# Patient Record
Sex: MALE | Race: OTHER | HISPANIC OR LATINO
[De-identification: names, ages, dates, MRNs, and addresses within clinical notes are randomized per-mention and may not be internally consistent; named-entity substitution may affect disease eponyms.]

---

## 2017-01-11 ENCOUNTER — LABORATORY RESULT (OUTPATIENT)
Age: 67
End: 2017-01-11

## 2017-01-11 ENCOUNTER — APPOINTMENT (OUTPATIENT)
Dept: INTERNAL MEDICINE | Facility: CLINIC | Age: 67
End: 2017-01-11

## 2017-01-11 ENCOUNTER — OUTPATIENT (OUTPATIENT)
Dept: OUTPATIENT SERVICES | Facility: HOSPITAL | Age: 67
LOS: 1 days | End: 2017-01-11
Payer: MEDICAID

## 2017-01-11 VITALS
SYSTOLIC BLOOD PRESSURE: 110 MMHG | DIASTOLIC BLOOD PRESSURE: 70 MMHG | BODY MASS INDEX: 27.29 KG/M2 | RESPIRATION RATE: 14 BRPM | WEIGHT: 164 LBS | HEART RATE: 66 BPM

## 2017-01-11 DIAGNOSIS — I10 ESSENTIAL (PRIMARY) HYPERTENSION: ICD-10-CM

## 2017-01-11 DIAGNOSIS — Z90.89 ACQUIRED ABSENCE OF OTHER ORGANS: Chronic | ICD-10-CM

## 2017-01-11 DIAGNOSIS — Z95.5 PRESENCE OF CORONARY ANGIOPLASTY IMPLANT AND GRAFT: Chronic | ICD-10-CM

## 2017-01-11 DIAGNOSIS — N20.0 CALCULUS OF KIDNEY: Chronic | ICD-10-CM

## 2017-01-11 DIAGNOSIS — D11.0 BENIGN NEOPLASM OF PAROTID GLAND: Chronic | ICD-10-CM

## 2017-01-11 LAB — HBA1C BLD-MCNC: 6.7 % — HIGH (ref 4–5.6)

## 2017-01-11 PROCEDURE — G0463: CPT

## 2017-01-11 PROCEDURE — 83036 HEMOGLOBIN GLYCOSYLATED A1C: CPT

## 2017-01-11 PROCEDURE — 80061 LIPID PANEL: CPT

## 2017-01-11 PROCEDURE — 80048 BASIC METABOLIC PNL TOTAL CA: CPT

## 2017-01-12 LAB
ANION GAP SERPL CALC-SCNC: 16 MMOL/L — SIGNIFICANT CHANGE UP (ref 5–17)
BUN SERPL-MCNC: 13 MG/DL — SIGNIFICANT CHANGE UP (ref 7–23)
CALCIUM SERPL-MCNC: 9.7 MG/DL — SIGNIFICANT CHANGE UP (ref 8.4–10.5)
CHLORIDE SERPL-SCNC: 99 MMOL/L — SIGNIFICANT CHANGE UP (ref 96–108)
CHOLEST SERPL-MCNC: 177 MG/DL — SIGNIFICANT CHANGE UP (ref 10–199)
CO2 SERPL-SCNC: 27 MMOL/L — SIGNIFICANT CHANGE UP (ref 22–31)
CREAT SERPL-MCNC: 0.68 MG/DL — SIGNIFICANT CHANGE UP (ref 0.5–1.3)
GLUCOSE SERPL-MCNC: 108 MG/DL — HIGH (ref 70–99)
HDLC SERPL-MCNC: 50 MG/DL — SIGNIFICANT CHANGE UP (ref 40–125)
LIPID PNL WITH DIRECT LDL SERPL: 51 MG/DL — SIGNIFICANT CHANGE UP
POTASSIUM SERPL-MCNC: 4.2 MMOL/L — SIGNIFICANT CHANGE UP (ref 3.5–5.3)
POTASSIUM SERPL-SCNC: 4.2 MMOL/L — SIGNIFICANT CHANGE UP (ref 3.5–5.3)
SODIUM SERPL-SCNC: 142 MMOL/L — SIGNIFICANT CHANGE UP (ref 135–145)
TOTAL CHOLESTEROL/HDL RATIO MEASUREMENT: 3.5 RATIO — SIGNIFICANT CHANGE UP (ref 3.4–9.6)
TRIGL SERPL-MCNC: 378 MG/DL — HIGH (ref 10–149)

## 2017-01-13 DIAGNOSIS — E78.5 HYPERLIPIDEMIA, UNSPECIFIED: ICD-10-CM

## 2017-01-13 DIAGNOSIS — E11.9 TYPE 2 DIABETES MELLITUS WITHOUT COMPLICATIONS: ICD-10-CM

## 2017-04-11 ENCOUNTER — APPOINTMENT (OUTPATIENT)
Dept: INTERNAL MEDICINE | Facility: CLINIC | Age: 67
End: 2017-04-11

## 2017-04-17 ENCOUNTER — LABORATORY RESULT (OUTPATIENT)
Age: 67
End: 2017-04-17

## 2017-04-17 ENCOUNTER — OUTPATIENT (OUTPATIENT)
Dept: OUTPATIENT SERVICES | Facility: HOSPITAL | Age: 67
LOS: 1 days | End: 2017-04-17
Payer: MEDICAID

## 2017-04-17 ENCOUNTER — APPOINTMENT (OUTPATIENT)
Dept: INTERNAL MEDICINE | Facility: CLINIC | Age: 67
End: 2017-04-17

## 2017-04-17 VITALS
SYSTOLIC BLOOD PRESSURE: 128 MMHG | BODY MASS INDEX: 26.96 KG/M2 | HEART RATE: 66 BPM | WEIGHT: 162 LBS | DIASTOLIC BLOOD PRESSURE: 86 MMHG | RESPIRATION RATE: 14 BRPM

## 2017-04-17 DIAGNOSIS — Z90.89 ACQUIRED ABSENCE OF OTHER ORGANS: Chronic | ICD-10-CM

## 2017-04-17 DIAGNOSIS — I10 ESSENTIAL (PRIMARY) HYPERTENSION: ICD-10-CM

## 2017-04-17 DIAGNOSIS — Z95.5 PRESENCE OF CORONARY ANGIOPLASTY IMPLANT AND GRAFT: Chronic | ICD-10-CM

## 2017-04-17 DIAGNOSIS — N20.0 CALCULUS OF KIDNEY: Chronic | ICD-10-CM

## 2017-04-17 DIAGNOSIS — D11.0 BENIGN NEOPLASM OF PAROTID GLAND: Chronic | ICD-10-CM

## 2017-04-17 LAB
HCT VFR BLD CALC: 45 % — SIGNIFICANT CHANGE UP (ref 39–50)
HGB BLD-MCNC: 14.9 G/DL — SIGNIFICANT CHANGE UP (ref 13–17)
MCHC RBC-ENTMCNC: 31.8 PG — SIGNIFICANT CHANGE UP (ref 27–34)
MCHC RBC-ENTMCNC: 33.1 GM/DL — SIGNIFICANT CHANGE UP (ref 32–36)
MCV RBC AUTO: 96.2 FL — SIGNIFICANT CHANGE UP (ref 80–100)
PLATELET # BLD AUTO: 323 K/UL — SIGNIFICANT CHANGE UP (ref 150–400)
RBC # BLD: 4.68 M/UL — SIGNIFICANT CHANGE UP (ref 4.2–5.8)
RBC # FLD: 13.7 % — SIGNIFICANT CHANGE UP (ref 10.3–14.5)
WBC # BLD: 10.33 K/UL — SIGNIFICANT CHANGE UP (ref 3.8–10.5)
WBC # FLD AUTO: 10.33 K/UL — SIGNIFICANT CHANGE UP (ref 3.8–10.5)

## 2017-04-17 PROCEDURE — 87338 HPYLORI STOOL AG IA: CPT

## 2017-04-17 PROCEDURE — G0463: CPT

## 2017-04-17 PROCEDURE — 83036 HEMOGLOBIN GLYCOSYLATED A1C: CPT

## 2017-04-17 PROCEDURE — 85027 COMPLETE CBC AUTOMATED: CPT

## 2017-04-18 LAB — HBA1C BLD-MCNC: 6.9 % — HIGH (ref 4–5.6)

## 2017-04-20 LAB — H PYLORI AG STL QL: SIGNIFICANT CHANGE UP

## 2017-05-02 DIAGNOSIS — E11.9 TYPE 2 DIABETES MELLITUS WITHOUT COMPLICATIONS: ICD-10-CM

## 2017-05-30 ENCOUNTER — APPOINTMENT (OUTPATIENT)
Dept: INTERNAL MEDICINE | Facility: CLINIC | Age: 67
End: 2017-05-30

## 2017-06-06 ENCOUNTER — OUTPATIENT (OUTPATIENT)
Dept: OUTPATIENT SERVICES | Facility: HOSPITAL | Age: 67
LOS: 1 days | End: 2017-06-06
Payer: MEDICAID

## 2017-06-06 ENCOUNTER — APPOINTMENT (OUTPATIENT)
Dept: INTERNAL MEDICINE | Facility: CLINIC | Age: 67
End: 2017-06-06

## 2017-06-06 VITALS
WEIGHT: 165 LBS | DIASTOLIC BLOOD PRESSURE: 70 MMHG | OXYGEN SATURATION: 95 % | HEIGHT: 65 IN | HEART RATE: 63 BPM | BODY MASS INDEX: 27.49 KG/M2 | SYSTOLIC BLOOD PRESSURE: 130 MMHG

## 2017-06-06 DIAGNOSIS — Z95.5 PRESENCE OF CORONARY ANGIOPLASTY IMPLANT AND GRAFT: Chronic | ICD-10-CM

## 2017-06-06 DIAGNOSIS — D11.0 BENIGN NEOPLASM OF PAROTID GLAND: Chronic | ICD-10-CM

## 2017-06-06 DIAGNOSIS — M19.90 UNSPECIFIED OSTEOARTHRITIS, UNSPECIFIED SITE: ICD-10-CM

## 2017-06-06 DIAGNOSIS — I10 ESSENTIAL (PRIMARY) HYPERTENSION: ICD-10-CM

## 2017-06-06 DIAGNOSIS — Z90.89 ACQUIRED ABSENCE OF OTHER ORGANS: Chronic | ICD-10-CM

## 2017-06-06 DIAGNOSIS — N20.0 CALCULUS OF KIDNEY: Chronic | ICD-10-CM

## 2017-06-06 PROCEDURE — G0463: CPT

## 2017-06-15 DIAGNOSIS — Z87.891 PERSONAL HISTORY OF NICOTINE DEPENDENCE: ICD-10-CM

## 2017-06-15 DIAGNOSIS — B35.4 TINEA CORPORIS: ICD-10-CM

## 2017-06-15 DIAGNOSIS — M19.90 UNSPECIFIED OSTEOARTHRITIS, UNSPECIFIED SITE: ICD-10-CM

## 2017-08-15 ENCOUNTER — LABORATORY RESULT (OUTPATIENT)
Age: 67
End: 2017-08-15

## 2017-08-15 ENCOUNTER — APPOINTMENT (OUTPATIENT)
Dept: INTERNAL MEDICINE | Facility: CLINIC | Age: 67
End: 2017-08-15

## 2017-08-15 ENCOUNTER — OUTPATIENT (OUTPATIENT)
Dept: OUTPATIENT SERVICES | Facility: HOSPITAL | Age: 67
LOS: 1 days | End: 2017-08-15
Payer: MEDICAID

## 2017-08-15 VITALS
DIASTOLIC BLOOD PRESSURE: 80 MMHG | BODY MASS INDEX: 27.49 KG/M2 | HEART RATE: 68 BPM | RESPIRATION RATE: 14 BRPM | WEIGHT: 165 LBS | HEIGHT: 65 IN | SYSTOLIC BLOOD PRESSURE: 150 MMHG

## 2017-08-15 DIAGNOSIS — I10 ESSENTIAL (PRIMARY) HYPERTENSION: ICD-10-CM

## 2017-08-15 DIAGNOSIS — Z90.89 ACQUIRED ABSENCE OF OTHER ORGANS: Chronic | ICD-10-CM

## 2017-08-15 DIAGNOSIS — Z95.5 PRESENCE OF CORONARY ANGIOPLASTY IMPLANT AND GRAFT: Chronic | ICD-10-CM

## 2017-08-15 DIAGNOSIS — N20.0 CALCULUS OF KIDNEY: Chronic | ICD-10-CM

## 2017-08-15 DIAGNOSIS — D11.0 BENIGN NEOPLASM OF PAROTID GLAND: Chronic | ICD-10-CM

## 2017-08-15 LAB
ANION GAP SERPL CALC-SCNC: 17 MMOL/L — SIGNIFICANT CHANGE UP (ref 5–17)
APPEARANCE UR: CLEAR — SIGNIFICANT CHANGE UP
BILIRUB UR-MCNC: NEGATIVE — SIGNIFICANT CHANGE UP
BUN SERPL-MCNC: 14 MG/DL — SIGNIFICANT CHANGE UP (ref 7–23)
CALCIUM SERPL-MCNC: 9.8 MG/DL — SIGNIFICANT CHANGE UP (ref 8.4–10.5)
CHLORIDE SERPL-SCNC: 100 MMOL/L — SIGNIFICANT CHANGE UP (ref 96–108)
CHOLEST SERPL-MCNC: 193 MG/DL — SIGNIFICANT CHANGE UP (ref 10–199)
CO2 SERPL-SCNC: 25 MMOL/L — SIGNIFICANT CHANGE UP (ref 22–31)
COLOR SPEC: YELLOW — SIGNIFICANT CHANGE UP
CREAT ?TM UR-MCNC: 83 MG/DL — SIGNIFICANT CHANGE UP
CREAT SERPL-MCNC: 0.86 MG/DL — SIGNIFICANT CHANGE UP (ref 0.5–1.3)
DIFF PNL FLD: NEGATIVE — SIGNIFICANT CHANGE UP
GLUCOSE SERPL-MCNC: 96 MG/DL — SIGNIFICANT CHANGE UP (ref 70–99)
GLUCOSE UR QL: NEGATIVE MG/DL — SIGNIFICANT CHANGE UP
HBA1C BLD-MCNC: 6.4 % — HIGH (ref 4–5.6)
HCT VFR BLD CALC: 44.4 % — SIGNIFICANT CHANGE UP (ref 39–50)
HDLC SERPL-MCNC: 45 MG/DL — SIGNIFICANT CHANGE UP (ref 40–125)
HGB BLD-MCNC: 14.1 G/DL — SIGNIFICANT CHANGE UP (ref 13–17)
KETONES UR-MCNC: NEGATIVE — SIGNIFICANT CHANGE UP
LEUKOCYTE ESTERASE UR-ACNC: NEGATIVE — SIGNIFICANT CHANGE UP
LIPID PNL WITH DIRECT LDL SERPL: 104 MG/DL — SIGNIFICANT CHANGE UP
MCHC RBC-ENTMCNC: 30.5 PG — SIGNIFICANT CHANGE UP (ref 27–34)
MCHC RBC-ENTMCNC: 31.8 GM/DL — LOW (ref 32–36)
MCV RBC AUTO: 96.1 FL — SIGNIFICANT CHANGE UP (ref 80–100)
MICROALBUMIN UR-MCNC: 1 MG/DL — SIGNIFICANT CHANGE UP
MICROALBUMIN/CREAT UR-RTO: 12 MG/G — SIGNIFICANT CHANGE UP (ref 0–30)
NITRITE UR-MCNC: NEGATIVE — SIGNIFICANT CHANGE UP
PH UR: 5 — SIGNIFICANT CHANGE UP (ref 5–8)
PLATELET # BLD AUTO: 328 K/UL — SIGNIFICANT CHANGE UP (ref 150–400)
POTASSIUM SERPL-MCNC: 4.5 MMOL/L — SIGNIFICANT CHANGE UP (ref 3.5–5.3)
POTASSIUM SERPL-SCNC: 4.5 MMOL/L — SIGNIFICANT CHANGE UP (ref 3.5–5.3)
PROT UR-MCNC: NEGATIVE MG/DL — SIGNIFICANT CHANGE UP
RBC # BLD: 4.62 M/UL — SIGNIFICANT CHANGE UP (ref 4.2–5.8)
RBC # FLD: 13.9 % — SIGNIFICANT CHANGE UP (ref 10.3–14.5)
SODIUM SERPL-SCNC: 142 MMOL/L — SIGNIFICANT CHANGE UP (ref 135–145)
SP GR SPEC: 1.02 — SIGNIFICANT CHANGE UP (ref 1.01–1.02)
TOTAL CHOLESTEROL/HDL RATIO MEASUREMENT: 4.3 RATIO — SIGNIFICANT CHANGE UP (ref 3.4–9.6)
TRIGL SERPL-MCNC: 219 MG/DL — HIGH (ref 10–149)
TSH SERPL-MCNC: 1.31 UIU/ML — SIGNIFICANT CHANGE UP (ref 0.27–4.2)
UROBILINOGEN FLD QL: NEGATIVE MG/DL — SIGNIFICANT CHANGE UP
WBC # BLD: 8.16 K/UL — SIGNIFICANT CHANGE UP (ref 3.8–10.5)
WBC # FLD AUTO: 8.16 K/UL — SIGNIFICANT CHANGE UP (ref 3.8–10.5)

## 2017-08-15 PROCEDURE — 83036 HEMOGLOBIN GLYCOSYLATED A1C: CPT

## 2017-08-15 PROCEDURE — 82043 UR ALBUMIN QUANTITATIVE: CPT

## 2017-08-15 PROCEDURE — 81003 URINALYSIS AUTO W/O SCOPE: CPT

## 2017-08-15 PROCEDURE — 84443 ASSAY THYROID STIM HORMONE: CPT

## 2017-08-15 PROCEDURE — G0463: CPT

## 2017-08-15 PROCEDURE — 80061 LIPID PANEL: CPT

## 2017-08-15 PROCEDURE — 80048 BASIC METABOLIC PNL TOTAL CA: CPT

## 2017-08-15 PROCEDURE — 85027 COMPLETE CBC AUTOMATED: CPT

## 2017-08-22 DIAGNOSIS — E11.9 TYPE 2 DIABETES MELLITUS WITHOUT COMPLICATIONS: ICD-10-CM

## 2017-08-22 DIAGNOSIS — M54.5 LOW BACK PAIN: ICD-10-CM

## 2017-08-22 DIAGNOSIS — Z87.891 PERSONAL HISTORY OF NICOTINE DEPENDENCE: ICD-10-CM

## 2017-08-22 DIAGNOSIS — E78.5 HYPERLIPIDEMIA, UNSPECIFIED: ICD-10-CM

## 2017-09-05 ENCOUNTER — APPOINTMENT (OUTPATIENT)
Dept: OPHTHALMOLOGY | Facility: CLINIC | Age: 67
End: 2017-09-05

## 2017-09-05 ENCOUNTER — OUTPATIENT (OUTPATIENT)
Dept: OUTPATIENT SERVICES | Facility: HOSPITAL | Age: 67
LOS: 1 days | End: 2017-09-05

## 2017-09-05 DIAGNOSIS — D11.0 BENIGN NEOPLASM OF PAROTID GLAND: Chronic | ICD-10-CM

## 2017-09-05 DIAGNOSIS — Z95.5 PRESENCE OF CORONARY ANGIOPLASTY IMPLANT AND GRAFT: Chronic | ICD-10-CM

## 2017-09-05 DIAGNOSIS — N20.0 CALCULUS OF KIDNEY: Chronic | ICD-10-CM

## 2017-09-05 DIAGNOSIS — Z90.89 ACQUIRED ABSENCE OF OTHER ORGANS: Chronic | ICD-10-CM

## 2017-09-14 DIAGNOSIS — E13.9 OTHER SPECIFIED DIABETES MELLITUS WITHOUT COMPLICATIONS: ICD-10-CM

## 2017-11-01 ENCOUNTER — RX RENEWAL (OUTPATIENT)
Age: 67
End: 2017-11-01

## 2018-03-26 ENCOUNTER — RX RENEWAL (OUTPATIENT)
Age: 68
End: 2018-03-26

## 2018-03-27 ENCOUNTER — RX RENEWAL (OUTPATIENT)
Age: 68
End: 2018-03-27

## 2018-04-02 ENCOUNTER — TRANSCRIPTION ENCOUNTER (OUTPATIENT)
Age: 68
End: 2018-04-02

## 2018-04-02 ENCOUNTER — EMERGENCY (EMERGENCY)
Facility: HOSPITAL | Age: 68
LOS: 1 days | Discharge: HOME | End: 2018-04-02
Attending: EMERGENCY MEDICINE | Admitting: EMERGENCY MEDICINE
Payer: COMMERCIAL

## 2018-04-02 VITALS
HEART RATE: 68 BPM | RESPIRATION RATE: 18 BRPM | OXYGEN SATURATION: 100 % | DIASTOLIC BLOOD PRESSURE: 85 MMHG | SYSTOLIC BLOOD PRESSURE: 170 MMHG

## 2018-04-02 VITALS
SYSTOLIC BLOOD PRESSURE: 177 MMHG | HEIGHT: 67 IN | RESPIRATION RATE: 18 BRPM | WEIGHT: 167.99 LBS | TEMPERATURE: 98 F | DIASTOLIC BLOOD PRESSURE: 88 MMHG | OXYGEN SATURATION: 97 % | HEART RATE: 67 BPM

## 2018-04-02 DIAGNOSIS — N20.0 CALCULUS OF KIDNEY: Chronic | ICD-10-CM

## 2018-04-02 DIAGNOSIS — Z90.89 ACQUIRED ABSENCE OF OTHER ORGANS: Chronic | ICD-10-CM

## 2018-04-02 DIAGNOSIS — Z95.5 PRESENCE OF CORONARY ANGIOPLASTY IMPLANT AND GRAFT: Chronic | ICD-10-CM

## 2018-04-02 DIAGNOSIS — D11.0 BENIGN NEOPLASM OF PAROTID GLAND: Chronic | ICD-10-CM

## 2018-04-02 LAB
ALBUMIN SERPL ELPH-MCNC: 4.3 G/DL — SIGNIFICANT CHANGE UP (ref 3.3–5)
ALP SERPL-CCNC: 83 U/L — SIGNIFICANT CHANGE UP (ref 40–120)
ALT FLD-CCNC: 16 U/L RC — SIGNIFICANT CHANGE UP (ref 10–45)
ANION GAP SERPL CALC-SCNC: 12 MMOL/L — SIGNIFICANT CHANGE UP (ref 5–17)
AST SERPL-CCNC: 15 U/L — SIGNIFICANT CHANGE UP (ref 10–40)
BASE EXCESS BLDV CALC-SCNC: 3.4 MMOL/L — HIGH (ref -2–2)
BASOPHILS # BLD AUTO: 0.1 K/UL — SIGNIFICANT CHANGE UP (ref 0–0.2)
BASOPHILS NFR BLD AUTO: 0.6 % — SIGNIFICANT CHANGE UP (ref 0–2)
BILIRUB SERPL-MCNC: 0.4 MG/DL — SIGNIFICANT CHANGE UP (ref 0.2–1.2)
BUN SERPL-MCNC: 14 MG/DL — SIGNIFICANT CHANGE UP (ref 7–23)
CA-I SERPL-SCNC: 1.17 MMOL/L — SIGNIFICANT CHANGE UP (ref 1.12–1.3)
CALCIUM SERPL-MCNC: 10.3 MG/DL — SIGNIFICANT CHANGE UP (ref 8.4–10.5)
CHLORIDE BLDV-SCNC: 100 MMOL/L — SIGNIFICANT CHANGE UP (ref 96–108)
CHLORIDE SERPL-SCNC: 99 MMOL/L — SIGNIFICANT CHANGE UP (ref 96–108)
CO2 BLDV-SCNC: 33 MMOL/L — HIGH (ref 22–30)
CO2 SERPL-SCNC: 28 MMOL/L — SIGNIFICANT CHANGE UP (ref 22–31)
CREAT SERPL-MCNC: 0.88 MG/DL — SIGNIFICANT CHANGE UP (ref 0.5–1.3)
EOSINOPHIL # BLD AUTO: 0.1 K/UL — SIGNIFICANT CHANGE UP (ref 0–0.5)
EOSINOPHIL NFR BLD AUTO: 1.7 % — SIGNIFICANT CHANGE UP (ref 0–6)
GAS PNL BLDV: 132 MMOL/L — LOW (ref 136–145)
GAS PNL BLDV: SIGNIFICANT CHANGE UP
GAS PNL BLDV: SIGNIFICANT CHANGE UP
GLUCOSE BLDV-MCNC: 108 MG/DL — HIGH (ref 70–99)
GLUCOSE SERPL-MCNC: 113 MG/DL — HIGH (ref 70–99)
HCO3 BLDV-SCNC: 31 MMOL/L — HIGH (ref 21–29)
HCT VFR BLD CALC: 45.5 % — SIGNIFICANT CHANGE UP (ref 39–50)
HCT VFR BLDA CALC: 46 % — SIGNIFICANT CHANGE UP (ref 39–50)
HGB BLD CALC-MCNC: 15.2 G/DL — SIGNIFICANT CHANGE UP (ref 13–17)
HGB BLD-MCNC: 15.3 G/DL — SIGNIFICANT CHANGE UP (ref 13–17)
LACTATE BLDV-MCNC: 2.6 MMOL/L — HIGH (ref 0.7–2)
LIDOCAIN IGE QN: 145 U/L — HIGH (ref 7–60)
LYMPHOCYTES # BLD AUTO: 1.2 K/UL — SIGNIFICANT CHANGE UP (ref 1–3.3)
LYMPHOCYTES # BLD AUTO: 15.6 % — SIGNIFICANT CHANGE UP (ref 13–44)
MCHC RBC-ENTMCNC: 32.6 PG — SIGNIFICANT CHANGE UP (ref 27–34)
MCHC RBC-ENTMCNC: 33.6 GM/DL — SIGNIFICANT CHANGE UP (ref 32–36)
MCV RBC AUTO: 97.2 FL — SIGNIFICANT CHANGE UP (ref 80–100)
MONOCYTES # BLD AUTO: 0.6 K/UL — SIGNIFICANT CHANGE UP (ref 0–0.9)
MONOCYTES NFR BLD AUTO: 7.4 % — SIGNIFICANT CHANGE UP (ref 2–14)
NEUTROPHILS # BLD AUTO: 6 K/UL — SIGNIFICANT CHANGE UP (ref 1.8–7.4)
NEUTROPHILS NFR BLD AUTO: 74.7 % — SIGNIFICANT CHANGE UP (ref 43–77)
PCO2 BLDV: 63 MMHG — HIGH (ref 35–50)
PH BLDV: 7.32 — LOW (ref 7.35–7.45)
PLATELET # BLD AUTO: 302 K/UL — SIGNIFICANT CHANGE UP (ref 150–400)
PO2 BLDV: 28 MMHG — SIGNIFICANT CHANGE UP (ref 25–45)
POTASSIUM BLDV-SCNC: 5.1 MMOL/L — HIGH (ref 3.5–5)
POTASSIUM SERPL-MCNC: 4.6 MMOL/L — SIGNIFICANT CHANGE UP (ref 3.5–5.3)
POTASSIUM SERPL-SCNC: 4.6 MMOL/L — SIGNIFICANT CHANGE UP (ref 3.5–5.3)
PROT SERPL-MCNC: 7.6 G/DL — SIGNIFICANT CHANGE UP (ref 6–8.3)
RBC # BLD: 4.68 M/UL — SIGNIFICANT CHANGE UP (ref 4.2–5.8)
RBC # FLD: 11.8 % — SIGNIFICANT CHANGE UP (ref 10.3–14.5)
SAO2 % BLDV: 42 % — LOW (ref 67–88)
SODIUM SERPL-SCNC: 139 MMOL/L — SIGNIFICANT CHANGE UP (ref 135–145)
WBC # BLD: 8 K/UL — SIGNIFICANT CHANGE UP (ref 3.8–10.5)
WBC # FLD AUTO: 8 K/UL — SIGNIFICANT CHANGE UP (ref 3.8–10.5)

## 2018-04-02 PROCEDURE — 99284 EMERGENCY DEPT VISIT MOD MDM: CPT | Mod: 25

## 2018-04-02 PROCEDURE — 82435 ASSAY OF BLOOD CHLORIDE: CPT

## 2018-04-02 PROCEDURE — 83605 ASSAY OF LACTIC ACID: CPT

## 2018-04-02 PROCEDURE — 82803 BLOOD GASES ANY COMBINATION: CPT

## 2018-04-02 PROCEDURE — 96374 THER/PROPH/DIAG INJ IV PUSH: CPT | Mod: XU

## 2018-04-02 PROCEDURE — 83690 ASSAY OF LIPASE: CPT

## 2018-04-02 PROCEDURE — 74019 RADEX ABDOMEN 2 VIEWS: CPT

## 2018-04-02 PROCEDURE — 84295 ASSAY OF SERUM SODIUM: CPT

## 2018-04-02 PROCEDURE — 82947 ASSAY GLUCOSE BLOOD QUANT: CPT

## 2018-04-02 PROCEDURE — 74019 RADEX ABDOMEN 2 VIEWS: CPT | Mod: 26

## 2018-04-02 PROCEDURE — 80053 COMPREHEN METABOLIC PANEL: CPT

## 2018-04-02 PROCEDURE — 84132 ASSAY OF SERUM POTASSIUM: CPT

## 2018-04-02 PROCEDURE — 74177 CT ABD & PELVIS W/CONTRAST: CPT

## 2018-04-02 PROCEDURE — 74177 CT ABD & PELVIS W/CONTRAST: CPT | Mod: 26

## 2018-04-02 PROCEDURE — 99284 EMERGENCY DEPT VISIT MOD MDM: CPT

## 2018-04-02 PROCEDURE — 82330 ASSAY OF CALCIUM: CPT

## 2018-04-02 PROCEDURE — 85014 HEMATOCRIT: CPT

## 2018-04-02 PROCEDURE — 85027 COMPLETE CBC AUTOMATED: CPT

## 2018-04-02 RX ORDER — SODIUM CHLORIDE 9 MG/ML
500 INJECTION, SOLUTION INTRAVENOUS ONCE
Qty: 0 | Refills: 0 | Status: COMPLETED | OUTPATIENT
Start: 2018-04-02 | End: 2018-04-02

## 2018-04-02 RX ORDER — FAMOTIDINE 10 MG/ML
20 INJECTION INTRAVENOUS ONCE
Qty: 0 | Refills: 0 | Status: COMPLETED | OUTPATIENT
Start: 2018-04-02 | End: 2018-04-02

## 2018-04-02 RX ORDER — POLYETHYLENE GLYCOL 3350 17 G/17G
17 POWDER, FOR SOLUTION ORAL ONCE
Qty: 0 | Refills: 0 | Status: DISCONTINUED | OUTPATIENT
Start: 2018-04-02 | End: 2018-04-02

## 2018-04-02 RX ADMIN — SODIUM CHLORIDE 500 MILLILITER(S): 9 INJECTION, SOLUTION INTRAVENOUS at 10:36

## 2018-04-02 RX ADMIN — FAMOTIDINE 20 MILLIGRAM(S): 10 INJECTION INTRAVENOUS at 10:36

## 2018-04-02 NOTE — ED ADULT NURSE NOTE - OBJECTIVE STATEMENT
67 yr old male with h/o HTN , Type 2 diabetes and stent c/o gen abd nonrad pain x 8 days Afebrile Denies chest pain or sob denies n/v has not had a BM x 4 days and c/o increased pain after eating. Denies burn or diff urinating Trace blood in urine at first med this am.

## 2018-04-02 NOTE — ED PROVIDER NOTE - OBJECTIVE STATEMENT
66 yo M w/ PMHx of HTN, HLD, T2DM, CAD s/p PCI w/ stent placement, GERD p/w abdominal pain.  The patient reports that he has had intermittent abdominal pain over the past 8 days.  Pain is diffuse and associated with abdominal distention, constipation and chills.  Patient is passing flatus.  Eating and drinking without nausea/vomiting, but his appetite has been poor lately.  Patient endorses that he had similar symptoms ~6months ago and saw a gastroenterologist who started him on protonix with improvement of symptoms.  he took protonix for a few months, but stopped 2 months ago.  Patient restarted his protonix 4 days ago and endorses that his symptoms have slowly started to improve.  Denies fevers, cp, sob, nausea/vomiting, diarrhea, dysuria.

## 2018-04-02 NOTE — ED PROVIDER NOTE - ATTENDING CONTRIBUTION TO CARE
Attending MD Cooper:   I personally have seen and examined this patient.  Physician assistant note reviewed and agree on plan of care and except where noted.  See HPI, PE, and MDM for details.

## 2018-04-02 NOTE — ED PROVIDER NOTE - PLAN OF CARE
1.  Stay well hydrated.  2.  Eat a bland diet  3.  Take Pantoprazole as previously prescribed.  Start taking Miralax, one cap full per day.  4.  Follow up with your PMD in 2-3 days.  bring a copy of your results with you to your appointment       Follow up with your gastroenterologist upon discharge.  5.  Return to the ER for worsening pain, nausea/vomiting or any other concerning symptoms

## 2018-04-02 NOTE — ED PROVIDER NOTE - PMH
BPH (benign prostatic hypertrophy)    CAD (coronary artery disease)    Hyperlipidemia    Hypertension    Type 2 diabetes mellitus    Urinary calculus

## 2018-04-02 NOTE — ED PROVIDER NOTE - PROGRESS NOTE DETAILS
abdominal pain has improved.  Tolerating PO.  CT Abd/pelvis negative for acute intraabdominal pathology.  Patient to be discharged on omeprazole and miralax with close GI follow up.  -Reji Martinez PA-C

## 2018-04-02 NOTE — ED PROVIDER NOTE - MEDICAL DECISION MAKING DETAILS
Attending MD Cooper: 67M presenting with mid abdominal pain and constipation x 4 days. Nontender abdomen currently, low suspicion for bowel obstruction or other acute bowel pathology. Plan for screening abdominal XR, labs, IV fluids and PO challenge. If alarming bloodwork, will pursue CT a/p

## 2018-04-02 NOTE — ED PROVIDER NOTE - PSH
Benign parotid tumor  s/p excision x 2 2008 and 2010 (left/right)  History of tonsillectomy    Renal calculus  s/p laser litho  Stented coronary artery  2009 - BMS to RCA

## 2018-04-02 NOTE — ED PROVIDER NOTE - PHYSICAL EXAMINATION
Gen: AAO x 3, NAD  Skin: No rashes or lesions  HEENT: NC/AT, PERRLA, EOMI, MMM  Resp: unlabored CTAB  Cardiac: rrr s1s2, no murmurs, rubs or gallops  GI: distended, +BS, Soft, NT  Ext: no pedal edema, FROM in all extremities  Neuro: no focal deficits

## 2018-04-02 NOTE — ED PROVIDER NOTE - CARE PLAN
Principal Discharge DX:	Abdominal pain  Assessment and plan of treatment:	1.  Stay well hydrated.  2.  Eat a bland diet  3.  Take Pantoprazole as previously prescribed.  Start taking Miralax, one cap full per day.  4.  Follow up with your PMD in 2-3 days.  bring a copy of your results with you to your appointment       Follow up with your gastroenterologist upon discharge.  5.  Return to the ER for worsening pain, nausea/vomiting or any other concerning symptoms  Secondary Diagnosis:	Constipation

## 2018-05-11 ENCOUNTER — OUTPATIENT (OUTPATIENT)
Dept: OUTPATIENT SERVICES | Facility: HOSPITAL | Age: 68
LOS: 1 days | End: 2018-05-11
Payer: COMMERCIAL

## 2018-05-11 ENCOUNTER — APPOINTMENT (OUTPATIENT)
Dept: INTERNAL MEDICINE | Facility: CLINIC | Age: 68
End: 2018-05-11
Payer: MEDICARE

## 2018-05-11 VITALS
DIASTOLIC BLOOD PRESSURE: 70 MMHG | HEIGHT: 65 IN | OXYGEN SATURATION: 95 % | BODY MASS INDEX: 27.32 KG/M2 | SYSTOLIC BLOOD PRESSURE: 140 MMHG | WEIGHT: 164 LBS | HEART RATE: 68 BPM

## 2018-05-11 DIAGNOSIS — I10 ESSENTIAL (PRIMARY) HYPERTENSION: ICD-10-CM

## 2018-05-11 DIAGNOSIS — Z90.89 ACQUIRED ABSENCE OF OTHER ORGANS: Chronic | ICD-10-CM

## 2018-05-11 DIAGNOSIS — D11.0 BENIGN NEOPLASM OF PAROTID GLAND: Chronic | ICD-10-CM

## 2018-05-11 DIAGNOSIS — Z95.5 PRESENCE OF CORONARY ANGIOPLASTY IMPLANT AND GRAFT: Chronic | ICD-10-CM

## 2018-05-11 DIAGNOSIS — N20.0 CALCULUS OF KIDNEY: Chronic | ICD-10-CM

## 2018-05-11 LAB — HBA1C MFR BLD HPLC: 6.9

## 2018-05-11 PROCEDURE — G0463: CPT

## 2018-05-11 PROCEDURE — 99213 OFFICE O/P EST LOW 20 MIN: CPT | Mod: GE

## 2018-05-11 RX ORDER — BUPROPION HYDROCHLORIDE 150 MG/1
150 TABLET, EXTENDED RELEASE ORAL
Qty: 60 | Refills: 1 | Status: DISCONTINUED | COMMUNITY
Start: 2017-04-17 | End: 2018-05-11

## 2018-05-17 DIAGNOSIS — E11.9 TYPE 2 DIABETES MELLITUS WITHOUT COMPLICATIONS: ICD-10-CM

## 2018-05-17 DIAGNOSIS — E78.5 HYPERLIPIDEMIA, UNSPECIFIED: ICD-10-CM

## 2018-07-10 ENCOUNTER — OUTPATIENT (OUTPATIENT)
Dept: OUTPATIENT SERVICES | Facility: HOSPITAL | Age: 68
LOS: 1 days | End: 2018-07-10

## 2018-07-10 ENCOUNTER — APPOINTMENT (OUTPATIENT)
Dept: OPHTHALMOLOGY | Facility: CLINIC | Age: 68
End: 2018-07-10

## 2018-07-10 DIAGNOSIS — D11.0 BENIGN NEOPLASM OF PAROTID GLAND: Chronic | ICD-10-CM

## 2018-07-10 DIAGNOSIS — Z95.5 PRESENCE OF CORONARY ANGIOPLASTY IMPLANT AND GRAFT: Chronic | ICD-10-CM

## 2018-07-10 DIAGNOSIS — N20.0 CALCULUS OF KIDNEY: Chronic | ICD-10-CM

## 2018-07-10 DIAGNOSIS — Z90.89 ACQUIRED ABSENCE OF OTHER ORGANS: Chronic | ICD-10-CM

## 2018-08-13 ENCOUNTER — APPOINTMENT (OUTPATIENT)
Dept: OPHTHALMOLOGY | Facility: CLINIC | Age: 68
End: 2018-08-13

## 2018-09-11 ENCOUNTER — APPOINTMENT (OUTPATIENT)
Dept: OPHTHALMOLOGY | Facility: CLINIC | Age: 68
End: 2018-09-11

## 2018-11-23 ENCOUNTER — LABORATORY RESULT (OUTPATIENT)
Age: 68
End: 2018-11-23

## 2018-11-23 ENCOUNTER — OUTPATIENT (OUTPATIENT)
Dept: OUTPATIENT SERVICES | Facility: HOSPITAL | Age: 68
LOS: 1 days | End: 2018-11-23
Payer: COMMERCIAL

## 2018-11-23 ENCOUNTER — APPOINTMENT (OUTPATIENT)
Dept: INTERNAL MEDICINE | Facility: CLINIC | Age: 68
End: 2018-11-23
Payer: SELF-PAY

## 2018-11-23 VITALS
OXYGEN SATURATION: 95 % | WEIGHT: 164 LBS | DIASTOLIC BLOOD PRESSURE: 70 MMHG | HEART RATE: 74 BPM | HEIGHT: 65 IN | BODY MASS INDEX: 27.32 KG/M2 | SYSTOLIC BLOOD PRESSURE: 146 MMHG

## 2018-11-23 DIAGNOSIS — I10 ESSENTIAL (PRIMARY) HYPERTENSION: ICD-10-CM

## 2018-11-23 DIAGNOSIS — E78.5 HYPERLIPIDEMIA, UNSPECIFIED: ICD-10-CM

## 2018-11-23 DIAGNOSIS — E11.9 TYPE 2 DIABETES MELLITUS WITHOUT COMPLICATIONS: ICD-10-CM

## 2018-11-23 DIAGNOSIS — N20.0 CALCULUS OF KIDNEY: Chronic | ICD-10-CM

## 2018-11-23 DIAGNOSIS — D11.0 BENIGN NEOPLASM OF PAROTID GLAND: Chronic | ICD-10-CM

## 2018-11-23 DIAGNOSIS — K29.70 GASTRITIS, UNSPECIFIED, WITHOUT BLEEDING: ICD-10-CM

## 2018-11-23 DIAGNOSIS — Z95.5 PRESENCE OF CORONARY ANGIOPLASTY IMPLANT AND GRAFT: Chronic | ICD-10-CM

## 2018-11-23 DIAGNOSIS — Z90.89 ACQUIRED ABSENCE OF OTHER ORGANS: Chronic | ICD-10-CM

## 2018-11-23 LAB
ALBUMIN SERPL ELPH-MCNC: 4.2 G/DL — SIGNIFICANT CHANGE UP (ref 3.3–5)
ALP SERPL-CCNC: 97 U/L — SIGNIFICANT CHANGE UP (ref 30–120)
ALT FLD-CCNC: 18 U/L — SIGNIFICANT CHANGE UP (ref 10–45)
ANION GAP SERPL CALC-SCNC: 13 MMOL/L — SIGNIFICANT CHANGE UP (ref 5–17)
AST SERPL-CCNC: 20 U/L — SIGNIFICANT CHANGE UP (ref 10–40)
BILIRUB SERPL-MCNC: 0.5 MG/DL — SIGNIFICANT CHANGE UP (ref 0.2–1.2)
BUN SERPL-MCNC: 19 MG/DL — SIGNIFICANT CHANGE UP (ref 7–23)
CALCIUM SERPL-MCNC: 10.1 MG/DL — SIGNIFICANT CHANGE UP (ref 8.4–10.5)
CHLORIDE SERPL-SCNC: 97 MMOL/L — SIGNIFICANT CHANGE UP (ref 96–108)
CHOLEST SERPL-MCNC: 152 MG/DL — SIGNIFICANT CHANGE UP (ref 10–199)
CO2 SERPL-SCNC: 26 MMOL/L — SIGNIFICANT CHANGE UP (ref 22–31)
CREAT ?TM UR-MCNC: 109 MG/DL — SIGNIFICANT CHANGE UP
CREAT SERPL-MCNC: 0.98 MG/DL — SIGNIFICANT CHANGE UP (ref 0.5–1.3)
GLUCOSE SERPL-MCNC: 116 MG/DL — HIGH (ref 70–99)
HBA1C BLD-MCNC: 7 % — HIGH (ref 4–5.6)
HCT VFR BLD CALC: 45.7 % — SIGNIFICANT CHANGE UP (ref 39–50)
HDLC SERPL-MCNC: 42 MG/DL — SIGNIFICANT CHANGE UP
HGB BLD-MCNC: 14.5 G/DL — SIGNIFICANT CHANGE UP (ref 13–17)
LIPID PNL WITH DIRECT LDL SERPL: 79 MG/DL — SIGNIFICANT CHANGE UP
MCHC RBC-ENTMCNC: 30.8 PG — SIGNIFICANT CHANGE UP (ref 27–34)
MCHC RBC-ENTMCNC: 31.7 GM/DL — LOW (ref 32–36)
MCV RBC AUTO: 97 FL — SIGNIFICANT CHANGE UP (ref 80–100)
MICROALBUMIN UR-MCNC: <1.2 MG/DL — SIGNIFICANT CHANGE UP
MICROALBUMIN/CREAT UR-RTO: SIGNIFICANT CHANGE UP (ref 0–30)
PLATELET # BLD AUTO: 354 K/UL — SIGNIFICANT CHANGE UP (ref 150–400)
POTASSIUM SERPL-MCNC: 5 MMOL/L — SIGNIFICANT CHANGE UP (ref 3.5–5.3)
POTASSIUM SERPL-SCNC: 5 MMOL/L — SIGNIFICANT CHANGE UP (ref 3.5–5.3)
PROT SERPL-MCNC: 7 G/DL — SIGNIFICANT CHANGE UP (ref 6–8.3)
RBC # BLD: 4.71 M/UL — SIGNIFICANT CHANGE UP (ref 4.2–5.8)
RBC # FLD: 13.8 % — SIGNIFICANT CHANGE UP (ref 10.3–14.5)
SODIUM SERPL-SCNC: 136 MMOL/L — SIGNIFICANT CHANGE UP (ref 135–145)
TOTAL CHOLESTEROL/HDL RATIO MEASUREMENT: 3.6 RATIO — SIGNIFICANT CHANGE UP (ref 3.4–9.6)
TRIGL SERPL-MCNC: 155 MG/DL — HIGH (ref 10–149)
WBC # BLD: 9.37 K/UL — SIGNIFICANT CHANGE UP (ref 3.8–10.5)
WBC # FLD AUTO: 9.37 K/UL — SIGNIFICANT CHANGE UP (ref 3.8–10.5)

## 2018-11-23 PROCEDURE — G0463: CPT

## 2018-11-23 PROCEDURE — 83036 HEMOGLOBIN GLYCOSYLATED A1C: CPT

## 2018-11-23 PROCEDURE — 82043 UR ALBUMIN QUANTITATIVE: CPT

## 2018-11-23 PROCEDURE — 85027 COMPLETE CBC AUTOMATED: CPT

## 2018-11-23 PROCEDURE — 99213 OFFICE O/P EST LOW 20 MIN: CPT | Mod: GE

## 2018-11-23 PROCEDURE — 80061 LIPID PANEL: CPT

## 2018-11-23 PROCEDURE — 80053 COMPREHEN METABOLIC PANEL: CPT

## 2018-11-23 NOTE — HEALTH RISK ASSESSMENT
[Very Good] : ~his/her~  mood as very good [] : Yes [No falls in past year] : Patient reported no falls in the past year [0] : 2) Feeling down, depressed, or hopeless: Not at all (0) [Patient reported colonoscopy was normal] : Patient reported colonoscopy was normal [With Family] : lives with family [Fully functional (bathing, dressing, toileting, transferring, walking, feeding)] : Fully functional (bathing, dressing, toileting, transferring, walking, feeding) [Fully functional (using the telephone, shopping, preparing meals, housekeeping, doing laundry, using] : Fully functional and needs no help or supervision to perform IADLs (using the telephone, shopping, preparing meals, housekeeping, doing laundry, using transportation, managing medications and managing finances) [de-identified] : 1/2 pack per day for 45 years. 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

## 2018-11-23 NOTE — REVIEW OF SYSTEMS
[Fever] : no fever [Chills] : no chills [Chest Pain] : no chest pain [Palpitations] : no palpitations [Shortness Of Breath] : no shortness of breath [Cough] : no cough [Abdominal Pain] : no abdominal pain [Nausea] : no nausea [Vomiting] : no vomiting [Hematuria] : no hematuria [Frequency] : no frequency [Joint Swelling] : no joint swelling [Headache] : no headache [Dizziness] : no dizziness [FreeTextEntry9] : b/l wrist pain

## 2018-11-23 NOTE — HISTORY OF PRESENT ILLNESS
[FreeTextEntry1] : Annual CPE [de-identified] : Patient is a 69 yo M w/ TIIDM, HTN, CAD stent to RCA 2009, dyslipidemia, and gastritis presenting for annual CPE. He reports b/l wrist pain that started today morning. He rated his pain as 1/10. He has had these sx in the past and it is generally triggered by cold weather. He is currently asymptomatic. Denies SOB, chest pain, fever, chills, headache, dizziness, nausea, vomiting, and palpitations. Pt is compliant with his medications. He is currently taking metformin 1000 mg bid, atorvastatin 40 mg, aspirin 81 mg, lisinopril 20 mg, metoprolol 20 mg, niacin 500 mg 2 tabs at bedtime and pantoprazole. He has had diabetic eye exam few months ago and it was normal.

## 2018-11-23 NOTE — ASSESSMENT
[FreeTextEntry1] : Patient is a 66 yo M w/ TIIDM, HTN, CAD stent to RCA 2009, dyslipidemia, and gastritis presenting for annual CPE.\par \par # T2DM\par - Last HgA1c (5/2018)- 6.9\par - UTD Diabetic Eye Exam- no cataracts\par - refilled metformin 1000 mg BID\par - f/u HgA1c, urine microalbumin/creatinine\par \par # Gastritis\par - Pt reports that his sx have been stable. Advised to d/c pantoprazole for two weeks and see if his sx resolve. If they don't, then resume pantoprazole.\par \par # Dyslipidemia\par - refilled atorvastatin and niacin\par \par # HTN\par - refilled lisinopril 20 mg once a day\par \par HCM\par - high dose influenza shot administered to LT arm\par - PCV13 administered to RT arm\par - f/u cbc, cmp, lipid panel\par \par - case d/w Dr. Waite

## 2018-11-23 NOTE — PHYSICAL EXAM
[No Acute Distress] : no acute distress [Well Nourished] : well nourished [Well Developed] : well developed [PERRL] : pupils equal round and reactive to light [EOMI] : extraocular movements intact [Supple] : supple [Clear to Auscultation] : lungs were clear to auscultation bilaterally [No Accessory Muscle Use] : no accessory muscle use [Regular Rhythm] : with a regular rhythm [Normal S1, S2] : normal S1 and S2 [Soft] : abdomen soft [Non Tender] : non-tender [No HSM] : no HSM [No Focal Deficits] : no focal deficits [de-identified] : holosystolic murmur at the apex

## 2018-11-26 DIAGNOSIS — H35.81 RETINAL EDEMA: ICD-10-CM

## 2019-02-18 ENCOUNTER — EMERGENCY (EMERGENCY)
Facility: HOSPITAL | Age: 69
LOS: 1 days | Discharge: HOME | End: 2019-02-18
Attending: EMERGENCY MEDICINE | Admitting: EMERGENCY MEDICINE
Payer: MEDICARE

## 2019-02-18 VITALS
SYSTOLIC BLOOD PRESSURE: 155 MMHG | DIASTOLIC BLOOD PRESSURE: 74 MMHG | TEMPERATURE: 96 F | HEART RATE: 76 BPM | RESPIRATION RATE: 16 BRPM

## 2019-02-18 DIAGNOSIS — Z90.89 ACQUIRED ABSENCE OF OTHER ORGANS: Chronic | ICD-10-CM

## 2019-02-18 DIAGNOSIS — Z95.5 PRESENCE OF CORONARY ANGIOPLASTY IMPLANT AND GRAFT: Chronic | ICD-10-CM

## 2019-02-18 DIAGNOSIS — N20.0 CALCULUS OF KIDNEY: Chronic | ICD-10-CM

## 2019-02-18 DIAGNOSIS — D11.0 BENIGN NEOPLASM OF PAROTID GLAND: Chronic | ICD-10-CM

## 2019-02-18 LAB
ALBUMIN SERPL ELPH-MCNC: 4.1 G/DL — SIGNIFICANT CHANGE UP (ref 3.3–5)
ALP SERPL-CCNC: 85 U/L — SIGNIFICANT CHANGE UP (ref 40–120)
ALT FLD-CCNC: 14 U/L — SIGNIFICANT CHANGE UP (ref 4–41)
ANION GAP SERPL CALC-SCNC: 15 MMO/L — HIGH (ref 7–14)
AST SERPL-CCNC: 13 U/L — SIGNIFICANT CHANGE UP (ref 4–40)
BASOPHILS # BLD AUTO: 0.06 K/UL — SIGNIFICANT CHANGE UP (ref 0–0.2)
BASOPHILS NFR BLD AUTO: 0.8 % — SIGNIFICANT CHANGE UP (ref 0–2)
BILIRUB SERPL-MCNC: 0.4 MG/DL — SIGNIFICANT CHANGE UP (ref 0.2–1.2)
BUN SERPL-MCNC: 9 MG/DL — SIGNIFICANT CHANGE UP (ref 7–23)
CALCIUM SERPL-MCNC: 9.5 MG/DL — SIGNIFICANT CHANGE UP (ref 8.4–10.5)
CHLORIDE SERPL-SCNC: 93 MMOL/L — LOW (ref 98–107)
CO2 SERPL-SCNC: 28 MMOL/L — SIGNIFICANT CHANGE UP (ref 22–31)
CREAT SERPL-MCNC: 0.86 MG/DL — SIGNIFICANT CHANGE UP (ref 0.5–1.3)
EOSINOPHIL # BLD AUTO: 0.19 K/UL — SIGNIFICANT CHANGE UP (ref 0–0.5)
EOSINOPHIL NFR BLD AUTO: 2.4 % — SIGNIFICANT CHANGE UP (ref 0–6)
GLUCOSE SERPL-MCNC: 135 MG/DL — HIGH (ref 70–99)
HCT VFR BLD CALC: 41.6 % — SIGNIFICANT CHANGE UP (ref 39–50)
HGB BLD-MCNC: 13.5 G/DL — SIGNIFICANT CHANGE UP (ref 13–17)
IMM GRANULOCYTES NFR BLD AUTO: 0.3 % — SIGNIFICANT CHANGE UP (ref 0–1.5)
LIDOCAIN IGE QN: 159.4 U/L — HIGH (ref 7–60)
LYMPHOCYTES # BLD AUTO: 1 K/UL — SIGNIFICANT CHANGE UP (ref 1–3.3)
LYMPHOCYTES # BLD AUTO: 12.6 % — LOW (ref 13–44)
MCHC RBC-ENTMCNC: 29.9 PG — SIGNIFICANT CHANGE UP (ref 27–34)
MCHC RBC-ENTMCNC: 32.5 % — SIGNIFICANT CHANGE UP (ref 32–36)
MCV RBC AUTO: 92.2 FL — SIGNIFICANT CHANGE UP (ref 80–100)
MONOCYTES # BLD AUTO: 0.63 K/UL — SIGNIFICANT CHANGE UP (ref 0–0.9)
MONOCYTES NFR BLD AUTO: 7.9 % — SIGNIFICANT CHANGE UP (ref 2–14)
NEUTROPHILS # BLD AUTO: 6.04 K/UL — SIGNIFICANT CHANGE UP (ref 1.8–7.4)
NEUTROPHILS NFR BLD AUTO: 76 % — SIGNIFICANT CHANGE UP (ref 43–77)
NRBC # FLD: 0 K/UL — LOW (ref 25–125)
PLATELET # BLD AUTO: 272 K/UL — SIGNIFICANT CHANGE UP (ref 150–400)
PMV BLD: 8.8 FL — SIGNIFICANT CHANGE UP (ref 7–13)
POTASSIUM SERPL-MCNC: 3.8 MMOL/L — SIGNIFICANT CHANGE UP (ref 3.5–5.3)
POTASSIUM SERPL-SCNC: 3.8 MMOL/L — SIGNIFICANT CHANGE UP (ref 3.5–5.3)
PROT SERPL-MCNC: 6.9 G/DL — SIGNIFICANT CHANGE UP (ref 6–8.3)
RBC # BLD: 4.51 M/UL — SIGNIFICANT CHANGE UP (ref 4.2–5.8)
RBC # FLD: 13.8 % — SIGNIFICANT CHANGE UP (ref 10.3–14.5)
SODIUM SERPL-SCNC: 136 MMOL/L — SIGNIFICANT CHANGE UP (ref 135–145)
WBC # BLD: 7.94 K/UL — SIGNIFICANT CHANGE UP (ref 3.8–10.5)
WBC # FLD AUTO: 7.94 K/UL — SIGNIFICANT CHANGE UP (ref 3.8–10.5)

## 2019-02-18 PROCEDURE — 99284 EMERGENCY DEPT VISIT MOD MDM: CPT | Mod: GC

## 2019-02-18 RX ORDER — FAMOTIDINE 10 MG/ML
1 INJECTION INTRAVENOUS
Qty: 14 | Refills: 0 | OUTPATIENT
Start: 2019-02-18 | End: 2019-03-03

## 2019-02-18 RX ORDER — FAMOTIDINE 10 MG/ML
20 INJECTION INTRAVENOUS ONCE
Qty: 0 | Refills: 0 | Status: COMPLETED | OUTPATIENT
Start: 2019-02-18 | End: 2019-02-18

## 2019-02-18 RX ADMIN — FAMOTIDINE 20 MILLIGRAM(S): 10 INJECTION INTRAVENOUS at 09:52

## 2019-02-18 RX ADMIN — Medication 30 MILLILITER(S): at 09:52

## 2019-02-18 NOTE — ED ADULT TRIAGE NOTE - CHIEF COMPLAINT QUOTE
Pt with PMH of DM, HTN, HLD, CAD with stent, gastritis, c/o epigastric pain x2 weeks, worse since last night, unable to sleep. Has been taking xantac and pantoprazole with some relief. Denies pain currently.

## 2019-02-18 NOTE — ED PROVIDER NOTE - PROGRESS NOTE DETAILS
Pt states he is feeling better after maalox and famotidine.  Labs reviewed, lipiase elevated however has been in past.  Tolerating food now.  Pt encouraged to follow up with PMD for repeat labs in 1-2 weeks after discharge.

## 2019-02-18 NOTE — ED PROVIDER NOTE - CLINICAL SUMMARY MEDICAL DECISION MAKING FREE TEXT BOX
68M PMH HTN, HLDL, CAD with stents presenting for epigastric pain in the setting of stopping PPI, likely 2/2 to stopping ppi.  Will give maalox, famotidine, follow up labs and reassess.

## 2019-02-18 NOTE — ED ADULT NURSE NOTE - OBJECTIVE STATEMENT
Pt A+OX3 c/o abd pain x2 weeks.  Denies N/V/D.  Saw MD and was given pantoprazole with no relief.  Labs obtained and sent as ordered.  #20g SL R AC placed.  Kept NPO.

## 2019-02-18 NOTE — ED PROVIDER NOTE - ATTENDING CONTRIBUTION TO CARE
I performed a face to face bedside interview with patient regarding history of present illness, review of symptoms and past medical history. I completed an independent physical exam.  I have discussed patient's plan of care.   I agree with note as stated above, having amended the EMR as needed to reflect my findings. I have discussed the assessment and plan of care.  This includes during the time I functioned as the attending physician for this patient.  Attending Contribution to Care: agree with plan of resident. pt p/w epigastric pain, recently stopped pantoprazole, pt stable, at this point. labs wnl with mildly elevated lipase, not consistent with pancreatitis. yasmine lf/u with gi for further management. pt is currently asymptomatic. vss. hd stable.

## 2019-02-18 NOTE — ED PROVIDER NOTE - NSFOLLOWUPINSTRUCTIONS_ED_ALL_ED_FT
Please take famotidine 20 mg daily before breakfast.  Follow up with your primary care physician in 1-2 weeks.  Call [phone] for an appointment.

## 2019-02-18 NOTE — ED PROVIDER NOTE - OBJECTIVE STATEMENT
Patient is a 69 yo M w/ TIIDM, HTN, CAD stent to RCA 2009, dyslipidemia, and gastritis presenting with 2 weeks abdominal pain starting since stopping pantoprazole 2 weeks ago.  Pt states that he was told to stop pantoprazole by PMD due to side effects but did not stop until about 2 weeks ago.  At that point, he started having increased epigastric pain daily.  It was better with small meals, worse with large meals.  Not associated with any food in particular.  Last night, pt states pain got worse and pt could not sleep so he came to the hospital.  Pt endorse chills, subjective fevers.  Denies sick contacts.  Pt lives at home with wife and adult child.  No travel.  Works as [employer]  and was able to work on Friday without issues.

## 2019-02-19 NOTE — DISCUSSION/SUMMARY
[ED Visit] : a visit to ED [FreeTextEntry1] : 67 yo M w/ TIIDM, HTN, CAD stent to RCA\par 2009, dyslipidemia, and gastritis presenting with 2 weeks abdominal pain\par starting since stopping pantoprazole 2 weeks ago. Pt states that he was told\par to stop pantoprazole by PMD due to side effects but did not stop until about 2\par weeks ago. At that point, he started having increased epigastric pain daily.\par It was better with small meals, worse with large meals. Not associated with\par any food in particular. Last night, pt states pain got worse and pt could not\par sleep so he came to the hospital. Pt endorse chills, subjective fevers.\par Denies sick contacts.\par 	Pt lives at home with wife and adult child. No travel.\par Works as [employer]  and was able to work on Friday without issues.

## 2019-03-08 ENCOUNTER — APPOINTMENT (OUTPATIENT)
Dept: INTERNAL MEDICINE | Facility: CLINIC | Age: 69
End: 2019-03-08

## 2019-03-08 ENCOUNTER — OUTPATIENT (OUTPATIENT)
Dept: OUTPATIENT SERVICES | Facility: HOSPITAL | Age: 69
LOS: 1 days | End: 2019-03-08
Payer: COMMERCIAL

## 2019-03-08 VITALS
BODY MASS INDEX: 26.33 KG/M2 | HEIGHT: 65 IN | WEIGHT: 158 LBS | SYSTOLIC BLOOD PRESSURE: 130 MMHG | DIASTOLIC BLOOD PRESSURE: 70 MMHG

## 2019-03-08 DIAGNOSIS — Z95.5 PRESENCE OF CORONARY ANGIOPLASTY IMPLANT AND GRAFT: Chronic | ICD-10-CM

## 2019-03-08 DIAGNOSIS — D11.0 BENIGN NEOPLASM OF PAROTID GLAND: Chronic | ICD-10-CM

## 2019-03-08 DIAGNOSIS — N20.0 CALCULUS OF KIDNEY: Chronic | ICD-10-CM

## 2019-03-08 DIAGNOSIS — E78.5 HYPERLIPIDEMIA, UNSPECIFIED: ICD-10-CM

## 2019-03-08 DIAGNOSIS — Z90.89 ACQUIRED ABSENCE OF OTHER ORGANS: Chronic | ICD-10-CM

## 2019-03-08 DIAGNOSIS — E11.9 TYPE 2 DIABETES MELLITUS WITHOUT COMPLICATIONS: ICD-10-CM

## 2019-03-08 DIAGNOSIS — I10 ESSENTIAL (PRIMARY) HYPERTENSION: ICD-10-CM

## 2019-03-08 LAB — HBA1C MFR BLD HPLC: 7.2

## 2019-03-08 PROCEDURE — 83036 HEMOGLOBIN GLYCOSYLATED A1C: CPT

## 2019-03-08 PROCEDURE — G0463: CPT

## 2019-03-19 ENCOUNTER — APPOINTMENT (OUTPATIENT)
Dept: CT IMAGING | Facility: CLINIC | Age: 69
End: 2019-03-19

## 2019-03-19 ENCOUNTER — APPOINTMENT (OUTPATIENT)
Dept: ULTRASOUND IMAGING | Facility: CLINIC | Age: 69
End: 2019-03-19

## 2019-03-24 NOTE — HISTORY OF PRESENT ILLNESS
[de-identified] : The patient is a 69 yo man with PMH of T2DM, HTN, CAD s/p PCI to RCA in 09’, dyslipidemia, gastritis presenting after ED visit. Pt visited ED in February 2018 with abdominal pain most likely 2/2 gastritis. Per documentation pt had stopped taking pantoprazole 2 weeks prior to visiting the ED. Pt reports in ED, being seen by GI, was treated with famotidine/maalox. At this time pt w/o abdominal pain, no nausea, vomiting, eating small portions, no blood in hematemesis/stool. No SOB, no CP.  \par \par Social History:\par Pt reports difficulty sleeping at night, is getting up 330 everyday for work. Was smoking 1 ppd, now smoking 1 pack every 3 days, drinks scotch on the weekends. PHQ-2 is 0.

## 2019-03-24 NOTE — END OF VISIT
[] : Resident [FreeTextEntry3] : Gastritis sx remitting with medication tx.  Will follow up with Dr. Srinivasan for possible endoscopy, in near term should sx re-emerge.  With weight loss in recent months, though potentially accounted for by recent conditions, would check CT lung CA screen given smoking hx.  US to r/o AAA due for screening.

## 2019-03-24 NOTE — ASSESSMENT
[FreeTextEntry1] : 67 yo man with PMH of T2DM, HTN, CAD s/p PCI to RCA in 09’, dyslipidemia, gastritis\par \par #Gastritis\par - pt will continue pantroprazole starting tomorrow, was taking famotidine 20 mg bid until yesterday\par - will f/u with Dr. Srinivasan, pt informed if continuing to have gastritis symptoms then will obtain endoscopy\par \par #T2DM\par - A1c 7.2 c/w current management\par - ophthalmic and podiatric referral given\par \par #Sleeping difficulty\par - instructed to start taking melatonin to help with sleep difficulty\par \par #Current smoking\par - pt reports being an active smoker, given history of smoking given referral for low dose CT scan for lung cancer screening and US for aortic abdominal aneurysm. \par \par #HCM\par Colon Cancer 1/15/16\par Pneumovax 11/12, 3/15, with booster given 10/18 per documentation\par Influenza 10/18 provided\par Depression PHQ 2

## 2019-03-27 ENCOUNTER — FORM ENCOUNTER (OUTPATIENT)
Age: 69
End: 2019-03-27

## 2019-03-28 ENCOUNTER — APPOINTMENT (OUTPATIENT)
Dept: ULTRASOUND IMAGING | Facility: CLINIC | Age: 69
End: 2019-03-28
Payer: MEDICARE

## 2019-03-28 ENCOUNTER — APPOINTMENT (OUTPATIENT)
Dept: CT IMAGING | Facility: CLINIC | Age: 69
End: 2019-03-28
Payer: MEDICARE

## 2019-03-28 ENCOUNTER — OUTPATIENT (OUTPATIENT)
Dept: OUTPATIENT SERVICES | Facility: HOSPITAL | Age: 69
LOS: 1 days | End: 2019-03-28
Payer: COMMERCIAL

## 2019-03-28 DIAGNOSIS — Z90.89 ACQUIRED ABSENCE OF OTHER ORGANS: Chronic | ICD-10-CM

## 2019-03-28 DIAGNOSIS — Z95.5 PRESENCE OF CORONARY ANGIOPLASTY IMPLANT AND GRAFT: Chronic | ICD-10-CM

## 2019-03-28 DIAGNOSIS — D11.0 BENIGN NEOPLASM OF PAROTID GLAND: Chronic | ICD-10-CM

## 2019-03-28 DIAGNOSIS — Z00.8 ENCOUNTER FOR OTHER GENERAL EXAMINATION: ICD-10-CM

## 2019-03-28 DIAGNOSIS — N20.0 CALCULUS OF KIDNEY: Chronic | ICD-10-CM

## 2019-03-28 PROCEDURE — G0297: CPT | Mod: 26

## 2019-03-28 PROCEDURE — 76775 US EXAM ABDO BACK WALL LIM: CPT | Mod: 26

## 2019-03-28 PROCEDURE — 76775 US EXAM ABDO BACK WALL LIM: CPT

## 2019-03-28 PROCEDURE — G0297: CPT

## 2019-04-03 ENCOUNTER — FORM ENCOUNTER (OUTPATIENT)
Age: 69
End: 2019-04-03

## 2019-04-04 ENCOUNTER — OUTPATIENT (OUTPATIENT)
Dept: OUTPATIENT SERVICES | Facility: HOSPITAL | Age: 69
LOS: 1 days | End: 2019-04-04
Payer: COMMERCIAL

## 2019-04-04 ENCOUNTER — APPOINTMENT (OUTPATIENT)
Dept: NUCLEAR MEDICINE | Facility: CLINIC | Age: 69
End: 2019-04-04
Payer: MEDICARE

## 2019-04-04 DIAGNOSIS — Z90.89 ACQUIRED ABSENCE OF OTHER ORGANS: Chronic | ICD-10-CM

## 2019-04-04 DIAGNOSIS — N20.0 CALCULUS OF KIDNEY: Chronic | ICD-10-CM

## 2019-04-04 DIAGNOSIS — Z95.5 PRESENCE OF CORONARY ANGIOPLASTY IMPLANT AND GRAFT: Chronic | ICD-10-CM

## 2019-04-04 DIAGNOSIS — R91.1 SOLITARY PULMONARY NODULE: ICD-10-CM

## 2019-04-04 DIAGNOSIS — D11.0 BENIGN NEOPLASM OF PAROTID GLAND: Chronic | ICD-10-CM

## 2019-04-04 PROCEDURE — A9552: CPT

## 2019-04-04 PROCEDURE — 78815 PET IMAGE W/CT SKULL-THIGH: CPT | Mod: 26,PI

## 2019-04-04 PROCEDURE — 78815 PET IMAGE W/CT SKULL-THIGH: CPT

## 2019-04-08 ENCOUNTER — APPOINTMENT (OUTPATIENT)
Dept: PULMONOLOGY | Facility: CLINIC | Age: 69
End: 2019-04-08
Payer: MEDICARE

## 2019-04-08 VITALS
SYSTOLIC BLOOD PRESSURE: 139 MMHG | HEART RATE: 76 BPM | DIASTOLIC BLOOD PRESSURE: 76 MMHG | HEIGHT: 65 IN | OXYGEN SATURATION: 92 % | RESPIRATION RATE: 18 BRPM | BODY MASS INDEX: 26.33 KG/M2 | WEIGHT: 158 LBS | TEMPERATURE: 97.9 F

## 2019-04-08 DIAGNOSIS — Z90.49 ACQUIRED ABSENCE OF OTHER SPECIFIED PARTS OF DIGESTIVE TRACT: ICD-10-CM

## 2019-04-08 DIAGNOSIS — Z90.79 ACQUIRED ABSENCE OF OTHER GENITAL ORGAN(S): ICD-10-CM

## 2019-04-08 PROCEDURE — 99205 OFFICE O/P NEW HI 60 MIN: CPT

## 2019-04-08 RX ORDER — CLOTRIMAZOLE 10 MG/G
1 CREAM TOPICAL TWICE DAILY
Qty: 1 | Refills: 0 | Status: DISCONTINUED | COMMUNITY
Start: 2017-06-06 | End: 2019-04-08

## 2019-04-08 RX ORDER — ACETAMINOPHEN EXTRA STRENGTH 500 MG/1
500 TABLET ORAL
Qty: 1 | Refills: 0 | Status: DISCONTINUED | COMMUNITY
Start: 2017-06-06 | End: 2019-04-08

## 2019-04-09 ENCOUNTER — NON-APPOINTMENT (OUTPATIENT)
Age: 69
End: 2019-04-09

## 2019-04-09 ENCOUNTER — OUTPATIENT (OUTPATIENT)
Dept: OUTPATIENT SERVICES | Facility: HOSPITAL | Age: 69
LOS: 1 days | End: 2019-04-09
Payer: COMMERCIAL

## 2019-04-09 ENCOUNTER — APPOINTMENT (OUTPATIENT)
Dept: CARDIOLOGY | Facility: HOSPITAL | Age: 69
End: 2019-04-09

## 2019-04-09 VITALS
DIASTOLIC BLOOD PRESSURE: 75 MMHG | SYSTOLIC BLOOD PRESSURE: 122 MMHG | WEIGHT: 156 LBS | BODY MASS INDEX: 25.99 KG/M2 | HEIGHT: 65 IN | HEART RATE: 71 BPM | OXYGEN SATURATION: 97 %

## 2019-04-09 DIAGNOSIS — N20.0 CALCULUS OF KIDNEY: Chronic | ICD-10-CM

## 2019-04-09 DIAGNOSIS — Z95.5 PRESENCE OF CORONARY ANGIOPLASTY IMPLANT AND GRAFT: Chronic | ICD-10-CM

## 2019-04-09 DIAGNOSIS — I25.10 ATHEROSCLEROTIC HEART DISEASE OF NATIVE CORONARY ARTERY WITHOUT ANGINA PECTORIS: ICD-10-CM

## 2019-04-09 DIAGNOSIS — Z90.89 ACQUIRED ABSENCE OF OTHER ORGANS: Chronic | ICD-10-CM

## 2019-04-09 DIAGNOSIS — D11.0 BENIGN NEOPLASM OF PAROTID GLAND: Chronic | ICD-10-CM

## 2019-04-09 LAB
ALBUMIN SERPL ELPH-MCNC: 4.2 G/DL
ALP BLD-CCNC: 113 U/L
ALT SERPL-CCNC: 19 U/L
ANION GAP SERPL CALC-SCNC: 17 MMOL/L
AST SERPL-CCNC: 20 U/L
BASOPHILS # BLD AUTO: 0.11 K/UL
BASOPHILS NFR BLD AUTO: 1.3 %
BILIRUB SERPL-MCNC: <0.2 MG/DL
BUN SERPL-MCNC: 10 MG/DL
CALCIUM SERPL-MCNC: 9.6 MG/DL
CHLORIDE SERPL-SCNC: 99 MMOL/L
CO2 SERPL-SCNC: 25 MMOL/L
CREAT SERPL-MCNC: 0.81 MG/DL
EOSINOPHIL # BLD AUTO: 0.64 K/UL
EOSINOPHIL NFR BLD AUTO: 7.8 %
GLUCOSE SERPL-MCNC: 132 MG/DL
HCT VFR BLD CALC: 42.5 %
HGB BLD-MCNC: 13.6 G/DL
IMM GRANULOCYTES NFR BLD AUTO: 0.4 %
INR PPP: 0.83 RATIO
LYMPHOCYTES # BLD AUTO: 1.18 K/UL
LYMPHOCYTES NFR BLD AUTO: 14.3 %
MAN DIFF?: NORMAL
MCHC RBC-ENTMCNC: 30.4 PG
MCHC RBC-ENTMCNC: 32 GM/DL
MCV RBC AUTO: 94.9 FL
MONOCYTES # BLD AUTO: 0.95 K/UL
MONOCYTES NFR BLD AUTO: 11.5 %
NEUTROPHILS # BLD AUTO: 5.32 K/UL
NEUTROPHILS NFR BLD AUTO: 64.7 %
PLATELET # BLD AUTO: 397 K/UL
POTASSIUM SERPL-SCNC: 4.7 MMOL/L
PROT SERPL-MCNC: 6.9 G/DL
PT BLD: 9.4 SEC
RBC # BLD: 4.48 M/UL
RBC # FLD: 15.9 %
SODIUM SERPL-SCNC: 141 MMOL/L
WBC # FLD AUTO: 8.23 K/UL

## 2019-04-09 PROCEDURE — G0463: CPT

## 2019-04-09 NOTE — PHYSICAL EXAM
[General Appearance - Well Developed] : well developed [Normal Appearance] : normal appearance [Well Groomed] : well groomed [General Appearance - Well Nourished] : well nourished [No Deformities] : no deformities [General Appearance - In No Acute Distress] : no acute distress [Normal Conjunctiva] : the conjunctiva exhibited no abnormalities [Neck Appearance] : the appearance of the neck was normal [Neck Cervical Mass (___cm)] : no neck mass was observed [Jugular Venous Distention Increased] : there was no jugular-venous distention [Heart Rate And Rhythm] : heart rate and rhythm were normal [Heart Sounds] : normal S1 and S2 [Arterial Pulses Normal] : the arterial pulses were normal [Edema] : no peripheral edema present [Respiration, Rhythm And Depth] : normal respiratory rhythm and effort [Exaggerated Use Of Accessory Muscles For Inspiration] : no accessory muscle use [Auscultation Breath Sounds / Voice Sounds] : lungs were clear to auscultation bilaterally [Bowel Sounds] : normal bowel sounds [Abdomen Soft] : soft [Abdomen Tenderness] : non-tender [Abnormal Walk] : normal gait [Nail Clubbing] : no clubbing of the fingernails [Cyanosis, Localized] : no localized cyanosis [Skin Turgor] : normal skin turgor [] : no rash [Motor Exam] : the motor exam was normal [No Focal Deficits] : no focal deficits [Oriented To Time, Place, And Person] : oriented to person, place, and time [Impaired Insight] : insight and judgment were intact [Affect] : the affect was normal [Mood] : the mood was normal [Memory Recent] : recent memory was not impaired [Memory Remote] : remote memory was not impaired [Erythema] : no erythema of the pharynx [FreeTextEntry1] : NCAT, MMM, nares clear, trachea midline, bilateral parotidectomy (benign lesions)

## 2019-04-09 NOTE — PHYSICAL EXAM
[Well Groomed] : well groomed [Normal Appearance] : normal appearance [General Appearance - Well Developed] : well developed [General Appearance - Well Nourished] : well nourished [General Appearance - In No Acute Distress] : no acute distress [No Deformities] : no deformities [Eyelids - No Xanthelasma] : the eyelids demonstrated no xanthelasmas [Normal Conjunctiva] : the conjunctiva exhibited no abnormalities [No Oral Pallor] : no oral pallor [Normal Oral Mucosa] : normal oral mucosa [No Oral Cyanosis] : no oral cyanosis [Normal Jugular Venous V Waves Present] : normal jugular venous V waves present [Normal Jugular Venous A Waves Present] : normal jugular venous A waves present [No Jugular Venous Seth A Waves] : no jugular venous seth A waves [Respiration, Rhythm And Depth] : normal respiratory rhythm and effort [Exaggerated Use Of Accessory Muscles For Inspiration] : no accessory muscle use [Auscultation Breath Sounds / Voice Sounds] : lungs were clear to auscultation bilaterally [Heart Rate And Rhythm] : heart rate and rhythm were normal [Heart Sounds] : normal S1 and S2 [Murmurs] : no murmurs present [Abdomen Soft] : soft [Abdomen Tenderness] : non-tender [Abnormal Walk] : normal gait [Abdomen Mass (___ Cm)] : no abdominal mass palpated [Nail Clubbing] : no clubbing of the fingernails [Gait - Sufficient For Exercise Testing] : the gait was sufficient for exercise testing [Petechial Hemorrhages (___cm)] : no petechial hemorrhages [Cyanosis, Localized] : no localized cyanosis [] : no rash [Skin Color & Pigmentation] : normal skin color and pigmentation [No Venous Stasis] : no venous stasis [Skin Lesions] : no skin lesions [No Xanthoma] : no  xanthoma was observed [No Skin Ulcers] : no skin ulcer

## 2019-04-09 NOTE — REVIEW OF SYSTEMS
[Fever] : no fever [Chills] : chills [Fatigue] : no fatigue [Poor Appetite] : poor appetite [Recent Wt Loss (___ Lbs)] : recent [unfilled] ~Ulb weight loss [Dry Eyes] : no dryness of the eyes [Nasal Congestion] : no nasal congestion [Ear Disturbance] : no ear disturbance [Sinus Problems] : no sinus problems [Postnasal Drip] : no postnasal drip [Epistaxis] : no nosebleeds [Dry Mouth] : no dry mouth [Sore Throat] : no sore throat [Cough] : cough [Hemoptysis] : no hemoptysis [Chest Tightness] : no chest tightness [Sputum] : sputum  [Dyspnea] : dyspnea [Frequent URIs] : no frequent upper respiratory infections [Pleuritic Pain] : no pleuritic pain [Hypotension] : no hypotension [Wheezing] : no wheezing [Hypertension] : ~T hypertension [Chest Discomfort] : no chest discomfort [Orthopnea] : no orthopnea [PND] : no PND [Palpitations] : no palpitations [Murmurs] : no murmurs were heard [Dysrhythmia] : no dysrhythmia [Watery Eyes] : no discharge from the eyes [Nasal Discharge] : no nasal discharge [Edema] : ~T edema was not present [Hives] : no urticaria was observed [Angioedema] : no angioedema [Heartburn] : heartburn [Nausea] : no nausea [Indigestion] : indigestion [Dysphagia] : no dysphagia [Vomiting] : no vomiting [Abdominal Pain] : no abdominal pain [Nocturia] : nocturia [Bleeding] : no bleeding [Back Pain] : ~T no back pain [Frequency] : urinary frequency [Urgency] : feelings of urinary urgency [Myalgias] : no myalgias [Arthralgias] : no arthralgias [Rash] : no [unfilled] rash [Easy Bruising] : no ~M tendency for easy bruising [Itch] : no itching [Headache] : no headache [Dizziness] : no dizziness [Syncope] : no fainting [Numbness] : no numbness [Paralysis] : no paralysis was seen [Focal Weakness] : no focal weakness [Depression] : depression [Anxiety] : no anxiety [Seizures] : no seizures [HIV] : no HIV infection [Diabetes] : diabetes mellitus [DVT] : no DVT [Difficulty Initiating Sleep] : difficulty falling asleep [Hepatic Disease] : no hepatic disease [Snoring] : snoring [Difficulty Maintaining Sleep] : difficulty maintaining sleep [Awakes With Headache] : awakes without a headache [Witnessed Apneas] : demonstrated no ~M apnea [Nonrestorative Sleep] : restorative sleep [Hypersomnolence] : not sleeping much more than usual [Awakes With Dry Mouth] : awakes without dry mouth

## 2019-04-09 NOTE — REASON FOR VISIT
[Initial Evaluation] : an initial evaluation [Abnormal CT Scan] : abnormal CT Scan [Spouse] : spouse [Patient Declined  Services] : - None: Patient declined  services [Family Member] : family member [FreeTextEntry2] : Daughter - Leena Ness

## 2019-04-09 NOTE — CONSULT LETTER
[Dear  ___] : Dear  [unfilled], [Please see my note below.] : Please see my note below. [Consult Letter:] : I had the pleasure of evaluating your patient, [unfilled]. [Consult Closing:] : Thank you very much for allowing me to participate in the care of this patient.  If you have any questions, please do not hesitate to contact me. [Sincerely,] : Sincerely, [FreeTextEntry3] : Don Jacobs MD\par  [DrDavid  ___] : Dr. CORTES

## 2019-04-09 NOTE — ASSESSMENT
[FreeTextEntry1] : 68M smoker, DM2, HTN, CAD s/p stent (2007 RCA) on Aspirin who presents for initial pulmonary evaluation of a concerning PATRICIO spiculated lung nodule.\par \par 1. Lung Nodule - patient has a 2.8 x 2.7cm spiculated PATRICIO lung nodule with mediastinal adenopathy. PET/CT also demonstrates uptake in the PATRICIO nodule, left mediastinal nodes, and possibly in the right hilar LN. He will need to have a biopsy of these lesions and I have suggested he undergo EBUS/FNA and Navigational Bronchoscopy as initial step in diagnosis and staging - with attempts at accessing the left paratracheal (Level 4) and Left hilar (Level 10) LN, as well as evaluating the right hilar (level 10) LN.\par -  The PRETEST probability of Malignancy in this spiculated, PET-avid, upper lobe nodule in an active smoker without a prior history of cancer is 87.4%. The clinical probability of malignancy is HIGH. He should have a procedure to provide diagnosis and staging. Based on PET/CT this is at LEAST a STAGE IIIA (if left sided LN are positive) or IIIB (if right hilar LN is positive). \par - If malignant cells are found he will need to be set up with the Oncologists to discuss treatment options. From my understanding, the initial treatment would be chemotherapy for stage III A lung cancers.\par - In order to undergo bronchoscopy he will need Presurgical testing which my office will help arrange. I have also asked that he obtain cardiology evaluation and clearance/risk stratification to undergo procedures with General Anesthesia given his history of CAD\par \par 2. Active Smoker and Emphysema - there is a suspicion of obstructive lung disease. He will need PFTs to further evaluate his lung function but his can be done after his biopsy procedures. I have suggested he start carrying and Albuterol inhaler and use it based on symptoms. We will monitor his usage and then based on this, plus his future PFTs, the need for long acting bronchodilator therapy can be determined.\par \par 3. Will arrange for EBUS/FNA +/- Navigational bronchoscopy with Dr. Martinez for next week. He will require evaluation by PST and Cardiology. He is not on any blood thinners. We will check labs in the office today. \par \par 4. Patient consented for enrollment in our [email or web address] Blood Study looking at patients with suspected lung cancer. Consent given to the research team at 410.

## 2019-04-09 NOTE — HISTORY OF PRESENT ILLNESS
[Feelings Of Weakness On Exertion] : denies exercise intolerance [Cough] : stable coughing [Wheezing] : denies wheezing [Regional Soft Tissue Swelling Both Lower Extremities] : denies lower extremity edema [Chest Pain Or Discomfort] : denies chest pain [Fever] : denies fever [Wt Loss ___ Lbs] : recent [unfilled] ~Upound(s) weight loss [0  -  Nothing at all] : 0, nothing at all [Date: ___] : was performed [unfilled] [PFTs] : pulmonary function tests [FreeTextEntry1] : 68M DM2, HTN, CAD s/p PCI (RCA 2007), HLD presenting for initial pulmonary evaluation of abnormal CT scan.\par \par He has been a long term smoker - about 1 ppd x 40 years and now down to about 1/4 ppd. He has dyspnea on exertion and a chronic cough with light sputum. He denies any overt hemoptysis but his wife has noted some blood streaked saliva on his pillow in the AM. He denies any chest pain or palpitations. He denies any prior history of lung disease. 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 He is originally from [facility]. He has had about a 12 lb weight loss over the last 4-6 months. His weight loss and smoking history are what prompted his CT scan which found the PATRICIO nodule.\par \par Daughter - Leena Ness - [phone]\par PMD - Candido Stephania - [address] [facility] [phone]\par \par He had a prior TURP for BPH. He did NOT have prostate cancer. He had prior bilateral parotidectomy done 2009 and then 2011 - both for benign swellings. He has never required any chemotherapy. He has no history of prior cancer. \par \par He has a history of CAD and had a BMS placed in 2007 at  [facility]. He currently takes ASA. He does not take Plavix or any other blood thinners. [Wt Gain ___ Lbs] : no recent weight gain [de-identified] : found to have a concerning lung nodule [Oxygen] : the patient uses no supplemental oxygen [Current] : is a current smoker [de-identified] : COMPARISON: No prior PET/CT scan available. \par \par OTHER STUDIES USED FOR CORRELATION: Low-dose CT chest 3/28/2019. \par \par FINDINGS: HEAD/NECK: Physiologic FDG activity in the visualized brain, head, \par and neck. \par \par THORAX: FDG avid AP window lymph node, 1.6 x 1.9 cm (image 94), SUV 12.1. \par Small right hilar lymph node, 0.8 x 0.8 cm (image 105), SUV 3.2. \par \par LUNGS: FDG avid spiculated left upper lobe lung nodule, 2.8 x 2.7 cm (image \par 88), SUV 13.5. Scattered calcified and noncalcified bilateral pulmonary \par nodules. Largest noncalcified nodule, 0.3 cm in right upper lobe, too small \par to characterize. \par \par PLEURA/PERICARDIUM: No abnormal FDG activity. No pleural or pericardial \par effusion. \par \par HEPATOBILIARY/PANCREAS: Physiologic FDG activity. Liver SUV mean 2.4. \par Cholelithiasis. \par \par SPLEEN: Physiologic FDG activity. \par \par ADRENAL GLANDS: No abnormal FDG activity. Non-FDG avid right adrenal nodule \par (image 117) and left adrenal gland thickening, stable on CT dating back to \par 11/16/2015. \par \par KIDNEYS/URINARY BLADDER: Excreted activity. \par \par ABDOMINOPELVIC NODES: No abnormal FDG activity. \par \par BOWEL/PERITONEUM/MESENTERY: Nonspecific heterogeneous FDG activity in the \par small bowel and large bowel. Nonobstructing proximal transverse colon \par lipoma. Colonic diverticulosis without diverticulitis. \par \par PELVIC ORGANS: Status post prostatectomy. No abnormal FDG activity. \par \par BONES: Small sclerotic focus in the anterolateral right fourth rib with \par minimal FDG activity, SUV 1.7 (image 89). Small sclerotic lesion in the left \par body of T6 vertebra, 0.8 x 0.6 cm (image 94), too small to characterize. \par \par FDG avid degenerative changes in the cervical spine. Degenerative changes in \par thoracolumbar spine. \par \par IMPRESSION: FDG-PET/CT scan: \par \par 1. FDG avid spiculated left upper lobe lung nodule, worrisome for malignancy. \par \par 2. FDG avid AP window lymph node, suspicious for metastasis. \par \par 3. Indeterminate sclerotic right fourth rib and T6 vertebral body foci

## 2019-04-10 ENCOUNTER — OUTPATIENT (OUTPATIENT)
Dept: OUTPATIENT SERVICES | Facility: HOSPITAL | Age: 69
LOS: 1 days | End: 2019-04-10

## 2019-04-10 VITALS
TEMPERATURE: 97 F | HEIGHT: 65 IN | HEART RATE: 76 BPM | SYSTOLIC BLOOD PRESSURE: 128 MMHG | DIASTOLIC BLOOD PRESSURE: 80 MMHG | WEIGHT: 154.98 LBS | RESPIRATION RATE: 16 BRPM | OXYGEN SATURATION: 97 %

## 2019-04-10 DIAGNOSIS — R91.1 SOLITARY PULMONARY NODULE: ICD-10-CM

## 2019-04-10 DIAGNOSIS — N20.0 CALCULUS OF KIDNEY: Chronic | ICD-10-CM

## 2019-04-10 DIAGNOSIS — D11.0 BENIGN NEOPLASM OF PAROTID GLAND: Chronic | ICD-10-CM

## 2019-04-10 DIAGNOSIS — E11.9 TYPE 2 DIABETES MELLITUS WITHOUT COMPLICATIONS: ICD-10-CM

## 2019-04-10 DIAGNOSIS — I25.10 ATHEROSCLEROTIC HEART DISEASE OF NATIVE CORONARY ARTERY WITHOUT ANGINA PECTORIS: ICD-10-CM

## 2019-04-10 DIAGNOSIS — Z90.89 ACQUIRED ABSENCE OF OTHER ORGANS: Chronic | ICD-10-CM

## 2019-04-10 DIAGNOSIS — Z95.5 PRESENCE OF CORONARY ANGIOPLASTY IMPLANT AND GRAFT: Chronic | ICD-10-CM

## 2019-04-10 RX ORDER — SODIUM CHLORIDE 9 MG/ML
1000 INJECTION, SOLUTION INTRAVENOUS
Qty: 0 | Refills: 0 | Status: DISCONTINUED | OUTPATIENT
Start: 2019-04-18 | End: 2019-05-03

## 2019-04-10 NOTE — H&P PST ADULT - HISTORY OF PRESENT ILLNESS
Pt is a 68 yr old male, German speaking , scheduled for navigation Endobronchial U/S Bronchoscopy Radial and Linear with Cytology with dr Martinez 4/18/19 - pt has had 12 lb wt loss over past 6 months and is a 52 yr smoker - CT scan noted pulmonary nodule. Pt has chronic cough with whitish sputum.

## 2019-04-10 NOTE — H&P PST ADULT - NSICDXPASTSURGICALHX_GEN_ALL_CORE_FT
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

## 2019-04-10 NOTE — H&P PST ADULT - NEGATIVE ENMT SYMPTOMS
no tinnitus/no vertigo/no sinus symptoms/no nasal congestion/no hearing difficulty/no ear pain/no throat pain/no dysphagia

## 2019-04-10 NOTE — H&P PST ADULT - NS SC CAGE ALCOHOL EYE OPENER
Depression Scale  In the past 7 days:  I have been able to laugh and see the funny side of things[de-identified] As much as I always could  I have looked forward with enjoyment to things: As much as I ever did  I have blamed myself unnecessarily when things went wrong: No, never  I have been anxious or worried for no good reason: No, not at all  I have felt scared or panicky for no good reason: No, not at all  Things have been getting on top of me: No, I have been coping as well as ever  I have been so unhappy that I have had difficulty sleeping: No, not at all  I have felt sad or miserable: No, not at all  I have been so unhappy that I have been crying: No, never  The thought of harming myself has occured to me: Never  [facility]  Depression Scale (EPDS)  [place]  Depression Score: 0 no

## 2019-04-10 NOTE — H&P PST ADULT - NSICDXPASTMEDICALHX_GEN_ALL_CORE_FT
PAST MEDICAL HISTORY:  BPH (benign prostatic hypertrophy)     CAD (coronary artery disease)     Hyperlipidemia     Hypertension     Type 2 diabetes mellitus     Urinary calculus PAST MEDICAL HISTORY:  BPH (benign prostatic hypertrophy)     CAD (coronary artery disease)     Hyperlipidemia     Hypertension     Smoking history     Type 2 diabetes mellitus     Urinary calculus     Weight loss

## 2019-04-10 NOTE — H&P PST ADULT - NSICDXPROBLEM_GEN_ALL_CORE_FT
PROBLEM DIAGNOSES  Problem: Pulmonary nodule  Assessment and Plan: Pt and daughter given preop instructions and verbalized understanding.   OR Booking notified of risk for sleep apnea and DM   Pt seen by Cardio and clearance given - printed and in chart     Problem: DM (diabetes mellitus)  Assessment and Plan: Pt to take last dose of Metformin 4/17/19 am dose     Problem: CAD (coronary artery disease)  Assessment and Plan: Pt to take Lisinopril and Metoprolol am DOS   Pt to stay on ASA preop

## 2019-04-10 NOTE — H&P PST ADULT - NEGATIVE NEUROLOGICAL SYMPTOMS
no transient paralysis/no weakness/no paresthesias/no focal seizures/no syncope/no generalized seizures

## 2019-04-11 NOTE — REASON FOR VISIT
[Follow-Up - Clinic] : a clinic follow-up of [Coronary Artery Disease] : coronary artery disease [FreeTextEntry1] : Pre-operative evaluation

## 2019-04-11 NOTE — HISTORY OF PRESENT ILLNESS
[FreeTextEntry1] : Mr Ness is a 69 yo M with a PMH significant for CAD s/p BMS to RCA in 2009, pre-DM, HTN, HLD who presents for evaluation prior to bronchoscopy. He underwent PET/CT 4/4/19 which revealed an FDG avid spiculated PATRICIO nodule and lymph node suspicious for metastatic CA. He is scheduled to undergo bronchoscopy/EBUS/FNA in about a week so he presents for pre-operative evaluation.\par \par Patient notes that he typically walks about 5-8 blocks daily because he whipple a bit far from his job. He also notes that he can climb about 4 flights of stairs and does so frequently when he visits family. He is able to do these activities without any limitations including chest pain, SOB, BILLINGS, palpitations, dizziness, lightheadedness, pre-syncope. \par \par \par Meds:\par lisinopril 20mg daily\par metoprolol succinate 50mg daily\par metformin 100mg BID\par ASA 81mg daily\par atorvastatin 40mg daily\par pantoprazole 40mg daily\par niacin 1000mg qhs\par fish oil 100mg daily\par MVI\par \par \par All:\par None\par \par FH: \par Mother- AAA\par Sister- valvular disease\par \par SH:\par S: now 3 cigs/day, prior ppd x 40 years\par E: 4-5 whiskey drinks/weekend\par D: denies

## 2019-04-15 ENCOUNTER — APPOINTMENT (OUTPATIENT)
Age: 69
End: 2019-04-15

## 2019-04-18 ENCOUNTER — APPOINTMENT (OUTPATIENT)
Dept: PULMONOLOGY | Facility: HOSPITAL | Age: 69
End: 2019-04-18

## 2019-04-18 ENCOUNTER — OUTPATIENT (OUTPATIENT)
Dept: OUTPATIENT SERVICES | Facility: HOSPITAL | Age: 69
LOS: 1 days | Discharge: HOME | End: 2019-04-18
Payer: MEDICARE

## 2019-04-18 ENCOUNTER — RESULT REVIEW (OUTPATIENT)
Age: 69
End: 2019-04-18

## 2019-04-18 VITALS
DIASTOLIC BLOOD PRESSURE: 68 MMHG | SYSTOLIC BLOOD PRESSURE: 144 MMHG | HEIGHT: 65 IN | OXYGEN SATURATION: 98 % | HEART RATE: 66 BPM | WEIGHT: 154.98 LBS | TEMPERATURE: 98 F | RESPIRATION RATE: 16 BRPM

## 2019-04-18 VITALS
SYSTOLIC BLOOD PRESSURE: 107 MMHG | RESPIRATION RATE: 20 BRPM | HEART RATE: 72 BPM | OXYGEN SATURATION: 96 % | DIASTOLIC BLOOD PRESSURE: 63 MMHG

## 2019-04-18 DIAGNOSIS — N20.0 CALCULUS OF KIDNEY: Chronic | ICD-10-CM

## 2019-04-18 DIAGNOSIS — R91.1 SOLITARY PULMONARY NODULE: ICD-10-CM

## 2019-04-18 DIAGNOSIS — Z90.89 ACQUIRED ABSENCE OF OTHER ORGANS: Chronic | ICD-10-CM

## 2019-04-18 DIAGNOSIS — D11.0 BENIGN NEOPLASM OF PAROTID GLAND: Chronic | ICD-10-CM

## 2019-04-18 DIAGNOSIS — Z95.5 PRESENCE OF CORONARY ANGIOPLASTY IMPLANT AND GRAFT: Chronic | ICD-10-CM

## 2019-04-18 PROCEDURE — 31624 DX BRONCHOSCOPE/LAVAGE: CPT | Mod: GC

## 2019-04-18 PROCEDURE — 88360 TUMOR IMMUNOHISTOCHEM/MANUAL: CPT | Mod: 26

## 2019-04-18 PROCEDURE — 31653 BRONCH EBUS SAMPLNG 3/> NODE: CPT | Mod: GC

## 2019-04-18 PROCEDURE — 88305 TISSUE EXAM BY PATHOLOGIST: CPT | Mod: 26

## 2019-04-18 PROCEDURE — 88173 CYTOPATH EVAL FNA REPORT: CPT | Mod: 26

## 2019-04-18 PROCEDURE — 88342 IMHCHEM/IMCYTCHM 1ST ANTB: CPT | Mod: 26,59

## 2019-04-18 PROCEDURE — 88112 CYTOPATH CELL ENHANCE TECH: CPT | Mod: 26,59

## 2019-04-18 PROCEDURE — 88341 IMHCHEM/IMCYTCHM EA ADD ANTB: CPT | Mod: 26,59

## 2019-04-18 NOTE — ASU DISCHARGE PLAN (ADULT/PEDIATRIC) - ASU DC SPECIAL INSTRUCTIONSFT
You may have a sore throat for the next 1-2 days for which you can take tylenol.  You may cough up some blood tinged phlegm for the next few days.   You may have a low grade fever within the first 24 hours of the procedure, for which you can take tylenol.

## 2019-04-18 NOTE — ASU DISCHARGE PLAN (ADULT/PEDIATRIC) - CALL YOUR DOCTOR IF YOU HAVE ANY OF THE FOLLOWING:
Bleeding that does not stop/Fever greater than (need to indicate Fahrenheit or Celsius)/Pain not relieved by Medications/any shortness of breath or chest pain

## 2019-04-18 NOTE — ASU PREOP CHECKLIST - 1.
Pt Indonesian speaking,  utilized to verify    may translate Pt Uzbek speaking,  utilized to verify Leena may translate. See flow sheet for  details

## 2019-04-22 PROBLEM — R63.4 ABNORMAL WEIGHT LOSS: Chronic | Status: ACTIVE | Noted: 2019-04-10

## 2019-04-22 PROBLEM — Z87.891 PERSONAL HISTORY OF NICOTINE DEPENDENCE: Chronic | Status: ACTIVE | Noted: 2019-04-10

## 2019-04-25 ENCOUNTER — OUTPATIENT (OUTPATIENT)
Dept: OUTPATIENT SERVICES | Facility: HOSPITAL | Age: 69
LOS: 1 days | Discharge: HOME | End: 2019-04-25

## 2019-04-25 DIAGNOSIS — Z90.89 ACQUIRED ABSENCE OF OTHER ORGANS: Chronic | ICD-10-CM

## 2019-04-25 DIAGNOSIS — Z95.5 PRESENCE OF CORONARY ANGIOPLASTY IMPLANT AND GRAFT: Chronic | ICD-10-CM

## 2019-04-25 DIAGNOSIS — C34.90 MALIGNANT NEOPLASM OF UNSPECIFIED PART OF UNSPECIFIED BRONCHUS OR LUNG: ICD-10-CM

## 2019-04-25 DIAGNOSIS — D11.0 BENIGN NEOPLASM OF PAROTID GLAND: Chronic | ICD-10-CM

## 2019-04-25 DIAGNOSIS — N20.0 CALCULUS OF KIDNEY: Chronic | ICD-10-CM

## 2019-04-26 ENCOUNTER — APPOINTMENT (OUTPATIENT)
Dept: HEMATOLOGY ONCOLOGY | Facility: CLINIC | Age: 69
End: 2019-04-26
Payer: MEDICARE

## 2019-04-26 VITALS
HEART RATE: 69 BPM | DIASTOLIC BLOOD PRESSURE: 68 MMHG | WEIGHT: 162.7 LBS | SYSTOLIC BLOOD PRESSURE: 113 MMHG | BODY MASS INDEX: 27.78 KG/M2 | OXYGEN SATURATION: 96 % | RESPIRATION RATE: 15 BRPM | HEIGHT: 64 IN | TEMPERATURE: 98.6 F

## 2019-04-26 DIAGNOSIS — Z80.8 FAMILY HISTORY OF MALIGNANT NEOPLASM OF OTHER ORGANS OR SYSTEMS: ICD-10-CM

## 2019-04-26 PROCEDURE — 99205 OFFICE O/P NEW HI 60 MIN: CPT

## 2019-04-30 PROBLEM — Z80.8 FAMILY HISTORY OF SKIN CANCER: Status: ACTIVE | Noted: 2019-04-30

## 2019-05-01 ENCOUNTER — LABORATORY RESULT (OUTPATIENT)
Age: 69
End: 2019-05-01

## 2019-05-01 ENCOUNTER — RESULT REVIEW (OUTPATIENT)
Age: 69
End: 2019-05-01

## 2019-05-01 ENCOUNTER — OTHER (OUTPATIENT)
Age: 69
End: 2019-05-01

## 2019-05-01 ENCOUNTER — APPOINTMENT (OUTPATIENT)
Dept: INFUSION THERAPY | Facility: HOSPITAL | Age: 69
End: 2019-05-01

## 2019-05-01 LAB
BASOPHILS # BLD AUTO: 0.1 K/UL — SIGNIFICANT CHANGE UP (ref 0–0.2)
BASOPHILS NFR BLD AUTO: 0.6 % — SIGNIFICANT CHANGE UP (ref 0–2)
EOSINOPHIL # BLD AUTO: 0.5 K/UL — SIGNIFICANT CHANGE UP (ref 0–0.5)
EOSINOPHIL NFR BLD AUTO: 5.4 % — SIGNIFICANT CHANGE UP (ref 0–6)
HCT VFR BLD CALC: 39.3 % — SIGNIFICANT CHANGE UP (ref 39–50)
HGB BLD-MCNC: 13.6 G/DL — SIGNIFICANT CHANGE UP (ref 13–17)
LYMPHOCYTES # BLD AUTO: 1.3 K/UL — SIGNIFICANT CHANGE UP (ref 1–3.3)
LYMPHOCYTES # BLD AUTO: 15.2 % — SIGNIFICANT CHANGE UP (ref 13–44)
MCHC RBC-ENTMCNC: 31.4 PG — SIGNIFICANT CHANGE UP (ref 27–34)
MCHC RBC-ENTMCNC: 34.5 G/DL — SIGNIFICANT CHANGE UP (ref 32–36)
MCV RBC AUTO: 91.1 FL — SIGNIFICANT CHANGE UP (ref 80–100)
MONOCYTES # BLD AUTO: 0.7 K/UL — SIGNIFICANT CHANGE UP (ref 0–0.9)
MONOCYTES NFR BLD AUTO: 7.4 % — SIGNIFICANT CHANGE UP (ref 2–14)
NEUTROPHILS # BLD AUTO: 6.3 K/UL — SIGNIFICANT CHANGE UP (ref 1.8–7.4)
NEUTROPHILS NFR BLD AUTO: 71.4 % — SIGNIFICANT CHANGE UP (ref 43–77)
PLATELET # BLD AUTO: 364 K/UL — SIGNIFICANT CHANGE UP (ref 150–400)
RBC # BLD: 4.32 M/UL — SIGNIFICANT CHANGE UP (ref 4.2–5.8)
RBC # FLD: 14.1 % — SIGNIFICANT CHANGE UP (ref 10.3–14.5)
WBC # BLD: 8.8 K/UL — SIGNIFICANT CHANGE UP (ref 3.8–10.5)
WBC # FLD AUTO: 8.8 K/UL — SIGNIFICANT CHANGE UP (ref 3.8–10.5)

## 2019-05-02 ENCOUNTER — APPOINTMENT (OUTPATIENT)
Dept: INFUSION THERAPY | Facility: HOSPITAL | Age: 69
End: 2019-05-02

## 2019-05-02 DIAGNOSIS — R11.2 NAUSEA WITH VOMITING, UNSPECIFIED: ICD-10-CM

## 2019-05-02 DIAGNOSIS — Z51.11 ENCOUNTER FOR ANTINEOPLASTIC CHEMOTHERAPY: ICD-10-CM

## 2019-05-02 DIAGNOSIS — C34.12 MALIGNANT NEOPLASM OF UPPER LOBE, LEFT BRONCHUS OR LUNG: ICD-10-CM

## 2019-05-02 DIAGNOSIS — Z01.818 ENCOUNTER FOR OTHER PREPROCEDURAL EXAMINATION: ICD-10-CM

## 2019-05-03 ENCOUNTER — APPOINTMENT (OUTPATIENT)
Dept: INFUSION THERAPY | Facility: HOSPITAL | Age: 69
End: 2019-05-03

## 2019-05-04 ENCOUNTER — FORM ENCOUNTER (OUTPATIENT)
Age: 69
End: 2019-05-04

## 2019-05-05 ENCOUNTER — APPOINTMENT (OUTPATIENT)
Dept: MRI IMAGING | Facility: CLINIC | Age: 69
End: 2019-05-05
Payer: MEDICARE

## 2019-05-05 ENCOUNTER — OUTPATIENT (OUTPATIENT)
Dept: OUTPATIENT SERVICES | Facility: HOSPITAL | Age: 69
LOS: 1 days | End: 2019-05-05
Payer: COMMERCIAL

## 2019-05-05 DIAGNOSIS — Z90.89 ACQUIRED ABSENCE OF OTHER ORGANS: Chronic | ICD-10-CM

## 2019-05-05 DIAGNOSIS — C34.12 MALIGNANT NEOPLASM OF UPPER LOBE, LEFT BRONCHUS OR LUNG: ICD-10-CM

## 2019-05-05 DIAGNOSIS — D11.0 BENIGN NEOPLASM OF PAROTID GLAND: Chronic | ICD-10-CM

## 2019-05-05 DIAGNOSIS — N20.0 CALCULUS OF KIDNEY: Chronic | ICD-10-CM

## 2019-05-05 DIAGNOSIS — Z95.5 PRESENCE OF CORONARY ANGIOPLASTY IMPLANT AND GRAFT: Chronic | ICD-10-CM

## 2019-05-05 PROCEDURE — 70553 MRI BRAIN STEM W/O & W/DYE: CPT | Mod: 26

## 2019-05-05 PROCEDURE — A9585: CPT

## 2019-05-05 PROCEDURE — 72157 MRI CHEST SPINE W/O & W/DYE: CPT | Mod: 26

## 2019-05-05 PROCEDURE — 70553 MRI BRAIN STEM W/O & W/DYE: CPT

## 2019-05-05 PROCEDURE — 72157 MRI CHEST SPINE W/O & W/DYE: CPT

## 2019-05-06 ENCOUNTER — APPOINTMENT (OUTPATIENT)
Dept: PULMONOLOGY | Facility: CLINIC | Age: 69
End: 2019-05-06
Payer: MEDICARE

## 2019-05-06 VITALS
BODY MASS INDEX: 27.81 KG/M2 | DIASTOLIC BLOOD PRESSURE: 76 MMHG | SYSTOLIC BLOOD PRESSURE: 110 MMHG | WEIGHT: 162 LBS | OXYGEN SATURATION: 97 % | RESPIRATION RATE: 15 BRPM | TEMPERATURE: 97.5 F | HEART RATE: 73 BPM

## 2019-05-06 DIAGNOSIS — Z51.89 ENCOUNTER FOR OTHER SPECIFIED AFTERCARE: ICD-10-CM

## 2019-05-06 PROCEDURE — 99214 OFFICE O/P EST MOD 30 MIN: CPT

## 2019-05-07 NOTE — ASSESSMENT
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 Since last visit has received chemotherapy - carboplatin, etoposide, and atezolizumab (started on 4/26), and tolerating it well. MRI Brain and T spine neg for metastatic disease. Only c/o fatigue and abdominal pain. Daughters state that he has had snoring and witnessed episodes of apnea - if pt has ARIELLE, may contribute to fatigue as well. Some cough but improved since he's quit smoking. Will obtain PFTs given smoking history to establish baseline and need for inhalers. Will order diagnostic sleep study to r/o ARIELLE.\par OV in 2 months.

## 2019-05-07 NOTE — PHYSICAL EXAM
[General Appearance - Well Developed] : well developed [Normal Appearance] : normal appearance [General Appearance - Well Nourished] : well nourished [General Appearance - In No Acute Distress] : no acute distress [Normal Conjunctiva] : the conjunctiva exhibited no abnormalities [Neck Appearance] : the appearance of the neck was normal [Heart Rate And Rhythm] : heart rate and rhythm were normal [Heart Sounds] : normal S1 and S2 [] : no respiratory distress [Murmurs] : no murmurs present [Auscultation Breath Sounds / Voice Sounds] : lungs were clear to auscultation bilaterally [Exaggerated Use Of Accessory Muscles For Inspiration] : no accessory muscle use [Bowel Sounds] : normal bowel sounds [Abnormal Walk] : normal gait [Abdomen Tenderness] : non-tender [Abdomen Soft] : soft [Nail Clubbing] : no clubbing of the fingernails [Cyanosis, Localized] : no localized cyanosis [Skin Turgor] : normal skin turgor [Skin Color & Pigmentation] : normal skin color and pigmentation [Motor Exam] : the motor exam was normal [No Focal Deficits] : no focal deficits [Oriented To Time, Place, And Person] : oriented to person, place, and time [Affect] : the affect was normal [FreeTextEntry1] : good airway entry b/l

## 2019-05-07 NOTE — HISTORY OF PRESENT ILLNESS
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 Last seen by Dr. Jacobs for evaluation of spiculated PATRICIO lung nodule with mediastinal adenopathy.\par \par PET showed uptake of PATRICIO nodule, L mediastinal nodes and possibly in R hilar LN. EBUS/FNA was preformed on 4/8/2019. EBUS showed 4L > 1 cm, 4R 5.5 mm, 8.1 mm; 7 8.7 mm, and 11R 8-10 mm. 4L/R, 11R were sampled. 4L/R showed small cell carcinoma, but 11R was non-diagnostic. He was subsequently referred to heme/onc and was seen on 4/26. Started on carboplatin, etoposide, and atezolizumab on 4/26. Last chemo 5/3. Overall tolerating chemo well. Just c/o fatigue and abdominal pain. Recently had MRI of Brain/T spine on 5/5, which was negative. WBT ppx discussed with oncology, but not needed at this time. Chronic cough for past year, gotten better since he's stopped smoking. No hemoptysis. No weight loss, change in appetite.\par \par TTE 1/10/2011: LA 4.0, normal LV sys function, no segmental wall motion abnormalities; EF 75%. Increased relative wall thickness with normal LV mass index – concentric LV remodeling. Normal RA, RV size/function\par CT Chest 3/28/2019: 2.8 x 2.7 spiculated nodule in PATRICIO extending centrally along apical posterior segmental bronchus. Spiculations and most inferior and medial to nodule abuts the mediastinum. Emphysema. Calcified and noncalcified b/l pulmonary nodules – largest noncalcified is 3 mm in RUL. Nonenlarged R hilar LN measures 8 x 8 mm, enlarged LN 16 mm in aortopulmonary window.\par PET 4/4/2019: FDG avid AP window LN 1.6 x 1.9 cm, SUV 12; small R hilar LN 0.8 x 0.8 cm, SUV 3.2; FDG avid spiculated PATRICIO nodule 2.8x 2.7 cm, SUV 13.5, scattered calcified and non-calcified b/l pulmonary nodules. Largest noncalcified 0.3 cm in RUL too small to characterized. Small sclerotic focus in anterolateral R fourth rib with minimal FDG activity SUV 1.7 small sclerotic lesion in left body of T6 vertebra 0.8 x 0.6 cm, too small to characterize\par MRI Brain/TSpine: neg for metastatic disease

## 2019-05-07 NOTE — REVIEW OF SYSTEMS
[Fatigue] : fatigue [Abdominal Pain] : abdominal pain [Negative] : Endocrine [Fever] : no fever [Chills] : no chills [Nausea] : no nausea [Vomiting] : no vomiting

## 2019-05-15 ENCOUNTER — APPOINTMENT (OUTPATIENT)
Dept: HEMATOLOGY ONCOLOGY | Facility: CLINIC | Age: 69
End: 2019-05-15
Payer: MEDICARE

## 2019-05-15 ENCOUNTER — RESULT REVIEW (OUTPATIENT)
Age: 69
End: 2019-05-15

## 2019-05-15 VITALS
RESPIRATION RATE: 14 BRPM | OXYGEN SATURATION: 98 % | SYSTOLIC BLOOD PRESSURE: 130 MMHG | WEIGHT: 162.04 LBS | DIASTOLIC BLOOD PRESSURE: 70 MMHG | HEART RATE: 96 BPM | BODY MASS INDEX: 27.81 KG/M2 | TEMPERATURE: 99.1 F

## 2019-05-15 LAB
BASOPHILS # BLD AUTO: 0.1 K/UL — SIGNIFICANT CHANGE UP (ref 0–0.2)
BASOPHILS NFR BLD AUTO: 0.5 % — SIGNIFICANT CHANGE UP (ref 0–2)
EOSINOPHIL # BLD AUTO: 0.4 K/UL — SIGNIFICANT CHANGE UP (ref 0–0.5)
EOSINOPHIL NFR BLD AUTO: 2.4 % — SIGNIFICANT CHANGE UP (ref 0–6)
HCT VFR BLD CALC: 39 % — SIGNIFICANT CHANGE UP (ref 39–50)
HGB BLD-MCNC: 13.1 G/DL — SIGNIFICANT CHANGE UP (ref 13–17)
LYMPHOCYTES # BLD AUTO: 1.4 K/UL — SIGNIFICANT CHANGE UP (ref 1–3.3)
LYMPHOCYTES # BLD AUTO: 8 % — LOW (ref 13–44)
MCHC RBC-ENTMCNC: 30.5 PG — SIGNIFICANT CHANGE UP (ref 27–34)
MCHC RBC-ENTMCNC: 33.7 G/DL — SIGNIFICANT CHANGE UP (ref 32–36)
MCV RBC AUTO: 90.5 FL — SIGNIFICANT CHANGE UP (ref 80–100)
MONOCYTES # BLD AUTO: 0.9 K/UL — SIGNIFICANT CHANGE UP (ref 0–0.9)
MONOCYTES NFR BLD AUTO: 5.2 % — SIGNIFICANT CHANGE UP (ref 2–14)
NEUTROPHILS # BLD AUTO: 14.8 K/UL — HIGH (ref 1.8–7.4)
NEUTROPHILS NFR BLD AUTO: 84 % — HIGH (ref 43–77)
PLATELET # BLD AUTO: 198 K/UL — SIGNIFICANT CHANGE UP (ref 150–400)
RBC # BLD: 4.3 M/UL — SIGNIFICANT CHANGE UP (ref 4.2–5.8)
RBC # FLD: 14.4 % — SIGNIFICANT CHANGE UP (ref 10.3–14.5)
WBC # BLD: 17.6 K/UL — HIGH (ref 3.8–10.5)
WBC # FLD AUTO: 17.6 K/UL — HIGH (ref 3.8–10.5)

## 2019-05-15 PROCEDURE — 99214 OFFICE O/P EST MOD 30 MIN: CPT

## 2019-05-16 LAB
ALBUMIN SERPL ELPH-MCNC: 4.2 G/DL
ALP BLD-CCNC: 176 U/L
ALT SERPL-CCNC: 32 U/L
ANION GAP SERPL CALC-SCNC: 15 MMOL/L
AST SERPL-CCNC: 18 U/L
BILIRUB SERPL-MCNC: 0.3 MG/DL
BUN SERPL-MCNC: 18 MG/DL
CALCIUM SERPL-MCNC: 9.8 MG/DL
CHLORIDE SERPL-SCNC: 97 MMOL/L
CO2 SERPL-SCNC: 27 MMOL/L
CREAT SERPL-MCNC: 0.8 MG/DL
GLUCOSE SERPL-MCNC: 224 MG/DL
LDH SERPL-CCNC: 152 U/L
POTASSIUM SERPL-SCNC: 4.4 MMOL/L
PROT SERPL-MCNC: 7 G/DL
SODIUM SERPL-SCNC: 139 MMOL/L

## 2019-05-22 ENCOUNTER — RESULT REVIEW (OUTPATIENT)
Age: 69
End: 2019-05-22

## 2019-05-22 ENCOUNTER — APPOINTMENT (OUTPATIENT)
Dept: INFUSION THERAPY | Facility: HOSPITAL | Age: 69
End: 2019-05-22

## 2019-05-22 ENCOUNTER — LABORATORY RESULT (OUTPATIENT)
Age: 69
End: 2019-05-22

## 2019-05-22 LAB
BASOPHILS # BLD AUTO: 0.1 K/UL — SIGNIFICANT CHANGE UP (ref 0–0.2)
BASOPHILS NFR BLD AUTO: 0.4 % — SIGNIFICANT CHANGE UP (ref 0–2)
EOSINOPHIL # BLD AUTO: 0.5 K/UL — SIGNIFICANT CHANGE UP (ref 0–0.5)
EOSINOPHIL NFR BLD AUTO: 3.7 % — SIGNIFICANT CHANGE UP (ref 0–6)
HCT VFR BLD CALC: 35 % — LOW (ref 39–50)
HGB BLD-MCNC: 12.5 G/DL — LOW (ref 13–17)
LYMPHOCYTES # BLD AUTO: 1.2 K/UL — SIGNIFICANT CHANGE UP (ref 1–3.3)
LYMPHOCYTES # BLD AUTO: 9.7 % — LOW (ref 13–44)
MCHC RBC-ENTMCNC: 32.6 PG — SIGNIFICANT CHANGE UP (ref 27–34)
MCHC RBC-ENTMCNC: 35.9 G/DL — SIGNIFICANT CHANGE UP (ref 32–36)
MCV RBC AUTO: 91 FL — SIGNIFICANT CHANGE UP (ref 80–100)
MONOCYTES # BLD AUTO: 0.9 K/UL — SIGNIFICANT CHANGE UP (ref 0–0.9)
MONOCYTES NFR BLD AUTO: 7.5 % — SIGNIFICANT CHANGE UP (ref 2–14)
NEUTROPHILS # BLD AUTO: 9.8 K/UL — HIGH (ref 1.8–7.4)
NEUTROPHILS NFR BLD AUTO: 78.7 % — HIGH (ref 43–77)
PLATELET # BLD AUTO: 371 K/UL — SIGNIFICANT CHANGE UP (ref 150–400)
RBC # BLD: 3.84 M/UL — LOW (ref 4.2–5.8)
RBC # FLD: 14.7 % — HIGH (ref 10.3–14.5)
WBC # BLD: 12.4 K/UL — HIGH (ref 3.8–10.5)
WBC # FLD AUTO: 12.4 K/UL — HIGH (ref 3.8–10.5)

## 2019-05-23 ENCOUNTER — APPOINTMENT (OUTPATIENT)
Dept: INFUSION THERAPY | Facility: HOSPITAL | Age: 69
End: 2019-05-23

## 2019-05-23 ENCOUNTER — OTHER (OUTPATIENT)
Age: 69
End: 2019-05-23

## 2019-05-24 ENCOUNTER — APPOINTMENT (OUTPATIENT)
Dept: INFUSION THERAPY | Facility: HOSPITAL | Age: 69
End: 2019-05-24

## 2019-05-30 ENCOUNTER — APPOINTMENT (OUTPATIENT)
Dept: NUCLEAR MEDICINE | Facility: IMAGING CENTER | Age: 69
End: 2019-05-30
Payer: MEDICARE

## 2019-05-30 ENCOUNTER — OUTPATIENT (OUTPATIENT)
Dept: OUTPATIENT SERVICES | Facility: HOSPITAL | Age: 69
LOS: 1 days | End: 2019-05-30
Payer: COMMERCIAL

## 2019-05-30 DIAGNOSIS — Z90.89 ACQUIRED ABSENCE OF OTHER ORGANS: Chronic | ICD-10-CM

## 2019-05-30 DIAGNOSIS — N20.0 CALCULUS OF KIDNEY: Chronic | ICD-10-CM

## 2019-05-30 DIAGNOSIS — C34.12 MALIGNANT NEOPLASM OF UPPER LOBE, LEFT BRONCHUS OR LUNG: ICD-10-CM

## 2019-05-30 DIAGNOSIS — Z95.5 PRESENCE OF CORONARY ANGIOPLASTY IMPLANT AND GRAFT: Chronic | ICD-10-CM

## 2019-05-30 DIAGNOSIS — D11.0 BENIGN NEOPLASM OF PAROTID GLAND: Chronic | ICD-10-CM

## 2019-05-30 PROCEDURE — 78815 PET IMAGE W/CT SKULL-THIGH: CPT | Mod: 26,PS

## 2019-05-30 PROCEDURE — 78815 PET IMAGE W/CT SKULL-THIGH: CPT

## 2019-05-30 PROCEDURE — A9552: CPT

## 2019-05-31 ENCOUNTER — OUTPATIENT (OUTPATIENT)
Dept: OUTPATIENT SERVICES | Facility: HOSPITAL | Age: 69
LOS: 1 days | Discharge: HOME | End: 2019-05-31

## 2019-05-31 DIAGNOSIS — N20.0 CALCULUS OF KIDNEY: Chronic | ICD-10-CM

## 2019-05-31 DIAGNOSIS — Z95.5 PRESENCE OF CORONARY ANGIOPLASTY IMPLANT AND GRAFT: Chronic | ICD-10-CM

## 2019-05-31 DIAGNOSIS — Z90.89 ACQUIRED ABSENCE OF OTHER ORGANS: Chronic | ICD-10-CM

## 2019-05-31 DIAGNOSIS — D11.0 BENIGN NEOPLASM OF PAROTID GLAND: Chronic | ICD-10-CM

## 2019-05-31 DIAGNOSIS — C34.90 MALIGNANT NEOPLASM OF UNSPECIFIED PART OF UNSPECIFIED BRONCHUS OR LUNG: ICD-10-CM

## 2019-06-05 ENCOUNTER — RESULT REVIEW (OUTPATIENT)
Age: 69
End: 2019-06-05

## 2019-06-05 ENCOUNTER — APPOINTMENT (OUTPATIENT)
Dept: HEMATOLOGY ONCOLOGY | Facility: CLINIC | Age: 69
End: 2019-06-05
Payer: MEDICARE

## 2019-06-05 VITALS
WEIGHT: 167.55 LBS | OXYGEN SATURATION: 99 % | RESPIRATION RATE: 14 BRPM | BODY MASS INDEX: 28.76 KG/M2 | DIASTOLIC BLOOD PRESSURE: 70 MMHG | SYSTOLIC BLOOD PRESSURE: 130 MMHG | TEMPERATURE: 98.3 F | HEART RATE: 80 BPM

## 2019-06-05 LAB
ALBUMIN SERPL ELPH-MCNC: 4.4 G/DL
ALP BLD-CCNC: 169 U/L
ALT SERPL-CCNC: 20 U/L
ANION GAP SERPL CALC-SCNC: 14 MMOL/L
AST SERPL-CCNC: 14 U/L
BASOPHILS # BLD AUTO: 0.1 K/UL — SIGNIFICANT CHANGE UP (ref 0–0.2)
BASOPHILS NFR BLD AUTO: 0.4 % — SIGNIFICANT CHANGE UP (ref 0–2)
BILIRUB SERPL-MCNC: 0.3 MG/DL
BUN SERPL-MCNC: 15 MG/DL
CALCIUM SERPL-MCNC: 9.8 MG/DL
CHLORIDE SERPL-SCNC: 97 MMOL/L
CO2 SERPL-SCNC: 28 MMOL/L
CREAT SERPL-MCNC: 0.91 MG/DL
EOSINOPHIL # BLD AUTO: 0.4 K/UL — SIGNIFICANT CHANGE UP (ref 0–0.5)
EOSINOPHIL NFR BLD AUTO: 2.2 % — SIGNIFICANT CHANGE UP (ref 0–6)
GLUCOSE SERPL-MCNC: 196 MG/DL
HCT VFR BLD CALC: 33.6 % — LOW (ref 39–50)
HGB BLD-MCNC: 12 G/DL — LOW (ref 13–17)
LDH SERPL-CCNC: 170 U/L
LYMPHOCYTES # BLD AUTO: 1.3 K/UL — SIGNIFICANT CHANGE UP (ref 1–3.3)
LYMPHOCYTES # BLD AUTO: 7.3 % — LOW (ref 13–44)
MCHC RBC-ENTMCNC: 33 PG — SIGNIFICANT CHANGE UP (ref 27–34)
MCHC RBC-ENTMCNC: 35.7 G/DL — SIGNIFICANT CHANGE UP (ref 32–36)
MCV RBC AUTO: 92.6 FL — SIGNIFICANT CHANGE UP (ref 80–100)
MONOCYTES # BLD AUTO: 0.7 K/UL — SIGNIFICANT CHANGE UP (ref 0–0.9)
MONOCYTES NFR BLD AUTO: 4.2 % — SIGNIFICANT CHANGE UP (ref 2–14)
NEUTROPHILS # BLD AUTO: 15.2 K/UL — HIGH (ref 1.8–7.4)
NEUTROPHILS NFR BLD AUTO: 86 % — HIGH (ref 43–77)
PLATELET # BLD AUTO: 186 K/UL — SIGNIFICANT CHANGE UP (ref 150–400)
POTASSIUM SERPL-SCNC: 4.8 MMOL/L
PROT SERPL-MCNC: 6.6 G/DL
RBC # BLD: 3.63 M/UL — LOW (ref 4.2–5.8)
RBC # FLD: 14.6 % — HIGH (ref 10.3–14.5)
SODIUM SERPL-SCNC: 139 MMOL/L
WBC # BLD: 17.7 K/UL — HIGH (ref 3.8–10.5)
WBC # FLD AUTO: 17.7 K/UL — HIGH (ref 3.8–10.5)

## 2019-06-05 PROCEDURE — 99215 OFFICE O/P EST HI 40 MIN: CPT

## 2019-06-12 ENCOUNTER — APPOINTMENT (OUTPATIENT)
Dept: INFUSION THERAPY | Facility: HOSPITAL | Age: 69
End: 2019-06-12

## 2019-06-12 ENCOUNTER — RESULT REVIEW (OUTPATIENT)
Age: 69
End: 2019-06-12

## 2019-06-12 LAB
BASOPHILS # BLD AUTO: 0.1 K/UL — SIGNIFICANT CHANGE UP (ref 0–0.2)
BASOPHILS NFR BLD AUTO: 0.4 % — SIGNIFICANT CHANGE UP (ref 0–2)
EOSINOPHIL # BLD AUTO: 0.4 K/UL — SIGNIFICANT CHANGE UP (ref 0–0.5)
EOSINOPHIL NFR BLD AUTO: 2.8 % — SIGNIFICANT CHANGE UP (ref 0–6)
HCT VFR BLD CALC: 33.3 % — LOW (ref 39–50)
HGB BLD-MCNC: 11.6 G/DL — LOW (ref 13–17)
LYMPHOCYTES # BLD AUTO: 1.2 K/UL — SIGNIFICANT CHANGE UP (ref 1–3.3)
LYMPHOCYTES # BLD AUTO: 9.4 % — LOW (ref 13–44)
MCHC RBC-ENTMCNC: 32.8 PG — SIGNIFICANT CHANGE UP (ref 27–34)
MCHC RBC-ENTMCNC: 34.9 G/DL — SIGNIFICANT CHANGE UP (ref 32–36)
MCV RBC AUTO: 94.1 FL — SIGNIFICANT CHANGE UP (ref 80–100)
MONOCYTES # BLD AUTO: 0.8 K/UL — SIGNIFICANT CHANGE UP (ref 0–0.9)
MONOCYTES NFR BLD AUTO: 6.4 % — SIGNIFICANT CHANGE UP (ref 2–14)
NEUTROPHILS # BLD AUTO: 10.5 K/UL — HIGH (ref 1.8–7.4)
NEUTROPHILS NFR BLD AUTO: 80.9 % — HIGH (ref 43–77)
PLATELET # BLD AUTO: 269 K/UL — SIGNIFICANT CHANGE UP (ref 150–400)
RBC # BLD: 3.54 M/UL — LOW (ref 4.2–5.8)
RBC # FLD: 15.1 % — HIGH (ref 10.3–14.5)
WBC # BLD: 12.9 K/UL — HIGH (ref 3.8–10.5)
WBC # FLD AUTO: 12.9 K/UL — HIGH (ref 3.8–10.5)

## 2019-06-13 ENCOUNTER — APPOINTMENT (OUTPATIENT)
Dept: INFUSION THERAPY | Facility: HOSPITAL | Age: 69
End: 2019-06-13

## 2019-06-13 DIAGNOSIS — R11.2 NAUSEA WITH VOMITING, UNSPECIFIED: ICD-10-CM

## 2019-06-13 DIAGNOSIS — Z51.11 ENCOUNTER FOR ANTINEOPLASTIC CHEMOTHERAPY: ICD-10-CM

## 2019-06-14 ENCOUNTER — APPOINTMENT (OUTPATIENT)
Dept: INFUSION THERAPY | Facility: HOSPITAL | Age: 69
End: 2019-06-14

## 2019-06-17 DIAGNOSIS — Z51.89 ENCOUNTER FOR OTHER SPECIFIED AFTERCARE: ICD-10-CM

## 2019-06-20 NOTE — ED PROVIDER NOTE - TEMPLATE
Addended by: ABDULLAHI OREILLY on: 6/20/2019 10:24 AM     Modules accepted: Orders    
Abdominal Pain, N/V/D

## 2019-06-26 ENCOUNTER — RESULT REVIEW (OUTPATIENT)
Age: 69
End: 2019-06-26

## 2019-06-26 ENCOUNTER — APPOINTMENT (OUTPATIENT)
Dept: HEMATOLOGY ONCOLOGY | Facility: CLINIC | Age: 69
End: 2019-06-26
Payer: MEDICARE

## 2019-06-26 VITALS
HEART RATE: 71 BPM | SYSTOLIC BLOOD PRESSURE: 126 MMHG | OXYGEN SATURATION: 95 % | BODY MASS INDEX: 29.14 KG/M2 | TEMPERATURE: 98.2 F | DIASTOLIC BLOOD PRESSURE: 73 MMHG | RESPIRATION RATE: 16 BRPM | WEIGHT: 169.73 LBS

## 2019-06-26 LAB
BASOPHILS # BLD AUTO: 0 K/UL — SIGNIFICANT CHANGE UP (ref 0–0.2)
BASOPHILS NFR BLD AUTO: 0.3 % — SIGNIFICANT CHANGE UP (ref 0–2)
EOSINOPHIL # BLD AUTO: 0.5 K/UL — SIGNIFICANT CHANGE UP (ref 0–0.5)
EOSINOPHIL NFR BLD AUTO: 3.4 % — SIGNIFICANT CHANGE UP (ref 0–6)
HCT VFR BLD CALC: 34.6 % — LOW (ref 39–50)
HGB BLD-MCNC: 11.6 G/DL — LOW (ref 13–17)
LYMPHOCYTES # BLD AUTO: 1.4 K/UL — SIGNIFICANT CHANGE UP (ref 1–3.3)
LYMPHOCYTES # BLD AUTO: 10.7 % — LOW (ref 13–44)
MCHC RBC-ENTMCNC: 32.2 PG — SIGNIFICANT CHANGE UP (ref 27–34)
MCHC RBC-ENTMCNC: 33.6 G/DL — SIGNIFICANT CHANGE UP (ref 32–36)
MCV RBC AUTO: 95.6 FL — SIGNIFICANT CHANGE UP (ref 80–100)
MONOCYTES # BLD AUTO: 0.7 K/UL — SIGNIFICANT CHANGE UP (ref 0–0.9)
MONOCYTES NFR BLD AUTO: 5.6 % — SIGNIFICANT CHANGE UP (ref 2–14)
NEUTROPHILS # BLD AUTO: 10.6 K/UL — HIGH (ref 1.8–7.4)
NEUTROPHILS NFR BLD AUTO: 80 % — HIGH (ref 43–77)
PLATELET # BLD AUTO: 171 K/UL — SIGNIFICANT CHANGE UP (ref 150–400)
RBC # BLD: 3.62 M/UL — LOW (ref 4.2–5.8)
RBC # FLD: 15.3 % — HIGH (ref 10.3–14.5)
WBC # BLD: 13.2 K/UL — HIGH (ref 3.8–10.5)
WBC # FLD AUTO: 13.2 K/UL — HIGH (ref 3.8–10.5)

## 2019-06-26 PROCEDURE — 99214 OFFICE O/P EST MOD 30 MIN: CPT

## 2019-06-27 LAB
ALBUMIN SERPL ELPH-MCNC: 4.2 G/DL
ALP BLD-CCNC: 155 U/L
ALT SERPL-CCNC: 25 U/L
ANION GAP SERPL CALC-SCNC: 14 MMOL/L
AST SERPL-CCNC: 16 U/L
BILIRUB SERPL-MCNC: 0.3 MG/DL
BUN SERPL-MCNC: 16 MG/DL
CALCIUM SERPL-MCNC: 9.4 MG/DL
CHLORIDE SERPL-SCNC: 99 MMOL/L
CO2 SERPL-SCNC: 24 MMOL/L
CREAT SERPL-MCNC: 0.85 MG/DL
GLUCOSE SERPL-MCNC: 194 MG/DL
LDH SERPL-CCNC: 164 U/L
POTASSIUM SERPL-SCNC: 4.3 MMOL/L
PROT SERPL-MCNC: 6.4 G/DL
SODIUM SERPL-SCNC: 137 MMOL/L

## 2019-06-28 ENCOUNTER — OUTPATIENT (OUTPATIENT)
Dept: OUTPATIENT SERVICES | Facility: HOSPITAL | Age: 69
LOS: 1 days | Discharge: HOME | End: 2019-06-28

## 2019-06-28 ENCOUNTER — APPOINTMENT (OUTPATIENT)
Dept: PULMONOLOGY | Facility: CLINIC | Age: 69
End: 2019-06-28
Payer: MEDICARE

## 2019-06-28 DIAGNOSIS — D11.0 BENIGN NEOPLASM OF PAROTID GLAND: Chronic | ICD-10-CM

## 2019-06-28 DIAGNOSIS — C34.90 MALIGNANT NEOPLASM OF UNSPECIFIED PART OF UNSPECIFIED BRONCHUS OR LUNG: ICD-10-CM

## 2019-06-28 DIAGNOSIS — Z90.89 ACQUIRED ABSENCE OF OTHER ORGANS: Chronic | ICD-10-CM

## 2019-06-28 DIAGNOSIS — Z95.5 PRESENCE OF CORONARY ANGIOPLASTY IMPLANT AND GRAFT: Chronic | ICD-10-CM

## 2019-06-28 DIAGNOSIS — N20.0 CALCULUS OF KIDNEY: Chronic | ICD-10-CM

## 2019-06-28 PROCEDURE — 94726 PLETHYSMOGRAPHY LUNG VOLUMES: CPT

## 2019-06-28 PROCEDURE — 94729 DIFFUSING CAPACITY: CPT

## 2019-06-28 PROCEDURE — 94060 EVALUATION OF WHEEZING: CPT

## 2019-07-03 ENCOUNTER — RESULT REVIEW (OUTPATIENT)
Age: 69
End: 2019-07-03

## 2019-07-03 ENCOUNTER — APPOINTMENT (OUTPATIENT)
Dept: INFUSION THERAPY | Facility: HOSPITAL | Age: 69
End: 2019-07-03

## 2019-07-03 LAB
BASOPHILS # BLD AUTO: 0 K/UL — SIGNIFICANT CHANGE UP (ref 0–0.2)
BASOPHILS NFR BLD AUTO: 0.4 % — SIGNIFICANT CHANGE UP (ref 0–2)
EOSINOPHIL # BLD AUTO: 0.6 K/UL — HIGH (ref 0–0.5)
EOSINOPHIL NFR BLD AUTO: 5.5 % — SIGNIFICANT CHANGE UP (ref 0–6)
HCT VFR BLD CALC: 35.7 % — LOW (ref 39–50)
HGB BLD-MCNC: 12 G/DL — LOW (ref 13–17)
LYMPHOCYTES # BLD AUTO: 1.2 K/UL — SIGNIFICANT CHANGE UP (ref 1–3.3)
LYMPHOCYTES # BLD AUTO: 11.3 % — LOW (ref 13–44)
MCHC RBC-ENTMCNC: 32.8 PG — SIGNIFICANT CHANGE UP (ref 27–34)
MCHC RBC-ENTMCNC: 33.8 G/DL — SIGNIFICANT CHANGE UP (ref 32–36)
MCV RBC AUTO: 97.1 FL — SIGNIFICANT CHANGE UP (ref 80–100)
MONOCYTES # BLD AUTO: 0.8 K/UL — SIGNIFICANT CHANGE UP (ref 0–0.9)
MONOCYTES NFR BLD AUTO: 7.5 % — SIGNIFICANT CHANGE UP (ref 2–14)
NEUTROPHILS # BLD AUTO: 8.2 K/UL — HIGH (ref 1.8–7.4)
NEUTROPHILS NFR BLD AUTO: 75.3 % — SIGNIFICANT CHANGE UP (ref 43–77)
PLATELET # BLD AUTO: 259 K/UL — SIGNIFICANT CHANGE UP (ref 150–400)
RBC # BLD: 3.67 M/UL — LOW (ref 4.2–5.8)
RBC # FLD: 15.4 % — HIGH (ref 10.3–14.5)
WBC # BLD: 10.8 K/UL — HIGH (ref 3.8–10.5)
WBC # FLD AUTO: 10.8 K/UL — HIGH (ref 3.8–10.5)

## 2019-07-05 ENCOUNTER — APPOINTMENT (OUTPATIENT)
Dept: INFUSION THERAPY | Facility: HOSPITAL | Age: 69
End: 2019-07-05

## 2019-07-05 DIAGNOSIS — Z51.11 ENCOUNTER FOR ANTINEOPLASTIC CHEMOTHERAPY: ICD-10-CM

## 2019-07-05 DIAGNOSIS — R11.2 NAUSEA WITH VOMITING, UNSPECIFIED: ICD-10-CM

## 2019-07-05 DIAGNOSIS — C34.12 MALIGNANT NEOPLASM OF UPPER LOBE, LEFT BRONCHUS OR LUNG: ICD-10-CM

## 2019-07-06 ENCOUNTER — APPOINTMENT (OUTPATIENT)
Dept: INFUSION THERAPY | Facility: HOSPITAL | Age: 69
End: 2019-07-06

## 2019-07-09 DIAGNOSIS — Z51.89 ENCOUNTER FOR OTHER SPECIFIED AFTERCARE: ICD-10-CM

## 2019-07-12 ENCOUNTER — APPOINTMENT (OUTPATIENT)
Dept: CT IMAGING | Facility: IMAGING CENTER | Age: 69
End: 2019-07-12
Payer: MEDICARE

## 2019-07-12 ENCOUNTER — APPOINTMENT (OUTPATIENT)
Dept: NUCLEAR MEDICINE | Facility: IMAGING CENTER | Age: 69
End: 2019-07-12

## 2019-07-12 ENCOUNTER — OUTPATIENT (OUTPATIENT)
Dept: OUTPATIENT SERVICES | Facility: HOSPITAL | Age: 69
LOS: 1 days | End: 2019-07-12
Payer: COMMERCIAL

## 2019-07-12 DIAGNOSIS — N20.0 CALCULUS OF KIDNEY: Chronic | ICD-10-CM

## 2019-07-12 DIAGNOSIS — Z90.89 ACQUIRED ABSENCE OF OTHER ORGANS: Chronic | ICD-10-CM

## 2019-07-12 DIAGNOSIS — Z95.5 PRESENCE OF CORONARY ANGIOPLASTY IMPLANT AND GRAFT: Chronic | ICD-10-CM

## 2019-07-12 DIAGNOSIS — C34.12 MALIGNANT NEOPLASM OF UPPER LOBE, LEFT BRONCHUS OR LUNG: ICD-10-CM

## 2019-07-12 DIAGNOSIS — D11.0 BENIGN NEOPLASM OF PAROTID GLAND: Chronic | ICD-10-CM

## 2019-07-12 PROCEDURE — 71260 CT THORAX DX C+: CPT

## 2019-07-12 PROCEDURE — 71260 CT THORAX DX C+: CPT | Mod: 26

## 2019-07-17 ENCOUNTER — APPOINTMENT (OUTPATIENT)
Dept: HEMATOLOGY ONCOLOGY | Facility: CLINIC | Age: 69
End: 2019-07-17
Payer: MEDICARE

## 2019-07-17 ENCOUNTER — RESULT REVIEW (OUTPATIENT)
Age: 69
End: 2019-07-17

## 2019-07-17 VITALS
SYSTOLIC BLOOD PRESSURE: 122 MMHG | OXYGEN SATURATION: 96 % | DIASTOLIC BLOOD PRESSURE: 70 MMHG | BODY MASS INDEX: 29.59 KG/M2 | HEART RATE: 83 BPM | RESPIRATION RATE: 14 BRPM | WEIGHT: 172.4 LBS | TEMPERATURE: 98 F

## 2019-07-17 LAB
BASOPHILS # BLD AUTO: 0.1 K/UL — SIGNIFICANT CHANGE UP (ref 0–0.2)
BASOPHILS NFR BLD AUTO: 0.5 % — SIGNIFICANT CHANGE UP (ref 0–2)
EOSINOPHIL # BLD AUTO: 0.3 K/UL — SIGNIFICANT CHANGE UP (ref 0–0.5)
EOSINOPHIL NFR BLD AUTO: 2.7 % — SIGNIFICANT CHANGE UP (ref 0–6)
HCT VFR BLD CALC: 34.8 % — LOW (ref 39–50)
HGB BLD-MCNC: 11.5 G/DL — LOW (ref 13–17)
LYMPHOCYTES # BLD AUTO: 1.2 K/UL — SIGNIFICANT CHANGE UP (ref 1–3.3)
LYMPHOCYTES # BLD AUTO: 10.5 % — LOW (ref 13–44)
MCHC RBC-ENTMCNC: 32.8 PG — SIGNIFICANT CHANGE UP (ref 27–34)
MCHC RBC-ENTMCNC: 33.2 G/DL — SIGNIFICANT CHANGE UP (ref 32–36)
MCV RBC AUTO: 99 FL — SIGNIFICANT CHANGE UP (ref 80–100)
MONOCYTES # BLD AUTO: 0.6 K/UL — SIGNIFICANT CHANGE UP (ref 0–0.9)
MONOCYTES NFR BLD AUTO: 5.3 % — SIGNIFICANT CHANGE UP (ref 2–14)
NEUTROPHILS # BLD AUTO: 9.6 K/UL — HIGH (ref 1.8–7.4)
NEUTROPHILS NFR BLD AUTO: 81 % — HIGH (ref 43–77)
PLATELET # BLD AUTO: 176 K/UL — SIGNIFICANT CHANGE UP (ref 150–400)
RBC # BLD: 3.51 M/UL — LOW (ref 4.2–5.8)
RBC # FLD: 16.1 % — HIGH (ref 10.3–14.5)
WBC # BLD: 11.9 K/UL — HIGH (ref 3.8–10.5)
WBC # FLD AUTO: 11.9 K/UL — HIGH (ref 3.8–10.5)

## 2019-07-17 PROCEDURE — 99215 OFFICE O/P EST HI 40 MIN: CPT

## 2019-07-18 LAB
ALBUMIN SERPL ELPH-MCNC: 4.4 G/DL
ALP BLD-CCNC: 183 U/L
ALT SERPL-CCNC: 24 U/L
ANION GAP SERPL CALC-SCNC: 17 MMOL/L
AST SERPL-CCNC: 18 U/L
BILIRUB SERPL-MCNC: 0.3 MG/DL
BUN SERPL-MCNC: 16 MG/DL
CALCIUM SERPL-MCNC: 9.5 MG/DL
CHLORIDE SERPL-SCNC: 97 MMOL/L
CO2 SERPL-SCNC: 25 MMOL/L
CREAT SERPL-MCNC: 0.9 MG/DL
GLUCOSE SERPL-MCNC: 248 MG/DL
LDH SERPL-CCNC: 225 U/L
POTASSIUM SERPL-SCNC: 4.6 MMOL/L
PROT SERPL-MCNC: 6.4 G/DL
SODIUM SERPL-SCNC: 139 MMOL/L

## 2019-07-23 ENCOUNTER — OUTPATIENT (OUTPATIENT)
Dept: OUTPATIENT SERVICES | Facility: HOSPITAL | Age: 69
LOS: 1 days | Discharge: HOME | End: 2019-07-23
Payer: MEDICARE

## 2019-07-23 ENCOUNTER — APPOINTMENT (OUTPATIENT)
Dept: RADIATION ONCOLOGY | Facility: CLINIC | Age: 69
End: 2019-07-23
Payer: MEDICARE

## 2019-07-23 VITALS
DIASTOLIC BLOOD PRESSURE: 79 MMHG | HEART RATE: 80 BPM | WEIGHT: 173.5 LBS | HEIGHT: 64 IN | BODY MASS INDEX: 29.62 KG/M2 | RESPIRATION RATE: 18 BRPM | OXYGEN SATURATION: 95 % | SYSTOLIC BLOOD PRESSURE: 124 MMHG

## 2019-07-23 DIAGNOSIS — Z95.5 PRESENCE OF CORONARY ANGIOPLASTY IMPLANT AND GRAFT: Chronic | ICD-10-CM

## 2019-07-23 DIAGNOSIS — Z90.89 ACQUIRED ABSENCE OF OTHER ORGANS: Chronic | ICD-10-CM

## 2019-07-23 DIAGNOSIS — N20.0 CALCULUS OF KIDNEY: Chronic | ICD-10-CM

## 2019-07-23 DIAGNOSIS — D11.0 BENIGN NEOPLASM OF PAROTID GLAND: Chronic | ICD-10-CM

## 2019-07-23 PROCEDURE — 99205 OFFICE O/P NEW HI 60 MIN: CPT

## 2019-07-23 RX ORDER — NICOTINE 4 MG/1
4 GUM, CHEWING ORAL
Qty: 240 | Refills: 0 | Status: DISCONTINUED | COMMUNITY
Start: 2017-04-17 | End: 2019-07-23

## 2019-07-23 NOTE — REVIEW OF SYSTEMS
[SOB on Exertion] : shortness of breath during exertion [Constipation] : constipation [Muscle Pain] : muscle pain [Diarrhea] : diarrhea [Easy Bruising] : a tendency for easy bruising [Negative] : Psychiatric [Diarrhea: Grade 1 - Increase of <4 stools per day over baseline; mild increase in ostomy output compared to baseline] : Diarrhea: Grade 1 - Increase of <4 stools per day over baseline; mild increase in ostomy output compared to baseline [Constipation: Grade 1 - Occasional or intermittent symptoms; occasional use of stool softeners, laxatives, dietary modification, or enema] : Constipation: Grade 1 - Occasional or intermittent symptoms; occasional use of stool softeners, laxatives, dietary modification, or enema [Dysphagia: Grade 0] : Dysphagia: Grade 0 [Nausea: Grade 0] : Nausea: Grade 0 [Fatigue: Grade 0] : Fatigue: Grade 0 [Hematuria: Grade 0] : Hematuria: Grade 0 [Dyspnea: Grade 1 - Shortness of breath with moderate exertion] : Dyspnea: Grade 1 - Shortness of breath with moderate exertion [Cough: Grade 0] : Cough: Grade 0

## 2019-07-23 NOTE — VITALS
[Maximal Pain Intensity: 0/10] : 0/10 [Least Pain Intensity: 0/10] : 0/10 [80: Normal activity with effort; some signs or symptoms of disease.] : 80: Normal activity with effort; some signs or symptoms of disease.  [Date: ____________] : Patient's last distress assessment performed on [unfilled]. [ECOG Performance Status: 0 - Fully active, able to carry on all pre-disease performance without restriction] : Performance Status: 0 - Fully active, able to carry on all pre-disease performance without restriction [8 - Distress Level] : Distress Level: 8 [Patient given social work contact information and resource sheet] : Patient was given social work contact information and resource sheet

## 2019-07-24 ENCOUNTER — LABORATORY RESULT (OUTPATIENT)
Age: 69
End: 2019-07-24

## 2019-07-24 ENCOUNTER — APPOINTMENT (OUTPATIENT)
Dept: INFUSION THERAPY | Facility: HOSPITAL | Age: 69
End: 2019-07-24

## 2019-07-26 PROCEDURE — 77263 THER RADIOLOGY TX PLNG CPLX: CPT

## 2019-07-26 PROCEDURE — 77334 RADIATION TREATMENT AID(S): CPT | Mod: 26

## 2019-07-26 PROCEDURE — 77290 THER RAD SIMULAJ FIELD CPLX: CPT | Mod: 26

## 2019-08-02 ENCOUNTER — RX RENEWAL (OUTPATIENT)
Age: 69
End: 2019-08-02

## 2019-08-06 PROCEDURE — 77295 3-D RADIOTHERAPY PLAN: CPT | Mod: 26

## 2019-08-06 PROCEDURE — 77300 RADIATION THERAPY DOSE PLAN: CPT | Mod: 26

## 2019-08-06 PROCEDURE — 77334 RADIATION TREATMENT AID(S): CPT | Mod: 26

## 2019-08-08 PROCEDURE — 77280 THER RAD SIMULAJ FIELD SMPL: CPT | Mod: 26

## 2019-08-09 PROCEDURE — G6002: CPT | Mod: 26

## 2019-08-12 ENCOUNTER — OUTPATIENT (OUTPATIENT)
Dept: OUTPATIENT SERVICES | Facility: HOSPITAL | Age: 69
LOS: 1 days | Discharge: HOME | End: 2019-08-12

## 2019-08-12 DIAGNOSIS — C34.90 MALIGNANT NEOPLASM OF UNSPECIFIED PART OF UNSPECIFIED BRONCHUS OR LUNG: ICD-10-CM

## 2019-08-12 DIAGNOSIS — N20.0 CALCULUS OF KIDNEY: Chronic | ICD-10-CM

## 2019-08-12 DIAGNOSIS — Z90.89 ACQUIRED ABSENCE OF OTHER ORGANS: Chronic | ICD-10-CM

## 2019-08-12 DIAGNOSIS — D11.0 BENIGN NEOPLASM OF PAROTID GLAND: Chronic | ICD-10-CM

## 2019-08-12 DIAGNOSIS — Z95.5 PRESENCE OF CORONARY ANGIOPLASTY IMPLANT AND GRAFT: Chronic | ICD-10-CM

## 2019-08-12 PROCEDURE — G6002: CPT | Mod: 26

## 2019-08-13 PROCEDURE — G6002: CPT | Mod: 26

## 2019-08-14 ENCOUNTER — APPOINTMENT (OUTPATIENT)
Dept: INFUSION THERAPY | Facility: HOSPITAL | Age: 69
End: 2019-08-14

## 2019-08-14 ENCOUNTER — LABORATORY RESULT (OUTPATIENT)
Age: 69
End: 2019-08-14

## 2019-08-14 ENCOUNTER — RESULT REVIEW (OUTPATIENT)
Age: 69
End: 2019-08-14

## 2019-08-14 ENCOUNTER — APPOINTMENT (OUTPATIENT)
Dept: HEMATOLOGY ONCOLOGY | Facility: CLINIC | Age: 69
End: 2019-08-14
Payer: MEDICARE

## 2019-08-14 VITALS
WEIGHT: 177.47 LBS | HEART RATE: 65 BPM | DIASTOLIC BLOOD PRESSURE: 84 MMHG | SYSTOLIC BLOOD PRESSURE: 134 MMHG | BODY MASS INDEX: 30.46 KG/M2 | OXYGEN SATURATION: 95 % | RESPIRATION RATE: 18 BRPM | TEMPERATURE: 98.2 F

## 2019-08-14 LAB
BASOPHILS # BLD AUTO: 0.1 K/UL — SIGNIFICANT CHANGE UP (ref 0–0.2)
BASOPHILS NFR BLD AUTO: 1.1 % — SIGNIFICANT CHANGE UP (ref 0–2)
EOSINOPHIL # BLD AUTO: 0.5 K/UL — SIGNIFICANT CHANGE UP (ref 0–0.5)
EOSINOPHIL NFR BLD AUTO: 7.6 % — HIGH (ref 0–6)
HCT VFR BLD CALC: 37.4 % — LOW (ref 39–50)
HGB BLD-MCNC: 12.7 G/DL — LOW (ref 13–17)
LYMPHOCYTES # BLD AUTO: 0.7 K/UL — LOW (ref 1–3.3)
LYMPHOCYTES # BLD AUTO: 9.9 % — LOW (ref 13–44)
MCHC RBC-ENTMCNC: 34 G/DL — SIGNIFICANT CHANGE UP (ref 32–36)
MCHC RBC-ENTMCNC: 34.3 PG — HIGH (ref 27–34)
MCV RBC AUTO: 101 FL — HIGH (ref 80–100)
MONOCYTES # BLD AUTO: 0.5 K/UL — SIGNIFICANT CHANGE UP (ref 0–0.9)
MONOCYTES NFR BLD AUTO: 7.7 % — SIGNIFICANT CHANGE UP (ref 2–14)
NEUTROPHILS # BLD AUTO: 5 K/UL — SIGNIFICANT CHANGE UP (ref 1.8–7.4)
NEUTROPHILS NFR BLD AUTO: 73.7 % — SIGNIFICANT CHANGE UP (ref 43–77)
PLATELET # BLD AUTO: 215 K/UL — SIGNIFICANT CHANGE UP (ref 150–400)
RBC # BLD: 3.71 M/UL — LOW (ref 4.2–5.8)
RBC # FLD: 14.3 % — SIGNIFICANT CHANGE UP (ref 10.3–14.5)
WBC # BLD: 6.8 K/UL — SIGNIFICANT CHANGE UP (ref 3.8–10.5)
WBC # FLD AUTO: 6.8 K/UL — SIGNIFICANT CHANGE UP (ref 3.8–10.5)

## 2019-08-14 PROCEDURE — 99214 OFFICE O/P EST MOD 30 MIN: CPT

## 2019-08-14 PROCEDURE — G6002: CPT | Mod: 26

## 2019-08-14 RX ORDER — NYSTATIN 100000 [USP'U]/ML
100000 SUSPENSION ORAL 3 TIMES DAILY
Qty: 1 | Refills: 0 | Status: DISCONTINUED | COMMUNITY
Start: 2019-07-26 | End: 2019-08-14

## 2019-08-15 VITALS
OXYGEN SATURATION: 95 % | SYSTOLIC BLOOD PRESSURE: 156 MMHG | RESPIRATION RATE: 18 BRPM | BODY MASS INDEX: 30.54 KG/M2 | HEART RATE: 76 BPM | DIASTOLIC BLOOD PRESSURE: 84 MMHG | WEIGHT: 177.91 LBS

## 2019-08-15 DIAGNOSIS — Z51.11 ENCOUNTER FOR ANTINEOPLASTIC CHEMOTHERAPY: ICD-10-CM

## 2019-08-15 PROCEDURE — 77014: CPT | Mod: 26

## 2019-08-15 PROCEDURE — 77427 RADIATION TX MANAGEMENT X5: CPT

## 2019-08-16 ENCOUNTER — RX RENEWAL (OUTPATIENT)
Age: 69
End: 2019-08-16

## 2019-08-16 PROCEDURE — G6002: CPT | Mod: 26

## 2019-08-18 NOTE — DISEASE MANAGEMENT
[Clinical] : TNM Stage: c [TTNM] : x [NTNM] : x [MTNM] : x [N/A] : Currently not applicable [de-identified] : 5tx/1500cGy [de-identified] : 10 tx/3000cGy [de-identified] : left lung

## 2019-08-18 NOTE — REASON FOR VISIT
[Consideration of Curative Therapy] : consideration of curative therapy for [Lung Cancer] : lung cancer [Spouse] : spouse [FreeTextEntry1] : [id number] [FreeTextEntry2] : blaire

## 2019-08-18 NOTE — REVIEW OF SYSTEMS
[Constipation: Grade 1 - Occasional or intermittent symptoms; occasional use of stool softeners, laxatives, dietary modification, or enema] : Constipation: Grade 1 - Occasional or intermittent symptoms; occasional use of stool softeners, laxatives, dietary modification, or enema [Dysphagia: Grade 0] : Dysphagia: Grade 0 [Diarrhea: Grade 1 - Increase of <4 stools per day over baseline; mild increase in ostomy output compared to baseline] : Diarrhea: Grade 1 - Increase of <4 stools per day over baseline; mild increase in ostomy output compared to baseline [Nausea: Grade 0] : Nausea: Grade 0 [Hematuria: Grade 0] : Hematuria: Grade 0 [Fatigue: Grade 0] : Fatigue: Grade 0 [Cough: Grade 1 - Mild symptoms; nonprescription intervention indicated] : Cough: Grade 1 - Mild symptoms; nonprescription intervention indicated [Dyspnea: Grade 1 - Shortness of breath with moderate exertion] : Dyspnea: Grade 1 - Shortness of breath with moderate exertion [Hoarseness: Grade 0] : Hoarseness: Grade 0 [FreeTextEntry1] : semi-productive

## 2019-08-18 NOTE — HISTORY OF PRESENT ILLNESS
[FreeTextEntry1] : Mr. Kareem Ness is a 68 year old man who developed a cogh a couple of months ago which has gotten progressively worse. he was a heavy smoker untill  when he quit. Pt was diagnosed via bronchoscopy on 4/18/19 as having small cell carcinoma.\par \par [id number]\par -tolerating tx well\par -no complaints of pain noted\par -pt has developed semi-productive cough with occ greenish phelgm.no fever noted.\par -eating well and moving bowels well\par -will complete treatment next week and was given discharge instructions and followup appt\par -all translated by pacific interperter # [id number]\par

## 2019-08-19 PROCEDURE — G6002: CPT | Mod: 26

## 2019-08-20 ENCOUNTER — RX RENEWAL (OUTPATIENT)
Age: 69
End: 2019-08-20

## 2019-08-20 VITALS
WEIGHT: 179.24 LBS | RESPIRATION RATE: 16 BRPM | SYSTOLIC BLOOD PRESSURE: 124 MMHG | BODY MASS INDEX: 30.77 KG/M2 | OXYGEN SATURATION: 96 % | TEMPERATURE: 37 F | DIASTOLIC BLOOD PRESSURE: 80 MMHG | HEART RATE: 77 BPM

## 2019-08-20 PROCEDURE — G6002: CPT | Mod: 26

## 2019-08-20 NOTE — HISTORY OF PRESENT ILLNESS
[FreeTextEntry1] : Mr. Kareem Ness is a 68 year old man who developed a cough a couple of months ago which has gotten progressively worse. \par Pt saw PCP and then referred to pulmonary doctor.  He was sent for a CT of chest which showed a left upper lobe lung mass with associated lymphadenopathy and suspicious bony lesions in ribs. PET scan confirmed these findings.Brain MRI and thoracic spine negative for metastatic disease.  He underwent a bronchoscopy on 4/18/19  with EBUS  biopsy of bilateral mediastinal lymph nodes that were positive for small cell carcinoma\par \par Started on Carbo/Etoposide/Atezolizumab in 5/19. completed 4th cycle on 7/6/19.  Had achieved MA after 2nd cycle.  Pt to start immunotherapy, tomorrow. Now referred to discuss consolidative radiation therapy.\par \par No complaints of pain noted. Occ sob on exertion.  Denies cough. No dysphagia. Eating well.  Pt speaks mainly Polish and wife/daughter here to interpret.\par \par

## 2019-08-20 NOTE — PHYSICAL EXAM
[Sclera] : the sclera and conjunctiva were normal [No Focal Deficits] : no focal deficits [Normal] : oriented to person, place and time, the affect was normal, the mood was normal and not anxious

## 2019-08-20 NOTE — REASON FOR VISIT
[Lung Cancer] : lung cancer [Spouse] : spouse [Consideration for Non-Curative Therapy] : consideration for non-curative therapy for

## 2019-08-20 NOTE — REVIEW OF SYSTEMS
[Constipation: Grade 1 - Occasional or intermittent symptoms; occasional use of stool softeners, laxatives, dietary modification, or enema] : Constipation: Grade 1 - Occasional or intermittent symptoms; occasional use of stool softeners, laxatives, dietary modification, or enema [Diarrhea: Grade 1 - Increase of <4 stools per day over baseline; mild increase in ostomy output compared to baseline] : Diarrhea: Grade 1 - Increase of <4 stools per day over baseline; mild increase in ostomy output compared to baseline [Nausea: Grade 0] : Nausea: Grade 0 [Fatigue: Grade 0] : Fatigue: Grade 0 [Hematuria: Grade 0] : Hematuria: Grade 0 [Cough: Grade 1 - Mild symptoms; nonprescription intervention indicated] : Cough: Grade 1 - Mild symptoms; nonprescription intervention indicated [Dyspnea: Grade 1 - Shortness of breath with moderate exertion] : Dyspnea: Grade 1 - Shortness of breath with moderate exertion [Hoarseness: Grade 0] : Hoarseness: Grade 0 [Dysphagia: Grade 1 - Symptomatic, able to eat regular diet] : Dysphagia: Grade 1 - Symptomatic, able to eat regular diet [Esophagitis: Grade 1 - Asymptomatic; clinical or diagnostic observations only; intervention not indicated] : Esophagitis: Grade 1 - Asymptomatic; clinical or diagnostic observations only; intervention not indicated [FreeTextEntry1] : semi-productive [Dermatitis Radiation: Grade 0] : Dermatitis Radiation: Grade 0

## 2019-08-20 NOTE — DISEASE MANAGEMENT
[Clinical] : TNM Stage: c [N/A] : Currently not applicable [FreeTextEntry4] : extensive stage small cell lung cancer [TTNM] : x [NTNM] : x [MTNM] : x [de-identified] : 5tx/1500cGy [de-identified] : 10 tx/3000cGy [de-identified] : left lung/mediastinum

## 2019-08-20 NOTE — HISTORY OF PRESENT ILLNESS
[FreeTextEntry1] : 8/20/19 - otv\par -Patient is doing well.\par -Reports cough mostly in the morning with greenish phlegm.  He denies fever.\par -Has good appetite but is starting to have pain when swallowing.  He is chewing his food more to go down easier\par -He completed 2400/3000 cGy today. \par \par 8/15/19\par -tolerating tx well\par -no complaints of pain noted\par -pt has developed semi-productive cough with occ greenish phelgm.no fever noted.\par -eating well and moving bowels well\par -will complete treatment next week and was given discharge instructions and followup appt\par -all translated by pacific interperter # [id number]\par

## 2019-08-20 NOTE — DISEASE MANAGEMENT
[FreeTextEntry4] : Extensive stage small cell lung cancer [Clinical] : TNM Stage: c [TTNM] : - [MTNM] : - [NTNM] : - [N/A] : Currently not applicable

## 2019-08-20 NOTE — REASON FOR VISIT
[Lung Cancer] : lung cancer [Spouse] : spouse [Routine On-Treatment] : a routine on-treatment visit for [FreeTextEntry2] : blaire [FreeTextEntry1] : [id number]

## 2019-08-21 PROCEDURE — 77014: CPT | Mod: 26

## 2019-08-21 RX ORDER — DIPHENHYDRAMINE HYDROCHLORIDE AND LIDOCAINE HYDROCHLORIDE AND ALUMINUM HYDROXIDE AND MAGNESIUM HYDRO
KIT
Qty: 1 | Refills: 5 | Status: DISCONTINUED | COMMUNITY
Start: 2019-08-20 | End: 2019-08-21

## 2019-08-21 RX ORDER — SUCRALFATE 1 G/10ML
1 SUSPENSION ORAL 4 TIMES DAILY
Qty: 600 | Refills: 5 | Status: DISCONTINUED | COMMUNITY
Start: 2019-08-20 | End: 2019-08-21

## 2019-08-22 PROCEDURE — G6002: CPT | Mod: 26

## 2019-08-22 PROCEDURE — 77427 RADIATION TX MANAGEMENT X5: CPT

## 2019-08-23 NOTE — PHYSICAL EXAM
[Restricted in physically strenuous activity but ambulatory and able to carry out work of a light or sedentary nature] : Status 1- Restricted in physically strenuous activity but ambulatory and able to carry out work of a light or sedentary nature, e.g., light house work, office work [Normal] : clear to auscultation bilaterally, no dullness, no wheezing [de-identified] : No icterus  [de-identified] : MMM O/P clear  [de-identified] : Supple No LAD  [de-identified] : S1 S2  [de-identified] : No edema  [de-identified] : Soft NT/ND No masses  [de-identified] : No spine/CVA tenderness

## 2019-08-23 NOTE — RESULTS/DATA
[FreeTextEntry1] : -CT Chest 3/28/19:  \par 1. A left upper lobe spiculated nodule is concerning for primary lung cancer. \par 2. Enlarged chest lymph node measuring 16 mm. \par 3. Indeterminate sclerotic lesions of the right fourth rib and T6 vertebral body. \par \par -PET/CT 4/4/19:  FDG-PET/CT scan: \par 1. FDG avid spiculated left upper lobe lung nodule, worrisome for malignancy. \par 2. FDG avid AP window lymph node, suspicious for metastasis. \par 3. Indeterminate sclerotic right fourth rib and T6 vertebral body foci. \par \par -MRI Brain 5/5/19:  Small vessel white matter ischemic changes. No hemorrhage, mass or acute infarct. No abnormal enhancement to suggest brain metastases. Nonspecific left mastoid effusion. \par \par -MRI Thoracic Spine 5/5/19: Focus of low signal intensity on the T1 and T2-weighted images within the anterior T6 vertebral body corresponds to sclerosis on PET/CT and \par likely represents benign sclerosis or bone island. No evidence of lytic or blastic metastases. No abnormal signal or enhancement. \par \par -PET/CT 5/30/19:  Abnormal FDG-PET/CT scan: \par 1. Hypermetabolic spiculated left upper lobe pulmonary nodule in bilateral mediastinal and hilar lymph nodes are decreased in size and metabolism as compared to prior study from 4/4/2019, compatible with a partial response to interval therapy. Scattered calcified and noncalcified bilateral pulmonary nodules, unchanged. \par 2. Diffuse bone marrow hypermetabolism, increased as compared to prior study, likely is secondary to recent treatment with colony-stimulating factors. Small sclerotic densities in right fourth rib and body of T6 vertebra are unchanged. \par \par Reviewed:\par \par -CT Chest 7/12/19:  \par 1. In comparison with 5/30/2019, 1.5 cm left upper lobe spiculated nodule is decrease in size. No new or enlarging pulmonary nodule. \par 2. Stable subcentimeter mediastinal and hilar lymph nodes.

## 2019-08-23 NOTE — ASSESSMENT
[FreeTextEntry1] : Small cell lung cancer that is at least stage IIIB based and bilateral mediastinal lymph node involvement, and is possibly stage IV based on evidence of bony metastatic disease in ribs.  MRI Brain negative for metastasis; MRI Thoracic spine negative for lytic lesions.  Treated for extensive stage disease.  Started first line Carbo/Etoposide/Atezo in early May 2019, achieved WI following C2.   s/p C4 with sustained response.   \par Now on Atezolizumab every 3 weeks, tolerating well . Getting consolidated thoracic RT with , will complete course on 8/22/19\par -Continue atezolizumab every 3 weeks.\par - F/up visit with  on 9/25/19, and will get f/up Chest CT without contrast on 9/22/19. RX given.\par \par

## 2019-08-23 NOTE — HISTORY OF PRESENT ILLNESS
[Disease: _____________________] : Disease: [unfilled] [T: ___] : T[unfilled] [N: ___] : N[unfilled] [de-identified] : The patient has a heavy smoking history who recently quit. He developed a worsening cough roughly 6 months ago followed by fatigue and insomnia roughly 5 months ago. He saw his PCP and was referred to pulmonary. He was referred for a CT chest for lung cancer screening which revealed a left upper lobe lung mass with associated lymphadenopathy and suspicious bony lesions. He was then sent for a PET/CT scan which have activity in these areas. He underwent a bronchoscopy with EBUS biopsy of bilateral mediastinal lymph nodes that were positive for small cell carcinoma.  Started first line systemic therapy with Carbo/Etoposide/Atezolizumab in May 2019.  Achieved VT after Cycle 2.  \par \par  [de-identified] : -Lymph node 4L and 4R EBUS guided FNA 4/18/19: Positive for malignant cells. Small cell carcinoma. [de-identified] : *Translation provided by wife. \par Patient is s/p C4 Carb/Etoposide/Atezo on 7/3-7/6.   Currently on treatment regimen with atezoluzumab every 3 weeks, and getting concomitant consolidation RT with , will complete on 8/22/19.\par -  He has had an occasional productive cough since starting RT. No fever/chills\par - Mild fatigue\par - Occasional episodes of constipation/diarrhea. Takes prunes/high fiber, and immodium for diarrhea as needed\par - Appetite good, stable weight and a stable performance status.\par

## 2019-09-04 ENCOUNTER — LABORATORY RESULT (OUTPATIENT)
Age: 69
End: 2019-09-04

## 2019-09-04 ENCOUNTER — RESULT REVIEW (OUTPATIENT)
Age: 69
End: 2019-09-04

## 2019-09-04 ENCOUNTER — APPOINTMENT (OUTPATIENT)
Dept: INFUSION THERAPY | Facility: HOSPITAL | Age: 69
End: 2019-09-04

## 2019-09-04 LAB
BASOPHILS # BLD AUTO: 0 K/UL — SIGNIFICANT CHANGE UP (ref 0–0.2)
BASOPHILS NFR BLD AUTO: 0.3 % — SIGNIFICANT CHANGE UP (ref 0–2)
EOSINOPHIL # BLD AUTO: 0.4 K/UL — SIGNIFICANT CHANGE UP (ref 0–0.5)
EOSINOPHIL NFR BLD AUTO: 6.4 % — HIGH (ref 0–6)
HCT VFR BLD CALC: 37.8 % — LOW (ref 39–50)
HGB BLD-MCNC: 13 G/DL — SIGNIFICANT CHANGE UP (ref 13–17)
LYMPHOCYTES # BLD AUTO: 0.4 K/UL — LOW (ref 1–3.3)
LYMPHOCYTES # BLD AUTO: 6.6 % — LOW (ref 13–44)
MCHC RBC-ENTMCNC: 34.1 PG — HIGH (ref 27–34)
MCHC RBC-ENTMCNC: 34.3 G/DL — SIGNIFICANT CHANGE UP (ref 32–36)
MCV RBC AUTO: 99.3 FL — SIGNIFICANT CHANGE UP (ref 80–100)
MONOCYTES # BLD AUTO: 0.6 K/UL — SIGNIFICANT CHANGE UP (ref 0–0.9)
MONOCYTES NFR BLD AUTO: 9.9 % — SIGNIFICANT CHANGE UP (ref 2–14)
NEUTROPHILS # BLD AUTO: 4.9 K/UL — SIGNIFICANT CHANGE UP (ref 1.8–7.4)
NEUTROPHILS NFR BLD AUTO: 76.8 % — SIGNIFICANT CHANGE UP (ref 43–77)
PLATELET # BLD AUTO: 225 K/UL — SIGNIFICANT CHANGE UP (ref 150–400)
RBC # BLD: 3.81 M/UL — LOW (ref 4.2–5.8)
RBC # FLD: 12.1 % — SIGNIFICANT CHANGE UP (ref 10.3–14.5)
WBC # BLD: 6.4 K/UL — SIGNIFICANT CHANGE UP (ref 3.8–10.5)
WBC # FLD AUTO: 6.4 K/UL — SIGNIFICANT CHANGE UP (ref 3.8–10.5)

## 2019-09-22 ENCOUNTER — FORM ENCOUNTER (OUTPATIENT)
Age: 69
End: 2019-09-22

## 2019-09-23 ENCOUNTER — APPOINTMENT (OUTPATIENT)
Dept: CT IMAGING | Facility: IMAGING CENTER | Age: 69
End: 2019-09-23

## 2019-09-23 ENCOUNTER — OUTPATIENT (OUTPATIENT)
Dept: OUTPATIENT SERVICES | Facility: HOSPITAL | Age: 69
LOS: 1 days | End: 2019-09-23
Payer: COMMERCIAL

## 2019-09-23 ENCOUNTER — OUTPATIENT (OUTPATIENT)
Dept: OUTPATIENT SERVICES | Facility: HOSPITAL | Age: 69
LOS: 1 days | Discharge: HOME | End: 2019-09-23

## 2019-09-23 DIAGNOSIS — C34.90 MALIGNANT NEOPLASM OF UNSPECIFIED PART OF UNSPECIFIED BRONCHUS OR LUNG: ICD-10-CM

## 2019-09-23 DIAGNOSIS — N20.0 CALCULUS OF KIDNEY: Chronic | ICD-10-CM

## 2019-09-23 DIAGNOSIS — D11.0 BENIGN NEOPLASM OF PAROTID GLAND: Chronic | ICD-10-CM

## 2019-09-23 DIAGNOSIS — Z90.89 ACQUIRED ABSENCE OF OTHER ORGANS: Chronic | ICD-10-CM

## 2019-09-23 DIAGNOSIS — Z95.5 PRESENCE OF CORONARY ANGIOPLASTY IMPLANT AND GRAFT: Chronic | ICD-10-CM

## 2019-09-23 DIAGNOSIS — C34.12 MALIGNANT NEOPLASM OF UPPER LOBE, LEFT BRONCHUS OR LUNG: ICD-10-CM

## 2019-09-23 PROCEDURE — 71250 CT THORAX DX C-: CPT

## 2019-09-23 PROCEDURE — 71250 CT THORAX DX C-: CPT | Mod: 26

## 2019-09-25 ENCOUNTER — APPOINTMENT (OUTPATIENT)
Dept: INFUSION THERAPY | Facility: HOSPITAL | Age: 69
End: 2019-09-25

## 2019-09-25 ENCOUNTER — APPOINTMENT (OUTPATIENT)
Dept: HEMATOLOGY ONCOLOGY | Facility: CLINIC | Age: 69
End: 2019-09-25
Payer: MEDICARE

## 2019-09-25 ENCOUNTER — RESULT REVIEW (OUTPATIENT)
Age: 69
End: 2019-09-25

## 2019-09-25 ENCOUNTER — LABORATORY RESULT (OUTPATIENT)
Age: 69
End: 2019-09-25

## 2019-09-25 VITALS
WEIGHT: 178.57 LBS | SYSTOLIC BLOOD PRESSURE: 122 MMHG | TEMPERATURE: 98.2 F | HEART RATE: 80 BPM | DIASTOLIC BLOOD PRESSURE: 74 MMHG | BODY MASS INDEX: 30.65 KG/M2 | OXYGEN SATURATION: 94 % | RESPIRATION RATE: 16 BRPM

## 2019-09-25 LAB
BASOPHILS # BLD AUTO: 0 K/UL — SIGNIFICANT CHANGE UP (ref 0–0.2)
BASOPHILS NFR BLD AUTO: 0.2 % — SIGNIFICANT CHANGE UP (ref 0–2)
EOSINOPHIL # BLD AUTO: 0.3 K/UL — SIGNIFICANT CHANGE UP (ref 0–0.5)
EOSINOPHIL NFR BLD AUTO: 3.9 % — SIGNIFICANT CHANGE UP (ref 0–6)
HCT VFR BLD CALC: 39.2 % — SIGNIFICANT CHANGE UP (ref 39–50)
HGB BLD-MCNC: 13.2 G/DL — SIGNIFICANT CHANGE UP (ref 13–17)
LYMPHOCYTES # BLD AUTO: 0.6 K/UL — LOW (ref 1–3.3)
LYMPHOCYTES # BLD AUTO: 8.6 % — LOW (ref 13–44)
MCHC RBC-ENTMCNC: 32.7 PG — SIGNIFICANT CHANGE UP (ref 27–34)
MCHC RBC-ENTMCNC: 33.6 G/DL — SIGNIFICANT CHANGE UP (ref 32–36)
MCV RBC AUTO: 97.1 FL — SIGNIFICANT CHANGE UP (ref 80–100)
MONOCYTES # BLD AUTO: 0.7 K/UL — SIGNIFICANT CHANGE UP (ref 0–0.9)
MONOCYTES NFR BLD AUTO: 9.6 % — SIGNIFICANT CHANGE UP (ref 2–14)
NEUTROPHILS # BLD AUTO: 5.4 K/UL — SIGNIFICANT CHANGE UP (ref 1.8–7.4)
NEUTROPHILS NFR BLD AUTO: 77.6 % — HIGH (ref 43–77)
PLATELET # BLD AUTO: 355 K/UL — SIGNIFICANT CHANGE UP (ref 150–400)
RBC # BLD: 4.03 M/UL — LOW (ref 4.2–5.8)
RBC # FLD: 11.8 % — SIGNIFICANT CHANGE UP (ref 10.3–14.5)
WBC # BLD: 7 K/UL — SIGNIFICANT CHANGE UP (ref 3.8–10.5)
WBC # FLD AUTO: 7 K/UL — SIGNIFICANT CHANGE UP (ref 3.8–10.5)

## 2019-09-25 PROCEDURE — 99214 OFFICE O/P EST MOD 30 MIN: CPT

## 2019-09-26 DIAGNOSIS — Z51.11 ENCOUNTER FOR ANTINEOPLASTIC CHEMOTHERAPY: ICD-10-CM

## 2019-10-10 ENCOUNTER — APPOINTMENT (OUTPATIENT)
Dept: RADIATION ONCOLOGY | Facility: CLINIC | Age: 69
End: 2019-10-10
Payer: MEDICARE

## 2019-10-10 VITALS
OXYGEN SATURATION: 92 % | DIASTOLIC BLOOD PRESSURE: 65 MMHG | TEMPERATURE: 98.2 F | HEART RATE: 87 BPM | WEIGHT: 179.67 LBS | RESPIRATION RATE: 16 BRPM | BODY MASS INDEX: 30.67 KG/M2 | SYSTOLIC BLOOD PRESSURE: 127 MMHG | HEIGHT: 64 IN

## 2019-10-10 PROCEDURE — 99024 POSTOP FOLLOW-UP VISIT: CPT | Mod: GC

## 2019-10-10 NOTE — REVIEW OF SYSTEMS
[Shortness Of Breath] : shortness of breath [Cough] : cough [Negative] : Constitutional [SOB on Exertion] : shortness of breath during exertion [Cough: Grade 1 - Mild symptoms; nonprescription intervention indicated] : Cough: Grade 1 - Mild symptoms; nonprescription intervention indicated [Dyspnea: Grade 1 - Shortness of breath with moderate exertion] : Dyspnea: Grade 1 - Shortness of breath with moderate exertion

## 2019-10-13 NOTE — REASON FOR VISIT
[Routine Follow-Up] : routine follow-up visit for [Lung Cancer] : lung cancer [Spouse] : spouse [FreeTextEntry1] : [id number] [TWNoteComboBox1] : [place]

## 2019-10-13 NOTE — DISEASE MANAGEMENT
[Clinical] : TNM Stage: c [N/A] : Currently not applicable [FreeTextEntry4] : Extensive stage [TTNM] : 1c [NTNM] : 3 [MTNM] : x

## 2019-10-16 ENCOUNTER — APPOINTMENT (OUTPATIENT)
Dept: INFUSION THERAPY | Facility: HOSPITAL | Age: 69
End: 2019-10-16

## 2019-10-16 ENCOUNTER — RESULT REVIEW (OUTPATIENT)
Age: 69
End: 2019-10-16

## 2019-10-16 ENCOUNTER — LABORATORY RESULT (OUTPATIENT)
Age: 69
End: 2019-10-16

## 2019-10-16 LAB
BASOPHILS # BLD AUTO: 0 K/UL — SIGNIFICANT CHANGE UP (ref 0–0.2)
BASOPHILS NFR BLD AUTO: 0.8 % — SIGNIFICANT CHANGE UP (ref 0–2)
EOSINOPHIL # BLD AUTO: 0.4 K/UL — SIGNIFICANT CHANGE UP (ref 0–0.5)
EOSINOPHIL NFR BLD AUTO: 7.1 % — HIGH (ref 0–6)
HCT VFR BLD CALC: 39 % — SIGNIFICANT CHANGE UP (ref 39–50)
HGB BLD-MCNC: 13.3 G/DL — SIGNIFICANT CHANGE UP (ref 13–17)
LYMPHOCYTES # BLD AUTO: 0.7 K/UL — LOW (ref 1–3.3)
LYMPHOCYTES # BLD AUTO: 11 % — LOW (ref 13–44)
MCHC RBC-ENTMCNC: 32.8 PG — SIGNIFICANT CHANGE UP (ref 27–34)
MCHC RBC-ENTMCNC: 34.2 G/DL — SIGNIFICANT CHANGE UP (ref 32–36)
MCV RBC AUTO: 96 FL — SIGNIFICANT CHANGE UP (ref 80–100)
MONOCYTES # BLD AUTO: 0.5 K/UL — SIGNIFICANT CHANGE UP (ref 0–0.9)
MONOCYTES NFR BLD AUTO: 8.2 % — SIGNIFICANT CHANGE UP (ref 2–14)
NEUTROPHILS # BLD AUTO: 4.6 K/UL — SIGNIFICANT CHANGE UP (ref 1.8–7.4)
NEUTROPHILS NFR BLD AUTO: 72.9 % — SIGNIFICANT CHANGE UP (ref 43–77)
PLATELET # BLD AUTO: 278 K/UL — SIGNIFICANT CHANGE UP (ref 150–400)
RBC # BLD: 4.06 M/UL — LOW (ref 4.2–5.8)
RBC # FLD: 12.7 % — SIGNIFICANT CHANGE UP (ref 10.3–14.5)
WBC # BLD: 6.2 K/UL — SIGNIFICANT CHANGE UP (ref 3.8–10.5)
WBC # FLD AUTO: 6.2 K/UL — SIGNIFICANT CHANGE UP (ref 3.8–10.5)

## 2019-10-23 ENCOUNTER — OUTPATIENT (OUTPATIENT)
Dept: OUTPATIENT SERVICES | Facility: HOSPITAL | Age: 69
LOS: 1 days | Discharge: HOME | End: 2019-10-23

## 2019-10-23 DIAGNOSIS — C34.90 MALIGNANT NEOPLASM OF UNSPECIFIED PART OF UNSPECIFIED BRONCHUS OR LUNG: ICD-10-CM

## 2019-10-23 DIAGNOSIS — Z90.89 ACQUIRED ABSENCE OF OTHER ORGANS: Chronic | ICD-10-CM

## 2019-10-23 DIAGNOSIS — D11.0 BENIGN NEOPLASM OF PAROTID GLAND: Chronic | ICD-10-CM

## 2019-10-23 DIAGNOSIS — Z95.5 PRESENCE OF CORONARY ANGIOPLASTY IMPLANT AND GRAFT: Chronic | ICD-10-CM

## 2019-10-23 DIAGNOSIS — N20.0 CALCULUS OF KIDNEY: Chronic | ICD-10-CM

## 2019-11-04 ENCOUNTER — LABORATORY RESULT (OUTPATIENT)
Age: 69
End: 2019-11-04

## 2019-11-04 ENCOUNTER — APPOINTMENT (OUTPATIENT)
Dept: INTERNAL MEDICINE | Facility: CLINIC | Age: 69
End: 2019-11-04
Payer: MEDICARE

## 2019-11-04 ENCOUNTER — OUTPATIENT (OUTPATIENT)
Dept: OUTPATIENT SERVICES | Facility: HOSPITAL | Age: 69
LOS: 1 days | End: 2019-11-04

## 2019-11-04 VITALS
SYSTOLIC BLOOD PRESSURE: 108 MMHG | HEART RATE: 76 BPM | WEIGHT: 180 LBS | DIASTOLIC BLOOD PRESSURE: 62 MMHG | BODY MASS INDEX: 30.73 KG/M2 | HEIGHT: 64 IN

## 2019-11-04 DIAGNOSIS — D11.0 BENIGN NEOPLASM OF PAROTID GLAND: Chronic | ICD-10-CM

## 2019-11-04 DIAGNOSIS — Z95.5 PRESENCE OF CORONARY ANGIOPLASTY IMPLANT AND GRAFT: Chronic | ICD-10-CM

## 2019-11-04 DIAGNOSIS — Z90.89 ACQUIRED ABSENCE OF OTHER ORGANS: Chronic | ICD-10-CM

## 2019-11-04 DIAGNOSIS — N20.0 CALCULUS OF KIDNEY: Chronic | ICD-10-CM

## 2019-11-04 LAB
ALBUMIN SERPL ELPH-MCNC: 4.4 G/DL — SIGNIFICANT CHANGE UP (ref 3.3–5)
ALP SERPL-CCNC: 91 U/L — SIGNIFICANT CHANGE UP (ref 40–120)
ALT FLD-CCNC: 40 U/L — SIGNIFICANT CHANGE UP (ref 4–41)
ANION GAP SERPL CALC-SCNC: 17 MMO/L — HIGH (ref 7–14)
AST SERPL-CCNC: 23 U/L — SIGNIFICANT CHANGE UP (ref 4–40)
BILIRUB SERPL-MCNC: 0.2 MG/DL — SIGNIFICANT CHANGE UP (ref 0.2–1.2)
BUN SERPL-MCNC: 18 MG/DL — SIGNIFICANT CHANGE UP (ref 7–23)
CALCIUM SERPL-MCNC: 10 MG/DL — SIGNIFICANT CHANGE UP (ref 8.4–10.5)
CHLORIDE SERPL-SCNC: 98 MMOL/L — SIGNIFICANT CHANGE UP (ref 98–107)
CHOLEST SERPL-MCNC: 179 MG/DL — SIGNIFICANT CHANGE UP (ref 120–199)
CO2 SERPL-SCNC: 25 MMOL/L — SIGNIFICANT CHANGE UP (ref 22–31)
CREAT SERPL-MCNC: 0.97 MG/DL — SIGNIFICANT CHANGE UP (ref 0.5–1.3)
GLUCOSE SERPL-MCNC: 257 MG/DL — HIGH (ref 70–99)
HBA1C BLD-MCNC: 9.4 % — HIGH (ref 4–5.6)
HDLC SERPL-MCNC: 36 MG/DL — SIGNIFICANT CHANGE UP (ref 35–55)
LIPID PNL WITH DIRECT LDL SERPL: 105 MG/DL — SIGNIFICANT CHANGE UP
MAGNESIUM SERPL-MCNC: 1.2 MG/DL — LOW (ref 1.6–2.6)
PHOSPHATE SERPL-MCNC: 3.9 MG/DL — SIGNIFICANT CHANGE UP (ref 2.5–4.5)
POTASSIUM SERPL-MCNC: 4.1 MMOL/L — SIGNIFICANT CHANGE UP (ref 3.5–5.3)
POTASSIUM SERPL-SCNC: 4.1 MMOL/L — SIGNIFICANT CHANGE UP (ref 3.5–5.3)
PROT SERPL-MCNC: 7.1 G/DL — SIGNIFICANT CHANGE UP (ref 6–8.3)
SODIUM SERPL-SCNC: 140 MMOL/L — SIGNIFICANT CHANGE UP (ref 135–145)
TRIGL SERPL-MCNC: 500 MG/DL — HIGH (ref 10–149)

## 2019-11-04 PROCEDURE — 99204 OFFICE O/P NEW MOD 45 MIN: CPT

## 2019-11-05 LAB
CREAT UR-MCNC: 170 MG/DL — SIGNIFICANT CHANGE UP
MICROALBUMIN UR-MCNC: 3.5 MG/DL — SIGNIFICANT CHANGE UP
MICROALBUMIN/CREAT UR-RTO: 21 MG/G — SIGNIFICANT CHANGE UP (ref 0–30)

## 2019-11-06 ENCOUNTER — RESULT REVIEW (OUTPATIENT)
Age: 69
End: 2019-11-06

## 2019-11-06 ENCOUNTER — APPOINTMENT (OUTPATIENT)
Dept: INFUSION THERAPY | Facility: HOSPITAL | Age: 69
End: 2019-11-06

## 2019-11-06 ENCOUNTER — LABORATORY RESULT (OUTPATIENT)
Age: 69
End: 2019-11-06

## 2019-11-06 ENCOUNTER — APPOINTMENT (OUTPATIENT)
Dept: HEMATOLOGY ONCOLOGY | Facility: CLINIC | Age: 69
End: 2019-11-06
Payer: MEDICARE

## 2019-11-06 VITALS
DIASTOLIC BLOOD PRESSURE: 70 MMHG | SYSTOLIC BLOOD PRESSURE: 118 MMHG | BODY MASS INDEX: 31.03 KG/M2 | WEIGHT: 180.78 LBS | OXYGEN SATURATION: 98 % | TEMPERATURE: 98.9 F | HEART RATE: 89 BPM | RESPIRATION RATE: 14 BRPM

## 2019-11-06 LAB
BASOPHILS # BLD AUTO: 0 K/UL — SIGNIFICANT CHANGE UP (ref 0–0.2)
BASOPHILS NFR BLD AUTO: 0.7 % — SIGNIFICANT CHANGE UP (ref 0–2)
EOSINOPHIL # BLD AUTO: 0.5 K/UL — SIGNIFICANT CHANGE UP (ref 0–0.5)
EOSINOPHIL NFR BLD AUTO: 7.3 % — HIGH (ref 0–6)
HCT VFR BLD CALC: 38.2 % — LOW (ref 39–50)
HGB BLD-MCNC: 13.7 G/DL — SIGNIFICANT CHANGE UP (ref 13–17)
LYMPHOCYTES # BLD AUTO: 0.7 K/UL — LOW (ref 1–3.3)
LYMPHOCYTES # BLD AUTO: 10.9 % — LOW (ref 13–44)
MCHC RBC-ENTMCNC: 34 PG — SIGNIFICANT CHANGE UP (ref 27–34)
MCHC RBC-ENTMCNC: 35.8 G/DL — SIGNIFICANT CHANGE UP (ref 32–36)
MCV RBC AUTO: 95 FL — SIGNIFICANT CHANGE UP (ref 80–100)
MONOCYTES # BLD AUTO: 0.5 K/UL — SIGNIFICANT CHANGE UP (ref 0–0.9)
MONOCYTES NFR BLD AUTO: 7.9 % — SIGNIFICANT CHANGE UP (ref 2–14)
NEUTROPHILS # BLD AUTO: 5 K/UL — SIGNIFICANT CHANGE UP (ref 1.8–7.4)
NEUTROPHILS NFR BLD AUTO: 73.2 % — SIGNIFICANT CHANGE UP (ref 43–77)
PLATELET # BLD AUTO: 232 K/UL — SIGNIFICANT CHANGE UP (ref 150–400)
RBC # BLD: 4.02 M/UL — LOW (ref 4.2–5.8)
RBC # FLD: 12.8 % — SIGNIFICANT CHANGE UP (ref 10.3–14.5)
WBC # BLD: 6.8 K/UL — SIGNIFICANT CHANGE UP (ref 3.8–10.5)
WBC # FLD AUTO: 6.8 K/UL — SIGNIFICANT CHANGE UP (ref 3.8–10.5)

## 2019-11-06 PROCEDURE — 99215 OFFICE O/P EST HI 40 MIN: CPT

## 2019-11-07 DIAGNOSIS — Z51.11 ENCOUNTER FOR ANTINEOPLASTIC CHEMOTHERAPY: ICD-10-CM

## 2019-11-12 LAB — GLUCOSE BLDC GLUCOMTR-MCNC: 214

## 2019-11-14 ENCOUNTER — FORM ENCOUNTER (OUTPATIENT)
Age: 69
End: 2019-11-14

## 2019-11-14 NOTE — PHYSICAL EXAM
[No Acute Distress] : no acute distress [Well Nourished] : well nourished [Well-Appearing] : well-appearing [Well Developed] : well developed [Normal Sclera/Conjunctiva] : normal sclera/conjunctiva [EOMI] : extraocular movements intact [Normal Outer Ear/Nose] : the outer ears and nose were normal in appearance [Normal Oropharynx] : the oropharynx was normal [No JVD] : no jugular venous distention [No Respiratory Distress] : no respiratory distress  [No Accessory Muscle Use] : no accessory muscle use [Clear to Auscultation] : lungs were clear to auscultation bilaterally [Normal Rate] : normal rate  [Regular Rhythm] : with a regular rhythm [Normal S1, S2] : normal S1 and S2 [Pedal Pulses Present] : the pedal pulses are present [No Edema] : there was no peripheral edema [No Extremity Clubbing/Cyanosis] : no extremity clubbing/cyanosis [Soft] : abdomen soft [Non Tender] : non-tender [Non-distended] : non-distended [Normal Bowel Sounds] : normal bowel sounds [No CVA Tenderness] : no CVA  tenderness [No Spinal Tenderness] : no spinal tenderness [No Rash] : no rash [No Focal Deficits] : no focal deficits [Normal Gait] : normal gait [Speech Grossly Normal] : speech grossly normal [Memory Grossly Normal] : memory grossly normal [Normal Insight/Judgement] : insight and judgment were intact

## 2019-11-14 NOTE — ASSESSMENT
[FreeTextEntry1] : HCM\par flu shot today\par shingrix discuss next visit\par pneumovax 2012, repeat next visit\par tdap 2012\par prevnar 2015\par ADL/IADL independent in all\par no recent falls\par depression screening done today\par CRC - colonoscopy 2016 with polyps\par Given health care proxy paperwork for completion, discussed together today

## 2019-11-14 NOTE — HISTORY OF PRESENT ILLNESS
[FreeTextEntry1] : establish care with new PCP [de-identified] : 69 year old man with history of chronic LBP, HTN, DM, HLD, ED, gastritis, CAD s/p stent, prior smoker with emphysema and extensive stage SCLC dx 2019, with chemo started 5/2019 carboplatin/etoposide/atezolizumab with partial response who remains on atezolizumab and s/p radiation therapy in 8/2019 who presents today to establish care with new PCP.\par -Planned MRI brain this month and CT Chest in Dec per onc, remains on atezolizumab. No respiratory symptoms. 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 No skin changes or swelling\par -sometimes has constipation or diarrhea, occasionally takes immodium and it controls diarrhea. no bleeding or thinning of stools\par -has noted that his blood glucose has been elevated lately in 200s since starting mAb\par -walks daily to walk the dog, approx 2 miles\par -feels he has lost his purpose for waking in the morning. \par -needs refills of metoprolol, lisinopril, metformin\par \par Review of Systems: \par Denies night sweats, chills, recent significant change in weight (though notes some increased abdominal girth and less muscle mass in arms), sore throat, cough, shortness of breath, chest pain, orthopnea, lower extremity edema, PND, nausea, vomiting, heartburn, blood in stool or black stools, dysuria, hematuria, urinary hesitancy, nocturia, urinary frequency, urinary urgency, incontinence of stool or urine, skin rash, muscle weakness, headache, insomnia, bleeding, or bruising.

## 2019-11-14 NOTE — HEALTH RISK ASSESSMENT
[No falls in past year] : Patient reported no falls in the past year [Patient reported colonoscopy was normal] : Patient reported colonoscopy was normal [Fully functional (using the telephone, shopping, preparing meals, housekeeping, doing laundry, using] : Fully functional and needs no help or supervision to perform IADLs (using the telephone, shopping, preparing meals, housekeeping, doing laundry, using transportation, managing medications and managing finances) [Fully functional (bathing, dressing, toileting, transferring, walking, feeding)] : Fully functional (bathing, dressing, toileting, transferring, walking, feeding) [] : No [YearQuit] : 2019 [FreeTextEntry1] : PHQ9 score 10 [de-identified] : walks 2 miles with dog daily  [EyeExamDate] : 06/19 [LowDoseCTScan] : current treatment lung cancer [ColonoscopyDate] : 01/16 [HIVDate] : 05/19 [HepatitisCDate] : 05/19 [HIVComments] : negative [de-identified] : wife does some of these due to division of labor but he is independent [HepatitisCComments] : negative [With Patient/Caregiver] : With Patient/Caregiver [AdvancecareDate] : 11/19

## 2019-11-15 ENCOUNTER — RESULT REVIEW (OUTPATIENT)
Age: 69
End: 2019-11-15

## 2019-11-15 ENCOUNTER — APPOINTMENT (OUTPATIENT)
Dept: MRI IMAGING | Facility: CLINIC | Age: 69
End: 2019-11-15
Payer: MEDICARE

## 2019-11-15 ENCOUNTER — OUTPATIENT (OUTPATIENT)
Dept: OUTPATIENT SERVICES | Facility: HOSPITAL | Age: 69
LOS: 1 days | End: 2019-11-15
Payer: COMMERCIAL

## 2019-11-15 DIAGNOSIS — Z00.8 ENCOUNTER FOR OTHER GENERAL EXAMINATION: ICD-10-CM

## 2019-11-15 DIAGNOSIS — Z23 ENCOUNTER FOR IMMUNIZATION: ICD-10-CM

## 2019-11-15 DIAGNOSIS — F32.89 OTHER SPECIFIED DEPRESSIVE EPISODES: ICD-10-CM

## 2019-11-15 DIAGNOSIS — C34.12 MALIGNANT NEOPLASM OF UPPER LOBE, LEFT BRONCHUS OR LUNG: ICD-10-CM

## 2019-11-15 DIAGNOSIS — I10 ESSENTIAL (PRIMARY) HYPERTENSION: ICD-10-CM

## 2019-11-15 DIAGNOSIS — Z95.5 PRESENCE OF CORONARY ANGIOPLASTY IMPLANT AND GRAFT: Chronic | ICD-10-CM

## 2019-11-15 DIAGNOSIS — N20.0 CALCULUS OF KIDNEY: Chronic | ICD-10-CM

## 2019-11-15 DIAGNOSIS — Z90.89 ACQUIRED ABSENCE OF OTHER ORGANS: Chronic | ICD-10-CM

## 2019-11-15 DIAGNOSIS — J43.2 CENTRILOBULAR EMPHYSEMA: ICD-10-CM

## 2019-11-15 DIAGNOSIS — E11.9 TYPE 2 DIABETES MELLITUS WITHOUT COMPLICATIONS: ICD-10-CM

## 2019-11-15 DIAGNOSIS — I25.10 ATHEROSCLEROTIC HEART DISEASE OF NATIVE CORONARY ARTERY WITHOUT ANGINA PECTORIS: ICD-10-CM

## 2019-11-15 DIAGNOSIS — D11.0 BENIGN NEOPLASM OF PAROTID GLAND: Chronic | ICD-10-CM

## 2019-11-15 DIAGNOSIS — R25.2 CRAMP AND SPASM: ICD-10-CM

## 2019-11-15 DIAGNOSIS — Z00.00 ENCOUNTER FOR GENERAL ADULT MEDICAL EXAMINATION WITHOUT ABNORMAL FINDINGS: ICD-10-CM

## 2019-11-15 PROCEDURE — 70553 MRI BRAIN STEM W/O & W/DYE: CPT

## 2019-11-15 PROCEDURE — 70553 MRI BRAIN STEM W/O & W/DYE: CPT | Mod: 26

## 2019-11-21 ENCOUNTER — OUTPATIENT (OUTPATIENT)
Dept: OUTPATIENT SERVICES | Facility: HOSPITAL | Age: 69
LOS: 1 days | Discharge: HOME | End: 2019-11-21

## 2019-11-21 DIAGNOSIS — Z90.89 ACQUIRED ABSENCE OF OTHER ORGANS: Chronic | ICD-10-CM

## 2019-11-21 DIAGNOSIS — D11.0 BENIGN NEOPLASM OF PAROTID GLAND: Chronic | ICD-10-CM

## 2019-11-21 DIAGNOSIS — N20.0 CALCULUS OF KIDNEY: Chronic | ICD-10-CM

## 2019-11-21 DIAGNOSIS — Z95.5 PRESENCE OF CORONARY ANGIOPLASTY IMPLANT AND GRAFT: Chronic | ICD-10-CM

## 2019-11-21 DIAGNOSIS — C34.90 MALIGNANT NEOPLASM OF UNSPECIFIED PART OF UNSPECIFIED BRONCHUS OR LUNG: ICD-10-CM

## 2019-11-22 ENCOUNTER — RESULT REVIEW (OUTPATIENT)
Age: 69
End: 2019-11-22

## 2019-11-25 ENCOUNTER — APPOINTMENT (OUTPATIENT)
Dept: PULMONOLOGY | Facility: CLINIC | Age: 69
End: 2019-11-25
Payer: MEDICARE

## 2019-11-25 VITALS
BODY MASS INDEX: 30.73 KG/M2 | TEMPERATURE: 97.9 F | HEIGHT: 64 IN | SYSTOLIC BLOOD PRESSURE: 121 MMHG | HEART RATE: 74 BPM | DIASTOLIC BLOOD PRESSURE: 77 MMHG | WEIGHT: 180 LBS | OXYGEN SATURATION: 94 % | RESPIRATION RATE: 16 BRPM

## 2019-11-25 PROCEDURE — 99215 OFFICE O/P EST HI 40 MIN: CPT

## 2019-11-26 NOTE — ASSESSMENT
[FreeTextEntry1] : 69M HTN, HLD, CAD, small cell lung cancer (4/2019) s/p chemo/XRT/immunotherapy now presenting for followup pulmonary evaluation in the setting of cough and sputum production that appear related to COPD and postnasal drip.\par \par 1. COPD - patient does have known obstructive lung disease with a bronchodilator response. He has not been using his rescue inhaler because he did not notice any benefit, but has only used 4 puffs in total based on review of his inhaler. We discussed increasing his inhaler use to 2 puffs as needed at least 3x daily for the next week to see if we can achieve control of his symptoms. Inhaler technique reviewed. I have also recommended starting long acting bronchodilator therapy with Breo-Ellipta. At this time patient does not require supplemental O2 or steroid therapy. We discussed these as possibilities should his symptoms progress or worsen.\par -- In addition, patient should continue to remain active as a sedentary lifestyle will worsen his COPD symptoms.\par \par 2. Postnasal drip - patient does have signs and symptoms suggestive of postnasal drip. He also appears to have allergies that trigger this rhinitis. I have recommended starting Flonase BID and monitoring for response over the next month. \par \par 3. Small Cell Lung Cancer - continue followup with Dr. Ramirez. Patient is s/p Carboplatin/Etoposide/Atezolizumab in May 2019 and then subsequent thoracic radiation therapy in August 2019.\par -- MRI brain reportedly without evidence of metastatic diseaes. Patient was offered prophylactic cranial irradiation but deferred.\par -- Patient does have evidence of extensive stage or metastatic disease with bony mets in ribs as well as bilateral mediastinal lymph node involvement.\par -- Continue immunotherapy indefinitely as per Dr. Ramirez with followup imaging to assess stability of disease.\par \par 4. Followup in 2-3 months or sooner as needed.

## 2019-11-26 NOTE — PHYSICAL EXAM
[General Appearance - Well Developed] : well developed [Normal Appearance] : normal appearance [Well Groomed] : well groomed [General Appearance - Well Nourished] : well nourished [No Deformities] : no deformities [General Appearance - In No Acute Distress] : no acute distress [Normal Conjunctiva] : the conjunctiva exhibited no abnormalities [Neck Appearance] : the appearance of the neck was normal [Neck Cervical Mass (___cm)] : no neck mass was observed [Jugular Venous Distention Increased] : there was no jugular-venous distention [Heart Rate And Rhythm] : heart rate and rhythm were normal [Heart Sounds] : normal S1 and S2 [Arterial Pulses Normal] : the arterial pulses were normal [Edema] : no peripheral edema present [Respiration, Rhythm And Depth] : normal respiratory rhythm and effort [Exaggerated Use Of Accessory Muscles For Inspiration] : no accessory muscle use [Bowel Sounds] : normal bowel sounds [Abdomen Soft] : soft [Abdomen Tenderness] : non-tender [Abnormal Walk] : normal gait [Nail Clubbing] : no clubbing of the fingernails [Cyanosis, Localized] : no localized cyanosis [Motor Exam] : the motor exam was normal [No Focal Deficits] : no focal deficits [Oriented To Time, Place, And Person] : oriented to person, place, and time [Impaired Insight] : insight and judgment were intact [Affect] : the affect was normal [Mood] : the mood was normal [Memory Recent] : recent memory was not impaired [Memory Remote] : remote memory was not impaired [Skin Turgor] : normal skin turgor [] : no rash [Erythema] : no erythema of the pharynx [FreeTextEntry1] : end expiratory wheeze

## 2019-11-26 NOTE — REASON FOR VISIT
[Follow-Up] : a follow-up visit [[employer] ] : provided by [employer]   [COPD] : COPD [Lung Cancer] : lung cancer [Spouse] : spouse [FreeTextEntry1] : [id number] [TWNoteComboBox1] : [place]

## 2019-11-26 NOTE — HISTORY OF PRESENT ILLNESS
[Date: ___] : was performed [unfilled] [Difficulty Breathing During Exertion] : improved dyspnea on exertion [Feelings Of Weakness On Exertion] : stable exercise intolerance [Cough] : worsened coughing [Wheezing] : improved wheezing [Regional Soft Tissue Swelling Both Lower Extremities] : denies lower extremity edema [Chest Pain Or Discomfort] : denies chest pain [Fever] : denies fever [0.5  -  Very, very slight] : 0.5, very, very slight [Former] : is a former smoker [FreeTextEntry1] : 69M DM2, HTN, CAD s/p PCI (RCA 2007), HLD, and newly diagnosed lung CA (4/2019) s/p chemo and XRT now on immunotherapy with Atezolizumab (PDL1) presenting for followup pulmonary evaluation in the setting of cough, dyspnea, and AM sputum production.\par \par He has intermittent dyspnea on exertion and a chronic cough with light sputum. He denies any overt hemoptysis. He notes that he has sputum in the AM and a hoarse voice that improve after coughing and clearing the mucus. He has at times normal exertion, and can walk his dog 3/4 mile without any difficulty but other times he walks 50 feet and he gets wheezing and SOB. He denies any chest pain or palpitations. He denies fevers or chills.. He is originally from [facility]. \par \par He had been a long term smoker - about 1 ppd x 40 years at the time of his cancer diagnosis. He states that he tolerated chemotherapy and radiation therapy well. He continues on immunotherapy with Dr. Ramirez which is planned indefinitely. He had a recent brain MRI which was clean by his report.\par \par Daughter - Leena Ness - [phone]\par \par He had a prior TURP for BPH. He did NOT have prostate cancer. He had prior bilateral parotidectomy done 2009 and then 2011 - both for benign swellings. He has never required any chemotherapy for this.  [Wt Gain ___ Lbs] : no recent weight gain [Oxygen] : the patient uses no supplemental oxygen [de-identified] : cough with AM sputum [de-identified] : The 1.5 x 1.3 cm spiculated PATRICIO nodule is unchanged. The bilateral calcified and noncalcified pulmonary nodules are unchanged. Emphysema. The 9x8mm AP window LN is unchanged. L hilar LN  [de-identified] : moderate obstructive ventilatory defect with positive bronchodilator response, normal lung volumes, moderate reduction in diffusion capacity

## 2019-11-26 NOTE — REVIEW OF SYSTEMS
[Cough] : cough [Sputum] : sputum  [Dyspnea] : dyspnea [Wheezing] : wheezing [Hypertension] : ~T hypertension [Heartburn] : heartburn [Nocturia] : nocturia [Frequency] : urinary frequency [Diabetes] : diabetes mellitus [Difficulty Initiating Sleep] : difficulty falling asleep [Difficulty Maintaining Sleep] : difficulty maintaining sleep [Snoring] : snoring [Fever] : no fever [Chills] : no chills [Fatigue] : no fatigue [Poor Appetite] : normal appetite  [Recent Wt Loss (___ Lbs)] : no recent weight loss [Dry Eyes] : no dryness of the eyes [Ear Disturbance] : no ear disturbance [Nasal Congestion] : no nasal congestion [Epistaxis] : no nosebleeds [Postnasal Drip] : no postnasal drip [Sinus Problems] : no sinus problems [Sore Throat] : no sore throat [Dry Mouth] : no dry mouth [Hemoptysis] : no hemoptysis [Chest Tightness] : no chest tightness [Pleuritic Pain] : no pleuritic pain [Frequent URIs] : no frequent upper respiratory infections [Hypotension] : no hypotension [Chest Discomfort] : no chest discomfort [PND] : no PND [Orthopnea] : no orthopnea [Dysrhythmia] : no dysrhythmia [Murmurs] : no murmurs were heard [Palpitations] : no palpitations [Edema] : ~T edema was not present [Watery Eyes] : no discharge from the eyes [Nasal Discharge] : no nasal discharge [Hives] : no urticaria was observed [Angioedema] : no angioedema [Indigestion] : no indigestion [Dysphagia] : no dysphagia [Nausea] : no nausea [Vomiting] : no vomiting [Abdominal Pain] : no abdominal pain [Bleeding] : no bleeding [Urgency] : no feelings of urinary urgency [Back Pain] : ~T no back pain [Myalgias] : no myalgias [Arthralgias] : no arthralgias [Rash] : no [unfilled] rash [Itch] : no itching [Easy Bruising] : no ~M tendency for easy bruising [Headache] : no headache [Dizziness] : no dizziness [Syncope] : no fainting [Focal Weakness] : no focal weakness [Numbness] : no numbness [Paralysis] : no paralysis was seen [Seizures] : no seizures [Depression] : no depression [Anxiety] : no anxiety [HIV] : no HIV infection [DVT] : no DVT [Hepatic Disease] : no hepatic disease [Witnessed Apneas] : demonstrated no ~M apnea [Nonrestorative Sleep] : restorative sleep [Awakes With Headache] : awakes without a headache [Awakes With Dry Mouth] : awakes without dry mouth [Hypersomnolence] : not sleeping much more than usual [de-identified] : Lung Cancer - on Immunotherapy

## 2019-11-27 ENCOUNTER — RESULT REVIEW (OUTPATIENT)
Age: 69
End: 2019-11-27

## 2019-11-27 ENCOUNTER — LABORATORY RESULT (OUTPATIENT)
Age: 69
End: 2019-11-27

## 2019-11-27 ENCOUNTER — APPOINTMENT (OUTPATIENT)
Dept: INFUSION THERAPY | Facility: HOSPITAL | Age: 69
End: 2019-11-27

## 2019-11-27 LAB
BASOPHILS # BLD AUTO: 0 K/UL — SIGNIFICANT CHANGE UP (ref 0–0.2)
BASOPHILS NFR BLD AUTO: 0.4 % — SIGNIFICANT CHANGE UP (ref 0–2)
EOSINOPHIL # BLD AUTO: 0.4 K/UL — SIGNIFICANT CHANGE UP (ref 0–0.5)
EOSINOPHIL NFR BLD AUTO: 6.6 % — HIGH (ref 0–6)
HCT VFR BLD CALC: 39.4 % — SIGNIFICANT CHANGE UP (ref 39–50)
HGB BLD-MCNC: 13.2 G/DL — SIGNIFICANT CHANGE UP (ref 13–17)
LYMPHOCYTES # BLD AUTO: 0.8 K/UL — LOW (ref 1–3.3)
LYMPHOCYTES # BLD AUTO: 12.5 % — LOW (ref 13–44)
MCHC RBC-ENTMCNC: 31.5 PG — SIGNIFICANT CHANGE UP (ref 27–34)
MCHC RBC-ENTMCNC: 33.4 G/DL — SIGNIFICANT CHANGE UP (ref 32–36)
MCV RBC AUTO: 94.4 FL — SIGNIFICANT CHANGE UP (ref 80–100)
MONOCYTES # BLD AUTO: 0.7 K/UL — SIGNIFICANT CHANGE UP (ref 0–0.9)
MONOCYTES NFR BLD AUTO: 10.3 % — SIGNIFICANT CHANGE UP (ref 2–14)
NEUTROPHILS # BLD AUTO: 4.7 K/UL — SIGNIFICANT CHANGE UP (ref 1.8–7.4)
NEUTROPHILS NFR BLD AUTO: 70.2 % — SIGNIFICANT CHANGE UP (ref 43–77)
PLATELET # BLD AUTO: 261 K/UL — SIGNIFICANT CHANGE UP (ref 150–400)
RBC # BLD: 4.18 M/UL — LOW (ref 4.2–5.8)
RBC # FLD: 12.8 % — SIGNIFICANT CHANGE UP (ref 10.3–14.5)
WBC # BLD: 6.6 K/UL — SIGNIFICANT CHANGE UP (ref 3.8–10.5)
WBC # FLD AUTO: 6.6 K/UL — SIGNIFICANT CHANGE UP (ref 3.8–10.5)

## 2019-11-29 DIAGNOSIS — Z51.11 ENCOUNTER FOR ANTINEOPLASTIC CHEMOTHERAPY: ICD-10-CM

## 2019-12-09 ENCOUNTER — RX RENEWAL (OUTPATIENT)
Age: 69
End: 2019-12-09

## 2019-12-16 ENCOUNTER — APPOINTMENT (OUTPATIENT)
Dept: CT IMAGING | Facility: CLINIC | Age: 69
End: 2019-12-16
Payer: MEDICARE

## 2019-12-16 ENCOUNTER — OUTPATIENT (OUTPATIENT)
Dept: OUTPATIENT SERVICES | Facility: HOSPITAL | Age: 69
LOS: 1 days | End: 2019-12-16
Payer: COMMERCIAL

## 2019-12-16 DIAGNOSIS — Z90.89 ACQUIRED ABSENCE OF OTHER ORGANS: Chronic | ICD-10-CM

## 2019-12-16 DIAGNOSIS — Z95.5 PRESENCE OF CORONARY ANGIOPLASTY IMPLANT AND GRAFT: Chronic | ICD-10-CM

## 2019-12-16 DIAGNOSIS — N20.0 CALCULUS OF KIDNEY: Chronic | ICD-10-CM

## 2019-12-16 DIAGNOSIS — C34.12 MALIGNANT NEOPLASM OF UPPER LOBE, LEFT BRONCHUS OR LUNG: ICD-10-CM

## 2019-12-16 DIAGNOSIS — Z00.8 ENCOUNTER FOR OTHER GENERAL EXAMINATION: ICD-10-CM

## 2019-12-16 DIAGNOSIS — D11.0 BENIGN NEOPLASM OF PAROTID GLAND: Chronic | ICD-10-CM

## 2019-12-16 PROCEDURE — 71250 CT THORAX DX C-: CPT | Mod: 26

## 2019-12-16 PROCEDURE — 71250 CT THORAX DX C-: CPT

## 2019-12-17 ENCOUNTER — TRANSCRIPTION ENCOUNTER (OUTPATIENT)
Age: 69
End: 2019-12-17

## 2019-12-18 ENCOUNTER — APPOINTMENT (OUTPATIENT)
Dept: HEMATOLOGY ONCOLOGY | Facility: CLINIC | Age: 69
End: 2019-12-18
Payer: MEDICARE

## 2019-12-18 ENCOUNTER — APPOINTMENT (OUTPATIENT)
Dept: INFUSION THERAPY | Facility: HOSPITAL | Age: 69
End: 2019-12-18

## 2019-12-18 ENCOUNTER — LABORATORY RESULT (OUTPATIENT)
Age: 69
End: 2019-12-18

## 2019-12-18 VITALS
HEART RATE: 70 BPM | OXYGEN SATURATION: 94 % | BODY MASS INDEX: 30.84 KG/M2 | DIASTOLIC BLOOD PRESSURE: 81 MMHG | WEIGHT: 179.65 LBS | RESPIRATION RATE: 18 BRPM | TEMPERATURE: 98.2 F | SYSTOLIC BLOOD PRESSURE: 136 MMHG

## 2019-12-18 PROCEDURE — 99214 OFFICE O/P EST MOD 30 MIN: CPT

## 2019-12-20 ENCOUNTER — OUTPATIENT (OUTPATIENT)
Dept: OUTPATIENT SERVICES | Facility: HOSPITAL | Age: 69
LOS: 1 days | Discharge: HOME | End: 2019-12-20

## 2019-12-20 DIAGNOSIS — D11.0 BENIGN NEOPLASM OF PAROTID GLAND: Chronic | ICD-10-CM

## 2019-12-20 DIAGNOSIS — N20.0 CALCULUS OF KIDNEY: Chronic | ICD-10-CM

## 2019-12-20 DIAGNOSIS — Z90.89 ACQUIRED ABSENCE OF OTHER ORGANS: Chronic | ICD-10-CM

## 2019-12-20 DIAGNOSIS — Z95.5 PRESENCE OF CORONARY ANGIOPLASTY IMPLANT AND GRAFT: Chronic | ICD-10-CM

## 2019-12-20 DIAGNOSIS — C34.90 MALIGNANT NEOPLASM OF UNSPECIFIED PART OF UNSPECIFIED BRONCHUS OR LUNG: ICD-10-CM

## 2019-12-23 ENCOUNTER — APPOINTMENT (OUTPATIENT)
Dept: HEMATOLOGY ONCOLOGY | Facility: CLINIC | Age: 69
End: 2019-12-23

## 2019-12-26 ENCOUNTER — APPOINTMENT (OUTPATIENT)
Dept: HEMATOLOGY ONCOLOGY | Facility: CLINIC | Age: 69
End: 2019-12-26

## 2019-12-26 ENCOUNTER — RESULT REVIEW (OUTPATIENT)
Age: 69
End: 2019-12-26

## 2019-12-26 LAB
BASOPHILS # BLD AUTO: 0 K/UL — SIGNIFICANT CHANGE UP (ref 0–0.2)
BASOPHILS NFR BLD AUTO: 0.6 % — SIGNIFICANT CHANGE UP (ref 0–2)
EOSINOPHIL # BLD AUTO: 0.5 K/UL — SIGNIFICANT CHANGE UP (ref 0–0.5)
EOSINOPHIL NFR BLD AUTO: 7.7 % — HIGH (ref 0–6)
HCT VFR BLD CALC: 41.3 % — SIGNIFICANT CHANGE UP (ref 39–50)
HGB BLD-MCNC: 13.7 G/DL — SIGNIFICANT CHANGE UP (ref 13–17)
LYMPHOCYTES # BLD AUTO: 0.8 K/UL — LOW (ref 1–3.3)
LYMPHOCYTES # BLD AUTO: 11.9 % — LOW (ref 13–44)
MCHC RBC-ENTMCNC: 31.5 PG — SIGNIFICANT CHANGE UP (ref 27–34)
MCHC RBC-ENTMCNC: 33.2 G/DL — SIGNIFICANT CHANGE UP (ref 32–36)
MCV RBC AUTO: 94.7 FL — SIGNIFICANT CHANGE UP (ref 80–100)
MONOCYTES # BLD AUTO: 0.5 K/UL — SIGNIFICANT CHANGE UP (ref 0–0.9)
MONOCYTES NFR BLD AUTO: 8.5 % — SIGNIFICANT CHANGE UP (ref 2–14)
NEUTROPHILS # BLD AUTO: 4.5 K/UL — SIGNIFICANT CHANGE UP (ref 1.8–7.4)
NEUTROPHILS NFR BLD AUTO: 71.3 % — SIGNIFICANT CHANGE UP (ref 43–77)
PLATELET # BLD AUTO: 248 K/UL — SIGNIFICANT CHANGE UP (ref 150–400)
RBC # BLD: 4.36 M/UL — SIGNIFICANT CHANGE UP (ref 4.2–5.8)
RBC # FLD: 13.1 % — SIGNIFICANT CHANGE UP (ref 10.3–14.5)
WBC # BLD: 6.3 K/UL — SIGNIFICANT CHANGE UP (ref 3.8–10.5)
WBC # FLD AUTO: 6.3 K/UL — SIGNIFICANT CHANGE UP (ref 3.8–10.5)

## 2019-12-28 ENCOUNTER — APPOINTMENT (OUTPATIENT)
Dept: INFUSION THERAPY | Facility: HOSPITAL | Age: 69
End: 2019-12-28

## 2019-12-30 DIAGNOSIS — E83.42 HYPOMAGNESEMIA: ICD-10-CM

## 2020-01-02 ENCOUNTER — RX RENEWAL (OUTPATIENT)
Age: 70
End: 2020-01-02

## 2020-01-02 LAB
MAGNESIUM SERPL-MCNC: 1.2 MG/DL
PHOSPHATE SERPL-MCNC: 2.3 MG/DL

## 2020-01-06 ENCOUNTER — APPOINTMENT (OUTPATIENT)
Dept: INTERNAL MEDICINE | Facility: CLINIC | Age: 70
End: 2020-01-06
Payer: MEDICARE

## 2020-01-06 ENCOUNTER — OUTPATIENT (OUTPATIENT)
Dept: OUTPATIENT SERVICES | Facility: HOSPITAL | Age: 70
LOS: 1 days | End: 2020-01-06

## 2020-01-06 VITALS
HEIGHT: 64 IN | DIASTOLIC BLOOD PRESSURE: 78 MMHG | WEIGHT: 181 LBS | HEART RATE: 87 BPM | SYSTOLIC BLOOD PRESSURE: 136 MMHG | OXYGEN SATURATION: 97 % | BODY MASS INDEX: 30.9 KG/M2

## 2020-01-06 DIAGNOSIS — N20.0 CALCULUS OF KIDNEY: Chronic | ICD-10-CM

## 2020-01-06 DIAGNOSIS — E11.9 TYPE 2 DIABETES MELLITUS WITHOUT COMPLICATIONS: ICD-10-CM

## 2020-01-06 DIAGNOSIS — Z95.5 PRESENCE OF CORONARY ANGIOPLASTY IMPLANT AND GRAFT: Chronic | ICD-10-CM

## 2020-01-06 DIAGNOSIS — D11.0 BENIGN NEOPLASM OF PAROTID GLAND: Chronic | ICD-10-CM

## 2020-01-06 DIAGNOSIS — I25.10 ATHEROSCLEROTIC HEART DISEASE OF NATIVE CORONARY ARTERY WITHOUT ANGINA PECTORIS: ICD-10-CM

## 2020-01-06 DIAGNOSIS — Z90.89 ACQUIRED ABSENCE OF OTHER ORGANS: Chronic | ICD-10-CM

## 2020-01-06 DIAGNOSIS — E83.42 HYPOMAGNESEMIA: ICD-10-CM

## 2020-01-06 DIAGNOSIS — F32.89 OTHER SPECIFIED DEPRESSIVE EPISODES: ICD-10-CM

## 2020-01-06 DIAGNOSIS — R25.2 CRAMP AND SPASM: ICD-10-CM

## 2020-01-06 DIAGNOSIS — E78.5 HYPERLIPIDEMIA, UNSPECIFIED: ICD-10-CM

## 2020-01-06 DIAGNOSIS — I10 ESSENTIAL (PRIMARY) HYPERTENSION: ICD-10-CM

## 2020-01-06 DIAGNOSIS — J43.2 CENTRILOBULAR EMPHYSEMA: ICD-10-CM

## 2020-01-06 LAB
GLUCOSE BLDC GLUCOMTR-MCNC: 207
HBA1C MFR BLD HPLC: 9

## 2020-01-06 PROCEDURE — 99214 OFFICE O/P EST MOD 30 MIN: CPT

## 2020-01-06 NOTE — ASSESSMENT
[FreeTextEntry1] : HCM\par flu shot 11/2019\par shingrix discuss next visit\par pneumovax 2012, [id number] next visit\par has HCP completed at home, will bring in\par tdap 2012\par prevnar 2015\par ADL/IADL independent in all\par no recent falls\par depression screening done today\par CRC - colonoscopy 2016 with polyps

## 2020-01-06 NOTE — HISTORY OF PRESENT ILLNESS
[FreeTextEntry1] : here for follow up of chronic medical problems [de-identified] : 69 year old man with history of chronic LBP, BPH s/p TURP, HTN, DM, HLD, ED, gastritis, CAD s/p stent, prior smoker with emphysema and extensive stage SCLC dx 2019, with chemo started 5/2019 carboplatin/etoposide/atezolizumab with partial response who remains on atezolizumab and s/p radiation therapy in 8/2019 who presents today for follow up chronic medical conditions. \par He is sleeping better on ambien which was started by pulm last month; using nightly-no side effects. States he will need refills, but discussed they will need to obtain from prior prescriber as it is a controlled substance. \fern Reports that he is breathing better on breo ellipta but then he developed plaques in his mouth and stopped it a week ago. Now reports that the plaques are gone so he will restart it and will wash his mouth after use. Reports he has not needed a rescue inhaler. Less cough while using new medications, still with yellow morning phlegm, but has not changed at all recently and is chronic. No fevers. Has increased exercise tolerance without shortness of breath. \fern Has not had great dietary control while he had family visiting over the holidays, but is using both metformin and januvia now. A1c today is 9.0 from 9.4 when we started januvia at the end of Nov; slightly decreased. \fern Had low Mg, was given po then iv repletion, leg cramping resolved. \par \par Review of Systems: \fern Denies night sweats, chills, recent significant change in weight (gained 2 lbs since last visit), sore throat, chest pain, orthopnea, lower extremity edema, PND, nausea, vomiting, heartburn, blood in stool or black stools, dysuria, hematuria, urinary hesitancy, nocturia, urinary frequency, urinary urgency, incontinence of stool or urine, skin rash, muscle weakness, headache, insomnia, bleeding, or bruising.

## 2020-01-06 NOTE — PHYSICAL EXAM
[Well Nourished] : well nourished [No Acute Distress] : no acute distress [Well Developed] : well developed [EOMI] : extraocular movements intact [Normal Sclera/Conjunctiva] : normal sclera/conjunctiva [Well-Appearing] : well-appearing [Normal Outer Ear/Nose] : the outer ears and nose were normal in appearance [No Respiratory Distress] : no respiratory distress  [No JVD] : no jugular venous distention [No Accessory Muscle Use] : no accessory muscle use [Clear to Auscultation] : lungs were clear to auscultation bilaterally [Normal Rate] : normal rate  [Regular Rhythm] : with a regular rhythm [Normal S1, S2] : normal S1 and S2 [No Varicosities] : no varicosities [No Edema] : there was no peripheral edema [No CVA Tenderness] : no CVA  tenderness [No Spinal Tenderness] : no spinal tenderness [No Focal Deficits] : no focal deficits [No Rash] : no rash [Speech Grossly Normal] : speech grossly normal [Normal Gait] : normal gait [Memory Grossly Normal] : memory grossly normal [Normal Insight/Judgement] : insight and judgment were intact [de-identified] : good air entry

## 2020-01-08 ENCOUNTER — APPOINTMENT (OUTPATIENT)
Dept: INFUSION THERAPY | Facility: HOSPITAL | Age: 70
End: 2020-01-08

## 2020-01-13 ENCOUNTER — APPOINTMENT (OUTPATIENT)
Dept: INTERNAL MEDICINE | Facility: CLINIC | Age: 70
End: 2020-01-13

## 2020-01-14 ENCOUNTER — OUTPATIENT (OUTPATIENT)
Dept: OUTPATIENT SERVICES | Facility: HOSPITAL | Age: 70
LOS: 1 days | End: 2020-01-14

## 2020-01-14 ENCOUNTER — APPOINTMENT (OUTPATIENT)
Dept: INTERNAL MEDICINE | Facility: CLINIC | Age: 70
End: 2020-01-14

## 2020-01-14 ENCOUNTER — LABORATORY RESULT (OUTPATIENT)
Age: 70
End: 2020-01-14

## 2020-01-14 DIAGNOSIS — Z90.89 ACQUIRED ABSENCE OF OTHER ORGANS: Chronic | ICD-10-CM

## 2020-01-14 DIAGNOSIS — E11.9 TYPE 2 DIABETES MELLITUS WITHOUT COMPLICATIONS: ICD-10-CM

## 2020-01-14 DIAGNOSIS — D11.0 BENIGN NEOPLASM OF PAROTID GLAND: Chronic | ICD-10-CM

## 2020-01-14 DIAGNOSIS — N20.0 CALCULUS OF KIDNEY: Chronic | ICD-10-CM

## 2020-01-14 DIAGNOSIS — E78.5 HYPERLIPIDEMIA, UNSPECIFIED: ICD-10-CM

## 2020-01-14 DIAGNOSIS — Z95.5 PRESENCE OF CORONARY ANGIOPLASTY IMPLANT AND GRAFT: Chronic | ICD-10-CM

## 2020-01-14 DIAGNOSIS — I10 ESSENTIAL (PRIMARY) HYPERTENSION: ICD-10-CM

## 2020-01-14 LAB
CHOLEST SERPL-MCNC: 192 MG/DL — SIGNIFICANT CHANGE UP (ref 120–199)
HDLC SERPL-MCNC: 42 MG/DL — SIGNIFICANT CHANGE UP (ref 35–55)
LIPID PNL WITH DIRECT LDL SERPL: 121 MG/DL — SIGNIFICANT CHANGE UP
MAGNESIUM SERPL-MCNC: 1.1 MG/DL — LOW (ref 1.6–2.6)
TRIGL SERPL-MCNC: 276 MG/DL — HIGH (ref 10–149)

## 2020-01-15 ENCOUNTER — RESULT REVIEW (OUTPATIENT)
Age: 70
End: 2020-01-15

## 2020-01-15 ENCOUNTER — LABORATORY RESULT (OUTPATIENT)
Age: 70
End: 2020-01-15

## 2020-01-15 ENCOUNTER — APPOINTMENT (OUTPATIENT)
Dept: INFUSION THERAPY | Facility: HOSPITAL | Age: 70
End: 2020-01-15

## 2020-01-15 LAB
BASOPHILS # BLD AUTO: 0 K/UL — SIGNIFICANT CHANGE UP (ref 0–0.2)
BASOPHILS NFR BLD AUTO: 0.2 % — SIGNIFICANT CHANGE UP (ref 0–2)
EOSINOPHIL # BLD AUTO: 0.4 K/UL — SIGNIFICANT CHANGE UP (ref 0–0.5)
EOSINOPHIL NFR BLD AUTO: 6.3 % — HIGH (ref 0–6)
HCT VFR BLD CALC: 38.9 % — LOW (ref 39–50)
HGB BLD-MCNC: 13.2 G/DL — SIGNIFICANT CHANGE UP (ref 13–17)
LYMPHOCYTES # BLD AUTO: 0.6 K/UL — LOW (ref 1–3.3)
LYMPHOCYTES # BLD AUTO: 8.5 % — LOW (ref 13–44)
MCHC RBC-ENTMCNC: 32.3 PG — SIGNIFICANT CHANGE UP (ref 27–34)
MCHC RBC-ENTMCNC: 33.9 G/DL — SIGNIFICANT CHANGE UP (ref 32–36)
MCV RBC AUTO: 95.4 FL — SIGNIFICANT CHANGE UP (ref 80–100)
MONOCYTES # BLD AUTO: 0.5 K/UL — SIGNIFICANT CHANGE UP (ref 0–0.9)
MONOCYTES NFR BLD AUTO: 8.3 % — SIGNIFICANT CHANGE UP (ref 2–14)
NEUTROPHILS # BLD AUTO: 5 K/UL — SIGNIFICANT CHANGE UP (ref 1.8–7.4)
NEUTROPHILS NFR BLD AUTO: 76.6 % — SIGNIFICANT CHANGE UP (ref 43–77)
PLATELET # BLD AUTO: 238 K/UL — SIGNIFICANT CHANGE UP (ref 150–400)
RBC # BLD: 4.08 M/UL — LOW (ref 4.2–5.8)
RBC # FLD: 13.3 % — SIGNIFICANT CHANGE UP (ref 10.3–14.5)
WBC # BLD: 6.5 K/UL — SIGNIFICANT CHANGE UP (ref 3.8–10.5)
WBC # FLD AUTO: 6.5 K/UL — SIGNIFICANT CHANGE UP (ref 3.8–10.5)

## 2020-01-15 RX ORDER — PANTOPRAZOLE 40 MG/1
40 TABLET, DELAYED RELEASE ORAL DAILY
Qty: 90 | Refills: 0 | Status: DISCONTINUED | COMMUNITY
Start: 2019-11-14 | End: 2020-01-15

## 2020-01-15 RX ORDER — LANOLIN ALCOHOL/MO/W.PET/CERES
64 CREAM (GRAM) TOPICAL
Qty: 180 | Refills: 1 | Status: DISCONTINUED | COMMUNITY
Start: 2019-12-18 | End: 2020-01-15

## 2020-01-16 DIAGNOSIS — Z51.11 ENCOUNTER FOR ANTINEOPLASTIC CHEMOTHERAPY: ICD-10-CM

## 2020-01-23 ENCOUNTER — LABORATORY RESULT (OUTPATIENT)
Age: 70
End: 2020-01-23

## 2020-01-23 ENCOUNTER — APPOINTMENT (OUTPATIENT)
Dept: INTERNAL MEDICINE | Facility: CLINIC | Age: 70
End: 2020-01-23

## 2020-01-23 ENCOUNTER — OUTPATIENT (OUTPATIENT)
Dept: OUTPATIENT SERVICES | Facility: HOSPITAL | Age: 70
LOS: 1 days | End: 2020-01-23

## 2020-01-23 DIAGNOSIS — I10 ESSENTIAL (PRIMARY) HYPERTENSION: ICD-10-CM

## 2020-01-23 DIAGNOSIS — D11.0 BENIGN NEOPLASM OF PAROTID GLAND: Chronic | ICD-10-CM

## 2020-01-23 DIAGNOSIS — Z95.5 PRESENCE OF CORONARY ANGIOPLASTY IMPLANT AND GRAFT: Chronic | ICD-10-CM

## 2020-01-23 DIAGNOSIS — N20.0 CALCULUS OF KIDNEY: Chronic | ICD-10-CM

## 2020-01-23 DIAGNOSIS — E11.9 TYPE 2 DIABETES MELLITUS WITHOUT COMPLICATIONS: ICD-10-CM

## 2020-01-23 DIAGNOSIS — Z90.89 ACQUIRED ABSENCE OF OTHER ORGANS: Chronic | ICD-10-CM

## 2020-01-23 DIAGNOSIS — E78.5 HYPERLIPIDEMIA, UNSPECIFIED: ICD-10-CM

## 2020-01-23 LAB — MAGNESIUM SERPL-MCNC: 1.5 MG/DL — LOW (ref 1.6–2.6)

## 2020-01-24 ENCOUNTER — APPOINTMENT (OUTPATIENT)
Dept: INTERNAL MEDICINE | Facility: CLINIC | Age: 70
End: 2020-01-24

## 2020-01-29 ENCOUNTER — APPOINTMENT (OUTPATIENT)
Dept: INFUSION THERAPY | Facility: HOSPITAL | Age: 70
End: 2020-01-29

## 2020-01-29 ENCOUNTER — APPOINTMENT (OUTPATIENT)
Dept: HEMATOLOGY ONCOLOGY | Facility: CLINIC | Age: 70
End: 2020-01-29

## 2020-01-29 ENCOUNTER — OUTPATIENT (OUTPATIENT)
Dept: OUTPATIENT SERVICES | Facility: HOSPITAL | Age: 70
LOS: 1 days | Discharge: HOME | End: 2020-01-29

## 2020-01-29 DIAGNOSIS — N20.0 CALCULUS OF KIDNEY: Chronic | ICD-10-CM

## 2020-01-29 DIAGNOSIS — Z95.5 PRESENCE OF CORONARY ANGIOPLASTY IMPLANT AND GRAFT: Chronic | ICD-10-CM

## 2020-01-29 DIAGNOSIS — C34.90 MALIGNANT NEOPLASM OF UNSPECIFIED PART OF UNSPECIFIED BRONCHUS OR LUNG: ICD-10-CM

## 2020-01-29 DIAGNOSIS — Z90.89 ACQUIRED ABSENCE OF OTHER ORGANS: Chronic | ICD-10-CM

## 2020-01-29 DIAGNOSIS — D11.0 BENIGN NEOPLASM OF PAROTID GLAND: Chronic | ICD-10-CM

## 2020-02-05 ENCOUNTER — APPOINTMENT (OUTPATIENT)
Dept: HEMATOLOGY ONCOLOGY | Facility: CLINIC | Age: 70
End: 2020-02-05
Payer: MEDICARE

## 2020-02-05 ENCOUNTER — APPOINTMENT (OUTPATIENT)
Dept: INFUSION THERAPY | Facility: HOSPITAL | Age: 70
End: 2020-02-05

## 2020-02-05 ENCOUNTER — LABORATORY RESULT (OUTPATIENT)
Age: 70
End: 2020-02-05

## 2020-02-05 ENCOUNTER — RESULT REVIEW (OUTPATIENT)
Age: 70
End: 2020-02-05

## 2020-02-05 VITALS
SYSTOLIC BLOOD PRESSURE: 120 MMHG | DIASTOLIC BLOOD PRESSURE: 70 MMHG | WEIGHT: 179.68 LBS | OXYGEN SATURATION: 99 % | TEMPERATURE: 98.3 F | HEART RATE: 71 BPM | BODY MASS INDEX: 30.84 KG/M2 | RESPIRATION RATE: 18 BRPM

## 2020-02-05 LAB
BASOPHILS # BLD AUTO: 0.1 K/UL — SIGNIFICANT CHANGE UP (ref 0–0.2)
BASOPHILS NFR BLD AUTO: 0.8 % — SIGNIFICANT CHANGE UP (ref 0–2)
EOSINOPHIL # BLD AUTO: 0.5 K/UL — SIGNIFICANT CHANGE UP (ref 0–0.5)
EOSINOPHIL NFR BLD AUTO: 8.1 % — HIGH (ref 0–6)
HCT VFR BLD CALC: 41.1 % — SIGNIFICANT CHANGE UP (ref 39–50)
HGB BLD-MCNC: 13.8 G/DL — SIGNIFICANT CHANGE UP (ref 13–17)
LYMPHOCYTES # BLD AUTO: 0.8 K/UL — LOW (ref 1–3.3)
LYMPHOCYTES # BLD AUTO: 11.8 % — LOW (ref 13–44)
MCHC RBC-ENTMCNC: 32.1 PG — SIGNIFICANT CHANGE UP (ref 27–34)
MCHC RBC-ENTMCNC: 33.6 G/DL — SIGNIFICANT CHANGE UP (ref 32–36)
MCV RBC AUTO: 95.4 FL — SIGNIFICANT CHANGE UP (ref 80–100)
MONOCYTES # BLD AUTO: 0.7 K/UL — SIGNIFICANT CHANGE UP (ref 0–0.9)
MONOCYTES NFR BLD AUTO: 10.1 % — SIGNIFICANT CHANGE UP (ref 2–14)
NEUTROPHILS # BLD AUTO: 4.7 K/UL — SIGNIFICANT CHANGE UP (ref 1.8–7.4)
NEUTROPHILS NFR BLD AUTO: 69.3 % — SIGNIFICANT CHANGE UP (ref 43–77)
PLATELET # BLD AUTO: 270 K/UL — SIGNIFICANT CHANGE UP (ref 150–400)
RBC # BLD: 4.3 M/UL — SIGNIFICANT CHANGE UP (ref 4.2–5.8)
RBC # FLD: 13 % — SIGNIFICANT CHANGE UP (ref 10.3–14.5)
WBC # BLD: 6.8 K/UL — SIGNIFICANT CHANGE UP (ref 3.8–10.5)
WBC # FLD AUTO: 6.8 K/UL — SIGNIFICANT CHANGE UP (ref 3.8–10.5)

## 2020-02-05 PROCEDURE — 99213 OFFICE O/P EST LOW 20 MIN: CPT

## 2020-02-05 RX ORDER — PANTOPRAZOLE SODIUM 20 MG/1
1 TABLET, DELAYED RELEASE ORAL
Qty: 0 | Refills: 0 | DISCHARGE

## 2020-02-06 DIAGNOSIS — Z51.11 ENCOUNTER FOR ANTINEOPLASTIC CHEMOTHERAPY: ICD-10-CM

## 2020-02-26 ENCOUNTER — LABORATORY RESULT (OUTPATIENT)
Age: 70
End: 2020-02-26

## 2020-02-26 ENCOUNTER — APPOINTMENT (OUTPATIENT)
Dept: INFUSION THERAPY | Facility: HOSPITAL | Age: 70
End: 2020-02-26

## 2020-03-12 ENCOUNTER — OUTPATIENT (OUTPATIENT)
Dept: OUTPATIENT SERVICES | Facility: HOSPITAL | Age: 70
LOS: 1 days | Discharge: HOME | End: 2020-03-12

## 2020-03-12 DIAGNOSIS — Z95.5 PRESENCE OF CORONARY ANGIOPLASTY IMPLANT AND GRAFT: Chronic | ICD-10-CM

## 2020-03-12 DIAGNOSIS — D11.0 BENIGN NEOPLASM OF PAROTID GLAND: Chronic | ICD-10-CM

## 2020-03-12 DIAGNOSIS — C34.90 MALIGNANT NEOPLASM OF UNSPECIFIED PART OF UNSPECIFIED BRONCHUS OR LUNG: ICD-10-CM

## 2020-03-12 DIAGNOSIS — Z90.89 ACQUIRED ABSENCE OF OTHER ORGANS: Chronic | ICD-10-CM

## 2020-03-12 DIAGNOSIS — N20.0 CALCULUS OF KIDNEY: Chronic | ICD-10-CM

## 2020-03-13 ENCOUNTER — OUTPATIENT (OUTPATIENT)
Dept: OUTPATIENT SERVICES | Facility: HOSPITAL | Age: 70
LOS: 1 days | End: 2020-03-13
Payer: COMMERCIAL

## 2020-03-13 ENCOUNTER — APPOINTMENT (OUTPATIENT)
Dept: CT IMAGING | Facility: CLINIC | Age: 70
End: 2020-03-13
Payer: MEDICARE

## 2020-03-13 DIAGNOSIS — D11.0 BENIGN NEOPLASM OF PAROTID GLAND: Chronic | ICD-10-CM

## 2020-03-13 DIAGNOSIS — Z00.8 ENCOUNTER FOR OTHER GENERAL EXAMINATION: ICD-10-CM

## 2020-03-13 DIAGNOSIS — Z95.5 PRESENCE OF CORONARY ANGIOPLASTY IMPLANT AND GRAFT: Chronic | ICD-10-CM

## 2020-03-13 DIAGNOSIS — Z90.89 ACQUIRED ABSENCE OF OTHER ORGANS: Chronic | ICD-10-CM

## 2020-03-13 DIAGNOSIS — N20.0 CALCULUS OF KIDNEY: Chronic | ICD-10-CM

## 2020-03-13 PROCEDURE — 71250 CT THORAX DX C-: CPT | Mod: 26

## 2020-03-13 PROCEDURE — 71250 CT THORAX DX C-: CPT

## 2020-03-18 ENCOUNTER — RESULT REVIEW (OUTPATIENT)
Age: 70
End: 2020-03-18

## 2020-03-18 ENCOUNTER — APPOINTMENT (OUTPATIENT)
Dept: HEMATOLOGY ONCOLOGY | Facility: CLINIC | Age: 70
End: 2020-03-18
Payer: MEDICARE

## 2020-03-18 ENCOUNTER — LABORATORY RESULT (OUTPATIENT)
Age: 70
End: 2020-03-18

## 2020-03-18 ENCOUNTER — APPOINTMENT (OUTPATIENT)
Dept: INFUSION THERAPY | Facility: HOSPITAL | Age: 70
End: 2020-03-18

## 2020-03-18 VITALS
SYSTOLIC BLOOD PRESSURE: 122 MMHG | WEIGHT: 176.37 LBS | BODY MASS INDEX: 30.27 KG/M2 | DIASTOLIC BLOOD PRESSURE: 72 MMHG | RESPIRATION RATE: 18 BRPM | TEMPERATURE: 98.3 F | HEART RATE: 86 BPM | OXYGEN SATURATION: 99 %

## 2020-03-18 LAB
BASOPHILS # BLD AUTO: 0.05 K/UL — SIGNIFICANT CHANGE UP (ref 0–0.2)
BASOPHILS NFR BLD AUTO: 0.7 % — SIGNIFICANT CHANGE UP (ref 0–2)
EOSINOPHIL # BLD AUTO: 0.48 K/UL — SIGNIFICANT CHANGE UP (ref 0–0.5)
EOSINOPHIL NFR BLD AUTO: 7.1 % — HIGH (ref 0–6)
HCT VFR BLD CALC: 41.9 % — SIGNIFICANT CHANGE UP (ref 39–50)
HGB BLD-MCNC: 13.6 G/DL — SIGNIFICANT CHANGE UP (ref 13–17)
IMM GRANULOCYTES NFR BLD AUTO: 0.6 % — SIGNIFICANT CHANGE UP (ref 0–1.5)
LYMPHOCYTES # BLD AUTO: 0.63 K/UL — LOW (ref 1–3.3)
LYMPHOCYTES # BLD AUTO: 9.3 % — LOW (ref 13–44)
MCHC RBC-ENTMCNC: 31.1 PG — SIGNIFICANT CHANGE UP (ref 27–34)
MCHC RBC-ENTMCNC: 32.5 GM/DL — SIGNIFICANT CHANGE UP (ref 32–36)
MCV RBC AUTO: 95.9 FL — SIGNIFICANT CHANGE UP (ref 80–100)
MONOCYTES # BLD AUTO: 0.57 K/UL — SIGNIFICANT CHANGE UP (ref 0–0.9)
MONOCYTES NFR BLD AUTO: 8.4 % — SIGNIFICANT CHANGE UP (ref 2–14)
NEUTROPHILS # BLD AUTO: 4.99 K/UL — SIGNIFICANT CHANGE UP (ref 1.8–7.4)
NEUTROPHILS NFR BLD AUTO: 73.9 % — SIGNIFICANT CHANGE UP (ref 43–77)
NRBC # BLD: 0 /100 WBCS — SIGNIFICANT CHANGE UP (ref 0–0)
PLATELET # BLD AUTO: 252 K/UL — SIGNIFICANT CHANGE UP (ref 150–400)
RBC # BLD: 4.37 M/UL — SIGNIFICANT CHANGE UP (ref 4.2–5.8)
RBC # FLD: 13.4 % — SIGNIFICANT CHANGE UP (ref 10.3–14.5)
WBC # BLD: 6.76 K/UL — SIGNIFICANT CHANGE UP (ref 3.8–10.5)
WBC # FLD AUTO: 6.76 K/UL — SIGNIFICANT CHANGE UP (ref 3.8–10.5)

## 2020-03-18 PROCEDURE — 99214 OFFICE O/P EST MOD 30 MIN: CPT

## 2020-03-19 DIAGNOSIS — Z51.11 ENCOUNTER FOR ANTINEOPLASTIC CHEMOTHERAPY: ICD-10-CM

## 2020-04-06 ENCOUNTER — APPOINTMENT (OUTPATIENT)
Dept: INTERNAL MEDICINE | Facility: CLINIC | Age: 70
End: 2020-04-06

## 2020-04-08 ENCOUNTER — RESULT REVIEW (OUTPATIENT)
Age: 70
End: 2020-04-08

## 2020-04-08 ENCOUNTER — APPOINTMENT (OUTPATIENT)
Dept: INFUSION THERAPY | Facility: HOSPITAL | Age: 70
End: 2020-04-08

## 2020-04-08 ENCOUNTER — LABORATORY RESULT (OUTPATIENT)
Age: 70
End: 2020-04-08

## 2020-04-08 LAB
BASOPHILS # BLD AUTO: 0.07 K/UL — SIGNIFICANT CHANGE UP (ref 0–0.2)
BASOPHILS NFR BLD AUTO: 1 % — SIGNIFICANT CHANGE UP (ref 0–2)
EOSINOPHIL # BLD AUTO: 0.58 K/UL — HIGH (ref 0–0.5)
EOSINOPHIL NFR BLD AUTO: 8.6 % — HIGH (ref 0–6)
HCT VFR BLD CALC: 39.9 % — SIGNIFICANT CHANGE UP (ref 39–50)
HGB BLD-MCNC: 13.4 G/DL — SIGNIFICANT CHANGE UP (ref 13–17)
IMM GRANULOCYTES NFR BLD AUTO: 0.7 % — SIGNIFICANT CHANGE UP (ref 0–1.5)
LYMPHOCYTES # BLD AUTO: 0.7 K/UL — LOW (ref 1–3.3)
LYMPHOCYTES # BLD AUTO: 10.4 % — LOW (ref 13–44)
MCHC RBC-ENTMCNC: 31.5 PG — SIGNIFICANT CHANGE UP (ref 27–34)
MCHC RBC-ENTMCNC: 33.6 GM/DL — SIGNIFICANT CHANGE UP (ref 32–36)
MCV RBC AUTO: 93.9 FL — SIGNIFICANT CHANGE UP (ref 80–100)
MONOCYTES # BLD AUTO: 0.52 K/UL — SIGNIFICANT CHANGE UP (ref 0–0.9)
MONOCYTES NFR BLD AUTO: 7.7 % — SIGNIFICANT CHANGE UP (ref 2–14)
NEUTROPHILS # BLD AUTO: 4.8 K/UL — SIGNIFICANT CHANGE UP (ref 1.8–7.4)
NEUTROPHILS NFR BLD AUTO: 71.6 % — SIGNIFICANT CHANGE UP (ref 43–77)
NRBC # BLD: 0 /100 WBCS — SIGNIFICANT CHANGE UP (ref 0–0)
PLATELET # BLD AUTO: 254 K/UL — SIGNIFICANT CHANGE UP (ref 150–400)
RBC # BLD: 4.25 M/UL — SIGNIFICANT CHANGE UP (ref 4.2–5.8)
RBC # FLD: 13.7 % — SIGNIFICANT CHANGE UP (ref 10.3–14.5)
WBC # BLD: 6.72 K/UL — SIGNIFICANT CHANGE UP (ref 3.8–10.5)
WBC # FLD AUTO: 6.72 K/UL — SIGNIFICANT CHANGE UP (ref 3.8–10.5)

## 2020-04-22 ENCOUNTER — OUTPATIENT (OUTPATIENT)
Dept: OUTPATIENT SERVICES | Facility: HOSPITAL | Age: 70
LOS: 1 days | Discharge: HOME | End: 2020-04-22

## 2020-04-22 DIAGNOSIS — Z95.5 PRESENCE OF CORONARY ANGIOPLASTY IMPLANT AND GRAFT: Chronic | ICD-10-CM

## 2020-04-22 DIAGNOSIS — N20.0 CALCULUS OF KIDNEY: Chronic | ICD-10-CM

## 2020-04-22 DIAGNOSIS — Z90.89 ACQUIRED ABSENCE OF OTHER ORGANS: Chronic | ICD-10-CM

## 2020-04-22 DIAGNOSIS — C34.90 MALIGNANT NEOPLASM OF UNSPECIFIED PART OF UNSPECIFIED BRONCHUS OR LUNG: ICD-10-CM

## 2020-04-22 DIAGNOSIS — D11.0 BENIGN NEOPLASM OF PAROTID GLAND: Chronic | ICD-10-CM

## 2020-04-29 ENCOUNTER — LABORATORY RESULT (OUTPATIENT)
Age: 70
End: 2020-04-29

## 2020-04-29 ENCOUNTER — APPOINTMENT (OUTPATIENT)
Dept: INFUSION THERAPY | Facility: HOSPITAL | Age: 70
End: 2020-04-29

## 2020-04-29 ENCOUNTER — APPOINTMENT (OUTPATIENT)
Dept: HEMATOLOGY ONCOLOGY | Facility: CLINIC | Age: 70
End: 2020-04-29
Payer: MEDICARE

## 2020-04-29 ENCOUNTER — RESULT REVIEW (OUTPATIENT)
Age: 70
End: 2020-04-29

## 2020-04-29 LAB
BASOPHILS # BLD AUTO: 0.05 K/UL — SIGNIFICANT CHANGE UP (ref 0–0.2)
BASOPHILS NFR BLD AUTO: 0.7 % — SIGNIFICANT CHANGE UP (ref 0–2)
EOSINOPHIL # BLD AUTO: 0.57 K/UL — HIGH (ref 0–0.5)
EOSINOPHIL NFR BLD AUTO: 8.3 % — HIGH (ref 0–6)
HCT VFR BLD CALC: 40.3 % — SIGNIFICANT CHANGE UP (ref 39–50)
HGB BLD-MCNC: 13.6 G/DL — SIGNIFICANT CHANGE UP (ref 13–17)
IMM GRANULOCYTES NFR BLD AUTO: 0.7 % — SIGNIFICANT CHANGE UP (ref 0–1.5)
LYMPHOCYTES # BLD AUTO: 0.77 K/UL — LOW (ref 1–3.3)
LYMPHOCYTES # BLD AUTO: 11.2 % — LOW (ref 13–44)
MCHC RBC-ENTMCNC: 31.7 PG — SIGNIFICANT CHANGE UP (ref 27–34)
MCHC RBC-ENTMCNC: 33.7 GM/DL — SIGNIFICANT CHANGE UP (ref 32–36)
MCV RBC AUTO: 93.9 FL — SIGNIFICANT CHANGE UP (ref 80–100)
MONOCYTES # BLD AUTO: 0.7 K/UL — SIGNIFICANT CHANGE UP (ref 0–0.9)
MONOCYTES NFR BLD AUTO: 10.2 % — SIGNIFICANT CHANGE UP (ref 2–14)
NEUTROPHILS # BLD AUTO: 4.75 K/UL — SIGNIFICANT CHANGE UP (ref 1.8–7.4)
NEUTROPHILS NFR BLD AUTO: 68.9 % — SIGNIFICANT CHANGE UP (ref 43–77)
NRBC # BLD: 0 /100 WBCS — SIGNIFICANT CHANGE UP (ref 0–0)
PLATELET # BLD AUTO: 245 K/UL — SIGNIFICANT CHANGE UP (ref 150–400)
RBC # BLD: 4.29 M/UL — SIGNIFICANT CHANGE UP (ref 4.2–5.8)
RBC # FLD: 13.8 % — SIGNIFICANT CHANGE UP (ref 10.3–14.5)
WBC # BLD: 6.89 K/UL — SIGNIFICANT CHANGE UP (ref 3.8–10.5)
WBC # FLD AUTO: 6.89 K/UL — SIGNIFICANT CHANGE UP (ref 3.8–10.5)

## 2020-04-29 PROCEDURE — 99214 OFFICE O/P EST MOD 30 MIN: CPT

## 2020-04-30 DIAGNOSIS — Z51.11 ENCOUNTER FOR ANTINEOPLASTIC CHEMOTHERAPY: ICD-10-CM

## 2020-05-12 ENCOUNTER — RX RENEWAL (OUTPATIENT)
Age: 70
End: 2020-05-12

## 2020-05-18 ENCOUNTER — APPOINTMENT (OUTPATIENT)
Dept: INTERNAL MEDICINE | Facility: CLINIC | Age: 70
End: 2020-05-18

## 2020-05-20 ENCOUNTER — LABORATORY RESULT (OUTPATIENT)
Age: 70
End: 2020-05-20

## 2020-05-20 ENCOUNTER — RESULT REVIEW (OUTPATIENT)
Age: 70
End: 2020-05-20

## 2020-05-20 ENCOUNTER — APPOINTMENT (OUTPATIENT)
Dept: INFUSION THERAPY | Facility: HOSPITAL | Age: 70
End: 2020-05-20

## 2020-05-20 LAB
BASOPHILS # BLD AUTO: 0.04 K/UL — SIGNIFICANT CHANGE UP (ref 0–0.2)
BASOPHILS NFR BLD AUTO: 0.6 % — SIGNIFICANT CHANGE UP (ref 0–2)
EOSINOPHIL # BLD AUTO: 0.38 K/UL — SIGNIFICANT CHANGE UP (ref 0–0.5)
EOSINOPHIL NFR BLD AUTO: 5.3 % — SIGNIFICANT CHANGE UP (ref 0–6)
HCT VFR BLD CALC: 40.9 % — SIGNIFICANT CHANGE UP (ref 39–50)
HGB BLD-MCNC: 13.9 G/DL — SIGNIFICANT CHANGE UP (ref 13–17)
IMM GRANULOCYTES NFR BLD AUTO: 0.6 % — SIGNIFICANT CHANGE UP (ref 0–1.5)
LYMPHOCYTES # BLD AUTO: 0.79 K/UL — LOW (ref 1–3.3)
LYMPHOCYTES # BLD AUTO: 11.1 % — LOW (ref 13–44)
MCHC RBC-ENTMCNC: 32 PG — SIGNIFICANT CHANGE UP (ref 27–34)
MCHC RBC-ENTMCNC: 34 GM/DL — SIGNIFICANT CHANGE UP (ref 32–36)
MCV RBC AUTO: 94 FL — SIGNIFICANT CHANGE UP (ref 80–100)
MONOCYTES # BLD AUTO: 0.56 K/UL — SIGNIFICANT CHANGE UP (ref 0–0.9)
MONOCYTES NFR BLD AUTO: 7.9 % — SIGNIFICANT CHANGE UP (ref 2–14)
NEUTROPHILS # BLD AUTO: 5.3 K/UL — SIGNIFICANT CHANGE UP (ref 1.8–7.4)
NEUTROPHILS NFR BLD AUTO: 74.5 % — SIGNIFICANT CHANGE UP (ref 43–77)
NRBC # BLD: 0 /100 WBCS — SIGNIFICANT CHANGE UP (ref 0–0)
PLATELET # BLD AUTO: 253 K/UL — SIGNIFICANT CHANGE UP (ref 150–400)
RBC # BLD: 4.35 M/UL — SIGNIFICANT CHANGE UP (ref 4.2–5.8)
RBC # FLD: 13.6 % — SIGNIFICANT CHANGE UP (ref 10.3–14.5)
WBC # BLD: 7.11 K/UL — SIGNIFICANT CHANGE UP (ref 3.8–10.5)
WBC # FLD AUTO: 7.11 K/UL — SIGNIFICANT CHANGE UP (ref 3.8–10.5)

## 2020-05-27 ENCOUNTER — TRANSCRIPTION ENCOUNTER (OUTPATIENT)
Age: 70
End: 2020-05-27

## 2020-05-29 ENCOUNTER — RESULT REVIEW (OUTPATIENT)
Age: 70
End: 2020-05-29

## 2020-05-29 ENCOUNTER — OUTPATIENT (OUTPATIENT)
Dept: OUTPATIENT SERVICES | Facility: HOSPITAL | Age: 70
LOS: 1 days | Discharge: HOME | End: 2020-05-29

## 2020-05-29 ENCOUNTER — LABORATORY RESULT (OUTPATIENT)
Age: 70
End: 2020-05-29

## 2020-05-29 ENCOUNTER — APPOINTMENT (OUTPATIENT)
Dept: HEMATOLOGY ONCOLOGY | Facility: CLINIC | Age: 70
End: 2020-05-29

## 2020-05-29 DIAGNOSIS — D11.0 BENIGN NEOPLASM OF PAROTID GLAND: Chronic | ICD-10-CM

## 2020-05-29 DIAGNOSIS — N20.0 CALCULUS OF KIDNEY: Chronic | ICD-10-CM

## 2020-05-29 DIAGNOSIS — Z95.5 PRESENCE OF CORONARY ANGIOPLASTY IMPLANT AND GRAFT: Chronic | ICD-10-CM

## 2020-05-29 DIAGNOSIS — Z90.89 ACQUIRED ABSENCE OF OTHER ORGANS: Chronic | ICD-10-CM

## 2020-05-29 DIAGNOSIS — C34.90 MALIGNANT NEOPLASM OF UNSPECIFIED PART OF UNSPECIFIED BRONCHUS OR LUNG: ICD-10-CM

## 2020-05-29 LAB
BASOPHILS # BLD AUTO: 0.06 K/UL — SIGNIFICANT CHANGE UP (ref 0–0.2)
BASOPHILS NFR BLD AUTO: 0.8 % — SIGNIFICANT CHANGE UP (ref 0–2)
EOSINOPHIL # BLD AUTO: 0.43 K/UL — SIGNIFICANT CHANGE UP (ref 0–0.5)
EOSINOPHIL NFR BLD AUTO: 6.1 % — HIGH (ref 0–6)
HCT VFR BLD CALC: 40.4 % — SIGNIFICANT CHANGE UP (ref 39–50)
HGB BLD-MCNC: 13.5 G/DL — SIGNIFICANT CHANGE UP (ref 13–17)
IMM GRANULOCYTES NFR BLD AUTO: 0.8 % — SIGNIFICANT CHANGE UP (ref 0–1.5)
LYMPHOCYTES # BLD AUTO: 0.67 K/UL — LOW (ref 1–3.3)
LYMPHOCYTES # BLD AUTO: 9.5 % — LOW (ref 13–44)
MCHC RBC-ENTMCNC: 31.7 PG — SIGNIFICANT CHANGE UP (ref 27–34)
MCHC RBC-ENTMCNC: 33.4 GM/DL — SIGNIFICANT CHANGE UP (ref 32–36)
MCV RBC AUTO: 94.8 FL — SIGNIFICANT CHANGE UP (ref 80–100)
MONOCYTES # BLD AUTO: 0.66 K/UL — SIGNIFICANT CHANGE UP (ref 0–0.9)
MONOCYTES NFR BLD AUTO: 9.3 % — SIGNIFICANT CHANGE UP (ref 2–14)
NEUTROPHILS # BLD AUTO: 5.18 K/UL — SIGNIFICANT CHANGE UP (ref 1.8–7.4)
NEUTROPHILS NFR BLD AUTO: 73.5 % — SIGNIFICANT CHANGE UP (ref 43–77)
NRBC # BLD: 0 /100 WBCS — SIGNIFICANT CHANGE UP (ref 0–0)
PLATELET # BLD AUTO: 231 K/UL — SIGNIFICANT CHANGE UP (ref 150–400)
RBC # BLD: 4.26 M/UL — SIGNIFICANT CHANGE UP (ref 4.2–5.8)
RBC # FLD: 13.8 % — SIGNIFICANT CHANGE UP (ref 10.3–14.5)
WBC # BLD: 7.06 K/UL — SIGNIFICANT CHANGE UP (ref 3.8–10.5)
WBC # FLD AUTO: 7.06 K/UL — SIGNIFICANT CHANGE UP (ref 3.8–10.5)

## 2020-06-03 ENCOUNTER — LABORATORY RESULT (OUTPATIENT)
Age: 70
End: 2020-06-03

## 2020-06-08 ENCOUNTER — OUTPATIENT (OUTPATIENT)
Dept: OUTPATIENT SERVICES | Facility: HOSPITAL | Age: 70
LOS: 1 days | End: 2020-06-08
Payer: COMMERCIAL

## 2020-06-08 ENCOUNTER — APPOINTMENT (OUTPATIENT)
Dept: CT IMAGING | Facility: IMAGING CENTER | Age: 70
End: 2020-06-08
Payer: MEDICARE

## 2020-06-08 DIAGNOSIS — Z90.89 ACQUIRED ABSENCE OF OTHER ORGANS: Chronic | ICD-10-CM

## 2020-06-08 DIAGNOSIS — N20.0 CALCULUS OF KIDNEY: Chronic | ICD-10-CM

## 2020-06-08 DIAGNOSIS — C34.12 MALIGNANT NEOPLASM OF UPPER LOBE, LEFT BRONCHUS OR LUNG: ICD-10-CM

## 2020-06-08 DIAGNOSIS — Z95.5 PRESENCE OF CORONARY ANGIOPLASTY IMPLANT AND GRAFT: Chronic | ICD-10-CM

## 2020-06-08 DIAGNOSIS — D11.0 BENIGN NEOPLASM OF PAROTID GLAND: Chronic | ICD-10-CM

## 2020-06-08 PROCEDURE — 71250 CT THORAX DX C-: CPT

## 2020-06-08 PROCEDURE — 71250 CT THORAX DX C-: CPT | Mod: 26

## 2020-06-09 ENCOUNTER — TRANSCRIPTION ENCOUNTER (OUTPATIENT)
Age: 70
End: 2020-06-09

## 2020-06-10 ENCOUNTER — LABORATORY RESULT (OUTPATIENT)
Age: 70
End: 2020-06-10

## 2020-06-10 ENCOUNTER — RESULT REVIEW (OUTPATIENT)
Age: 70
End: 2020-06-10

## 2020-06-10 ENCOUNTER — APPOINTMENT (OUTPATIENT)
Dept: HEMATOLOGY ONCOLOGY | Facility: CLINIC | Age: 70
End: 2020-06-10
Payer: MEDICARE

## 2020-06-10 ENCOUNTER — APPOINTMENT (OUTPATIENT)
Dept: INFUSION THERAPY | Facility: HOSPITAL | Age: 70
End: 2020-06-10

## 2020-06-10 VITALS
DIASTOLIC BLOOD PRESSURE: 77 MMHG | BODY MASS INDEX: 31.26 KG/M2 | TEMPERATURE: 97.9 F | WEIGHT: 182.1 LBS | SYSTOLIC BLOOD PRESSURE: 125 MMHG | OXYGEN SATURATION: 95 % | RESPIRATION RATE: 17 BRPM | HEART RATE: 66 BPM

## 2020-06-10 LAB
BASOPHILS # BLD AUTO: 0.05 K/UL — SIGNIFICANT CHANGE UP (ref 0–0.2)
BASOPHILS NFR BLD AUTO: 0.7 % — SIGNIFICANT CHANGE UP (ref 0–2)
EOSINOPHIL # BLD AUTO: 0.45 K/UL — SIGNIFICANT CHANGE UP (ref 0–0.5)
EOSINOPHIL NFR BLD AUTO: 6.5 % — HIGH (ref 0–6)
HCT VFR BLD CALC: 40.3 % — SIGNIFICANT CHANGE UP (ref 39–50)
HGB BLD-MCNC: 13.6 G/DL — SIGNIFICANT CHANGE UP (ref 13–17)
IMM GRANULOCYTES NFR BLD AUTO: 0.4 % — SIGNIFICANT CHANGE UP (ref 0–1.5)
LYMPHOCYTES # BLD AUTO: 0.69 K/UL — LOW (ref 1–3.3)
LYMPHOCYTES # BLD AUTO: 9.9 % — LOW (ref 13–44)
MCHC RBC-ENTMCNC: 31.9 PG — SIGNIFICANT CHANGE UP (ref 27–34)
MCHC RBC-ENTMCNC: 33.7 GM/DL — SIGNIFICANT CHANGE UP (ref 32–36)
MCV RBC AUTO: 94.6 FL — SIGNIFICANT CHANGE UP (ref 80–100)
MONOCYTES # BLD AUTO: 0.71 K/UL — SIGNIFICANT CHANGE UP (ref 0–0.9)
MONOCYTES NFR BLD AUTO: 10.2 % — SIGNIFICANT CHANGE UP (ref 2–14)
NEUTROPHILS # BLD AUTO: 5.02 K/UL — SIGNIFICANT CHANGE UP (ref 1.8–7.4)
NEUTROPHILS NFR BLD AUTO: 72.3 % — SIGNIFICANT CHANGE UP (ref 43–77)
NRBC # BLD: 0 /100 WBCS — SIGNIFICANT CHANGE UP (ref 0–0)
PLATELET # BLD AUTO: 258 K/UL — SIGNIFICANT CHANGE UP (ref 150–400)
RBC # BLD: 4.26 M/UL — SIGNIFICANT CHANGE UP (ref 4.2–5.8)
RBC # FLD: 13.8 % — SIGNIFICANT CHANGE UP (ref 10.3–14.5)
WBC # BLD: 6.95 K/UL — SIGNIFICANT CHANGE UP (ref 3.8–10.5)
WBC # FLD AUTO: 6.95 K/UL — SIGNIFICANT CHANGE UP (ref 3.8–10.5)

## 2020-06-10 PROCEDURE — 99215 OFFICE O/P EST HI 40 MIN: CPT

## 2020-06-11 DIAGNOSIS — Z51.11 ENCOUNTER FOR ANTINEOPLASTIC CHEMOTHERAPY: ICD-10-CM

## 2020-06-15 LAB
ALBUMIN SERPL ELPH-MCNC: 4.5 G/DL
ALP BLD-CCNC: 103 U/L
ALT SERPL-CCNC: 25 U/L
ANION GAP SERPL CALC-SCNC: 19 MMOL/L
AST SERPL-CCNC: 18 U/L
BILIRUB SERPL-MCNC: 0.2 MG/DL
BUN SERPL-MCNC: 16 MG/DL
CALCIUM SERPL-MCNC: 10.1 MG/DL
CHLORIDE SERPL-SCNC: 97 MMOL/L
CO2 SERPL-SCNC: 23 MMOL/L
CREAT SERPL-MCNC: 0.89 MG/DL
GLUCOSE SERPL-MCNC: 196 MG/DL
POTASSIUM SERPL-SCNC: 4 MMOL/L
PROT SERPL-MCNC: 6.6 G/DL
SODIUM SERPL-SCNC: 139 MMOL/L

## 2020-06-16 ENCOUNTER — OUTPATIENT (OUTPATIENT)
Dept: OUTPATIENT SERVICES | Facility: HOSPITAL | Age: 70
LOS: 1 days | End: 2020-06-16

## 2020-06-16 ENCOUNTER — APPOINTMENT (OUTPATIENT)
Dept: INTERNAL MEDICINE | Facility: CLINIC | Age: 70
End: 2020-06-16

## 2020-06-16 VITALS — WEIGHT: 180 LBS | BODY MASS INDEX: 30.73 KG/M2 | HEIGHT: 64 IN

## 2020-06-16 DIAGNOSIS — N20.0 CALCULUS OF KIDNEY: Chronic | ICD-10-CM

## 2020-06-16 DIAGNOSIS — D11.0 BENIGN NEOPLASM OF PAROTID GLAND: Chronic | ICD-10-CM

## 2020-06-16 DIAGNOSIS — Z95.5 PRESENCE OF CORONARY ANGIOPLASTY IMPLANT AND GRAFT: Chronic | ICD-10-CM

## 2020-06-16 DIAGNOSIS — Z90.89 ACQUIRED ABSENCE OF OTHER ORGANS: Chronic | ICD-10-CM

## 2020-06-21 ENCOUNTER — RX RENEWAL (OUTPATIENT)
Age: 70
End: 2020-06-21

## 2020-06-23 NOTE — HISTORY OF PRESENT ILLNESS
[Home] : at home, [unfilled] , at the time of the visit. [Other Location: e.g. Home (Enter Location, City,State)___] : at [unfilled] [Spouse] : spouse [FreeTextEntry1] : EVER GOYAL is a 69 year man who presents today for follow up of chronic medical conditions. \par \par  [de-identified] : 69 year old man with history of chronic LBP, BPH s/p TURP, HTN, DM, HLD, ED, gastritis, CAD s/p stent, prior smoker with emphysema and extensive stage SCLC dx 2019, with chemo started 5/2019 carboplatin/etoposide/atezolizumab with partial response who remains on atezolizumab and s/p radiation therapy in 8/2019 who presents today for follow up chronic medical conditions including uncontrolled diabetes. HbA1c 8.9 last week with oncology, mg 1.5; from a1c 9.0 in Jan - diet changes at that time - and has been adherent to his meds during the interim period. diet control during quarantine has been difficult during stay at home period. now states he is amenable to insulin.\par having some heartburn but doing ok on famotidine and occasional Aluminum [place] (gaviscon) and occasional carafate. taking Mg 1 tab AM, 1 tab lunchtime, 2 tabs nighttime. \par Denies any hypoglycemia, hypoglycemia requiring assistance, nocturnal hypoglycemia, tremors, sweating, nausea, confusion, weakness, nocturia, polyuria, polydipsia, polyphagia, blurred vision, leg tingling, or weight changes. \par Scheduled for MRI brain 6/24 per onc, with recent HA. Otherwise to continue with immunotherapy.

## 2020-06-23 NOTE — ASSESSMENT
[FreeTextEntry1] : type 2 diabetes: uncontrolled, A1c above goal at 8.9 - also with some dietary indiscretions. On metformin and januvia. Will add lantus 10units qhs. Sent kwikpen to pharmacy with pen needles and new glucometer and testing supplies, discussed testing 1-2x/day and discussed proper injection sites. Discussed hypoglycemia recognition and treatment, they verbalized understanding. Insulin pen teaching will be done at home with their daughter who is an NP. \par will need repeat pneumovax next in-person visit as last dose in 2012 was prior to age 67. \par ophtho - reports had visit this morning with no DR (6/2020)\par monofilament exam normal 11/2019\par continue atorvastatin 40mg daily\par Continue with lisinopril\par \par cramp in lower leg: likely due to hypomagnesemia, increase Mg as below\par \par hypomagnesemia: recently still low, likely 2/2 atezolizumab therapy. increase mag supplementation to 800mg in the morning, 400mg in the afternoon, and 800mg in the evening. asymptomatic currently, no leg cramping\par \par headaches: saw oncology- scheduled for MRI brain on 6/24 given h/o SCLC\par \par Centrilobular emphysema: No significant emphysema symptoms at this time, continue with present treatment regimen.\par \par Discuss shingrix next visit

## 2020-06-23 NOTE — REVIEW OF SYSTEMS
[Headache] : headache [Negative] : Respiratory [Fever] : no fever [Chills] : no chills [Itching] : no itching [Skin Rash] : no skin rash [Dizziness] : no dizziness [Confusion] : no confusion [Unsteady Walk] : no ataxia [FreeTextEntry2] : some fatigue [de-identified] : some leg cramping

## 2020-06-23 NOTE — PHYSICAL EXAM
[No Acute Distress] : no acute distress [Well Nourished] : well nourished [Well-Appearing] : well-appearing [Well Developed] : well developed [Normal Sclera/Conjunctiva] : normal sclera/conjunctiva [EOMI] : extraocular movements intact [Normal Outer Ear/Nose] : the outer ears and nose were normal in appearance [No JVD] : no jugular venous distention [No Respiratory Distress] : no respiratory distress  [No Accessory Muscle Use] : no accessory muscle use [No Edema] : there was no peripheral edema [Non Tender] : non-tender [Soft] : abdomen soft [Coordination Grossly Intact] : coordination grossly intact [Grossly Normal Strength/Tone] : grossly normal strength/tone [Speech Grossly Normal] : speech grossly normal [Normal Gait] : normal gait [Memory Grossly Normal] : memory grossly normal [Normal Mood] : the mood was normal [Normal Insight/Judgement] : insight and judgment were intact [de-identified] : Patient and his wife participated in video exam [de-identified] : n

## 2020-06-24 ENCOUNTER — APPOINTMENT (OUTPATIENT)
Dept: MRI IMAGING | Facility: CLINIC | Age: 70
End: 2020-06-24
Payer: MEDICARE

## 2020-06-24 ENCOUNTER — OUTPATIENT (OUTPATIENT)
Dept: OUTPATIENT SERVICES | Facility: HOSPITAL | Age: 70
LOS: 1 days | End: 2020-06-24
Payer: COMMERCIAL

## 2020-06-24 DIAGNOSIS — D11.0 BENIGN NEOPLASM OF PAROTID GLAND: Chronic | ICD-10-CM

## 2020-06-24 DIAGNOSIS — N20.0 CALCULUS OF KIDNEY: Chronic | ICD-10-CM

## 2020-06-24 DIAGNOSIS — Z90.89 ACQUIRED ABSENCE OF OTHER ORGANS: Chronic | ICD-10-CM

## 2020-06-24 DIAGNOSIS — Z00.8 ENCOUNTER FOR OTHER GENERAL EXAMINATION: ICD-10-CM

## 2020-06-24 DIAGNOSIS — Z95.5 PRESENCE OF CORONARY ANGIOPLASTY IMPLANT AND GRAFT: Chronic | ICD-10-CM

## 2020-06-24 PROCEDURE — 70553 MRI BRAIN STEM W/O & W/DYE: CPT

## 2020-06-24 PROCEDURE — A9585: CPT

## 2020-06-24 PROCEDURE — 70553 MRI BRAIN STEM W/O & W/DYE: CPT | Mod: 26

## 2020-06-25 ENCOUNTER — OUTPATIENT (OUTPATIENT)
Dept: OUTPATIENT SERVICES | Facility: HOSPITAL | Age: 70
LOS: 1 days | Discharge: HOME | End: 2020-06-25
Payer: MEDICARE

## 2020-06-25 DIAGNOSIS — Z90.89 ACQUIRED ABSENCE OF OTHER ORGANS: Chronic | ICD-10-CM

## 2020-06-25 DIAGNOSIS — N20.0 CALCULUS OF KIDNEY: Chronic | ICD-10-CM

## 2020-06-25 DIAGNOSIS — Z95.5 PRESENCE OF CORONARY ANGIOPLASTY IMPLANT AND GRAFT: Chronic | ICD-10-CM

## 2020-06-25 DIAGNOSIS — D11.0 BENIGN NEOPLASM OF PAROTID GLAND: Chronic | ICD-10-CM

## 2020-06-26 ENCOUNTER — APPOINTMENT (OUTPATIENT)
Dept: RADIATION ONCOLOGY | Facility: CLINIC | Age: 70
End: 2020-06-26
Payer: MEDICARE

## 2020-06-26 ENCOUNTER — OUTPATIENT (OUTPATIENT)
Dept: OUTPATIENT SERVICES | Facility: HOSPITAL | Age: 70
LOS: 1 days | Discharge: HOME | End: 2020-06-26

## 2020-06-26 DIAGNOSIS — Z90.89 ACQUIRED ABSENCE OF OTHER ORGANS: Chronic | ICD-10-CM

## 2020-06-26 DIAGNOSIS — Z95.5 PRESENCE OF CORONARY ANGIOPLASTY IMPLANT AND GRAFT: Chronic | ICD-10-CM

## 2020-06-26 DIAGNOSIS — N20.0 CALCULUS OF KIDNEY: Chronic | ICD-10-CM

## 2020-06-26 DIAGNOSIS — C34.90 MALIGNANT NEOPLASM OF UNSPECIFIED PART OF UNSPECIFIED BRONCHUS OR LUNG: ICD-10-CM

## 2020-06-26 DIAGNOSIS — D11.0 BENIGN NEOPLASM OF PAROTID GLAND: Chronic | ICD-10-CM

## 2020-06-26 PROCEDURE — 99215 OFFICE O/P EST HI 40 MIN: CPT | Mod: 95,25

## 2020-06-26 NOTE — VITALS
[Maximal Pain Intensity: 0/10] : 0/10 [80: Normal activity with effort; some signs or symptoms of disease.] : 80: Normal activity with effort; some signs or symptoms of disease.  [Least Pain Intensity: 0/10] : 0/10

## 2020-06-26 NOTE — LETTER CLOSING
[FreeTextEntry3] : Wilberto Guerra MD\par Attending Physician\par Department of Radiation Medicine\par \par \par

## 2020-06-26 NOTE — HISTORY OF PRESENT ILLNESS
[Other Location: e.g. Home (Enter Location, City,State)___] : at [unfilled] [Home] : at home, [unfilled] , at the time of the visit. [Family Member] : family member [Spouse] : spouse [Verbal consent obtained from patient] : the patient, [unfilled] [FreeTextEntry1] : Mr. Ness presents for consideration for radiation therapy for new brain metastases.  \par \par He is a 69 year old male with a history of small cell lung cancer.  This was initially diagnosed in 4/2019 through an ebus of bilateral level 4 lymph nodes in 4/2019 after presenting with 6 months of worsening cough, fatigue, and insomnia. A CT chest showed a lung mass at that time. He was initially treated with carbo/etoposide/atezoluzimab starting in 5/2019 with 4 cycles completed in 7/2019, followed by atezolizumab maintenance . Consolidative RT completed in 8/2019. He elected against prophylactic whole brain radiation. \par \par Due to frequent headaches, forgetfulness, and reflexes bring worse, a brain MRI was ordered on 6/10/2020. \par \par This brain MRI revealed two brain lesions consistent with metastases. \par 1: left posterior temporal/occipital cortex,  approximately 1.4 x 1.2 cm. \par 2:  high medial left frontal cortex, approximately 0.7 x 0.6 cm Small amount of surrounding edema is identified. \par \par He presents to discuss management of his brain metastases.  He is not currently on dexamethasone.  He endorses ocasssional headaches, manageable.  Denies focal weakness, nausea, vomiting, or other complaints.  Maintains an excellent KPS.  His daughter, a physician, is serving as the  during this conversation

## 2020-06-26 NOTE — PHYSICAL EXAM
[General Appearance - Well Developed] : well developed [General Appearance - Well Nourished] : well nourished [de-identified] : Telehealth Visit with two way audio-video [Not Anxious] : not anxious [General Appearance - In No Acute Distress] : in no acute distress [Oriented To Time, Place, And Person] : oriented to person, place, and time [de-identified] : CN II-XII grossly intact.  Excellent comprehension.

## 2020-06-29 ENCOUNTER — APPOINTMENT (OUTPATIENT)
Dept: NEUROSURGERY | Facility: CLINIC | Age: 70
End: 2020-06-29
Payer: MEDICARE

## 2020-06-29 PROCEDURE — 99204 OFFICE O/P NEW MOD 45 MIN: CPT | Mod: 95

## 2020-06-30 ENCOUNTER — OUTPATIENT (OUTPATIENT)
Dept: OUTPATIENT SERVICES | Facility: HOSPITAL | Age: 70
LOS: 1 days | End: 2020-06-30
Payer: COMMERCIAL

## 2020-06-30 ENCOUNTER — APPOINTMENT (OUTPATIENT)
Dept: MRI IMAGING | Facility: IMAGING CENTER | Age: 70
End: 2020-06-30

## 2020-06-30 DIAGNOSIS — D11.0 BENIGN NEOPLASM OF PAROTID GLAND: Chronic | ICD-10-CM

## 2020-06-30 DIAGNOSIS — C79.31 SECONDARY MALIGNANT NEOPLASM OF BRAIN: ICD-10-CM

## 2020-06-30 DIAGNOSIS — Z00.8 ENCOUNTER FOR OTHER GENERAL EXAMINATION: ICD-10-CM

## 2020-06-30 DIAGNOSIS — N20.0 CALCULUS OF KIDNEY: Chronic | ICD-10-CM

## 2020-06-30 DIAGNOSIS — Z95.5 PRESENCE OF CORONARY ANGIOPLASTY IMPLANT AND GRAFT: Chronic | ICD-10-CM

## 2020-06-30 DIAGNOSIS — Z90.89 ACQUIRED ABSENCE OF OTHER ORGANS: Chronic | ICD-10-CM

## 2020-06-30 PROCEDURE — 77334 RADIATION TREATMENT AID(S): CPT | Mod: 26

## 2020-06-30 PROCEDURE — A9585: CPT

## 2020-06-30 PROCEDURE — 77290 THER RAD SIMULAJ FIELD CPLX: CPT | Mod: 26

## 2020-06-30 PROCEDURE — 76498 UNLISTED MR PROCEDURE: CPT

## 2020-06-30 NOTE — REASON FOR VISIT
[New Patient Visit] : a new patient visit [Spouse] : spouse [Patient Declined  Services] : - None: Patient declined  services [Family Member] : family member [FreeTextEntry3] : Daughter translated per the patient's request. [FreeTextEntry1] : metastatic brain lesions for treatment discussion.

## 2020-06-30 NOTE — PLAN
[FreeTextEntry1] : Discussion:\par - Reviewed MRI findings. Two metastatic lesions that are amenable to SRS treatment.\par - These lesions are not a treatment failure but more likely seeded prior to treatment.\par - Reviewed treatment options- SRS and WBRT. WBRT is not recommended at this time. Low tumor burden and risk of cognitive effects not indicated at this time.\par - reviewed SRS using Gamma Knife as highly effective focused radiation to control tumor growth.\par - No interruption in current treatment is needed.\par - Reviewed synergistic effect of immunotherapy and SRS- the possible risk of cerebral edema and associated side effects would be treated with steroids, resection or DENISE.\par - Reviewed GK experience. Mask placement, pain management, treatment duration expected to be 35 minutes, and recovery.\par - SRS is tentatively scheduled for Wenesday, July 1st at 11;30.\par - Patient prescribed AE keppra 500mg twice daily for 14 days.\par - All questions were answered.

## 2020-06-30 NOTE — PHYSICAL EXAM
[General Appearance - Alert] : alert [General Appearance - In No Acute Distress] : in no acute distress [Oriented To Time, Place, And Person] : oriented to person, place, and time [General Appearance - Well-Appearing] : healthy appearing [Impaired Insight] : insight and judgment were intact [Person] : oriented to person [Affect] : the affect was normal [Place] : oriented to place [Time] : oriented to time [Short Term Intact] : short term memory intact [Span Intact] : the attention span was normal [Remote Intact] : remote memory intact [Concentration Intact] : normal concentrating ability [Comprehension] : comprehension intact [Fluency] : fluency intact [Current Events] : adequate knowledge of current events [Vocabulary] : adequate range of vocabulary [Cranial Nerves Optic (II)] : visual acuity intact bilaterally,  pupils equal round and reactive to light [Past History] : adequate knowledge of personal past history [Cranial Nerves Trigeminal (V)] : facial sensation intact symmetrically [Cranial Nerves Oculomotor (III)] : extraocular motion intact [Cranial Nerves Facial (VII)] : face symmetrical [Cranial Nerves Accessory (XI - Cranial And Spinal)] : head turning and shoulder shrug symmetric [Cranial Nerves Vestibulocochlear (VIII)] : hearing was intact bilaterally [Cranial Nerves Hypoglossal (XII)] : there was no tongue deviation with protrusion [Motor Strength] : muscle strength was normal in all four extremities [Abnormal Walk] : normal gait [Motor Handedness Right-Handed] : the patient is right hand dominant [Tremor] : no tremor present [Balance] : balance was intact [Past-pointing] : there was no past-pointing

## 2020-06-30 NOTE — HISTORY OF PRESENT ILLNESS
[Home] : at home, [unfilled] , at the time of the visit. [Spouse] : spouse [Medical Office: ([facility])___] : at the medical office located in  [< 3 months] : less than 3 months [Family Member] : family member [Verbal consent obtained from patient] : the patient, [unfilled] [FreeTextEntry1] : Forgetfulness and headaches [de-identified] : MRI brain +/- contrast performed with new headaches and forgetfulness revealing two metastatic brain tumors. Lung cancer primary.\par Patient currently on immunotherapy with good systemic control of disease.\par Patient had previously treated with chemo and RT. Some residual neuropathy.

## 2020-06-30 NOTE — DATA REVIEWED
[de-identified] : MRI brain reviewed.\par \par EXAM: MR BRAIN WAW IC \par \par \par PROCEDURE DATE: 06/24/2020 \par \par \par \par INTERPRETATION: Clinical indication: Headache. History of lung cancer. \par \par MRI of the brain was performed using sagittal T1, axial T1 and fast \par spin-echo T2-weighted sequence with FLAIR diffusion and susceptibility \par weighted sequence. The patient was injected with approximately 7.5 cc of \par gadolinium IV and sagittal coronal and axial T1-weighted sequences were \par performed. \par \par Parenchymal volume loss and chronic microvascular ischemic changes are \par identified. \par \par Lesion 1: Thick irregular, peripherally enhancing lesion is now seen \par involving the left posterior temporal/occipital cortex. This lesion measures \par approximately 1.4 x 1.2 cm. \par \par Lesion 2: Enhancing lesion is seen involving the high medial left frontal \par cortex. This is along the interhemispheric region and measures approximately \par 0.7 x 0.6 cm. Small amount of surrounding edema is identified. \par \par There is no evidence of acute hemorrhage in the posterior fossa or supra \par tentorial region \par \par Evaluation of the diffusion weighted sequence demonstrates no abnormal areas \par of restricted diffusion to suggest acute infarct. \par \par The large vessels demonstrate normal flow-voids. \par \par Left maxillary polyp versus retention cyst is seen. \par \par Bilateral maxillary sinus mucosal thickening is seen. \par \par Inflammatory changes involving the left mastoid and middle ear region is \par seen. \par \par IMPRESSION: Enhancing lesions are identified consistent with metastasis \par given patient's history. \par \par Findings discussed with Zeny GREEN on June 24, 2020 1:10 PM with read back. \par \par \par \par \par \par \par \par \par CHANDANA NULL M.D., ATTENDING RADIOLOGIST \par This document has been electronically signed. Jun 24 2020 1:14PM

## 2020-06-30 NOTE — REVIEW OF SYSTEMS
[Memory Lapses or Loss] : memory loss [Numbness] : numbness [Tension Headache] : tension-type headaches [Abnormal Sensation] : an abnormal sensation [Negative] : Heme/Lymph

## 2020-07-01 ENCOUNTER — APPOINTMENT (OUTPATIENT)
Dept: NEUROSURGERY | Facility: CLINIC | Age: 70
End: 2020-07-01
Payer: MEDICARE

## 2020-07-01 PROCEDURE — 61797 SRS CRAN LES SIMPLE ADDL: CPT

## 2020-07-01 PROCEDURE — 77432 STEREOTACTIC RADIATION TRMT: CPT

## 2020-07-01 PROCEDURE — 77263 THER RADIOLOGY TX PLNG CPLX: CPT

## 2020-07-01 PROCEDURE — 61796 SRS CRANIAL LESION SIMPLE: CPT

## 2020-07-01 PROCEDURE — 77300 RADIATION THERAPY DOSE PLAN: CPT | Mod: 26

## 2020-07-01 PROCEDURE — 77295 3-D RADIOTHERAPY PLAN: CPT | Mod: 26

## 2020-07-01 PROCEDURE — 77334 RADIATION TREATMENT AID(S): CPT | Mod: 26

## 2020-07-02 ENCOUNTER — APPOINTMENT (OUTPATIENT)
Dept: HEMATOLOGY ONCOLOGY | Facility: CLINIC | Age: 70
End: 2020-07-02
Payer: MEDICARE

## 2020-07-02 ENCOUNTER — APPOINTMENT (OUTPATIENT)
Dept: INFUSION THERAPY | Facility: HOSPITAL | Age: 70
End: 2020-07-02

## 2020-07-02 ENCOUNTER — RESULT REVIEW (OUTPATIENT)
Age: 70
End: 2020-07-02

## 2020-07-02 ENCOUNTER — LABORATORY RESULT (OUTPATIENT)
Age: 70
End: 2020-07-02

## 2020-07-02 VITALS
BODY MASS INDEX: 30.65 KG/M2 | DIASTOLIC BLOOD PRESSURE: 72 MMHG | TEMPERATURE: 98 F | SYSTOLIC BLOOD PRESSURE: 122 MMHG | WEIGHT: 178.57 LBS | OXYGEN SATURATION: 96 % | HEART RATE: 81 BPM | RESPIRATION RATE: 18 BRPM

## 2020-07-02 LAB
BASOPHILS # BLD AUTO: 0.08 K/UL — SIGNIFICANT CHANGE UP (ref 0–0.2)
BASOPHILS NFR BLD AUTO: 1 % — SIGNIFICANT CHANGE UP (ref 0–2)
EOSINOPHIL # BLD AUTO: 0.56 K/UL — HIGH (ref 0–0.5)
EOSINOPHIL NFR BLD AUTO: 6.8 % — HIGH (ref 0–6)
HCT VFR BLD CALC: 42.2 % — SIGNIFICANT CHANGE UP (ref 39–50)
HGB BLD-MCNC: 13.7 G/DL — SIGNIFICANT CHANGE UP (ref 13–17)
IMM GRANULOCYTES NFR BLD AUTO: 0.5 % — SIGNIFICANT CHANGE UP (ref 0–1.5)
LYMPHOCYTES # BLD AUTO: 0.88 K/UL — LOW (ref 1–3.3)
LYMPHOCYTES # BLD AUTO: 10.7 % — LOW (ref 13–44)
MCHC RBC-ENTMCNC: 32 PG — SIGNIFICANT CHANGE UP (ref 27–34)
MCHC RBC-ENTMCNC: 32.5 GM/DL — SIGNIFICANT CHANGE UP (ref 32–36)
MCV RBC AUTO: 98.6 FL — SIGNIFICANT CHANGE UP (ref 80–100)
MONOCYTES # BLD AUTO: 0.8 K/UL — SIGNIFICANT CHANGE UP (ref 0–0.9)
MONOCYTES NFR BLD AUTO: 9.7 % — SIGNIFICANT CHANGE UP (ref 2–14)
NEUTROPHILS # BLD AUTO: 5.85 K/UL — SIGNIFICANT CHANGE UP (ref 1.8–7.4)
NEUTROPHILS NFR BLD AUTO: 71.3 % — SIGNIFICANT CHANGE UP (ref 43–77)
NRBC # BLD: 0 /100 WBCS — SIGNIFICANT CHANGE UP (ref 0–0)
PLATELET # BLD AUTO: 277 K/UL — SIGNIFICANT CHANGE UP (ref 150–400)
RBC # BLD: 4.28 M/UL — SIGNIFICANT CHANGE UP (ref 4.2–5.8)
RBC # FLD: 13.9 % — SIGNIFICANT CHANGE UP (ref 10.3–14.5)
WBC # BLD: 8.21 K/UL — SIGNIFICANT CHANGE UP (ref 3.8–10.5)
WBC # FLD AUTO: 8.21 K/UL — SIGNIFICANT CHANGE UP (ref 3.8–10.5)

## 2020-07-02 PROCEDURE — 99214 OFFICE O/P EST MOD 30 MIN: CPT

## 2020-07-06 DIAGNOSIS — Z51.11 ENCOUNTER FOR ANTINEOPLASTIC CHEMOTHERAPY: ICD-10-CM

## 2020-07-10 DIAGNOSIS — C79.31 SECONDARY MALIGNANT NEOPLASM OF BRAIN: ICD-10-CM

## 2020-07-15 ENCOUNTER — OUTPATIENT (OUTPATIENT)
Dept: OUTPATIENT SERVICES | Facility: HOSPITAL | Age: 70
LOS: 1 days | End: 2020-07-15
Payer: COMMERCIAL

## 2020-07-15 ENCOUNTER — APPOINTMENT (OUTPATIENT)
Dept: CT IMAGING | Facility: CLINIC | Age: 70
End: 2020-07-15
Payer: MEDICARE

## 2020-07-15 DIAGNOSIS — C34.12 MALIGNANT NEOPLASM OF UPPER LOBE, LEFT BRONCHUS OR LUNG: ICD-10-CM

## 2020-07-15 DIAGNOSIS — D11.0 BENIGN NEOPLASM OF PAROTID GLAND: Chronic | ICD-10-CM

## 2020-07-15 DIAGNOSIS — Z90.89 ACQUIRED ABSENCE OF OTHER ORGANS: Chronic | ICD-10-CM

## 2020-07-15 DIAGNOSIS — N20.0 CALCULUS OF KIDNEY: Chronic | ICD-10-CM

## 2020-07-15 DIAGNOSIS — Z95.5 PRESENCE OF CORONARY ANGIOPLASTY IMPLANT AND GRAFT: Chronic | ICD-10-CM

## 2020-07-15 PROCEDURE — 74177 CT ABD & PELVIS W/CONTRAST: CPT

## 2020-07-15 PROCEDURE — 74177 CT ABD & PELVIS W/CONTRAST: CPT | Mod: 26

## 2020-07-17 ENCOUNTER — APPOINTMENT (OUTPATIENT)
Dept: INFUSION THERAPY | Facility: HOSPITAL | Age: 70
End: 2020-07-17

## 2020-07-21 LAB — SARS-COV-2 N GENE NPH QL NAA+PROBE: NOT DETECTED

## 2020-07-22 ENCOUNTER — LABORATORY RESULT (OUTPATIENT)
Age: 70
End: 2020-07-22

## 2020-07-22 ENCOUNTER — APPOINTMENT (OUTPATIENT)
Dept: INFUSION THERAPY | Facility: HOSPITAL | Age: 70
End: 2020-07-22

## 2020-07-22 ENCOUNTER — APPOINTMENT (OUTPATIENT)
Dept: HEMATOLOGY ONCOLOGY | Facility: CLINIC | Age: 70
End: 2020-07-22

## 2020-07-22 ENCOUNTER — EMERGENCY (EMERGENCY)
Facility: HOSPITAL | Age: 70
LOS: 1 days | Discharge: HOME | End: 2020-07-22
Attending: EMERGENCY MEDICINE | Admitting: EMERGENCY MEDICINE
Payer: MEDICARE

## 2020-07-22 VITALS
RESPIRATION RATE: 16 BRPM | HEART RATE: 63 BPM | TEMPERATURE: 98 F | OXYGEN SATURATION: 100 % | DIASTOLIC BLOOD PRESSURE: 75 MMHG | SYSTOLIC BLOOD PRESSURE: 136 MMHG

## 2020-07-22 VITALS
TEMPERATURE: 98 F | OXYGEN SATURATION: 98 % | SYSTOLIC BLOOD PRESSURE: 175 MMHG | DIASTOLIC BLOOD PRESSURE: 82 MMHG | RESPIRATION RATE: 20 BRPM | HEART RATE: 74 BPM

## 2020-07-22 DIAGNOSIS — Z90.89 ACQUIRED ABSENCE OF OTHER ORGANS: Chronic | ICD-10-CM

## 2020-07-22 DIAGNOSIS — D11.0 BENIGN NEOPLASM OF PAROTID GLAND: Chronic | ICD-10-CM

## 2020-07-22 DIAGNOSIS — Z95.5 PRESENCE OF CORONARY ANGIOPLASTY IMPLANT AND GRAFT: Chronic | ICD-10-CM

## 2020-07-22 DIAGNOSIS — N20.0 CALCULUS OF KIDNEY: Chronic | ICD-10-CM

## 2020-07-22 LAB
ANION GAP SERPL CALC-SCNC: 13 MMO/L — SIGNIFICANT CHANGE UP (ref 7–14)
BASOPHILS # BLD AUTO: 0.05 K/UL — SIGNIFICANT CHANGE UP (ref 0–0.2)
BASOPHILS NFR BLD AUTO: 0.5 % — SIGNIFICANT CHANGE UP (ref 0–2)
BUN SERPL-MCNC: 16 MG/DL — SIGNIFICANT CHANGE UP (ref 7–23)
CALCIUM SERPL-MCNC: 9.7 MG/DL — SIGNIFICANT CHANGE UP (ref 8.4–10.5)
CHLORIDE SERPL-SCNC: 98 MMOL/L — SIGNIFICANT CHANGE UP (ref 98–107)
CO2 SERPL-SCNC: 26 MMOL/L — SIGNIFICANT CHANGE UP (ref 22–31)
CREAT SERPL-MCNC: 0.94 MG/DL — SIGNIFICANT CHANGE UP (ref 0.5–1.3)
CRP SERPL-MCNC: 10 MG/L — HIGH
EOSINOPHIL # BLD AUTO: 0.29 K/UL — SIGNIFICANT CHANGE UP (ref 0–0.5)
EOSINOPHIL NFR BLD AUTO: 2.9 % — SIGNIFICANT CHANGE UP (ref 0–6)
ERYTHROCYTE [SEDIMENTATION RATE] IN BLOOD: 23 MM/HR — HIGH (ref 1–15)
GLUCOSE SERPL-MCNC: 185 MG/DL — HIGH (ref 70–99)
HCT VFR BLD CALC: 40.7 % — SIGNIFICANT CHANGE UP (ref 39–50)
HGB BLD-MCNC: 13.6 G/DL — SIGNIFICANT CHANGE UP (ref 13–17)
IMM GRANULOCYTES NFR BLD AUTO: 0.3 % — SIGNIFICANT CHANGE UP (ref 0–1.5)
LYMPHOCYTES # BLD AUTO: 0.54 K/UL — LOW (ref 1–3.3)
LYMPHOCYTES # BLD AUTO: 5.4 % — LOW (ref 13–44)
MCHC RBC-ENTMCNC: 32.4 PG — SIGNIFICANT CHANGE UP (ref 27–34)
MCHC RBC-ENTMCNC: 33.4 % — SIGNIFICANT CHANGE UP (ref 32–36)
MCV RBC AUTO: 96.9 FL — SIGNIFICANT CHANGE UP (ref 80–100)
MONOCYTES # BLD AUTO: 0.91 K/UL — HIGH (ref 0–0.9)
MONOCYTES NFR BLD AUTO: 9 % — SIGNIFICANT CHANGE UP (ref 2–14)
NEUTROPHILS # BLD AUTO: 8.24 K/UL — HIGH (ref 1.8–7.4)
NEUTROPHILS NFR BLD AUTO: 81.9 % — HIGH (ref 43–77)
NRBC # FLD: 0 K/UL — SIGNIFICANT CHANGE UP (ref 0–0)
PLATELET # BLD AUTO: 251 K/UL — SIGNIFICANT CHANGE UP (ref 150–400)
PMV BLD: 9.2 FL — SIGNIFICANT CHANGE UP (ref 7–13)
POTASSIUM SERPL-MCNC: 4.3 MMOL/L — SIGNIFICANT CHANGE UP (ref 3.5–5.3)
POTASSIUM SERPL-SCNC: 4.3 MMOL/L — SIGNIFICANT CHANGE UP (ref 3.5–5.3)
RBC # BLD: 4.2 M/UL — SIGNIFICANT CHANGE UP (ref 4.2–5.8)
RBC # FLD: 13.7 % — SIGNIFICANT CHANGE UP (ref 10.3–14.5)
SODIUM SERPL-SCNC: 137 MMOL/L — SIGNIFICANT CHANGE UP (ref 135–145)
WBC # BLD: 10.06 K/UL — SIGNIFICANT CHANGE UP (ref 3.8–10.5)
WBC # FLD AUTO: 10.06 K/UL — SIGNIFICANT CHANGE UP (ref 3.8–10.5)

## 2020-07-22 PROCEDURE — 73110 X-RAY EXAM OF WRIST: CPT | Mod: 26,LT

## 2020-07-22 PROCEDURE — 99281 EMR DPT VST MAYX REQ PHY/QHP: CPT | Mod: GC

## 2020-07-22 PROCEDURE — 99284 EMERGENCY DEPT VISIT MOD MDM: CPT

## 2020-07-22 RX ORDER — KETOROLAC TROMETHAMINE 30 MG/ML
15 SYRINGE (ML) INJECTION ONCE
Refills: 0 | Status: DISCONTINUED | OUTPATIENT
Start: 2020-07-22 | End: 2020-07-22

## 2020-07-22 RX ORDER — OMEPRAZOLE 10 MG/1
1 CAPSULE, DELAYED RELEASE ORAL
Qty: 14 | Refills: 0
Start: 2020-07-22 | End: 2020-08-04

## 2020-07-22 RX ORDER — ACETAMINOPHEN 500 MG
975 TABLET ORAL ONCE
Refills: 0 | Status: COMPLETED | OUTPATIENT
Start: 2020-07-22 | End: 2020-07-22

## 2020-07-22 RX ADMIN — Medication 15 MILLIGRAM(S): at 12:35

## 2020-07-22 RX ADMIN — Medication 975 MILLIGRAM(S): at 09:43

## 2020-07-22 RX ADMIN — Medication 15 MILLIGRAM(S): at 12:15

## 2020-07-22 RX ADMIN — Medication 975 MILLIGRAM(S): at 10:40

## 2020-07-22 NOTE — ED PROVIDER NOTE - NS ED ROS FT
Constitutional: No fever or Chills.  No unexpected weight loss.  Head, Eyes, Ears, Nose, Throat: No visual changes.  No tongue or throat swelling or pain.  Neck: No neck stiffness.  Pulmonary: No shortness of breath or cough.  No wheezing.  Cardiovascular: No chest pain, palpitations, or diaphoresis.  Abdominal: No abdominal pain. No nausea, vomiting, diarrhea.  No bloody or melenic stools.  Genitourinary: No dysuria or hematuria.  No discharge.  Dermatologic: No rashes.  Neurologic: No headache, weakness, visual changes, speech changes, or sensory abnormalities.  No fainting episodes.  No gait imbalance.

## 2020-07-22 NOTE — ED PROVIDER NOTE - PMH
BPH (benign prostatic hypertrophy)    CAD (coronary artery disease)    Hyperlipidemia    Hypertension    Smoking history    Type 2 diabetes mellitus    Urinary calculus    Weight loss

## 2020-07-22 NOTE — ED PROVIDER NOTE - NSFOLLOWUPINSTRUCTIONS_ED_ALL_ED_FT
You were seen for wrist pain. No medical emergency was found. You were seen by our hand surgeon. We discussed your case with the rheumatology doctor but you are electing to leave the emergency department without their full evaluation.  They will call you to setup an appointment in about 1 week with Dr Choi. Contact information for the clinic is below.    [address] [place] [address]  [phone]    You were prescribed naproxen 500 mg twice a day for two weeks to help with pain as well as pantoprazole once per day.  Return to the emergency department for worsening pain, new fever over 100.4 F, inability to move the wrist, or any other concern for a medical emergency. You were seen for wrist pain. No medical emergency was found. You were seen by our hand surgeon. We discussed your case with the rheumatology doctor but you are electing to leave the emergency department without their full evaluation.  They will call you to setup an appointment in about 1 week with Dr Choi. Contact information for the clinic is below.    [address] [place] [address]  [phone]    You were prescribed naproxen 500 mg twice a day for two weeks to help with pain as well as Omeprazole once per day.  Please use ice for 20 minutes at a time, use the splint as needed for comfort, and keep the wrist elevated.   Return to the emergency department for worsening pain, new fever over 100.4 F, inability to move the wrist, or any other concern for a medical emergency.

## 2020-07-22 NOTE — CONSULT NOTE ADULT - SUBJECTIVE AND OBJECTIVE BOX
Authored By:  Cecilio Fraser MD  PGY-2, Internal Medicine  Pager NS: [phone], LI: [phone]    EVER GOYAL  [id number]    HISTORY OF PRESENT ILLNESS:  Patient interviewed with  # [id number].    Patient is a 68 y/o M with SCLC w/ brain mets (diagnosed 2019, s/p chemo with carbo/etoposide on 5/2019, radiation therapy on 8/2019, and recent gamma knife for brain mets currently on maintenance immunotherapy with atezolizumab for over 1 year), HTN, DM, BPH and CAD presenting to the ED with acute onset left wrist pain. Patient reports doing some heavy lifting yesterday and was awoken at 3AM last night due to severe wrist pain. Pain is described as sharp, rated at 3/10 at rest and 10/10 with movement. Patient has also noticed wrist swelling since last night. He states that he has never experienced pain/swelling of any joint before. Denies any trauma to the wrist. Denies any history of gout or arthritis. Has not taken any medications at home for the pain. He reports that his pain has somewhat improved with toradol and Tylenol that he has received in the ED as well as with ice packs. Currently denies any fever, numbness/tingling of extremity, weakness, CP, SOB, lightheadedness, n/v or diarrhea.     PAST MEDICAL & SURGICAL HISTORY:  Weight loss  Smoking history  Urinary calculus  BPH (benign prostatic hypertrophy)  Type 2 diabetes mellitus  Hyperlipidemia  CAD (coronary artery disease)  Hypertension  Benign parotid tumor: s/p excision x 2 2008 and 2010 (left/right)  Renal calculus: s/p laser litho  Stented coronary artery: 2009 - BMS to RCA  History of tonsillectomy    Review of Systems:  Gen:  No fevers/chills, weight loss  HEENT: No blurry vision, no difficulty swallowing  CVS: No chest pain/palpitations  Resp: No SOB/wheezing  GI: No N/V/C/D/abdominal pain  MSK: Left wrist pain and swelling  Skin: No new rashes  Neuro: No headaches    MEDICATIONS  (STANDING):    MEDICATIONS  (PRN):    Allergies    No Known Allergies    Intolerances    PERTINENT MEDICATION HISTORY:    SOCIAL HISTORY: Former smoker, denies drinking/drug use    TRAVEL HISTORY: Denies any recent travel    FAMILY HISTORY:  No pertinent family history in first degree relatives    Vital Signs Last 24 Hrs  T(C): 36.4 (22 Jul 2020 16:12), Max: 37.1 (22 Jul 2020 13:00)  T(F): 97.5 (22 Jul 2020 16:12), Max: 98.8 (22 Jul 2020 13:00)  HR: 63 (22 Jul 2020 16:12) (63 - 80)  BP: 136/75 (22 Jul 2020 16:12) (134/76 - 175/82)  BP(mean): --  RR: 16 (22 Jul 2020 16:12) (16 - 20)  SpO2: 100% (22 Jul 2020 16:12) (98% - 100%)    Physical Exam:  General: No apparent distress  HEENT: EOMI, MMM  CVS: +S1/S2, RRR, no murmurs/rubs/gallops  Resp: CTA b/l. No crackles/wheezing  GI: Soft, NT/ND +BS  MSK:  Shoulders: wnl  Elbows: wnl  Wrists: left wrist with erythema and swelling, exquisite TTP around wrist, ROM impaired 2/2 pain. Sensation over hand and fingers intact. Palpable radial pulses. No cyanosis of fingers.  MCPs: wnl  PIPs: wnl  DIPs: wnl   Knees: wnl   Ankle: wnl  Neuro: AAOx3  Skin: small non-erythematous papules present over upper chest    LABS:                        13.6   10.06 )-----------( 251      ( 22 Jul 2020 09:46 )             40.7     07-22    137  |  98  |  16  ----------------------------<  185<H>  4.3   |  26  |  0.94    Ca    9.7      22 Jul 2020 09:46    ESR: 23  CRP: 10    RADIOLOGY & ADDITIONAL STUDIES:  < from: Xray Wrist 3 Views, Left (07.22.20 @ 10:30) >  IMPRESSION:  No tracking soft tissue gas collections, radiopaque foreign bodies, or gross radiographic evidence for osteomyelitis.    No fractures or dislocations.    Preserved joint spaces and no joint margin erosions.    Carpal bones normally aligned.    Neutral ulnar variance.    No lytic or blastic lesions.    < end of copied text >

## 2020-07-22 NOTE — CONSULT NOTE ADULT - ASSESSMENT
68 y/o M with SCLC w/ brain mets currently on maintenance immunotherapy with atezolizumab presenting with acute left wrist pain and swelling with elevated ESR and CRP with largely unremarkable imaging.    #Left Wrist Pain  Patient with acute onset left wrist pain with elevated ESR/CRP, imaging unremarkable for fracture and no fever or leukocytosis.  - Possible causes include septic joint vs gout/pseudogout vs fracture vs medication adverse effect  - Patient evaluated by plastic surgery, unlikely septic joint, no indication for arthrocentesis at this time  - Possible adverse effect of atezolizumab is arthralgia, however more likely to be polyarticular in cases of medication adverse effect  - More likely to be pseudogout incited by heavy lifting day prior to admission  - Patient now symptomatically improved since admission  - recommend discharging patient with Naproxen 500mg BID with PPI  - follow up in rheumatology clinic in 1 week    Case discussed with Dr. Choi    PLEASE SEE ATTENDING ATTESTATION BELOW:

## 2020-07-22 NOTE — ED PROVIDER NOTE - PROGRESS NOTE DETAILS
Seen and evaluated by Dr SIMS from plastic surgery. He advises no arthrocentesis as suspicion for septic arthritis very low. He advises rheum follow up and no further hand surgery follow up for now.  Patient in some discomfort but pain much improved compared to prior.

## 2020-07-22 NOTE — CONSULT NOTE ADULT - ATTENDING COMMENTS
Patient discharged before being seen.  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  Naproxen 500mg bid x 2 weeks if no contraindication.  Patient will be contacted tomorrow for close follow up in rheumatology clinic,

## 2020-07-22 NOTE — ED PROVIDER NOTE - NOTES
Spoke to Dr. Ca.  Does not want arthrocentesis at this time as believes likely inflammatory condition.  Will come see patient.  Asked to have Rheumatology evaluate patient. 2nd page 11:23hrs.

## 2020-07-22 NOTE — ED ADULT TRIAGE NOTE - CHIEF COMPLAINT QUOTE
pt coming with left wrist pain/swelling/warm to touch/erythema to area pt stated woke at 3 AM with the pain, pt stated yest. he was lifting few boxes approx. 15-20lbs each,   Hx. lung Ca on chemo/radiation Tx

## 2020-07-22 NOTE — ED PROVIDER NOTE - OBJECTIVE STATEMENT
70 yo M with lung CA with brain mets on immunotherapy presents for left wrist pain. First noticed when woke up around 3am. Since then has been gradually worsening. No obvious cause that he can think of. No trauma. Never had this before. Sharp. Non radiating. Worse with movement. Slightly better with ice applied at triage. Hasn't taken any meds for this.   Denies other joint pains, fever. Otherwise feeling normal.  Denies arthritis, gout, history of join infection. 68 yo M with lung CA with brain mets on immunotherapy presents for left wrist pain. First noticed when woke up around 3am. Since then has been gradually worsening. No obvious cause that he can think of. No trauma. Never had this before. Sharp. Non radiating. Worse with movement. Slightly better with ice applied at triage. Hasn't taken any meds for this.   Denies other joint pains, fever. Otherwise feeling normal.  Denies arthritis, gout, history of join infection.  Attending - Agree with above.  I evaluated patient myself. 68 y/o M with PMH including lung CA with mets to brain s/p chemo, on Tecentriq.  c/o left wrist pain that started approximately 3am.  Pain with movement of wrist.  No known trauma to area.  Reports was carrying bags yesterday.  No fever.  No other pain or complaint.  Denies fever, cough, covid dx.  Never had this before.  No other joint pain. 68 yo M with lung CA with brain mets on immunotherapy presents for left wrist pain. First noticed when woke up around 3am. Since then has been gradually worsening. No obvious cause that he can think of. No trauma. Never had this before. Sharp. Non radiating. Worse with movement. Slightly better with ice applied at triage. Not much pain when holding still. Hasn't taken any meds for this.   Denies other joint pains, fever, numbness/tingling/weakness of extremity. Otherwise feeling normal.  Denies arthritis, gout, history of join infection.  Attending - Agree with above.  I evaluated patient myself. 70 y/o M with PMH including lung CA with mets to brain s/p chemo, on Tecentriq.  c/o left wrist pain that started approximately 3am.  Pain with movement of wrist.  No known trauma to area.  Reports was carrying bags yesterday.  No fever.  No other pain or complaint.  Denies fever, cough, covid dx.  Never had this before.  No other joint pain.

## 2020-07-22 NOTE — ED PROVIDER NOTE - PATIENT PORTAL LINK FT
You can access the FollowMyHealth Patient Portal offered by [facility] by registering at the following website: [email or web address]. By joining [facility]’s FollowMyHealth portal, you will also be able to view your health information using other applications (apps) compatible with our system.

## 2020-07-22 NOTE — ED PROVIDER NOTE - PHYSICAL EXAMINATION
General: Seated on edge of bed, Awake, Alert, Conversant, no distress  Head, Ears, Eyes, Nose, Throat: Pupils equal, round, reactive to light, normal sclera, moist oral mucosa  Pulmonary: Breath sounds bilaterally, no increased work of breathing, speaking full sentences  Cardiovascular: Normal and regular heart rate, normal S1/S2, no murmurs, rubs, or gallops  Abdominal: Soft and nontender, no rebound or guarding  Extremities: Left wrist without severe swelling compared to right; no tenderness of left upper extremity including left wrist; left wrist cool to touch and erythematous consistent with ice pack; not otherwise significantly erythematous; left hand with normal median/ulnar/radial nerve distribution and  strength; left wrist flexion/extension and pronation/supination very limited due to severe pain; appears comfortable without movement  Dermatologic: No rash  Neurologic: Alert and oriented x3, clear and fluent speech, moving all extremities with good strength General: Seated on edge of bed, Awake, Alert, Conversant, no distress  Head, Ears, Eyes, Nose, Throat: Pupils equal, round, reactive to light, normal sclera, moist oral mucosa  Pulmonary: Breath sounds bilaterally, no increased work of breathing, speaking full sentences  Cardiovascular: Normal and regular heart rate, normal S1/S2, no murmurs, rubs, or gallops  Abdominal: Soft and nontender, no rebound or guarding  Extremities: Left wrist without severe swelling compared to right; no tenderness of left upper extremity including left wrist; left wrist cool to touch and erythematous consistent with ice pack; not otherwise significantly erythematous; left hand with normal median/ulnar/radial nerve distribution and  strength; left wrist flexion/extension and pronation/supination very limited due to severe pain; appears comfortable without movement  Dermatologic: No rash  Neurologic: Alert and oriented x3, clear and fluent speech, moving all extremities with good strength  ATTENDING PHYSICAL EXAM  GEN - NAD; well appearing; A+O x3  HEAD - NC/AT; EYES/NOSE - PERRL, EOMI, mucous membranes moist, no discharge; THROAT: Oral cavity and pharynx normal. No inflammation, swelling, exudate, or lesions  NECK: Neck supple, non-tender  PULMONARY - CTA b/l, symmetric breath sounds  CARDIAC -s1s2, RRR, no M,R,G  ABDOMEN - +BS, ND, NT, soft, no guarding, no rebound, no masses   BACK - no CVA tenderness, No vertebral or paravertebral tenderness  EXTREMITIES - left arm and elbow normal.  Noted mild erythema of skin on dorsal wrist to distal aspect of forearm.  No wrist swelling appreciated.  +pain with any ranging of wrist.  2+ rad pulse.  Distal motor and sensory function intact.

## 2020-07-22 NOTE — CONSULT NOTE ADULT - SUBJECTIVE AND OBJECTIVE BOX
Plastic Surgery    SUBJECTIVE: 69M with pmh lung cancer w/ mets to brain s/p chemotherapy, currently on immunotherapy. Awoke at 3 am last night to sharp pain and swelling in left wrist. Has never had pain like this previously in any joint. Pain with movement and to touch. Less tender when not moving wrist, no radiation. Stated yesterday he was carrying heavy bags but otherwise no trauma. No other painful joints. Denies fevers.     VITALS  T(C): 36.9 (07-22-20 @ 09:02), Max: 36.9 (07-22-20 @ 09:02)  HR: 77 (07-22-20 @ 09:46) (74 - 77)  BP: 165/74 (07-22-20 @ 09:46) (165/74 - 175/82)  RR: 18 (07-22-20 @ 09:46) (18 - 20)  SpO2: 99% (07-22-20 @ 09:46) (98% - 99%)  CAPILLARY BLOOD GLUCOSE      POCT Blood Glucose.: 198 mg/dL (22 Jul 2020 09:10)      Is/Os      PHYSICAL EXAM:   General: NAD, Lying in bed   Neuro: Awake and alert  LUE: generalized edema around wrist when compared to right wrist, dorsal>volar. Tender to touch. Range of motion limited by tenderness. No erythema. Neurovascularly intact distal to wrist.      MEDICATIONS (STANDING):   MEDICATIONS (PRN):    LABS  CBC (07-22 @ 09:46)                              13.6                           10.06   )----------------(  251        81.9<H>% Neutrophils, 5.4<L>% Lymphocytes, ANC: 8.24<H>                              40.7      BMP (07-22 @ 09:46)             137     |  98      |  16    		Ca++ --      Ca 9.7                ---------------------------------( 185<H>		Mg --                 4.3     |  26      |  0.94  			Ph --                    IMAGING STUDIES

## 2020-07-22 NOTE — ED ADULT NURSE NOTE - OBJECTIVE STATEMENT
Pt awake and alert x 3 afebrile co pain to left wrist with mild swelling , iv placed blood collected pt received Tylenol for pain. awaiting xrays and dispo.

## 2020-07-22 NOTE — CONSULT NOTE ADULT - ASSESSMENT
A: 69M with pmh lung cancer w/ mets to brain s/p chemotherapy, currently on immunotherapy, CAD, T2DM consulted for acute wrist pain    P:  - Given history of pain, low suspicion for septic joint  - No indication for acute surgical intervention at this time  - Recommend rheumatology consult, given recent history of new immunotherapy agent  - NPO until seen by Dr. Ca  - To be seen this afternoon with Dr. Ca, plan subject to change A: 69M with pmh lung cancer w/ mets to brain s/p chemotherapy, currently on immunotherapy, CAD, T2DM consulted for acute wrist pain    P:  - Given history of pain, low suspicion for septic joint  - No indication for acute surgical intervention at this time  - Recommend rheumatology and ID consults, given recent history of new immunotherapy agent and immunosuppression  - NPO until seen by Dr. Ca  - To be seen early this afternoon with Dr. Ca, plan subject to change A: 69M with pmh lung cancer w/ mets to brain s/p chemotherapy, currently on immunotherapy, CAD, T2DM consulted for acute wrist pain    P:  - Given history of pain, low suspicion for septic joint  - No indication for acute surgical intervention at this time  - Recommend rheumatology and ID consults, given recent history of new immunotherapy agent and immunosuppression.

## 2020-07-22 NOTE — ED PROVIDER NOTE - CLINICAL SUMMARY MEDICAL DECISION MAKING FREE TEXT BOX
Right handed man on immunotherapy seen for acute non traumatic left wrist pain. No history of joint problems. Overall well appearing. Afebrile.   Left wrist with some mild swelling. Not severely tender but very painful with only slight range of motion. Neurovascularly intact. Suspected more likely inflammatory in the setting of medications but given the degree of pain with movement and immunosuppression, considered possible septic arthritis.  Discussed with hand surgery who evaluated and was confident there is no septic arthritis and advised strongly against arthrocentesis due to risk of potentially introducing an infection.   Treated with tylenol and NSAIDs which much improvement in pain. Still some discomfort and range of motion still somewhat limited to pain but exam now not consistent with a septic joint.   Discussed with rheumatology who had planned to round on the patient as a team but pt insisted on going home. Discussed risks and benefits of leaving with him and his adult daughter who is a health care worker here at [facility]. Do not feel that pt necessarily has to signs AMA but we thoroughly discussed return precautions including signs/symptoms of septic arthritis.  Rheum team will follow up to set up clinic appointment in 1 week. They recommend 2 weeks PPI and naproxen. Daughter will be able to do serial exams on wrist and will immediately bring back to ED for any worsening.   We discussed RICE therapy and pt was provided with a velcro splint for comfort.   Discharged home in stable condition. Right handed man on immunotherapy seen for acute non traumatic left wrist pain. No history of joint problems. Overall well appearing. Afebrile.   Left wrist with some mild swelling. Not severely tender but very painful with only slight range of motion. Neurovascularly intact. Suspected more likely inflammatory in the setting of medications but given the degree of pain with movement and immunosuppression, considered possible septic arthritis.  Wrist xray unremarkable. Inflammatory markers slightly elevated. No leukocytosis.  Discussed with hand surgery who evaluated and was confident there is no septic arthritis and advised strongly against arthrocentesis due to risk of potentially introducing an infection.   Treated with tylenol and NSAIDs which much improvement in pain. Still some discomfort and range of motion still somewhat limited to pain but exam now not consistent with a septic joint.   Discussed with rheumatology who had planned to round on the patient as a team but pt insisted on going home. Discussed risks and benefits of leaving with him and his adult daughter who is a health care worker here at [facility]. Do not feel that pt necessarily has to signs AMA but we thoroughly discussed return precautions including signs/symptoms of septic arthritis.  Rheum team will follow up to set up clinic appointment in 1 week. They recommend 2 weeks PPI and naproxen. Daughter will be able to do serial exams on wrist and will immediately bring back to ED for any worsening.   We discussed RICE therapy and pt was provided with a velcro splint for comfort.   Specific cause of pain unclear at this time but low suspicion for emergent pathology. Discharged home in stable condition.

## 2020-07-24 ENCOUNTER — OUTPATIENT (OUTPATIENT)
Dept: OUTPATIENT SERVICES | Facility: HOSPITAL | Age: 70
LOS: 1 days | Discharge: HOME | End: 2020-07-24

## 2020-07-24 DIAGNOSIS — D11.0 BENIGN NEOPLASM OF PAROTID GLAND: Chronic | ICD-10-CM

## 2020-07-24 DIAGNOSIS — N20.0 CALCULUS OF KIDNEY: Chronic | ICD-10-CM

## 2020-07-24 DIAGNOSIS — C34.90 MALIGNANT NEOPLASM OF UNSPECIFIED PART OF UNSPECIFIED BRONCHUS OR LUNG: ICD-10-CM

## 2020-07-24 DIAGNOSIS — Z95.5 PRESENCE OF CORONARY ANGIOPLASTY IMPLANT AND GRAFT: Chronic | ICD-10-CM

## 2020-07-24 DIAGNOSIS — Z90.89 ACQUIRED ABSENCE OF OTHER ORGANS: Chronic | ICD-10-CM

## 2020-07-29 ENCOUNTER — APPOINTMENT (OUTPATIENT)
Dept: INFUSION THERAPY | Facility: HOSPITAL | Age: 70
End: 2020-07-29

## 2020-07-29 ENCOUNTER — RESULT REVIEW (OUTPATIENT)
Age: 70
End: 2020-07-29

## 2020-07-29 ENCOUNTER — LABORATORY RESULT (OUTPATIENT)
Age: 70
End: 2020-07-29

## 2020-07-29 ENCOUNTER — APPOINTMENT (OUTPATIENT)
Dept: HEMATOLOGY ONCOLOGY | Facility: CLINIC | Age: 70
End: 2020-07-29
Payer: MEDICARE

## 2020-07-29 VITALS
SYSTOLIC BLOOD PRESSURE: 148 MMHG | TEMPERATURE: 98.2 F | OXYGEN SATURATION: 93 % | WEIGHT: 178.33 LBS | BODY MASS INDEX: 30.61 KG/M2 | HEART RATE: 74 BPM | DIASTOLIC BLOOD PRESSURE: 76 MMHG

## 2020-07-29 LAB
BASOPHILS # BLD AUTO: 0.06 K/UL — SIGNIFICANT CHANGE UP (ref 0–0.2)
BASOPHILS NFR BLD AUTO: 0.9 % — SIGNIFICANT CHANGE UP (ref 0–2)
EOSINOPHIL # BLD AUTO: 0.47 K/UL — SIGNIFICANT CHANGE UP (ref 0–0.5)
EOSINOPHIL NFR BLD AUTO: 6.7 % — HIGH (ref 0–6)
HCT VFR BLD CALC: 39.7 % — SIGNIFICANT CHANGE UP (ref 39–50)
HGB BLD-MCNC: 12.8 G/DL — LOW (ref 13–17)
IMM GRANULOCYTES NFR BLD AUTO: 0.6 % — SIGNIFICANT CHANGE UP (ref 0–1.5)
LYMPHOCYTES # BLD AUTO: 0.55 K/UL — LOW (ref 1–3.3)
LYMPHOCYTES # BLD AUTO: 7.8 % — LOW (ref 13–44)
MCHC RBC-ENTMCNC: 32 PG — SIGNIFICANT CHANGE UP (ref 27–34)
MCHC RBC-ENTMCNC: 32.2 GM/DL — SIGNIFICANT CHANGE UP (ref 32–36)
MCV RBC AUTO: 99.3 FL — SIGNIFICANT CHANGE UP (ref 80–100)
MONOCYTES # BLD AUTO: 0.68 K/UL — SIGNIFICANT CHANGE UP (ref 0–0.9)
MONOCYTES NFR BLD AUTO: 9.7 % — SIGNIFICANT CHANGE UP (ref 2–14)
NEUTROPHILS # BLD AUTO: 5.21 K/UL — SIGNIFICANT CHANGE UP (ref 1.8–7.4)
NEUTROPHILS NFR BLD AUTO: 74.3 % — SIGNIFICANT CHANGE UP (ref 43–77)
NRBC # BLD: 0 /100 WBCS — SIGNIFICANT CHANGE UP (ref 0–0)
PLATELET # BLD AUTO: 259 K/UL — SIGNIFICANT CHANGE UP (ref 150–400)
RBC # BLD: 4 M/UL — LOW (ref 4.2–5.8)
RBC # FLD: 13.6 % — SIGNIFICANT CHANGE UP (ref 10.3–14.5)
WBC # BLD: 7.01 K/UL — SIGNIFICANT CHANGE UP (ref 3.8–10.5)
WBC # FLD AUTO: 7.01 K/UL — SIGNIFICANT CHANGE UP (ref 3.8–10.5)

## 2020-07-29 PROCEDURE — 99213 OFFICE O/P EST LOW 20 MIN: CPT

## 2020-07-30 ENCOUNTER — APPOINTMENT (OUTPATIENT)
Dept: RHEUMATOLOGY | Facility: CLINIC | Age: 70
End: 2020-07-30
Payer: MEDICARE

## 2020-07-30 VITALS
SYSTOLIC BLOOD PRESSURE: 147 MMHG | BODY MASS INDEX: 25.61 KG/M2 | HEART RATE: 77 BPM | OXYGEN SATURATION: 95 % | HEIGHT: 64 IN | TEMPERATURE: 97.6 F | DIASTOLIC BLOOD PRESSURE: 73 MMHG | WEIGHT: 150 LBS

## 2020-07-30 DIAGNOSIS — Z51.12 ENCOUNTER FOR ANTINEOPLASTIC IMMUNOTHERAPY: ICD-10-CM

## 2020-07-30 DIAGNOSIS — R11.2 NAUSEA WITH VOMITING, UNSPECIFIED: ICD-10-CM

## 2020-07-30 DIAGNOSIS — Z51.11 ENCOUNTER FOR ANTINEOPLASTIC CHEMOTHERAPY: ICD-10-CM

## 2020-07-30 PROCEDURE — 99213 OFFICE O/P EST LOW 20 MIN: CPT

## 2020-08-02 PROBLEM — Z51.12 MAINTENANCE ANTINEOPLASTIC IMMUNOTHERAPY: Status: ACTIVE | Noted: 2020-08-02

## 2020-08-02 NOTE — ASSESSMENT
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 PD-L1 generally lead to symmetric polyarthritis, less so a large joint oligoarthritis.\par \par Plan:\par -since patient is improving, would continue with 1 more week of naproxen 500mg bid and stop\par -RTC as needed if patient develops recurrence of arthritis. Pt has my contact information

## 2020-08-02 NOTE — REASON FOR VISIT
[Initial Evaluation] : an initial evaluation [Spouse] : spouse [FreeTextEntry1] : [id number] [TWNoteComboBox1] : [place]

## 2020-08-02 NOTE — HISTORY OF PRESENT ILLNESS
[FreeTextEntry1] : \par 69M with PMH of Stage IV SCC of lung (with lymph node and bone involvement, since 5/2019 on immunotherapy atezolizumab in addition to carbo/etoposide, s/p RT 8/2019) with 2 day h/o significant left wrist and hand pain and swelling with limited range of motion. Patient has been doing heavy lifting day prior.Patient seen in [facility] ED on 7/22 and hand surgery was consulted for concern of septic arthritis. They did not think that the wrist was infected and arthrocentesis not indicated. Labs ESR 23 CRP 1.0, uric acid 6.6. Patient received 1 x Toradol which improved wrist swelling and patient sent home from ED with 2 weeks of naproxen 500mg bid with PPI.\par Today patient is feeling much better after 1 week of naproxen 500mg bid. Swelling and pain have improved and patient is able to flex/extend wrist.\par No h/o prior joint pain or swelling. Not on diuretic, no h/o kidney stones. No FH of gout or other athritis.\par \par

## 2020-08-02 NOTE — PHYSICAL EXAM
[General Appearance - In No Acute Distress] : in no acute distress [General Appearance - Alert] : alert [Sclera] : the sclera and conjunctiva were normal [No Spinal Tenderness] : no spinal tenderness [General Appearance - Well Nourished] : well nourished [Sensation] : the sensory exam was normal to light touch and pinprick [] : no rash [Oriented To Time, Place, And Person] : oriented to person, place, and time [Motor Exam] : the motor exam was normal [FreeTextEntry1] : very slight swelling in left wrist, much improved, able to flex and extend wrist. Slight TTP to palpation.

## 2020-08-12 ENCOUNTER — APPOINTMENT (OUTPATIENT)
Dept: INFUSION THERAPY | Facility: HOSPITAL | Age: 70
End: 2020-08-12

## 2020-08-12 ENCOUNTER — APPOINTMENT (OUTPATIENT)
Dept: HEMATOLOGY ONCOLOGY | Facility: CLINIC | Age: 70
End: 2020-08-12

## 2020-08-17 ENCOUNTER — LABORATORY RESULT (OUTPATIENT)
Age: 70
End: 2020-08-17

## 2020-08-17 ENCOUNTER — APPOINTMENT (OUTPATIENT)
Dept: INFUSION THERAPY | Facility: HOSPITAL | Age: 70
End: 2020-08-17

## 2020-08-19 ENCOUNTER — RESULT REVIEW (OUTPATIENT)
Age: 70
End: 2020-08-19

## 2020-08-19 ENCOUNTER — APPOINTMENT (OUTPATIENT)
Dept: INFUSION THERAPY | Facility: HOSPITAL | Age: 70
End: 2020-08-19

## 2020-08-19 ENCOUNTER — APPOINTMENT (OUTPATIENT)
Dept: HEMATOLOGY ONCOLOGY | Facility: CLINIC | Age: 70
End: 2020-08-19
Payer: MEDICARE

## 2020-08-19 ENCOUNTER — LABORATORY RESULT (OUTPATIENT)
Age: 70
End: 2020-08-19

## 2020-08-19 VITALS
SYSTOLIC BLOOD PRESSURE: 133 MMHG | RESPIRATION RATE: 18 BRPM | HEIGHT: 63.98 IN | OXYGEN SATURATION: 98 % | WEIGHT: 176.37 LBS | HEART RATE: 68 BPM | DIASTOLIC BLOOD PRESSURE: 81 MMHG | BODY MASS INDEX: 30.11 KG/M2 | TEMPERATURE: 97.6 F

## 2020-08-19 DIAGNOSIS — C79.31 SECONDARY MALIGNANT NEOPLASM OF BRAIN: ICD-10-CM

## 2020-08-19 LAB
BASOPHILS # BLD AUTO: 0.06 K/UL — SIGNIFICANT CHANGE UP (ref 0–0.2)
BASOPHILS NFR BLD AUTO: 0.8 % — SIGNIFICANT CHANGE UP (ref 0–2)
EOSINOPHIL # BLD AUTO: 0.47 K/UL — SIGNIFICANT CHANGE UP (ref 0–0.5)
EOSINOPHIL NFR BLD AUTO: 6.6 % — HIGH (ref 0–6)
HCT VFR BLD CALC: 41.1 % — SIGNIFICANT CHANGE UP (ref 39–50)
HGB BLD-MCNC: 13.5 G/DL — SIGNIFICANT CHANGE UP (ref 13–17)
IMM GRANULOCYTES NFR BLD AUTO: 1.1 % — SIGNIFICANT CHANGE UP (ref 0–1.5)
LYMPHOCYTES # BLD AUTO: 0.66 K/UL — LOW (ref 1–3.3)
LYMPHOCYTES # BLD AUTO: 9.2 % — LOW (ref 13–44)
MCHC RBC-ENTMCNC: 31.4 PG — SIGNIFICANT CHANGE UP (ref 27–34)
MCHC RBC-ENTMCNC: 32.8 GM/DL — SIGNIFICANT CHANGE UP (ref 32–36)
MCV RBC AUTO: 95.6 FL — SIGNIFICANT CHANGE UP (ref 80–100)
MONOCYTES # BLD AUTO: 0.69 K/UL — SIGNIFICANT CHANGE UP (ref 0–0.9)
MONOCYTES NFR BLD AUTO: 9.7 % — SIGNIFICANT CHANGE UP (ref 2–14)
NEUTROPHILS # BLD AUTO: 5.19 K/UL — SIGNIFICANT CHANGE UP (ref 1.8–7.4)
NEUTROPHILS NFR BLD AUTO: 72.6 % — SIGNIFICANT CHANGE UP (ref 43–77)
NRBC # BLD: 0 /100 WBCS — SIGNIFICANT CHANGE UP (ref 0–0)
PLATELET # BLD AUTO: 315 K/UL — SIGNIFICANT CHANGE UP (ref 150–400)
RBC # BLD: 4.3 M/UL — SIGNIFICANT CHANGE UP (ref 4.2–5.8)
RBC # FLD: 13.5 % — SIGNIFICANT CHANGE UP (ref 10.3–14.5)
WBC # BLD: 7.15 K/UL — SIGNIFICANT CHANGE UP (ref 3.8–10.5)
WBC # FLD AUTO: 7.15 K/UL — SIGNIFICANT CHANGE UP (ref 3.8–10.5)

## 2020-08-19 PROCEDURE — 99213 OFFICE O/P EST LOW 20 MIN: CPT

## 2020-08-25 ENCOUNTER — RX RENEWAL (OUTPATIENT)
Age: 70
End: 2020-08-25

## 2020-08-30 NOTE — ASU PATIENT PROFILE, ADULT - ABILITY TO HEAR (WITH HEARING AID OR HEARING APPLIANCE IF NORMALLY USED):
Pt Name: Vidya Gomez  MRN: [id number]  [place] 1992  Age/Sex: 34 y o  male  Date of evaluation: 2020  PCP: Hayley Curiel MD    [address] [place] [address]    Chief Complaint   Patient presents with    Back Pain     Patient presents to ED with co lower back that started yesterday  Denies injuir         HPI    34 y o  male presenting with lower back pain  Patient states that he has a history of low back pain with a prior to your episode several years ago, never sought care  He states he was in his normal state of health yesterday, was bending over and cleaning things, woke this morning with low back pain  He complains of being stiff and sore, with pain in the left low back, severe, sharp, radiating up and down the back as well as towards the left leg, worse with movement better at rest   He denies fever, nausea, vomiting, numbness, weakness, changes in bowel or bladder function, incontinence, perineal numbness, trauma, other symptoms  HPI      Past Medical and Surgical History    Past Medical History:   Diagnosis Date    Fatty liver     Prediabetes        Past Surgical History:   Procedure Laterality Date    TONSILLECTOMY         Family History   Problem Relation Age of Onset    No Known Problems Mother     Hypertension Father        Social History     Tobacco Use    Smoking status: Former Smoker     Last attempt to quit: 2016     Years since quittin 6    Smokeless tobacco: Former User   Substance Use Topics    Alcohol use: No    Drug use: No           Allergies    No Known Allergies    Home Medications    Prior to Admission medications    Medication Sig Start Date End Date Taking?  Authorizing Provider   albuterol (PROAIR HFA) 90 mcg/act inhaler Inhale 2 puffs every 6 (six) hours as needed for wheezing 10/14/19  Yes Gerry Bob PA-C   valACYclovir (VALTREX) 500 mg tablet Take 500 mg by mouth 2 (two) times a day   Yes Historical Provider, MD   dicyclomine (BENTYL) 20 mg tablet Take 1 tablet (20 mg total) by mouth 2 (two) times a day  Patient not taking: Reported on 10/14/2019 6/29/19   Eboni Bradshaw MD   ondansetron (ZOFRAN-ODT) 4 mg disintegrating tablet Take 1 tablet (4 mg total) by mouth every 8 (eight) hours as needed for nausea  Patient not taking: Reported on 10/14/2019 6/29/19   Eboni Bradshaw MD   pantoprazole (PROTONIX) 40 mg tablet Take 1 tablet (40 mg total) by mouth daily  Patient not taking: Reported on 10/14/2019 7/24/19   Ángel Johnson PA-C           Review of Systems    Review of Systems   Constitutional: Negative for appetite change, chills and diaphoresis  HENT: Negative for drooling, facial swelling, trouble swallowing and voice change  Respiratory: Negative for apnea, shortness of breath and wheezing  Cardiovascular: Negative for chest pain and leg swelling  Gastrointestinal: Negative for abdominal distention, abdominal pain, diarrhea, nausea and vomiting  Genitourinary: Negative for dysuria and urgency  Musculoskeletal: Positive for back pain  Negative for arthralgias, gait problem and neck pain  Skin: Negative for color change, rash and wound  Neurological: Negative for seizures, speech difficulty, weakness and headaches  Psychiatric/Behavioral: Negative for agitation, behavioral problems and dysphoric mood  The patient is not nervous/anxious  All other systems reviewed and negative  Physical Exam      ED Triage Vitals   Temperature Pulse Respirations Blood Pressure SpO2   08/30/20 1106 08/30/20 1059 08/30/20 1059 08/30/20 1059 08/30/20 1059   98 6 °F (37 °C) 87 15 (!) 185/107 100 %      Temp Source Heart Rate Source Patient Position - Orthostatic VS BP Location FiO2 (%)   08/30/20 1059 08/30/20 1059 08/30/20 1059 08/30/20 1059 --   Oral Monitor Sitting Right arm       Pain Score       08/30/20 1059       7               Physical Exam  Vitals signs and nursing note reviewed  Constitutional:       Appearance: He is well-developed  HENT:      Head: Normocephalic and atraumatic  Eyes:      Conjunctiva/sclera: Conjunctivae normal       Pupils: Pupils are equal, round, and reactive to light  Neck:      Musculoskeletal: Normal range of motion and neck supple  Trachea: No tracheal deviation  Cardiovascular:      Rate and Rhythm: Normal rate and regular rhythm  Heart sounds: Normal heart sounds  No murmur  Pulmonary:      Effort: Pulmonary effort is normal  No respiratory distress  Breath sounds: Normal breath sounds  No stridor  No wheezing or rales  Abdominal:      General: There is no distension  Palpations: Abdomen is soft  Tenderness: There is no abdominal tenderness  There is no guarding or rebound  Musculoskeletal: Normal range of motion  General: Tenderness present  No deformity  Comments: Tender to palpation left lower back, no midline tenderness, with maximal tenderness approximately 5 cm lateral to the L4-L5 level  Skin:     General: Skin is warm and dry  Findings: No rash  Neurological:      Mental Status: He is alert and oriented to person, place, and time  Cranial Nerves: No cranial nerve deficit  Motor: No weakness  Coordination: Coordination normal       Gait: Gait normal       Deep Tendon Reflexes: Reflexes normal       Comments: 5/5 strength in all extremities, 2+ brisk patellar reflexes bilaterally, normal sensation  Patient previous reported pain radiating down towards the foot on the left but has negative straight leg raise test in ER  Psychiatric:         Behavior: Behavior normal          Thought Content:  Thought content normal          Judgment: Judgment normal               Diagnostic Results      Labs:    Results Reviewed     None          All labs reviewed and utilized in the medical decision making process    Radiology:    No orders to display       All radiology studies independently viewed by me and interpreted by the radiologist     Procedure    Procedures    After discussion of risks and benefits, to include infection, failure to improve pain,  damage to deeper structures, patient consented to a trigger point injection  One site of maximum pain located by palpation  5 cm lateral to the L4-L5 level, skin prepped with alcohol, and 10 mL 2% lidocaine without epinephrine infiltrated into that site  No air or blood seen on continuous aspiration while inserting the needle  Procedure was tolerated well without complications  Patient reported substantial relief his pain almost immediately, posture improved  ED Course of Care and Re-Assessments      Symptoms improved substantially with Toradol and trigger point injection  Medications   ketorolac (TORADOL) injection 30 mg (30 mg Intramuscular Given 8/30/20 1138)   lidocaine (PF) (XYLOCAINE-MPF) 2 % injection 10 mL (10 mL Infiltration Given 8/30/20 1139)           FINAL IMPRESSION    Final diagnoses:   Acute left-sided low back pain, unspecified whether sciatica present   Elevated blood pressure reading         DISPOSITION/PLAN    Acute left-sided low back pain as above  Vital signs and examination overall reassuring, nonfocal neurologic exam   Very low suspicion for unstable fracture or dislocation, critical cord or nerve root compression, cauda equina syndrome, spinal epidural hematoma or abscess, other acute threat to life limb or nerve  Treated symptomatically and referred [facility], counseled regarding conservative measures and need for stretching and mobility, discharged strict return precautions    Time reflects when diagnosis was documented in both MDM as applicable and the Disposition within this note     Time User Action Codes Description Comment    8/30/2020 11:50 AM Judi SMITH Add [M54 5] Acute left-sided low back pain, unspecified whether sciatica present     8/30/2020 11:50 AM Darral Salts Add [R03 0] Elevated blood pressure reading       ED Disposition     ED Disposition Condition Date/Time Comment    Discharge Stable Sun Aug 30, 2020 11:51 AM Mar Vale discharge to home/self care  Follow-up Information     Follow up With Specialties Details Why [address] [place], MD Family Medicine Call in 1 day To discuss this visit and recheck your blood pressure [address] [facility]  [address]  [facility] [address]  [facility] Emergency Department Emergency Medicine Go to  If symptoms worsen [address] ED, [address], [address]            PATIENT REFERRED TO:    Luh Min MD  [address]  [address]  [facility] [address] [phone]    Call in 1 day  To discuss this visit and recheck your blood pressure    [facility]  [address] [facility] [address] [phone]  Go to   If symptoms worsen      DISCHARGE MEDICATIONS:    Patient's Medications   Discharge Prescriptions    MELOXICAM (MOBIC) 7 5 MG TABLET    Take 1 tablet (7 5 mg total) by mouth daily       Start Date: 8/30/2020 End Date: --       Order Dose: 7 5 mg       Quantity: 15 tablet    Refills: 0                MD Noelle Saenz MD  08/30/20 0895 Adequate: hears normal conversation without difficulty

## 2020-08-31 ENCOUNTER — RESULT REVIEW (OUTPATIENT)
Age: 70
End: 2020-08-31

## 2020-08-31 ENCOUNTER — APPOINTMENT (OUTPATIENT)
Dept: CT IMAGING | Facility: CLINIC | Age: 70
End: 2020-08-31
Payer: MEDICARE

## 2020-08-31 ENCOUNTER — OUTPATIENT (OUTPATIENT)
Dept: OUTPATIENT SERVICES | Facility: HOSPITAL | Age: 70
LOS: 1 days | End: 2020-08-31
Payer: COMMERCIAL

## 2020-08-31 DIAGNOSIS — N20.0 CALCULUS OF KIDNEY: Chronic | ICD-10-CM

## 2020-08-31 DIAGNOSIS — Z95.5 PRESENCE OF CORONARY ANGIOPLASTY IMPLANT AND GRAFT: Chronic | ICD-10-CM

## 2020-08-31 DIAGNOSIS — Z90.89 ACQUIRED ABSENCE OF OTHER ORGANS: Chronic | ICD-10-CM

## 2020-08-31 DIAGNOSIS — Z00.8 ENCOUNTER FOR OTHER GENERAL EXAMINATION: ICD-10-CM

## 2020-08-31 DIAGNOSIS — D11.0 BENIGN NEOPLASM OF PAROTID GLAND: Chronic | ICD-10-CM

## 2020-08-31 PROCEDURE — 71250 CT THORAX DX C-: CPT | Mod: 26

## 2020-08-31 PROCEDURE — 71250 CT THORAX DX C-: CPT

## 2020-09-01 ENCOUNTER — OUTPATIENT (OUTPATIENT)
Dept: OUTPATIENT SERVICES | Facility: HOSPITAL | Age: 70
LOS: 1 days | Discharge: HOME | End: 2020-09-01

## 2020-09-01 DIAGNOSIS — Z95.5 PRESENCE OF CORONARY ANGIOPLASTY IMPLANT AND GRAFT: Chronic | ICD-10-CM

## 2020-09-01 DIAGNOSIS — D11.0 BENIGN NEOPLASM OF PAROTID GLAND: Chronic | ICD-10-CM

## 2020-09-01 DIAGNOSIS — Z90.89 ACQUIRED ABSENCE OF OTHER ORGANS: Chronic | ICD-10-CM

## 2020-09-01 DIAGNOSIS — N20.0 CALCULUS OF KIDNEY: Chronic | ICD-10-CM

## 2020-09-01 DIAGNOSIS — C34.90 MALIGNANT NEOPLASM OF UNSPECIFIED PART OF UNSPECIFIED BRONCHUS OR LUNG: ICD-10-CM

## 2020-09-02 ENCOUNTER — APPOINTMENT (OUTPATIENT)
Dept: HEMATOLOGY ONCOLOGY | Facility: CLINIC | Age: 70
End: 2020-09-02

## 2020-09-02 ENCOUNTER — APPOINTMENT (OUTPATIENT)
Dept: INFUSION THERAPY | Facility: HOSPITAL | Age: 70
End: 2020-09-02

## 2020-09-09 ENCOUNTER — APPOINTMENT (OUTPATIENT)
Dept: INFUSION THERAPY | Facility: HOSPITAL | Age: 70
End: 2020-09-09

## 2020-09-09 ENCOUNTER — APPOINTMENT (OUTPATIENT)
Dept: HEMATOLOGY ONCOLOGY | Facility: CLINIC | Age: 70
End: 2020-09-09
Payer: MEDICARE

## 2020-09-09 ENCOUNTER — LABORATORY RESULT (OUTPATIENT)
Age: 70
End: 2020-09-09

## 2020-09-09 VITALS
TEMPERATURE: 97.8 F | BODY MASS INDEX: 29.54 KG/M2 | DIASTOLIC BLOOD PRESSURE: 80 MMHG | HEART RATE: 72 BPM | SYSTOLIC BLOOD PRESSURE: 130 MMHG | RESPIRATION RATE: 18 BRPM | WEIGHT: 171.96 LBS | OXYGEN SATURATION: 96 %

## 2020-09-09 DIAGNOSIS — Z51.11 ENCOUNTER FOR ANTINEOPLASTIC CHEMOTHERAPY: ICD-10-CM

## 2020-09-09 PROCEDURE — 99215 OFFICE O/P EST HI 40 MIN: CPT

## 2020-09-09 RX ORDER — ZOLPIDEM TARTRATE 5 MG/1
5 TABLET ORAL
Qty: 30 | Refills: 5 | Status: COMPLETED | COMMUNITY
Start: 2019-11-06 | End: 2020-05-15

## 2020-09-10 NOTE — ASU PREOP CHECKLIST - ASSESSMENT, HISTORY & PHYSICAL COMPLETED AND ON MEDICAL RECORD
-- DO NOT REPLY / DO NOT REPLY ALL --  -- Message is from the [facility]--    COVID-19 Universal Screening: N/A - Not about scheduling    General Patient Message      Reason for Call: Patient states [employer] faxed over information to office regarding his medictaions. Please follow up with patient.        Alternative phone number: none    Turnaround time given to caller:   \"This message will be sent to [state Provider's name]. The clinical team will fulfill your request as soon as they review your message.\"    
done

## 2020-09-11 ENCOUNTER — RESULT REVIEW (OUTPATIENT)
Age: 70
End: 2020-09-11

## 2020-09-11 ENCOUNTER — APPOINTMENT (OUTPATIENT)
Dept: MRI IMAGING | Facility: CLINIC | Age: 70
End: 2020-09-11
Payer: MEDICARE

## 2020-09-11 ENCOUNTER — OUTPATIENT (OUTPATIENT)
Dept: OUTPATIENT SERVICES | Facility: HOSPITAL | Age: 70
LOS: 1 days | End: 2020-09-11

## 2020-09-11 DIAGNOSIS — Z90.89 ACQUIRED ABSENCE OF OTHER ORGANS: Chronic | ICD-10-CM

## 2020-09-11 DIAGNOSIS — D11.0 BENIGN NEOPLASM OF PAROTID GLAND: Chronic | ICD-10-CM

## 2020-09-11 DIAGNOSIS — N20.0 CALCULUS OF KIDNEY: Chronic | ICD-10-CM

## 2020-09-11 DIAGNOSIS — Z95.5 PRESENCE OF CORONARY ANGIOPLASTY IMPLANT AND GRAFT: Chronic | ICD-10-CM

## 2020-09-11 DIAGNOSIS — C79.31 SECONDARY MALIGNANT NEOPLASM OF BRAIN: ICD-10-CM

## 2020-09-11 PROCEDURE — 70553 MRI BRAIN STEM W/O & W/DYE: CPT | Mod: 26

## 2020-09-16 ENCOUNTER — APPOINTMENT (OUTPATIENT)
Dept: NEUROSURGERY | Facility: CLINIC | Age: 70
End: 2020-09-16
Payer: MEDICARE

## 2020-09-16 PROCEDURE — 99024 POSTOP FOLLOW-UP VISIT: CPT

## 2020-09-16 NOTE — ASSESSMENT
[FreeTextEntry1] : Good response to GK SRS on multiple brain mets originated from lung\par  \par Plan: Follow up MRI with/without contrast enhancement in 3 months. \par

## 2020-09-16 NOTE — REASON FOR VISIT
[FreeTextEntry1] : 70 year-old gentleman, \par came for post GK-SRS visit. \par \par Had lung mass FNA biopsy on 04/18/2019 (Diagnosed "Small cell lung carcinoma", T1cN3) \par s/p Chemotherapy (Carbo/Etoposide/Atezolizumab, May`July, 2019)\par s/p Consolidative thoracic radiotherapy (Aug 2019)  \par \par Found small brain mets on left frontal & parietal area \par s/p Gamma Knife Radiosurgery (20Gy/1Fx for each mass, July 2020) \par \par F/u MRI looks great (tumor shrinked/disappeared)

## 2020-09-16 NOTE — PHYSICAL EXAM
[General Appearance - Alert] : alert [General Appearance - In No Acute Distress] : in no acute distress [General Appearance - Well Nourished] : well nourished [General Appearance - Well-Appearing] : healthy appearing [Person] : oriented to person [Place] : oriented to place [Time] : oriented to time [I: Normal Smell] : smell intact bilaterally [Cranial Nerves Optic (II)] : visual acuity intact bilaterally,  pupils equal round and reactive to light [Cranial Nerves Oculomotor (III)] : extraocular motion intact [Cranial Nerves Trigeminal (V)] : facial sensation intact symmetrically [Cranial Nerves Facial (VII)] : face symmetrical [Cranial Nerves Vestibulocochlear (VIII)] : hearing was intact bilaterally [Cranial Nerves Glossopharyngeal (IX)] : tongue and palate midline [Cranial Nerves Accessory (XI - Cranial And Spinal)] : head turning and shoulder shrug symmetric [Cranial Nerves Hypoglossal (XII)] : there was no tongue deviation with protrusion [Motor Tone] : muscle tone was normal in all four extremities [Motor Strength] : muscle strength was normal in all four extremities [Involuntary Movements] : no involuntary movements were seen [Balance] : balance was intact [Abnormal Walk] : normal gait [Intact] : all reflexes within normal limits bilaterally [Neck Appearance] : the appearance of the neck was normal [] : the neck was supple

## 2020-09-17 ENCOUNTER — APPOINTMENT (OUTPATIENT)
Dept: RADIATION ONCOLOGY | Facility: CLINIC | Age: 70
End: 2020-09-17
Payer: MEDICARE

## 2020-09-17 VITALS
SYSTOLIC BLOOD PRESSURE: 127 MMHG | HEIGHT: 63.98 IN | TEMPERATURE: 98.7 F | RESPIRATION RATE: 18 BRPM | BODY MASS INDEX: 29.55 KG/M2 | OXYGEN SATURATION: 92 % | WEIGHT: 173.06 LBS | DIASTOLIC BLOOD PRESSURE: 57 MMHG | HEART RATE: 84 BPM

## 2020-09-17 PROCEDURE — 99024 POSTOP FOLLOW-UP VISIT: CPT

## 2020-09-23 ENCOUNTER — RX RENEWAL (OUTPATIENT)
Age: 70
End: 2020-09-23

## 2020-09-23 ENCOUNTER — APPOINTMENT (OUTPATIENT)
Dept: DERMATOLOGY | Facility: CLINIC | Age: 70
End: 2020-09-23
Payer: MEDICARE

## 2020-09-23 PROCEDURE — 99203 OFFICE O/P NEW LOW 30 MIN: CPT

## 2020-09-23 NOTE — HISTORY OF PRESENT ILLNESS
[FreeTextEntry1] : Mr. Ness presents for follow up. He underwent gamma knife treatment for a total of 2000 cgy of radiation to a left frontal and left parietal brain lesion on 7/1/2020. \par \par ONCOLOGY HISTORY\par He is a 69 year old male with a history of small cell lung cancer.  This was initially diagnosed in 4/2019 through an ebus of bilateral level 4 lymph nodes in 4/2019 after presenting with 6 months of worsening cough, fatigue, and insomnia. A CT chest showed a lung mass at that time. He was initially treated with carbo/etoposide/atezoluzimab starting in 5/2019 with 4 cycles completed in 7/2019, followed by atezolizumab maintenance . Consolidative RT completed in 8/2019. He elected against prophylactic whole brain radiation. \par \par Due to frequent headaches, forgetfulness, and reflexes bring worse, a brain MRI was ordered on 6/10/2020. \par \par This brain MRI revealed two brain lesions consistent with metastases. \par 1: left posterior temporal/occipital cortex,  approximately 1.4 x 1.2 cm. \par 2:  high medial left frontal cortex, approximately 0.7 x 0.6 cm Small amount of surrounding edema is identified. \par \par At the time of initial consult he endorsed occassional headaches, manageable.  Denied focal weakness, nausea, vomiting, or other complaints.  Maintains an excellent KPS.  His daughter, a physician, is serving as the  during this conversation.\par \par He was treated with GK SRS to the two lesions on 7/1/2020\par \par 9/17/2020- Mr. Ness presents today for follow up. He is seen with the assistance of [facility]  [id number]. MRI brain done 9/11/2020 showed maarked improvement and treatment response since prior exam. Previously visualized enhancing lesion in the left posterior temporal lobe is markedly decreased in size since prior exam, measuring approximately 0.6 x 0.6 cm, and previously measured 1.4 x 1.2 cm. There is no vasogenic edema surrounding this lesion. Previously visualized enhancing lesion in the high medial left frontal cortex, is markedly decreased in size since prior exam, nearly nonexistent measuring 0.1 cm, and previously measuring 0.7 x 0.6 cm. Minimal surrounding vasogenic edema remains. No new enhancing lesions are identified. \par \par He continues following with Dr. Ramirez. continues on atezoluzumab maintenance. CT chest 8/31/2020 showed Emphysema is present. Left upper lobe ill-defined nodular/linear opacity is similar compared to the prior study. Other bilateral patchy lung opacities are new from the prior study. These are indeterminate and may be infectious/inflammatory. 1 month follow-up CT needed for complete evaluation. \par \par Today he feels very well. Notes some headaches over the last week, not lasting long. Denies nausea, vomiting, focal weakness, Overall feeling well. Hearing may be a little worse recently.

## 2020-09-23 NOTE — PHYSICAL EXAM
[General Appearance - Well Developed] : well developed [General Appearance - Well Nourished] : well nourished [General Appearance - In No Acute Distress] : in no acute distress [Oriented To Time, Place, And Person] : oriented to person, place, and time [Not Anxious] : not anxious [Outer Ear] : the ears and nose were normal in appearance [Normal] : no focal deficits [de-identified] : CN II-XII grossly intact.  Excellent comprehension.

## 2020-09-23 NOTE — REVIEW OF SYSTEMS
[Negative] : Allergic/Immunologic [Loss of Hearing] : loss of hearing [Shortness Of Breath] : shortness of breath [Dysphagia] : no dysphagia [Confused] : no confusion [Dizziness] : no dizziness [FreeTextEntry9] : pain to back X 15 years [de-identified] : skin rash to hands

## 2020-09-30 ENCOUNTER — APPOINTMENT (OUTPATIENT)
Dept: INFUSION THERAPY | Facility: HOSPITAL | Age: 70
End: 2020-09-30

## 2020-09-30 ENCOUNTER — RESULT REVIEW (OUTPATIENT)
Age: 70
End: 2020-09-30

## 2020-09-30 ENCOUNTER — APPOINTMENT (OUTPATIENT)
Dept: HEMATOLOGY ONCOLOGY | Facility: CLINIC | Age: 70
End: 2020-09-30
Payer: MEDICARE

## 2020-09-30 ENCOUNTER — LABORATORY RESULT (OUTPATIENT)
Age: 70
End: 2020-09-30

## 2020-09-30 VITALS
RESPIRATION RATE: 18 BRPM | HEART RATE: 83 BPM | SYSTOLIC BLOOD PRESSURE: 113 MMHG | WEIGHT: 174.17 LBS | OXYGEN SATURATION: 92 % | TEMPERATURE: 98.3 F | DIASTOLIC BLOOD PRESSURE: 72 MMHG | BODY MASS INDEX: 29.91 KG/M2

## 2020-09-30 LAB
BASOPHILS # BLD AUTO: 0.07 K/UL — SIGNIFICANT CHANGE UP (ref 0–0.2)
BASOPHILS NFR BLD AUTO: 0.7 % — SIGNIFICANT CHANGE UP (ref 0–2)
EOSINOPHIL # BLD AUTO: 0.38 K/UL — SIGNIFICANT CHANGE UP (ref 0–0.5)
EOSINOPHIL NFR BLD AUTO: 4 % — SIGNIFICANT CHANGE UP (ref 0–6)
HCT VFR BLD CALC: 41.1 % — SIGNIFICANT CHANGE UP (ref 39–50)
HGB BLD-MCNC: 13.2 G/DL — SIGNIFICANT CHANGE UP (ref 13–17)
IMM GRANULOCYTES NFR BLD AUTO: 0.5 % — SIGNIFICANT CHANGE UP (ref 0–1.5)
LYMPHOCYTES # BLD AUTO: 0.55 K/UL — LOW (ref 1–3.3)
LYMPHOCYTES # BLD AUTO: 5.8 % — LOW (ref 13–44)
MCHC RBC-ENTMCNC: 30.6 PG — SIGNIFICANT CHANGE UP (ref 27–34)
MCHC RBC-ENTMCNC: 32.1 G/DL — SIGNIFICANT CHANGE UP (ref 32–36)
MCV RBC AUTO: 95.4 FL — SIGNIFICANT CHANGE UP (ref 80–100)
MONOCYTES # BLD AUTO: 1.2 K/UL — HIGH (ref 0–0.9)
MONOCYTES NFR BLD AUTO: 12.7 % — SIGNIFICANT CHANGE UP (ref 2–14)
NEUTROPHILS # BLD AUTO: 7.21 K/UL — SIGNIFICANT CHANGE UP (ref 1.8–7.4)
NEUTROPHILS NFR BLD AUTO: 76.3 % — SIGNIFICANT CHANGE UP (ref 43–77)
NRBC # BLD: 0 /100 WBCS — SIGNIFICANT CHANGE UP (ref 0–0)
PLATELET # BLD AUTO: 406 K/UL — HIGH (ref 150–400)
RBC # BLD: 4.31 M/UL — SIGNIFICANT CHANGE UP (ref 4.2–5.8)
RBC # FLD: 13.9 % — SIGNIFICANT CHANGE UP (ref 10.3–14.5)
WBC # BLD: 9.46 K/UL — SIGNIFICANT CHANGE UP (ref 3.8–10.5)
WBC # FLD AUTO: 9.46 K/UL — SIGNIFICANT CHANGE UP (ref 3.8–10.5)

## 2020-09-30 PROCEDURE — 99213 OFFICE O/P EST LOW 20 MIN: CPT

## 2020-10-02 ENCOUNTER — RX RENEWAL (OUTPATIENT)
Age: 70
End: 2020-10-02

## 2020-10-09 NOTE — H&P PST ADULT - WEIGHT IN KG
Patient:  Cortez George Location:  [place]   :  1940 Attending Physician:  Radha Espinosa M.D.     Date:  2020 Note Type: Phone Triage Note      PHONE CALL NOTE    Date Of Call:  2020   Time of Call:    Patient: Cortez George    Diagnosis: Primary - C34.90 - Malignant neoplasm of unspecified part of unspecified bronchus or lung,    Diagnosed Oct 7, 2019   (Active)  Stg IIIA,  T4, N0, M0        Physician/Nurse returning the call:  Marine    Concern/Issue:    Pt's son, Miguel Angel, called regarding pt's cough,states, \" he coughs all night, he doesn't get any sleep\"    Assessment/Plan:    Miguel Angel reports pt. has tried benzonatate, Delsym, Robitussin and Mucinex (currently), with little to no relief.  Reviewed with Dr. Espinosa, son  instructed to stop the Mucinex, to  have pt.  try bendadryl 25 mg at bedtime, to call MD on call this weekend if no relief.  Miguel Angel verbalized understanding        Electronically signed by:    Marine Mendes          70.3

## 2020-10-12 NOTE — ED ADULT TRIAGE NOTE - RESPIRATORY RATE (BREATHS/MIN)
Initial ACM outreach call to pt. Unable to reach pt. Message left requesting return call. If no return call- will attempted second ACM outreach call in a week.      FU appts/Provider:    Future Appointments   Date Time Provider Mimi Belle   10/16/2020 11:00 AM [facility] [address] [place]   10/30/2020  2:30 PM Mraianne Chen MD [place] [facility] 20

## 2020-10-13 ENCOUNTER — APPOINTMENT (OUTPATIENT)
Dept: PULMONOLOGY | Facility: CLINIC | Age: 70
End: 2020-10-13
Payer: MEDICARE

## 2020-10-13 VITALS
TEMPERATURE: 98.2 F | HEART RATE: 86 BPM | DIASTOLIC BLOOD PRESSURE: 66 MMHG | SYSTOLIC BLOOD PRESSURE: 145 MMHG | OXYGEN SATURATION: 92 % | RESPIRATION RATE: 17 BRPM | WEIGHT: 173 LBS | BODY MASS INDEX: 29.53 KG/M2 | HEIGHT: 63.98 IN

## 2020-10-13 PROCEDURE — 99215 OFFICE O/P EST HI 40 MIN: CPT

## 2020-10-13 NOTE — HISTORY OF PRESENT ILLNESS
[Difficulty Initiating Sleep] : difficulty initiating sleep [Snoring] : snoring [Witnessed Apneas] : witnessed apneas [Former] : former [Never] : never [Wheezing] : wheezing [Nasal Passage Blockage (Stuffiness)] : edema [Nonspecific Pain, Swelling, And Stiffness] : chest pain [Fever] : fever [Cough] : coughing [Difficulty Breathing During Exertion] : dyspnea on exertion [Feelings Of Weakness On Exertion] : exercise intolerance [2  -  Slight] : 2, slight [TextBox_4] : 70M DM2, HTN, CAD s/p PCI (RCA 2007), HLD, and COPD and lung cancer presenting for followup pulmonary evaluation. He was last seen in our office about 1 year ago on 11/25/2019.\par \par He is a long term former smoker but quit at the time of his lung cancer diagnosis. He has known obstructive lung disease with a bronchodilator response. He continues to use Breo-Ellipta. He has a ventolin inhaler but has not used it much. His daughter is concerned that he is having more dyspnea than he lets on and has asked him to use his rescue inhaler more often. He is not wheezing on exam today but does tell me that he has noted more dyspnea on exertion.\par \par He was diagnosed with small cell lung cancer 4/2019 and then started on chemotherapy and radiation. He initially underwent Bronchoscopy/EBUS with Dr. Martinez which revealed that bilateral mediastinal LN were positive for small cell carcinoma. He was started on chemotehrapy at that time in May 2019 and achieved partial response. He was then started on maintenance Atezolizumab. He andrew offered prophylactic cranial irradiation but declined initially. He developed small brain mets in June 2020 and received GK-SRS with Dr. Mari [facility]. He had a CT chest 8/31/2020 that showed a small stable upper lobe opacity. He is pending a repeat CT chest in 2 weeks.\par \par He has a visit with PMD set up for tomorrow. He is due for a flu shot but will discuss with his PMD. He also is likely due to a pneumococcal vaccine booster with PPSV 23 as he received his prior immunizations prior to 65. He will discuss with PMD.\par  [TextBox_77] : 10-11 pm [TextBox_79] : 4-5 am [TextBox_83] : y [TextBox_93] : started on Ambien in conjunction with Immunotherapy for Small Cell Lung Cancer [TextBox_42] : 8/31/2020 - \par  CT Chest No Cont             Final\par \par No Documents Attached\par \par \par \par \par   EXAM:  CT CHEST\par \par \par PROCEDURE DATE:  08/31/2020\par \par \par \par INTERPRETATION:  EXAMINATION: CT CHEST\par \par CLINICAL INDICATION: Lung cancer.\par \par TECHNIQUE: Noncontrast CT of the chest was obtained.\par \par COMPARISON: Multiple CT, most recent 6/8/2020.\par \par FINDINGS:\par \par AIRWAYS AND LUNGS: The central tracheobronchial tree is patent.  Emphysema is present. Left upper lobe ill-defined nodular/linear opacity is similar compared to the prior study. Other bilateral patchy lung opacities are new from the prior study. Calcified bilateral lung nodules.\par \par MEDIASTINUM AND PLEURA: Calcified mediastinal and right hilar lymph nodes. The visualized portion of the thyroid gland is unremarkable. There is no pleural effusion. There is no pneumothorax.\par \par HEART AND VESSELS: The heart is normal in size.  There are atherosclerotic calcifications of the aorta and coronary arteries.  There is no pericardial effusion.\par \par UPPER ABDOMEN: Images of the upper abdomen demonstrate no abnormality.\par \par BONES AND SOFT TISSUES: The bones are unremarkable.  The soft tissues are unremarkable.\par \par TUBES/LINES: None.\par \par IMPRESSION:\par Emphysema is present. Left upper lobe ill-defined nodular/linear opacity is similar compared to the prior study. Other bilateral patchy lung opacities are new from the prior study. These are indeterminate and may be infectious/inflammatory. 1 month follow-up CT needed for complete evaluation

## 2020-10-13 NOTE — ASSESSMENT
[FreeTextEntry1] : 70M extensive history including DM2, CAD, COPD, and small cell lung cancer presenting for routine pulmonary followup. Over the last year he has been doing well but has persistent dyspnea which has worsened in the setting of inactivity due to COVID-19 pandemic. \par \par 1. Small Cell Lung Cancer - with metastatic disease. Continue maintenance therapy as per Dr. Ramirez et al. Followup chest imaging later this month for monitoring of upper lobe lesion.\par - patient has received radiation therapy \par \par 2. COPD - patient is a long term former smoker. He will continue Breo and increase his use of Ventolin as needed. We will plan for repeat PFTs in 2021 hopefully once COVID pandemic has eased as well as a 6MWT.\par \par 3. Snoring - patient with reported signs and symptoms of sleep disorder breathing. I have referred him to the [facility] for a diagnostic home sleep study. We discussed potential sleep apnea therapies which patient will consider.\par \par 4. Health Maintenance - patient appears to be due for both flu shot and PPSV 23. I offered these to him today but he wants to discuss with his PMD (Dr. Hyman) who he has an appointment with tomorrow.

## 2020-10-13 NOTE — REVIEW OF SYSTEMS
[Cough] : cough [SOB on Exertion] : sob on exertion [Rash] : rash [Diabetes] : diabetes [Fever] : no fever [Fatigue] : no fatigue [EDS] : no eds [Chills] : no chills [Poor Appetite] : no poor appetite [Dry Eyes] : no dry eyes [Ear Disturbance] : no ear disturbance [Epistaxis] : no epistaxis [Sore Throat] : no sore throat [Eye Irritation] : no eye irritation [Nasal Congestion] : no nasal congestion [Postnasal Drip] : no postnasal drip [Mouth Ulcers] : no mouth ulcers [Hemoptysis] : no hemoptysis [Chest Tightness] : no chest tightness [Frequent URIs] : no frequent URIs [Sputum] : no sputum [Wheezing] : no wheezing [Chest Discomfort] : no chest discomfort [Claudication] : no claudication [Edema] : no edema [Leg Cramps] : no leg cramps [Orthopnea] : no orthopnea [Syncope] : no syncope [Hay Fever] : no hay fever [Watery Eyes] : no watery eyes [Itchy Eyes] : no itchy eyes [Seasonal Allergies] : no seasonal allergies [Hives] : no hives [GERD] : no gerd [Abdominal Pain] : no abdominal pain [Nausea] : no nausea [Vomiting] : no vomiting [Dysuria] : no urgency [Arthralgias] : no arthralgias [Myalgias] : no myalgias [Itch] : no itch [Easy Bruising] : no easy bruising [Headache] : no headache [Focal Weakness] : no focal weakness [Seizures] : no seizures [Head Injury] : no head injury [Dizziness] : no dizziness [Paralysis] : no paralysis [Panic Attacks] : no panic attacks [Obesity] : no obesity [TextBox_101] : seen by Derm

## 2020-10-13 NOTE — PHYSICAL EXAM
[No Acute Distress] : no acute distress [Well Nourished] : well nourished [Well Groomed] : well groomed [No Deformities] : no deformities [Well Developed] : well developed [Normal Oropharynx] : normal oropharynx [Normal Appearance] : normal appearance [Supple] : supple [No Neck Mass] : no neck mass [No JVD] : no jvd [Normal Rate/Rhythm] : normal rate/rhythm [Normal S1, S2] : normal s1, s2 [No Resp Distress] : no resp distress [No Acc Muscle Use] : no acc muscle use [Normal Rhythm and Effort] : normal rhythm and effort [No Abnormalities] : no abnormalities [Benign] : benign [Not Tender] : not tender [Soft] : soft [Normal Gait] : normal gait [No Clubbing] : no clubbing [No Cyanosis] : no cyanosis [No Edema] : no edema [FROM] : FROM [Normal Color/ Pigmentation] : normal color/ pigmentation [No Focal Deficits] : no focal deficits [No Motor Deficits] : no motor deficits [Oriented x3] : oriented x3 [Normal Mood] : normal mood [Normal Affect] : normal affect [TextBox_11] : NCAT, EOMI, anicteric, trachea midline, wearing mask [TextBox_68] : no wheeze [TextBox_125] : rash over bilateral lower extremities (improved per patient) - scaly papules and small plaques

## 2020-10-13 NOTE — REASON FOR VISIT
[Follow-Up] : a follow-up visit [Lung Cancer] : lung cancer [COPD] : COPD [Other: _____] : [unfilled] [[employer] ] : provided by [employer]   [FreeTextEntry1] : [id number] [FreeTextEntry2] : Opal [TWNoteComboBox1] : [place]ian

## 2020-10-13 NOTE — REVIEW OF SYSTEMS
This document is complete and the patient is ready for discharge.
[FreeTextEntry2] : see HPI for ROS

## 2020-10-14 ENCOUNTER — OUTPATIENT (OUTPATIENT)
Dept: OUTPATIENT SERVICES | Facility: HOSPITAL | Age: 70
LOS: 1 days | End: 2020-10-14

## 2020-10-14 ENCOUNTER — APPOINTMENT (OUTPATIENT)
Dept: INTERNAL MEDICINE | Facility: CLINIC | Age: 70
End: 2020-10-14

## 2020-10-14 ENCOUNTER — MED ADMIN CHARGE (OUTPATIENT)
Age: 70
End: 2020-10-14

## 2020-10-14 VITALS
HEART RATE: 86 BPM | BODY MASS INDEX: 29.19 KG/M2 | SYSTOLIC BLOOD PRESSURE: 130 MMHG | DIASTOLIC BLOOD PRESSURE: 60 MMHG | OXYGEN SATURATION: 95 % | WEIGHT: 171 LBS | HEIGHT: 63.98 IN

## 2020-10-14 VITALS — TEMPERATURE: 97.7 F

## 2020-10-14 DIAGNOSIS — N20.0 CALCULUS OF KIDNEY: Chronic | ICD-10-CM

## 2020-10-14 DIAGNOSIS — Z90.89 ACQUIRED ABSENCE OF OTHER ORGANS: Chronic | ICD-10-CM

## 2020-10-14 DIAGNOSIS — D11.0 BENIGN NEOPLASM OF PAROTID GLAND: Chronic | ICD-10-CM

## 2020-10-14 DIAGNOSIS — Z95.5 PRESENCE OF CORONARY ANGIOPLASTY IMPLANT AND GRAFT: Chronic | ICD-10-CM

## 2020-10-16 ENCOUNTER — OUTPATIENT (OUTPATIENT)
Dept: OUTPATIENT SERVICES | Facility: HOSPITAL | Age: 70
LOS: 1 days | Discharge: HOME | End: 2020-10-16

## 2020-10-16 DIAGNOSIS — N20.0 CALCULUS OF KIDNEY: Chronic | ICD-10-CM

## 2020-10-16 DIAGNOSIS — Z95.5 PRESENCE OF CORONARY ANGIOPLASTY IMPLANT AND GRAFT: Chronic | ICD-10-CM

## 2020-10-16 DIAGNOSIS — C34.90 MALIGNANT NEOPLASM OF UNSPECIFIED PART OF UNSPECIFIED BRONCHUS OR LUNG: ICD-10-CM

## 2020-10-16 DIAGNOSIS — Z90.89 ACQUIRED ABSENCE OF OTHER ORGANS: Chronic | ICD-10-CM

## 2020-10-16 DIAGNOSIS — D11.0 BENIGN NEOPLASM OF PAROTID GLAND: Chronic | ICD-10-CM

## 2020-10-16 NOTE — ASSESSMENT
[FreeTextEntry1] : Type 2 diabetes mellitus:\par Started insulin at the last visit, and his A1c has decreased to 6.2%.  Now it is at goal.  Denies hypoglycemia.  We will continue with current medication regimen at this time.  May be able to reduce insulin dosing in the future (currently on 10 units of basal glargine nightly).  Counseled on recognition and management of hypoglycemia.  Continue statin and ACE inhibitor.  Monofilament exam normal today.  Up-to-date with ophthalmology screening for diabetic retinopathy.\par \par Hypertension:\par Blood pressure at goal today.  Continue with present  medication regimen.\par \par Coronary artery disease:\par Asymptomatic. Blood pressure at goal.  Diabetes at goal.  Continue aspirin.  Continue statin.  Continue metoprolol.\par \par Hypomagnesemia:\par Last magnesium level was normal.  Continue with magnesium supplementation.  Refills were sent to pharmacy.\par \par Healthcare maintenance: Influenza vaccination today.\par \par Emphysema: Symptoms are presently controlled.  He is following with pulmonary.  Continue with current medication regimen.  May have sleep apnea as per wife's description of symptoms.  Has been recommended for sleep study by pulmonary.\par \par Eczematous dermatitis: Following with dermatology.  Was started on betamethasone which is helping with rash somewhat.  Likely due to immunotherapy.  Refill sent on betamethasone.  Advised to limit use to areas affected, and if these areas are really large surface areas, to use steroid topical for 1 week on, 1 week off.\par \par Lung cancer with brain and bone metastases: Pending repeat CT of chest.  Following with hematology/oncology.  Status post radiation for brain metastases with improvement in brain MRI.  Following with radiation oncology.  Remains on atezolizumab.\par \par Wrist arthritis:\par Resolved.  Was traumatic wrist arthritis.  Completed naproxen.  Does not need refills at this time, discontinue naproxen.\par \par Return to office 3 months

## 2020-10-16 NOTE — PHYSICAL EXAM
[No Acute Distress] : no acute distress [Well Developed] : well developed [Well-Appearing] : well-appearing [Normal Sclera/Conjunctiva] : normal sclera/conjunctiva [Normal Outer Ear/Nose] : the outer ears and nose were normal in appearance [Normal Oropharynx] : the oropharynx was normal [No Respiratory Distress] : no respiratory distress  [No JVD] : no jugular venous distention [No Accessory Muscle Use] : no accessory muscle use [Clear to Auscultation] : lungs were clear to auscultation bilaterally [Normal Rate] : normal rate  [No Edema] : there was no peripheral edema [Regular Rhythm] : with a regular rhythm [Normal S1, S2] : normal S1 and S2 [Soft] : abdomen soft [Non Tender] : non-tender [Non-distended] : non-distended [Normal Supraclavicular Nodes] : no supraclavicular lymphadenopathy [Normal Posterior Cervical Nodes] : no posterior cervical lymphadenopathy [Normal Anterior Cervical Nodes] : no anterior cervical lymphadenopathy [No Focal Deficits] : no focal deficits [Normal Gait] : normal gait [Normal Affect] : the affect was normal [Speech Grossly Normal] : speech grossly normal [Normal Mood] : the mood was normal [None] : no ulcers in either foot were found [] : normal [de-identified] : Diffuse rash on arms and legs and trunk, scattered, scaly, mildly erythematous papules and plaques [de-identified] : No wheezing [de-identified] : 5 point monofilament testing was normal [TWNoteComboBox3] : +2 [TWNoteComboBox4] : +2

## 2020-10-16 NOTE — HISTORY OF PRESENT ILLNESS
[FreeTextEntry3] : Visit conducted in Tongan by clinician [FreeTextEntry1] : Follow-up of chronic medical conditions [de-identified] : 70-year-old man with a history of chronic low back pain, benign prostatic hypertrophy s/p TURP, hypertension, type 2 diabetes mellitus, hyperlipidemia, erectile dysfunction, gastritis, hypomagnesemia, coronary artery disease s/p stent, prior smoker with emphysema and extensive stage SCLC (dx spring 2019) s/p carboplatin/etoposide and radiation with partial response now remains on atezolizumab immunotherapy, recently found to have small brain metastases now status post gamma knife radiation therapy with improved brain MRI who presents for follow-up of his chronic medical conditions.\fern Overall, reports that he has been trying to stay active.  Does a few hours of carpentry daily, walks 1 to 2 miles per day, has been busy in the house since moving during the summer.  Reports feeling less depressed.  Feels encouraged by repeat MRI of the brain which had improved after radiation.  Last CT chest with nonspecific opacities, plan for repeat next week to ensure resolution.  In interim, was seen by rheumatology for wrist pain which was thought secondary to traumatic arthritis and required a short course of naproxen which she is no longer taking.  His wrist pain has improved.  He has also developed rashes which began on his hands and progressed to her arms, legs, trunk.  Was evaluated by dermatology on 9/23/2020 and thought secondary to atezolizumab.  Was given betamethasone cream, which he and his wife report he has been using.  However, given the extent of his lesions, he has had difficulty using betamethasone only on specific spots of his rash and rather has been using over larger areas that are all affected.  Requesting refills of betamethasone cream today.\fern Was evaluated by pulmonary, no changes to his COPD medications.  Given his wife reports snoring and moments where she feels he is "drowning", he was referred for evaluation with sleep study for sleep apnea.  Remains off cigarettes.\fern Has been taking magnesium oxide 800 mg in the morning and evening, with 400 mg at lunchtime.  Not having any cramping in his legs at this time.  Last magnesium level was normal.\fern Reports ophthalmology visit 4 months ago, reported no diabetic retinopathy bilaterally.\fern We discussed considering renal evaluation for persistent hypomagnesemia requiring supplementation, and also discussed that his gastritis/prior PPI use could have contributed.  He would like to hold off on renal evaluation at this time given the number of medical appointments he has currently.\par He has been taking Basaglar, Metformin, Januvia.  A1c was 8.9 in June.  At that time, insulin was added.  He has been doing well with insulin injections.  Denies any hypoglycemia, lowest blood sugars have been in the 130s range.  Mostly, fasting blood sugars have been between 150 and 160s.\par PHQ-9 score today is 6.\par POC A1c today is 6.2%

## 2020-10-16 NOTE — REVIEW OF SYSTEMS
[Fatigue] : fatigue [Skin Rash] : skin rash [Negative] : Genitourinary [Shortness Of Breath] : no shortness of breath [Cough] : no cough [FreeTextEntry3] : following with kael [de-identified] : see HPI [de-identified] : headaches are improved [de-identified] : feeling like he is handling stress better with his activities

## 2020-10-19 ENCOUNTER — OUTPATIENT (OUTPATIENT)
Dept: OUTPATIENT SERVICES | Facility: HOSPITAL | Age: 70
LOS: 1 days | End: 2020-10-19
Payer: COMMERCIAL

## 2020-10-19 ENCOUNTER — APPOINTMENT (OUTPATIENT)
Dept: CT IMAGING | Facility: CLINIC | Age: 70
End: 2020-10-19
Payer: MEDICARE

## 2020-10-19 DIAGNOSIS — D11.0 BENIGN NEOPLASM OF PAROTID GLAND: Chronic | ICD-10-CM

## 2020-10-19 DIAGNOSIS — N20.0 CALCULUS OF KIDNEY: Chronic | ICD-10-CM

## 2020-10-19 DIAGNOSIS — Z90.89 ACQUIRED ABSENCE OF OTHER ORGANS: Chronic | ICD-10-CM

## 2020-10-19 DIAGNOSIS — C34.12 MALIGNANT NEOPLASM OF UPPER LOBE, LEFT BRONCHUS OR LUNG: ICD-10-CM

## 2020-10-19 DIAGNOSIS — Z95.5 PRESENCE OF CORONARY ANGIOPLASTY IMPLANT AND GRAFT: Chronic | ICD-10-CM

## 2020-10-19 PROCEDURE — 71250 CT THORAX DX C-: CPT | Mod: 26

## 2020-10-19 PROCEDURE — 71250 CT THORAX DX C-: CPT

## 2020-10-21 ENCOUNTER — INPATIENT (INPATIENT)
Facility: HOSPITAL | Age: 70
LOS: 11 days | Discharge: HOME HEALTH | End: 2020-11-02
Attending: HOSPITALIST | Admitting: HOSPITALIST
Payer: MEDICARE

## 2020-10-21 ENCOUNTER — APPOINTMENT (OUTPATIENT)
Dept: INFUSION THERAPY | Facility: HOSPITAL | Age: 70
End: 2020-10-21

## 2020-10-21 ENCOUNTER — APPOINTMENT (OUTPATIENT)
Dept: HEMATOLOGY ONCOLOGY | Facility: CLINIC | Age: 70
End: 2020-10-21
Payer: MEDICARE

## 2020-10-21 VITALS
BODY MASS INDEX: 28.4 KG/M2 | RESPIRATION RATE: 18 BRPM | DIASTOLIC BLOOD PRESSURE: 72 MMHG | SYSTOLIC BLOOD PRESSURE: 121 MMHG | HEART RATE: 79 BPM | WEIGHT: 165.35 LBS | TEMPERATURE: 98 F

## 2020-10-21 VITALS
HEIGHT: 65 IN | TEMPERATURE: 99 F | SYSTOLIC BLOOD PRESSURE: 121 MMHG | OXYGEN SATURATION: 87 % | DIASTOLIC BLOOD PRESSURE: 61 MMHG | HEART RATE: 102 BPM | RESPIRATION RATE: 26 BRPM

## 2020-10-21 VITALS — OXYGEN SATURATION: 86 %

## 2020-10-21 DIAGNOSIS — J44.9 CHRONIC OBSTRUCTIVE PULMONARY DISEASE, UNSPECIFIED: ICD-10-CM

## 2020-10-21 DIAGNOSIS — E11.9 TYPE 2 DIABETES MELLITUS WITHOUT COMPLICATIONS: ICD-10-CM

## 2020-10-21 DIAGNOSIS — Z90.89 ACQUIRED ABSENCE OF OTHER ORGANS: Chronic | ICD-10-CM

## 2020-10-21 DIAGNOSIS — R21 RASH AND OTHER NONSPECIFIC SKIN ERUPTION: ICD-10-CM

## 2020-10-21 DIAGNOSIS — E78.5 HYPERLIPIDEMIA, UNSPECIFIED: ICD-10-CM

## 2020-10-21 DIAGNOSIS — R06.02 SHORTNESS OF BREATH: ICD-10-CM

## 2020-10-21 DIAGNOSIS — N20.0 CALCULUS OF KIDNEY: Chronic | ICD-10-CM

## 2020-10-21 DIAGNOSIS — D11.0 BENIGN NEOPLASM OF PAROTID GLAND: Chronic | ICD-10-CM

## 2020-10-21 DIAGNOSIS — R09.02 HYPOXEMIA: ICD-10-CM

## 2020-10-21 DIAGNOSIS — Z95.5 PRESENCE OF CORONARY ANGIOPLASTY IMPLANT AND GRAFT: Chronic | ICD-10-CM

## 2020-10-21 DIAGNOSIS — Z29.9 ENCOUNTER FOR PROPHYLACTIC MEASURES, UNSPECIFIED: ICD-10-CM

## 2020-10-21 DIAGNOSIS — I10 ESSENTIAL (PRIMARY) HYPERTENSION: ICD-10-CM

## 2020-10-21 DIAGNOSIS — C34.90 MALIGNANT NEOPLASM OF UNSPECIFIED PART OF UNSPECIFIED BRONCHUS OR LUNG: ICD-10-CM

## 2020-10-21 LAB
ALBUMIN SERPL ELPH-MCNC: 3.9 G/DL — SIGNIFICANT CHANGE UP (ref 3.3–5)
ALP SERPL-CCNC: 79 U/L — SIGNIFICANT CHANGE UP (ref 40–120)
ALT FLD-CCNC: 13 U/L — SIGNIFICANT CHANGE UP (ref 4–41)
ANION GAP SERPL CALC-SCNC: 17 MMO/L — HIGH (ref 7–14)
APTT BLD: 26 SEC — LOW (ref 27–36.3)
AST SERPL-CCNC: 23 U/L — SIGNIFICANT CHANGE UP (ref 4–40)
B PERT DNA SPEC QL NAA+PROBE: SIGNIFICANT CHANGE UP
BASE EXCESS BLDV CALC-SCNC: 3.5 MMOL/L — SIGNIFICANT CHANGE UP
BASOPHILS # BLD AUTO: 0.06 K/UL — SIGNIFICANT CHANGE UP (ref 0–0.2)
BASOPHILS NFR BLD AUTO: 0.6 % — SIGNIFICANT CHANGE UP (ref 0–2)
BILIRUB SERPL-MCNC: 0.3 MG/DL — SIGNIFICANT CHANGE UP (ref 0.2–1.2)
BLOOD GAS VENOUS - CREATININE: 1.01 MG/DL — SIGNIFICANT CHANGE UP (ref 0.5–1.3)
BUN SERPL-MCNC: 17 MG/DL — SIGNIFICANT CHANGE UP (ref 7–23)
C PNEUM DNA SPEC QL NAA+PROBE: SIGNIFICANT CHANGE UP
CALCIUM SERPL-MCNC: 9.8 MG/DL — SIGNIFICANT CHANGE UP (ref 8.4–10.5)
CHLORIDE BLDV-SCNC: 100 MMOL/L — SIGNIFICANT CHANGE UP (ref 96–108)
CHLORIDE SERPL-SCNC: 98 MMOL/L — SIGNIFICANT CHANGE UP (ref 98–107)
CO2 SERPL-SCNC: 23 MMOL/L — SIGNIFICANT CHANGE UP (ref 22–31)
CREAT SERPL-MCNC: 0.95 MG/DL — SIGNIFICANT CHANGE UP (ref 0.5–1.3)
EOSINOPHIL # BLD AUTO: 0.17 K/UL — SIGNIFICANT CHANGE UP (ref 0–0.5)
EOSINOPHIL NFR BLD AUTO: 1.8 % — SIGNIFICANT CHANGE UP (ref 0–6)
FLUAV H1 2009 PAND RNA SPEC QL NAA+PROBE: SIGNIFICANT CHANGE UP
FLUAV H1 RNA SPEC QL NAA+PROBE: SIGNIFICANT CHANGE UP
FLUAV H3 RNA SPEC QL NAA+PROBE: SIGNIFICANT CHANGE UP
FLUAV SUBTYP SPEC NAA+PROBE: SIGNIFICANT CHANGE UP
FLUBV RNA SPEC QL NAA+PROBE: SIGNIFICANT CHANGE UP
GAS PNL BLDV: 132 MMOL/L — LOW (ref 136–146)
GLUCOSE BLDV-MCNC: 294 MG/DL — HIGH (ref 70–99)
GLUCOSE SERPL-MCNC: 135 MG/DL — HIGH (ref 70–99)
HADV DNA SPEC QL NAA+PROBE: SIGNIFICANT CHANGE UP
HCO3 BLDV-SCNC: 25 MMOL/L — SIGNIFICANT CHANGE UP (ref 20–27)
HCOV PNL SPEC NAA+PROBE: SIGNIFICANT CHANGE UP
HCT VFR BLD CALC: 41.9 % — SIGNIFICANT CHANGE UP (ref 39–50)
HCT VFR BLDV CALC: 44.5 % — SIGNIFICANT CHANGE UP (ref 39–51)
HGB BLD-MCNC: 13.4 G/DL — SIGNIFICANT CHANGE UP (ref 13–17)
HGB BLDV-MCNC: 14.5 G/DL — SIGNIFICANT CHANGE UP (ref 13–17)
HMPV RNA SPEC QL NAA+PROBE: SIGNIFICANT CHANGE UP
HPIV1 RNA SPEC QL NAA+PROBE: SIGNIFICANT CHANGE UP
HPIV2 RNA SPEC QL NAA+PROBE: SIGNIFICANT CHANGE UP
HPIV3 RNA SPEC QL NAA+PROBE: SIGNIFICANT CHANGE UP
HPIV4 RNA SPEC QL NAA+PROBE: SIGNIFICANT CHANGE UP
IMM GRANULOCYTES NFR BLD AUTO: 0.6 % — SIGNIFICANT CHANGE UP (ref 0–1.5)
INR BLD: 1.1 — SIGNIFICANT CHANGE UP (ref 0.88–1.16)
LACTATE BLDV-MCNC: 3.9 MMOL/L — HIGH (ref 0.5–2)
LYMPHOCYTES # BLD AUTO: 0.38 K/UL — LOW (ref 1–3.3)
LYMPHOCYTES # BLD AUTO: 4 % — LOW (ref 13–44)
MCHC RBC-ENTMCNC: 29.2 PG — SIGNIFICANT CHANGE UP (ref 27–34)
MCHC RBC-ENTMCNC: 32 % — SIGNIFICANT CHANGE UP (ref 32–36)
MCV RBC AUTO: 91.3 FL — SIGNIFICANT CHANGE UP (ref 80–100)
MONOCYTES # BLD AUTO: 1.1 K/UL — HIGH (ref 0–0.9)
MONOCYTES NFR BLD AUTO: 11.7 % — SIGNIFICANT CHANGE UP (ref 2–14)
NEUTROPHILS # BLD AUTO: 7.62 K/UL — HIGH (ref 1.8–7.4)
NEUTROPHILS NFR BLD AUTO: 81.3 % — HIGH (ref 43–77)
NRBC # FLD: 0 K/UL — SIGNIFICANT CHANGE UP (ref 0–0)
PCO2 BLDV: 47 MMHG — SIGNIFICANT CHANGE UP (ref 41–51)
PH BLDV: 7.39 PH — SIGNIFICANT CHANGE UP (ref 7.32–7.43)
PLATELET # BLD AUTO: 345 K/UL — SIGNIFICANT CHANGE UP (ref 150–400)
PMV BLD: 9.1 FL — SIGNIFICANT CHANGE UP (ref 7–13)
PO2 BLDV: < 24 MMHG — LOW (ref 35–40)
POTASSIUM BLDV-SCNC: 4 MMOL/L — SIGNIFICANT CHANGE UP (ref 3.4–4.5)
POTASSIUM SERPL-MCNC: 4.3 MMOL/L — SIGNIFICANT CHANGE UP (ref 3.5–5.3)
POTASSIUM SERPL-SCNC: 4.3 MMOL/L — SIGNIFICANT CHANGE UP (ref 3.5–5.3)
PROT SERPL-MCNC: 6.7 G/DL — SIGNIFICANT CHANGE UP (ref 6–8.3)
PROTHROM AB SERPL-ACNC: 12.5 SEC — SIGNIFICANT CHANGE UP (ref 10.6–13.6)
RAPID RVP RESULT: SIGNIFICANT CHANGE UP
RBC # BLD: 4.59 M/UL — SIGNIFICANT CHANGE UP (ref 4.2–5.8)
RBC # FLD: 15.2 % — HIGH (ref 10.3–14.5)
RSV RNA SPEC QL NAA+PROBE: SIGNIFICANT CHANGE UP
RV+EV RNA SPEC QL NAA+PROBE: SIGNIFICANT CHANGE UP
SAO2 % BLDV: 18.9 % — LOW (ref 60–85)
SARS-COV-2 RNA SPEC QL NAA+PROBE: SIGNIFICANT CHANGE UP
SARS-COV-2 RNA SPEC QL NAA+PROBE: SIGNIFICANT CHANGE UP
SODIUM SERPL-SCNC: 138 MMOL/L — SIGNIFICANT CHANGE UP (ref 135–145)
TROPONIN T, HIGH SENSITIVITY: 15 NG/L — SIGNIFICANT CHANGE UP (ref ?–14)
TROPONIN T, HIGH SENSITIVITY: 15 NG/L — SIGNIFICANT CHANGE UP (ref ?–14)
WBC # BLD: 9.39 K/UL — SIGNIFICANT CHANGE UP (ref 3.8–10.5)
WBC # FLD AUTO: 9.39 K/UL — SIGNIFICANT CHANGE UP (ref 3.8–10.5)

## 2020-10-21 PROCEDURE — 99223 1ST HOSP IP/OBS HIGH 75: CPT | Mod: GC

## 2020-10-21 PROCEDURE — 99285 EMERGENCY DEPT VISIT HI MDM: CPT

## 2020-10-21 PROCEDURE — 71275 CT ANGIOGRAPHY CHEST: CPT | Mod: 26

## 2020-10-21 PROCEDURE — 93010 ELECTROCARDIOGRAM REPORT: CPT

## 2020-10-21 PROCEDURE — 99215 OFFICE O/P EST HI 40 MIN: CPT

## 2020-10-21 RX ORDER — OMEGA-3 ACID ETHYL ESTERS 1 G
1 CAPSULE ORAL DAILY
Refills: 0 | Status: DISCONTINUED | OUTPATIENT
Start: 2020-10-21 | End: 2020-10-30

## 2020-10-21 RX ORDER — OMEGA-3 ACID ETHYL ESTERS 1 G
1 CAPSULE ORAL
Qty: 0 | Refills: 0 | DISCHARGE

## 2020-10-21 RX ORDER — SODIUM CHLORIDE 9 MG/ML
1000 INJECTION, SOLUTION INTRAVENOUS
Refills: 0 | Status: DISCONTINUED | OUTPATIENT
Start: 2020-10-21 | End: 2020-11-02

## 2020-10-21 RX ORDER — FAMOTIDINE 10 MG/ML
1 INJECTION INTRAVENOUS
Qty: 0 | Refills: 0 | DISCHARGE

## 2020-10-21 RX ORDER — ZOLPIDEM TARTRATE 10 MG/1
5 TABLET ORAL AT BEDTIME
Refills: 0 | Status: DISCONTINUED | OUTPATIENT
Start: 2020-10-21 | End: 2020-10-21

## 2020-10-21 RX ORDER — ASPIRIN/CALCIUM CARB/MAGNESIUM 324 MG
1 TABLET ORAL
Qty: 0 | Refills: 0 | DISCHARGE

## 2020-10-21 RX ORDER — INSULIN LISPRO 100/ML
VIAL (ML) SUBCUTANEOUS AT BEDTIME
Refills: 0 | Status: DISCONTINUED | OUTPATIENT
Start: 2020-10-21 | End: 2020-10-30

## 2020-10-21 RX ORDER — FLUTICASONE FUROATE AND VILANTEROL TRIFENATATE 100; 25 UG/1; UG/1
1 POWDER RESPIRATORY (INHALATION)
Qty: 0 | Refills: 0 | DISCHARGE

## 2020-10-21 RX ORDER — GLUCAGON INJECTION, SOLUTION 0.5 MG/.1ML
1 INJECTION, SOLUTION SUBCUTANEOUS ONCE
Refills: 0 | Status: DISCONTINUED | OUTPATIENT
Start: 2020-10-21 | End: 2020-11-02

## 2020-10-21 RX ORDER — DEXTROSE 50 % IN WATER 50 %
15 SYRINGE (ML) INTRAVENOUS ONCE
Refills: 0 | Status: DISCONTINUED | OUTPATIENT
Start: 2020-10-21 | End: 2020-11-02

## 2020-10-21 RX ORDER — NIACIN 50 MG
2 TABLET ORAL
Qty: 0 | Refills: 0 | DISCHARGE

## 2020-10-21 RX ORDER — DEXTROSE 50 % IN WATER 50 %
25 SYRINGE (ML) INTRAVENOUS ONCE
Refills: 0 | Status: DISCONTINUED | OUTPATIENT
Start: 2020-10-21 | End: 2020-11-02

## 2020-10-21 RX ORDER — DEXTROSE 50 % IN WATER 50 %
12.5 SYRINGE (ML) INTRAVENOUS ONCE
Refills: 0 | Status: DISCONTINUED | OUTPATIENT
Start: 2020-10-21 | End: 2020-11-02

## 2020-10-21 RX ORDER — INSULIN LISPRO 100/ML
VIAL (ML) SUBCUTANEOUS
Refills: 0 | Status: DISCONTINUED | OUTPATIENT
Start: 2020-10-21 | End: 2020-10-30

## 2020-10-21 RX ORDER — BUDESONIDE AND FORMOTEROL FUMARATE DIHYDRATE 160; 4.5 UG/1; UG/1
2 AEROSOL RESPIRATORY (INHALATION)
Refills: 0 | Status: DISCONTINUED | OUTPATIENT
Start: 2020-10-21 | End: 2020-11-02

## 2020-10-21 RX ORDER — METOPROLOL TARTRATE 50 MG
50 TABLET ORAL DAILY
Refills: 0 | Status: DISCONTINUED | OUTPATIENT
Start: 2020-10-21 | End: 2020-11-02

## 2020-10-21 RX ORDER — ALBUTEROL 90 UG/1
2 AEROSOL, METERED ORAL
Qty: 0 | Refills: 0 | DISCHARGE

## 2020-10-21 RX ORDER — METOPROLOL TARTRATE 50 MG
1 TABLET ORAL
Qty: 0 | Refills: 0 | DISCHARGE

## 2020-10-21 RX ORDER — FLUTICASONE PROPIONATE 50 MCG
1 SPRAY, SUSPENSION NASAL
Qty: 0 | Refills: 0 | DISCHARGE

## 2020-10-21 RX ORDER — ENOXAPARIN SODIUM 100 MG/ML
40 INJECTION SUBCUTANEOUS DAILY
Refills: 0 | Status: DISCONTINUED | OUTPATIENT
Start: 2020-10-21 | End: 2020-10-22

## 2020-10-21 RX ORDER — ATORVASTATIN CALCIUM 80 MG/1
1 TABLET, FILM COATED ORAL
Qty: 0 | Refills: 0 | DISCHARGE

## 2020-10-21 RX ORDER — LISINOPRIL 2.5 MG/1
1 TABLET ORAL
Qty: 0 | Refills: 0 | DISCHARGE

## 2020-10-21 RX ORDER — SODIUM CHLORIDE 9 MG/ML
1000 INJECTION INTRAMUSCULAR; INTRAVENOUS; SUBCUTANEOUS
Refills: 0 | Status: DISCONTINUED | OUTPATIENT
Start: 2020-10-21 | End: 2020-10-23

## 2020-10-21 RX ORDER — INSULIN GLARGINE 100 [IU]/ML
10 INJECTION, SOLUTION SUBCUTANEOUS AT BEDTIME
Refills: 0 | Status: DISCONTINUED | OUTPATIENT
Start: 2020-10-21 | End: 2020-10-26

## 2020-10-21 RX ORDER — METOPROLOL TARTRATE 50 MG
50 TABLET ORAL DAILY
Refills: 0 | Status: DISCONTINUED | OUTPATIENT
Start: 2020-10-21 | End: 2020-10-21

## 2020-10-21 RX ORDER — IPRATROPIUM/ALBUTEROL SULFATE 18-103MCG
3 AEROSOL WITH ADAPTER (GRAM) INHALATION EVERY 4 HOURS
Refills: 0 | Status: DISCONTINUED | OUTPATIENT
Start: 2020-10-21 | End: 2020-11-02

## 2020-10-21 RX ORDER — FAMOTIDINE 10 MG/ML
20 INJECTION INTRAVENOUS
Refills: 0 | Status: DISCONTINUED | OUTPATIENT
Start: 2020-10-21 | End: 2020-11-02

## 2020-10-21 RX ORDER — NIACIN 50 MG
1000 TABLET ORAL AT BEDTIME
Refills: 0 | Status: DISCONTINUED | OUTPATIENT
Start: 2020-10-21 | End: 2020-11-02

## 2020-10-21 RX ORDER — MAGNESIUM OXIDE 400 MG ORAL TABLET 241.3 MG
400 TABLET ORAL DAILY
Refills: 0 | Status: DISCONTINUED | OUTPATIENT
Start: 2020-10-21 | End: 2020-10-23

## 2020-10-21 RX ORDER — ASCORBIC ACID 60 MG
1 TABLET,CHEWABLE ORAL
Qty: 0 | Refills: 0 | DISCHARGE

## 2020-10-21 RX ORDER — ATORVASTATIN CALCIUM 80 MG/1
40 TABLET, FILM COATED ORAL AT BEDTIME
Refills: 0 | Status: DISCONTINUED | OUTPATIENT
Start: 2020-10-21 | End: 2020-11-02

## 2020-10-21 RX ORDER — ZOLPIDEM TARTRATE 10 MG/1
1 TABLET ORAL
Qty: 0 | Refills: 0 | DISCHARGE

## 2020-10-21 RX ORDER — ASPIRIN/CALCIUM CARB/MAGNESIUM 324 MG
81 TABLET ORAL DAILY
Refills: 0 | Status: DISCONTINUED | OUTPATIENT
Start: 2020-10-21 | End: 2020-11-02

## 2020-10-21 RX ORDER — MAGNESIUM OXIDE 400 MG ORAL TABLET 241.3 MG
1 TABLET ORAL
Qty: 0 | Refills: 0 | DISCHARGE

## 2020-10-21 RX ADMIN — Medication 3: at 17:31

## 2020-10-21 RX ADMIN — Medication 1000 MILLIGRAM(S): at 21:32

## 2020-10-21 RX ADMIN — BUDESONIDE AND FORMOTEROL FUMARATE DIHYDRATE 2 PUFF(S): 160; 4.5 AEROSOL RESPIRATORY (INHALATION) at 23:00

## 2020-10-21 RX ADMIN — Medication 1 GRAM(S): at 21:32

## 2020-10-21 RX ADMIN — ATORVASTATIN CALCIUM 40 MILLIGRAM(S): 80 TABLET, FILM COATED ORAL at 21:32

## 2020-10-21 RX ADMIN — MAGNESIUM OXIDE 400 MG ORAL TABLET 400 MILLIGRAM(S): 241.3 TABLET ORAL at 21:32

## 2020-10-21 RX ADMIN — FAMOTIDINE 20 MILLIGRAM(S): 10 INJECTION INTRAVENOUS at 17:31

## 2020-10-21 RX ADMIN — ENOXAPARIN SODIUM 40 MILLIGRAM(S): 100 INJECTION SUBCUTANEOUS at 21:33

## 2020-10-21 RX ADMIN — INSULIN GLARGINE 10 UNIT(S): 100 INJECTION, SOLUTION SUBCUTANEOUS at 21:32

## 2020-10-21 RX ADMIN — SODIUM CHLORIDE 75 MILLILITER(S): 9 INJECTION INTRAMUSCULAR; INTRAVENOUS; SUBCUTANEOUS at 21:32

## 2020-10-21 NOTE — ED PROVIDER NOTE - ATTENDING CONTRIBUTION TO CARE
New Zealander speaking ID  # [id number]:  71 y/o male with pmhx of lung ca with mets to brain on immunotherapy, (previsouly radiationa nd chemo)DM,  htn, hld, cad, sent to ED by oncologist Dr Ramirez for hypoxia. Pt states he is not on oxygen at home. His baseline o2 is between 92 and 94. Patient c/o worsening dry cough x 2 months and increased SOB and fatigue when he walks a mile a day. Pt denies chest pain, fever, chills, abd pain, n/v/d, dysuria, weakness, syncope. symptoms better at rest  pt well appearing no distress, off 02 sats 89-90, able to speak full sentences, not tachypneic at rest  lungs clear, no longer tachy, abd nontender, legs not swollen  will check labs, ekg, trop, CTA ro PE, dw heme/onc Angolan speaking ID  # [id number]:  69 y/o male with pmhx of lung ca with mets to brain on immunotherapy, (previsouly radiationa nd chemo)DM,  htn, hld, cad, COPD sent to ED by oncologist Dr Ramirez for hypoxia. Pt states he is not on oxygen at home. His baseline o2 is between 92 and 94. Patient c/o worsening dry cough x 2 months and increased SOB and fatigue when he walks a mile a day. Pt denies chest pain, fever, chills, abd pain, n/v/d, dysuria, weakness, syncope. symptoms better at rest pt had noncon CT on monday with worsening bl GGO, concern for covid   pt well appearing no distress, off 02 sats 89-90, able to speak full sentences, not tachypneic at rest  lungs clear, no longer tachy, abd nontender, legs not swollen  will check labs, ekg, trop, CTA ro PE, dw heme/onc

## 2020-10-21 NOTE — H&P ADULT - PROBLEM SELECTOR PLAN 1
-Patient with worsening bilateral diffuse peribronchovascular opacities on CT imaging concerning for infectious vs. inflammatory/pneumonitis etiology  -History of SCLC on maintenance immunotherapy, following for surveillance imaging  -Pulmonology aware, will f/u recs  -RVP negative in ED, repeat COVID  -Sputum Cx  -Send fungitell, Quantiferon, LDH  -Tentative plan for bronch per pulmonology -Patient with worsening bilateral diffuse peribronchovascular opacities on CT imaging concerning for infectious vs. inflammatory/pneumonitis etiology  - patient admitted with acute hypoxic respiratory failure (with desaturations to the mid 80s), c/w supplemental O2 as needed  -History of SCLC on maintenance immunotherapy, following for surveillance imaging  -Pulmonology aware, will f/u recs  -RVP negative in ED, repeat COVID swab  -Sputum Cx  -Send fungitell, Quantiferon, LDH  -Tentative plan for bronch per pulmonology  - non-toxic appearing, afebrile, without leukocytosis, monitoring off antibiotics at this time, will f/u pulm and heme/onc recs

## 2020-10-21 NOTE — ED PROVIDER NOTE - OBJECTIVE STATEMENT
Syrian speaking ID  # [id number]:  69 y/o male with pmhx of lung ca with mets to brain on immunotherapy, DM, sent to ED by oncologist for hypoxia. Pt states he is not on oxygen at home. His baseline o2 is between 92 and 94. Patient c/o worsening dry cough x 2 months and increased SOB when he walks a mile a day. Pt denies chest pain, fever, chills, abd pain, n/v/d, dysuria, weakness, syncope.

## 2020-10-21 NOTE — H&P ADULT - NSICDXPASTMEDICALHX_GEN_ALL_CORE_FT
PAST MEDICAL HISTORY:  BPH (benign prostatic hypertrophy)     CAD (coronary artery disease)     Hyperlipidemia     Hypertension     Smoking history     Type 2 diabetes mellitus     Urinary calculus     Weight loss

## 2020-10-21 NOTE — CHART NOTE - NSCHARTNOTEFT_GEN_A_CORE
Anurag Hanley | Reference #: [id number]    Others' Prescriptions  Patient Name: Osman Kohli Date: 1950  Address: [facility] [place] [address] [address]Sam: Male  Rx Written	Rx Dispensed	Drug	Quantity	Days Supply	Prescriber Name	Payment Method	Dispenser  09/15/2020	10/16/2020	zolpidem tartrate 10 mg tablet	30	30	Kerry Mcclain (PA)	Medicare	[id number]  09/15/2020	09/16/2020	zolpidem tartrate 10 mg tablet	30	30	Kerry Mcclain (PA)	Medicare	[facility][id number]  08/17/2020	08/17/2020	zolpidem tartrate 10 mg tablet	30	30	Kerry Mcclain (PA)	Medicare	[facility][id number]  06/15/2020	07/16/2020	zolpidem tartrate 10 mg tablet	30	30	Kerry Mcclain (PA)	Medicare	[facility][id number]  06/15/2020	06/15/2020	zolpidem tartrate 10 mg tablet	30	30	Kerry Mcclain (PA)	Medicare	[facility][id number]  03/18/2020	05/18/2020	zolpidem tartrate 5 mg tablet	30	30	Al Ramirez MD	Medicare	[facility][id number]  03/18/2020	04/17/2020	zolpidem tartrate 5 mg tablet	30	30	Al Ramirez MD	Medicare	[facility][id number]  03/18/2020	03/18/2020	zolpidem tartrate 5 mg tablet	30	30	Al Ramirez MD	Medicare	[facility][id number]  02/05/2020	02/06/2020	zolpidem tartrate 5 mg tablet	30	30	Kerry Mcclain (PA)	[facility] #[id number]  01/02/2020	01/06/2020	zolpidem tartrate 5 mg tablet	30	30	Kerry Mcclain (PA)	Medicare	[facility][id number]  12/09/2019	12/10/2019	zolpidem tartrate 5 mg tablet	30	30	Kerry Mcclain (PA)	Medicare	[facility][id number]  11/06/2019	11/06/2019	zolpidem tartrate 5 mg tablet	30	30	Kerry Mcclain (PA)	Medicare	[facility] #[id number]

## 2020-10-21 NOTE — H&P ADULT - NSHPSOCIALHISTORY_GEN_ALL_CORE
Patient emigrated from [place] 21 years ago. He lives at home with his family. He is a retired  though and spends his free time working in his woodshop with protective mask. Patient is independent of ADLs and walks with AD  Tobacco - former smoker quit 1.5 years ago, 50 pack-year history  ETOH - occasional use  Illicit drugs - denies

## 2020-10-21 NOTE — PROVIDER CONTACT NOTE (OTHER) - RECOMMENDATIONS
Incentive spirometry performed. Chest PT performed. Will continue to monitor. oxygen increased to 6L without relief. oxygen increased to 35% venturi mask, now O2 saturation 90%. Incentive spirometry performed. Chest PT performed. Will continue to monitor.

## 2020-10-21 NOTE — H&P ADULT - ASSESSMENT
Patient is a 69yo M with PMHx HTN, HLD, IDDM, BPH s/p TURP, CAD s/p stent 2007, COPD, and SCLC (dx spring 2019) s/p carboplatin/etoposide and radiation with partial response now on maintenance atezolizumab immunotherapy every 3 weeks (c/b brain metastases now s/p gamma knife radiation therapy with improved brain MRI) who presents with worsening BILLINGS [id number] found to be hypoxic and with interval progression of bilateral lung opacities on CT concerning for infectious vs inflammatory etiology. Patient is a 71yo M with PMHx HTN, HLD, DM2, BPH s/p TURP, CAD s/p stent 2007, COPD, and SCLC (dx spring 2019) s/p carboplatin/etoposide and radiation with partial response now on maintenance atezolizumab immunotherapy every 3 weeks (c/b brain metastases now s/p gamma knife radiation therapy with improved brain MRI) who presents with worsening BILLINGS [id number] found to be hypoxic and with interval progression of bilateral lung opacities on CT concerning for infectious vs inflammatory etiology.

## 2020-10-21 NOTE — H&P ADULT - PROBLEM SELECTOR PLAN 4
-Patient seen by derm for rash as an outpatient who recommended betamethasone ointment which patient has been using without relief  -Consider dermatology consult in AM per family request

## 2020-10-21 NOTE — H&P ADULT - NSHPREVIEWOFSYSTEMS_GEN_ALL_CORE
REVIEW OF SYSTEMS:  CONSTITUTIONAL: Denies fever, night sweats  EYES: Denies eye pain, visual disturbances, or discharge  ENMT:  Denies difficulty hearing, tinnitus, vertigo, sore throat  NECK: Denies pain or stiffness  RESPIRATORY: Denies wheezing, or hemoptysis; +Shortness of breath +Dry cough  CARDIOVASCULAR: Denies chest pain, dizziness, or leg swelling +palpitations  GASTROINTESTINAL: Denies abdominal or epigastric pain. Denies nausea, vomiting, or hematemesis; Denies diarrhea or constipation. Denies melena or hematochezia.  GENITOURINARY: Denies dysuria, urinary urgency or frequency, hematuria, or incontinence  NEUROLOGICAL: Denies headaches, memory loss, loss of strength, numbness, or tremors  SKIN: +Rash as above  LYMPH NODES: Denies enlarged glands  ENDOCRINE: Denies heat or cold intolerance; Denies hair loss  MUSCULOSKELETAL: Denies joint pain or swelling; Denies muscle, back, or extremity pain  PSYCHIATRIC: Denies depression, anxiety, mood swings, +insomnia  HEME/LYMPH: Denies easy bruising, or bleeding gums  ALLERGY AND IMMUNOLOGIC: Denies hives or eczema REVIEW OF SYSTEMS:   CONSTITUTIONAL: Denies fever, night sweats  EYES: Denies eye pain, visual disturbances, or discharge  ENMT:  Denies difficulty hearing, tinnitus, vertigo, sore throat  NECK: Denies pain or stiffness  RESPIRATORY: Denies wheezing, or hemoptysis; +Shortness of breath +Dry cough  CARDIOVASCULAR: Denies chest pain, dizziness, or leg swelling +palpitations  GASTROINTESTINAL: Denies abdominal or epigastric pain. Denies nausea, vomiting, or hematemesis; Denies diarrhea or constipation. Denies melena or hematochezia.  GENITOURINARY: Denies dysuria, urinary urgency or frequency, hematuria, or incontinence  NEUROLOGICAL: Denies headaches, memory loss, loss of strength, numbness, or tremors  SKIN: +Rash as above  LYMPH NODES: Denies enlarged glands  ENDOCRINE: Denies heat or cold intolerance; Denies hair loss  MUSCULOSKELETAL: Denies joint pain or swelling; Denies muscle, back, or extremity pain  PSYCHIATRIC: Denies depression, anxiety, mood swings, +insomnia  HEME/LYMPH: Denies easy bruising, or bleeding gums  ALLERGY AND IMMUNOLOGIC: Denies hives or eczema

## 2020-10-21 NOTE — PHARMACOTHERAPY INTERVENTION NOTE - COMMENTS
Medication history is complete. Medication list updated in Outpatient Medication Record (OMR). Please call spectra [phone] if you have any questions.

## 2020-10-21 NOTE — H&P ADULT - PROBLEM SELECTOR PLAN 5
-Recently started on insulin as outpatient  -Lantus 10u QHS with FS and SSI TIDAC  -Regular diet -Recently started on insulin as outpatient  -Lantus 10u QHS with FS and SSI TIDAC  -Regular diet (consistent carb/dash/tlc)

## 2020-10-21 NOTE — H&P ADULT - PROBLEM SELECTOR PLAN 2
-Patient follows with oncology Dr. Ramirez outpatient  -Oncology aware, will f/u recs  -Atezolizumab maintenance therapy every 3 weeks, holding for now  -Dyspnea workup as above
none

## 2020-10-21 NOTE — H&P ADULT - PROBLEM SELECTOR PLAN 3
-No hx of hospitalizations, does not use home O2  -Duonebs PRN  -Symbicort as interchange for Breo Ellipta  -Maintain goal O2 sat >88%, 88-93% on NC

## 2020-10-21 NOTE — H&P ADULT - HISTORY OF PRESENT ILLNESS
Patient is a 71yo M with PMHx HTN, HLD, BPH s/p TURP, CAD s/p stent 2007, COPD, and SCLC (dx spring 2019) s/p carboplatin/etoposide and radiation with partial response now on maintenance atezolizumab immunotherapy (c/b brain metastases now s/p gamma knife radiation therapy with improved brain MRI) who was sent from oncology clinic for hypoxia. Patient states that he has had worsening dyspnea on exertion over the past month associated with a dry cough not productive of any sputum. Patient is a 69yo M with PMHx HTN, HLD, IDDM, BPH s/p TURP, CAD s/p stent 2007, COPD, and SCLC (dx spring 2019) s/p carboplatin/etoposide and radiation with partial response now on maintenance atezolizumab immunotherapy every 3 weeks (c/b brain metastases now s/p gamma knife radiation therapy with improved brain MRI) who was sent from oncology clinic for hypoxia. Patient states that he has had worsening dyspnea on exertion over the past month associated with a dry cough not productive of any sputum, worse over the past 5 days. He walks about a mile each morning and has noticed progressively earlier fatigue and dyspnea, often associated with palpitations. He denies chest pain, syncope, shortness of breath at rest, orthopnea, lower extremity swelling, recent illness or sick contacts, recent travel. Over the past month, patient has also developed a non-itchy, nontender rash which started on his palms and spread to his trunk and lower extremities. He was seen by dermatology 9/23/20 who started betamethasone cream. Patient's daughter Leena is an NP and occasionally monitors O2 sat with portable pulse ox, and home sats have been as low as 80-82% with an isolated desaturation to 67% during activity. Patient has a history of COPD though has never required hospitalization and does not use home O2. Of note, patient has been followed with surveillance imaging for lung cancer which in August of this year showed bilateral patchy opacities concerning for infectious vs. inflammatory etiology. Outpatient imaging 10/19/20 prior to outpatient oncology appointment on DOA demonstrated interval progression of these opacities.     ED Course:  Vitals: T98.3F, HR , BP 94//61, RR 26 with SpO2 87% RA -> RR 20 with 92% on 4L  In the ED, the patient was found to be hypoxic and started on nasal cannula which was titrated to 4L. CTA Chest was repeated which showed increase in diffuse bilateral peribronchovascular opacities representing likely infectious vs. pneumonitis etiology. Patient is a 71yo M with PMHx HTN, HLD, IDDM, BPH s/p TURP, CAD s/p stent 2007, COPD, and SCLC (dx spring 2019) s/p carboplatin/etoposide and radiation with partial response now on maintenance atezolizumab immunotherapy every 3 weeks (c/b brain metastases now s/p gamma knife radiation therapy with improved brain MRI) who was sent from oncology clinic for hypoxia. He is primarily Irish-speaking though understands and speaks some English, and was examined with translation assistance from his daughter Leena at his request. Patient states that he has had worsening dyspnea on exertion over the past month associated with a dry cough not productive of any sputum, worse over the past 5 days. He walks about a mile each morning and has noticed progressively earlier fatigue and dyspnea, often associated with palpitations. He denies chest pain, syncope, shortness of breath at rest, orthopnea, lower extremity swelling, recent illness or sick contacts, recent travel. Over the past month, patient has also developed a non-itchy, nontender rash which started on his palms and spread to his trunk and lower extremities. He was seen by dermatology 9/23/20 who started betamethasone cream. Patient's daughter Leena is an NP and occasionally monitors O2 sat with portable pulse ox, and home sats have been as low as 80-82% with an isolated desaturation to 67% during activity. Patient has a history of COPD though has never required hospitalization and does not use home O2. Of note, patient has been followed with surveillance imaging for lung cancer which in August of this year showed bilateral patchy opacities concerning for infectious vs. inflammatory etiology. Outpatient imaging 10/19/20 prior to outpatient oncology appointment on DOA demonstrated interval progression of these opacities.     ED Course:  Vitals: T98.3F, HR , BP 94//61, RR 26 with SpO2 87% RA -> RR 20 with 92% on 4L  In the ED, the patient was found to be hypoxic and started on nasal cannula which was titrated to 4L. CTA Chest was repeated which showed increase in diffuse bilateral peribronchovascular opacities representing likely infectious vs. pneumonitis etiology.

## 2020-10-21 NOTE — H&P ADULT - NSHPPHYSICALEXAM_GEN_ALL_CORE
GENERAL: NAD, sitting comfortably  HEAD:  Atraumatic, Normocephalic  EYES: EOMI, PERRL, conjunctiva and sclera clear  ENT: Mucous membranes moist, no pharyngeal erythema  NECK: Supple, No Lymphadenopathy  CHEST/LUNG: Clear to auscultation bilaterally; No wheezes or rhonchi  CV: Regular rate and rhythm, S1/S2 equal; No murmurs, rubs, or gallops; No JVD or hepatojugular reflux  ABDOMEN: Soft, Nontender, Nondistended; Bowel sounds present  EXTREMITIES:  2+ Peripheral Pulses, No clubbing, cyanosis, or edema  PSYCH: AAOx3; affect appropriate  NEUROLOGY: no focal motor or sensory deficits, muscle strength 5/5 bilaterally, quadriceps reflex intact  SKIN: Diffuse nontender nonitching blanching macular rash on lower extremities, buttocks, and trunk with 2-3 open blisters on b/l ankle and few lesions on hands/palms Vital Signs Last 24 Hrs  T(C): 36.7 (21 Oct 2020 16:10), Max: 37.3 (21 Oct 2020 09:50)  T(F): 98.1 (21 Oct 2020 16:10), Max: 99.2 (21 Oct 2020 09:50)  HR: 68 (21 Oct 2020 16:10) (68 - 102)  BP: 113/75 (21 Oct 2020 16:10) (94/73 - 121/61)  BP(mean): 78 (21 Oct 2020 10:51) (78 - 78)  RR: 18 (21 Oct 2020 19:09) (18 - 26)  SpO2: 90% (21 Oct 2020 19:09) (85% - 96%)    GENERAL: NAD, sitting comfortably  HEAD:  Atraumatic, Normocephalic  EYES: EOMI, PERRL, conjunctiva and sclera clear  ENT: Mucous membranes moist, no pharyngeal erythema  NECK: Supple, No Lymphadenopathy  CHEST/LUNG: Clear to auscultation bilaterally; No wheezes or rhonchi. NC in place, speaking in full sentences  CV: Regular rate and rhythm, S1/S2 equal; No murmurs, rubs, or gallops; No JVD or hepatojugular reflux  ABDOMEN: Soft, Nontender, Nondistended; Bowel sounds present  EXTREMITIES:  2+ Peripheral Pulses, No clubbing, cyanosis, or edema  PSYCH: AAOx3; affect appropriate  NEUROLOGY: no focal motor or sensory deficits, muscle strength 5/5 bilaterally, quadriceps reflex intact  SKIN: Diffuse nontender nonitching blanching macular rash on lower extremities, buttocks, and trunk with 2-3 open blisters on b/l ankle and few lesions on hands/palms
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
## 2020-10-21 NOTE — H&P ADULT - NSHPLABSRESULTS_GEN_ALL_CORE
LABS: Personally reviewed labs, imaging, and ECG                          13.4   9.39  )-----------( 345      ( 21 Oct 2020 10:53 )             41.9       10-21    138  |  98  |  17  ----------------------------<  135<H>  4.3   |  23  |  0.95    Ca    9.8      21 Oct 2020 10:53    TPro  6.7  /  Alb  3.9  /  TBili  0.3  /  DBili  x   /  AST  23  /  ALT  13  /  AlkPhos  79  10-21       LIVER FUNCTIONS - ( 21 Oct 2020 10:53 )  Alb: 3.9 g/dL / Pro: 6.7 g/dL / ALK PHOS: 79 u/L / ALT: 13 u/L / AST: 23 u/L / GGT: x                        PT/INR - ( 21 Oct 2020 10:53 )   PT: 12.5 SEC;   INR: 1.10          PTT - ( 21 Oct 2020 10:53 )  PTT:26.0 SEC    Lactate Trend            CAPILLARY BLOOD GLUCOSE      POCT Blood Glucose.: 259 mg/dL (21 Oct 2020 17:20)        RADIOLOGY & ADDITIONAL TESTS:   < from: CT Angio Chest w/ IV Cont (10.21.20 @ 13:48) >    IMPRESSION:    No pulmonary embolus.    Interval increase of diffuse bilateral peribronchovascular opacities which may represent pneumonitis or infection.    < end of copied text >

## 2020-10-21 NOTE — ED PROVIDER NOTE - PROGRESS NOTE DETAILS
ARCENIO Tipton- spoke with Dr. Ramirez's PA onoclogy, states pt had outpt CT today noncontrast that showed b/l opacities concerning for infection vs inflammatory and possible COVID pneumonia. plan to admit patient

## 2020-10-21 NOTE — H&P ADULT - ATTENDING COMMENTS
Patient seen and examined, d/w Dr. Ibanez, agree with above.     69 yo M w/ COPD, lung ca (SCLC) w/ mets to the brain, currently on immunotherapy, admitted for acute hypoxic respiratory failure, respiratory status currently improving with addition of supplemental O2. Sami translation provided by daughter Leena per patient request (daughter is NP in the medicine clinic [facility]). Patient currently comfortable. CTA negative for PE but with findings concerning for pneumonitis (?related to immunotherapy?) vs infection. Obtaining sputum culture, fungitell, quant gold, LDH as per pulm, they may consider bronchoscopy, will f/u further recs. Initial COVID negative, repeat swab pending (denies sick contacts/recent travel). Patient afebrile, without leukocytosis, non-toxic appearing, monitoring patient off antibiotics at this time, will reassess if clinical status changes. Will followup further pulm and heme/onc recs. Daughter reports patient's baseline O2 sat in low 90s, is not on home O2. Patient and wife hopeful for continued improvement in clinical status.

## 2020-10-21 NOTE — ED PROVIDER NOTE - CLINICAL SUMMARY MEDICAL DECISION MAKING FREE TEXT BOX
69 y/o male with pmhx of lung ca with mets to brain, DM, sent to ED by oncologist for hypoxia. Pt states he is not on oxygen at home. His baseline o2 is between 92 and 94. Patient c/o worsening dry cough x 2 months and increased SOB when he walks a mile a day. pt well appearing, sating 92% on RA, not tachypneic, not tachycardic, stable. plan to check labs, CTA r/o PE, ekg, oncology consult, likely admit for hypoxia.

## 2020-10-21 NOTE — ED ADULT TRIAGE NOTE - CHIEF COMPLAINT QUOTE
Arrives from home with wife pt was sent by oncologist for hypoxia. Pt has known lung ca, finished chemo and radiation last year now on immunotherapy. As per wife, pt has been more SOB and oxygen saturation at home has been 86-88% x 2 weeks. pt tachypneic in triage, placed on supplemental O2. Denies other PMH

## 2020-10-21 NOTE — ED ADULT NURSE NOTE - OBJECTIVE STATEMENT
Pt. is A&Ox3 presents to ER c/o SOB & weakness x 2 weeks. Pt. has a history of HTN, prediabetes, lung CA mets to brain, and HLD. Pt. was following up with oncologist, was told by MD to come to ER to rule out infection due to hypoxia. Pt. placed on cardiac monitoring, NSR at this time, respirations even and labored, 93% on 2.5LNC, abdomen nontender and nondistended, noted hives all over body due to immunotherapy currently on medication for hives otherwise skin intact. 20G IV obtained in RAC, bloods obtained, flushing without resistance, dressing dry and intact. Pt. is A&Ox3 presents to ER c/o SOB, dry nonproductive cough, and weakness x 2 weeks. Pt. has a history of HTN, prediabetes, lung CA mets to brain, and HLD. Pt. was following up with oncologist, was told by MD to come to ER to rule out infection due to hypoxia. Pt. placed on cardiac monitoring, NSR at this time, respirations even and labored, 93% on 2.5LNC, abdomen nontender and nondistended, noted hives all over body due to immunotherapy currently on medication for hives otherwise skin intact. 20G IV obtained in RAC, bloods obtained, flushing without resistance, dressing dry and intact.

## 2020-10-22 DIAGNOSIS — L28.0 LICHEN SIMPLEX CHRONICUS: ICD-10-CM

## 2020-10-22 LAB
ALBUMIN SERPL ELPH-MCNC: 3.3 G/DL — SIGNIFICANT CHANGE UP (ref 3.3–5)
ALP SERPL-CCNC: 69 U/L — SIGNIFICANT CHANGE UP (ref 40–120)
ALT FLD-CCNC: 13 U/L — SIGNIFICANT CHANGE UP (ref 4–41)
ANION GAP SERPL CALC-SCNC: 10 MMO/L — SIGNIFICANT CHANGE UP (ref 7–14)
AST SERPL-CCNC: 17 U/L — SIGNIFICANT CHANGE UP (ref 4–40)
BASOPHILS # BLD AUTO: 0.05 K/UL — SIGNIFICANT CHANGE UP (ref 0–0.2)
BASOPHILS NFR BLD AUTO: 0.8 % — SIGNIFICANT CHANGE UP (ref 0–2)
BILIRUB SERPL-MCNC: 0.3 MG/DL — SIGNIFICANT CHANGE UP (ref 0.2–1.2)
BUN SERPL-MCNC: 13 MG/DL — SIGNIFICANT CHANGE UP (ref 7–23)
CALCIUM SERPL-MCNC: 9.2 MG/DL — SIGNIFICANT CHANGE UP (ref 8.4–10.5)
CHLORIDE SERPL-SCNC: 98 MMOL/L — SIGNIFICANT CHANGE UP (ref 98–107)
CO2 SERPL-SCNC: 26 MMOL/L — SIGNIFICANT CHANGE UP (ref 22–31)
CREAT SERPL-MCNC: 0.93 MG/DL — SIGNIFICANT CHANGE UP (ref 0.5–1.3)
EOSINOPHIL # BLD AUTO: 0.24 K/UL — SIGNIFICANT CHANGE UP (ref 0–0.5)
EOSINOPHIL NFR BLD AUTO: 3.9 % — SIGNIFICANT CHANGE UP (ref 0–6)
GLUCOSE SERPL-MCNC: 155 MG/DL — HIGH (ref 70–99)
GRAM STN FLD: SIGNIFICANT CHANGE UP
HBA1C BLD-MCNC: 6.9 % — HIGH (ref 4–5.6)
HCT VFR BLD CALC: 39.2 % — SIGNIFICANT CHANGE UP (ref 39–50)
HGB BLD-MCNC: 12.2 G/DL — LOW (ref 13–17)
IMM GRANULOCYTES NFR BLD AUTO: 0.6 % — SIGNIFICANT CHANGE UP (ref 0–1.5)
LACTATE BLDV-MCNC: 1.3 MMOL/L — SIGNIFICANT CHANGE UP (ref 0.5–2)
LDH SERPL L TO P-CCNC: 234 U/L — HIGH (ref 135–225)
LYMPHOCYTES # BLD AUTO: 0.32 K/UL — LOW (ref 1–3.3)
LYMPHOCYTES # BLD AUTO: 5.2 % — LOW (ref 13–44)
MAGNESIUM SERPL-MCNC: 1.5 MG/DL — LOW (ref 1.6–2.6)
MCHC RBC-ENTMCNC: 29.1 PG — SIGNIFICANT CHANGE UP (ref 27–34)
MCHC RBC-ENTMCNC: 31.1 % — LOW (ref 32–36)
MCV RBC AUTO: 93.6 FL — SIGNIFICANT CHANGE UP (ref 80–100)
MONOCYTES # BLD AUTO: 0.81 K/UL — SIGNIFICANT CHANGE UP (ref 0–0.9)
MONOCYTES NFR BLD AUTO: 13 % — SIGNIFICANT CHANGE UP (ref 2–14)
NEUTROPHILS # BLD AUTO: 4.75 K/UL — SIGNIFICANT CHANGE UP (ref 1.8–7.4)
NEUTROPHILS NFR BLD AUTO: 76.5 % — SIGNIFICANT CHANGE UP (ref 43–77)
NRBC # FLD: 0 K/UL — SIGNIFICANT CHANGE UP (ref 0–0)
PHOSPHATE SERPL-MCNC: 3.6 MG/DL — SIGNIFICANT CHANGE UP (ref 2.5–4.5)
PLATELET # BLD AUTO: 286 K/UL — SIGNIFICANT CHANGE UP (ref 150–400)
PMV BLD: 9.4 FL — SIGNIFICANT CHANGE UP (ref 7–13)
POTASSIUM SERPL-MCNC: 4.1 MMOL/L — SIGNIFICANT CHANGE UP (ref 3.5–5.3)
POTASSIUM SERPL-SCNC: 4.1 MMOL/L — SIGNIFICANT CHANGE UP (ref 3.5–5.3)
PROT SERPL-MCNC: 6.2 G/DL — SIGNIFICANT CHANGE UP (ref 6–8.3)
RBC # BLD: 4.19 M/UL — LOW (ref 4.2–5.8)
RBC # FLD: 15.4 % — HIGH (ref 10.3–14.5)
SODIUM SERPL-SCNC: 134 MMOL/L — LOW (ref 135–145)
SPECIMEN SOURCE: SIGNIFICANT CHANGE UP
WBC # BLD: 6.21 K/UL — SIGNIFICANT CHANGE UP (ref 3.8–10.5)
WBC # FLD AUTO: 6.21 K/UL — SIGNIFICANT CHANGE UP (ref 3.8–10.5)

## 2020-10-22 PROCEDURE — 99223 1ST HOSP IP/OBS HIGH 75: CPT | Mod: GC

## 2020-10-22 PROCEDURE — 99233 SBSQ HOSP IP/OBS HIGH 50: CPT | Mod: GC

## 2020-10-22 PROCEDURE — 99232 SBSQ HOSP IP/OBS MODERATE 35: CPT | Mod: GC

## 2020-10-22 PROCEDURE — 99222 1ST HOSP IP/OBS MODERATE 55: CPT

## 2020-10-22 RX ADMIN — INSULIN GLARGINE 10 UNIT(S): 100 INJECTION, SOLUTION SUBCUTANEOUS at 21:11

## 2020-10-22 RX ADMIN — Medication 81 MILLIGRAM(S): at 09:59

## 2020-10-22 RX ADMIN — Medication 1 GRAM(S): at 09:59

## 2020-10-22 RX ADMIN — ATORVASTATIN CALCIUM 40 MILLIGRAM(S): 80 TABLET, FILM COATED ORAL at 21:12

## 2020-10-22 RX ADMIN — Medication 1 APPLICATION(S): at 18:12

## 2020-10-22 RX ADMIN — ENOXAPARIN SODIUM 40 MILLIGRAM(S): 100 INJECTION SUBCUTANEOUS at 09:59

## 2020-10-22 RX ADMIN — BUDESONIDE AND FORMOTEROL FUMARATE DIHYDRATE 2 PUFF(S): 160; 4.5 AEROSOL RESPIRATORY (INHALATION) at 09:59

## 2020-10-22 RX ADMIN — FAMOTIDINE 20 MILLIGRAM(S): 10 INJECTION INTRAVENOUS at 05:43

## 2020-10-22 RX ADMIN — Medication 1: at 21:12

## 2020-10-22 RX ADMIN — Medication 50 MILLIGRAM(S): at 05:42

## 2020-10-22 RX ADMIN — Medication 1000 MILLIGRAM(S): at 21:12

## 2020-10-22 RX ADMIN — BUDESONIDE AND FORMOTEROL FUMARATE DIHYDRATE 2 PUFF(S): 160; 4.5 AEROSOL RESPIRATORY (INHALATION) at 21:12

## 2020-10-22 RX ADMIN — FAMOTIDINE 20 MILLIGRAM(S): 10 INJECTION INTRAVENOUS at 18:13

## 2020-10-22 RX ADMIN — MAGNESIUM OXIDE 400 MG ORAL TABLET 400 MILLIGRAM(S): 241.3 TABLET ORAL at 09:59

## 2020-10-22 NOTE — PROGRESS NOTE ADULT - PROBLEM SELECTOR PLAN 5
-Recently started on insulin as outpatient  -Lantus 10u QHS with FS and SSI TIDAC  -Regular diet (consistent carb/dash/tlc) -Recently started on insulin as outpatient  -Lantus 10u QHS with FS and SSI TIDAC  - monitor FS and adjust regimen as needed  -Regular diet (consistent carb/dash/tlc)  - A1C 6.9

## 2020-10-22 NOTE — PROGRESS NOTE ADULT - ATTENDING COMMENTS
Patient seen and examined, d/w Dr. Ibanez, agree with above.     Ukrainian translation provided by daughter Leena per patient request.  Patient transitioned from ventimask back to NC, patient reports breathing currently improved. Is enjoying his lunch. Case was d/w Dr. Coffey (pulmonary), workup ongoing, will plan for bronch tomorrow for further evaluation, NPO after midnight. Dermatology evaluation appreciated, will continue topical treatments as per recs. Daughter Leena and patient in agreement with plan. Will continue to monitor patient off antibiotics. Hypomagnesemia - replete Mg.

## 2020-10-22 NOTE — CONSULT NOTE ADULT - ASSESSMENT
Patient is a 71 yo M w/ HTN, HLD, DM, BPH s/p TURP, CAD s/p stent 2007, COPD, and SCLC (dx spring 2019) s/p carboplatin/etoposide and radiation with partial response now on maintenance atezolizumab immunotherapy every 3 weeks (c/b brain metastases now s/p gamma knife radiation therapy with improved brain MRI) admitted on 10/21 after being found to be hypoxemic at oncology clinic. As per patient and chart review, endorses worsening BILLINGS and dry cough x 1 month. On image review, patient has had progression of peribronchovascular opacities since CT chest 8/31/2020 with marked progression between CT from 10/19/2020 and 10/21/2020.

## 2020-10-22 NOTE — CONSULT NOTE ADULT - PROBLEM SELECTOR RECOMMENDATION 9
Progressive peribronchovascular opacities on CT chest over last 7 weeks accompanied by worsening BILLINGS and dry cough. Also with b/l shin nodular rash. No fevers or leukocytosis. Differential includes atypical infection in setting of relative immunocompromise (fungal, PCP) vs pneumonitis (3% w/ atezolizumab, although atypical distribution) vs progression of disease vs overlapping inflammatory lung disease  - Plan for Bronch w/ BAL and transbronchial biopsy 10/23 at noon  - Please make NPO after MN tonight and hold Lovenox  - f/u Fungitell and Quant gold  - f/u sputum cultures  - Recommend derm eval for rash biopsy    Shad Coffey MD  Pulmonary & Critical Care Fellow  [phone]

## 2020-10-22 NOTE — PROGRESS NOTE ADULT - PROBLEM SELECTOR PLAN 3
-No hx of hospitalizations, does not use home O2  -Roxy PRN  -Symbicort as interchange for Breo Ellipta  -Maintain goal O2 sat >88%, 88-93% on NC  -Will likely qualify for home O2 on discharge -No hx of hospitalizations, does not use home O2  -Roxy PRN  -Symbicort as interchange for Breo Ellipta  -Maintain goal O2 sat >88%, 88-93% on NC  -Will determine need for home O2 prior to discharge

## 2020-10-22 NOTE — PROGRESS NOTE ADULT - SUBJECTIVE AND OBJECTIVE BOX
Modesto Ibanez MD  Pager [phone]    Patient is a 70y old  Male who presents with a chief complaint of Hypoxia, abnormal CT (22 Oct 2020 10:49)      SUBJECTIVE / OVERNIGHT EVENTS:  Patient was seen and examined at bedside this morning. No acute events overnight.  ID#[id number]. Patient was placed on venturi mask 35% yesterday evening for saturations in the low 80s and has had recurrent desats with exertion. Transitioned back to 6L NC this morning with stable sats >88%. Patient reports feeling comfortable today. He states that SOB is improved and he does not have cough today. Denies fever, chills, chest pain, n/v/d. Counseled patient on plan for bronch and further infectious workup. Plan to update daughter NP Leena today.     ADDITIONAL REVIEW OF SYSTEMS: Otherwise negative.    MEDICATIONS  (STANDING):  aspirin enteric coated 81 milliGRAM(s) Oral daily  atorvastatin 40 milliGRAM(s) Oral at bedtime  budesonide 160 MICROgram(s)/formoterol 4.5 MICROgram(s) Inhaler 2 Puff(s) Inhalation two times a day  dextrose 5%. 1000 milliLiter(s) (50 mL/Hr) IV Continuous <Continuous>  dextrose 50% Injectable 12.5 Gram(s) IV Push once  dextrose 50% Injectable 25 Gram(s) IV Push once  dextrose 50% Injectable 25 Gram(s) IV Push once  famotidine    Tablet 20 milliGRAM(s) Oral two times a day  insulin glargine Injectable (LANTUS) 10 Unit(s) SubCutaneous at bedtime  insulin lispro (ADMELOG) corrective regimen sliding scale   SubCutaneous three times a day before meals  insulin lispro (ADMELOG) corrective regimen sliding scale   SubCutaneous at bedtime  magnesium oxide 400 milliGRAM(s) Oral daily  metoprolol succinate ER 50 milliGRAM(s) Oral daily  niacin SR 1000 milliGRAM(s) Oral at bedtime  omega-3-Acid Ethyl Esters 1 Gram(s) Oral daily  sodium chloride 0.9%. 1000 milliLiter(s) (75 mL/Hr) IV Continuous <Continuous>    MEDICATIONS  (PRN):  albuterol/ipratropium for Nebulization 3 milliLiter(s) Nebulizer every 4 hours PRN Shortness of Breath and/or Wheezing  dextrose 40% Gel 15 Gram(s) Oral once PRN Blood Glucose LESS THAN 70 milliGRAM(s)/deciliter  glucagon  Injectable 1 milliGRAM(s) IntraMuscular once PRN Glucose LESS THAN 70 milligrams/deciliter  zolpidem 5 milliGRAM(s) Oral at bedtime PRN Insomnia  zolpidem 5 milliGRAM(s) Oral at bedtime PRN Insomnia      CAPILLARY BLOOD GLUCOSE  149 (22 Oct 2020 07:39)      POCT Blood Glucose.: 137 mg/dL (22 Oct 2020 11:50)  POCT Blood Glucose.: 149 mg/dL (22 Oct 2020 07:39)  POCT Blood Glucose.: 149 mg/dL (21 Oct 2020 21:01)  POCT Blood Glucose.: 259 mg/dL (21 Oct 2020 17:20)    I&O's Summary      PHYSICAL EXAM:  Vital Signs Last 24 Hrs  T(C): 36.6 (22 Oct 2020 05:40), Max: 37.1 (21 Oct 2020 21:15)  T(F): 97.9 (22 Oct 2020 05:40), Max: 98.7 (21 Oct 2020 21:15)  HR: 67 (22 Oct 2020 05:40) (67 - 88)  BP: 109/55 (22 Oct 2020 05:40) (108/61 - 114/57)  BP(mean): --  RR: 16 (22 Oct 2020 10:52) (16 - 20)  SpO2: 95% (22 Oct 2020 10:52) (85% - 95%)    GENERAL: NAD, sitting comfortably  HEAD:  Atraumatic, Normocephalic  EYES: EOMI, PERRL, conjunctiva and sclera clear  ENT: Mucous membranes moist, no pharyngeal erythema  NECK: Supple, No Lymphadenopathy  CHEST/LUNG: Clear to auscultation bilaterally; No wheezes or rhonchi. NC in place, speaking in full sentences  CV: Regular rate and rhythm, S1/S2 equal; No murmurs, rubs, or gallops; No JVD or hepatojugular reflux  ABDOMEN: Soft, Nontender, Nondistended; Bowel sounds present  EXTREMITIES:  2+ Peripheral Pulses, No clubbing, cyanosis, or edema  PSYCH: AAOx3; affect appropriate  NEUROLOGY: no focal motor or sensory deficits, muscle strength 5/5 bilaterally, quadriceps reflex intact  SKIN: Diffuse nontender nonitching blanching macular rash on lower extremities, buttocks, and trunk with 2-3 open blisters on b/l ankle and few lesions on hands/palms    LABS:                        12.2   6.21  )-----------( 286      ( 22 Oct 2020 05:30 )             39.2     10-22    134<L>  |  98  |  13  ----------------------------<  155<H>  4.1   |  26  |  0.93    Ca    9.2      22 Oct 2020 05:30  Phos  3.6     10-22  Mg     1.5     10-22    TPro  6.2  /  Alb  3.3  /  TBili  0.3  /  DBili  x   /  AST  17  /  ALT  13  /  AlkPhos  69  10-22    PT/INR - ( 21 Oct 2020 10:53 )   PT: 12.5 SEC;   INR: 1.10          PTT - ( 21 Oct 2020 10:53 )  PTT:26.0 SEC          Culture - Sputum (collected 22 Oct 2020 00:21)  Source: .Sputum Sputum  Gram Stain (22 Oct 2020 07:52):    Moderate polymorphonuclear leukocytes per low power field    Rare Squamous epithelial cells per low power field    Numerous Gram Negative Rods seen per oil power field    Numerous Gram positive cocci in pairs seen per oil power field        RADIOLOGY & ADDITIONAL TESTS:  < from: CT Angio Chest w/ IV Cont (10.21.20 @ 13:48) >  IMPRESSION:    No pulmonary embolus.    Interval increase of diffuse bilateral peribronchovascular opacities which may represent pneumonitis or infection.      < end of copied text >   Modesto Ibanez MD  Pager [phone]    Patient is a 70y old  Male who presents with a chief complaint of Hypoxia, abnormal CT (22 Oct 2020 10:49)      SUBJECTIVE / OVERNIGHT EVENTS:  Patient was seen and examined at bedside this morning. No acute events overnight.  ID#[id number]. Patient was placed on venturi mask 35% yesterday evening for saturations in the low 80s and has had recurrent desats with exertion. Transitioned back to 6L NC this morning with stable sats >88%. Patient reports feeling comfortable today. He states that SOB is improved and he does not have cough today. Denies fever, chills, chest pain, n/v/d. Counseled patient on plan for bronch and further infectious workup. Plan to update daughter NP Leena today.     ADDITIONAL REVIEW OF SYSTEMS: Otherwise negative.    MEDICATIONS  (STANDING):  aspirin enteric coated 81 milliGRAM(s) Oral daily  atorvastatin 40 milliGRAM(s) Oral at bedtime  budesonide 160 MICROgram(s)/formoterol 4.5 MICROgram(s) Inhaler 2 Puff(s) Inhalation two times a day  dextrose 5%. 1000 milliLiter(s) (50 mL/Hr) IV Continuous <Continuous>  dextrose 50% Injectable 12.5 Gram(s) IV Push once  dextrose 50% Injectable 25 Gram(s) IV Push once  dextrose 50% Injectable 25 Gram(s) IV Push once  famotidine    Tablet 20 milliGRAM(s) Oral two times a day  insulin glargine Injectable (LANTUS) 10 Unit(s) SubCutaneous at bedtime  insulin lispro (ADMELOG) corrective regimen sliding scale   SubCutaneous three times a day before meals  insulin lispro (ADMELOG) corrective regimen sliding scale   SubCutaneous at bedtime  magnesium oxide 400 milliGRAM(s) Oral daily  metoprolol succinate ER 50 milliGRAM(s) Oral daily  niacin SR 1000 milliGRAM(s) Oral at bedtime  omega-3-Acid Ethyl Esters 1 Gram(s) Oral daily  sodium chloride 0.9%. 1000 milliLiter(s) (75 mL/Hr) IV Continuous <Continuous>    MEDICATIONS  (PRN):  albuterol/ipratropium for Nebulization 3 milliLiter(s) Nebulizer every 4 hours PRN Shortness of Breath and/or Wheezing  dextrose 40% Gel 15 Gram(s) Oral once PRN Blood Glucose LESS THAN 70 milliGRAM(s)/deciliter  glucagon  Injectable 1 milliGRAM(s) IntraMuscular once PRN Glucose LESS THAN 70 milligrams/deciliter  zolpidem 5 milliGRAM(s) Oral at bedtime PRN Insomnia  zolpidem 5 milliGRAM(s) Oral at bedtime PRN Insomnia      CAPILLARY BLOOD GLUCOSE  149 (22 Oct 2020 07:39)      POCT Blood Glucose.: 137 mg/dL (22 Oct 2020 11:50)  POCT Blood Glucose.: 149 mg/dL (22 Oct 2020 07:39)  POCT Blood Glucose.: 149 mg/dL (21 Oct 2020 21:01)  POCT Blood Glucose.: 259 mg/dL (21 Oct 2020 17:20)    I&O's Summary      PHYSICAL EXAM:  Vital Signs Last 24 Hrs  T(C): 36.6 (22 Oct 2020 05:40), Max: 37.1 (21 Oct 2020 21:15)  T(F): 97.9 (22 Oct 2020 05:40), Max: 98.7 (21 Oct 2020 21:15)  HR: 67 (22 Oct 2020 05:40) (67 - 88)  BP: 109/55 (22 Oct 2020 05:40) (108/61 - 114/57)  BP(mean): --  RR: 16 (22 Oct 2020 10:52) (16 - 20)  SpO2: 95% (22 Oct 2020 10:52) (85% - 95%)    GENERAL: NAD, sitting comfortably  HEAD:  Atraumatic, Normocephalic  EYES: EOMI, PERRL, conjunctiva and sclera clear  ENT: Mucous membranes moist, no pharyngeal erythema  NECK: Supple, No Lymphadenopathy  CHEST/LUNG: Clear to auscultation bilaterally; No wheezes or rhonchi. NC in place, speaking in full sentences  CV: Regular rate and rhythm, S1/S2 equal; No murmurs, rubs, or gallops; No JVD or hepatojugular reflux  ABDOMEN: Soft, Nontender, Nondistended; Bowel sounds present  EXTREMITIES:  2+ Peripheral Pulses, No clubbing, cyanosis, or edema  PSYCH: AAOx3; affect appropriate  NEUROLOGY: no focal motor or sensory deficits, muscle strength 5/5 bilaterally, quadriceps reflex intact  SKIN: Diffuse nontender nonitching blanching macular rash on lower extremities, buttocks, and trunk with 2-3 open blisters on b/l ankle and few lesions on hands/palms    LABS:                        12.2   6.21  )-----------( 286      ( 22 Oct 2020 05:30 )             39.2     10-22    134<L>  |  98  |  13  ----------------------------<  155<H>  4.1   |  26  |  0.93    Ca    9.2      22 Oct 2020 05:30  Phos  3.6     10-22  Mg     1.5     10-22    TPro  6.2  /  Alb  3.3  /  TBili  0.3  /  DBili  x   /  AST  17  /  ALT  13  /  AlkPhos  69  10-22    PT/INR - ( 21 Oct 2020 10:53 )   PT: 12.5 SEC;   INR: 1.10          PTT - ( 21 Oct 2020 10:53 )  PTT:26.0 SEC          Culture - Sputum (collected 22 Oct 2020 00:21)  Source: .Sputum Sputum  Gram Stain (22 Oct 2020 07:52):    Moderate polymorphonuclear leukocytes per low power field    Rare Squamous epithelial cells per low power field    Numerous Gram Negative Rods seen per oil power field    Numerous Gram positive cocci in pairs seen per oil power field    Consultant notes reviewed: Pulm, Heme/Onc, Dermatology    Providers d/w: Dr. Coffey (pulm) re: workup and plans for bronch    RADIOLOGY & ADDITIONAL TESTS:  < from: CT Angio Chest w/ IV Cont (10.21.20 @ 13:48) >  IMPRESSION:    No pulmonary embolus.    Interval increase of diffuse bilateral peribronchovascular opacities which may represent pneumonitis or infection.      < end of copied text >

## 2020-10-22 NOTE — CONSULT NOTE ADULT - ASSESSMENT
Patient with extensive stage small cell lung cancer who has been on maintenance immunotherapy with Atezolizumab, p/w worsening SOB and hypoxia to mid-80’s on RA.   CT chest with b/l diffuse opacities concerning for infection vs pneumonitis, on immune checkpoint inhibitor therapy.    -Pulm consult   Patient with extensive stage small cell lung cancer who has been on maintenance immunotherapy with Atezolizumab, p/w worsening SOB and hypoxia to mid-80’s on RA.   CT chest with b/l diffuse opacities concerning for infection vs pneumonitis, on immune checkpoint inhibitor therapy.    -Pulm consult appreciated. Plan is for bronch tomorrow  -Holding off on steroids for treatment of possible pneumonitis given pt is stable and comfortable until bronch results are back  -Consdier Covid IgG  -Derm Consult  -Supportive care, pain control, Nutrition, PT, DVT ppx  -Outpatient oncology f/u    Will follow. Please do not hesitate to call back with questions.     Kirsten Phipps MD  Medical Oncology Attending  C: [phone]

## 2020-10-22 NOTE — PROGRESS NOTE ADULT - ASSESSMENT
Patient is a 69yo M with PMHx HTN, HLD, DM2, BPH s/p TURP, CAD s/p stent 2007, COPD, and SCLC (dx spring 2019) s/p carboplatin/etoposide and radiation with partial response now on maintenance atezolizumab immunotherapy every 3 weeks (c/b brain metastases now s/p gamma knife radiation therapy with improved brain MRI) who presents with worsening BILLINGS [id number] found to be hypoxic and with interval progression of bilateral lung opacities on CT concerning for infectious vs inflammatory etiology.

## 2020-10-22 NOTE — PROGRESS NOTE ADULT - PROBLEM SELECTOR PLAN 2
-Patient follows with oncology Dr. Ramirez outpatient  -Oncology aware, will f/u recs  -Atezolizumab maintenance therapy every 3 weeks, holding for now  -Dyspnea workup as above -Patient follows with oncology Dr. Ramirez outpatient  -Oncology aware, appreciate recs  -Atezolizumab maintenance therapy every 3 weeks, holding for now  -Dyspnea workup as above

## 2020-10-22 NOTE — CONSULT NOTE ADULT - SUBJECTIVE AND OBJECTIVE BOX
CHIEF COMPLAINT:    HPI: Patient is a 69 yo M w/ HTN, HLD, DM, BPH s/p TURP, CAD s/p stent 2007, COPD, and SCLC (dx spring 2019) s/p carboplatin/etoposide and radiation with partial response now on maintenance atezolizumab immunotherapy every 3 weeks (c/b brain metastases now s/p gamma knife radiation therapy with improved brain MRI) admitted on 10/21 after being found to be hypoxemic at oncology clinic. As per patient and chart review, endorses worsening BILLINGS and dry cough x 1 month. On image review, patient has had progression of peribronchovascular opacities since CT chest 8/31/2020 with marked progression between CT from 10/19/2020 and 10/21/2020. Labs notable for no leukocytosis, mildly elevated , RVP negative, sputum cultures pending, RVP negative, COVID negative. Quant Gold and Fungitell pending.     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)    Renal calculus  s/p laser litho    Stented coronary artery  2009 - BMS to RCA    History of tonsillectomy        FAMILY HISTORY:  FH: liver cancer    FH: HTN (hypertension)        SOCIAL HISTORY:  Smoking: [ ] Never Smoked [X] Former Smoker (50 pack years, quit 1.5 years ago) [ ] Current Smoker  (__ packs x ___ years)  Substance Use: [X] Never Used [ ] Used ____  EtOH Use: Occasional  Marital Status: [ ] Single [ ]  [ ]  [ ]   Sexual History:   Occupation:  Recent Travel: Retired   Country of Birth:  Advance Directives:    Allergies    No Known Allergies    Intolerances        HOME MEDICATIONS:  Home Medications:  Aspirin Enteric Coated 81 mg oral delayed release tablet: 1 tab(s) orally once a day (21 Oct 2020 17:58)  atorvastatin 40 mg oral tablet: 1 tab(s) orally once a day (21 Oct 2020 17:58)  Basaglar KwikPen 100 units/mL subcutaneous solution: 10 unit(s) subcutaneous once a day (at bedtime) (21 Oct 2020 17:58)  betamethasone dipropionate, augmented 0.05% topical ointment: Apply topically to affected area sparingly 2 times a day (21 Oct 2020 17:58)  Breo Ellipta 200 mcg-25 mcg/inh inhalation powder: 1 puff(s) inhaled once a day (21 Oct 2020 17:58)  famotidine 20 mg oral tablet: 1 tab(s) orally 2 times a day (21 Oct 2020 17:58)  Fish Oil 1000 mg oral capsule: 1 cap(s) orally once a day  OTC supplement (21 Oct 2020 17:58)  Januvia 100 mg oral tablet: 1 tab(s) orally once a day (21 Oct 2020 17:58)  lisinopril 20 mg oral tablet: 1 tab(s) orally once a day (21 Oct 2020 17:58)  magnesium oxide 400 mg (240 mg elemental magnesium) oral tablet: 1 tab(s) orally in the morning  2 tab(s) orally in the afternoon  1 tab(s) orally in the evening (21 Oct 2020 17:58)  metFORMIN 1000 mg oral tablet: 1 tab(s) orally 2 times a day (21 Oct 2020 17:58)  Metoprolol Succinate ER 50 mg oral tablet, extended release: 1 tab(s) orally once a day (21 Oct 2020 17:58)  niacin 500 mg oral tablet, extended release: 2 tab(s) orally once a day (at bedtime) (21 Oct 2020 17:58)  Ventolin HFA 90 mcg/inh inhalation aerosol: 1 to 2 puff(s) inhaled every 4 to 6 hours, As Needed (21 Oct 2020 17:58)  Vitamin C 500 mg oral tablet: 1 tab(s) orally once a day (21 Oct 2020 17:58)  zolpidem 10 mg oral tablet: 1 tab(s) orally once a day (at bedtime)    ISTOP Reference #: [id number]  Quantity of 30 tablets for a 30-day supply dispensed 10/16/2020 (21 Oct 2020 17:58)      REVIEW OF SYSTEMS:  Constitutional: [ ] negative [ ] fevers [ ] chills [ ] weight loss [ ] weight gain  HEENT: [ ] negative [ ] dry eyes [ ] eye irritation [ ] postnasal drip [ ] nasal congestion  CV: [ ] negative  [ ] chest pain [ ] orthopnea [ ] palpitations [ ] murmur  Resp: [ ] negative [ ] cough [ ] shortness of breath [ ] dyspnea [ ] wheezing [ ] sputum [ ] hemoptysis  GI: [ ] negative [ ] nausea [ ] vomiting [ ] diarrhea [ ] constipation [ ] abd pain [ ] dysphagia   : [ ] negative [ ] dysuria [ ] nocturia [ ] hematuria [ ] increased urinary frequency  Musculoskeletal: [ ] negative [ ] back pain [ ] myalgias [ ] arthralgias [ ] fracture  Skin: [ ] negative [ ] rash [ ] itch  Neurological: [ ] negative [ ] headache [ ] dizziness [ ] syncope [ ] weakness [ ] numbness  Psychiatric: [ ] negative [ ] anxiety [ ] depression  Endocrine: [ ] negative [ ] diabetes [ ] thyroid problem  Hematologic/Lymphatic: [ ] negative [ ] anemia [ ] bleeding problem  Allergic/Immunologic: [ ] negative [ ] itchy eyes [ ] nasal discharge [ ] hives [ ] angioedema  [ ] All other systems negative  [ ] Unable to assess ROS because ________    OBJECTIVE:  ICU Vital Signs Last 24 Hrs  T(C): 36.6 (22 Oct 2020 05:40), Max: 37.3 (21 Oct 2020 09:50)  T(F): 97.9 (22 Oct 2020 05:40), Max: 99.2 (21 Oct 2020 09:50)  HR: 67 (22 Oct 2020 05:40) (67 - 102)  BP: 109/55 (22 Oct 2020 05:40) (94/73 - 121/61)  BP(mean): 78 (21 Oct 2020 10:51) (78 - 78)  ABP: --  ABP(mean): --  RR: 18 (22 Oct 2020 05:40) (18 - 26)  SpO2: 91% (22 Oct 2020 05:40) (85% - 96%)        CAPILLARY BLOOD GLUCOSE  149 (22 Oct 2020 07:39)      POCT Blood Glucose.: 149 mg/dL (22 Oct 2020 07:39)      PHYSICAL EXAM:  General:   HEENT:   Lymph Nodes:  Neck:   Respiratory:   Cardiovascular:   Abdomen:   Extremities:   Skin:   Neurological:  Psychiatry:    LINES:     HOSPITAL MEDICATIONS:  Standing Meds:  aspirin enteric coated 81 milliGRAM(s) Oral daily  atorvastatin 40 milliGRAM(s) Oral at bedtime  budesonide 160 MICROgram(s)/formoterol 4.5 MICROgram(s) Inhaler 2 Puff(s) Inhalation two times a day  dextrose 5%. 1000 milliLiter(s) IV Continuous <Continuous>  dextrose 50% Injectable 12.5 Gram(s) IV Push once  dextrose 50% Injectable 25 Gram(s) IV Push once  dextrose 50% Injectable 25 Gram(s) IV Push once  enoxaparin Injectable 40 milliGRAM(s) SubCutaneous daily  famotidine    Tablet 20 milliGRAM(s) Oral two times a day  insulin glargine Injectable (LANTUS) 10 Unit(s) SubCutaneous at bedtime  insulin lispro (ADMELOG) corrective regimen sliding scale   SubCutaneous three times a day before meals  insulin lispro (ADMELOG) corrective regimen sliding scale   SubCutaneous at bedtime  magnesium oxide 400 milliGRAM(s) Oral daily  metoprolol succinate ER 50 milliGRAM(s) Oral daily  niacin SR 1000 milliGRAM(s) Oral at bedtime  omega-3-Acid Ethyl Esters 1 Gram(s) Oral daily  sodium chloride 0.9%. 1000 milliLiter(s) IV Continuous <Continuous>      PRN Meds:  albuterol/ipratropium for Nebulization 3 milliLiter(s) Nebulizer every 4 hours PRN  dextrose 40% Gel 15 Gram(s) Oral once PRN  glucagon  Injectable 1 milliGRAM(s) IntraMuscular once PRN  zolpidem 5 milliGRAM(s) Oral at bedtime PRN  zolpidem 5 milliGRAM(s) Oral at bedtime PRN      LABS:                        12.2   6.21  )-----------( 286      ( 22 Oct 2020 05:30 )             39.2     Hgb Trend: 12.2<--, 13.4<--  10-22    134<L>  |  98  |  13  ----------------------------<  155<H>  4.1   |  26  |  0.93    Ca    9.2      22 Oct 2020 05:30  Phos  3.6     10-22  Mg     1.5     10-22    TPro  6.2  /  Alb  3.3  /  TBili  0.3  /  DBili  x   /  AST  17  /  ALT  13  /  AlkPhos  69  10-22    Creatinine Trend: 0.93<--, 0.95<--  PT/INR - ( 21 Oct 2020 10:53 )   PT: 12.5 SEC;   INR: 1.10          PTT - ( 21 Oct 2020 10:53 )  PTT:26.0 SEC      Venous Blood Gas:  10-22 @ 08:04  --/--/--/--/--  VBG Lactate: 1.3  Venous Blood Gas:  10-21 @ 18:13  7.39/47/< 24/25/18.9  VBG Lactate: 3.9      MICROBIOLOGY:     Culture - Sputum (collected 22 Oct 2020 00:21)  Source: .Sputum Sputum  Gram Stain (22 Oct 2020 07:52):    Moderate polymorphonuclear leukocytes per low power field    Rare Squamous epithelial cells per low power field    Numerous Gram Negative Rods seen per oil power field    Numerous Gram positive cocci in pairs seen per oil power field        RADIOLOGY:  [ ] Reviewed and interpreted by me    PULMONARY FUNCTION TESTS:    EKG:   CHIEF COMPLAINT:    HPI: Patient is a 71 yo M w/ HTN, HLD, DM, BPH s/p TURP, CAD s/p stent 2007, COPD, and SCLC (dx spring 2019) s/p carboplatin/etoposide and radiation with partial response now on maintenance atezolizumab immunotherapy every 3 weeks (c/b brain metastases now s/p gamma knife radiation therapy with improved brain MRI) admitted on 10/21 after being found to be hypoxemic at oncology clinic. As per patient and chart review, endorses worsening BILLINGS and dry cough x 1 month. On image review, patient has had progression of peribronchovascular opacities since CT chest 8/31/2020 with marked progression between CT from 10/19/2020 and 10/21/2020. Labs notable for no leukocytosis, mildly elevated , RVP negative, sputum cultures pending, RVP negative, COVID negative. Quant Gold and Fungitell pending.      #[id number], and Daughter Leena    Patient endorses wood working as hobby but endorses consistent PPE use, endorses one episode of chills, b/l LE skin rash being treated by derm. Patient denies any weight loss, fevers, sick contacts, recent travels, family history of lung disease, pets, change in environment.    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)    Renal calculus  s/p laser litho    Stented coronary artery  2009 - BMS to RCA    History of tonsillectomy        FAMILY HISTORY:  FH: liver cancer    FH: HTN (hypertension)        SOCIAL HISTORY:  Smoking: [ ] Never Smoked [X] Former Smoker (50 pack years, quit 1.5 years ago) [ ] Current Smoker  (__ packs x ___ years)  Substance Use: [X] Never Used [ ] Used ____  EtOH Use: Occasional  Marital Status: [ ] Single [ ]  [ ]  [ ]   Sexual History:   Occupation:  Recent Travel: Retired   Country of Birth:  Advance Directives:    Allergies    No Known Allergies    Intolerances        HOME MEDICATIONS:  Home Medications:  Aspirin Enteric Coated 81 mg oral delayed release tablet: 1 tab(s) orally once a day (21 Oct 2020 17:58)  atorvastatin 40 mg oral tablet: 1 tab(s) orally once a day (21 Oct 2020 17:58)  Basaglar KwikPen 100 units/mL subcutaneous solution: 10 unit(s) subcutaneous once a day (at bedtime) (21 Oct 2020 17:58)  betamethasone dipropionate, augmented 0.05% topical ointment: Apply topically to affected area sparingly 2 times a day (21 Oct 2020 17:58)  Breo Ellipta 200 mcg-25 mcg/inh inhalation powder: 1 puff(s) inhaled once a day (21 Oct 2020 17:58)  famotidine 20 mg oral tablet: 1 tab(s) orally 2 times a day (21 Oct 2020 17:58)  Fish Oil 1000 mg oral capsule: 1 cap(s) orally once a day  OTC supplement (21 Oct 2020 17:58)  Januvia 100 mg oral tablet: 1 tab(s) orally once a day (21 Oct 2020 17:58)  lisinopril 20 mg oral tablet: 1 tab(s) orally once a day (21 Oct 2020 17:58)  magnesium oxide 400 mg (240 mg elemental magnesium) oral tablet: 1 tab(s) orally in the morning  2 tab(s) orally in the afternoon  1 tab(s) orally in the evening (21 Oct 2020 17:58)  metFORMIN 1000 mg oral tablet: 1 tab(s) orally 2 times a day (21 Oct 2020 17:58)  Metoprolol Succinate ER 50 mg oral tablet, extended release: 1 tab(s) orally once a day (21 Oct 2020 17:58)  niacin 500 mg oral tablet, extended release: 2 tab(s) orally once a day (at bedtime) (21 Oct 2020 17:58)  Ventolin HFA 90 mcg/inh inhalation aerosol: 1 to 2 puff(s) inhaled every 4 to 6 hours, As Needed (21 Oct 2020 17:58)  Vitamin C 500 mg oral tablet: 1 tab(s) orally once a day (21 Oct 2020 17:58)  zolpidem 10 mg oral tablet: 1 tab(s) orally once a day (at bedtime)    ISTOP Reference #: [id number]  Quantity of 30 tablets for a 30-day supply dispensed 10/16/2020 (21 Oct 2020 17:58)      REVIEW OF SYSTEMS:  Constitutional: [ ] negative [ ] fevers [ ] chills [ ] weight loss [ ] weight gain  HEENT: [ ] negative [ ] dry eyes [ ] eye irritation [ ] postnasal drip [ ] nasal congestion  CV: [ ] negative  [ ] chest pain [ ] orthopnea [ ] palpitations [ ] murmur  Resp: [ ] negative [ ] cough [ ] shortness of breath [X] dyspnea [ ] wheezing [ ] sputum [ ] hemoptysis  GI: [ ] negative [ ] nausea [ ] vomiting [ ] diarrhea [ ] constipation [ ] abd pain [ ] dysphagia   : [ ] negative [ ] dysuria [ ] nocturia [ ] hematuria [ ] increased urinary frequency  Musculoskeletal: [ ] negative [ ] back pain [ ] myalgias [ ] arthralgias [ ] fracture  Skin: [ ] negative [ ] rash [ ] itch  Neurological: [ ] negative [ ] headache [ ] dizziness [ ] syncope [ ] weakness [ ] numbness  Psychiatric: [ ] negative [ ] anxiety [ ] depression  Endocrine: [ ] negative [ ] diabetes [ ] thyroid problem  Hematologic/Lymphatic: [ ] negative [ ] anemia [ ] bleeding problem  Allergic/Immunologic: [ ] negative [ ] itchy eyes [ ] nasal discharge [ ] hives [ ] angioedema  [X] All other systems negative  [ ] Unable to assess ROS because ________    OBJECTIVE:  ICU Vital Signs Last 24 Hrs  T(C): 36.6 (22 Oct 2020 05:40), Max: 37.3 (21 Oct 2020 09:50)  T(F): 97.9 (22 Oct 2020 05:40), Max: 99.2 (21 Oct 2020 09:50)  HR: 67 (22 Oct 2020 05:40) (67 - 102)  BP: 109/55 (22 Oct 2020 05:40) (94/73 - 121/61)  BP(mean): 78 (21 Oct 2020 10:51) (78 - 78)  ABP: --  ABP(mean): --  RR: 18 (22 Oct 2020 05:40) (18 - 26)  SpO2: 91% (22 Oct 2020 05:40) (85% - 96%)        CAPILLARY BLOOD GLUCOSE  149 (22 Oct 2020 07:39)      POCT Blood Glucose.: 149 mg/dL (22 Oct 2020 07:39)      PHYSICAL EXAM:  General: NAD, resting comfortably of 4 L nc  HEENT: EOMI, PERRLA  Respiratory: CTAB  Cardiovascular: S1S2, RRR  Abdomen: Soft, NTND, BS+  Extremities: No peripheral edema  Skin: B/l shin nodular rash  Neurological: Grossly intact      LINES:     HOSPITAL MEDICATIONS:  Standing Meds:  aspirin enteric coated 81 milliGRAM(s) Oral daily  atorvastatin 40 milliGRAM(s) Oral at bedtime  budesonide 160 MICROgram(s)/formoterol 4.5 MICROgram(s) Inhaler 2 Puff(s) Inhalation two times a day  dextrose 5%. 1000 milliLiter(s) IV Continuous <Continuous>  dextrose 50% Injectable 12.5 Gram(s) IV Push once  dextrose 50% Injectable 25 Gram(s) IV Push once  dextrose 50% Injectable 25 Gram(s) IV Push once  enoxaparin Injectable 40 milliGRAM(s) SubCutaneous daily  famotidine    Tablet 20 milliGRAM(s) Oral two times a day  insulin glargine Injectable (LANTUS) 10 Unit(s) SubCutaneous at bedtime  insulin lispro (ADMELOG) corrective regimen sliding scale   SubCutaneous three times a day before meals  insulin lispro (ADMELOG) corrective regimen sliding scale   SubCutaneous at bedtime  magnesium oxide 400 milliGRAM(s) Oral daily  metoprolol succinate ER 50 milliGRAM(s) Oral daily  niacin SR 1000 milliGRAM(s) Oral at bedtime  omega-3-Acid Ethyl Esters 1 Gram(s) Oral daily  sodium chloride 0.9%. 1000 milliLiter(s) IV Continuous <Continuous>      PRN Meds:  albuterol/ipratropium for Nebulization 3 milliLiter(s) Nebulizer every 4 hours PRN  dextrose 40% Gel 15 Gram(s) Oral once PRN  glucagon  Injectable 1 milliGRAM(s) IntraMuscular once PRN  zolpidem 5 milliGRAM(s) Oral at bedtime PRN  zolpidem 5 milliGRAM(s) Oral at bedtime PRN      LABS:                        12.2   6.21  )-----------( 286      ( 22 Oct 2020 05:30 )             39.2     Hgb Trend: 12.2<--, 13.4<--  10-22    134<L>  |  98  |  13  ----------------------------<  155<H>  4.1   |  26  |  0.93    Ca    9.2      22 Oct 2020 05:30  Phos  3.6     10-22  Mg     1.5     10-22    TPro  6.2  /  Alb  3.3  /  TBili  0.3  /  DBili  x   /  AST  17  /  ALT  13  /  AlkPhos  69  10-22    Creatinine Trend: 0.93<--, 0.95<--  PT/INR - ( 21 Oct 2020 10:53 )   PT: 12.5 SEC;   INR: 1.10          PTT - ( 21 Oct 2020 10:53 )  PTT:26.0 SEC      Venous Blood Gas:  10-22 @ 08:04  --/--/--/--/--  VBG Lactate: 1.3  Venous Blood Gas:  10-21 @ 18:13  7.39/47/< 24/25/18.9  VBG Lactate: 3.9      MICROBIOLOGY:     Culture - Sputum (collected 22 Oct 2020 00:21)  Source: .Sputum Sputum  Gram Stain (22 Oct 2020 07:52):    Moderate polymorphonuclear leukocytes per low power field    Rare Squamous epithelial cells per low power field    Numerous Gram Negative Rods seen per oil power field    Numerous Gram positive cocci in pairs seen per oil power field        RADIOLOGY:  [ ] Reviewed and interpreted by me    PULMONARY FUNCTION TESTS:    EKG:

## 2020-10-22 NOTE — PROGRESS NOTE ADULT - PROBLEM SELECTOR PLAN 1
-Patient with worsening bilateral diffuse peribronchovascular opacities on CT imaging concerning for infectious vs. inflammatory/pneumonitis etiology  -Patient admitted with acute hypoxic respiratory failure (with desaturations to the mid 80s), c/w supplemental O2 as needed  -History of SCLC on maintenance immunotherapy, following for surveillance imaging  -Pulmonology aware, will f/u recs  -RVP negative in ED, repeat COVID swab negative  -Sputum Cx with GN rods and GP cocci in pairs  -Send fungitell, Quantiferon, LDH  -Plan for bronch 10/23 per pulmonology  -Non-toxic appearing, afebrile, without leukocytosis, monitoring off antibiotics at this time, will f/u pulm and heme/onc recs -Patient with worsening bilateral diffuse peribronchovascular opacities on CT imaging concerning for infectious vs. inflammatory/pneumonitis etiology  -Patient admitted with acute hypoxic respiratory failure (with desaturations to the mid 80s), c/w supplemental O2 as needed  -History of SCLC on maintenance immunotherapy, following for surveillance imaging  -Pulmonology aware, appreicate input, workup in progress, plan for bronch 10/23, NPO after MN  -RVP negative in ED, repeat COVID swab negative  -Sputum Cx with GN rods and GP cocci in pairs  -Send fungitell, Quantiferon, LDH  -Plan for bronch 10/23 per pulmonology  -Non-toxic appearing, afebrile, without leukocytosis, monitoring off antibiotics at this time, appreciate pulm and heme/onc recs

## 2020-10-22 NOTE — CONSULT NOTE ADULT - SUBJECTIVE AND OBJECTIVE BOX
Patient is a 70y old  Male who presents with a chief complaint of Hypoxia, abnormal CT (22 Oct 2020 08:44)      HPI:  Patient is a 71yo M with PMHx HTN, HLD, IDDM, BPH s/p TURP, CAD s/p stent 2007, COPD, and SCLC (dx spring 2019) s/p carboplatin/etoposide and radiation with partial response now on maintenance atezolizumab immunotherapy every 3 weeks (c/b brain metastases now s/p gamma knife radiation therapy with improved brain MRI) who was sent from oncology clinic for hypoxia. He is primarily Nepalese-speaking though understands and speaks some English, and was examined with translation assistance from his daughter Leena at his request. Patient states that he has had worsening dyspnea on exertion over the past month associated with a dry cough not productive of any sputum, worse over the past 5 days. He walks about a mile each morning and has noticed progressively earlier fatigue and dyspnea, often associated with palpitations. He denies chest pain, syncope, shortness of breath at rest, orthopnea, lower extremity swelling, recent illness or sick contacts, recent travel. Over the past month, patient has also developed a non-itchy, nontender rash which started on his palms and spread to his trunk and lower extremities. He was seen by dermatology 9/23/20 who started betamethasone cream. Patient's daughter Leena is an NP and occasionally monitors O2 sat with portable pulse ox, and home sats have been as low as 80-82% with an isolated desaturation to 67% during activity. Patient has a history of COPD though has never required hospitalization and does not use home O2. Of note, patient has been followed with surveillance imaging for lung cancer which in August of this year showed bilateral patchy opacities concerning for infectious vs. inflammatory etiology. Outpatient imaging 10/19/20 prior to outpatient oncology appointment on DOA demonstrated interval progression of these opacities.     ED Course:  Vitals: T98.3F, HR , BP 94//61, RR 26 with SpO2 87% RA -> RR 20 with 92% on 4L  In the ED, the patient was found to be hypoxic and started on nasal cannula which was titrated to 4L. CTA Chest was repeated which showed increase in diffuse bilateral peribronchovascular opacities representing likely infectious vs. pneumonitis etiology. (21 Oct 2020 16:56)       Oncologic History:      ROS: as above     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)    Renal calculus  s/p laser litho    Stented coronary artery  2009 - BMS to RCA    History of tonsillectomy        SOCIAL HISTORY:    FAMILY HISTORY:  FH: liver cancer    FH: HTN (hypertension)        MEDICATIONS  (STANDING):  aspirin enteric coated 81 milliGRAM(s) Oral daily  atorvastatin 40 milliGRAM(s) Oral at bedtime  budesonide 160 MICROgram(s)/formoterol 4.5 MICROgram(s) Inhaler 2 Puff(s) Inhalation two times a day  dextrose 5%. 1000 milliLiter(s) (50 mL/Hr) IV Continuous <Continuous>  dextrose 50% Injectable 12.5 Gram(s) IV Push once  dextrose 50% Injectable 25 Gram(s) IV Push once  dextrose 50% Injectable 25 Gram(s) IV Push once  famotidine    Tablet 20 milliGRAM(s) Oral two times a day  insulin glargine Injectable (LANTUS) 10 Unit(s) SubCutaneous at bedtime  insulin lispro (ADMELOG) corrective regimen sliding scale   SubCutaneous three times a day before meals  insulin lispro (ADMELOG) corrective regimen sliding scale   SubCutaneous at bedtime  magnesium oxide 400 milliGRAM(s) Oral daily  metoprolol succinate ER 50 milliGRAM(s) Oral daily  niacin SR 1000 milliGRAM(s) Oral at bedtime  omega-3-Acid Ethyl Esters 1 Gram(s) Oral daily  sodium chloride 0.9%. 1000 milliLiter(s) (75 mL/Hr) IV Continuous <Continuous>    MEDICATIONS  (PRN):  albuterol/ipratropium for Nebulization 3 milliLiter(s) Nebulizer every 4 hours PRN Shortness of Breath and/or Wheezing  dextrose 40% Gel 15 Gram(s) Oral once PRN Blood Glucose LESS THAN 70 milliGRAM(s)/deciliter  glucagon  Injectable 1 milliGRAM(s) IntraMuscular once PRN Glucose LESS THAN 70 milligrams/deciliter  zolpidem 5 milliGRAM(s) Oral at bedtime PRN Insomnia  zolpidem 5 milliGRAM(s) Oral at bedtime PRN Insomnia      Allergies    No Known Allergies    Intolerances        Vital Signs Last 24 Hrs  T(C): 36.6 (22 Oct 2020 05:40), Max: 37.3 (21 Oct 2020 10:51)  T(F): 97.9 (22 Oct 2020 05:40), Max: 99.1 (21 Oct 2020 10:51)  HR: 67 (22 Oct 2020 05:40) (67 - 89)  BP: 109/55 (22 Oct 2020 05:40) (94/73 - 114/57)  BP(mean): 78 (21 Oct 2020 10:51) (78 - 78)  RR: 18 (22 Oct 2020 05:40) (18 - 23)  SpO2: 91% (22 Oct 2020 05:40) (85% - 93%)    PHYSICAL EXAM  General: adult in NAD  HEENT: clear oropharynx, anicteric sclera, pink conjunctiva  Neck: supple  CV: normal S1/S2 with no murmur rubs or gallops  Lungs: positive air movement b/l ant lungs, clear to auscultation, no wheezes, no rales  Abdomen: soft non-tender non-distended, no hepatosplenomegaly  Ext: no clubbing cyanosis or edema  Skin: no rashes and no petechiae  Neuro: alert and oriented X 3, none focal    LABS:                          12.2   6.21  )-----------( 286      ( 22 Oct 2020 05:30 )             39.2         Mean Cell Volume : 93.6 fL  Mean Cell Hemoglobin : 29.1 pg  Mean Cell Hemoglobin Concentration : 31.1 %  Auto Neutrophil # : 4.75 K/uL  Auto Lymphocyte # : 0.32 K/uL  Auto Monocyte # : 0.81 K/uL  Auto Eosinophil # : 0.24 K/uL  Auto Basophil # : 0.05 K/uL  Auto Neutrophil % : 76.5 %  Auto Lymphocyte % : 5.2 %  Auto Monocyte % : 13.0 %  Auto Eosinophil % : 3.9 %  Auto Basophil % : 0.8 %      Serial CBC's  10-22 @ 05:30  Hct-39.2 / Hgb-12.2 / Plat-286 / RBC-4.19 / WBC-6.21  Serial CBC's  10-21 @ 10:53  Hct-41.9 / Hgb-13.4 / Plat-345 / RBC-4.59 / WBC-9.39      10-22    134<L>  |  98  |  13  ----------------------------<  155<H>  4.1   |  26  |  0.93    Ca    9.2      22 Oct 2020 05:30  Phos  3.6     10-22  Mg     1.5     10-22    TPro  6.2  /  Alb  3.3  /  TBili  0.3  /  DBili  x   /  AST  17  /  ALT  13  /  AlkPhos  69  10-22      PT/INR - ( 21 Oct 2020 10:53 )   PT: 12.5 SEC;   INR: 1.10          PTT - ( 21 Oct 2020 10:53 )  PTT:26.0 SEC                RADIOLOGY & ADDITIONAL STUDIES:     Patient is a 70y old  Male who presents with a chief complaint of Hypoxia, abnormal CT (22 Oct 2020 08:44)      HPI:  Patient is a 69yo M with PMHx HTN, HLD, IDDM, BPH s/p TURP, CAD s/p stent 2007, COPD, and SCLC (dx spring 2019) s/p carboplatin/etoposide and radiation with partial response now on maintenance atezolizumab immunotherapy every 3 weeks (c/b brain metastases now s/p gamma knife radiation therapy with improved brain MRI) who was sent from oncology clinic for hypoxia. He is primarily French-speaking though understands and speaks some English, and was examined with translation assistance from his daughter Leena at his request. Patient states that he has had worsening dyspnea on exertion over the past month associated with a dry cough not productive of any sputum, worse over the past 5 days. He walks about a mile each morning and has noticed progressively earlier fatigue and dyspnea, often associated with palpitations. He denies chest pain, syncope, shortness of breath at rest, orthopnea, lower extremity swelling, recent illness or sick contacts, recent travel. Over the past month, patient has also developed a non-itchy, nontender rash which started on his palms and spread to his trunk and lower extremities. He was seen by dermatology 9/23/20 who started betamethasone cream. Patient's daughter Leena is an NP and occasionally monitors O2 sat with portable pulse ox, and home sats have been as low as 80-82% with an isolated desaturation to 67% during activity. Patient has a history of COPD though has never required hospitalization and does not use home O2. Of note, patient has been followed with surveillance imaging for lung cancer which in August of this year showed bilateral patchy opacities concerning for infectious vs. inflammatory etiology. Outpatient imaging 10/19/20 prior to outpatient oncology appointment on DOA demonstrated interval progression of these opacities.     ED Course:  Vitals: T98.3F, HR , BP 94//61, RR 26 with SpO2 87% RA -> RR 20 with 92% on 4L  In the ED, the patient was found to be hypoxic and started on nasal cannula which was titrated to 4L. CTA Chest was repeated which showed increase in diffuse bilateral peribronchovascular opacities representing likely infectious vs. pneumonitis etiology. (21 Oct 2020 16:56)       Oncologic History:      ROS: as above     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)    Renal calculus  s/p laser litho    Stented coronary artery  2009 - BMS to RCA    History of tonsillectomy        SOCIAL HISTORY:    FAMILY HISTORY:  FH: liver cancer    FH: HTN (hypertension)        MEDICATIONS  (STANDING):  aspirin enteric coated 81 milliGRAM(s) Oral daily  atorvastatin 40 milliGRAM(s) Oral at bedtime  budesonide 160 MICROgram(s)/formoterol 4.5 MICROgram(s) Inhaler 2 Puff(s) Inhalation two times a day  dextrose 5%. 1000 milliLiter(s) (50 mL/Hr) IV Continuous <Continuous>  dextrose 50% Injectable 12.5 Gram(s) IV Push once  dextrose 50% Injectable 25 Gram(s) IV Push once  dextrose 50% Injectable 25 Gram(s) IV Push once  famotidine    Tablet 20 milliGRAM(s) Oral two times a day  insulin glargine Injectable (LANTUS) 10 Unit(s) SubCutaneous at bedtime  insulin lispro (ADMELOG) corrective regimen sliding scale   SubCutaneous three times a day before meals  insulin lispro (ADMELOG) corrective regimen sliding scale   SubCutaneous at bedtime  magnesium oxide 400 milliGRAM(s) Oral daily  metoprolol succinate ER 50 milliGRAM(s) Oral daily  niacin SR 1000 milliGRAM(s) Oral at bedtime  omega-3-Acid Ethyl Esters 1 Gram(s) Oral daily  sodium chloride 0.9%. 1000 milliLiter(s) (75 mL/Hr) IV Continuous <Continuous>    MEDICATIONS  (PRN):  albuterol/ipratropium for Nebulization 3 milliLiter(s) Nebulizer every 4 hours PRN Shortness of Breath and/or Wheezing  dextrose 40% Gel 15 Gram(s) Oral once PRN Blood Glucose LESS THAN 70 milliGRAM(s)/deciliter  glucagon  Injectable 1 milliGRAM(s) IntraMuscular once PRN Glucose LESS THAN 70 milligrams/deciliter  zolpidem 5 milliGRAM(s) Oral at bedtime PRN Insomnia  zolpidem 5 milliGRAM(s) Oral at bedtime PRN Insomnia      Allergies    No Known Allergies    Intolerances        Vital Signs Last 24 Hrs  T(C): 36.6 (22 Oct 2020 05:40), Max: 37.3 (21 Oct 2020 10:51)  T(F): 97.9 (22 Oct 2020 05:40), Max: 99.1 (21 Oct 2020 10:51)  HR: 67 (22 Oct 2020 05:40) (67 - 89)  BP: 109/55 (22 Oct 2020 05:40) (94/73 - 114/57)  BP(mean): 78 (21 Oct 2020 10:51) (78 - 78)  RR: 18 (22 Oct 2020 05:40) (18 - 23)  SpO2: 91% (22 Oct 2020 05:40) (85% - 93%)        LABS:                          12.2   6.21  )-----------( 286      ( 22 Oct 2020 05:30 )             39.2         Mean Cell Volume : 93.6 fL  Mean Cell Hemoglobin : 29.1 pg  Mean Cell Hemoglobin Concentration : 31.1 %  Auto Neutrophil # : 4.75 K/uL  Auto Lymphocyte # : 0.32 K/uL  Auto Monocyte # : 0.81 K/uL  Auto Eosinophil # : 0.24 K/uL  Auto Basophil # : 0.05 K/uL  Auto Neutrophil % : 76.5 %  Auto Lymphocyte % : 5.2 %  Auto Monocyte % : 13.0 %  Auto Eosinophil % : 3.9 %  Auto Basophil % : 0.8 %      Serial CBC's  10-22 @ 05:30  Hct-39.2 / Hgb-12.2 / Plat-286 / RBC-4.19 / WBC-6.21  Serial CBC's  10-21 @ 10:53  Hct-41.9 / Hgb-13.4 / Plat-345 / RBC-4.59 / WBC-9.39      10-22    134<L>  |  98  |  13  ----------------------------<  155<H>  4.1   |  26  |  0.93    Ca    9.2      22 Oct 2020 05:30  Phos  3.6     10-22  Mg     1.5     10-22    TPro  6.2  /  Alb  3.3  /  TBili  0.3  /  DBili  x   /  AST  17  /  ALT  13  /  AlkPhos  69  10-22      PT/INR - ( 21 Oct 2020 10:53 )   PT: 12.5 SEC;   INR: 1.10          PTT - ( 21 Oct 2020 10:53 )  PTT:26.0 SEC                RADIOLOGY & ADDITIONAL STUDIES:

## 2020-10-22 NOTE — CONSULT NOTE ADULT - ASSESSMENT
#Lichenoid dermatitis  This is likely secondary to immunotherapy with atezolizumab  This is the most common reaction in skin to this therapy  No lesions in mouth or classic Elsa's striae to indicate systemic process such as lichen planus  This condition is notoriously difficult to treat  Since this rash has been more bothersome from an aesthetic perspective and minimally symptomatic, would recommend treating through and continuing immuno therapy  While in hospital start clobetasol 0.05% ointment BID to affected areas on upper and lower extremities. Avoid use on face or groin. Upon discharge, please have patient continue betamethasone ointment BID to these areas x 2 weeks on 1 week off.  Will discuss case with Dr. Marques and see if additional therapies warranted while hospitalized    Rohith Espinoza MD  Resident Physician, PGY3  [facility]  Pager: [phone]  Office: [phone]    The patient's chart was reviewed in addition to being seen and examined at bedside with the dermatology attending Dr. Asif  Recommendations were communicated with the primary team.  Please page [phone] for further related questions.   #Lichenoid dermatitis  This is likely secondary to immunotherapy with atezolizumab. Grade 1 reaction  This is the most common reaction in skin to this therapy  No lesions in mouth or classic Elsa's striae  This condition is often recalcitrant to treatment  Since pt has minimal symptoms, would recommend continuing topical treatments for now  While in hospital start clobetasol 0.05% ointment BID to affected areas on upper and lower extremities. Avoid use on face or groin. Upon discharge, please have patient continue augmented betamethasone ointment BID to these areas x 2 weeks on 1 week off.    Rohith Espinoza MD  Resident Physician, PGY3  [facility]  Pager: [phone]  Office: [phone]    The patient's chart was reviewed in addition to being seen and examined at bedside with the dermatology attending Dr. Asif  Recommendations were communicated with the primary team.  Please page [phone] for further related questions.

## 2020-10-22 NOTE — CONSULT NOTE ADULT - SUBJECTIVE AND OBJECTIVE BOX
HPI:  Patient is a 69yo M with PMHx HTN, HLD, IDDM, BPH s/p TURP, CAD s/p stent 2007, COPD, and SCLC (dx spring 2019) s/p carboplatin/etoposide and radiation with partial response now on maintenance atezolizumab immunotherapy every 3 weeks (c/b brain metastases now s/p gamma knife radiation therapy with improved brain MRI) admitted for hypoxia after being referred to ED by oncology team.     Dermatology consulted for rash on upper and lower extremities x 2 months. Previously seen by our dermatology office at [facility] 9/23/20 by attending Dr. Marques, believed to be reaction to immunotherapy. Treated with betamethasone ointment and emollient although not responding very well.    He is primarily Palauan-speaking though understands and speaks some English, and was examined with translation assistance from his daughter Leena at his request.  Per patient and daughter, the rash is asymptomatic. Denies itching or pain. Does report some tenderness of rash on hands occasionally but the rash is now predominantly on lower extremities without symptoms. Rash initially started on palms and distal upper extremities which then resolved with hyperpigmentation. Then started from waist down spreading to legs. Currently using betamethasone x 2 weeks on 1 week off. Denies lesions in mouth or pain in mouth or on gums.        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)    Renal calculus  s/p laser litho    Stented coronary artery  2009 - BMS to RCA    History of tonsillectomy        REVIEW OF SYSTEMS      General: no fevers/chills, no lethargy	    Skin/Breast: see HPI  	  Ophthalmologic: no eye pain or change in vision  	  ENMT: no dysphagia or change in hearing    Respiratory and Thorax: no SOB or cough  	  Cardiovascular: no palpitations or chest pain    Gastrointestinal: no abdominal pain or blood in stool     Genitourinary: no dysuria or frequency    Musculoskeletal: no joint pains or weakness	    Neurological: no weakness, numbness , or tingling    MEDICATIONS  (STANDING):  aspirin enteric coated 81 milliGRAM(s) Oral daily  atorvastatin 40 milliGRAM(s) Oral at bedtime  budesonide 160 MICROgram(s)/formoterol 4.5 MICROgram(s) Inhaler 2 Puff(s) Inhalation two times a day  dextrose 5%. 1000 milliLiter(s) (50 mL/Hr) IV Continuous <Continuous>  dextrose 50% Injectable 12.5 Gram(s) IV Push once  dextrose 50% Injectable 25 Gram(s) IV Push once  dextrose 50% Injectable 25 Gram(s) IV Push once  famotidine    Tablet 20 milliGRAM(s) Oral two times a day  insulin glargine Injectable (LANTUS) 10 Unit(s) SubCutaneous at bedtime  insulin lispro (ADMELOG) corrective regimen sliding scale   SubCutaneous three times a day before meals  insulin lispro (ADMELOG) corrective regimen sliding scale   SubCutaneous at bedtime  magnesium oxide 400 milliGRAM(s) Oral daily  metoprolol succinate ER 50 milliGRAM(s) Oral daily  niacin SR 1000 milliGRAM(s) Oral at bedtime  omega-3-Acid Ethyl Esters 1 Gram(s) Oral daily  sodium chloride 0.9%. 1000 milliLiter(s) (75 mL/Hr) IV Continuous <Continuous>    MEDICATIONS  (PRN):  albuterol/ipratropium for Nebulization 3 milliLiter(s) Nebulizer every 4 hours PRN Shortness of Breath and/or Wheezing  dextrose 40% Gel 15 Gram(s) Oral once PRN Blood Glucose LESS THAN 70 milliGRAM(s)/deciliter  glucagon  Injectable 1 milliGRAM(s) IntraMuscular once PRN Glucose LESS THAN 70 milligrams/deciliter  zolpidem 5 milliGRAM(s) Oral at bedtime PRN Insomnia  zolpidem 5 milliGRAM(s) Oral at bedtime PRN Insomnia      Allergies    No Known Allergies    Intolerances        SOCIAL HISTORY:    FAMILY HISTORY:  FH: liver cancer    FH: HTN (hypertension)        Vital Signs Last 24 Hrs  T(C): 36.9 (22 Oct 2020 13:46), Max: 37.1 (21 Oct 2020 21:15)  T(F): 98.4 (22 Oct 2020 13:46), Max: 98.7 (21 Oct 2020 21:15)  HR: 88 (22 Oct 2020 13:46) (67 - 88)  BP: 126/64 (22 Oct 2020 13:46) (108/61 - 126/64)  BP(mean): --  RR: 17 (22 Oct 2020 13:46) (16 - 18)  SpO2: 92% (22 Oct 2020 13:46) (85% - 95%)    PHYSICAL EXAM:     The patient was alert and oriented X 3, well nourished, and in no  apparent distress.  OP showed no ulcerations  There was no visible lymphadenopathy.  Conjunctiva were non injected  There was no clubbing or edema of extremities.  The scalp, hair, face, eyebrows, lips, OP, neck, chest, back,   extremities X 4, nails were examined.  There was no hyperhidrosis or bromhidrosis.    Of note on skin exam:   Lichenoid appearing flat topped violaceous papules and plaques predominantly on upper and lower extremities, most pronounced on distal aspects. Some lesions with slight scale.     LABS:                        12.2   6.21  )-----------( 286      ( 22 Oct 2020 05:30 )             39.2     10-22    134<L>  |  98  |  13  ----------------------------<  155<H>  4.1   |  26  |  0.93    Ca    9.2      22 Oct 2020 05:30  Phos  3.6     10-22  Mg     1.5     10-22    TPro  6.2  /  Alb  3.3  /  TBili  0.3  /  DBili  x   /  AST  17  /  ALT  13  /  AlkPhos  69  10-22    PT/INR - ( 21 Oct 2020 10:53 )   PT: 12.5 SEC;   INR: 1.10          PTT - ( 21 Oct 2020 10:53 )  PTT:26.0 SEC      RADIOLOGY & ADDITIONAL STUDIES:           HPI:  Patient is a 71yo M with PMHx HTN, HLD, IDDM, BPH s/p TURP, CAD s/p stent 2007, COPD, and SCLC (dx spring 2019) s/p carboplatin/etoposide and radiation with partial response now on maintenance atezolizumab immunotherapy every 3 weeks (c/b brain metastases now s/p gamma knife radiation therapy with improved brain MRI) admitted for hypoxia after being referred to ED by oncology team.     Dermatology consulted for rash on upper and lower extremities x 2 months. Previously seen by our dermatology office at [facility] 9/23/20 by attending Dr. Marques, believed to be reaction to immunotherapy. Treated with betamethasone ointment and emollient although not responding very well.    He is primarily Romanian-speaking though understands and speaks some English, and was examined with translation assistance from his daughter Leena at his request.  Per patient and daughter, the rash is asymptomatic. Denies itching or pain. Does report some tenderness of rash on hands occasionally but the rash is now predominantly on lower extremities without symptoms. Rash initially started on palms and distal upper extremities which then resolved with hyperpigmentation. Then started from waist down spreading to legs. Currently using betamethasone x 2 weeks on 1 week off. Denies lesions in mouth or pain in mouth or on gums.        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)    Renal calculus  s/p laser litho    Stented coronary artery  2009 - BMS to RCA    History of tonsillectomy        REVIEW OF SYSTEMS      General: no fevers/chills, no lethargy	    Skin/Breast: see HPI  	  Ophthalmologic: no eye pain or change in vision  	  ENMT: no dysphagia or change in hearing    Respiratory and Thorax: no SOB or cough  	  Cardiovascular: no palpitations or chest pain    Gastrointestinal: no abdominal pain or blood in stool     Genitourinary: no dysuria or frequency    Musculoskeletal: no joint pains or weakness	    Neurological: no weakness, numbness , or tingling    MEDICATIONS  (STANDING):  aspirin enteric coated 81 milliGRAM(s) Oral daily  atorvastatin 40 milliGRAM(s) Oral at bedtime  budesonide 160 MICROgram(s)/formoterol 4.5 MICROgram(s) Inhaler 2 Puff(s) Inhalation two times a day  dextrose 5%. 1000 milliLiter(s) (50 mL/Hr) IV Continuous <Continuous>  dextrose 50% Injectable 12.5 Gram(s) IV Push once  dextrose 50% Injectable 25 Gram(s) IV Push once  dextrose 50% Injectable 25 Gram(s) IV Push once  famotidine    Tablet 20 milliGRAM(s) Oral two times a day  insulin glargine Injectable (LANTUS) 10 Unit(s) SubCutaneous at bedtime  insulin lispro (ADMELOG) corrective regimen sliding scale   SubCutaneous three times a day before meals  insulin lispro (ADMELOG) corrective regimen sliding scale   SubCutaneous at bedtime  magnesium oxide 400 milliGRAM(s) Oral daily  metoprolol succinate ER 50 milliGRAM(s) Oral daily  niacin SR 1000 milliGRAM(s) Oral at bedtime  omega-3-Acid Ethyl Esters 1 Gram(s) Oral daily  sodium chloride 0.9%. 1000 milliLiter(s) (75 mL/Hr) IV Continuous <Continuous>    MEDICATIONS  (PRN):  albuterol/ipratropium for Nebulization 3 milliLiter(s) Nebulizer every 4 hours PRN Shortness of Breath and/or Wheezing  dextrose 40% Gel 15 Gram(s) Oral once PRN Blood Glucose LESS THAN 70 milliGRAM(s)/deciliter  glucagon  Injectable 1 milliGRAM(s) IntraMuscular once PRN Glucose LESS THAN 70 milligrams/deciliter  zolpidem 5 milliGRAM(s) Oral at bedtime PRN Insomnia  zolpidem 5 milliGRAM(s) Oral at bedtime PRN Insomnia      Allergies    No Known Allergies    Intolerances        SOCIAL HISTORY: denies drug use    FAMILY HISTORY:  FH: liver cancer    FH: HTN (hypertension)        Vital Signs Last 24 Hrs  T(C): 36.9 (22 Oct 2020 13:46), Max: 37.1 (21 Oct 2020 21:15)  T(F): 98.4 (22 Oct 2020 13:46), Max: 98.7 (21 Oct 2020 21:15)  HR: 88 (22 Oct 2020 13:46) (67 - 88)  BP: 126/64 (22 Oct 2020 13:46) (108/61 - 126/64)  BP(mean): --  RR: 17 (22 Oct 2020 13:46) (16 - 18)  SpO2: 92% (22 Oct 2020 13:46) (85% - 95%)    PHYSICAL EXAM:     The patient was alert and oriented X 3, well nourished, and in no  apparent distress.  OP showed no ulcerations  There was no visible lymphadenopathy.  Conjunctiva were non injected  There was no clubbing or edema of extremities.  The scalp, hair, face, eyebrows, lips, OP, neck, chest, back,   extremities X 4, nails were examined.  There was no hyperhidrosis or bromhidrosis.    Of note on skin exam:   Lichenoid appearing flat topped violaceous papules and plaques predominantly on upper and lower extremities, most pronounced on distal aspects. Some lesions with slight scale. volar wrist involvement    LABS:                        12.2   6.21  )-----------( 286      ( 22 Oct 2020 05:30 )             39.2     10-22    134<L>  |  98  |  13  ----------------------------<  155<H>  4.1   |  26  |  0.93    Ca    9.2      22 Oct 2020 05:30  Phos  3.6     10-22  Mg     1.5     10-22    TPro  6.2  /  Alb  3.3  /  TBili  0.3  /  DBili  x   /  AST  17  /  ALT  13  /  AlkPhos  69  10-22    PT/INR - ( 21 Oct 2020 10:53 )   PT: 12.5 SEC;   INR: 1.10          PTT - ( 21 Oct 2020 10:53 )  PTT:26.0 SEC      RADIOLOGY & ADDITIONAL STUDIES: no relevant studies

## 2020-10-23 ENCOUNTER — RESULT REVIEW (OUTPATIENT)
Age: 70
End: 2020-10-23

## 2020-10-23 LAB
ANION GAP SERPL CALC-SCNC: 13 MMO/L — SIGNIFICANT CHANGE UP (ref 7–14)
APTT BLD: 25.8 SEC — LOW (ref 27–36.3)
BASOPHILS # BLD AUTO: 0.05 K/UL — SIGNIFICANT CHANGE UP (ref 0–0.2)
BASOPHILS NFR BLD AUTO: 0.8 % — SIGNIFICANT CHANGE UP (ref 0–2)
BODY FLUID TYPE: SIGNIFICANT CHANGE UP
BUN SERPL-MCNC: 13 MG/DL — SIGNIFICANT CHANGE UP (ref 7–23)
CALCIUM SERPL-MCNC: 9 MG/DL — SIGNIFICANT CHANGE UP (ref 8.4–10.5)
CHLORIDE SERPL-SCNC: 100 MMOL/L — SIGNIFICANT CHANGE UP (ref 98–107)
CLARITY SPEC: CLEAR — SIGNIFICANT CHANGE UP
CO2 SERPL-SCNC: 24 MMOL/L — SIGNIFICANT CHANGE UP (ref 22–31)
COLOR FLD: COLORLESS — SIGNIFICANT CHANGE UP
CREAT SERPL-MCNC: 0.84 MG/DL — SIGNIFICANT CHANGE UP (ref 0.5–1.3)
CULTURE RESULTS: SIGNIFICANT CHANGE UP
EOSINOPHIL # BLD AUTO: 0.23 K/UL — SIGNIFICANT CHANGE UP (ref 0–0.5)
EOSINOPHIL NFR BLD AUTO: 3.6 % — SIGNIFICANT CHANGE UP (ref 0–6)
GAMMA INTERFERON BACKGROUND BLD IA-ACNC: 0.02 IU/ML — SIGNIFICANT CHANGE UP
GLUCOSE SERPL-MCNC: 172 MG/DL — HIGH (ref 70–99)
HCT VFR BLD CALC: 38.4 % — LOW (ref 39–50)
HGB BLD-MCNC: 12.4 G/DL — LOW (ref 13–17)
IMM GRANULOCYTES NFR BLD AUTO: 0.5 % — SIGNIFICANT CHANGE UP (ref 0–1.5)
INR BLD: 1.09 — SIGNIFICANT CHANGE UP (ref 0.88–1.16)
LYMPHOCYTES # BLD AUTO: 0.28 K/UL — LOW (ref 1–3.3)
LYMPHOCYTES # BLD AUTO: 4.4 % — LOW (ref 13–44)
LYMPHOCYTES NFR FLD: 6 % — SIGNIFICANT CHANGE UP
M TB IFN-G BLD-IMP: NEGATIVE — SIGNIFICANT CHANGE UP
M TB IFN-G CD4+ BCKGRND COR BLD-ACNC: 0.15 IU/ML — SIGNIFICANT CHANGE UP
M TB IFN-G CD4+CD8+ BCKGRND COR BLD-ACNC: 0.17 IU/ML — SIGNIFICANT CHANGE UP
MAGNESIUM SERPL-MCNC: 1.5 MG/DL — LOW (ref 1.6–2.6)
MCHC RBC-ENTMCNC: 29.4 PG — SIGNIFICANT CHANGE UP (ref 27–34)
MCHC RBC-ENTMCNC: 32.3 % — SIGNIFICANT CHANGE UP (ref 32–36)
MCV RBC AUTO: 91 FL — SIGNIFICANT CHANGE UP (ref 80–100)
MONOCYTES # BLD AUTO: 0.84 K/UL — SIGNIFICANT CHANGE UP (ref 0–0.9)
MONOCYTES NFR BLD AUTO: 13.1 % — SIGNIFICANT CHANGE UP (ref 2–14)
NEUTROPHILS # BLD AUTO: 5 K/UL — SIGNIFICANT CHANGE UP (ref 1.8–7.4)
NEUTROPHILS NFR BLD AUTO: 77.6 % — HIGH (ref 43–77)
NEUTS SEG NFR FLD MANUAL: 4 % — SIGNIFICANT CHANGE UP
NRBC # FLD: 0 K/UL — SIGNIFICANT CHANGE UP (ref 0–0)
OTHER CELLS FLD MANUAL: 90 % — SIGNIFICANT CHANGE UP
PHOSPHATE SERPL-MCNC: 3.3 MG/DL — SIGNIFICANT CHANGE UP (ref 2.5–4.5)
PLATELET # BLD AUTO: 305 K/UL — SIGNIFICANT CHANGE UP (ref 150–400)
PMV BLD: 9 FL — SIGNIFICANT CHANGE UP (ref 7–13)
POTASSIUM SERPL-MCNC: 3.9 MMOL/L — SIGNIFICANT CHANGE UP (ref 3.5–5.3)
POTASSIUM SERPL-SCNC: 3.9 MMOL/L — SIGNIFICANT CHANGE UP (ref 3.5–5.3)
PROTHROM AB SERPL-ACNC: 12.5 SEC — SIGNIFICANT CHANGE UP (ref 10.6–13.6)
QUANT TB PLUS MITOGEN MINUS NIL: 0.78 IU/ML — SIGNIFICANT CHANGE UP
RBC # BLD: 4.22 M/UL — SIGNIFICANT CHANGE UP (ref 4.2–5.8)
RBC # FLD: 15 % — HIGH (ref 10.3–14.5)
RCV VOL RI: SIGNIFICANT CHANGE UP CELL/UL (ref 0–5)
SODIUM SERPL-SCNC: 137 MMOL/L — SIGNIFICANT CHANGE UP (ref 135–145)
SPECIMEN SOURCE: SIGNIFICANT CHANGE UP
TOTAL CELLS COUNTED, BODY FLUID: 100 CELLS — SIGNIFICANT CHANGE UP
TOTAL NUCLEATED CELL COUNT, BODY FLUID: SIGNIFICANT CHANGE UP CELL/UL (ref 0–5)
WBC # BLD: 6.43 K/UL — SIGNIFICANT CHANGE UP (ref 3.8–10.5)
WBC # FLD AUTO: 6.43 K/UL — SIGNIFICANT CHANGE UP (ref 3.8–10.5)

## 2020-10-23 PROCEDURE — 71045 X-RAY EXAM CHEST 1 VIEW: CPT | Mod: 26

## 2020-10-23 PROCEDURE — 99233 SBSQ HOSP IP/OBS HIGH 50: CPT | Mod: GC

## 2020-10-23 PROCEDURE — 31624 DX BRONCHOSCOPE/LAVAGE: CPT | Mod: GC

## 2020-10-23 PROCEDURE — 88305 TISSUE EXAM BY PATHOLOGIST: CPT | Mod: 26

## 2020-10-23 PROCEDURE — 88112 CYTOPATH CELL ENHANCE TECH: CPT | Mod: 26

## 2020-10-23 PROCEDURE — 88305 TISSUE EXAM BY PATHOLOGIST: CPT | Mod: 26,59

## 2020-10-23 PROCEDURE — 31628 BRONCHOSCOPY/LUNG BX EACH: CPT | Mod: GC

## 2020-10-23 PROCEDURE — 99232 SBSQ HOSP IP/OBS MODERATE 35: CPT | Mod: GC,25

## 2020-10-23 RX ORDER — MAGNESIUM OXIDE 400 MG ORAL TABLET 241.3 MG
400 TABLET ORAL
Refills: 0 | Status: DISCONTINUED | OUTPATIENT
Start: 2020-10-23 | End: 2020-11-02

## 2020-10-23 RX ORDER — FENTANYL CITRATE 50 UG/ML
25 INJECTION INTRAVENOUS
Refills: 0 | Status: DISCONTINUED | OUTPATIENT
Start: 2020-10-23 | End: 2020-10-23

## 2020-10-23 RX ORDER — ENOXAPARIN SODIUM 100 MG/ML
40 INJECTION SUBCUTANEOUS DAILY
Refills: 0 | Status: DISCONTINUED | OUTPATIENT
Start: 2020-10-24 | End: 2020-11-02

## 2020-10-23 RX ORDER — ONDANSETRON 8 MG/1
4 TABLET, FILM COATED ORAL ONCE
Refills: 0 | Status: DISCONTINUED | OUTPATIENT
Start: 2020-10-23 | End: 2020-10-23

## 2020-10-23 RX ORDER — MAGNESIUM SULFATE 500 MG/ML
2 VIAL (ML) INJECTION ONCE
Refills: 0 | Status: COMPLETED | OUTPATIENT
Start: 2020-10-23 | End: 2020-10-23

## 2020-10-23 RX ADMIN — Medication 1 APPLICATION(S): at 05:32

## 2020-10-23 RX ADMIN — Medication 2: at 21:59

## 2020-10-23 RX ADMIN — Medication 1 APPLICATION(S): at 17:06

## 2020-10-23 RX ADMIN — Medication 1000 MILLIGRAM(S): at 22:00

## 2020-10-23 RX ADMIN — Medication 1 GRAM(S): at 17:06

## 2020-10-23 RX ADMIN — Medication 1: at 06:56

## 2020-10-23 RX ADMIN — Medication 50 GRAM(S): at 10:44

## 2020-10-23 RX ADMIN — ATORVASTATIN CALCIUM 40 MILLIGRAM(S): 80 TABLET, FILM COATED ORAL at 21:59

## 2020-10-23 RX ADMIN — Medication 81 MILLIGRAM(S): at 17:06

## 2020-10-23 RX ADMIN — FAMOTIDINE 20 MILLIGRAM(S): 10 INJECTION INTRAVENOUS at 17:06

## 2020-10-23 RX ADMIN — BUDESONIDE AND FORMOTEROL FUMARATE DIHYDRATE 2 PUFF(S): 160; 4.5 AEROSOL RESPIRATORY (INHALATION) at 10:15

## 2020-10-23 RX ADMIN — Medication 1: at 17:06

## 2020-10-23 RX ADMIN — Medication 50 MILLIGRAM(S): at 17:06

## 2020-10-23 RX ADMIN — BUDESONIDE AND FORMOTEROL FUMARATE DIHYDRATE 2 PUFF(S): 160; 4.5 AEROSOL RESPIRATORY (INHALATION) at 22:00

## 2020-10-23 RX ADMIN — INSULIN GLARGINE 10 UNIT(S): 100 INJECTION, SOLUTION SUBCUTANEOUS at 22:00

## 2020-10-23 NOTE — PROGRESS NOTE ADULT - SUBJECTIVE AND OBJECTIVE BOX
***NOTE INCOMPLETE*** Modesto Ibanez MD  Pager [phone]    Patient is a 70y old  Male who presents with a chief complaint of Hypoxia, abnormal CT (23 Oct 2020 12:37)      SUBJECTIVE / OVERNIGHT EVENTS:  Patient was seen and examined at bedside this morning. No acute events overnight.  ID#[id number]. Patient with multiple desaturations to the 80s overnight, lowest desat into the 70s; often when patient walking to bathroom, he has been asymptomatic. States that he feels well, denies sob, cough, chest pain, fever, chills. Understands that plan is for bronch today which will yield better idea regarding infectious vs. inflammatory etiology.     ADDITIONAL REVIEW OF SYSTEMS: Otherwise negative.    MEDICATIONS  (STANDING):  aspirin enteric coated 81 milliGRAM(s) Oral daily  atorvastatin 40 milliGRAM(s) Oral at bedtime  budesonide 160 MICROgram(s)/formoterol 4.5 MICROgram(s) Inhaler 2 Puff(s) Inhalation two times a day  clobetasol 0.05% Ointment 1 Application(s) Topical two times a day  dextrose 5%. 1000 milliLiter(s) (50 mL/Hr) IV Continuous <Continuous>  dextrose 50% Injectable 12.5 Gram(s) IV Push once  dextrose 50% Injectable 25 Gram(s) IV Push once  dextrose 50% Injectable 25 Gram(s) IV Push once  famotidine    Tablet 20 milliGRAM(s) Oral two times a day  insulin glargine Injectable (LANTUS) 10 Unit(s) SubCutaneous at bedtime  insulin lispro (ADMELOG) corrective regimen sliding scale   SubCutaneous three times a day before meals  insulin lispro (ADMELOG) corrective regimen sliding scale   SubCutaneous at bedtime  magnesium oxide 400 milliGRAM(s) Oral three times a day with meals  metoprolol succinate ER 50 milliGRAM(s) Oral daily  niacin SR 1000 milliGRAM(s) Oral at bedtime  omega-3-Acid Ethyl Esters 1 Gram(s) Oral daily    MEDICATIONS  (PRN):  albuterol/ipratropium for Nebulization 3 milliLiter(s) Nebulizer every 4 hours PRN Shortness of Breath and/or Wheezing  dextrose 40% Gel 15 Gram(s) Oral once PRN Blood Glucose LESS THAN 70 milliGRAM(s)/deciliter  glucagon  Injectable 1 milliGRAM(s) IntraMuscular once PRN Glucose LESS THAN 70 milligrams/deciliter  zolpidem 5 milliGRAM(s) Oral at bedtime PRN Insomnia  zolpidem 5 milliGRAM(s) Oral at bedtime PRN Insomnia      CAPILLARY BLOOD GLUCOSE      POCT Blood Glucose.: 178 mg/dL (23 Oct 2020 06:34)  POCT Blood Glucose.: 256 mg/dL (22 Oct 2020 20:30)  POCT Blood Glucose.: 129 mg/dL (22 Oct 2020 16:58)    I&O's Summary      PHYSICAL EXAM:  Vital Signs Last 24 Hrs  T(C): 36.6 (23 Oct 2020 10:55), Max: 36.9 (22 Oct 2020 13:46)  T(F): 97.8 (23 Oct 2020 10:55), Max: 98.4 (22 Oct 2020 13:46)  HR: 101 (23 Oct 2020 10:55) (88 - 101)  BP: 100/60 (23 Oct 2020 10:55) (100/60 - 126/64)  BP(mean): --  RR: 17 (23 Oct 2020 10:55) (17 - 18)  SpO2: 94% (23 Oct 2020 10:55) (92% - 94%)    GENERAL: NAD, sitting comfortably  HEAD:  Atraumatic, Normocephalic  EYES: EOMI, PERRL, conjunctiva and sclera clear  ENT: Mucous membranes moist, no pharyngeal erythema  NECK: Supple, No Lymphadenopathy  CHEST/LUNG: Mild inspiratory wheeze bilaterally; NC in place, speaking in full sentences  CV: Regular rate and rhythm, S1/S2 equal; No murmurs, rubs, or gallops; No JVD or hepatojugular reflux  ABDOMEN: Soft, Nontender, Nondistended; Bowel sounds present  EXTREMITIES:  2+ Peripheral Pulses, No clubbing, cyanosis, or edema  PSYCH: AAOx3; affect appropriate  NEUROLOGY: no focal motor or sensory deficits, muscle strength 5/5 bilaterally, quadriceps reflex intact  SKIN: Diffuse nontender nonitching blanching macular rash on lower extremities, buttocks, and trunk with 2-3 open blisters on b/l ankle and few lesions on hands/palms    LABS:                        12.4   6.43  )-----------( 305      ( 23 Oct 2020 06:17 )             38.4     10-23    137  |  100  |  13  ----------------------------<  172<H>  3.9   |  24  |  0.84    Ca    9.0      23 Oct 2020 06:17  Phos  3.3     10-23  Mg     1.5     10-23    TPro  6.2  /  Alb  3.3  /  TBili  0.3  /  DBili  x   /  AST  17  /  ALT  13  /  AlkPhos  69  10-22    PT/INR - ( 23 Oct 2020 06:17 )   PT: 12.5 SEC;   INR: 1.09          PTT - ( 23 Oct 2020 06:17 )  PTT:25.8 SEC          Culture - Sputum (collected 22 Oct 2020 00:21)  Source: .Sputum Sputum  Gram Stain (22 Oct 2020 07:52):    Moderate polymorphonuclear leukocytes per low power field    Rare Squamous epithelial cells per low power field    Numerous Gram Negative Rods seen per oil power field    Numerous Gram positive cocci in pairs seen per oil power field  Preliminary Report (22 Oct 2020 19:29):    Normal Respiratory Alesia present        RADIOLOGY & ADDITIONAL TESTS:  No new interval imaging. Modesto Ibanez MD  Pager [phone]    Patient is a 70y old  Male who presents with a chief complaint of Hypoxia, abnormal CT (23 Oct 2020 12:37)      SUBJECTIVE / OVERNIGHT EVENTS:  Patient was seen and examined at bedside this morning. No acute events overnight.  ID#[id number]. Patient with multiple desaturations to the 80s overnight, lowest desat into the 70s; often when patient walking to bathroom, he has been asymptomatic. States that he feels well, denies sob, cough, chest pain, fever, chills. Understands that plan is for bronch today which will yield better idea regarding infectious vs. inflammatory etiology.     ADDITIONAL REVIEW OF SYSTEMS: Otherwise negative.    MEDICATIONS  (STANDING):  aspirin enteric coated 81 milliGRAM(s) Oral daily  atorvastatin 40 milliGRAM(s) Oral at bedtime  budesonide 160 MICROgram(s)/formoterol 4.5 MICROgram(s) Inhaler 2 Puff(s) Inhalation two times a day  clobetasol 0.05% Ointment 1 Application(s) Topical two times a day  dextrose 5%. 1000 milliLiter(s) (50 mL/Hr) IV Continuous <Continuous>  dextrose 50% Injectable 12.5 Gram(s) IV Push once  dextrose 50% Injectable 25 Gram(s) IV Push once  dextrose 50% Injectable 25 Gram(s) IV Push once  famotidine    Tablet 20 milliGRAM(s) Oral two times a day  insulin glargine Injectable (LANTUS) 10 Unit(s) SubCutaneous at bedtime  insulin lispro (ADMELOG) corrective regimen sliding scale   SubCutaneous three times a day before meals  insulin lispro (ADMELOG) corrective regimen sliding scale   SubCutaneous at bedtime  magnesium oxide 400 milliGRAM(s) Oral three times a day with meals  metoprolol succinate ER 50 milliGRAM(s) Oral daily  niacin SR 1000 milliGRAM(s) Oral at bedtime  omega-3-Acid Ethyl Esters 1 Gram(s) Oral daily    MEDICATIONS  (PRN):  albuterol/ipratropium for Nebulization 3 milliLiter(s) Nebulizer every 4 hours PRN Shortness of Breath and/or Wheezing  dextrose 40% Gel 15 Gram(s) Oral once PRN Blood Glucose LESS THAN 70 milliGRAM(s)/deciliter  glucagon  Injectable 1 milliGRAM(s) IntraMuscular once PRN Glucose LESS THAN 70 milligrams/deciliter  zolpidem 5 milliGRAM(s) Oral at bedtime PRN Insomnia  zolpidem 5 milliGRAM(s) Oral at bedtime PRN Insomnia      CAPILLARY BLOOD GLUCOSE      POCT Blood Glucose.: 178 mg/dL (23 Oct 2020 06:34)  POCT Blood Glucose.: 256 mg/dL (22 Oct 2020 20:30)  POCT Blood Glucose.: 129 mg/dL (22 Oct 2020 16:58)    I&O's Summary      PHYSICAL EXAM:  Vital Signs Last 24 Hrs  T(C): 36.6 (23 Oct 2020 10:55), Max: 36.9 (22 Oct 2020 13:46)  T(F): 97.8 (23 Oct 2020 10:55), Max: 98.4 (22 Oct 2020 13:46)  HR: 101 (23 Oct 2020 10:55) (88 - 101)  BP: 100/60 (23 Oct 2020 10:55) (100/60 - 126/64)  BP(mean): --  RR: 17 (23 Oct 2020 10:55) (17 - 18)  SpO2: 94% (23 Oct 2020 10:55) (92% - 94%)    GENERAL: NAD, sitting comfortably  HEAD:  Atraumatic, Normocephalic  EYES: EOMI, PERRL, conjunctiva and sclera clear  ENT: Mucous membranes moist, no pharyngeal erythema  NECK: Supple, No Lymphadenopathy  CHEST/LUNG: Mild inspiratory wheeze bilaterally; NC in place, speaking in full sentences  CV: Regular rate and rhythm, S1/S2 equal; No murmurs, rubs, or gallops; No JVD or hepatojugular reflux  ABDOMEN: Soft, Nontender, Nondistended; Bowel sounds present  EXTREMITIES:  2+ Peripheral Pulses, No clubbing, cyanosis, or edema  PSYCH: AAOx3; affect appropriate  NEUROLOGY: no focal motor or sensory deficits, muscle strength 5/5 bilaterally, quadriceps reflex intact  SKIN: Diffuse nontender nonitching blanching macular rash on lower extremities, buttocks, and trunk with 2-3 open blisters on b/l ankle and few lesions on hands/palms    LABS:                        12.4   6.43  )-----------( 305      ( 23 Oct 2020 06:17 )             38.4     10-23    137  |  100  |  13  ----------------------------<  172<H>  3.9   |  24  |  0.84    Ca    9.0      23 Oct 2020 06:17  Phos  3.3     10-23  Mg     1.5     10-23    TPro  6.2  /  Alb  3.3  /  TBili  0.3  /  DBili  x   /  AST  17  /  ALT  13  /  AlkPhos  69  10-22    PT/INR - ( 23 Oct 2020 06:17 )   PT: 12.5 SEC;   INR: 1.09          PTT - ( 23 Oct 2020 06:17 )  PTT:25.8 SEC          Culture - Sputum (collected 22 Oct 2020 00:21)  Source: .Sputum Sputum  Gram Stain (22 Oct 2020 07:52):    Moderate polymorphonuclear leukocytes per low power field    Rare Squamous epithelial cells per low power field    Numerous Gram Negative Rods seen per oil power field    Numerous Gram positive cocci in pairs seen per oil power field  Preliminary Report (22 Oct 2020 19:29):    Normal Respiratory Alesia present        RADIOLOGY & ADDITIONAL TESTS:  No new interval imaging.    Consultant notes reviewed: Pulm

## 2020-10-23 NOTE — PROGRESS NOTE ADULT - PROBLEM SELECTOR PLAN 4
-Patient seen by derm for rash as an outpatient who recommended betamethasone ointment which patient has been using without relief  -Dermatology consult, appreciate recs, clobetasol .05% ointment

## 2020-10-23 NOTE — PROGRESS NOTE ADULT - PROBLEM SELECTOR PLAN 1
Progressive peribronchovascular opacities on CT chest over last 7 weeks accompanied by worsening BILLINGS and dry cough. Also with b/l shin nodular rash. No fevers or leukocytosis. Differential includes atypical infection in setting of relative immunocompromise (fungal, PCP) vs pneumonitis (3% w/ atezolizumab, although atypical distribution) vs progression of disease vs overlapping inflammatory lung disease. Slightly increased O2 requirments.  - Plan for Bronch w/ BAL and transbronchial biopsy 10/23 at noon  - Keep NPO for procedure  - f/u Fungitell and Quant gold  - Derm eval appreciated  - Will attempt to hold steroids/antibiotics until bronch results return but may need to start both empirically if hypoxemia continues to worsen    Shad Coffey MD  Pulmonary & Critical Care Fellow  [phone]

## 2020-10-23 NOTE — PROGRESS NOTE ADULT - SUBJECTIVE AND OBJECTIVE BOX
CHIEF COMPLAINT:    Interval Events: Supplemental O2 increased from 4L to 6L. Patient however endorses stable SOB and feels well.    REVIEW OF SYSTEMS:  Constitutional: [ ] negative [ ] fevers [ ] chills [ ] weight loss [ ] weight gain  HEENT: [ ] negative [ ] dry eyes [ ] eye irritation [ ] postnasal drip [ ] nasal congestion  CV: [ ] negative  [ ] chest pain [ ] orthopnea [ ] palpitations [ ] murmur  Resp: [ ] negative [X] cough [X] shortness of breath [ ] dyspnea [ ] wheezing [ ] sputum [ ] hemoptysis  GI: [ ] negative [ ] nausea [ ] vomiting [ ] diarrhea [ ] constipation [ ] abd pain [ ] dysphagia   : [ ] negative [ ] dysuria [ ] nocturia [ ] hematuria [ ] increased urinary frequency  Musculoskeletal: [ ] negative [ ] back pain [ ] myalgias [ ] arthralgias [ ] fracture  Skin: [ ] negative [ ] rash [ ] itch  Neurological: [ ] negative [ ] headache [ ] dizziness [ ] syncope [ ] weakness [ ] numbness  Psychiatric: [ ] negative [ ] anxiety [ ] depression  Endocrine: [ ] negative [ ] diabetes [ ] thyroid problem  Hematologic/Lymphatic: [ ] negative [ ] anemia [ ] bleeding problem  Allergic/Immunologic: [ ] negative [ ] itchy eyes [ ] nasal discharge [ ] hives [ ] angioedema  [X] All other systems negative  [ ] Unable to assess ROS because ________    OBJECTIVE:  ICU Vital Signs Last 24 Hrs  T(C): 36.6 (23 Oct 2020 05:00), Max: 36.9 (22 Oct 2020 13:46)  T(F): 97.8 (23 Oct 2020 05:00), Max: 98.4 (22 Oct 2020 13:46)  HR: 96 (23 Oct 2020 05:00) (72 - 96)  BP: 108/69 (23 Oct 2020 05:00) (108/69 - 126/64)  BP(mean): --  ABP: --  ABP(mean): --  RR: 18 (23 Oct 2020 05:00) (16 - 18)  SpO2: 93% (23 Oct 2020 05:00) (92% - 95%)        CAPILLARY BLOOD GLUCOSE  149 (22 Oct 2020 07:39)      POCT Blood Glucose.: 178 mg/dL (23 Oct 2020 06:34)      PHYSICAL EXAM:  General: NAD, resting comfortably on 6 L nc  HEENT: EOMI, PERRLA  Respiratory: CTAB  Cardiovascular: S1S2, RRR  Abdomen: Soft, NTND, BS+  Extremities: No peripheral edema  Skin: B/l shin nodular rash  Neurological: Grossly intact      HOSPITAL MEDICATIONS:  MEDICATIONS  (STANDING):  aspirin enteric coated 81 milliGRAM(s) Oral daily  atorvastatin 40 milliGRAM(s) Oral at bedtime  budesonide 160 MICROgram(s)/formoterol 4.5 MICROgram(s) Inhaler 2 Puff(s) Inhalation two times a day  clobetasol 0.05% Ointment 1 Application(s) Topical two times a day  dextrose 5%. 1000 milliLiter(s) (50 mL/Hr) IV Continuous <Continuous>  dextrose 50% Injectable 12.5 Gram(s) IV Push once  dextrose 50% Injectable 25 Gram(s) IV Push once  dextrose 50% Injectable 25 Gram(s) IV Push once  famotidine    Tablet 20 milliGRAM(s) Oral two times a day  insulin glargine Injectable (LANTUS) 10 Unit(s) SubCutaneous at bedtime  insulin lispro (ADMELOG) corrective regimen sliding scale   SubCutaneous three times a day before meals  insulin lispro (ADMELOG) corrective regimen sliding scale   SubCutaneous at bedtime  magnesium oxide 400 milliGRAM(s) Oral daily  metoprolol succinate ER 50 milliGRAM(s) Oral daily  niacin SR 1000 milliGRAM(s) Oral at bedtime  omega-3-Acid Ethyl Esters 1 Gram(s) Oral daily    MEDICATIONS  (PRN):  albuterol/ipratropium for Nebulization 3 milliLiter(s) Nebulizer every 4 hours PRN Shortness of Breath and/or Wheezing  dextrose 40% Gel 15 Gram(s) Oral once PRN Blood Glucose LESS THAN 70 milliGRAM(s)/deciliter  glucagon  Injectable 1 milliGRAM(s) IntraMuscular once PRN Glucose LESS THAN 70 milligrams/deciliter  zolpidem 5 milliGRAM(s) Oral at bedtime PRN Insomnia  zolpidem 5 milliGRAM(s) Oral at bedtime PRN Insomnia      LABS:                        12.4   6.43  )-----------( 305      ( 23 Oct 2020 06:17 )             38.4     Hgb Trend: 12.4<--, 12.2<--, 13.4<--  10-23    137  |  100  |  13  ----------------------------<  172<H>  3.9   |  24  |  0.84    Ca    9.0      23 Oct 2020 06:17  Phos  3.3     10-23  Mg     1.5     10-23    TPro  6.2  /  Alb  3.3  /  TBili  0.3  /  DBili  x   /  AST  17  /  ALT  13  /  AlkPhos  69  10-22    Creatinine Trend: 0.84<--, 0.93<--, 0.95<--  PT/INR - ( 23 Oct 2020 06:17 )   PT: 12.5 SEC;   INR: 1.09          PTT - ( 23 Oct 2020 06:17 )  PTT:25.8 SEC      Venous Blood Gas:  10-22 @ 08:04  --/--/--/--/--  VBG Lactate: 1.3  Venous Blood Gas:  10-21 @ 18:13  7.39/47/< 24/25/18.9  VBG Lactate: 3.9      MICROBIOLOGY:     Culture - Sputum (collected 22 Oct 2020 00:21)  Source: .Sputum Sputum  Gram Stain (22 Oct 2020 07:52):    Moderate polymorphonuclear leukocytes per low power field    Rare Squamous epithelial cells per low power field    Numerous Gram Negative Rods seen per oil power field    Numerous Gram positive cocci in pairs seen per oil power field  Preliminary Report (22 Oct 2020 19:29):    Normal Respiratory Alesia present        RADIOLOGY:  [ ] Reviewed and interpreted by me    PULMONARY FUNCTION TESTS:    EKG:

## 2020-10-23 NOTE — PROGRESS NOTE ADULT - ATTENDING COMMENTS
69 y/o M former smoker, SCLC (dx spring 2019) s/p carboplatin/etoposide and radiation with partial response now on maintenance atezolizumab immunotherapy every 3 weeks (c/b brain metastases now s/p gamma knife radiation therapy with improved brain MRI) now admitted for hypoxemia. Patinet with worsening bilateral ground glass infiltrates predominately in peribronchovascular distribution over past month. Patient also with new lower extremity rash in similar time frame thought to be secondary to immunotherapy. Differential remains broad, however suspect likely pneumonitis from atezolizumab, however infection remains in the differential.    - Plan for bronchoscopy with TBBx to r/o infection.  - Hold steroids/abx for now as patient not toxic appearing  - Hold atezolizumab

## 2020-10-23 NOTE — PROGRESS NOTE ADULT - PROBLEM SELECTOR PLAN 5
-Recently started on insulin as outpatient  -Lantus 10u QHS with FS and SSI TIDAC  - monitor FS and adjust regimen as needed  -Regular diet (consistent carb/dash/tlc)  - A1C 6.9 -Recently started on insulin as outpatient  -Lantus 10u QHS with FS and SSI TIDAC  -Monitor FS and adjust regimen as needed  -Regular diet (consistent carb/dash/tlc)  -A1C 6.9

## 2020-10-23 NOTE — PROGRESS NOTE ADULT - PROBLEM SELECTOR PLAN 2
-Patient follows with oncology Dr. Ramirez outpatient  -Oncology aware, appreciate recs  -Atezolizumab maintenance therapy every 3 weeks, holding for now  -Dyspnea workup as above

## 2020-10-23 NOTE — CHART NOTE - NSCHARTNOTEFT_GEN_A_CORE
BRONCHOSCOPY NOTE    Pre-Bronchoscopy Tuberculosis Risk Screening Tool  To reduce the risk for airborne transmission of Mycobacterium tuberculosis, this assessment form must be completed prior to bronchoscopy procedures being performed outside of a negative pressure environment.    Procedure Date: 10/23  Health Care Provider Name: Dr Lalo Tenorio / Dr Blanton [facility]  Reason for the Bronchoscopy: Respiratory failure  Planned Location for the Procedure:  [ ] Emergency Department     [] Intensive Care Unit     [ x] Operating Room     Other: ___________________    Risk Assessment  I. I have personally evaluated this patient for Mycobacterium tuberculosis and I determined the following risk:  [x] No Risk for TB     [ ] Low risk or TB     [ ] Significant risk for TB    II. Additional Findings: _________________________    III. Based on the Determined Risk for TB the following Action(s) are Suggested:  1. If there are no risk factors for TB infection proceed with the procedure.  2. If there is low risk or significant risk for TB infection the following recommendations should be followed:            a. Perform the procedure in a negative pressure room, with N95 respirator.            b. If not feasible to move the patient or defer the procedure:                    i. Use a single-bedded room low traffic area to perform the bronchoscopy procedure.                   ii. Place a portable high-efficiency particulate air (HEPA) filter in the space prior to starting the procedure and keep closed.                       Refer to the INF.1132 titled “Tuberculosis Control Strategy Plan” for additional information.                  iii. All Health Care Providers within the procedure room shall wear an N95 respirator.            c. Documentation of the tuberculosis risk assessment should be included within the patient’s medical record.      Indication: respiratory failure      Findings:  Bronchoscope inserted through ETT. ETT noted to be in good position. Airway evaluation revealed normal airways. Scant clear secretions seen throughout examined airways. BAL performed, bilateral lower lobes. Transbronchial biopsy performed of RUL. Minimal blood loss. Bronchoscope then withdrawn from ETT.    Specimens: BAL, transbronchial biopsy BRONCHOSCOPY NOTE    Pre-Bronchoscopy Tuberculosis Risk Screening Tool  To reduce the risk for airborne transmission of Mycobacterium tuberculosis, this assessment form must be completed prior to bronchoscopy procedures being performed outside of a negative pressure environment.    Procedure Date: 10/23  Health Care Provider Name: Dr Lalo Tenorio / Dr Blanton [facility]  Reason for the Bronchoscopy: Respiratory failure  Planned Location for the Procedure:  [ ] Emergency Department     [] Intensive Care Unit     [ x] Operating Room     Other: ___________________    Risk Assessment  I. I have personally evaluated this patient for Mycobacterium tuberculosis and I determined the following risk:  [x] No Risk for TB     [ ] Low risk or TB     [ ] Significant risk for TB    II. Additional Findings: _________________________    III. Based on the Determined Risk for TB the following Action(s) are Suggested:  1. If there are no risk factors for TB infection proceed with the procedure.  2. If there is low risk or significant risk for TB infection the following recommendations should be followed:            a. Perform the procedure in a negative pressure room, with N95 respirator.            b. If not feasible to move the patient or defer the procedure:                    i. Use a single-bedded room low traffic area to perform the bronchoscopy procedure.                   ii. Place a portable high-efficiency particulate air (HEPA) filter in the space prior to starting the procedure and keep closed.                       Refer to the INF.1132 titled “Tuberculosis Control Strategy Plan” for additional information.                  iii. All Health Care Providers within the procedure room shall wear an N95 respirator.            c. Documentation of the tuberculosis risk assessment should be included within the patient’s medical record.      Indication: respiratory failure      Findings:  Bronchoscope inserted through ETT. ETT noted to be in good position. Airway evaluation down to segmental levels revealed normal airways. Scant clear secretions seen throughout examined airways. BAL performed, bilateral lower lobes. Transbronchial biopsy performed of RUL. Minimal blood loss. Bronchoscope then withdrawn from ETT.    Specimens: BAL, transbronchial biopsy    ATTENDING ATTESTATION:  I was present for and assisted with the entire procedure.

## 2020-10-23 NOTE — PROGRESS NOTE ADULT - PROBLEM SELECTOR PLAN 3
-No hx of hospitalizations, does not use home O2  -Roxy PRN  -Symbicort as interchange for Breo Ellipta  -Maintain goal O2 sat >88%, 88-93% on NC  -Will determine need for home O2 prior to discharge

## 2020-10-23 NOTE — PROGRESS NOTE ADULT - PROBLEM SELECTOR PLAN 1
-Patient with worsening bilateral diffuse peribronchovascular opacities on CT imaging concerning for infectious vs. inflammatory/pneumonitis etiology  -Patient admitted with acute hypoxic respiratory failure (with desaturations to the mid 80s), c/w supplemental O2 as needed  -History of SCLC on maintenance immunotherapy, following for surveillance imaging  -Pulmonology aware, appreicate input, workup in progress, plan for bronch 10/23, NPO after MN  -RVP negative in ED, repeat COVID swab negative  -Sputum Cx with GN rods and GP cocci in pairs  -Send fungitell, Quantiferon, LDH  -Plan for bronch 10/23 per pulmonology  -Non-toxic appearing, afebrile, without leukocytosis, monitoring off antibiotics at this time, appreciate pulm and heme/onc recs -Patient with worsening bilateral diffuse peribronchovascular opacities on CT imaging concerning for infectious vs. inflammatory/pneumonitis etiology  -Patient admitted with acute hypoxic respiratory failure (with desaturations to the mid 80s), c/w supplemental O2 as needed  -History of SCLC on maintenance immunotherapy, following for surveillance imaging  -Pulmonology aware, appreciate input, workup in progress, plan for bronch 10/23, NPO after MN  -RVP negative in ED, repeat COVID swab negative  -Sputum Cx with GN rods and GP cocci in pairs  -Send fungitell, Quantiferon  -  -Plan for bronch 10/23 per pulmonology  -Non-toxic appearing, afebrile, without leukocytosis, monitoring off antibiotics at this time, appreciate pulm and heme/onc recs -Patient with worsening bilateral diffuse peribronchovascular opacities on CT imaging concerning for infectious vs. inflammatory/pneumonitis etiology  -Patient admitted with acute hypoxic respiratory failure (with desaturations to the mid 80s), c/w supplemental O2 as needed  -History of SCLC on maintenance immunotherapy, following for surveillance imaging  -Pulmonology aware, appreciate input, workup in progress, plan for bronch 10/23, NPO after MN  -RVP negative in ED, repeat COVID swab negative  -Sputum Cx with GN rods and GP cocci in pairs  -Send fungitell, Quantiferon  -  -Non-toxic appearing, afebrile, without leukocytosis, monitoring off antibiotics/steroids at this time, appreciate pulm and heme/onc recs

## 2020-10-23 NOTE — PROGRESS NOTE ADULT - ATTENDING COMMENTS
Patient seen and examined, d/w Dr. Ibanez, agree with above.     Patient evaluated in PACU (pre-op area), currently awaiting bronchoscopy with transbronchial biopsy today (appreciate pulm assistance) for further evaluation (differential includes pneumonitis 2/2 immunotherapy vs atypical infection vs progression of disease). [place] translation provided by daughter Leena at bedside, patient reports breathing is OK on supplemental O2 at this time, is in agreement with plan for procedure. Will f/u results of bronch/biopsy and further heme/onc, pulm recs. Holding off on steroids/antibiotics for now as per pulm.

## 2020-10-23 NOTE — PROGRESS NOTE ADULT - ASSESSMENT
Patient is a 71yo M with PMHx HTN, HLD, DM2, BPH s/p TURP, CAD s/p stent 2007, COPD, and SCLC (dx spring 2019) s/p carboplatin/etoposide and radiation with partial response now on maintenance atezolizumab immunotherapy every 3 weeks (c/b brain metastases now s/p gamma knife radiation therapy with improved brain MRI) who presents with worsening BILLINGS [id number] found to be hypoxic and with interval progression of bilateral lung opacities on CT concerning for infectious vs inflammatory etiology.

## 2020-10-24 DIAGNOSIS — J96.01 ACUTE RESPIRATORY FAILURE WITH HYPOXIA: ICD-10-CM

## 2020-10-24 LAB
ANION GAP SERPL CALC-SCNC: 14 MMO/L — SIGNIFICANT CHANGE UP (ref 7–14)
BUN SERPL-MCNC: 15 MG/DL — SIGNIFICANT CHANGE UP (ref 7–23)
CALCIUM SERPL-MCNC: 9.1 MG/DL — SIGNIFICANT CHANGE UP (ref 8.4–10.5)
CHLORIDE SERPL-SCNC: 100 MMOL/L — SIGNIFICANT CHANGE UP (ref 98–107)
CO2 SERPL-SCNC: 22 MMOL/L — SIGNIFICANT CHANGE UP (ref 22–31)
CREAT SERPL-MCNC: 0.82 MG/DL — SIGNIFICANT CHANGE UP (ref 0.5–1.3)
GLUCOSE SERPL-MCNC: 267 MG/DL — HIGH (ref 70–99)
HCT VFR BLD CALC: 39.9 % — SIGNIFICANT CHANGE UP (ref 39–50)
HGB BLD-MCNC: 12.4 G/DL — LOW (ref 13–17)
MAGNESIUM SERPL-MCNC: 2 MG/DL — SIGNIFICANT CHANGE UP (ref 1.6–2.6)
MCHC RBC-ENTMCNC: 29.3 PG — SIGNIFICANT CHANGE UP (ref 27–34)
MCHC RBC-ENTMCNC: 31.1 % — LOW (ref 32–36)
MCV RBC AUTO: 94.3 FL — SIGNIFICANT CHANGE UP (ref 80–100)
NRBC # FLD: 0 K/UL — SIGNIFICANT CHANGE UP (ref 0–0)
PHOSPHATE SERPL-MCNC: 3.1 MG/DL — SIGNIFICANT CHANGE UP (ref 2.5–4.5)
PLATELET # BLD AUTO: 348 K/UL — SIGNIFICANT CHANGE UP (ref 150–400)
PMV BLD: 9.1 FL — SIGNIFICANT CHANGE UP (ref 7–13)
POTASSIUM SERPL-MCNC: 4.6 MMOL/L — SIGNIFICANT CHANGE UP (ref 3.5–5.3)
POTASSIUM SERPL-SCNC: 4.6 MMOL/L — SIGNIFICANT CHANGE UP (ref 3.5–5.3)
RBC # BLD: 4.23 M/UL — SIGNIFICANT CHANGE UP (ref 4.2–5.8)
RBC # FLD: 14.6 % — HIGH (ref 10.3–14.5)
SODIUM SERPL-SCNC: 136 MMOL/L — SIGNIFICANT CHANGE UP (ref 135–145)
WBC # BLD: 8.73 K/UL — SIGNIFICANT CHANGE UP (ref 3.8–10.5)
WBC # FLD AUTO: 8.73 K/UL — SIGNIFICANT CHANGE UP (ref 3.8–10.5)

## 2020-10-24 PROCEDURE — 99232 SBSQ HOSP IP/OBS MODERATE 35: CPT | Mod: 25

## 2020-10-24 PROCEDURE — 99233 SBSQ HOSP IP/OBS HIGH 50: CPT | Mod: GC

## 2020-10-24 RX ADMIN — Medication 50 MILLIGRAM(S): at 17:38

## 2020-10-24 RX ADMIN — MAGNESIUM OXIDE 400 MG ORAL TABLET 400 MILLIGRAM(S): 241.3 TABLET ORAL at 08:20

## 2020-10-24 RX ADMIN — ENOXAPARIN SODIUM 40 MILLIGRAM(S): 100 INJECTION SUBCUTANEOUS at 12:26

## 2020-10-24 RX ADMIN — Medication 1 GRAM(S): at 12:26

## 2020-10-24 RX ADMIN — INSULIN GLARGINE 10 UNIT(S): 100 INJECTION, SOLUTION SUBCUTANEOUS at 21:30

## 2020-10-24 RX ADMIN — ATORVASTATIN CALCIUM 40 MILLIGRAM(S): 80 TABLET, FILM COATED ORAL at 21:29

## 2020-10-24 RX ADMIN — Medication 2: at 08:20

## 2020-10-24 RX ADMIN — FAMOTIDINE 20 MILLIGRAM(S): 10 INJECTION INTRAVENOUS at 05:05

## 2020-10-24 RX ADMIN — Medication 1000 MILLIGRAM(S): at 21:30

## 2020-10-24 RX ADMIN — Medication 1 APPLICATION(S): at 17:15

## 2020-10-24 RX ADMIN — Medication 1 APPLICATION(S): at 05:05

## 2020-10-24 RX ADMIN — Medication 2: at 17:16

## 2020-10-24 RX ADMIN — MAGNESIUM OXIDE 400 MG ORAL TABLET 400 MILLIGRAM(S): 241.3 TABLET ORAL at 17:15

## 2020-10-24 RX ADMIN — Medication 81 MILLIGRAM(S): at 12:26

## 2020-10-24 RX ADMIN — MAGNESIUM OXIDE 400 MG ORAL TABLET 400 MILLIGRAM(S): 241.3 TABLET ORAL at 12:26

## 2020-10-24 RX ADMIN — BUDESONIDE AND FORMOTEROL FUMARATE DIHYDRATE 2 PUFF(S): 160; 4.5 AEROSOL RESPIRATORY (INHALATION) at 21:29

## 2020-10-24 RX ADMIN — BUDESONIDE AND FORMOTEROL FUMARATE DIHYDRATE 2 PUFF(S): 160; 4.5 AEROSOL RESPIRATORY (INHALATION) at 08:20

## 2020-10-24 RX ADMIN — FAMOTIDINE 20 MILLIGRAM(S): 10 INJECTION INTRAVENOUS at 17:15

## 2020-10-24 RX ADMIN — Medication 2: at 12:26

## 2020-10-24 NOTE — PROGRESS NOTE ADULT - ATTENDING COMMENTS
Agree with plan as outlined above. Patient seen and examined at bedside. Patient history, laboratory data, and imaging personally reviewed.     Pt is a 70M with PMHx as above s/p bronchoscopy yesterday by pulmonary team. Pt appears clinically well today. Results of bronchoscopy pending. Pulmonary will continue to follow.

## 2020-10-24 NOTE — PROGRESS NOTE ADULT - PROBLEM SELECTOR PLAN 2
-Patient follows with oncology Dr. Ramirez outpatient  -Oncology aware, appreciate recs  -Atezolizumab maintenance therapy every 3 weeks, holding for now  -Dyspnea workup as above SCLC (dx spring 2019) s/p carboplatin/etoposide and radiation with partial response now on maintenance atezolizumab immunotherapy every 3 weeks (c/b brain metastases now s/p gamma knife radiation therapy with improved brain MRI)   -Patient follows with oncology Dr. Ramirez outpatient  -Oncology aware, appreciate recs  -Atezolizumab maintenance therapy every 3 weeks, holding for now

## 2020-10-24 NOTE — PROGRESS NOTE ADULT - PROBLEM SELECTOR PLAN 1
-Patient with worsening bilateral diffuse peribronchovascular opacities on CT imaging concerning for infectious vs. inflammatory/pneumonitis etiology  -Patient admitted with acute hypoxic respiratory failure (with desaturations to the mid 80s), c/w supplemental O2 as needed  -History of SCLC on maintenance immunotherapy, following for surveillance imaging  -Pulmonology aware, appreciate input, workup in progress, plan for bronch 10/23, NPO after MN  -RVP negative in ED, repeat COVID swab negative  -Sputum Cx with GN rods and GP cocci in pairs  -Send fungitell, Quantiferon  -  -Non-toxic appearing, afebrile, without leukocytosis, monitoring off antibiotics/steroids at this time, appreciate pulm and heme/onc recs On 6L, SaO2 94-96% while at rest; desat to 86% if ambulating.   Differential: pneumonitis vs. pneumonia.  CT chest showed progression of bilateral lung opacity.   - CT A negative for PE  - RVP negative  - COVID 19 negative   - sputum culture normal respiratory karlie   - bronchial fluid negative for gram stain (10/23)     Plan:   - low suspicion for pneumonia --- observe off antibiotics  - wean O2 as tolerated   - lung cancer management per below On 6L, SaO2 94-96% while at rest; desat to 86% if ambulating.   Differential: pneumonitis vs. pneumonia.  CT chest showed progression of bilateral lung opacity.   - CT A negative for PE  - RVP negative  - COVID 19 negative   - sputum culture normal respiratory karlie   - bronchial fluid negative for gram stain (10/23)     Plan:   - low suspicion for pneumonia --- observe off antibiotics  - wean O2 as tolerated   - f/u results of bronch  - lung cancer management per below

## 2020-10-24 NOTE — PROGRESS NOTE ADULT - ATTENDING COMMENTS
Patient seen and examined, d/w Dr. Guardado, agree with above.      ID# [id number] (Patient had wanted daughter Leena to provide translation but when he called her it went to voicemail). Patient reports breathing is improving. Tolerated bronchoscopy yesterday, awaiting full results, pulm input appreciated. Patient happy to hear quant gold negative and AFB negative x3, not suspicious for TB (he reports TB is common in his background/area of world). Continue to monitor respiratory status, holding off on steroids/antibiotics at this time.

## 2020-10-24 NOTE — PROGRESS NOTE ADULT - SUBJECTIVE AND OBJECTIVE BOX
ANESTHESIA POSTOP CHECK    70y Male POSTOP DAY 1 S/P bronchoscopy    Vital Signs Last 24 Hrs  T(C): 36.8 (24 Oct 2020 04:58), Max: 36.8 (24 Oct 2020 04:58)  T(F): 98.2 (24 Oct 2020 04:58), Max: 98.2 (24 Oct 2020 04:58)  HR: 68 (24 Oct 2020 04:58) (67 - 101)  BP: 121/62 (24 Oct 2020 04:58) (92/50 - 122/60)  BP(mean): 59 (23 Oct 2020 15:45) (59 - 66)  RR: 18 (24 Oct 2020 04:58) (15 - 19)  SpO2: 94% (24 Oct 2020 04:58) (91% - 95%)  I&O's Summary    23 Oct 2020 07:01  -  24 Oct 2020 07:00  --------------------------------------------------------  IN: 200 mL / OUT: 575 mL / NET: -375 mL        [x] NO APPARENT ANESTHESIA COMPLICATIONS

## 2020-10-24 NOTE — PROGRESS NOTE ADULT - ASSESSMENT
Patient is a 69yo M with PMHx HTN, HLD, DM2, BPH s/p TURP, CAD s/p stent 2007, COPD, and SCLC (dx spring 2019) s/p carboplatin/etoposide and radiation with partial response now on maintenance atezolizumab immunotherapy every 3 weeks (c/b brain metastases now s/p gamma knife radiation therapy with improved brain MRI) who presents with worsening BILLINGS [id number] found to be hypoxic and with interval progression of bilateral lung opacities on CT concerning for infectious vs inflammatory etiology.

## 2020-10-24 NOTE — PROGRESS NOTE ADULT - ASSESSMENT
Patient is a 71 yo M w/ HTN, HLD, DM, BPH s/p TURP, CAD s/p stent 2007, COPD, and SCLC (dx spring 2019) s/p carboplatin/etoposide and radiation with partial response now on maintenance atezolizumab immunotherapy every 3 weeks (c/b brain metastases now s/p gamma knife radiation therapy with improved brain MRI) admitted on 10/21 after being found to be hypoxemic at oncology clinic. As per patient and chart review, endorses worsening BILLINGS and dry cough x 1 month. On image review, patient has had progression of peribronchovascular opacities since CT chest 8/31/2020 with marked progression between CT from 10/19/2020 and 10/21/2020. Patient is s/p bronchoscopy yesterday, 10/24.    Hypoxia  - Potentially pneumonitis from atezolizumab, but infectious etiology remains on differential  - Patient still on 6L NC, reports feeling well  - Continue to hold antibiotics and steroids pending final results of bronchoscopy  - Hold atezolizumab    Landon English, PGY-5  Pulmonary and Critical Care Medicine  [id number]

## 2020-10-24 NOTE — PROGRESS NOTE ADULT - SUBJECTIVE AND OBJECTIVE BOX
CHIEF COMPLAINT: Hypoxia    Interval Events: No acute events overnight. Refused pacific , patient had daughter translate over the phone. Patient reports that he feels well. He denies shortness of breath. He states that he did have some blood tinged sputum after the procedure but none today.    REVIEW OF SYSTEMS:  Constitutional: No fevers or chills.  CV: No chest pain. No palpitations.  Resp: No dyspnea at rest. No cough. No wheezing  GI: No nausea. No vomiting. No diarrhea.  MSK: No joint pain or pain in any extremities  Integumentary: No skin lesions. No pedal edema.  Neurological: No gross motor weakness. No sensory changes.  [x] All other systems negative  [ ] Unable to assess ROS because ________    OBJECTIVE:  ICU Vital Signs Last 24 Hrs  T(C): 36.6 (24 Oct 2020 12:31), Max: 36.8 (24 Oct 2020 04:58)  T(F): 97.8 (24 Oct 2020 12:31), Max: 98.2 (24 Oct 2020 04:58)  HR: 72 (24 Oct 2020 12:31) (67 - 97)  BP: 121/56 (24 Oct 2020 12:31) (92/50 - 130/51)  BP(mean): 59 (23 Oct 2020 15:45) (59 - 66)  ABP: --  ABP(mean): --  RR: 17 (24 Oct 2020 12:31) (15 - 19)  SpO2: 92% (24 Oct 2020 12:31) (91% - 95%)    10-23 @ 07:01  -  10-24 @ 07:00  --------------------------------------------------------  IN: 200 mL / OUT: 575 mL / NET: -375 mL    CAPILLARY BLOOD GLUCOSE    POCT Blood Glucose.: 223 mg/dL (24 Oct 2020 12:11)    PHYSICAL EXAM:  General: Awake, alert, oriented X 3.   HEENT: Atraumatic, normocephalic.   Neck: Supple.  Respiratory: Normal chest expansion. Clear lungs, occasional wheeze on R.  Cardiovascular: S1 S2 normal. No murmurs, rubs or gallops.   Abdomen: Soft, non-tender, non-distended. No organomegaly.  Extremities: Warm to touch. Peripheral pulse palpable. No pedal edema.   Skin: No rashes or skin lesions  Neurological: Motor and sensory examination equal and normal in all four extremities.  Psychiatry: Appropriate mood and affect.    HOSPITAL MEDICATIONS:  MEDICATIONS  (STANDING):  aspirin enteric coated 81 milliGRAM(s) Oral daily  atorvastatin 40 milliGRAM(s) Oral at bedtime  budesonide 160 MICROgram(s)/formoterol 4.5 MICROgram(s) Inhaler 2 Puff(s) Inhalation two times a day  clobetasol 0.05% Ointment 1 Application(s) Topical two times a day  dextrose 5%. 1000 milliLiter(s) (50 mL/Hr) IV Continuous <Continuous>  dextrose 50% Injectable 12.5 Gram(s) IV Push once  dextrose 50% Injectable 25 Gram(s) IV Push once  dextrose 50% Injectable 25 Gram(s) IV Push once  enoxaparin Injectable 40 milliGRAM(s) SubCutaneous daily  famotidine    Tablet 20 milliGRAM(s) Oral two times a day  insulin glargine Injectable (LANTUS) 10 Unit(s) SubCutaneous at bedtime  insulin lispro (ADMELOG) corrective regimen sliding scale   SubCutaneous three times a day before meals  insulin lispro (ADMELOG) corrective regimen sliding scale   SubCutaneous at bedtime  magnesium oxide 400 milliGRAM(s) Oral three times a day with meals  metoprolol succinate ER 50 milliGRAM(s) Oral daily  niacin SR 1000 milliGRAM(s) Oral at bedtime  omega-3-Acid Ethyl Esters 1 Gram(s) Oral daily    MEDICATIONS  (PRN):  albuterol/ipratropium for Nebulization 3 milliLiter(s) Nebulizer every 4 hours PRN Shortness of Breath and/or Wheezing  dextrose 40% Gel 15 Gram(s) Oral once PRN Blood Glucose LESS THAN 70 milliGRAM(s)/deciliter  glucagon  Injectable 1 milliGRAM(s) IntraMuscular once PRN Glucose LESS THAN 70 milligrams/deciliter  zolpidem 5 milliGRAM(s) Oral at bedtime PRN Insomnia  zolpidem 5 milliGRAM(s) Oral at bedtime PRN Insomnia      LABS:                        12.4   8.73  )-----------( 348      ( 24 Oct 2020 06:50 )             39.9     10-24    136  |  100  |  15  ----------------------------<  267<H>  4.6   |  22  |  0.82    Ca    9.1      24 Oct 2020 06:50  Phos  3.1     10-24  Mg     2.0     10-24      PT/INR - ( 23 Oct 2020 06:17 )   PT: 12.5 SEC;   INR: 1.09          PTT - ( 23 Oct 2020 06:17 )  PTT:25.8 SEC          MICROBIOLOGY:     RADIOLOGY:  [ ] Reviewed and interpreted by me    Point of Care Ultrasound Findings:    PFT:    EKG:

## 2020-10-24 NOTE — PROGRESS NOTE ADULT - SUBJECTIVE AND OBJECTIVE BOX
PROGRESS NOTE:     CONTACT INFO:  Bonnie Guardado MD  Internal Medicine PGY2  Pager: [phone]    Patient is a 70y old  Male who presents with a chief complaint of Hypoxia, abnormal CT (23 Oct 2020 12:37)      SUBJECTIVE / OVERNIGHT EVENTS:    ADDITIONAL REVIEW OF SYSTEMS:    MEDICATIONS  (STANDING):  aspirin enteric coated 81 milliGRAM(s) Oral daily  atorvastatin 40 milliGRAM(s) Oral at bedtime  budesonide 160 MICROgram(s)/formoterol 4.5 MICROgram(s) Inhaler 2 Puff(s) Inhalation two times a day  clobetasol 0.05% Ointment 1 Application(s) Topical two times a day  dextrose 5%. 1000 milliLiter(s) (50 mL/Hr) IV Continuous <Continuous>  dextrose 50% Injectable 12.5 Gram(s) IV Push once  dextrose 50% Injectable 25 Gram(s) IV Push once  dextrose 50% Injectable 25 Gram(s) IV Push once  enoxaparin Injectable 40 milliGRAM(s) SubCutaneous daily  famotidine    Tablet 20 milliGRAM(s) Oral two times a day  insulin glargine Injectable (LANTUS) 10 Unit(s) SubCutaneous at bedtime  insulin lispro (ADMELOG) corrective regimen sliding scale   SubCutaneous three times a day before meals  insulin lispro (ADMELOG) corrective regimen sliding scale   SubCutaneous at bedtime  magnesium oxide 400 milliGRAM(s) Oral three times a day with meals  metoprolol succinate ER 50 milliGRAM(s) Oral daily  niacin SR 1000 milliGRAM(s) Oral at bedtime  omega-3-Acid Ethyl Esters 1 Gram(s) Oral daily    MEDICATIONS  (PRN):  albuterol/ipratropium for Nebulization 3 milliLiter(s) Nebulizer every 4 hours PRN Shortness of Breath and/or Wheezing  dextrose 40% Gel 15 Gram(s) Oral once PRN Blood Glucose LESS THAN 70 milliGRAM(s)/deciliter  glucagon  Injectable 1 milliGRAM(s) IntraMuscular once PRN Glucose LESS THAN 70 milligrams/deciliter  zolpidem 5 milliGRAM(s) Oral at bedtime PRN Insomnia  zolpidem 5 milliGRAM(s) Oral at bedtime PRN Insomnia      CAPILLARY BLOOD GLUCOSE      POCT Blood Glucose.: 347 mg/dL (23 Oct 2020 21:16)  POCT Blood Glucose.: 157 mg/dL (23 Oct 2020 16:49)  POCT Blood Glucose.: 178 mg/dL (23 Oct 2020 06:34)    I&O's Summary      PHYSICAL EXAM:  Vital Signs Last 24 Hrs  T(C): 36.8 (24 Oct 2020 04:58), Max: 36.8 (24 Oct 2020 04:58)  T(F): 98.2 (24 Oct 2020 04:58), Max: 98.2 (24 Oct 2020 04:58)  HR: 68 (24 Oct 2020 04:58) (67 - 101)  BP: 121/62 (24 Oct 2020 04:58) (92/50 - 122/60)  BP(mean): 59 (23 Oct 2020 15:45) (59 - 66)  RR: 18 (24 Oct 2020 04:58) (15 - 19)  SpO2: 94% (24 Oct 2020 04:58) (91% - 95%)      LABS:                        12.4   6.43  )-----------( 305      ( 23 Oct 2020 06:17 )             38.4     10-23    137  |  100  |  13  ----------------------------<  172<H>  3.9   |  24  |  0.84    Ca    9.0      23 Oct 2020 06:17  Phos  3.3     10-23  Mg     1.5     10-23    TPro  6.2  /  Alb  3.3  /  TBili  0.3  /  DBili  x   /  AST  17  /  ALT  13  /  AlkPhos  69  10-22    PT/INR - ( 23 Oct 2020 06:17 )   PT: 12.5 SEC;   INR: 1.09          PTT - ( 23 Oct 2020 06:17 )  PTT:25.8 SEC          Culture - Sputum (collected 21 Oct 2020 17:40)  Source: .Sputum Sputum  Gram Stain (22 Oct 2020 07:52):    Moderate polymorphonuclear leukocytes per low power field    Rare Squamous epithelial cells per low power field    Numerous Gram Negative Rods seen per oil power field    Numerous Gram positive cocci in pairs seen per oil power field  Final Report (23 Oct 2020 16:59):    Normal Respiratory Alesia present        RADIOLOGY & ADDITIONAL TESTS:  Results Reviewed:   Imaging Personally Reviewed:  Electrocardiogram Personally Reviewed:    COORDINATION OF CARE:  Care Discussed with Consultants/Other Providers [Y/N]:  Prior or Outpatient Records Reviewed [Y/N]:   PROGRESS NOTE:     CONTACT INFO:  Bonnie Guardado MD  Internal Medicine PGY2  Pager: [phone]    Patient is a 70y old  Male who presents with a chief complaint of Hypoxia, abnormal CT (23 Oct 2020 12:37)      SUBJECTIVE / OVERNIGHT EVENTS:  Overnight, when patient walked from bed to bathroom, desat to 85% to 89% while on 6L O2; while sitting or sleeping, O2 improved to 92-95%. Assessed patient at bedside, patient comfortable, denies chest pain/shortness of breath/abdominal pain, patient able to speak in full sentences  ADDITIONAL REVIEW OF SYSTEMS:    MEDICATIONS  (STANDING):  aspirin enteric coated 81 milliGRAM(s) Oral daily  atorvastatin 40 milliGRAM(s) Oral at bedtime  budesonide 160 MICROgram(s)/formoterol 4.5 MICROgram(s) Inhaler 2 Puff(s) Inhalation two times a day  clobetasol 0.05% Ointment 1 Application(s) Topical two times a day  dextrose 5%. 1000 milliLiter(s) (50 mL/Hr) IV Continuous <Continuous>  dextrose 50% Injectable 12.5 Gram(s) IV Push once  dextrose 50% Injectable 25 Gram(s) IV Push once  dextrose 50% Injectable 25 Gram(s) IV Push once  enoxaparin Injectable 40 milliGRAM(s) SubCutaneous daily  famotidine    Tablet 20 milliGRAM(s) Oral two times a day  insulin glargine Injectable (LANTUS) 10 Unit(s) SubCutaneous at bedtime  insulin lispro (ADMELOG) corrective regimen sliding scale   SubCutaneous three times a day before meals  insulin lispro (ADMELOG) corrective regimen sliding scale   SubCutaneous at bedtime  magnesium oxide 400 milliGRAM(s) Oral three times a day with meals  metoprolol succinate ER 50 milliGRAM(s) Oral daily  niacin SR 1000 milliGRAM(s) Oral at bedtime  omega-3-Acid Ethyl Esters 1 Gram(s) Oral daily    MEDICATIONS  (PRN):  albuterol/ipratropium for Nebulization 3 milliLiter(s) Nebulizer every 4 hours PRN Shortness of Breath and/or Wheezing  dextrose 40% Gel 15 Gram(s) Oral once PRN Blood Glucose LESS THAN 70 milliGRAM(s)/deciliter  glucagon  Injectable 1 milliGRAM(s) IntraMuscular once PRN Glucose LESS THAN 70 milligrams/deciliter  zolpidem 5 milliGRAM(s) Oral at bedtime PRN Insomnia  zolpidem 5 milliGRAM(s) Oral at bedtime PRN Insomnia      CAPILLARY BLOOD GLUCOSE      POCT Blood Glucose.: 347 mg/dL (23 Oct 2020 21:16)  POCT Blood Glucose.: 157 mg/dL (23 Oct 2020 16:49)  POCT Blood Glucose.: 178 mg/dL (23 Oct 2020 06:34)    I&O's Summary      PHYSICAL EXAM:  Vital Signs Last 24 Hrs  T(C): 36.8 (24 Oct 2020 04:58), Max: 36.8 (24 Oct 2020 04:58)  T(F): 98.2 (24 Oct 2020 04:58), Max: 98.2 (24 Oct 2020 04:58)  HR: 68 (24 Oct 2020 04:58) (67 - 101)  BP: 121/62 (24 Oct 2020 04:58) (92/50 - 122/60)  BP(mean): 59 (23 Oct 2020 15:45) (59 - 66)  RR: 18 (24 Oct 2020 04:58) (15 - 19)  SpO2: 94% (24 Oct 2020 04:58) (91% - 95%)    GENERAL: NAD  HEAD:  Atraumatic, Normocephalic  HEENT: scleral anicteric.   CV: Regular rate and rhythm. No murmurs, rubs, or gallops  Respiratory: normal respiratory effort, speaking in complete sentences, on 6L O2 NC. Lungs clear to auscultation bilaterally, no wheezes/crackles.  ABDOMEN: Soft, Nontender, Nondistended; Bowel sounds normal  EXTREMITIES: No lower extremity edema. Bilateral LE symmetric in size.  PSYCH: AAOx3.   NEURO: Symmetric facial expressions.   Skin: No rashes    LABS:                        12.4   6.43  )-----------( 305      ( 23 Oct 2020 06:17 )             38.4     10-23    137  |  100  |  13  ----------------------------<  172<H>  3.9   |  24  |  0.84    Ca    9.0      23 Oct 2020 06:17  Phos  3.3     10-23  Mg     1.5     10-23    TPro  6.2  /  Alb  3.3  /  TBili  0.3  /  DBili  x   /  AST  17  /  ALT  13  /  AlkPhos  69  10-22    PT/INR - ( 23 Oct 2020 06:17 )   PT: 12.5 SEC;   INR: 1.09          PTT - ( 23 Oct 2020 06:17 )  PTT:25.8 SEC          Culture - Sputum (collected 21 Oct 2020 17:40)  Source: .Sputum Sputum  Gram Stain (22 Oct 2020 07:52):    Moderate polymorphonuclear leukocytes per low power field    Rare Squamous epithelial cells per low power field    Numerous Gram Negative Rods seen per oil power field    Numerous Gram positive cocci in pairs seen per oil power field  Final Report (23 Oct 2020 16:59):    Normal Respiratory Alesia present        RADIOLOGY & ADDITIONAL TESTS:  T< from: CT Angio Chest w/ IV Cont (10.21.20 @ 13:48) >  FINDINGS:    PULMONARY VESSELS: No pulmonary embolus. Main pulmonary artery normal in diameter.  HEART AND VASCULATURE: Cardiomegaly. No pericardial effusion. Right coronary stents.  No aortic aneurysm or dissection.  LUNGS, AIRWAYS, PLEURA: Patentcentral airways. Emphysema. Interval increase of diffuse bilateral peribronchovascular opacities.  No pleural effusions or pneumothorax.  MEDIASTINUM: Enlarged subcarinal and right hilar lymph nodes measuring 1.4, 1.2 cm short axis. Calcified mediastinal and right hilar lymph nodes.  UPPER ABDOMEN: Within normal limits.  BONES AND SOFT TISSUES: No aggressive osseous lesion.  LOWER NECK: Within normal limits.  IMPRESSION:    No pulmonary embolus.  Interval increase of diffuse bilateral peribronchovascular opacities which may represent pneumonitis or infection.    < end of copied text >      COORDINATION OF CARE:  Care Discussed with Consultants/Other Providers [Y/N]:  Prior or Outpatient Records Reviewed [Y/N]:   PROGRESS NOTE:     CONTACT INFO:  Bonnie Guardado MD  Internal Medicine PGY2  Pager: [phone]    Patient is a 70y old  Male who presents with a chief complaint of Hypoxia, abnormal CT (23 Oct 2020 12:37)      SUBJECTIVE / OVERNIGHT EVENTS:  Overnight, when patient walked from bed to bathroom, desat to 85% to 89% while on 6L O2; while sitting or sleeping, O2 improved to 92-95%. Assessed patient at bedside, patient comfortable, denies chest pain/shortness of breath/abdominal pain, patient able to speak in full sentences  ADDITIONAL REVIEW OF SYSTEMS:    MEDICATIONS  (STANDING):  aspirin enteric coated 81 milliGRAM(s) Oral daily  atorvastatin 40 milliGRAM(s) Oral at bedtime  budesonide 160 MICROgram(s)/formoterol 4.5 MICROgram(s) Inhaler 2 Puff(s) Inhalation two times a day  clobetasol 0.05% Ointment 1 Application(s) Topical two times a day  dextrose 5%. 1000 milliLiter(s) (50 mL/Hr) IV Continuous <Continuous>  dextrose 50% Injectable 12.5 Gram(s) IV Push once  dextrose 50% Injectable 25 Gram(s) IV Push once  dextrose 50% Injectable 25 Gram(s) IV Push once  enoxaparin Injectable 40 milliGRAM(s) SubCutaneous daily  famotidine    Tablet 20 milliGRAM(s) Oral two times a day  insulin glargine Injectable (LANTUS) 10 Unit(s) SubCutaneous at bedtime  insulin lispro (ADMELOG) corrective regimen sliding scale   SubCutaneous three times a day before meals  insulin lispro (ADMELOG) corrective regimen sliding scale   SubCutaneous at bedtime  magnesium oxide 400 milliGRAM(s) Oral three times a day with meals  metoprolol succinate ER 50 milliGRAM(s) Oral daily  niacin SR 1000 milliGRAM(s) Oral at bedtime  omega-3-Acid Ethyl Esters 1 Gram(s) Oral daily    MEDICATIONS  (PRN):  albuterol/ipratropium for Nebulization 3 milliLiter(s) Nebulizer every 4 hours PRN Shortness of Breath and/or Wheezing  dextrose 40% Gel 15 Gram(s) Oral once PRN Blood Glucose LESS THAN 70 milliGRAM(s)/deciliter  glucagon  Injectable 1 milliGRAM(s) IntraMuscular once PRN Glucose LESS THAN 70 milligrams/deciliter  zolpidem 5 milliGRAM(s) Oral at bedtime PRN Insomnia  zolpidem 5 milliGRAM(s) Oral at bedtime PRN Insomnia      CAPILLARY BLOOD GLUCOSE      POCT Blood Glucose.: 347 mg/dL (23 Oct 2020 21:16)  POCT Blood Glucose.: 157 mg/dL (23 Oct 2020 16:49)  POCT Blood Glucose.: 178 mg/dL (23 Oct 2020 06:34)    I&O's Summary      PHYSICAL EXAM:  Vital Signs Last 24 Hrs  T(C): 36.8 (24 Oct 2020 04:58), Max: 36.8 (24 Oct 2020 04:58)  T(F): 98.2 (24 Oct 2020 04:58), Max: 98.2 (24 Oct 2020 04:58)  HR: 68 (24 Oct 2020 04:58) (67 - 101)  BP: 121/62 (24 Oct 2020 04:58) (92/50 - 122/60)  BP(mean): 59 (23 Oct 2020 15:45) (59 - 66)  RR: 18 (24 Oct 2020 04:58) (15 - 19)  SpO2: 94% (24 Oct 2020 04:58) (91% - 95%)    GENERAL: NAD  HEAD:  Atraumatic, Normocephalic  HEENT: scleral anicteric.   CV: Regular rate and rhythm. No murmurs, rubs, or gallops  Respiratory: normal respiratory effort, speaking in complete sentences, on 6L O2 NC. Lungs clear to auscultation bilaterally, no wheezes/crackles.  ABDOMEN: Soft, Nontender, Nondistended; Bowel sounds normal  EXTREMITIES: No lower extremity edema. Bilateral LE symmetric in size.  PSYCH: AAOx3.   NEURO: Symmetric facial expressions.   Skin: No rashes    LABS:                                   12.4   8.73  )-----------( 348      ( 24 Oct 2020 06:50 )             39.9                  12.4   6.43  )-----------( 305      ( 23 Oct 2020 06:17 )             38.4     10-24    136  |  100  |  15  ----------------------------<  267<H>  4.6   |  22  |  0.82    Ca    9.1      24 Oct 2020 06:50  Phos  3.1     10-24  Mg     2.0     10-24        10-23    137  |  100  |  13  ----------------------------<  172<H>  3.9   |  24  |  0.84    Ca    9.0      23 Oct 2020 06:17  Phos  3.3     10-23  Mg     1.5     10-23    TPro  6.2  /  Alb  3.3  /  TBili  0.3  /  DBili  x   /  AST  17  /  ALT  13  /  AlkPhos  69  10-22    PT/INR - ( 23 Oct 2020 06:17 )   PT: 12.5 SEC;   INR: 1.09          PTT - ( 23 Oct 2020 06:17 )  PTT:25.8 SEC          Culture - Sputum (collected 21 Oct 2020 17:40)  Source: .Sputum Sputum  Gram Stain (22 Oct 2020 07:52):    Moderate polymorphonuclear leukocytes per low power field    Rare Squamous epithelial cells per low power field    Numerous Gram Negative Rods seen per oil power field    Numerous Gram positive cocci in pairs seen per oil power field  Final Report (23 Oct 2020 16:59):    Normal Respiratory Alesia present        RADIOLOGY & ADDITIONAL TESTS:  T< from: CT Angio Chest w/ IV Cont (10.21.20 @ 13:48) >  FINDINGS:    PULMONARY VESSELS: No pulmonary embolus. Main pulmonary artery normal in diameter.  HEART AND VASCULATURE: Cardiomegaly. No pericardial effusion. Right coronary stents.  No aortic aneurysm or dissection.  LUNGS, AIRWAYS, PLEURA: Patentcentral airways. Emphysema. Interval increase of diffuse bilateral peribronchovascular opacities.  No pleural effusions or pneumothorax.  MEDIASTINUM: Enlarged subcarinal and right hilar lymph nodes measuring 1.4, 1.2 cm short axis. Calcified mediastinal and right hilar lymph nodes.  UPPER ABDOMEN: Within normal limits.  BONES AND SOFT TISSUES: No aggressive osseous lesion.  LOWER NECK: Within normal limits.  IMPRESSION:    No pulmonary embolus.  Interval increase of diffuse bilateral peribronchovascular opacities which may represent pneumonitis or infection.    < end of copied text >      COORDINATION OF CARE:  Care Discussed with Consultants/Other Providers [Y/N]:  Prior or Outpatient Records Reviewed [Y]: Pulmonary

## 2020-10-24 NOTE — PROGRESS NOTE ADULT - PROBLEM SELECTOR PLAN 5
-Recently started on insulin as outpatient  -Lantus 10u QHS with FS and SSI TIDAC  -Monitor FS and adjust regimen as needed  -Regular diet (consistent carb/dash/tlc)  -A1C 6.9

## 2020-10-25 LAB
ANION GAP SERPL CALC-SCNC: 12 MMO/L — SIGNIFICANT CHANGE UP (ref 7–14)
BUN SERPL-MCNC: 13 MG/DL — SIGNIFICANT CHANGE UP (ref 7–23)
CALCIUM SERPL-MCNC: 8.9 MG/DL — SIGNIFICANT CHANGE UP (ref 8.4–10.5)
CHLORIDE SERPL-SCNC: 100 MMOL/L — SIGNIFICANT CHANGE UP (ref 98–107)
CO2 SERPL-SCNC: 25 MMOL/L — SIGNIFICANT CHANGE UP (ref 22–31)
CREAT SERPL-MCNC: 0.81 MG/DL — SIGNIFICANT CHANGE UP (ref 0.5–1.3)
GLUCOSE SERPL-MCNC: 187 MG/DL — HIGH (ref 70–99)
HCT VFR BLD CALC: 39 % — SIGNIFICANT CHANGE UP (ref 39–50)
HGB BLD-MCNC: 12.1 G/DL — LOW (ref 13–17)
MAGNESIUM SERPL-MCNC: 1.5 MG/DL — LOW (ref 1.6–2.6)
MCHC RBC-ENTMCNC: 28.9 PG — SIGNIFICANT CHANGE UP (ref 27–34)
MCHC RBC-ENTMCNC: 31 % — LOW (ref 32–36)
MCV RBC AUTO: 93.3 FL — SIGNIFICANT CHANGE UP (ref 80–100)
NRBC # FLD: 0 K/UL — SIGNIFICANT CHANGE UP (ref 0–0)
PHOSPHATE SERPL-MCNC: 2.7 MG/DL — SIGNIFICANT CHANGE UP (ref 2.5–4.5)
PLATELET # BLD AUTO: 318 K/UL — SIGNIFICANT CHANGE UP (ref 150–400)
PMV BLD: 9.2 FL — SIGNIFICANT CHANGE UP (ref 7–13)
POTASSIUM SERPL-MCNC: 4.1 MMOL/L — SIGNIFICANT CHANGE UP (ref 3.5–5.3)
POTASSIUM SERPL-SCNC: 4.1 MMOL/L — SIGNIFICANT CHANGE UP (ref 3.5–5.3)
RBC # BLD: 4.18 M/UL — LOW (ref 4.2–5.8)
RBC # FLD: 14.8 % — HIGH (ref 10.3–14.5)
SODIUM SERPL-SCNC: 137 MMOL/L — SIGNIFICANT CHANGE UP (ref 135–145)
WBC # BLD: 7.16 K/UL — SIGNIFICANT CHANGE UP (ref 3.8–10.5)
WBC # FLD AUTO: 7.16 K/UL — SIGNIFICANT CHANGE UP (ref 3.8–10.5)

## 2020-10-25 PROCEDURE — 99233 SBSQ HOSP IP/OBS HIGH 50: CPT | Mod: GC

## 2020-10-25 RX ORDER — MAGNESIUM SULFATE 500 MG/ML
2 VIAL (ML) INJECTION ONCE
Refills: 0 | Status: COMPLETED | OUTPATIENT
Start: 2020-10-25 | End: 2020-10-25

## 2020-10-25 RX ADMIN — Medication 1 APPLICATION(S): at 17:15

## 2020-10-25 RX ADMIN — Medication 1000 MILLIGRAM(S): at 21:27

## 2020-10-25 RX ADMIN — Medication 1 APPLICATION(S): at 05:53

## 2020-10-25 RX ADMIN — INSULIN GLARGINE 10 UNIT(S): 100 INJECTION, SOLUTION SUBCUTANEOUS at 21:26

## 2020-10-25 RX ADMIN — Medication 81 MILLIGRAM(S): at 11:36

## 2020-10-25 RX ADMIN — Medication 50 GRAM(S): at 11:35

## 2020-10-25 RX ADMIN — MAGNESIUM OXIDE 400 MG ORAL TABLET 400 MILLIGRAM(S): 241.3 TABLET ORAL at 17:14

## 2020-10-25 RX ADMIN — Medication 50 MILLIGRAM(S): at 17:15

## 2020-10-25 RX ADMIN — BUDESONIDE AND FORMOTEROL FUMARATE DIHYDRATE 2 PUFF(S): 160; 4.5 AEROSOL RESPIRATORY (INHALATION) at 08:05

## 2020-10-25 RX ADMIN — Medication 3: at 17:14

## 2020-10-25 RX ADMIN — FAMOTIDINE 20 MILLIGRAM(S): 10 INJECTION INTRAVENOUS at 05:53

## 2020-10-25 RX ADMIN — Medication 1 GRAM(S): at 11:37

## 2020-10-25 RX ADMIN — Medication 1: at 08:05

## 2020-10-25 RX ADMIN — Medication 2: at 11:36

## 2020-10-25 RX ADMIN — BUDESONIDE AND FORMOTEROL FUMARATE DIHYDRATE 2 PUFF(S): 160; 4.5 AEROSOL RESPIRATORY (INHALATION) at 21:27

## 2020-10-25 RX ADMIN — FAMOTIDINE 20 MILLIGRAM(S): 10 INJECTION INTRAVENOUS at 17:15

## 2020-10-25 RX ADMIN — MAGNESIUM OXIDE 400 MG ORAL TABLET 400 MILLIGRAM(S): 241.3 TABLET ORAL at 08:05

## 2020-10-25 RX ADMIN — MAGNESIUM OXIDE 400 MG ORAL TABLET 400 MILLIGRAM(S): 241.3 TABLET ORAL at 11:36

## 2020-10-25 RX ADMIN — ENOXAPARIN SODIUM 40 MILLIGRAM(S): 100 INJECTION SUBCUTANEOUS at 11:36

## 2020-10-25 RX ADMIN — ATORVASTATIN CALCIUM 40 MILLIGRAM(S): 80 TABLET, FILM COATED ORAL at 21:27

## 2020-10-25 NOTE — PROGRESS NOTE ADULT - SUBJECTIVE AND OBJECTIVE BOX
Modesto Ibanez MD  Pager [phone]    Patient is a 70y old  Male who presents with a chief complaint of Hypoxia, abnormal CT (24 Oct 2020 14:24)      SUBJECTIVE / OVERNIGHT EVENTS:  Patient was seen and examined at bedside this morning. No acute events overnight. Sinus 60-70s on tele with multiple transient desaturations to the mid 80s.  ID# [id number]. Patient reports feeling well today; denies fever, chills, shortness of breath, cough. Also denies chest pain, abdominal pain, n/v/d. No further complaints. Patient educated on rationale for holding off on steroids while investigating potential infectious sources.     ADDITIONAL REVIEW OF SYSTEMS: Otherwise negative.    MEDICATIONS  (STANDING):  aspirin enteric coated 81 milliGRAM(s) Oral daily  atorvastatin 40 milliGRAM(s) Oral at bedtime  budesonide 160 MICROgram(s)/formoterol 4.5 MICROgram(s) Inhaler 2 Puff(s) Inhalation two times a day  clobetasol 0.05% Ointment 1 Application(s) Topical two times a day  dextrose 5%. 1000 milliLiter(s) (50 mL/Hr) IV Continuous <Continuous>  dextrose 50% Injectable 12.5 Gram(s) IV Push once  dextrose 50% Injectable 25 Gram(s) IV Push once  dextrose 50% Injectable 25 Gram(s) IV Push once  enoxaparin Injectable 40 milliGRAM(s) SubCutaneous daily  famotidine    Tablet 20 milliGRAM(s) Oral two times a day  insulin glargine Injectable (LANTUS) 10 Unit(s) SubCutaneous at bedtime  insulin lispro (ADMELOG) corrective regimen sliding scale   SubCutaneous three times a day before meals  insulin lispro (ADMELOG) corrective regimen sliding scale   SubCutaneous at bedtime  magnesium oxide 400 milliGRAM(s) Oral three times a day with meals  metoprolol succinate ER 50 milliGRAM(s) Oral daily  niacin SR 1000 milliGRAM(s) Oral at bedtime  omega-3-Acid Ethyl Esters 1 Gram(s) Oral daily    MEDICATIONS  (PRN):  albuterol/ipratropium for Nebulization 3 milliLiter(s) Nebulizer every 4 hours PRN Shortness of Breath and/or Wheezing  dextrose 40% Gel 15 Gram(s) Oral once PRN Blood Glucose LESS THAN 70 milliGRAM(s)/deciliter  glucagon  Injectable 1 milliGRAM(s) IntraMuscular once PRN Glucose LESS THAN 70 milligrams/deciliter  zolpidem 5 milliGRAM(s) Oral at bedtime PRN Insomnia  zolpidem 5 milliGRAM(s) Oral at bedtime PRN Insomnia      CAPILLARY BLOOD GLUCOSE      POCT Blood Glucose.: 163 mg/dL (25 Oct 2020 07:52)  POCT Blood Glucose.: 235 mg/dL (24 Oct 2020 21:23)  POCT Blood Glucose.: 235 mg/dL (24 Oct 2020 16:55)  POCT Blood Glucose.: 223 mg/dL (24 Oct 2020 12:11)    I&O's Summary    24 Oct 2020 07:01  -  25 Oct 2020 07:00  --------------------------------------------------------  IN: 0 mL / OUT: 1300 mL / NET: -1300 mL        PHYSICAL EXAM:  Vital Signs Last 24 Hrs  T(C): 37 (25 Oct 2020 05:51), Max: 37 (25 Oct 2020 05:51)  T(F): 98.6 (25 Oct 2020 05:51), Max: 98.6 (25 Oct 2020 05:51)  HR: 73 (25 Oct 2020 05:51) (67 - 86)  BP: 111/63 (25 Oct 2020 05:51) (111/63 - 130/51)  BP(mean): --  RR: 19 (25 Oct 2020 05:51) (17 - 19)  SpO2: 93% (25 Oct 2020 05:51) (92% - 95%)    GENERAL: NAD, sitting comfortably  HEAD:  Atraumatic, Normocephalic  EYES: EOMI, PERRL, conjunctiva and sclera clear  ENT: Mucous membranes moist, no pharyngeal erythema  NECK: Supple, No Lymphadenopathy  CHEST/LUNG: Mild L-sided rhonchi; NC in place, speaking in full sentences  CV: Regular rate and rhythm, S1/S2 equal; No murmurs, rubs, or gallops; No JVD or hepatojugular reflux  ABDOMEN: Soft, Nontender, Nondistended; Bowel sounds present  EXTREMITIES:  2+ Peripheral Pulses, No clubbing, cyanosis, or edema  PSYCH: AAOx3; affect appropriate  NEUROLOGY: no focal motor or sensory deficits, muscle strength 5/5 bilaterally, quadriceps reflex intact  SKIN: Diffuse nontender nonitching blanching macular rash on lower extremities, buttocks, and trunk with 2-3 open blisters on b/l ankle and few lesions on hands/palms    LABS:                        12.4   8.73  )-----------( 348      ( 24 Oct 2020 06:50 )             39.9     10-24    136  |  100  |  15  ----------------------------<  267<H>  4.6   |  22  |  0.82    Ca    9.1      24 Oct 2020 06:50  Phos  3.1     10-24  Mg     2.0     10-24                Culture - Acid Fast - Bronchial w/Smear (collected 24 Oct 2020 01:17)  Source: .Bronchial    Culture - Bronchial (collected 24 Oct 2020 01:17)  Source: .Bronchial  Gram Stain (24 Oct 2020 07:28):    Rare polymorphonuclear leukocytes seen per low power field    No Squamous epithelial cells seen per low power field    No organisms seen per oil power field  Preliminary Report (25 Oct 2020 08:12):    No growth to date.    Culture - Acid Fast - Bronchial w/Smear (collected 24 Oct 2020 01:15)  Source: .Bronchial    Culture - Bronchial (collected 24 Oct 2020 01:15)  Source: .Bronchial  Gram Stain (24 Oct 2020 07:29):    Rare polymorphonuclear leukocytes seen per low power field    No organisms seen Squamous epithelial cells seen per low power field    No organisms seen per oil power field  Preliminary Report (25 Oct 2020 08:11):    No growth to date.    Culture - Acid Fast - Bronchial w/Smear (collected 24 Oct 2020 01:12)  Source: .Bronchial    Culture - Bronchial (collected 24 Oct 2020 01:12)  Source: .Bronchial  Gram Stain (24 Oct 2020 07:29):    Rare polymorphonuclear leukocytes seen per low power field    No organisms seen Squamous epithelial cells seen per low power field    No organisms seen per oil power field  Preliminary Report (25 Oct 2020 08:11):    No growth to date.        RADIOLOGY & ADDITIONAL TESTS:  No new interval imaging. Modesto Ibanez MD  Pager [phone]    Patient is a 70y old  Male who presents with a chief complaint of Hypoxia, abnormal CT (24 Oct 2020 14:24)      SUBJECTIVE / OVERNIGHT EVENTS:  Patient was seen and examined at bedside this morning. No acute events overnight. Sinus 60-70s on tele with multiple transient desaturations to the mid 80s.  ID# [id number]. Patient reports feeling well today; denies fever, chills, shortness of breath, cough. Also denies chest pain, abdominal pain, n/v/d. No further complaints. Patient educated on rationale for holding off on steroids while investigating potential infectious sources.     ADDITIONAL REVIEW OF SYSTEMS: Otherwise negative.    MEDICATIONS  (STANDING):  aspirin enteric coated 81 milliGRAM(s) Oral daily  atorvastatin 40 milliGRAM(s) Oral at bedtime  budesonide 160 MICROgram(s)/formoterol 4.5 MICROgram(s) Inhaler 2 Puff(s) Inhalation two times a day  clobetasol 0.05% Ointment 1 Application(s) Topical two times a day  dextrose 5%. 1000 milliLiter(s) (50 mL/Hr) IV Continuous <Continuous>  dextrose 50% Injectable 12.5 Gram(s) IV Push once  dextrose 50% Injectable 25 Gram(s) IV Push once  dextrose 50% Injectable 25 Gram(s) IV Push once  enoxaparin Injectable 40 milliGRAM(s) SubCutaneous daily  famotidine    Tablet 20 milliGRAM(s) Oral two times a day  insulin glargine Injectable (LANTUS) 10 Unit(s) SubCutaneous at bedtime  insulin lispro (ADMELOG) corrective regimen sliding scale   SubCutaneous three times a day before meals  insulin lispro (ADMELOG) corrective regimen sliding scale   SubCutaneous at bedtime  magnesium oxide 400 milliGRAM(s) Oral three times a day with meals  metoprolol succinate ER 50 milliGRAM(s) Oral daily  niacin SR 1000 milliGRAM(s) Oral at bedtime  omega-3-Acid Ethyl Esters 1 Gram(s) Oral daily    MEDICATIONS  (PRN):  albuterol/ipratropium for Nebulization 3 milliLiter(s) Nebulizer every 4 hours PRN Shortness of Breath and/or Wheezing  dextrose 40% Gel 15 Gram(s) Oral once PRN Blood Glucose LESS THAN 70 milliGRAM(s)/deciliter  glucagon  Injectable 1 milliGRAM(s) IntraMuscular once PRN Glucose LESS THAN 70 milligrams/deciliter  zolpidem 5 milliGRAM(s) Oral at bedtime PRN Insomnia  zolpidem 5 milliGRAM(s) Oral at bedtime PRN Insomnia      CAPILLARY BLOOD GLUCOSE      POCT Blood Glucose.: 163 mg/dL (25 Oct 2020 07:52)  POCT Blood Glucose.: 235 mg/dL (24 Oct 2020 21:23)  POCT Blood Glucose.: 235 mg/dL (24 Oct 2020 16:55)  POCT Blood Glucose.: 223 mg/dL (24 Oct 2020 12:11)    I&O's Summary    24 Oct 2020 07:01  -  25 Oct 2020 07:00  --------------------------------------------------------  IN: 0 mL / OUT: 1300 mL / NET: -1300 mL        PHYSICAL EXAM:  Vital Signs Last 24 Hrs  T(C): 37 (25 Oct 2020 05:51), Max: 37 (25 Oct 2020 05:51)  T(F): 98.6 (25 Oct 2020 05:51), Max: 98.6 (25 Oct 2020 05:51)  HR: 73 (25 Oct 2020 05:51) (67 - 86)  BP: 111/63 (25 Oct 2020 05:51) (111/63 - 130/51)  BP(mean): --  RR: 19 (25 Oct 2020 05:51) (17 - 19)  SpO2: 93% (25 Oct 2020 05:51) (92% - 95%)    GENERAL: NAD, sitting comfortably  HEAD:  Atraumatic, Normocephalic  EYES: EOMI, PERRL, conjunctiva and sclera clear  ENT: Mucous membranes moist, no pharyngeal erythema  NECK: Supple, No Lymphadenopathy  CHEST/LUNG: Mild L-sided rhonchi; NC in place, speaking in full sentences  CV: Regular rate and rhythm, S1/S2 equal; No murmurs, rubs, or gallops; No JVD or hepatojugular reflux  ABDOMEN: Soft, Nontender, Nondistended; Bowel sounds present  EXTREMITIES:  2+ Peripheral Pulses, No clubbing, cyanosis, or edema  PSYCH: AAOx3; affect appropriate  NEUROLOGY: no focal motor or sensory deficits, muscle strength 5/5 bilaterally, quadriceps reflex intact  SKIN: Diffuse nontender nonitching blanching macular rash on lower extremities, buttocks, and trunk with 2-3 open blisters on b/l ankle and few lesions on hands/palms    LABS:                                   12.1   7.16  )-----------( 318      ( 25 Oct 2020 06:45 )             39.0                  12.4   8.73  )-----------( 348      ( 24 Oct 2020 06:50 )             39.9     10-25    137  |  100  |  13  ----------------------------<  187<H>  4.1   |  25  |  0.81    Ca    8.9      25 Oct 2020 06:46  Phos  2.7     10-25  Mg     1.5     10-25    10-24    136  |  100  |  15  ----------------------------<  267<H>  4.6   |  22  |  0.82    Ca    9.1      24 Oct 2020 06:50  Phos  3.1     10-24  Mg     2.0     10-24    Culture - Acid Fast - Bronchial w/Smear (collected 24 Oct 2020 01:17)  Source: .Bronchial    Culture - Bronchial (collected 24 Oct 2020 01:17)  Source: .Bronchial  Gram Stain (24 Oct 2020 07:28):    Rare polymorphonuclear leukocytes seen per low power field    No Squamous epithelial cells seen per low power field    No organisms seen per oil power field  Preliminary Report (25 Oct 2020 08:12):    No growth to date.    Culture - Acid Fast - Bronchial w/Smear (collected 24 Oct 2020 01:15)  Source: .Bronchial    Culture - Bronchial (collected 24 Oct 2020 01:15)  Source: .Bronchial  Gram Stain (24 Oct 2020 07:29):    Rare polymorphonuclear leukocytes seen per low power field    No organisms seen Squamous epithelial cells seen per low power field    No organisms seen per oil power field  Preliminary Report (25 Oct 2020 08:11):    No growth to date.    Culture - Acid Fast - Bronchial w/Smear (collected 24 Oct 2020 01:12)  Source: .Bronchial    Culture - Bronchial (collected 24 Oct 2020 01:12)  Source: .Bronchial  Gram Stain (24 Oct 2020 07:29):    Rare polymorphonuclear leukocytes seen per low power field    No organisms seen Squamous epithelial cells seen per low power field    No organisms seen per oil power field  Preliminary Report (25 Oct 2020 08:11):    No growth to date.        RADIOLOGY & ADDITIONAL TESTS:  No new interval imaging.

## 2020-10-25 NOTE — PROGRESS NOTE ADULT - PROBLEM SELECTOR PLAN 2
-SCLC (dx spring 2019) s/p carboplatin/etoposide and radiation with partial response now on maintenance atezolizumab immunotherapy every 3 weeks (c/b brain metastases now s/p gamma knife radiation therapy with improved brain MRI)   -Patient follows with oncology Dr. Ramirez outpatient  -Oncology aware, appreciate recs  -Atezolizumab maintenance therapy every 3 weeks, holding for now

## 2020-10-25 NOTE — PROGRESS NOTE ADULT - PROBLEM SELECTOR PLAN 1
-Differential: pneumonitis vs. pneumonia.  CT chest showed progression of bilateral diffuse peribronchovascular opacities  -CT A negative for PE  -RVP/COVID 19 negative   -Sputum culture normal respiratory karlie   -BAL fluid  (10/23) negative for gram stain and NGTD  -Low suspicion for pneumonia - observe off antibiotics  -Wean O2 as tolerated   -F/u results of bronch  -Consider steroids pending bronch results for possible pneumonitis 2/2 atezolizumab  -Lung cancer management per below

## 2020-10-25 NOTE — PROGRESS NOTE ADULT - ASSESSMENT
Patient is a 71yo M with PMHx HTN, HLD, DM2, BPH s/p TURP, CAD s/p stent 2007, COPD, and SCLC (dx spring 2019) s/p carboplatin/etoposide and radiation with partial response now on maintenance atezolizumab immunotherapy every 3 weeks (c/b brain metastases now s/p gamma knife radiation therapy with improved brain MRI) who presents with worsening BILLINGS [id number] found to be hypoxic and with interval progression of bilateral lung opacities on CT concerning for infectious vs inflammatory etiology.

## 2020-10-25 NOTE — PROGRESS NOTE ADULT - ATTENDING COMMENTS
Patient seen and examined, d/w Dr. Ibanez, agree with above.      ID # [id number], patient reports breathing is good on the supplemental O2. Awaiting final results of bronchoscopy, ruling out infectious etiologies etc, will f/u pulm recs re: potential use of steroids given possible pneumonitis. Will need to assess need for home O2, given history of desats this admission, suspect patient will require it. Updated patient's wife and daughter Leena as well, in agreement with plan.

## 2020-10-26 ENCOUNTER — TRANSCRIPTION ENCOUNTER (OUTPATIENT)
Age: 70
End: 2020-10-26

## 2020-10-26 LAB
ANION GAP SERPL CALC-SCNC: 12 MMO/L — SIGNIFICANT CHANGE UP (ref 7–14)
BASOPHILS # BLD AUTO: 0.05 K/UL — SIGNIFICANT CHANGE UP (ref 0–0.2)
BASOPHILS NFR BLD AUTO: 0.7 % — SIGNIFICANT CHANGE UP (ref 0–2)
BUN SERPL-MCNC: 13 MG/DL — SIGNIFICANT CHANGE UP (ref 7–23)
CALCIUM SERPL-MCNC: 9.1 MG/DL — SIGNIFICANT CHANGE UP (ref 8.4–10.5)
CHLORIDE SERPL-SCNC: 99 MMOL/L — SIGNIFICANT CHANGE UP (ref 98–107)
CO2 SERPL-SCNC: 25 MMOL/L — SIGNIFICANT CHANGE UP (ref 22–31)
CREAT SERPL-MCNC: 0.83 MG/DL — SIGNIFICANT CHANGE UP (ref 0.5–1.3)
EOSINOPHIL # BLD AUTO: 0.26 K/UL — SIGNIFICANT CHANGE UP (ref 0–0.5)
EOSINOPHIL NFR BLD AUTO: 3.7 % — SIGNIFICANT CHANGE UP (ref 0–6)
GLUCOSE SERPL-MCNC: 225 MG/DL — HIGH (ref 70–99)
HCT VFR BLD CALC: 41.1 % — SIGNIFICANT CHANGE UP (ref 39–50)
HGB BLD-MCNC: 12.8 G/DL — LOW (ref 13–17)
IMM GRANULOCYTES NFR BLD AUTO: 0.4 % — SIGNIFICANT CHANGE UP (ref 0–1.5)
LYMPHOCYTES # BLD AUTO: 0.33 K/UL — LOW (ref 1–3.3)
LYMPHOCYTES # BLD AUTO: 4.7 % — LOW (ref 13–44)
MAGNESIUM SERPL-MCNC: 1.6 MG/DL — SIGNIFICANT CHANGE UP (ref 1.6–2.6)
MCHC RBC-ENTMCNC: 29.4 PG — SIGNIFICANT CHANGE UP (ref 27–34)
MCHC RBC-ENTMCNC: 31.1 % — LOW (ref 32–36)
MCV RBC AUTO: 94.3 FL — SIGNIFICANT CHANGE UP (ref 80–100)
MONOCYTES # BLD AUTO: 0.79 K/UL — SIGNIFICANT CHANGE UP (ref 0–0.9)
MONOCYTES NFR BLD AUTO: 11.4 % — SIGNIFICANT CHANGE UP (ref 2–14)
NEUTROPHILS # BLD AUTO: 5.49 K/UL — SIGNIFICANT CHANGE UP (ref 1.8–7.4)
NEUTROPHILS NFR BLD AUTO: 79.1 % — HIGH (ref 43–77)
NRBC # FLD: 0 K/UL — SIGNIFICANT CHANGE UP (ref 0–0)
PHOSPHATE SERPL-MCNC: 2.6 MG/DL — SIGNIFICANT CHANGE UP (ref 2.5–4.5)
PLATELET # BLD AUTO: 345 K/UL — SIGNIFICANT CHANGE UP (ref 150–400)
PMV BLD: 9.2 FL — SIGNIFICANT CHANGE UP (ref 7–13)
POTASSIUM SERPL-MCNC: 4.3 MMOL/L — SIGNIFICANT CHANGE UP (ref 3.5–5.3)
POTASSIUM SERPL-SCNC: 4.3 MMOL/L — SIGNIFICANT CHANGE UP (ref 3.5–5.3)
RBC # BLD: 4.36 M/UL — SIGNIFICANT CHANGE UP (ref 4.2–5.8)
RBC # FLD: 14.6 % — HIGH (ref 10.3–14.5)
SODIUM SERPL-SCNC: 136 MMOL/L — SIGNIFICANT CHANGE UP (ref 135–145)
WBC # BLD: 6.95 K/UL — SIGNIFICANT CHANGE UP (ref 3.8–10.5)
WBC # FLD AUTO: 6.95 K/UL — SIGNIFICANT CHANGE UP (ref 3.8–10.5)

## 2020-10-26 PROCEDURE — 99232 SBSQ HOSP IP/OBS MODERATE 35: CPT

## 2020-10-26 PROCEDURE — 99233 SBSQ HOSP IP/OBS HIGH 50: CPT | Mod: GC

## 2020-10-26 PROCEDURE — 99233 SBSQ HOSP IP/OBS HIGH 50: CPT

## 2020-10-26 RX ORDER — INSULIN GLARGINE 100 [IU]/ML
15 INJECTION, SOLUTION SUBCUTANEOUS AT BEDTIME
Refills: 0 | Status: DISCONTINUED | OUTPATIENT
Start: 2020-10-26 | End: 2020-10-28

## 2020-10-26 RX ORDER — INSULIN LISPRO 100/ML
5 VIAL (ML) SUBCUTANEOUS
Refills: 0 | Status: DISCONTINUED | OUTPATIENT
Start: 2020-10-26 | End: 2020-10-27

## 2020-10-26 RX ADMIN — Medication 4: at 21:28

## 2020-10-26 RX ADMIN — BUDESONIDE AND FORMOTEROL FUMARATE DIHYDRATE 2 PUFF(S): 160; 4.5 AEROSOL RESPIRATORY (INHALATION) at 21:29

## 2020-10-26 RX ADMIN — Medication 50 MILLIGRAM(S): at 17:01

## 2020-10-26 RX ADMIN — Medication 1000 MILLIGRAM(S): at 21:28

## 2020-10-26 RX ADMIN — ATORVASTATIN CALCIUM 40 MILLIGRAM(S): 80 TABLET, FILM COATED ORAL at 21:27

## 2020-10-26 RX ADMIN — Medication 1 GRAM(S): at 16:55

## 2020-10-26 RX ADMIN — MAGNESIUM OXIDE 400 MG ORAL TABLET 400 MILLIGRAM(S): 241.3 TABLET ORAL at 12:28

## 2020-10-26 RX ADMIN — INSULIN GLARGINE 15 UNIT(S): 100 INJECTION, SOLUTION SUBCUTANEOUS at 21:28

## 2020-10-26 RX ADMIN — BUDESONIDE AND FORMOTEROL FUMARATE DIHYDRATE 2 PUFF(S): 160; 4.5 AEROSOL RESPIRATORY (INHALATION) at 08:26

## 2020-10-26 RX ADMIN — FAMOTIDINE 20 MILLIGRAM(S): 10 INJECTION INTRAVENOUS at 05:56

## 2020-10-26 RX ADMIN — Medication 81 MILLIGRAM(S): at 12:28

## 2020-10-26 RX ADMIN — Medication 1 APPLICATION(S): at 17:01

## 2020-10-26 RX ADMIN — Medication 40 MILLIGRAM(S): at 15:27

## 2020-10-26 RX ADMIN — Medication 1: at 08:26

## 2020-10-26 RX ADMIN — MAGNESIUM OXIDE 400 MG ORAL TABLET 400 MILLIGRAM(S): 241.3 TABLET ORAL at 17:01

## 2020-10-26 RX ADMIN — Medication 1 APPLICATION(S): at 05:55

## 2020-10-26 RX ADMIN — FAMOTIDINE 20 MILLIGRAM(S): 10 INJECTION INTRAVENOUS at 17:01

## 2020-10-26 RX ADMIN — Medication 5: at 17:01

## 2020-10-26 RX ADMIN — Medication 1: at 12:26

## 2020-10-26 RX ADMIN — MAGNESIUM OXIDE 400 MG ORAL TABLET 400 MILLIGRAM(S): 241.3 TABLET ORAL at 08:26

## 2020-10-26 RX ADMIN — ENOXAPARIN SODIUM 40 MILLIGRAM(S): 100 INJECTION SUBCUTANEOUS at 12:28

## 2020-10-26 NOTE — PROVIDER CONTACT NOTE (OTHER) - SITUATION
Patient's blood glucose level greater than 400 Patient's blood glucose level greater than 400 (403 and repeat was 434)

## 2020-10-26 NOTE — PROGRESS NOTE ADULT - ATTENDING COMMENTS
Agree with above.  Awaiting results of bronch with BAL.  Follow up path report  Trial of steroids as above

## 2020-10-26 NOTE — PROGRESS NOTE ADULT - ASSESSMENT
Patient with extensive stage small cell lung cancer who has been on maintenance immunotherapy with Atezolizumab, p/w worsening SOB and hypoxia to mid-80’s on RA.   CT chest with b/l diffuse opacities concerning for infection vs pneumonitis, on immune checkpoint inhibitor therapy.    -Pulm consult appreciated. Plan is for bronch tomorrow  -Holding off on steroids for treatment of possible pneumonitis given pt is stable and comfortable until bronch results are back  -Consdier Covid IgG  -Derm Consult  -Supportive care, pain control, Nutrition, PT, DVT ppx  -Outpatient oncology f/u     Patient with extensive stage small cell lung cancer who has been on maintenance immunotherapy with Atezolizumab, p/w worsening SOB and hypoxia to mid-80’s on RA.   CT chest with b/l diffuse opacities concerning for infection vs pneumonitis, on immune checkpoint inhibitor therapy.    -Pulm consult appreciated. S/p bronch w/ BAL and transbronchial biopsy 10/23. BAL cx NGTD. Will followup BAL cultures, fungitell, PCP and biopsy path  -Given preliminary negative cultures would start steroids for treatment of possible pneumonitis   - Start Solumedrol 2mg/kg/day  -Consider Covid IgG, ordered, pending collection  -Derm Consult recs appreciated, rash improving  -Patient to followup with Dr. Ramirez ([facility], [place]) upon discharge  -C/w Supportive care, pain control, Nutrition, PT, DVT ppx  -Oncology will continue to follow with you      Case d/w Dr. Desire RG  Oncology Physician Assistant  Jo JEAN/[facility], [place]  Pager [phone]    If after 5pm or weekends please page On-call Oncology Fellow

## 2020-10-26 NOTE — DISCHARGE NOTE PROVIDER - NSDCFUSCHEDAPPT_GEN_ALL_CORE_FT
EVER CHAVEZ ; 11/04/2020 ; NPP Med SleepLab [address]  EVER CHAVEZ ; 12/16/2020 ; NPP Rad  Opd Lkvl  EVER CHAVEZ ; 12/16/2020 ; NPP Neurosurg [address] [facility] EVER CHAVEZ ; 11/04/2020 ; NPP Med SleepLab [address]  EVER CHAVEZ ; 11/10/2020 ; NPP Endocrin [address] ; 12/16/2020 ; NPP Rad  Opd Lkvl  EVER CHAVEZ ; 12/16/2020 ; NPP Neurosurg [address] ; 12/30/2020 ; NPP Endocrin [address] SCOTT EVER ; 11/04/2020 ; NPP Med SleepLab [address]  SCOTT EVER ; 11/10/2020 ; NPP Endocrin [address] ; 11/11/2020 ; [facility] ; 11/12/2020 ; NPP Med Pulm [address] [facility]  SCOTT EVER ; 12/16/2020 ; NPP Rad  Opd Lkvl  SCOTT EVER ; 12/16/2020 ; NPP Neurosurg [address] [facility]  SCOTT EVER ; 12/30/2020 ; NPP Endocrin [address] SCOTT EVER ; 11/10/2020 ; NPP Endocrin [address]  SCOTT EVER ; 11/11/2020 ; NPP [facility]  SCOTT EVER ; 11/12/2020 ; NPP Med Pulm [address] [facility]  SCOTT EVER ; 11/16/2020 ; NPP Intmed OP [id number] [facility]  SCOTT EVER ; 12/16/2020 ; NPP Rad  Opd Lkvl  SCOTT EVER ; 12/16/2020 ; NPP Neurosurg [address] [facility]  SCOTT EVER ; 12/30/2020 ; NPP Endocrin [address]

## 2020-10-26 NOTE — PROGRESS NOTE ADULT - PROBLEM SELECTOR PLAN 1
Progressive peribronchovascular opacities on CT chest over last 7 weeks accompanied by worsening BILLINGS and dry cough. Also with b/l shin nodular rash. No fevers or leukocytosis. Differential includes atypical infection in setting of relative immunocompromise (fungal, PCP) vs pneumonitis (3% w/ atezolizumab, although atypical distribution) vs progression of disease vs overlapping inflammatory lung disease. s/p Bronch w/ BAL and transbronchial biopsy 10/23. BAL cx NGTD  - f/u BAL cultures, fungitell, PCP  - f/u transbronchial biopsy path  - Will discuss starting empiric steroids given preliminary negative cultures    Shad Coffey MD  Pulmonary & Critical Care Fellow  [phone] Progressive peribronchovascular opacities on CT chest over last 7 weeks accompanied by worsening BILLINGS and dry cough. Also with b/l shin nodular rash. No fevers or leukocytosis. Differential includes atypical infection in setting of relative immunocompromise (fungal, PCP) vs pneumonitis (3% w/ atezolizumab, although atypical distribution) vs progression of disease vs overlapping inflammatory lung disease. s/p Bronch w/ BAL and transbronchial biopsy 10/23. BAL cx NGTD  - f/u BAL cultures, fungitell, PCP  - f/u transbronchial biopsy path  - Will start Solumedrol 40mg IV daily    Shad Coffey MD  Pulmonary & Critical Care Fellow  [phone]

## 2020-10-26 NOTE — PROGRESS NOTE ADULT - PROBLEM SELECTOR PLAN 5
-Recently started on insulin as outpatient  -Lantus 10u QHS with FS and SSI TIDAC  -Monitor FS and adjust regimen as needed  -Regular diet (consistent carb/dash/tlc)  -A1C 6.9 -Recently started on insulin as outpatient  -Lantus 10u QHS with FS and SSI TIDAC  -Monitor FS and adjust regimen as needed  -May need dose adjustment in case of initiation of systemic steroids  -Regular diet (consistent carb/dash/tlc)  -A1C 6.9

## 2020-10-26 NOTE — PROVIDER CONTACT NOTE (OTHER) - ACTION/TREATMENT ORDERED:
Sarah Dickens, Physician Assistant, notified, awaiting further instructions Administer PM insulin coverage and reassess patient blood glucose

## 2020-10-26 NOTE — PROGRESS NOTE ADULT - SUBJECTIVE AND OBJECTIVE BOX
CHIEF COMPLAINT:    Interval Events: No acute events overnight. Still with cough and BILLINGS, with intermittent desaturation on 6L nasal cannula with ambulation to bathroom. BAL cultures negative thus far    REVIEW OF SYSTEMS:  Constitutional: [ ] negative [ ] fevers [ ] chills [ ] weight loss [ ] weight gain  HEENT: [ ] negative [ ] dry eyes [ ] eye irritation [ ] postnasal drip [ ] nasal congestion  CV: [ ] negative  [ ] chest pain [ ] orthopnea [ ] palpitations [ ] murmur  Resp: [ ] negative [X] cough [ ] shortness of breath [X] dyspnea [ ] wheezing [ ] sputum [ ] hemoptysis  GI: [ ] negative [ ] nausea [ ] vomiting [ ] diarrhea [ ] constipation [ ] abd pain [ ] dysphagia   : [ ] negative [ ] dysuria [ ] nocturia [ ] hematuria [ ] increased urinary frequency  Musculoskeletal: [ ] negative [ ] back pain [ ] myalgias [ ] arthralgias [ ] fracture  Skin: [ ] negative [ ] rash [ ] itch  Neurological: [ ] negative [ ] headache [ ] dizziness [ ] syncope [ ] weakness [ ] numbness  Psychiatric: [ ] negative [ ] anxiety [ ] depression  Endocrine: [ ] negative [ ] diabetes [ ] thyroid problem  Hematologic/Lymphatic: [ ] negative [ ] anemia [ ] bleeding problem  Allergic/Immunologic: [ ] negative [ ] itchy eyes [ ] nasal discharge [ ] hives [ ] angioedema  [X] All other systems negative  [ ] Unable to assess ROS because ________    OBJECTIVE:  ICU Vital Signs Last 24 Hrs  T(C): 36.8 (26 Oct 2020 05:54), Max: 37.2 (25 Oct 2020 20:41)  T(F): 98.3 (26 Oct 2020 05:54), Max: 98.9 (25 Oct 2020 20:41)  HR: 77 (26 Oct 2020 05:54) (71 - 78)  BP: 127/70 (26 Oct 2020 05:54) (102/61 - 143/67)  BP(mean): --  ABP: --  ABP(mean): --  RR: 17 (26 Oct 2020 05:54) (17 - 18)  SpO2: 94% (26 Oct 2020 05:54) (94% - 96%)        CAPILLARY BLOOD GLUCOSE      POCT Blood Glucose.: 200 mg/dL (26 Oct 2020 08:04)      PHYSICAL EXAM:  General: NAD, resting comfortably on 6 L nc  HEENT: EOMI, PERRLA  Respiratory: CTAB  Cardiovascular: S1S2, RRR  Abdomen: Soft, NTND, BS+  Extremities: No peripheral edema  Skin: B/l shin nodular rash  Neurological: Grossly intact    HOSPITAL MEDICATIONS:  MEDICATIONS  (STANDING):  aspirin enteric coated 81 milliGRAM(s) Oral daily  atorvastatin 40 milliGRAM(s) Oral at bedtime  budesonide 160 MICROgram(s)/formoterol 4.5 MICROgram(s) Inhaler 2 Puff(s) Inhalation two times a day  clobetasol 0.05% Ointment 1 Application(s) Topical two times a day  dextrose 5%. 1000 milliLiter(s) (50 mL/Hr) IV Continuous <Continuous>  dextrose 50% Injectable 12.5 Gram(s) IV Push once  dextrose 50% Injectable 25 Gram(s) IV Push once  dextrose 50% Injectable 25 Gram(s) IV Push once  enoxaparin Injectable 40 milliGRAM(s) SubCutaneous daily  famotidine    Tablet 20 milliGRAM(s) Oral two times a day  insulin glargine Injectable (LANTUS) 10 Unit(s) SubCutaneous at bedtime  insulin lispro (ADMELOG) corrective regimen sliding scale   SubCutaneous three times a day before meals  insulin lispro (ADMELOG) corrective regimen sliding scale   SubCutaneous at bedtime  magnesium oxide 400 milliGRAM(s) Oral three times a day with meals  metoprolol succinate ER 50 milliGRAM(s) Oral daily  niacin SR 1000 milliGRAM(s) Oral at bedtime  omega-3-Acid Ethyl Esters 1 Gram(s) Oral daily    MEDICATIONS  (PRN):  albuterol/ipratropium for Nebulization 3 milliLiter(s) Nebulizer every 4 hours PRN Shortness of Breath and/or Wheezing  dextrose 40% Gel 15 Gram(s) Oral once PRN Blood Glucose LESS THAN 70 milliGRAM(s)/deciliter  glucagon  Injectable 1 milliGRAM(s) IntraMuscular once PRN Glucose LESS THAN 70 milligrams/deciliter  zolpidem 5 milliGRAM(s) Oral at bedtime PRN Insomnia  zolpidem 5 milliGRAM(s) Oral at bedtime PRN Insomnia      LABS:                        12.8   6.95  )-----------( 345      ( 26 Oct 2020 06:45 )             41.1     Hgb Trend: 12.8<--, 12.1<--, 12.4<--, 12.4<--, 12.2<--  10-26    136  |  99  |  13  ----------------------------<  225<H>  4.3   |  25  |  0.83    Ca    9.1      26 Oct 2020 06:45  Phos  2.6     10-26  Mg     1.6     10-26      Creatinine Trend: 0.83<--, 0.81<--, 0.82<--, 0.84<--, 0.93<--, 0.95<--            MICROBIOLOGY:     Culture - Acid Fast - Bronchial w/Smear (collected 24 Oct 2020 01:17)  Source: .Bronchial    Culture - Fungal, Bronchial (collected 24 Oct 2020 01:17)  Source: .Bronchial  Preliminary Report (26 Oct 2020 11:00):    Testing in progress    Culture - Acid Fast - Bronchial w/Smear (collected 24 Oct 2020 01:15)  Source: .Bronchial    Culture - Fungal, Bronchial (collected 24 Oct 2020 01:15)  Source: .Bronchial  Preliminary Report (26 Oct 2020 11:04):    Testing in progress    Culture - Acid Fast - Bronchial w/Smear (collected 24 Oct 2020 01:12)  Source: .Bronchial    Culture - Fungal, Bronchial (collected 24 Oct 2020 01:12)  Source: .Bronchial  Preliminary Report (26 Oct 2020 11:00):    Testing in progress    Culture - Bronchial (collected 23 Oct 2020 16:48)  Source: .Bronchial  Gram Stain (24 Oct 2020 07:29):    Rare polymorphonuclear leukocytes seen per low power field    No organisms seen Squamous epithelial cells seen per low power field    No organisms seen per oil power field  Final Report (26 Oct 2020 10:54):    No growth at 48 hours    Culture - Bronchial (collected 23 Oct 2020 13:35)  Source: .Bronchial  Gram Stain (24 Oct 2020 07:29):    Rare polymorphonuclear leukocytes seen per low power field    No organisms seen Squamous epithelial cells seen per low power field    No organisms seen per oil power field  Final Report (26 Oct 2020 10:55):    No growth at 48 hours    Culture - Bronchial (collected 23 Oct 2020 13:35)  Source: .Bronchial  Gram Stain (24 Oct 2020 07:28):    Rare polymorphonuclear leukocytes seen per low power field    No Squamous epithelial cells seen per low power field    No organisms seen per oil power field  Final Report (26 Oct 2020 10:53):    No growth at 48 hours        RADIOLOGY:  [ ] Reviewed and interpreted by me    PULMONARY FUNCTION TESTS:    EKG:

## 2020-10-26 NOTE — DISCHARGE NOTE PROVIDER - CARE PROVIDER_API CALL
At 5 am, glucose noted to be in 500's. BHB 1.3, bicarb 23 with normal anion GAP. Started on titrating Insulin drip. His glucose check at 1 pm was 135; he was transitioned to subcutaneous determir 22 units and started on bariatric no sugar diet. Insulin drip discontinued an hour later.     PLAN:  - POC AC/HS  - Lev 20 BID and Asp 14 TIDWM  - diabetic diet  - BMP q12   Al Ramirez)  Hematology; Medical Oncology  [address] [place] [address]  Phone: [phone]  Fax: [phone]  Follow Up Time:     IRA MORRISON  Pulmonary Diseases  [address]  [place] [address]  Phone: [phone]  Fax: [phone]  Follow Up Time:     Akhil Hyman)  Internal Medicine  [address] [place] [address]  Phone: [phone]  Fax: [phone]  Follow Up Time:    Al Ramirez)  Hematology; Medical Oncology  [address] [place] [address]  Phone: [phone]  Fax: [phone]  Follow Up Time:     IRA MORRISON  Pulmonary Diseases  [address]  [place] [address]  Phone: [phone]  Fax: [phone]  Follow Up Time:     Akhil Hyman)  Internal Medicine  [address] [place] [address]  Phone: [phone]  Fax: [phone]  Follow Up Time:     Magi Schaffer  NP IN ADULT HEALTH  [address] [place] [address]  Phone: [phone]  Fax: [phone]  Follow Up Time:

## 2020-10-26 NOTE — DISCHARGE NOTE PROVIDER - NSDCCPCAREPLAN_GEN_ALL_CORE_FT
PRINCIPAL DISCHARGE DIAGNOSIS  Diagnosis: Acute respiratory failure with hypoxia  Assessment and Plan of Treatment: You came to the hospital with symptoms of shortness of breath and low oxygen levels. CT imaging of your chest showed worsening haziness that could represent either infection or chronic inflammation. We sent laboratory tests searching for infection which were negative. You received a bronchoscopy during which the lung doctors saw normal airways and took biopsy samples from the lung. The samples taken were also found to be negative for infection. The pathologists determined _____      SECONDARY DISCHARGE DIAGNOSES  Diagnosis: Lung cancer  Assessment and Plan of Treatment: You have a history of cancer for which you are taking atezolizumab. The changes occuring in your lungs may have been caused or exacerbated by the atezolizumab treatment, so we held further treatment while you were in the hospital. Please _____     PRINCIPAL DISCHARGE DIAGNOSIS  Diagnosis: Acute respiratory failure with hypoxia  Assessment and Plan of Treatment: You came to the hospital with symptoms of shortness of breath and low oxygen levels. CT imaging of your chest showed worsening haziness that could represent either infection or chronic inflammation. We sent laboratory tests searching for infection which were negative. You received a bronchoscopy during which the lung doctors saw normal airways and took biopsy samples from the lung. The samples taken were also found to be negative for infection. The biopsy pathology showed chronic inflammation likely due to your immunotherapy. You were started on steroids, which you will need to continue after leaving the hospital. Please follow up with Dr. Ramirez within several days of discharge for management of  your steroid taper.      SECONDARY DISCHARGE DIAGNOSES  Diagnosis: Lung cancer  Assessment and Plan of Treatment: You have a history of cancer for which you are taking atezolizumab. The changes occuring in your lungs may have been caused or exacerbated by the atezolizumab treatment, so we held further treatment while you were in the hospital. Please follow up with Dr. Ramirez within several days of discharge for management of  your steroid taper and further management of your cancer.     PRINCIPAL DISCHARGE DIAGNOSIS  Diagnosis: Acute respiratory failure with hypoxia  Assessment and Plan of Treatment: You came to the hospital with symptoms of shortness of breath and low oxygen levels. CT imaging of your chest showed worsening haziness that could represent either infection or chronic inflammation. We sent laboratory tests searching for infection which were negative. You received a bronchoscopy during which the lung doctors saw normal airways and took biopsy samples from the lung. The samples taken were also found to be negative for infection. The biopsy pathology showed chronic inflammation likely due to your immunotherapy. You were started on steroids, which you will need to continue after leaving the hospital. Please follow up with Dr. Ramirez within several days of discharge for management of  your steroid taper. Please take pantoprazole 40mg daily while on high dose steroids and discuss with Dr. Ramirez regarding continuation.      SECONDARY DISCHARGE DIAGNOSES  Diagnosis: Lung cancer  Assessment and Plan of Treatment: You have a history of cancer for which you are taking atezolizumab. The changes occuring in your lungs may have been caused or exacerbated by the atezolizumab treatment, so we held further treatment while you were in the hospital. Please follow up with Dr. Ramirez within several days of discharge for management of  your steroid taper and further management of your cancer.     PRINCIPAL DISCHARGE DIAGNOSIS  Diagnosis: Acute respiratory failure with hypoxia  Assessment and Plan of Treatment: You came to the hospital with symptoms of shortness of breath and low oxygen levels. CT imaging of your chest showed worsening haziness that could represent either infection or chronic inflammation. We sent laboratory tests searching for infection which were negative. You received a bronchoscopy during which the lung doctors saw normal airways and took biopsy samples from the lung. The samples taken were also found to be negative for infection. The biopsy pathology showed chronic inflammation likely due to your immunotherapy. You were started on steroids, which you will need to continue after leaving the hospital. Please follow up with Dr. Ramirez within several days of discharge for management of  your steroid taper. Please take pantoprazole 40mg daily while on high dose steroids and discuss with Dr. Ramirez regarding continuation.      SECONDARY DISCHARGE DIAGNOSES  Diagnosis: Diabetes mellitus  Assessment and Plan of Treatment: Because you are on steroids, your diabetes medications had to be adjusted. Please take Basaglar 43 units at night.    Diagnosis: Lung cancer  Assessment and Plan of Treatment: You have a history of cancer for which you are taking atezolizumab. The changes occuring in your lungs may have been caused or exacerbated by the atezolizumab treatment, so we held further treatment while you were in the hospital. Please follow up with Dr. Ramirez within several days of discharge for management of  your steroid taper and further management of your cancer.     PRINCIPAL DISCHARGE DIAGNOSIS  Diagnosis: Acute respiratory failure with hypoxia  Assessment and Plan of Treatment: You came to the hospital with symptoms of shortness of breath and low oxygen levels. CT imaging of your chest showed worsening haziness that could represent either infection or chronic inflammation. We sent laboratory tests searching for infection which were negative. You received a bronchoscopy during which the lung doctors saw normal airways and took biopsy samples from the lung. The samples taken were also found to be negative for infection. The biopsy pathology showed chronic inflammation likely due to your immunotherapy. You were started on steroids, which you will need to continue after leaving the hospital. Please follow up with Dr. Ramirez within several days of discharge for management of  your steroid taper. Please take pantoprazole 40mg daily while on high dose steroids and discuss with Dr. Ramirez regarding continuation.      SECONDARY DISCHARGE DIAGNOSES  Diagnosis: Diabetes mellitus  Assessment and Plan of Treatment: Because you are on steroids, your diabetes medications had to be adjusted. Please take Basaglar 43 units at night. Take Novolog 25 units prior to each meal (3 meals a day). You also have Novolog to use as correction, with the following instructions:  2 Units if glc 151-200  4 units if glc 201-250  6 units if glc 251-300  8 units if glc 301-350  10 units if glc 351-400  12 units if glc greater than 400 + contact doctor  Please do this subcutaneous three times a day before meals in addition to 25 units novolog before meals.   Please let your endocrinologist know when your steroid dose is adjusted, as your insulin will also need to be adjusted.       Diagnosis: Lung cancer  Assessment and Plan of Treatment: You have a history of cancer for which you are taking atezolizumab. The changes occuring in your lungs may have been caused or exacerbated by the atezolizumab treatment, so we held further treatment while you were in the hospital. Please follow up with Dr. Ramirez within several days of discharge for management of  your steroid taper and further management of your cancer.

## 2020-10-26 NOTE — PROGRESS NOTE ADULT - PROBLEM SELECTOR PLAN 4
-Patient seen by derm for rash as an outpatient who recommended betamethasone ointment which patient has been using without relief  -Dermatology consult, appreciate recs, clobetasol .05% ointment -Patient seen by derm for rash as an outpatient who recommended betamethasone ointment which patient has been using without relief  -Dermatology consult, appreciate recs, clobetasol .05% ointment  -Rash improving

## 2020-10-26 NOTE — DISCHARGE NOTE PROVIDER - HOSPITAL COURSE
Patient is a 71yo M with PMHx HTN, HLD, DM2, BPH s/p TURP, CAD s/p stent 2007, COPD, and SCLC (dx spring 2019) s/p carboplatin/etoposide and radiation with partial response now on maintenance atezolizumab immunotherapy every 3 weeks (c/b brain metastases now s/p gamma knife radiation therapy with improved brain MRI) who presents with worsening BILLINGS [id number] found to be hypoxic and with interval progression of bilateral lung opacities on CT concerning for infectious vs inflammatory etiology. Infectious workup was performed, including BCx, Sputum Cx, RVP, Fungitell, Quantiferon, LDH which was negative. Patient received a bronchoscopy by pulmonology on 10/23 which revealed normal airways. Transbronchial biopsy and BAL were performed; fluid analysis and fungal/bacterial cultures were negative. Patient was maintained on NC for O2 saturation >82% during his admission.  Transbronchial biopsy _____  Steroids_____ Patient is a 69yo M with PMHx HTN, HLD, DM2, BPH s/p TURP, CAD s/p stent 2007, COPD, and SCLC (dx spring 2019) s/p carboplatin/etoposide and radiation with partial response now on maintenance atezolizumab immunotherapy every 3 weeks (c/b brain metastases now s/p gamma knife radiation therapy with improved brain MRI) who presents with worsening BILLINGS [id number] found to be hypoxic and with interval progression of bilateral lung opacities on CT concerning for infectious vs inflammatory etiology. Infectious workup was performed, including BCx, Sputum Cx, RVP, Fungitell, Quantiferon, LDH which was negative. Patient received a bronchoscopy by pulmonology on 10/23 which revealed normal airways. Transbronchial biopsy and BAL were performed; fluid analysis and fungal/bacterial cultures were negative. Patient was maintained on NC for O2 saturation >88% during his admission.  Transbronchial biopsy pathology results were positive for chronic interstitial inflammation without evidence of malignancy. Patient was initiated on IV steroids increased to 2mg/kg/day   Steroids_____ Patient is a 71yo M with PMHx HTN, HLD, DM2, BPH s/p TURP, CAD s/p stent 2007, COPD, and SCLC (dx spring 2019) s/p carboplatin/etoposide and radiation with partial response now on maintenance atezolizumab immunotherapy every 3 weeks (c/b brain metastases now s/p gamma knife radiation therapy with improved brain MRI) who presents with worsening BILLINGS [id number] found to be hypoxic and with interval progression of bilateral lung opacities on CT concerning for infectious vs inflammatory etiology. Infectious workup was performed, including BCx, Sputum Cx, RVP, Fungitell, Quantiferon, LDH which was negative. Patient received a bronchoscopy by pulmonology on 10/23 which revealed normal airways. Transbronchial biopsy and BAL were performed; fluid analysis and fungal/bacterial cultures were negative. Patient was maintained on NC for O2 saturation >88% during his admission. Transbronchial biopsy pathology results were positive for chronic interstitial inflammation without evidence of malignancy. Patient was initiated on IV steroids and transitioned to prednisone 2mg/kg/day. Per onc, discharge on that dose with follow up within one week of initiation of max dose(10/30). While on steroids, will require basal/bolus home insulin therapy per endocrinology dose ____ Patient is a 71yo M with PMHx HTN, HLD, DM2, BPH s/p TURP, CAD s/p stent 2007, COPD, and SCLC (dx spring 2019) s/p carboplatin/etoposide and radiation with partial response now on maintenance atezolizumab immunotherapy every 3 weeks (c/b brain metastases now s/p gamma knife radiation therapy with improved brain MRI) who presents with worsening BILLINGS [id number] found to be hypoxic and with interval progression of bilateral lung opacities on CT concerning for infectious vs inflammatory etiology. Infectious workup was performed, including BCx, Sputum Cx, RVP, Fungitell, Quantiferon, LDH which was negative. Patient received a bronchoscopy by pulmonology on 10/23 which revealed normal airways. Transbronchial biopsy and BAL were performed; fluid analysis and fungal/bacterial cultures were negative. Patient was maintained on NC for O2 saturation >88% during his admission. Transbronchial biopsy pathology results were positive for chronic interstitial inflammation without evidence of malignancy. Patient was initiated on IV steroids and transitioned to prednisone 2mg/kg/day. Per onc, discharge on that dose with follow up within one week of initiation of max dose(10/30). While on steroids, will require basal/bolus home insulin therapy per endocrinology dose long acting 43 units QHS and premeal 25 units TID. Patient is a 71yo M with PMHx HTN, HLD, DM2, BPH s/p TURP, CAD s/p stent 2007, COPD, and SCLC (dx spring 2019) s/p carboplatin/etoposide and radiation with partial response now on maintenance atezolizumab immunotherapy every 3 weeks (c/b brain metastases now s/p gamma knife radiation therapy with improved brain MRI) who presents with worsening BILLINGS [id number] found to be hypoxic and with interval progression of bilateral lung opacities on CT concerning for infectious vs inflammatory etiology. Infectious workup was performed, including BCx, Sputum Cx, RVP, Fungitell, Quantiferon, LDH which was negative. Patient received a bronchoscopy by pulmonology on 10/23 which revealed normal airways. Transbronchial biopsy and BAL were performed; fluid analysis and fungal/bacterial cultures were negative. Patient was maintained on NC for O2 saturation >88% during his admission. Transbronchial biopsy pathology results were positive for chronic interstitial inflammation without evidence of malignancy. Patient was initiated on IV steroids and transitioned to prednisone 2mg/kg/day. Per onc, discharge on that dose with follow up within one week of initiation of max dose(10/30). While on steroids, will require basal/bolus home insulin therapy per endocrinology dose long acting 43 units QHS and premeal 25 units TID. Will also d/c on PPI while on high dose steroids (pantoprazole 40mg daily).

## 2020-10-26 NOTE — PROGRESS NOTE ADULT - SUBJECTIVE AND OBJECTIVE BOX
Modesto Ibanez MD  Pager [phone]    Patient is a 70y old  Male who presents with a chief complaint of Hypoxia, abnormal CT (25 Oct 2020 08:32)      SUBJECTIVE / OVERNIGHT EVENTS:  Patient was seen and examined at bedside this morning. Sinus rhythm on tele overnight with multiple desats to mid 80s, lowest desat was transiently to 79% nonsustained.  ID#[id number]. Still awaiting pathology from bronch, infectious workup negative to date. Patient remains asymptomatic, denies shortness of breath, cough, chest pain, weakness, palpitations. He states that his rash is improving somewhat and not bothering him; he has been using the clobetasone cream. He is eager to start treatment    ADDITIONAL REVIEW OF SYSTEMS: Otherwise negative.    MEDICATIONS  (STANDING):  aspirin enteric coated 81 milliGRAM(s) Oral daily  atorvastatin 40 milliGRAM(s) Oral at bedtime  budesonide 160 MICROgram(s)/formoterol 4.5 MICROgram(s) Inhaler 2 Puff(s) Inhalation two times a day  clobetasol 0.05% Ointment 1 Application(s) Topical two times a day  dextrose 5%. 1000 milliLiter(s) (50 mL/Hr) IV Continuous <Continuous>  dextrose 50% Injectable 12.5 Gram(s) IV Push once  dextrose 50% Injectable 25 Gram(s) IV Push once  dextrose 50% Injectable 25 Gram(s) IV Push once  enoxaparin Injectable 40 milliGRAM(s) SubCutaneous daily  famotidine    Tablet 20 milliGRAM(s) Oral two times a day  insulin glargine Injectable (LANTUS) 10 Unit(s) SubCutaneous at bedtime  insulin lispro (ADMELOG) corrective regimen sliding scale   SubCutaneous three times a day before meals  insulin lispro (ADMELOG) corrective regimen sliding scale   SubCutaneous at bedtime  magnesium oxide 400 milliGRAM(s) Oral three times a day with meals  metoprolol succinate ER 50 milliGRAM(s) Oral daily  niacin SR 1000 milliGRAM(s) Oral at bedtime  omega-3-Acid Ethyl Esters 1 Gram(s) Oral daily    MEDICATIONS  (PRN):  albuterol/ipratropium for Nebulization 3 milliLiter(s) Nebulizer every 4 hours PRN Shortness of Breath and/or Wheezing  dextrose 40% Gel 15 Gram(s) Oral once PRN Blood Glucose LESS THAN 70 milliGRAM(s)/deciliter  glucagon  Injectable 1 milliGRAM(s) IntraMuscular once PRN Glucose LESS THAN 70 milligrams/deciliter  zolpidem 5 milliGRAM(s) Oral at bedtime PRN Insomnia  zolpidem 5 milliGRAM(s) Oral at bedtime PRN Insomnia      CAPILLARY BLOOD GLUCOSE      POCT Blood Glucose.: 200 mg/dL (26 Oct 2020 08:04)  POCT Blood Glucose.: 202 mg/dL (25 Oct 2020 20:46)  POCT Blood Glucose.: 262 mg/dL (25 Oct 2020 17:02)  POCT Blood Glucose.: 236 mg/dL (25 Oct 2020 11:21)    I&O's Summary      PHYSICAL EXAM:  Vital Signs Last 24 Hrs  T(C): 36.8 (26 Oct 2020 05:54), Max: 37.2 (25 Oct 2020 20:41)  T(F): 98.3 (26 Oct 2020 05:54), Max: 98.9 (25 Oct 2020 20:41)  HR: 77 (26 Oct 2020 05:54) (71 - 78)  BP: 127/70 (26 Oct 2020 05:54) (102/61 - 143/67)  BP(mean): --  RR: 17 (26 Oct 2020 05:54) (17 - 18)  SpO2: 94% (26 Oct 2020 05:54) (94% - 96%)    CONSTITUTIONAL: NAD, lying comfortably  EYES: PERRL; conjunctiva and sclera clear  ENMT: Moist oral mucosa, no pharyngeal erythema or exudates  NECK: Supple, no palpable masses or lymphadenopathy  RESPIRATORY: Normal respiratory effort; lungs are clear to auscultation bilaterally, no wheezes or crackles  CARDIOVASCULAR: Regular rate and rhythm, normal S1 and S2, no murmurs gallops or rubs; No lower extremity edema; No JVD  ABDOMEN: Nondistended, normoactive bowel sounds; Soft, nontender to palpation, no rebound/guarding; No hepatosplenomegaly  EXTREMITIES:  2+ peripheral pulses bilaterally; no joint swelling or tenderness to palpation; no clubbing or cyanosis  PSYCH: Alert and oriented to person, place, and time; affect appropriate  NEUROLOGY: CN 2-12 intact; no gross motor or sensory deficits  SKIN: No rashes; no palpable lesions    LABS:                        12.8   6.95  )-----------( 345      ( 26 Oct 2020 06:45 )             41.1     10-26    136  |  99  |  13  ----------------------------<  225<H>  4.3   |  25  |  0.83    Ca    9.1      26 Oct 2020 06:45  Phos  2.6     10-26  Mg     1.6     10-26                Culture - Acid Fast - Bronchial w/Smear (collected 24 Oct 2020 01:17)  Source: .Bronchial    Culture - Bronchial (collected 24 Oct 2020 01:17)  Source: .Bronchial  Gram Stain (24 Oct 2020 07:28):    Rare polymorphonuclear leukocytes seen per low power field    No Squamous epithelial cells seen per low power field    No organisms seen per oil power field  Preliminary Report (25 Oct 2020 08:12):    No growth to date.    Culture - Acid Fast - Bronchial w/Smear (collected 24 Oct 2020 01:15)  Source: .Bronchial    Culture - Bronchial (collected 24 Oct 2020 01:15)  Source: .Bronchial  Gram Stain (24 Oct 2020 07:29):    Rare polymorphonuclear leukocytes seen per low power field    No organisms seen Squamous epithelial cells seen per low power field    No organisms seen per oil power field  Preliminary Report (25 Oct 2020 08:11):    No growth to date.    Culture - Acid Fast - Bronchial w/Smear (collected 24 Oct 2020 01:12)  Source: .Bronchial    Culture - Bronchial (collected 24 Oct 2020 01:12)  Source: .Bronchial  Gram Stain (24 Oct 2020 07:29):    Rare polymorphonuclear leukocytes seen per low power field    No organisms seen Squamous epithelial cells seen per low power field    No organisms seen per oil power field  Preliminary Report (25 Oct 2020 08:11):    No growth to date.        RADIOLOGY & ADDITIONAL TESTS:   Modesto Ibanez MD  Pager [phone]    Patient is a 70y old  Male who presents with a chief complaint of Hypoxia, abnormal CT (25 Oct 2020 08:32)      SUBJECTIVE / OVERNIGHT EVENTS:  Patient was seen and examined at bedside this morning. Sinus rhythm on tele overnight with multiple desats to mid 80s, lowest desat was transiently to 79% nonsustained.  ID#[id number]. Still awaiting pathology from bronch, infectious workup negative to date. Patient remains asymptomatic, denies shortness of breath, cough, chest pain, weakness, palpitations. He states that his rash is improving somewhat and not bothering him; he has been using the clobetasone ointment.    ADDITIONAL REVIEW OF SYSTEMS: Otherwise negative.    MEDICATIONS  (STANDING):  aspirin enteric coated 81 milliGRAM(s) Oral daily  atorvastatin 40 milliGRAM(s) Oral at bedtime  budesonide 160 MICROgram(s)/formoterol 4.5 MICROgram(s) Inhaler 2 Puff(s) Inhalation two times a day  clobetasol 0.05% Ointment 1 Application(s) Topical two times a day  dextrose 5%. 1000 milliLiter(s) (50 mL/Hr) IV Continuous <Continuous>  dextrose 50% Injectable 12.5 Gram(s) IV Push once  dextrose 50% Injectable 25 Gram(s) IV Push once  dextrose 50% Injectable 25 Gram(s) IV Push once  enoxaparin Injectable 40 milliGRAM(s) SubCutaneous daily  famotidine    Tablet 20 milliGRAM(s) Oral two times a day  insulin glargine Injectable (LANTUS) 10 Unit(s) SubCutaneous at bedtime  insulin lispro (ADMELOG) corrective regimen sliding scale   SubCutaneous three times a day before meals  insulin lispro (ADMELOG) corrective regimen sliding scale   SubCutaneous at bedtime  magnesium oxide 400 milliGRAM(s) Oral three times a day with meals  metoprolol succinate ER 50 milliGRAM(s) Oral daily  niacin SR 1000 milliGRAM(s) Oral at bedtime  omega-3-Acid Ethyl Esters 1 Gram(s) Oral daily    MEDICATIONS  (PRN):  albuterol/ipratropium for Nebulization 3 milliLiter(s) Nebulizer every 4 hours PRN Shortness of Breath and/or Wheezing  dextrose 40% Gel 15 Gram(s) Oral once PRN Blood Glucose LESS THAN 70 milliGRAM(s)/deciliter  glucagon  Injectable 1 milliGRAM(s) IntraMuscular once PRN Glucose LESS THAN 70 milligrams/deciliter  zolpidem 5 milliGRAM(s) Oral at bedtime PRN Insomnia  zolpidem 5 milliGRAM(s) Oral at bedtime PRN Insomnia      CAPILLARY BLOOD GLUCOSE      POCT Blood Glucose.: 200 mg/dL (26 Oct 2020 08:04)  POCT Blood Glucose.: 202 mg/dL (25 Oct 2020 20:46)  POCT Blood Glucose.: 262 mg/dL (25 Oct 2020 17:02)  POCT Blood Glucose.: 236 mg/dL (25 Oct 2020 11:21)    I&O's Summary      PHYSICAL EXAM:  Vital Signs Last 24 Hrs  T(C): 36.8 (26 Oct 2020 05:54), Max: 37.2 (25 Oct 2020 20:41)  T(F): 98.3 (26 Oct 2020 05:54), Max: 98.9 (25 Oct 2020 20:41)  HR: 77 (26 Oct 2020 05:54) (71 - 78)  BP: 127/70 (26 Oct 2020 05:54) (102/61 - 143/67)  BP(mean): --  RR: 17 (26 Oct 2020 05:54) (17 - 18)  SpO2: 94% (26 Oct 2020 05:54) (94% - 96%)    GENERAL: NAD, sitting comfortably  HEAD:  Atraumatic, Normocephalic  EYES: EOMI, PERRL, conjunctiva and sclera clear  ENT: Mucous membranes moist, no pharyngeal erythema  NECK: Supple, No Lymphadenopathy  CHEST/LUNG: Mild bl rhonchi; NC in place, speaking in full sentences  CV: Regular rate and rhythm, S1/S2 equal; No murmurs, rubs, or gallops; No JVD or hepatojugular reflux  ABDOMEN: Soft, Nontender, Nondistended; Bowel sounds present  EXTREMITIES:  2+ Peripheral Pulses, No clubbing, cyanosis, or edema  PSYCH: AAOx3; affect appropriate  NEUROLOGY: no focal motor or sensory deficits, muscle strength 5/5 bilaterally, quadriceps reflex intact  SKIN: Diffuse nontender nonitching blanching macular rash on lower extremities, buttocks, and trunk with 2-3 open blisters on b/l ankle and few lesions on hands/palms    LABS:                        12.8   6.95  )-----------( 345      ( 26 Oct 2020 06:45 )             41.1     10-26    136  |  99  |  13  ----------------------------<  225<H>  4.3   |  25  |  0.83    Ca    9.1      26 Oct 2020 06:45  Phos  2.6     10-26  Mg     1.6     10-26                Culture - Acid Fast - Bronchial w/Smear (collected 24 Oct 2020 01:17)  Source: .Bronchial    Culture - Bronchial (collected 24 Oct 2020 01:17)  Source: .Bronchial  Gram Stain (24 Oct 2020 07:28):    Rare polymorphonuclear leukocytes seen per low power field    No Squamous epithelial cells seen per low power field    No organisms seen per oil power field  Preliminary Report (25 Oct 2020 08:12):    No growth to date.    Culture - Acid Fast - Bronchial w/Smear (collected 24 Oct 2020 01:15)  Source: .Bronchial    Culture - Bronchial (collected 24 Oct 2020 01:15)  Source: .Bronchial  Gram Stain (24 Oct 2020 07:29):    Rare polymorphonuclear leukocytes seen per low power field    No organisms seen Squamous epithelial cells seen per low power field    No organisms seen per oil power field  Preliminary Report (25 Oct 2020 08:11):    No growth to date.    Culture - Acid Fast - Bronchial w/Smear (collected 24 Oct 2020 01:12)  Source: .Bronchial    Culture - Bronchial (collected 24 Oct 2020 01:12)  Source: .Bronchial  Gram Stain (24 Oct 2020 07:29):    Rare polymorphonuclear leukocytes seen per low power field    No organisms seen Squamous epithelial cells seen per low power field    No organisms seen per oil power field  Preliminary Report (25 Oct 2020 08:11):    No growth to date.        RADIOLOGY & ADDITIONAL TESTS:  No new interval imaging Modesto Ibanez MD  Pager [phone]    Patient is a 70y old  Male who presents with a chief complaint of Hypoxia, abnormal CT (25 Oct 2020 08:32)      SUBJECTIVE / OVERNIGHT EVENTS:  Patient was seen and examined at bedside this morning. Sinus rhythm on tele overnight with multiple desats to mid 80s, lowest desat was transiently to 79% nonsustained.  ID#[id number]. Still awaiting pathology from bronch, infectious workup negative to date. Patient remains asymptomatic, denies shortness of breath, cough, chest pain, weakness, palpitations. He states that his rash is improving somewhat and not bothering him; he has been using the clobetasone ointment.    ADDITIONAL REVIEW OF SYSTEMS: Otherwise negative.    MEDICATIONS  (STANDING):  aspirin enteric coated 81 milliGRAM(s) Oral daily  atorvastatin 40 milliGRAM(s) Oral at bedtime  budesonide 160 MICROgram(s)/formoterol 4.5 MICROgram(s) Inhaler 2 Puff(s) Inhalation two times a day  clobetasol 0.05% Ointment 1 Application(s) Topical two times a day  dextrose 5%. 1000 milliLiter(s) (50 mL/Hr) IV Continuous <Continuous>  dextrose 50% Injectable 12.5 Gram(s) IV Push once  dextrose 50% Injectable 25 Gram(s) IV Push once  dextrose 50% Injectable 25 Gram(s) IV Push once  enoxaparin Injectable 40 milliGRAM(s) SubCutaneous daily  famotidine    Tablet 20 milliGRAM(s) Oral two times a day  insulin glargine Injectable (LANTUS) 10 Unit(s) SubCutaneous at bedtime  insulin lispro (ADMELOG) corrective regimen sliding scale   SubCutaneous three times a day before meals  insulin lispro (ADMELOG) corrective regimen sliding scale   SubCutaneous at bedtime  magnesium oxide 400 milliGRAM(s) Oral three times a day with meals  metoprolol succinate ER 50 milliGRAM(s) Oral daily  niacin SR 1000 milliGRAM(s) Oral at bedtime  omega-3-Acid Ethyl Esters 1 Gram(s) Oral daily    MEDICATIONS  (PRN):  albuterol/ipratropium for Nebulization 3 milliLiter(s) Nebulizer every 4 hours PRN Shortness of Breath and/or Wheezing  dextrose 40% Gel 15 Gram(s) Oral once PRN Blood Glucose LESS THAN 70 milliGRAM(s)/deciliter  glucagon  Injectable 1 milliGRAM(s) IntraMuscular once PRN Glucose LESS THAN 70 milligrams/deciliter  zolpidem 5 milliGRAM(s) Oral at bedtime PRN Insomnia  zolpidem 5 milliGRAM(s) Oral at bedtime PRN Insomnia      CAPILLARY BLOOD GLUCOSE      POCT Blood Glucose.: 200 mg/dL (26 Oct 2020 08:04)  POCT Blood Glucose.: 202 mg/dL (25 Oct 2020 20:46)  POCT Blood Glucose.: 262 mg/dL (25 Oct 2020 17:02)  POCT Blood Glucose.: 236 mg/dL (25 Oct 2020 11:21)    I&O's Summary      PHYSICAL EXAM:  Vital Signs Last 24 Hrs  T(C): 36.8 (26 Oct 2020 05:54), Max: 37.2 (25 Oct 2020 20:41)  T(F): 98.3 (26 Oct 2020 05:54), Max: 98.9 (25 Oct 2020 20:41)  HR: 77 (26 Oct 2020 05:54) (71 - 78)  BP: 127/70 (26 Oct 2020 05:54) (102/61 - 143/67)  BP(mean): --  RR: 17 (26 Oct 2020 05:54) (17 - 18)  SpO2: 94% (26 Oct 2020 05:54) (94% - 96%)    GENERAL: NAD, sitting comfortably  HEAD:  Atraumatic, Normocephalic  EYES: EOMI, PERRL, conjunctiva and sclera clear  ENT: Mucous membranes moist, no pharyngeal erythema  NECK: Supple, No Lymphadenopathy  CHEST/LUNG: Mild bl rhonchi; NC in place, speaking in full sentences  CV: Regular rate and rhythm, S1/S2 equal; No murmurs, rubs, or gallops; No JVD or hepatojugular reflux  ABDOMEN: Soft, Nontender, Nondistended; Bowel sounds present  EXTREMITIES:  2+ Peripheral Pulses, No clubbing, cyanosis, or edema  PSYCH: AAOx3; affect appropriate  NEUROLOGY: no focal motor or sensory deficits, muscle strength 5/5 bilaterally, quadriceps reflex intact  SKIN: Diffuse nontender nonitching blanching macular rash on lower extremities, buttocks, and trunk with 2-3 open blisters on b/l ankle and few lesions on hands/palms    LABS:                        12.8   6.95  )-----------( 345      ( 26 Oct 2020 06:45 )             41.1     10-26    136  |  99  |  13  ----------------------------<  225<H>  4.3   |  25  |  0.83    Ca    9.1      26 Oct 2020 06:45  Phos  2.6     10-26  Mg     1.6     10-26                Culture - Acid Fast - Bronchial w/Smear (collected 24 Oct 2020 01:17)  Source: .Bronchial    Culture - Bronchial (collected 24 Oct 2020 01:17)  Source: .Bronchial  Gram Stain (24 Oct 2020 07:28):    Rare polymorphonuclear leukocytes seen per low power field    No Squamous epithelial cells seen per low power field    No organisms seen per oil power field  Preliminary Report (25 Oct 2020 08:12):    No growth to date.    Culture - Acid Fast - Bronchial w/Smear (collected 24 Oct 2020 01:15)  Source: .Bronchial    Culture - Bronchial (collected 24 Oct 2020 01:15)  Source: .Bronchial  Gram Stain (24 Oct 2020 07:29):    Rare polymorphonuclear leukocytes seen per low power field    No organisms seen Squamous epithelial cells seen per low power field    No organisms seen per oil power field  Preliminary Report (25 Oct 2020 08:11):    No growth to date.    Culture - Acid Fast - Bronchial w/Smear (collected 24 Oct 2020 01:12)  Source: .Bronchial    Culture - Bronchial (collected 24 Oct 2020 01:12)  Source: .Bronchial  Gram Stain (24 Oct 2020 07:29):    Rare polymorphonuclear leukocytes seen per low power field    No organisms seen Squamous epithelial cells seen per low power field    No organisms seen per oil power field  Preliminary Report (25 Oct 2020 08:11):    No growth to date.        RADIOLOGY & ADDITIONAL TESTS:  No new interval imaging    Consultant notes reviewed: Pulm, Onc    Providers d/w: Pulm Dr. Coffey, heme/onc Dr. Phipps re: starting steroids

## 2020-10-26 NOTE — PROGRESS NOTE ADULT - ATTENDING COMMENTS
Pt continues to require 6 lit o2 NC  Infectious work up so far negative  Will start steroids, as above  FSG checks and PPI while on steroids

## 2020-10-26 NOTE — DISCHARGE NOTE PROVIDER - CARE PROVIDERS DIRECT ADDRESSES
,[email or web address]_Unknown,[email or web address],DirectAddress_Unknown

## 2020-10-26 NOTE — PROGRESS NOTE ADULT - ASSESSMENT
Patient is a 71yo M with PMHx HTN, HLD, DM2, BPH s/p TURP, CAD s/p stent 2007, COPD, and SCLC (dx spring 2019) s/p carboplatin/etoposide and radiation with partial response now on maintenance atezolizumab immunotherapy every 3 weeks (c/b brain metastases now s/p gamma knife radiation therapy with improved brain MRI) who presents with worsening BILLINGS [id number] found to be hypoxic and with interval progression of bilateral lung opacities on CT concerning for infectious vs inflammatory etiology.

## 2020-10-26 NOTE — PROGRESS NOTE ADULT - SUBJECTIVE AND OBJECTIVE BOX
INTERVAL HPI/OVERNIGHT EVENTS:  Patient seen at bedside.      VITAL SIGNS:  T(F): 98.7 (10-26-20 @ 11:52)  HR: 84 (10-26-20 @ 11:52)  BP: 127/63 (10-26-20 @ 11:52)  RR: 19 (10-26-20 @ 11:52)  SpO2: 97% (10-26-20 @ 11:52)  Wt(kg): --    PHYSICAL EXAM:    Constitutional: NAD, resting in bed comfortably  Eyes: EOMI, sclera non-icteric  Neck: supple, no LAD  Respiratory: CTA b/l, good air entry b/l, no wheezing, rhonchi or crackles  Cardiovascular: RRR, normal S1S2, no M/R/G  Gastrointestinal: soft, NTND  Extremities: no edema  Neurological: AAOx3, non focal  Skin: Normal temperature    MEDICATIONS  (STANDING):  aspirin enteric coated 81 milliGRAM(s) Oral daily  atorvastatin 40 milliGRAM(s) Oral at bedtime  budesonide 160 MICROgram(s)/formoterol 4.5 MICROgram(s) Inhaler 2 Puff(s) Inhalation two times a day  clobetasol 0.05% Ointment 1 Application(s) Topical two times a day  dextrose 5%. 1000 milliLiter(s) (50 mL/Hr) IV Continuous <Continuous>  dextrose 50% Injectable 12.5 Gram(s) IV Push once  dextrose 50% Injectable 25 Gram(s) IV Push once  dextrose 50% Injectable 25 Gram(s) IV Push once  enoxaparin Injectable 40 milliGRAM(s) SubCutaneous daily  famotidine    Tablet 20 milliGRAM(s) Oral two times a day  insulin glargine Injectable (LANTUS) 10 Unit(s) SubCutaneous at bedtime  insulin lispro (ADMELOG) corrective regimen sliding scale   SubCutaneous three times a day before meals  insulin lispro (ADMELOG) corrective regimen sliding scale   SubCutaneous at bedtime  magnesium oxide 400 milliGRAM(s) Oral three times a day with meals  metoprolol succinate ER 50 milliGRAM(s) Oral daily  niacin SR 1000 milliGRAM(s) Oral at bedtime  omega-3-Acid Ethyl Esters 1 Gram(s) Oral daily    MEDICATIONS  (PRN):  albuterol/ipratropium for Nebulization 3 milliLiter(s) Nebulizer every 4 hours PRN Shortness of Breath and/or Wheezing  dextrose 40% Gel 15 Gram(s) Oral once PRN Blood Glucose LESS THAN 70 milliGRAM(s)/deciliter  glucagon  Injectable 1 milliGRAM(s) IntraMuscular once PRN Glucose LESS THAN 70 milligrams/deciliter  zolpidem 5 milliGRAM(s) Oral at bedtime PRN Insomnia  zolpidem 5 milliGRAM(s) Oral at bedtime PRN Insomnia      Allergies    No Known Allergies    Intolerances        LABS:                        12.8   6.95  )-----------( 345      ( 26 Oct 2020 06:45 )             41.1     10-26    136  |  99  |  13  ----------------------------<  225<H>  4.3   |  25  |  0.83    Ca    9.1      26 Oct 2020 06:45  Phos  2.6     10-26  Mg     1.6     10-26            RADIOLOGY & ADDITIONAL TESTS:  Studies reviewed.   INTERVAL HPI/OVERNIGHT EVENTS:  Patient seen at bedside. Patients dtr Leena was  available by telephone and provided translation  Patient with much improved symptoms.   Noted to be on 6L of supp O2  Denies chest pain.      VITAL SIGNS:  T(F): 98.7 (10-26-20 @ 11:52)  HR: 84 (10-26-20 @ 11:52)  BP: 127/63 (10-26-20 @ 11:52)  RR: 19 (10-26-20 @ 11:52)  SpO2: 97% (10-26-20 @ 11:52)  Wt(kg): --    PHYSICAL EXAM:    In accordance with current standard limiting patient contact, deferred physical exam  2/2 COVID pandemic  Please refer to physical exam of primary team.    MEDICATIONS  (STANDING):  aspirin enteric coated 81 milliGRAM(s) Oral daily  atorvastatin 40 milliGRAM(s) Oral at bedtime  budesonide 160 MICROgram(s)/formoterol 4.5 MICROgram(s) Inhaler 2 Puff(s) Inhalation two times a day  clobetasol 0.05% Ointment 1 Application(s) Topical two times a day  dextrose 5%. 1000 milliLiter(s) (50 mL/Hr) IV Continuous <Continuous>  dextrose 50% Injectable 12.5 Gram(s) IV Push once  dextrose 50% Injectable 25 Gram(s) IV Push once  dextrose 50% Injectable 25 Gram(s) IV Push once  enoxaparin Injectable 40 milliGRAM(s) SubCutaneous daily  famotidine    Tablet 20 milliGRAM(s) Oral two times a day  insulin glargine Injectable (LANTUS) 10 Unit(s) SubCutaneous at bedtime  insulin lispro (ADMELOG) corrective regimen sliding scale   SubCutaneous three times a day before meals  insulin lispro (ADMELOG) corrective regimen sliding scale   SubCutaneous at bedtime  magnesium oxide 400 milliGRAM(s) Oral three times a day with meals  metoprolol succinate ER 50 milliGRAM(s) Oral daily  niacin SR 1000 milliGRAM(s) Oral at bedtime  omega-3-Acid Ethyl Esters 1 Gram(s) Oral daily    MEDICATIONS  (PRN):  albuterol/ipratropium for Nebulization 3 milliLiter(s) Nebulizer every 4 hours PRN Shortness of Breath and/or Wheezing  dextrose 40% Gel 15 Gram(s) Oral once PRN Blood Glucose LESS THAN 70 milliGRAM(s)/deciliter  glucagon  Injectable 1 milliGRAM(s) IntraMuscular once PRN Glucose LESS THAN 70 milligrams/deciliter  zolpidem 5 milliGRAM(s) Oral at bedtime PRN Insomnia  zolpidem 5 milliGRAM(s) Oral at bedtime PRN Insomnia      Allergies    No Known Allergies    Intolerances        LABS:                        12.8   6.95  )-----------( 345      ( 26 Oct 2020 06:45 )             41.1     10-26    136  |  99  |  13  ----------------------------<  225<H>  4.3   |  25  |  0.83    Ca    9.1      26 Oct 2020 06:45  Phos  2.6     10-26  Mg     1.6     10-26            RADIOLOGY & ADDITIONAL TESTS:  Studies reviewed.

## 2020-10-26 NOTE — PROGRESS NOTE ADULT - ASSESSMENT
Patient is a 69 yo M w/ HTN, HLD, DM, BPH s/p TURP, CAD s/p stent 2007, COPD, and SCLC (dx spring 2019) s/p carboplatin/etoposide and radiation with partial response now on maintenance atezolizumab immunotherapy every 3 weeks (c/b brain metastases now s/p gamma knife radiation therapy with improved brain MRI) admitted on 10/21 after being found to be hypoxemic at oncology clinic. As per patient and chart review, endorses worsening BILLINGS and dry cough x 1 month. On image review, patient has had progression of peribronchovascular opacities since CT chest 8/31/2020 with marked progression between CT from 10/19/2020 and 10/21/2020. S/P Bronch w/ BAL and transbronchial bx on 10/23

## 2020-10-26 NOTE — DISCHARGE NOTE PROVIDER - NSDCMRMEDTOKEN_GEN_ALL_CORE_FT
Aspirin Enteric Coated 81 mg oral delayed release tablet: 1 tab(s) orally once a day  atorvastatin 40 mg oral tablet: 1 tab(s) orally once a day  Basaglar KwikPen 100 units/mL subcutaneous solution: 10 unit(s) subcutaneous once a day (at bedtime)  betamethasone dipropionate, augmented 0.05% topical ointment: Apply topically to affected area sparingly 2 times a day  Breo Ellipta 200 mcg-25 mcg/inh inhalation powder: 1 puff(s) inhaled once a day  famotidine 20 mg oral tablet: 1 tab(s) orally 2 times a day  Fish Oil 1000 mg oral capsule: 1 cap(s) orally once a day  OTC supplement  Januvia 100 mg oral tablet: 1 tab(s) orally once a day  lisinopril 20 mg oral tablet: 1 tab(s) orally once a day  magnesium oxide 400 mg (240 mg elemental magnesium) oral tablet: 1 tab(s) orally in the morning  2 tab(s) orally in the afternoon  1 tab(s) orally in the evening  metFORMIN 1000 mg oral tablet: 1 tab(s) orally 2 times a day  Metoprolol Succinate ER 50 mg oral tablet, extended release: 1 tab(s) orally once a day  niacin 500 mg oral tablet, extended release: 2 tab(s) orally once a day (at bedtime)  Ventolin HFA 90 mcg/inh inhalation aerosol: 1 to 2 puff(s) inhaled every 4 to 6 hours, As Needed  Vitamin C 500 mg oral tablet: 1 tab(s) orally once a day  zolpidem 10 mg oral tablet: 1 tab(s) orally once a day (at bedtime)    ISTOP Reference #: [id number]  Quantity of 30 tablets for a 30-day supply dispensed 10/16/2020   Aspirin Enteric Coated 81 mg oral delayed release tablet: 1 tab(s) orally once a day  atorvastatin 40 mg oral tablet: 1 tab(s) orally once a day  Bactrim  mg-160 mg oral tablet: 1 tab(s) orally 3 times a week   Basaglar KwikPen 100 units/mL subcutaneous solution: 10 unit(s) subcutaneous once a day (at bedtime)  Breo Ellipta 200 mcg-25 mcg/inh inhalation powder: 1 puff(s) inhaled once a day  clobetasol 0.05% topical ointment: Apply topically to affected area 2 times a day   famotidine 20 mg oral tablet: 1 tab(s) orally 2 times a day  Fish Oil 1000 mg oral capsule: 1 cap(s) orally once a day  OTC supplement  lisinopril 20 mg oral tablet: 1 tab(s) orally once a day  magnesium oxide 400 mg (240 mg elemental magnesium) oral tablet: 1 tab(s) orally in the morning  2 tab(s) orally in the afternoon  1 tab(s) orally in the evening  Metoprolol Succinate ER 50 mg oral tablet, extended release: 1 tab(s) orally once a day  niacin 500 mg oral tablet, extended release: 2 tab(s) orally once a day (at bedtime)  predniSONE 20 mg oral tablet: 75 milligram(s) orally 2 times a day   Ventolin HFA 90 mcg/inh inhalation aerosol: 1 to 2 puff(s) inhaled every 4 to 6 hours, As Needed  Vitamin C 500 mg oral tablet: 1 tab(s) orally once a day  zolpidem 10 mg oral tablet: 1 tab(s) orally once a day (at bedtime)    ISTOP Reference #: [id number]  Quantity of 30 tablets for a 30-day supply dispensed 10/16/2020   Admelog SoloStar 100 units/mL injectable solution: 25 unit(s) injectable 3 times a day (before meals)   Aspirin Enteric Coated 81 mg oral delayed release tablet: 1 tab(s) orally once a day  atorvastatin 40 mg oral tablet: 1 tab(s) orally once a day  Bactrim  mg-160 mg oral tablet: 1 tab(s) orally 3 times a week   Basaglar KwikPen 100 units/mL subcutaneous solution: 10 unit(s) subcutaneous once a day (at bedtime)  Breo Ellipta 200 mcg-25 mcg/inh inhalation powder: 1 puff(s) inhaled once a day  clobetasol 0.05% topical ointment: Apply topically to affected area 2 times a day   famotidine 20 mg oral tablet: 1 tab(s) orally 2 times a day  Fish Oil 1000 mg oral capsule: 1 cap(s) orally once a day  OTC supplement  Lantus 100 units/mL subcutaneous solution: 43 unit(s) subcutaneous once a day (at bedtime)   lisinopril 20 mg oral tablet: 1 tab(s) orally once a day  magnesium oxide 400 mg (240 mg elemental magnesium) oral tablet: 1 tab(s) orally in the morning  2 tab(s) orally in the afternoon  1 tab(s) orally in the evening  Metoprolol Succinate ER 50 mg oral tablet, extended release: 1 tab(s) orally once a day  niacin 500 mg oral tablet, extended release: 2 tab(s) orally once a day (at bedtime)  predniSONE 20 mg oral tablet: 75 milligram(s) orally 2 times a day   Protonix 40 mg oral granule, delayed release: 40 each orally once a day while on high dose steroids  Ventolin HFA 90 mcg/inh inhalation aerosol: 1 to 2 puff(s) inhaled every 4 to 6 hours, As Needed  Vitamin C 500 mg oral tablet: 1 tab(s) orally once a day  zolpidem 10 mg oral tablet: 1 tab(s) orally once a day (at bedtime)    ISTOP Reference #: [id number]  Quantity of 30 tablets for a 30-day supply dispensed 10/16/2020   alcohol swabs : Apply topically to affected area 4 times a day   Aspirin Enteric Coated 81 mg oral delayed release tablet: 1 tab(s) orally once a day  atorvastatin 40 mg oral tablet: 1 tab(s) orally once a day  Bactrim  mg-160 mg oral tablet: 1 tab(s) orally 3 times a week   Breo Ellipta 200 mcg-25 mcg/inh inhalation powder: 1 puff(s) inhaled once a day  clobetasol 0.05% topical ointment: Apply topically to affected area 2 times a day   famotidine 20 mg oral tablet: 1 tab(s) orally 2 times a day  Fish Oil 1000 mg oral capsule: 1 cap(s) orally once a day  OTC supplement  Insulin Pen Needles, 4mm: 1 application subcutaneously 4 times a day. ** Use with insulin pen **   lancets: 1 application subcutaneously 4 times a day   lisinopril 20 mg oral tablet: 1 tab(s) orally once a day  magnesium oxide 400 mg (240 mg elemental magnesium) oral tablet: 1 tab(s) orally in the morning  2 tab(s) orally in the afternoon  1 tab(s) orally in the evening  Metoprolol Succinate ER 50 mg oral tablet, extended release: 1 tab(s) orally once a day  niacin 500 mg oral tablet, extended release: 2 tab(s) orally once a day (at bedtime)  NovoLOG FlexPen 100 units/mL injectable solution: 10 unit(s) injectable 3 times a day (before meals) MDD:36 units  NovoLOG FlexPen 100 units/mL injectable solution: 25 unit(s) injectable 3 times a day (before meals)   predniSONE 20 mg oral tablet: 75 milligram(s) orally 2 times a day   Protonix 40 mg oral delayed release tablet: 1 tab(s) orally once a day   test strips (per patient&#x27;s insurance): 1 application subcutaneously 4 times a day. ** Compatible with patient&#x27;s glucometer **  Ventolin HFA 90 mcg/inh inhalation aerosol: 1 to 2 puff(s) inhaled every 4 to 6 hours, As Needed  Vitamin C 500 mg oral tablet: 1 tab(s) orally once a day  zolpidem 10 mg oral tablet: 1 tab(s) orally once a day (at bedtime)    ISTOP Reference #: [id number]  Quantity of 30 tablets for a 30-day supply dispensed 10/16/2020   alcohol swabs : Apply topically to affected area 4 times a day   Aspirin Enteric Coated 81 mg oral delayed release tablet: 1 tab(s) orally once a day  atorvastatin 40 mg oral tablet: 1 tab(s) orally once a day  Bactrim  mg-160 mg oral tablet: 1 tab(s) orally 3 times a week   Breo Ellipta 200 mcg-25 mcg/inh inhalation powder: 1 puff(s) inhaled once a day  clobetasol 0.05% topical ointment: Apply topically to affected area 2 times a day   famotidine 20 mg oral tablet: 1 tab(s) orally 2 times a day  Fish Oil 1000 mg oral capsule: 1 cap(s) orally once a day  OTC supplement  Insulin Pen Needles, 4mm: 1 application subcutaneously 4 times a day. ** Use with insulin pen **   lancets: 1 application subcutaneously 4 times a day   lisinopril 20 mg oral tablet: 1 tab(s) orally once a day  magnesium oxide 400 mg (240 mg elemental magnesium) oral tablet: 1 tab(s) orally in the morning  2 tab(s) orally in the afternoon  1 tab(s) orally in the evening  Metoprolol Succinate ER 50 mg oral tablet, extended release: 1 tab(s) orally once a day  niacin 500 mg oral tablet, extended release: 2 tab(s) orally once a day (at bedtime)  NovoLOG FlexPen 100 units/mL injectable solution: 25 unit(s) injectable 3 times a day (before meals)   predniSONE 20 mg oral tablet: 75 milligram(s) orally 2 times a day   Protonix 40 mg oral delayed release tablet: 1 tab(s) orally once a day   test strips (per patient&#x27;s insurance): 1 application subcutaneously 4 times a day. ** Compatible with patient&#x27;s glucometer **  Ventolin HFA 90 mcg/inh inhalation aerosol: 1 to 2 puff(s) inhaled every 4 to 6 hours, As Needed  Vitamin C 500 mg oral tablet: 1 tab(s) orally once a day  zolpidem 10 mg oral tablet: 1 tab(s) orally once a day (at bedtime)    ISTOP Reference #: [id number]  Quantity of 30 tablets for a 30-day supply dispensed 10/16/2020   alcohol swabs : Apply topically to affected area 4 times a day   Aspirin Enteric Coated 81 mg oral delayed release tablet: 1 tab(s) orally once a day  atorvastatin 40 mg oral tablet: 1 tab(s) orally once a day  Bactrim  mg-160 mg oral tablet: 1 tab(s) orally 3 times a week   Breo Ellipta 200 mcg-25 mcg/inh inhalation powder: 1 puff(s) inhaled once a day  clobetasol 0.05% topical ointment: Apply topically to affected area 2 times a day   famotidine 20 mg oral tablet: 1 tab(s) orally 2 times a day  Fish Oil 1000 mg oral capsule: 1 cap(s) orally once a day  OTC supplement  Insulin Pen Needles, 4mm: 1 application subcutaneously 4 times a day. ** Use with insulin pen **   lancets: 1 application subcutaneously 4 times a day   lisinopril 20 mg oral tablet: 1 tab(s) orally once a day  magnesium oxide 400 mg (240 mg elemental magnesium) oral tablet: 1 tab(s) orally in the morning  2 tab(s) orally in the afternoon  1 tab(s) orally in the evening  Metoprolol Succinate ER 50 mg oral tablet, extended release: 1 tab(s) orally once a day  niacin 500 mg oral tablet, extended release: 2 tab(s) orally once a day (at bedtime)  NovoLOG FlexPen 100 units/mL injectable solution: 25 unit(s) injectable 3 times a day (before meals) MDD:111u    predniSONE 20 mg oral tablet: 75 milligram(s) orally 2 times a day   Protonix 40 mg oral delayed release tablet: 1 tab(s) orally once a day   test strips (per patient&#x27;s insurance): 1 application subcutaneously 4 times a day. ** Compatible with patient&#x27;s glucometer **  Ventolin HFA 90 mcg/inh inhalation aerosol: 1 to 2 puff(s) inhaled every 4 to 6 hours, As Needed  Vitamin C 500 mg oral tablet: 1 tab(s) orally once a day  zolpidem 10 mg oral tablet: 1 tab(s) orally once a day (at bedtime)    ISTOP Reference #: [id number]  Quantity of 30 tablets for a 30-day supply dispensed 10/16/2020

## 2020-10-26 NOTE — PROGRESS NOTE ADULT - ATTENDING COMMENTS
Patient seen and examined, d/w Dr. Ibanez, agree with above.      ID # [id number], patient reports breathing improving. Case d/w heme/onc Dr. Phipps and pulm Dr. Coffey, plan to initiate steroid therapy for treatment of possible pneumonitis (IV solumedrol). Discussed with patient and daughter Leena at bedside, both in agreement with plan, also  discussed potential side effects of steroid usage including GI upset, hyperglycemia etc. Is on H2 blocker, likely will need to increase insulin regimen to manage steroid induced hyperglycemia. Appreciate CM/SW assistance with setting up home O2.

## 2020-10-26 NOTE — DISCHARGE NOTE PROVIDER - PROVIDER TOKENS
PROVIDER:[TOKEN:[[id number]]],PROVIDER:[TOKEN:[[id number]]],PROVIDER:[TOKEN:[[id number]]] PROVIDER:[TOKEN:[[id number]]],PROVIDER:[TOKEN:[[id number]]],PROVIDER:[TOKEN:[[id number]]],PROVIDER:[TOKEN:[[id number]]]

## 2020-10-27 LAB
ANION GAP SERPL CALC-SCNC: 11 MMO/L — SIGNIFICANT CHANGE UP (ref 7–14)
BUN SERPL-MCNC: 16 MG/DL — SIGNIFICANT CHANGE UP (ref 7–23)
CALCIUM SERPL-MCNC: 9.7 MG/DL — SIGNIFICANT CHANGE UP (ref 8.4–10.5)
CHLORIDE SERPL-SCNC: 99 MMOL/L — SIGNIFICANT CHANGE UP (ref 98–107)
CO2 SERPL-SCNC: 25 MMOL/L — SIGNIFICANT CHANGE UP (ref 22–31)
CREAT SERPL-MCNC: 0.8 MG/DL — SIGNIFICANT CHANGE UP (ref 0.5–1.3)
GLUCOSE SERPL-MCNC: 275 MG/DL — HIGH (ref 70–99)
HCT VFR BLD CALC: 43 % — SIGNIFICANT CHANGE UP (ref 39–50)
HGB BLD-MCNC: 13.7 G/DL — SIGNIFICANT CHANGE UP (ref 13–17)
MAGNESIUM SERPL-MCNC: 2.1 MG/DL — SIGNIFICANT CHANGE UP (ref 1.6–2.6)
MCHC RBC-ENTMCNC: 29.8 PG — SIGNIFICANT CHANGE UP (ref 27–34)
MCHC RBC-ENTMCNC: 31.9 % — LOW (ref 32–36)
MCV RBC AUTO: 93.5 FL — SIGNIFICANT CHANGE UP (ref 80–100)
MISCELLANEOUS - CHEM: SIGNIFICANT CHANGE UP
NRBC # FLD: 0 K/UL — SIGNIFICANT CHANGE UP (ref 0–0)
PHOSPHATE SERPL-MCNC: 3.8 MG/DL — SIGNIFICANT CHANGE UP (ref 2.5–4.5)
PLATELET # BLD AUTO: 425 K/UL — HIGH (ref 150–400)
PMV BLD: 9 FL — SIGNIFICANT CHANGE UP (ref 7–13)
POTASSIUM SERPL-MCNC: 4.5 MMOL/L — SIGNIFICANT CHANGE UP (ref 3.5–5.3)
POTASSIUM SERPL-SCNC: 4.5 MMOL/L — SIGNIFICANT CHANGE UP (ref 3.5–5.3)
RBC # BLD: 4.6 M/UL — SIGNIFICANT CHANGE UP (ref 4.2–5.8)
RBC # FLD: 14.4 % — SIGNIFICANT CHANGE UP (ref 10.3–14.5)
SODIUM SERPL-SCNC: 135 MMOL/L — SIGNIFICANT CHANGE UP (ref 135–145)
WBC # BLD: 7.65 K/UL — SIGNIFICANT CHANGE UP (ref 3.8–10.5)
WBC # FLD AUTO: 7.65 K/UL — SIGNIFICANT CHANGE UP (ref 3.8–10.5)

## 2020-10-27 PROCEDURE — 99232 SBSQ HOSP IP/OBS MODERATE 35: CPT

## 2020-10-27 PROCEDURE — 99233 SBSQ HOSP IP/OBS HIGH 50: CPT | Mod: GC

## 2020-10-27 PROCEDURE — 99233 SBSQ HOSP IP/OBS HIGH 50: CPT

## 2020-10-27 RX ORDER — INSULIN LISPRO 100/ML
8 VIAL (ML) SUBCUTANEOUS
Refills: 0 | Status: DISCONTINUED | OUTPATIENT
Start: 2020-10-27 | End: 2020-10-28

## 2020-10-27 RX ADMIN — Medication 2: at 08:22

## 2020-10-27 RX ADMIN — BUDESONIDE AND FORMOTEROL FUMARATE DIHYDRATE 2 PUFF(S): 160; 4.5 AEROSOL RESPIRATORY (INHALATION) at 08:22

## 2020-10-27 RX ADMIN — Medication 6: at 17:18

## 2020-10-27 RX ADMIN — Medication 4: at 12:15

## 2020-10-27 RX ADMIN — Medication 50 MILLIGRAM(S): at 17:19

## 2020-10-27 RX ADMIN — Medication 8 UNIT(S): at 17:18

## 2020-10-27 RX ADMIN — Medication 81 MILLIGRAM(S): at 12:16

## 2020-10-27 RX ADMIN — BUDESONIDE AND FORMOTEROL FUMARATE DIHYDRATE 2 PUFF(S): 160; 4.5 AEROSOL RESPIRATORY (INHALATION) at 22:23

## 2020-10-27 RX ADMIN — FAMOTIDINE 20 MILLIGRAM(S): 10 INJECTION INTRAVENOUS at 05:36

## 2020-10-27 RX ADMIN — MAGNESIUM OXIDE 400 MG ORAL TABLET 400 MILLIGRAM(S): 241.3 TABLET ORAL at 08:25

## 2020-10-27 RX ADMIN — ATORVASTATIN CALCIUM 40 MILLIGRAM(S): 80 TABLET, FILM COATED ORAL at 22:24

## 2020-10-27 RX ADMIN — Medication 1 GRAM(S): at 12:16

## 2020-10-27 RX ADMIN — Medication 1000 MILLIGRAM(S): at 22:24

## 2020-10-27 RX ADMIN — MAGNESIUM OXIDE 400 MG ORAL TABLET 400 MILLIGRAM(S): 241.3 TABLET ORAL at 17:19

## 2020-10-27 RX ADMIN — Medication 1 APPLICATION(S): at 05:35

## 2020-10-27 RX ADMIN — MAGNESIUM OXIDE 400 MG ORAL TABLET 400 MILLIGRAM(S): 241.3 TABLET ORAL at 12:16

## 2020-10-27 RX ADMIN — Medication 1 APPLICATION(S): at 17:19

## 2020-10-27 RX ADMIN — INSULIN GLARGINE 15 UNIT(S): 100 INJECTION, SOLUTION SUBCUTANEOUS at 22:14

## 2020-10-27 RX ADMIN — Medication 40 MILLIGRAM(S): at 05:35

## 2020-10-27 RX ADMIN — Medication 5 UNIT(S): at 08:22

## 2020-10-27 RX ADMIN — Medication 2: at 22:36

## 2020-10-27 RX ADMIN — Medication 5 UNIT(S): at 12:15

## 2020-10-27 RX ADMIN — FAMOTIDINE 20 MILLIGRAM(S): 10 INJECTION INTRAVENOUS at 17:19

## 2020-10-27 NOTE — PROGRESS NOTE ADULT - PROBLEM SELECTOR PLAN 1
-Differential: pneumonitis vs. pneumonia.  CT chest showed progression of bilateral diffuse peribronchovascular opacities  -CT A negative for PE  -RVP/COVID 19 negative   -Sputum culture normal respiratory karlie   -BAL fluid  (10/23) negative for gram stain and NGTD  -Low suspicion for pneumonia - observe off antibiotics  -Wean O2 as tolerated   -F/u results of bronch  -Consider steroids pending bronch results for possible pneumonitis 2/2 atezolizumab  -Lung cancer management per below -Differential: pneumonitis vs. pneumonia.  CT chest showed progression of bilateral diffuse peribronchovascular opacities  -CT A negative for PE  -RVP/COVID 19 negative   -Sputum culture normal respiratory karlie; fungitell negative  -BAL fluid  (10/23) negative for gram stain and NGTD; cytology negative  -Low suspicion for pneumonia - observe off antibiotics  -Wean O2 as tolerated  -F/u path from bronch  -Consider steroids pending bronch results for possible pneumonitis 2/2 atezolizumab  -Lung cancer management per below -Differential: pneumonitis vs. pneumonia.  CT chest showed progression of bilateral diffuse peribronchovascular opacities  -CT A negative for PE  -RVP/COVID 19 negative   -Sputum culture normal respiratory karlie; fungitell negative  -BAL fluid  (10/23) negative for gram stain and NGTD; cytology negative  -Low suspicion for pneumonia - observe off antibiotics  -Wean O2 as tolerated, however patient requires home oxygen given desaturations  -F/u path from bronch  -started on IV steroids as per heme/onc and pulm for possible pneumonitis 2/2 atezolizumab  -Lung cancer management per below

## 2020-10-27 NOTE — PROGRESS NOTE ADULT - PROBLEM SELECTOR PLAN 1
Progressive peribronchovascular opacities on CT chest over last 7 weeks accompanied by worsening BILLINGS and dry cough. Also with b/l shin nodular rash. No fevers or leukocytosis. Differential includes infection in setting of relative immunocompromise vs pneumonitis (3% w/ atezolizumab, although atypical distribution) vs progression of disease. s/p Bronch w/ BAL and transbronchial biopsy 10/23. BAL cx NGTD. BAL cyto unremarkable. Fungitell negative. Quant negative.  - f/u BAL cultures  - f/u transbronchial biopsy path  - c/w Solumedrol 40mg IV daily for now  - Wean O2 as tolerated    Shad Coffey MD  Pulmonary & Critical Care Fellow  [phone]

## 2020-10-27 NOTE — PROGRESS NOTE ADULT - PROBLEM SELECTOR PLAN 4
-Patient seen by derm for rash as an outpatient who recommended betamethasone ointment which patient has been using without relief  -Dermatology consult, appreciate recs, clobetasol .05% ointment  -Rash improving

## 2020-10-27 NOTE — PROGRESS NOTE ADULT - PROBLEM SELECTOR PLAN 5
-Recently started on insulin as outpatient  -Lantus 10u QHS with FS and SSI TIDAC  -Monitor FS and adjust regimen as needed  -May need dose adjustment in case of initiation of systemic steroids  -Regular diet (consistent carb/dash/tlc)  -A1C 6.9 -Recently started on insulin as outpatient  -Lantus now increased to 15u QHS with FS and SSI TIDAC given hyperglycemia after initiation of steroids  -Monitor FS and adjust regimen as needed  -Regular diet (consistent carb/dash/tlc)  -A1C 6.9

## 2020-10-27 NOTE — PROGRESS NOTE ADULT - SUBJECTIVE AND OBJECTIVE BOX
Modesto Ibanez MD  Pager [phone]    Patient is a 70y old  Male who presents with a chief complaint of Hypoxia, abnormal CT (27 Oct 2020 08:56)      SUBJECTIVE / OVERNIGHT EVENTS:      ADDITIONAL REVIEW OF SYSTEMS: Otherwise negative.    MEDICATIONS  (STANDING):  aspirin enteric coated 81 milliGRAM(s) Oral daily  atorvastatin 40 milliGRAM(s) Oral at bedtime  budesonide 160 MICROgram(s)/formoterol 4.5 MICROgram(s) Inhaler 2 Puff(s) Inhalation two times a day  clobetasol 0.05% Ointment 1 Application(s) Topical two times a day  dextrose 5%. 1000 milliLiter(s) (50 mL/Hr) IV Continuous <Continuous>  dextrose 50% Injectable 12.5 Gram(s) IV Push once  dextrose 50% Injectable 25 Gram(s) IV Push once  dextrose 50% Injectable 25 Gram(s) IV Push once  enoxaparin Injectable 40 milliGRAM(s) SubCutaneous daily  famotidine    Tablet 20 milliGRAM(s) Oral two times a day  insulin glargine Injectable (LANTUS) 15 Unit(s) SubCutaneous at bedtime  insulin lispro (ADMELOG) corrective regimen sliding scale   SubCutaneous three times a day before meals  insulin lispro (ADMELOG) corrective regimen sliding scale   SubCutaneous at bedtime  insulin lispro Injectable (ADMELOG) 5 Unit(s) SubCutaneous three times a day before meals  magnesium oxide 400 milliGRAM(s) Oral three times a day with meals  methylPREDNISolone sodium succinate Injectable 40 milliGRAM(s) IV Push daily  metoprolol succinate ER 50 milliGRAM(s) Oral daily  niacin SR 1000 milliGRAM(s) Oral at bedtime  omega-3-Acid Ethyl Esters 1 Gram(s) Oral daily    MEDICATIONS  (PRN):  albuterol/ipratropium for Nebulization 3 milliLiter(s) Nebulizer every 4 hours PRN Shortness of Breath and/or Wheezing  dextrose 40% Gel 15 Gram(s) Oral once PRN Blood Glucose LESS THAN 70 milliGRAM(s)/deciliter  glucagon  Injectable 1 milliGRAM(s) IntraMuscular once PRN Glucose LESS THAN 70 milligrams/deciliter  zolpidem 5 milliGRAM(s) Oral at bedtime PRN Insomnia  zolpidem 5 milliGRAM(s) Oral at bedtime PRN Insomnia      CAPILLARY BLOOD GLUCOSE      POCT Blood Glucose.: 243 mg/dL (27 Oct 2020 07:52)  POCT Blood Glucose.: 346 mg/dL (26 Oct 2020 23:29)  POCT Blood Glucose.: 434 mg/dL (26 Oct 2020 20:53)  POCT Blood Glucose.: 403 mg/dL (26 Oct 2020 20:51)  POCT Blood Glucose.: 372 mg/dL (26 Oct 2020 16:54)  POCT Blood Glucose.: 414 mg/dL (26 Oct 2020 16:52)  POCT Blood Glucose.: 197 mg/dL (26 Oct 2020 11:47)    I&O's Summary      PHYSICAL EXAM:  Vital Signs Last 24 Hrs  T(C): 36.5 (27 Oct 2020 05:34), Max: 37.1 (26 Oct 2020 11:52)  T(F): 97.7 (27 Oct 2020 05:34), Max: 98.7 (26 Oct 2020 11:52)  HR: 73 (27 Oct 2020 05:34) (65 - 126)  BP: 122/63 (27 Oct 2020 05:34) (122/63 - 129/72)  BP(mean): --  RR: 18 (27 Oct 2020 05:34) (18 - 21)  SpO2: 95% (27 Oct 2020 05:34) (87% - 97%)    CONSTITUTIONAL: NAD, lying comfortably  EYES: PERRL; conjunctiva and sclera clear  ENMT: Moist oral mucosa, no pharyngeal erythema or exudates  NECK: Supple, no palpable masses or lymphadenopathy  RESPIRATORY: Normal respiratory effort; lungs are clear to auscultation bilaterally, no wheezes or crackles  CARDIOVASCULAR: Regular rate and rhythm, normal S1 and S2, no murmurs gallops or rubs; No lower extremity edema; No JVD  ABDOMEN: Nondistended, normoactive bowel sounds; Soft, nontender to palpation, no rebound/guarding; No hepatosplenomegaly  EXTREMITIES:  2+ peripheral pulses bilaterally; no joint swelling or tenderness to palpation; no clubbing or cyanosis  PSYCH: Alert and oriented to person, place, and time; affect appropriate  NEUROLOGY: CN 2-12 intact; no gross motor or sensory deficits  SKIN: No rashes; no palpable lesions    LABS:                        13.7   7.65  )-----------( 425      ( 27 Oct 2020 05:40 )             43.0     10-27    135  |  99  |  16  ----------------------------<  275<H>  4.5   |  25  |  0.80    Ca    9.7      27 Oct 2020 05:40  Phos  3.8     10-27  Mg     2.1     10-27                  RADIOLOGY & ADDITIONAL TESTS:   Modesto Ibanez MD  Pager [phone]    Patient is a 70y old  Male who presents with a chief complaint of Hypoxia, abnormal CT (27 Oct 2020 08:56)      SUBJECTIVE / OVERNIGHT EVENTS:  Patient was seen and examined at bedside this morning. No acute events overnight. Patient had a few desaturation events on tele, lowest to 79%, not sustained (now on 5L NC). Patient was started on Solumedrol yesterday and became hyperglycemic; basal insulin was increased. Patient reports feeling well and is eager to leave the hospital. Denies shortness of breath, cough, chest pain, abdominal pain, n/v/d.     ADDITIONAL REVIEW OF SYSTEMS: Otherwise negative.    MEDICATIONS  (STANDING):  aspirin enteric coated 81 milliGRAM(s) Oral daily  atorvastatin 40 milliGRAM(s) Oral at bedtime  budesonide 160 MICROgram(s)/formoterol 4.5 MICROgram(s) Inhaler 2 Puff(s) Inhalation two times a day  clobetasol 0.05% Ointment 1 Application(s) Topical two times a day  dextrose 5%. 1000 milliLiter(s) (50 mL/Hr) IV Continuous <Continuous>  dextrose 50% Injectable 12.5 Gram(s) IV Push once  dextrose 50% Injectable 25 Gram(s) IV Push once  dextrose 50% Injectable 25 Gram(s) IV Push once  enoxaparin Injectable 40 milliGRAM(s) SubCutaneous daily  famotidine    Tablet 20 milliGRAM(s) Oral two times a day  insulin glargine Injectable (LANTUS) 15 Unit(s) SubCutaneous at bedtime  insulin lispro (ADMELOG) corrective regimen sliding scale   SubCutaneous three times a day before meals  insulin lispro (ADMELOG) corrective regimen sliding scale   SubCutaneous at bedtime  insulin lispro Injectable (ADMELOG) 5 Unit(s) SubCutaneous three times a day before meals  magnesium oxide 400 milliGRAM(s) Oral three times a day with meals  methylPREDNISolone sodium succinate Injectable 40 milliGRAM(s) IV Push daily  metoprolol succinate ER 50 milliGRAM(s) Oral daily  niacin SR 1000 milliGRAM(s) Oral at bedtime  omega-3-Acid Ethyl Esters 1 Gram(s) Oral daily    MEDICATIONS  (PRN):  albuterol/ipratropium for Nebulization 3 milliLiter(s) Nebulizer every 4 hours PRN Shortness of Breath and/or Wheezing  dextrose 40% Gel 15 Gram(s) Oral once PRN Blood Glucose LESS THAN 70 milliGRAM(s)/deciliter  glucagon  Injectable 1 milliGRAM(s) IntraMuscular once PRN Glucose LESS THAN 70 milligrams/deciliter  zolpidem 5 milliGRAM(s) Oral at bedtime PRN Insomnia  zolpidem 5 milliGRAM(s) Oral at bedtime PRN Insomnia      CAPILLARY BLOOD GLUCOSE      POCT Blood Glucose.: 243 mg/dL (27 Oct 2020 07:52)  POCT Blood Glucose.: 346 mg/dL (26 Oct 2020 23:29)  POCT Blood Glucose.: 434 mg/dL (26 Oct 2020 20:53)  POCT Blood Glucose.: 403 mg/dL (26 Oct 2020 20:51)  POCT Blood Glucose.: 372 mg/dL (26 Oct 2020 16:54)  POCT Blood Glucose.: 414 mg/dL (26 Oct 2020 16:52)  POCT Blood Glucose.: 197 mg/dL (26 Oct 2020 11:47)    I&O's Summary      PHYSICAL EXAM:  Vital Signs Last 24 Hrs  T(C): 36.5 (27 Oct 2020 05:34), Max: 37.1 (26 Oct 2020 11:52)  T(F): 97.7 (27 Oct 2020 05:34), Max: 98.7 (26 Oct 2020 11:52)  HR: 73 (27 Oct 2020 05:34) (65 - 126)  BP: 122/63 (27 Oct 2020 05:34) (122/63 - 129/72)  BP(mean): --  RR: 18 (27 Oct 2020 05:34) (18 - 21)  SpO2: 95% (27 Oct 2020 05:34) (87% - 97%)    GENERAL: NAD, sitting comfortably  HEAD:  Atraumatic, Normocephalic  EYES: EOMI, PERRL, conjunctiva and sclera clear  ENT: Mucous membranes moist, no pharyngeal erythema  NECK: Supple, No Lymphadenopathy  CHEST/LUNG: LCTABL; NC in place, speaking in full sentences  CV: Regular rate and rhythm, S1/S2 equal; No murmurs, rubs, or gallops; No JVD or hepatojugular reflux  ABDOMEN: Soft, Nontender, Nondistended; Bowel sounds present  EXTREMITIES:  2+ Peripheral Pulses, No clubbing, cyanosis, or edema  PSYCH: AAOx3; affect appropriate  NEUROLOGY: no focal motor or sensory deficits, muscle strength 5/5 bilaterally, quadriceps reflex intact  SKIN: Diffuse nontender nonitching blanching macular rash on lower extremities, buttocks, and trunk with 2-3 open blisters on b/l ankle and few lesions on hands/palms    LABS:                        13.7   7.65  )-----------( 425      ( 27 Oct 2020 05:40 )             43.0     10-27    135  |  99  |  16  ----------------------------<  275<H>  4.5   |  25  |  0.80    Ca    9.7      27 Oct 2020 05:40  Phos  3.8     10-27  Mg     2.1     10-27      RADIOLOGY & ADDITIONAL TESTS:  No new interval imaging Modesto Ibanez MD  Pager [phone]    Patient is a 70y old  Male who presents with a chief complaint of Hypoxia, abnormal CT (27 Oct 2020 08:56)      SUBJECTIVE / OVERNIGHT EVENTS:  Patient was seen and examined at bedside this morning. No acute events overnight. Patient had a few desaturation events on tele, lowest to 79%, not sustained (now on 5L NC). Patient was started on Solumedrol yesterday and became hyperglycemic; basal insulin was increased. Patient reports feeling well and is eager to leave the hospital. Denies shortness of breath, cough, chest pain, abdominal pain, n/v/d.     ADDITIONAL REVIEW OF SYSTEMS: Otherwise negative.    MEDICATIONS  (STANDING):  aspirin enteric coated 81 milliGRAM(s) Oral daily  atorvastatin 40 milliGRAM(s) Oral at bedtime  budesonide 160 MICROgram(s)/formoterol 4.5 MICROgram(s) Inhaler 2 Puff(s) Inhalation two times a day  clobetasol 0.05% Ointment 1 Application(s) Topical two times a day  dextrose 5%. 1000 milliLiter(s) (50 mL/Hr) IV Continuous <Continuous>  dextrose 50% Injectable 12.5 Gram(s) IV Push once  dextrose 50% Injectable 25 Gram(s) IV Push once  dextrose 50% Injectable 25 Gram(s) IV Push once  enoxaparin Injectable 40 milliGRAM(s) SubCutaneous daily  famotidine    Tablet 20 milliGRAM(s) Oral two times a day  insulin glargine Injectable (LANTUS) 15 Unit(s) SubCutaneous at bedtime  insulin lispro (ADMELOG) corrective regimen sliding scale   SubCutaneous three times a day before meals  insulin lispro (ADMELOG) corrective regimen sliding scale   SubCutaneous at bedtime  insulin lispro Injectable (ADMELOG) 5 Unit(s) SubCutaneous three times a day before meals  magnesium oxide 400 milliGRAM(s) Oral three times a day with meals  methylPREDNISolone sodium succinate Injectable 40 milliGRAM(s) IV Push daily  metoprolol succinate ER 50 milliGRAM(s) Oral daily  niacin SR 1000 milliGRAM(s) Oral at bedtime  omega-3-Acid Ethyl Esters 1 Gram(s) Oral daily    MEDICATIONS  (PRN):  albuterol/ipratropium for Nebulization 3 milliLiter(s) Nebulizer every 4 hours PRN Shortness of Breath and/or Wheezing  dextrose 40% Gel 15 Gram(s) Oral once PRN Blood Glucose LESS THAN 70 milliGRAM(s)/deciliter  glucagon  Injectable 1 milliGRAM(s) IntraMuscular once PRN Glucose LESS THAN 70 milligrams/deciliter  zolpidem 5 milliGRAM(s) Oral at bedtime PRN Insomnia  zolpidem 5 milliGRAM(s) Oral at bedtime PRN Insomnia      CAPILLARY BLOOD GLUCOSE      POCT Blood Glucose.: 243 mg/dL (27 Oct 2020 07:52)  POCT Blood Glucose.: 346 mg/dL (26 Oct 2020 23:29)  POCT Blood Glucose.: 434 mg/dL (26 Oct 2020 20:53)  POCT Blood Glucose.: 403 mg/dL (26 Oct 2020 20:51)  POCT Blood Glucose.: 372 mg/dL (26 Oct 2020 16:54)  POCT Blood Glucose.: 414 mg/dL (26 Oct 2020 16:52)  POCT Blood Glucose.: 197 mg/dL (26 Oct 2020 11:47)    I&O's Summary      PHYSICAL EXAM:  Vital Signs Last 24 Hrs  T(C): 36.5 (27 Oct 2020 05:34), Max: 37.1 (26 Oct 2020 11:52)  T(F): 97.7 (27 Oct 2020 05:34), Max: 98.7 (26 Oct 2020 11:52)  HR: 73 (27 Oct 2020 05:34) (65 - 126)  BP: 122/63 (27 Oct 2020 05:34) (122/63 - 129/72)  BP(mean): --  RR: 18 (27 Oct 2020 05:34) (18 - 21)  SpO2: 95% (27 Oct 2020 05:34) (87% - 97%)    GENERAL: NAD, sitting comfortably  HEAD:  Atraumatic, Normocephalic  EYES: EOMI, PERRL, conjunctiva and sclera clear  ENT: Mucous membranes moist, no pharyngeal erythema  NECK: Supple, No Lymphadenopathy  CHEST/LUNG: LCTABL; NC in place, speaking in full sentences  CV: Regular rate and rhythm, S1/S2 equal; No murmurs, rubs, or gallops; No JVD or hepatojugular reflux  ABDOMEN: Soft, Nontender, Nondistended; Bowel sounds present  EXTREMITIES:  2+ Peripheral Pulses, No clubbing, cyanosis, or edema  PSYCH: AAOx3; affect appropriate  NEUROLOGY: no focal motor or sensory deficits, muscle strength 5/5 bilaterally, quadriceps reflex intact  SKIN: Diffuse nontender nonitching blanching macular rash on lower extremities, buttocks, and trunk with 2-3 open blisters on b/l ankle and few lesions on hands/palms    LABS:                        13.7   7.65  )-----------( 425      ( 27 Oct 2020 05:40 )             43.0     10-27    135  |  99  |  16  ----------------------------<  275<H>  4.5   |  25  |  0.80    Ca    9.7      27 Oct 2020 05:40  Phos  3.8     10-27  Mg     2.1     10-27      RADIOLOGY & ADDITIONAL TESTS:  No new interval imaging    Consultant notes reviewed: Heme/Onc, Pulm

## 2020-10-27 NOTE — PROGRESS NOTE ADULT - SUBJECTIVE AND OBJECTIVE BOX
CHIEF COMPLAINT:    Interval Events: Decreased from 6l nc to 5l nc. Feels subjectively better compared to yesterday with slightly improved dyspnea and cough.     REVIEW OF SYSTEMS:  Constitutional: [ ] negative [ ] fevers [ ] chills [ ] weight loss [ ] weight gain  HEENT: [ ] negative [ ] dry eyes [ ] eye irritation [ ] postnasal drip [ ] nasal congestion  CV: [ ] negative  [ ] chest pain [ ] orthopnea [ ] palpitations [ ] murmur  Resp: [ ] negative [X] cough [ ] shortness of breath [X] dyspnea [ ] wheezing [ ] sputum [ ] hemoptysis  GI: [ ] negative [ ] nausea [ ] vomiting [ ] diarrhea [ ] constipation [ ] abd pain [ ] dysphagia   : [ ] negative [ ] dysuria [ ] nocturia [ ] hematuria [ ] increased urinary frequency  Musculoskeletal: [ ] negative [ ] back pain [ ] myalgias [ ] arthralgias [ ] fracture  Skin: [ ] negative [ ] rash [ ] itch  Neurological: [ ] negative [ ] headache [ ] dizziness [ ] syncope [ ] weakness [ ] numbness  Psychiatric: [ ] negative [ ] anxiety [ ] depression  Endocrine: [ ] negative [ ] diabetes [ ] thyroid problem  Hematologic/Lymphatic: [ ] negative [ ] anemia [ ] bleeding problem  Allergic/Immunologic: [ ] negative [ ] itchy eyes [ ] nasal discharge [ ] hives [ ] angioedema  [X] All other systems negative  [ ] Unable to assess ROS because ________    OBJECTIVE:  ICU Vital Signs Last 24 Hrs  T(C): 36.5 (27 Oct 2020 05:34), Max: 37.1 (26 Oct 2020 11:52)  T(F): 97.7 (27 Oct 2020 05:34), Max: 98.7 (26 Oct 2020 11:52)  HR: 73 (27 Oct 2020 05:34) (65 - 126)  BP: 122/63 (27 Oct 2020 05:34) (122/63 - 129/72)  BP(mean): --  ABP: --  ABP(mean): --  RR: 18 (27 Oct 2020 05:34) (18 - 21)  SpO2: 95% (27 Oct 2020 05:34) (87% - 97%)        CAPILLARY BLOOD GLUCOSE      POCT Blood Glucose.: 243 mg/dL (27 Oct 2020 07:52)      PHYSICAL EXAM:  General: NAD, resting comfortably on 6 L nc  HEENT: EOMI, PERRLA  Respiratory: CTAB  Cardiovascular: S1S2, RRR  Abdomen: Soft, NTND, BS+  Extremities: No peripheral edema  Skin: B/l shin nodular rash  Neurological: Grossly intact    HOSPITAL MEDICATIONS:  MEDICATIONS  (STANDING):  aspirin enteric coated 81 milliGRAM(s) Oral daily  atorvastatin 40 milliGRAM(s) Oral at bedtime  budesonide 160 MICROgram(s)/formoterol 4.5 MICROgram(s) Inhaler 2 Puff(s) Inhalation two times a day  clobetasol 0.05% Ointment 1 Application(s) Topical two times a day  dextrose 5%. 1000 milliLiter(s) (50 mL/Hr) IV Continuous <Continuous>  dextrose 50% Injectable 12.5 Gram(s) IV Push once  dextrose 50% Injectable 25 Gram(s) IV Push once  dextrose 50% Injectable 25 Gram(s) IV Push once  enoxaparin Injectable 40 milliGRAM(s) SubCutaneous daily  famotidine    Tablet 20 milliGRAM(s) Oral two times a day  insulin glargine Injectable (LANTUS) 15 Unit(s) SubCutaneous at bedtime  insulin lispro (ADMELOG) corrective regimen sliding scale   SubCutaneous three times a day before meals  insulin lispro (ADMELOG) corrective regimen sliding scale   SubCutaneous at bedtime  insulin lispro Injectable (ADMELOG) 5 Unit(s) SubCutaneous three times a day before meals  magnesium oxide 400 milliGRAM(s) Oral three times a day with meals  methylPREDNISolone sodium succinate Injectable 40 milliGRAM(s) IV Push daily  metoprolol succinate ER 50 milliGRAM(s) Oral daily  niacin SR 1000 milliGRAM(s) Oral at bedtime  omega-3-Acid Ethyl Esters 1 Gram(s) Oral daily    MEDICATIONS  (PRN):  albuterol/ipratropium for Nebulization 3 milliLiter(s) Nebulizer every 4 hours PRN Shortness of Breath and/or Wheezing  dextrose 40% Gel 15 Gram(s) Oral once PRN Blood Glucose LESS THAN 70 milliGRAM(s)/deciliter  glucagon  Injectable 1 milliGRAM(s) IntraMuscular once PRN Glucose LESS THAN 70 milligrams/deciliter  zolpidem 5 milliGRAM(s) Oral at bedtime PRN Insomnia  zolpidem 5 milliGRAM(s) Oral at bedtime PRN Insomnia      LABS:                        13.7   7.65  )-----------( 425      ( 27 Oct 2020 05:40 )             43.0     Hgb Trend: 13.7<--, 12.8<--, 12.1<--, 12.4<--, 12.4<--  10-27    135  |  99  |  16  ----------------------------<  275<H>  4.5   |  25  |  0.80    Ca    9.7      27 Oct 2020 05:40  Phos  3.8     10-27  Mg     2.1     10-27      Creatinine Trend: 0.80<--, 0.83<--, 0.81<--, 0.82<--, 0.84<--, 0.93<--            MICROBIOLOGY:       RADIOLOGY:  [ ] Reviewed and interpreted by me    PULMONARY FUNCTION TESTS:    EKG:

## 2020-10-27 NOTE — PROGRESS NOTE ADULT - ASSESSMENT
Patient with extensive stage small cell lung cancer who has been on maintenance immunotherapy with Atezolizumab, p/w worsening SOB and hypoxia to mid-80’s on RA.   CT chest with b/l diffuse opacities concerning for infection vs pneumonitis, on immune checkpoint inhibitor therapy.    -Pulm consult appreciated. S/p bronch w/ BAL and transbronchial biopsy 10/23. BAL cx NGTD. Will followup BAL cultures, fungitell, PCP and biopsy path  -Given preliminary negative cultures would start steroids for treatment of possible pneumonitis   - Started on  Solumedrol however would increase dose to 1mg/kg/day  -Consider Covid IgG, ordered, pending collection  -Derm Consult recs appreciated, rash improving  -Patient to followup with Dr. Ramirez ([facility]) upon discharge  -C/w Supportive care, pain control, Nutrition, PT, DVT ppx  -Oncology will continue to follow with you      Case d/w Dr. Desire RG  Oncology Physician Assistant  [facility]  Pager [phone]    If after 5pm or weekends please page On-call Oncology Fellow     Patient with extensive stage small cell lung cancer who has been on maintenance immunotherapy with Atezolizumab, p/w worsening SOB and hypoxia to mid-80’s on RA.   CT chest with b/l diffuse opacities concerning for infection vs pneumonitis, on immune checkpoint inhibitor therapy.    -Pulm consult appreciated. S/p bronch w/ BAL and transbronchial biopsy 10/23. BAL cx NGTD. Will followup BAL cultures, fungitell, PCP and biopsy path  -Given preliminary negative cultures would start steroids for treatment of possible pneumonitis   - Started on  Solumedrol however would increase dose to at least 1mg/kg/day  -Consider Covid IgG, ordered, pending collection  -Derm Consult recs appreciated, rash improving  -Patient to followup with Dr. Ramirez ([facility]) upon discharge  -C/w Supportive care, pain control, Nutrition, PT, DVT ppx  -Oncology will continue to follow with you      Case d/w Dr. Desire RG  Oncology Physician Assistant  [facility]  Pager [phone]    If after 5pm or weekends please page On-call Oncology Fellow

## 2020-10-27 NOTE — PROGRESS NOTE ADULT - SUBJECTIVE AND OBJECTIVE BOX
INTERVAL HPI/OVERNIGHT EVENTS:  Patient seen at bedside.  Patient with improved symptoms  Using less supp oxygen today, 4L from 6L yesterday  Denies cough or fever  Spoke to patient with his dtr Veronica on the phone  Dtr provided translation      VITAL SIGNS:  T(F): 98.7 (10-27-20 @ 11:46)  HR: 76 (10-27-20 @ 11:46)  BP: 139/69 (10-27-20 @ 11:46)  RR: 18 (10-27-20 @ 11:46)  SpO2: 94% (10-27-20 @ 12:30)  Wt(kg): --    PHYSICAL EXAM:  In accordance with current standard limiting patient contact, deferred physical exam  2/2 COVID pandemic  Please refer to physical exam of primary team.    MEDICATIONS  (STANDING):  aspirin enteric coated 81 milliGRAM(s) Oral daily  atorvastatin 40 milliGRAM(s) Oral at bedtime  budesonide 160 MICROgram(s)/formoterol 4.5 MICROgram(s) Inhaler 2 Puff(s) Inhalation two times a day  clobetasol 0.05% Ointment 1 Application(s) Topical two times a day  dextrose 5%. 1000 milliLiter(s) (50 mL/Hr) IV Continuous <Continuous>  dextrose 50% Injectable 12.5 Gram(s) IV Push once  dextrose 50% Injectable 25 Gram(s) IV Push once  dextrose 50% Injectable 25 Gram(s) IV Push once  enoxaparin Injectable 40 milliGRAM(s) SubCutaneous daily  famotidine    Tablet 20 milliGRAM(s) Oral two times a day  insulin glargine Injectable (LANTUS) 15 Unit(s) SubCutaneous at bedtime  insulin lispro (ADMELOG) corrective regimen sliding scale   SubCutaneous three times a day before meals  insulin lispro (ADMELOG) corrective regimen sliding scale   SubCutaneous at bedtime  insulin lispro Injectable (ADMELOG) 8 Unit(s) SubCutaneous three times a day before meals  magnesium oxide 400 milliGRAM(s) Oral three times a day with meals  methylPREDNISolone sodium succinate Injectable 40 milliGRAM(s) IV Push daily  metoprolol succinate ER 50 milliGRAM(s) Oral daily  niacin SR 1000 milliGRAM(s) Oral at bedtime  omega-3-Acid Ethyl Esters 1 Gram(s) Oral daily    MEDICATIONS  (PRN):  albuterol/ipratropium for Nebulization 3 milliLiter(s) Nebulizer every 4 hours PRN Shortness of Breath and/or Wheezing  dextrose 40% Gel 15 Gram(s) Oral once PRN Blood Glucose LESS THAN 70 milliGRAM(s)/deciliter  glucagon  Injectable 1 milliGRAM(s) IntraMuscular once PRN Glucose LESS THAN 70 milligrams/deciliter  zolpidem 5 milliGRAM(s) Oral at bedtime PRN Insomnia  zolpidem 5 milliGRAM(s) Oral at bedtime PRN Insomnia      Allergies    No Known Allergies    Intolerances        LABS:                        13.7   7.65  )-----------( 425      ( 27 Oct 2020 05:40 )             43.0     10-27    135  |  99  |  16  ----------------------------<  275<H>  4.5   |  25  |  0.80    Ca    9.7      27 Oct 2020 05:40  Phos  3.8     10-27  Mg     2.1     10-27            RADIOLOGY & ADDITIONAL TESTS:  Studies reviewed.

## 2020-10-27 NOTE — PROGRESS NOTE ADULT - ATTENDING COMMENTS
Clinically patient feels improved  Saturating 98% on 5L NC at rest  Cont steroids, fungitell negative  Pathology pending  Weans oxygen as tolerated

## 2020-10-27 NOTE — PROGRESS NOTE ADULT - ATTENDING COMMENTS
Patient seen and examined, d/w Dr. Ibanez, agree with above.      ID # [id number]. Patient reports doing well (aside from being a little impatient due to still being in the hospital), has not noticed a large change in his breathing yet since starting the steroids, but is on lower amounts of supplemental O2 at this time. Appreciate heme/onc and pulm input, will followup recs re: steroid regimen/duration (treating patient for possible pneumonitis). Fungitell negative. With steroid induced hyperglycemia, will monitor FS and titrate insulin regimen further as necessary. Appreciate CM/SW assistance with setting up home O2.

## 2020-10-27 NOTE — PROGRESS NOTE ADULT - PROBLEM SELECTOR PLAN 3
-No hx of hospitalizations, does not use home O2  -Roxy PRN  -Symbicort as interchange for Breo Ellipta  -Maintain goal O2 sat >88%, 88-93% on NC  -Will determine need for home O2 prior to discharge -No hx of hospitalizations, does not use home O2  -Roxy PRN  -Symbicort as interchange for Breo Ellipta  -Maintain goal O2 sat >88%, 88-93% on NC  -Will document need for home O2 prior to discharge -No hx of hospitalizations, does not use home O2  -Roxy PRN  -Symbicort as interchange for Breo Ellipta  -Maintain goal O2 sat >88%, 88-93% on NC  -patient requires home O2 as above, appreciate CM/SW assistance

## 2020-10-27 NOTE — PROGRESS NOTE ADULT - ASSESSMENT
Patient is a 71yo M with PMHx HTN, HLD, DM2, BPH s/p TURP, CAD s/p stent 2007, COPD, and SCLC (dx spring 2019) s/p carboplatin/etoposide and radiation with partial response now on maintenance atezolizumab immunotherapy every 3 weeks (c/b brain metastases now s/p gamma knife radiation therapy with improved brain MRI) who presents with worsening BILLINGS [id number] found to be hypoxic and with interval progression of bilateral lung opacities on CT concerning for infectious vs inflammatory etiology. Patient is a 71yo M with PMHx HTN, HLD, DM2, BPH s/p TURP, CAD s/p stent 2007, COPD, and SCLC (dx spring 2019) s/p carboplatin/etoposide and radiation with partial response now on maintenance atezolizumab immunotherapy every 3 weeks (c/b brain metastases now s/p gamma knife radiation therapy with improved brain MRI) who presents with worsening BILLINGS [id number] found to be hypoxic and with interval progression of bilateral lung opacities on CT concerning for infectious vs inflammatory etiology. Started on IV steroids for possible pneumonitis.

## 2020-10-28 LAB
ANION GAP SERPL CALC-SCNC: 12 MMO/L — SIGNIFICANT CHANGE UP (ref 7–14)
BASOPHILS # BLD AUTO: 0.06 K/UL — SIGNIFICANT CHANGE UP (ref 0–0.2)
BASOPHILS NFR BLD AUTO: 0.7 % — SIGNIFICANT CHANGE UP (ref 0–2)
BUN SERPL-MCNC: 18 MG/DL — SIGNIFICANT CHANGE UP (ref 7–23)
CALCIUM SERPL-MCNC: 9.3 MG/DL — SIGNIFICANT CHANGE UP (ref 8.4–10.5)
CHLORIDE SERPL-SCNC: 100 MMOL/L — SIGNIFICANT CHANGE UP (ref 98–107)
CO2 SERPL-SCNC: 24 MMOL/L — SIGNIFICANT CHANGE UP (ref 22–31)
CREAT SERPL-MCNC: 0.84 MG/DL — SIGNIFICANT CHANGE UP (ref 0.5–1.3)
EOSINOPHIL # BLD AUTO: 0.09 K/UL — SIGNIFICANT CHANGE UP (ref 0–0.5)
EOSINOPHIL NFR BLD AUTO: 1.1 % — SIGNIFICANT CHANGE UP (ref 0–6)
GLUCOSE SERPL-MCNC: 226 MG/DL — HIGH (ref 70–99)
HCT VFR BLD CALC: 38.7 % — LOW (ref 39–50)
HGB BLD-MCNC: 12.4 G/DL — LOW (ref 13–17)
IMM GRANULOCYTES NFR BLD AUTO: 0.5 % — SIGNIFICANT CHANGE UP (ref 0–1.5)
LYMPHOCYTES # BLD AUTO: 0.45 K/UL — LOW (ref 1–3.3)
LYMPHOCYTES # BLD AUTO: 5.4 % — LOW (ref 13–44)
MAGNESIUM SERPL-MCNC: 1.5 MG/DL — LOW (ref 1.6–2.6)
MCHC RBC-ENTMCNC: 29 PG — SIGNIFICANT CHANGE UP (ref 27–34)
MCHC RBC-ENTMCNC: 32 % — SIGNIFICANT CHANGE UP (ref 32–36)
MCV RBC AUTO: 90.4 FL — SIGNIFICANT CHANGE UP (ref 80–100)
MONOCYTES # BLD AUTO: 0.71 K/UL — SIGNIFICANT CHANGE UP (ref 0–0.9)
MONOCYTES NFR BLD AUTO: 8.5 % — SIGNIFICANT CHANGE UP (ref 2–14)
NEUTROPHILS # BLD AUTO: 7 K/UL — SIGNIFICANT CHANGE UP (ref 1.8–7.4)
NEUTROPHILS NFR BLD AUTO: 83.8 % — HIGH (ref 43–77)
NRBC # FLD: 0 K/UL — SIGNIFICANT CHANGE UP (ref 0–0)
PHOSPHATE SERPL-MCNC: 3.3 MG/DL — SIGNIFICANT CHANGE UP (ref 2.5–4.5)
PLATELET # BLD AUTO: 400 K/UL — SIGNIFICANT CHANGE UP (ref 150–400)
PMV BLD: 8.9 FL — SIGNIFICANT CHANGE UP (ref 7–13)
POTASSIUM SERPL-MCNC: 3.9 MMOL/L — SIGNIFICANT CHANGE UP (ref 3.5–5.3)
POTASSIUM SERPL-SCNC: 3.9 MMOL/L — SIGNIFICANT CHANGE UP (ref 3.5–5.3)
RBC # BLD: 4.28 M/UL — SIGNIFICANT CHANGE UP (ref 4.2–5.8)
RBC # FLD: 14.5 % — SIGNIFICANT CHANGE UP (ref 10.3–14.5)
SODIUM SERPL-SCNC: 136 MMOL/L — SIGNIFICANT CHANGE UP (ref 135–145)
WBC # BLD: 8.35 K/UL — SIGNIFICANT CHANGE UP (ref 3.8–10.5)
WBC # FLD AUTO: 8.35 K/UL — SIGNIFICANT CHANGE UP (ref 3.8–10.5)

## 2020-10-28 PROCEDURE — 99232 SBSQ HOSP IP/OBS MODERATE 35: CPT

## 2020-10-28 PROCEDURE — 99233 SBSQ HOSP IP/OBS HIGH 50: CPT | Mod: GC

## 2020-10-28 PROCEDURE — 99233 SBSQ HOSP IP/OBS HIGH 50: CPT

## 2020-10-28 RX ORDER — INSULIN LISPRO 100/ML
10 VIAL (ML) SUBCUTANEOUS
Refills: 0 | Status: DISCONTINUED | OUTPATIENT
Start: 2020-10-28 | End: 2020-10-29

## 2020-10-28 RX ORDER — INSULIN GLARGINE 100 [IU]/ML
18 INJECTION, SOLUTION SUBCUTANEOUS AT BEDTIME
Refills: 0 | Status: DISCONTINUED | OUTPATIENT
Start: 2020-10-28 | End: 2020-10-29

## 2020-10-28 RX ORDER — MAGNESIUM SULFATE 500 MG/ML
1 VIAL (ML) INJECTION ONCE
Refills: 0 | Status: COMPLETED | OUTPATIENT
Start: 2020-10-28 | End: 2020-10-28

## 2020-10-28 RX ADMIN — Medication 81 MILLIGRAM(S): at 12:11

## 2020-10-28 RX ADMIN — BUDESONIDE AND FORMOTEROL FUMARATE DIHYDRATE 2 PUFF(S): 160; 4.5 AEROSOL RESPIRATORY (INHALATION) at 21:49

## 2020-10-28 RX ADMIN — Medication 1 APPLICATION(S): at 17:10

## 2020-10-28 RX ADMIN — MAGNESIUM OXIDE 400 MG ORAL TABLET 400 MILLIGRAM(S): 241.3 TABLET ORAL at 17:10

## 2020-10-28 RX ADMIN — INSULIN GLARGINE 18 UNIT(S): 100 INJECTION, SOLUTION SUBCUTANEOUS at 21:49

## 2020-10-28 RX ADMIN — Medication 4: at 17:09

## 2020-10-28 RX ADMIN — Medication 75 MILLIGRAM(S): at 05:53

## 2020-10-28 RX ADMIN — Medication 3: at 12:10

## 2020-10-28 RX ADMIN — Medication 1 GRAM(S): at 12:11

## 2020-10-28 RX ADMIN — ATORVASTATIN CALCIUM 40 MILLIGRAM(S): 80 TABLET, FILM COATED ORAL at 21:49

## 2020-10-28 RX ADMIN — Medication 8 UNIT(S): at 08:15

## 2020-10-28 RX ADMIN — MAGNESIUM OXIDE 400 MG ORAL TABLET 400 MILLIGRAM(S): 241.3 TABLET ORAL at 08:15

## 2020-10-28 RX ADMIN — Medication 1 APPLICATION(S): at 05:54

## 2020-10-28 RX ADMIN — Medication 1: at 21:49

## 2020-10-28 RX ADMIN — FAMOTIDINE 20 MILLIGRAM(S): 10 INJECTION INTRAVENOUS at 17:10

## 2020-10-28 RX ADMIN — Medication 8 UNIT(S): at 12:10

## 2020-10-28 RX ADMIN — Medication 2: at 08:15

## 2020-10-28 RX ADMIN — Medication 1000 MILLIGRAM(S): at 21:50

## 2020-10-28 RX ADMIN — Medication 100 GRAM(S): at 12:10

## 2020-10-28 RX ADMIN — FAMOTIDINE 20 MILLIGRAM(S): 10 INJECTION INTRAVENOUS at 05:54

## 2020-10-28 RX ADMIN — Medication 50 MILLIGRAM(S): at 17:10

## 2020-10-28 RX ADMIN — BUDESONIDE AND FORMOTEROL FUMARATE DIHYDRATE 2 PUFF(S): 160; 4.5 AEROSOL RESPIRATORY (INHALATION) at 08:15

## 2020-10-28 RX ADMIN — Medication 10 UNIT(S): at 17:09

## 2020-10-28 RX ADMIN — MAGNESIUM OXIDE 400 MG ORAL TABLET 400 MILLIGRAM(S): 241.3 TABLET ORAL at 12:11

## 2020-10-28 NOTE — PROGRESS NOTE ADULT - PROBLEM SELECTOR PLAN 1
Progressive peribronchovascular opacities on CT chest over last 7 weeks accompanied by worsening BILLINGS and dry cough. Also with b/l shin nodular rash. No fevers or leukocytosis. Likely pneumonitis (3% w/ atezolizumab) vs progression of disease. s/p Bronch w/ BAL and transbronchial biopsy 10/23. BAL cx NGTD. BAL cyto unremarkable. Fungitell negative. Quant negative.  - f/u BAL cultures  - f/u transbronchial biopsy path  - c/w Solumedrol, increased to 75mg IV daily for likely pneumonitis. Expect slow taper over several weeks if continued improvement  - Wean O2 as tolerated    Shad Coffey MD  Pulmonary & Critical Care Fellow  [phone]

## 2020-10-28 NOTE — PROGRESS NOTE ADULT - SUBJECTIVE AND OBJECTIVE BOX
INTERVAL HPI/OVERNIGHT EVENTS:  Patient seen at bedside.  Patient with continued improvement of symptoms  Now on 1L of O2, tolerating well  No acute complaints overnight    VITAL SIGNS:  T(F): 97.5 (10-28-20 @ 11:45)  HR: 83 (10-28-20 @ 11:45)  BP: 139/72 (10-28-20 @ 11:45)  RR: 18 (10-28-20 @ 11:45)  SpO2: 92% (10-28-20 @ 11:45)  Wt(kg): --    PHYSICAL EXAM:  In accordance with current standard limiting patient contact, deferred physical exam  2/2 COVID pandemic  Please refer to physical exam of primary team.    MEDICATIONS  (STANDING):  aspirin enteric coated 81 milliGRAM(s) Oral daily  atorvastatin 40 milliGRAM(s) Oral at bedtime  budesonide 160 MICROgram(s)/formoterol 4.5 MICROgram(s) Inhaler 2 Puff(s) Inhalation two times a day  clobetasol 0.05% Ointment 1 Application(s) Topical two times a day  dextrose 5%. 1000 milliLiter(s) (50 mL/Hr) IV Continuous <Continuous>  dextrose 50% Injectable 12.5 Gram(s) IV Push once  dextrose 50% Injectable 25 Gram(s) IV Push once  dextrose 50% Injectable 25 Gram(s) IV Push once  enoxaparin Injectable 40 milliGRAM(s) SubCutaneous daily  famotidine    Tablet 20 milliGRAM(s) Oral two times a day  insulin glargine Injectable (LANTUS) 18 Unit(s) SubCutaneous at bedtime  insulin lispro (ADMELOG) corrective regimen sliding scale   SubCutaneous three times a day before meals  insulin lispro (ADMELOG) corrective regimen sliding scale   SubCutaneous at bedtime  insulin lispro Injectable (ADMELOG) 10 Unit(s) SubCutaneous three times a day before meals  magnesium oxide 400 milliGRAM(s) Oral three times a day with meals  methylPREDNISolone sodium succinate Injectable 75 milliGRAM(s) IV Push daily  metoprolol succinate ER 50 milliGRAM(s) Oral daily  niacin SR 1000 milliGRAM(s) Oral at bedtime  omega-3-Acid Ethyl Esters 1 Gram(s) Oral daily    MEDICATIONS  (PRN):  albuterol/ipratropium for Nebulization 3 milliLiter(s) Nebulizer every 4 hours PRN Shortness of Breath and/or Wheezing  dextrose 40% Gel 15 Gram(s) Oral once PRN Blood Glucose LESS THAN 70 milliGRAM(s)/deciliter  glucagon  Injectable 1 milliGRAM(s) IntraMuscular once PRN Glucose LESS THAN 70 milligrams/deciliter  zolpidem 5 milliGRAM(s) Oral at bedtime PRN Insomnia  zolpidem 5 milliGRAM(s) Oral at bedtime PRN Insomnia      Allergies    No Known Allergies    Intolerances        LABS:                        12.4   8.35  )-----------( 400      ( 28 Oct 2020 06:48 )             38.7     10-28    136  |  100  |  18  ----------------------------<  226<H>  3.9   |  24  |  0.84    Ca    9.3      28 Oct 2020 06:48  Phos  3.3     10-28  Mg     1.5     10-28            RADIOLOGY & ADDITIONAL TESTS:  Studies reviewed.

## 2020-10-28 NOTE — DIETITIAN INITIAL EVALUATION ADULT. - OTHER INFO
Pt 71 yo male with PMHx of HTN, HLD, DM2, BPH s/p TURP, CAD s/p stent 2007, COPD, and SCLC (dx spring 2019) s/p carboplatin/etoposide and radiation with partial response now on maintenance atezolizumab immunotherapy every 3 weeks (c/b brain metastases now s/p gamma knife radiation therapy with improved brain MRI) presented with hypoxia - per chart review.    At time of visit Pt appears alert, oriented. Per Pt his appetite good now; no chewing or swallowing difficulty; no nausea, vomiting or diarrhea @ this time. +BM (10/27) - per flow sheet. Per Pt his had lost weight ~15# in past 6 months 2/2 his sickness "Cancer". RDN offered PO supplement: Glucerna shake, but Pt declined. Food preferences discussed. No other food related concerns voiced @ present. RDN remains available.    Pt speaks [place]ian; Pt communicated in English with RD at time of visit.

## 2020-10-28 NOTE — PROGRESS NOTE ADULT - PROBLEM SELECTOR PLAN 3
-No hx of hospitalizations, does not use home O2  -Roxy PRN  -Symbicort as interchange for Breo Ellipta  -Maintain goal O2 sat >88%, 88-93% on NC  -patient requires home O2 as above, appreciate CM/SW assistance

## 2020-10-28 NOTE — PROGRESS NOTE ADULT - SUBJECTIVE AND OBJECTIVE BOX
Modesto Ibanez MD  Pager [phone]    Patient is a 70y old  Male who presents with a chief complaint of Hypoxia, abnormal CT (27 Oct 2020 13:57)      SUBJECTIVE / OVERNIGHT EVENTS:  Patient was seen and examined at bedside this morning. No acute events overnight. No significant desats beow 88% on continuous pulse ox overnight. Patient reports feeling well this morning; denies cough, shortness of breath, fever, chills, n/v/d, chest pain. Patient understands plan for higher dose steroids this morning and glucose management while waiting on surgical path results.    ADDITIONAL REVIEW OF SYSTEMS: Otherwise negative.    MEDICATIONS  (STANDING):  aspirin enteric coated 81 milliGRAM(s) Oral daily  atorvastatin 40 milliGRAM(s) Oral at bedtime  budesonide 160 MICROgram(s)/formoterol 4.5 MICROgram(s) Inhaler 2 Puff(s) Inhalation two times a day  clobetasol 0.05% Ointment 1 Application(s) Topical two times a day  dextrose 5%. 1000 milliLiter(s) (50 mL/Hr) IV Continuous <Continuous>  dextrose 50% Injectable 12.5 Gram(s) IV Push once  dextrose 50% Injectable 25 Gram(s) IV Push once  dextrose 50% Injectable 25 Gram(s) IV Push once  enoxaparin Injectable 40 milliGRAM(s) SubCutaneous daily  famotidine    Tablet 20 milliGRAM(s) Oral two times a day  insulin glargine Injectable (LANTUS) 15 Unit(s) SubCutaneous at bedtime  insulin lispro (ADMELOG) corrective regimen sliding scale   SubCutaneous three times a day before meals  insulin lispro (ADMELOG) corrective regimen sliding scale   SubCutaneous at bedtime  insulin lispro Injectable (ADMELOG) 8 Unit(s) SubCutaneous three times a day before meals  magnesium oxide 400 milliGRAM(s) Oral three times a day with meals  magnesium sulfate  IVPB 1 Gram(s) IV Intermittent once  methylPREDNISolone sodium succinate Injectable 75 milliGRAM(s) IV Push daily  metoprolol succinate ER 50 milliGRAM(s) Oral daily  niacin SR 1000 milliGRAM(s) Oral at bedtime  omega-3-Acid Ethyl Esters 1 Gram(s) Oral daily    MEDICATIONS  (PRN):  albuterol/ipratropium for Nebulization 3 milliLiter(s) Nebulizer every 4 hours PRN Shortness of Breath and/or Wheezing  dextrose 40% Gel 15 Gram(s) Oral once PRN Blood Glucose LESS THAN 70 milliGRAM(s)/deciliter  glucagon  Injectable 1 milliGRAM(s) IntraMuscular once PRN Glucose LESS THAN 70 milligrams/deciliter  zolpidem 5 milliGRAM(s) Oral at bedtime PRN Insomnia  zolpidem 5 milliGRAM(s) Oral at bedtime PRN Insomnia      CAPILLARY BLOOD GLUCOSE      POCT Blood Glucose.: 233 mg/dL (28 Oct 2020 07:38)  POCT Blood Glucose.: 304 mg/dL (27 Oct 2020 22:28)  POCT Blood Glucose.: 292 mg/dL (27 Oct 2020 21:13)  POCT Blood Glucose.: 407 mg/dL (27 Oct 2020 16:50)  POCT Blood Glucose.: 336 mg/dL (27 Oct 2020 11:43)    I&O's Summary      PHYSICAL EXAM:  Vital Signs Last 24 Hrs  T(C): 36.3 (28 Oct 2020 05:53), Max: 37.1 (27 Oct 2020 11:46)  T(F): 97.3 (28 Oct 2020 05:53), Max: 98.7 (27 Oct 2020 11:46)  HR: 68 (28 Oct 2020 05:53) (67 - 76)  BP: 122/60 (28 Oct 2020 05:53) (122/60 - 142/66)  BP(mean): --  RR: 18 (28 Oct 2020 05:53) (17 - 18)  SpO2: 95% (28 Oct 2020 05:53) (94% - 96%)    GENERAL: NAD, sitting comfortably  HEAD:  Atraumatic, Normocephalic  EYES: EOMI, PERRL, conjunctiva and sclera clear  ENT: Mucous membranes moist, no pharyngeal erythema  NECK: Supple, No Lymphadenopathy  CHEST/LUNG: LCTABL; NC in place, speaking in full sentences  CV: Regular rate and rhythm, S1/S2 equal; No murmurs, rubs, or gallops; No JVD or hepatojugular reflux  ABDOMEN: Soft, Nontender, Nondistended; Bowel sounds present  EXTREMITIES:  2+ Peripheral Pulses, No clubbing, cyanosis, or edema  PSYCH: AAOx3; affect appropriate  NEUROLOGY: no focal motor or sensory deficits, muscle strength 5/5 bilaterally, quadriceps reflex intact  SKIN: Diffuse nontender non-itching blanching macular rash on lower extremities, buttocks, and trunk with 2-3 open blisters on b/l ankle and few lesions on hands/palms; improving    LABS:                        12.4   8.35  )-----------( 400      ( 28 Oct 2020 06:48 )             38.7     10-28    136  |  100  |  18  ----------------------------<  226<H>  3.9   |  24  |  0.84    Ca    9.3      28 Oct 2020 06:48  Phos  3.3     10-28  Mg     1.5     10-28      RADIOLOGY & ADDITIONAL TESTS:  No new interval imaging. Modesto Ibanez MD  Pager [phone]    Patient is a 70y old  Male who presents with a chief complaint of Hypoxia, abnormal CT (27 Oct 2020 13:57)      SUBJECTIVE / OVERNIGHT EVENTS:  Patient was seen and examined at bedside this morning. No acute events overnight. No significant desats beow 88% on continuous pulse ox overnight. Patient reports feeling well this morning; denies cough, shortness of breath, fever, chills, n/v/d, chest pain. Patient understands plan for higher dose steroids this morning and glucose management while waiting on surgical path results.    ADDITIONAL REVIEW OF SYSTEMS: Otherwise negative.    MEDICATIONS  (STANDING):  aspirin enteric coated 81 milliGRAM(s) Oral daily  atorvastatin 40 milliGRAM(s) Oral at bedtime  budesonide 160 MICROgram(s)/formoterol 4.5 MICROgram(s) Inhaler 2 Puff(s) Inhalation two times a day  clobetasol 0.05% Ointment 1 Application(s) Topical two times a day  dextrose 5%. 1000 milliLiter(s) (50 mL/Hr) IV Continuous <Continuous>  dextrose 50% Injectable 12.5 Gram(s) IV Push once  dextrose 50% Injectable 25 Gram(s) IV Push once  dextrose 50% Injectable 25 Gram(s) IV Push once  enoxaparin Injectable 40 milliGRAM(s) SubCutaneous daily  famotidine    Tablet 20 milliGRAM(s) Oral two times a day  insulin glargine Injectable (LANTUS) 15 Unit(s) SubCutaneous at bedtime  insulin lispro (ADMELOG) corrective regimen sliding scale   SubCutaneous three times a day before meals  insulin lispro (ADMELOG) corrective regimen sliding scale   SubCutaneous at bedtime  insulin lispro Injectable (ADMELOG) 8 Unit(s) SubCutaneous three times a day before meals  magnesium oxide 400 milliGRAM(s) Oral three times a day with meals  magnesium sulfate  IVPB 1 Gram(s) IV Intermittent once  methylPREDNISolone sodium succinate Injectable 75 milliGRAM(s) IV Push daily  metoprolol succinate ER 50 milliGRAM(s) Oral daily  niacin SR 1000 milliGRAM(s) Oral at bedtime  omega-3-Acid Ethyl Esters 1 Gram(s) Oral daily    MEDICATIONS  (PRN):  albuterol/ipratropium for Nebulization 3 milliLiter(s) Nebulizer every 4 hours PRN Shortness of Breath and/or Wheezing  dextrose 40% Gel 15 Gram(s) Oral once PRN Blood Glucose LESS THAN 70 milliGRAM(s)/deciliter  glucagon  Injectable 1 milliGRAM(s) IntraMuscular once PRN Glucose LESS THAN 70 milligrams/deciliter  zolpidem 5 milliGRAM(s) Oral at bedtime PRN Insomnia  zolpidem 5 milliGRAM(s) Oral at bedtime PRN Insomnia      CAPILLARY BLOOD GLUCOSE      POCT Blood Glucose.: 233 mg/dL (28 Oct 2020 07:38)  POCT Blood Glucose.: 304 mg/dL (27 Oct 2020 22:28)  POCT Blood Glucose.: 292 mg/dL (27 Oct 2020 21:13)  POCT Blood Glucose.: 407 mg/dL (27 Oct 2020 16:50)  POCT Blood Glucose.: 336 mg/dL (27 Oct 2020 11:43)    I&O's Summary      PHYSICAL EXAM:  Vital Signs Last 24 Hrs  T(C): 36.3 (28 Oct 2020 05:53), Max: 37.1 (27 Oct 2020 11:46)  T(F): 97.3 (28 Oct 2020 05:53), Max: 98.7 (27 Oct 2020 11:46)  HR: 68 (28 Oct 2020 05:53) (67 - 76)  BP: 122/60 (28 Oct 2020 05:53) (122/60 - 142/66)  BP(mean): --  RR: 18 (28 Oct 2020 05:53) (17 - 18)  SpO2: 95% (28 Oct 2020 05:53) (94% - 96%)    GENERAL: NAD, sitting comfortably  HEAD:  Atraumatic, Normocephalic  EYES: EOMI, PERRL, conjunctiva and sclera clear  ENT: Mucous membranes moist, no pharyngeal erythema  NECK: Supple, No Lymphadenopathy  CHEST/LUNG: LCTABL; NC in place, speaking in full sentences  CV: Regular rate and rhythm, S1/S2 equal; No murmurs, rubs, or gallops; No JVD or hepatojugular reflux  ABDOMEN: Soft, Nontender, Nondistended; Bowel sounds present  EXTREMITIES:  2+ Peripheral Pulses, No clubbing, cyanosis, or edema  PSYCH: AAOx3; affect appropriate  NEUROLOGY: no focal motor or sensory deficits, muscle strength 5/5 bilaterally, quadriceps reflex intact  SKIN: Diffuse nontender non-itching blanching macular rash on lower extremities, buttocks, and trunk with 2-3 open blisters on b/l ankle and few lesions on hands/palms; improving    LABS:                        12.4   8.35  )-----------( 400      ( 28 Oct 2020 06:48 )             38.7     10-28    136  |  100  |  18  ----------------------------<  226<H>  3.9   |  24  |  0.84    Ca    9.3      28 Oct 2020 06:48  Phos  3.3     10-28  Mg     1.5     10-28      RADIOLOGY & ADDITIONAL TESTS:  No new interval imaging.    Consultant notes reviewed: Pulm, Heme/Onc

## 2020-10-28 NOTE — PROGRESS NOTE ADULT - ASSESSMENT
Patient is a 71yo M with PMHx HTN, HLD, DM2, BPH s/p TURP, CAD s/p stent 2007, COPD, and SCLC (dx spring 2019) s/p carboplatin/etoposide and radiation with partial response now on maintenance atezolizumab immunotherapy every 3 weeks (c/b brain metastases now s/p gamma knife radiation therapy with improved brain MRI) who presents with worsening BILLINGS [id number] found to be hypoxic and with interval progression of bilateral lung opacities on CT concerning for infectious vs inflammatory etiology. Started on IV steroids for possible pneumonitis.

## 2020-10-28 NOTE — PROGRESS NOTE ADULT - ASSESSMENT
Patient with extensive stage small cell lung cancer who has been on maintenance immunotherapy with Atezolizumab, p/w worsening SOB and hypoxia to mid-80’s on RA.   CT chest with b/l diffuse opacities concerning for infection vs pneumonitis, on immune checkpoint inhibitor therapy.    -Pulm consult appreciated. S/p bronch w/ BAL and transbronchial biopsy 10/23. BAL cx NGTD. Will followup BAL cultures, fungitell, PCP and biopsy path  -Given preliminary negative cultures would start steroids for treatment of possible pneumonitis   - Started on  Solumedrol , now om 1mg/kg/day  -Consider Covid IgG, ordered, pending collection  -Derm Consult recs appreciated, rash improving  -Patient to followup with Dr. Ramirez ([facility]) upon discharge  -C/w Supportive care, pain control, Nutrition, PT, DVT ppx  -Oncology will continue to follow with you      Case d/w Dr. Desire RG  Oncology Physician Assistant  [facility]  Pager [phone]    If after 5pm or weekends please page On-call Oncology Fellow

## 2020-10-28 NOTE — PROGRESS NOTE ADULT - ATTENDING COMMENTS
Patient seen and examined, d/w Dr. Ibanez, agree with above.      ID# [id number] Patient feeling a bit better today, is on less supplemental oxygen then he was on previously. Enjoying lasagna for lunch. Will continue on steroids for possible pneumonitis as per heme/onc and pulm, f/u their recommendations re: regimen/duration, when can transition to oral steroids etc. With steroid induced hyperglycemia, FS still above goal, will titrate insulin regimen further. Hypomagnesemia - replete Mg.

## 2020-10-28 NOTE — PROGRESS NOTE ADULT - PROBLEM SELECTOR PLAN 5
-Recently started on insulin as outpatient  -Lantus now increased to 15u QHS with Admelog 8u TIDAC and FS and SSI TIDAC/QHS given hyperglycemia after initiation of steroids  -Monitor FS and adjust regimen as needed  -Regular diet (consistent carb/dash/tlc)  -A1C 6.9

## 2020-10-28 NOTE — DIETITIAN INITIAL EVALUATION ADULT. - ADD RECOMMEND
1. Encourage & assist Pt with meals; Monitor PO diet tolerance; Honor food preferences;   2. Monitor labs, hydration status;

## 2020-10-28 NOTE — PROGRESS NOTE ADULT - PROBLEM SELECTOR PLAN 1
-Differential: pneumonitis vs. pneumonia. CT chest showed progression of bilateral diffuse peribronchovascular opacities  -CT A negative for PE  -RVP/COVID 19 negative   -Sputum culture normal respiratory karlie; fungitell negative  -BAL fluid  (10/23) negative for gram stain and NGTD; cytology negative  -Low suspicion for pneumonia - observe off antibiotics  -Wean O2 as tolerated, however patient requires home oxygen given desaturations  -F/u path from bronch  -Started on IV steroids as per heme/onc and pulm for possible pneumonitis 2/2 atezolizumab; increased 10/28 to 75mg IV daily  -Lung cancer management per below

## 2020-10-28 NOTE — PROGRESS NOTE ADULT - ATTENDING COMMENTS
Clinically patient feels improved  Saturating 98% on 2L NC at rest (down from 6L NC)  Cont steroids, fungitell negative  Pathology pending  Weans oxygen as tolerated

## 2020-10-28 NOTE — PROGRESS NOTE ADULT - SUBJECTIVE AND OBJECTIVE BOX
CHIEF COMPLAINT:    Interval Events: Feels better, down to 3L nc at rest. No more cough.     REVIEW OF SYSTEMS:  Constitutional: [ ] negative [ ] fevers [ ] chills [ ] weight loss [ ] weight gain  HEENT: [ ] negative [ ] dry eyes [ ] eye irritation [ ] postnasal drip [ ] nasal congestion  CV: [ ] negative  [ ] chest pain [ ] orthopnea [ ] palpitations [ ] murmur  Resp: [ ] negative [ ] cough [ ] shortness of breath [X] dyspnea [ ] wheezing [ ] sputum [ ] hemoptysis  GI: [ ] negative [ ] nausea [ ] vomiting [ ] diarrhea [ ] constipation [ ] abd pain [ ] dysphagia   : [ ] negative [ ] dysuria [ ] nocturia [ ] hematuria [ ] increased urinary frequency  Musculoskeletal: [ ] negative [ ] back pain [ ] myalgias [ ] arthralgias [ ] fracture  Skin: [ ] negative [ ] rash [ ] itch  Neurological: [ ] negative [ ] headache [ ] dizziness [ ] syncope [ ] weakness [ ] numbness  Psychiatric: [ ] negative [ ] anxiety [ ] depression  Endocrine: [ ] negative [ ] diabetes [ ] thyroid problem  Hematologic/Lymphatic: [ ] negative [ ] anemia [ ] bleeding problem  Allergic/Immunologic: [ ] negative [ ] itchy eyes [ ] nasal discharge [ ] hives [ ] angioedema  [X] All other systems negative  [ ] Unable to assess ROS because ________    OBJECTIVE:  ICU Vital Signs Last 24 Hrs  T(C): 36.3 (28 Oct 2020 05:53), Max: 37.1 (27 Oct 2020 11:46)  T(F): 97.3 (28 Oct 2020 05:53), Max: 98.7 (27 Oct 2020 11:46)  HR: 68 (28 Oct 2020 05:53) (67 - 76)  BP: 122/60 (28 Oct 2020 05:53) (122/60 - 142/66)  BP(mean): --  ABP: --  ABP(mean): --  RR: 18 (28 Oct 2020 05:53) (17 - 18)  SpO2: 95% (28 Oct 2020 05:53) (94% - 96%)        CAPILLARY BLOOD GLUCOSE      POCT Blood Glucose.: 233 mg/dL (28 Oct 2020 07:38)      PHYSICAL EXAM:  General: NAD, resting comfortably on 3 L nc  HEENT: EOMI, PERRLA  Respiratory: CTAB  Cardiovascular: S1S2, RRR  Abdomen: Soft, NTND, BS+  Extremities: No peripheral edema  Skin: B/l shin nodular rash  Neurological: Grossly intact    HOSPITAL MEDICATIONS:  MEDICATIONS  (STANDING):  aspirin enteric coated 81 milliGRAM(s) Oral daily  atorvastatin 40 milliGRAM(s) Oral at bedtime  budesonide 160 MICROgram(s)/formoterol 4.5 MICROgram(s) Inhaler 2 Puff(s) Inhalation two times a day  clobetasol 0.05% Ointment 1 Application(s) Topical two times a day  dextrose 5%. 1000 milliLiter(s) (50 mL/Hr) IV Continuous <Continuous>  dextrose 50% Injectable 12.5 Gram(s) IV Push once  dextrose 50% Injectable 25 Gram(s) IV Push once  dextrose 50% Injectable 25 Gram(s) IV Push once  enoxaparin Injectable 40 milliGRAM(s) SubCutaneous daily  famotidine    Tablet 20 milliGRAM(s) Oral two times a day  insulin glargine Injectable (LANTUS) 15 Unit(s) SubCutaneous at bedtime  insulin lispro (ADMELOG) corrective regimen sliding scale   SubCutaneous three times a day before meals  insulin lispro (ADMELOG) corrective regimen sliding scale   SubCutaneous at bedtime  insulin lispro Injectable (ADMELOG) 8 Unit(s) SubCutaneous three times a day before meals  magnesium oxide 400 milliGRAM(s) Oral three times a day with meals  magnesium sulfate  IVPB 1 Gram(s) IV Intermittent once  methylPREDNISolone sodium succinate Injectable 75 milliGRAM(s) IV Push daily  metoprolol succinate ER 50 milliGRAM(s) Oral daily  niacin SR 1000 milliGRAM(s) Oral at bedtime  omega-3-Acid Ethyl Esters 1 Gram(s) Oral daily    MEDICATIONS  (PRN):  albuterol/ipratropium for Nebulization 3 milliLiter(s) Nebulizer every 4 hours PRN Shortness of Breath and/or Wheezing  dextrose 40% Gel 15 Gram(s) Oral once PRN Blood Glucose LESS THAN 70 milliGRAM(s)/deciliter  glucagon  Injectable 1 milliGRAM(s) IntraMuscular once PRN Glucose LESS THAN 70 milligrams/deciliter  zolpidem 5 milliGRAM(s) Oral at bedtime PRN Insomnia  zolpidem 5 milliGRAM(s) Oral at bedtime PRN Insomnia      LABS:                        12.4   8.35  )-----------( 400      ( 28 Oct 2020 06:48 )             38.7     Hgb Trend: 12.4<--, 13.7<--, 12.8<--, 12.1<--, 12.4<--  10-28    136  |  100  |  18  ----------------------------<  226<H>  3.9   |  24  |  0.84    Ca    9.3      28 Oct 2020 06:48  Phos  3.3     10-28  Mg     1.5     10-28      Creatinine Trend: 0.84<--, 0.80<--, 0.83<--, 0.81<--, 0.82<--, 0.84<--            MICROBIOLOGY:       RADIOLOGY:  [ ] Reviewed and interpreted by me    PULMONARY FUNCTION TESTS:    EKG:

## 2020-10-29 ENCOUNTER — TRANSCRIPTION ENCOUNTER (OUTPATIENT)
Age: 70
End: 2020-10-29

## 2020-10-29 DIAGNOSIS — E11.9 TYPE 2 DIABETES MELLITUS WITHOUT COMPLICATIONS: ICD-10-CM

## 2020-10-29 DIAGNOSIS — I10 ESSENTIAL (PRIMARY) HYPERTENSION: ICD-10-CM

## 2020-10-29 DIAGNOSIS — E78.5 HYPERLIPIDEMIA, UNSPECIFIED: ICD-10-CM

## 2020-10-29 DIAGNOSIS — T38.0X5A ADVERSE EFFECT OF GLUCOCORTICOIDS AND SYNTHETIC ANALOGUES, INITIAL ENCOUNTER: ICD-10-CM

## 2020-10-29 LAB
ANION GAP SERPL CALC-SCNC: 11 MMO/L — SIGNIFICANT CHANGE UP (ref 7–14)
BASOPHILS # BLD AUTO: 0.05 K/UL — SIGNIFICANT CHANGE UP (ref 0–0.2)
BASOPHILS NFR BLD AUTO: 0.6 % — SIGNIFICANT CHANGE UP (ref 0–2)
BUN SERPL-MCNC: 19 MG/DL — SIGNIFICANT CHANGE UP (ref 7–23)
CALCIUM SERPL-MCNC: 9.2 MG/DL — SIGNIFICANT CHANGE UP (ref 8.4–10.5)
CHLORIDE SERPL-SCNC: 100 MMOL/L — SIGNIFICANT CHANGE UP (ref 98–107)
CO2 SERPL-SCNC: 24 MMOL/L — SIGNIFICANT CHANGE UP (ref 22–31)
CREAT SERPL-MCNC: 0.81 MG/DL — SIGNIFICANT CHANGE UP (ref 0.5–1.3)
EOSINOPHIL # BLD AUTO: 0.06 K/UL — SIGNIFICANT CHANGE UP (ref 0–0.5)
EOSINOPHIL NFR BLD AUTO: 0.7 % — SIGNIFICANT CHANGE UP (ref 0–6)
GLUCOSE SERPL-MCNC: 232 MG/DL — HIGH (ref 70–99)
HCT VFR BLD CALC: 39.3 % — SIGNIFICANT CHANGE UP (ref 39–50)
HGB BLD-MCNC: 12.5 G/DL — LOW (ref 13–17)
IMM GRANULOCYTES NFR BLD AUTO: 0.6 % — SIGNIFICANT CHANGE UP (ref 0–1.5)
LYMPHOCYTES # BLD AUTO: 0.51 K/UL — LOW (ref 1–3.3)
LYMPHOCYTES # BLD AUTO: 6 % — LOW (ref 13–44)
MAGNESIUM SERPL-MCNC: 1.7 MG/DL — SIGNIFICANT CHANGE UP (ref 1.6–2.6)
MCHC RBC-ENTMCNC: 28.8 PG — SIGNIFICANT CHANGE UP (ref 27–34)
MCHC RBC-ENTMCNC: 31.8 % — LOW (ref 32–36)
MCV RBC AUTO: 90.6 FL — SIGNIFICANT CHANGE UP (ref 80–100)
MONOCYTES # BLD AUTO: 0.71 K/UL — SIGNIFICANT CHANGE UP (ref 0–0.9)
MONOCYTES NFR BLD AUTO: 8.3 % — SIGNIFICANT CHANGE UP (ref 2–14)
NEUTROPHILS # BLD AUTO: 7.19 K/UL — SIGNIFICANT CHANGE UP (ref 1.8–7.4)
NEUTROPHILS NFR BLD AUTO: 83.8 % — HIGH (ref 43–77)
NRBC # FLD: 0 K/UL — SIGNIFICANT CHANGE UP (ref 0–0)
PHOSPHATE SERPL-MCNC: 3.7 MG/DL — SIGNIFICANT CHANGE UP (ref 2.5–4.5)
PLATELET # BLD AUTO: 403 K/UL — HIGH (ref 150–400)
PMV BLD: 8.8 FL — SIGNIFICANT CHANGE UP (ref 7–13)
POTASSIUM SERPL-MCNC: 3.7 MMOL/L — SIGNIFICANT CHANGE UP (ref 3.5–5.3)
POTASSIUM SERPL-SCNC: 3.7 MMOL/L — SIGNIFICANT CHANGE UP (ref 3.5–5.3)
RBC # BLD: 4.34 M/UL — SIGNIFICANT CHANGE UP (ref 4.2–5.8)
RBC # FLD: 14.2 % — SIGNIFICANT CHANGE UP (ref 10.3–14.5)
SODIUM SERPL-SCNC: 135 MMOL/L — SIGNIFICANT CHANGE UP (ref 135–145)
WBC # BLD: 8.57 K/UL — SIGNIFICANT CHANGE UP (ref 3.8–10.5)
WBC # FLD AUTO: 8.57 K/UL — SIGNIFICANT CHANGE UP (ref 3.8–10.5)

## 2020-10-29 PROCEDURE — 99233 SBSQ HOSP IP/OBS HIGH 50: CPT | Mod: GC

## 2020-10-29 PROCEDURE — 99223 1ST HOSP IP/OBS HIGH 75: CPT

## 2020-10-29 PROCEDURE — 99233 SBSQ HOSP IP/OBS HIGH 50: CPT

## 2020-10-29 RX ORDER — INSULIN LISPRO 100/ML
12 VIAL (ML) SUBCUTANEOUS
Refills: 0 | Status: DISCONTINUED | OUTPATIENT
Start: 2020-10-30 | End: 2020-10-30

## 2020-10-29 RX ORDER — INSULIN GLARGINE 100 [IU]/ML
24 INJECTION, SOLUTION SUBCUTANEOUS AT BEDTIME
Refills: 0 | Status: DISCONTINUED | OUTPATIENT
Start: 2020-10-29 | End: 2020-10-30

## 2020-10-29 RX ORDER — INSULIN LISPRO 100/ML
13 VIAL (ML) SUBCUTANEOUS
Refills: 0 | Status: DISCONTINUED | OUTPATIENT
Start: 2020-10-29 | End: 2020-10-29

## 2020-10-29 RX ADMIN — FAMOTIDINE 20 MILLIGRAM(S): 10 INJECTION INTRAVENOUS at 05:17

## 2020-10-29 RX ADMIN — Medication 50 MILLIGRAM(S): at 21:19

## 2020-10-29 RX ADMIN — ENOXAPARIN SODIUM 40 MILLIGRAM(S): 100 INJECTION SUBCUTANEOUS at 12:07

## 2020-10-29 RX ADMIN — MAGNESIUM OXIDE 400 MG ORAL TABLET 400 MILLIGRAM(S): 241.3 TABLET ORAL at 17:13

## 2020-10-29 RX ADMIN — Medication 2: at 08:04

## 2020-10-29 RX ADMIN — Medication 4: at 17:12

## 2020-10-29 RX ADMIN — MAGNESIUM OXIDE 400 MG ORAL TABLET 400 MILLIGRAM(S): 241.3 TABLET ORAL at 08:04

## 2020-10-29 RX ADMIN — MAGNESIUM OXIDE 400 MG ORAL TABLET 400 MILLIGRAM(S): 241.3 TABLET ORAL at 12:08

## 2020-10-29 RX ADMIN — Medication 1 GRAM(S): at 12:08

## 2020-10-29 RX ADMIN — Medication 75 MILLIGRAM(S): at 05:17

## 2020-10-29 RX ADMIN — Medication 5: at 12:07

## 2020-10-29 RX ADMIN — Medication 1 APPLICATION(S): at 05:17

## 2020-10-29 RX ADMIN — Medication 10 UNIT(S): at 12:07

## 2020-10-29 RX ADMIN — Medication 81 MILLIGRAM(S): at 12:07

## 2020-10-29 RX ADMIN — Medication 10 UNIT(S): at 08:04

## 2020-10-29 RX ADMIN — Medication 1 APPLICATION(S): at 17:13

## 2020-10-29 RX ADMIN — ATORVASTATIN CALCIUM 40 MILLIGRAM(S): 80 TABLET, FILM COATED ORAL at 21:19

## 2020-10-29 RX ADMIN — BUDESONIDE AND FORMOTEROL FUMARATE DIHYDRATE 2 PUFF(S): 160; 4.5 AEROSOL RESPIRATORY (INHALATION) at 08:04

## 2020-10-29 RX ADMIN — FAMOTIDINE 20 MILLIGRAM(S): 10 INJECTION INTRAVENOUS at 17:13

## 2020-10-29 RX ADMIN — Medication 10 UNIT(S): at 17:12

## 2020-10-29 RX ADMIN — INSULIN GLARGINE 24 UNIT(S): 100 INJECTION, SOLUTION SUBCUTANEOUS at 21:19

## 2020-10-29 RX ADMIN — BUDESONIDE AND FORMOTEROL FUMARATE DIHYDRATE 2 PUFF(S): 160; 4.5 AEROSOL RESPIRATORY (INHALATION) at 21:18

## 2020-10-29 RX ADMIN — Medication 1000 MILLIGRAM(S): at 21:19

## 2020-10-29 NOTE — PHYSICAL THERAPY INITIAL EVALUATION ADULT - DIAGNOSIS, PT EVAL
CHIEF COMPLAINT:   Chief Complaint   Patient presents with   • Physical       HISTORY OF PRESENT ILLNESS:  The patient is a 56 year old who presents for her routine physical exam.  She has no other concerns at this time.    She is having ongoing knee issues but does not want to follow-up further with orthopedics at this time.  She underwent a uterine biopsy for her irregular menstrual cycles and that showed a polyp only. No bleeding since. Hormone levels back October 2016 were not indicative of the patient being in menopause at that time. She is now having occasional hot flashes.  She sees Dr. Nirav Sunshine for follow-up regarding her cardiac care    Current Outpatient Prescriptions   Medication Sig Dispense Refill   • amLODIPine (NORVASC) 2.5 MG tablet Take 1 tablet by mouth daily. Indications: Disease of the Arteries of the Heart 30 tablet 0   • aspirin 325 MG tablet Take 1 tablet by mouth daily.     • carvedilol (COREG) 3.125 MG tablet Take 1 tablet by mouth 2 times daily (with meals). For heart disease. 180 tablet 3   • cholecalciferol (VITAMIN D3) 1000 UNIT tablet Take 1,000 Units by mouth 2 times daily. 1 tablet 0   • ZINC SULFATE PO Take 1 tablet by mouth daily as needed.       No current facility-administered medications for this visit.        ADULT ILLNESSES:    Plantar fascial fibromatosis                    07/01/2005      Comment: left    ECZEMA                                                          Comment: recurrent, left forearm, bilateral cheeks    Acute myocardial infarction, unspecified site,* 11/27/09        Comment: MI, cath w/out blockage    Vitamin D deficiency                            9/1/10          Comment: started supplement    Abnormal echocardiogram                         11/09           Comment: post mI EJF 58 %    Abnormal uterine bleeding                       11/9/2016     PAST SURGICAL HX:    CAD DIAGNOSITC MAMMOGRAPHY                      09/19/2007      Comment: right -   numerous calcifications, probably                benign, 6 month f/u recommended    SCREENING MAMMOGRAPHY                           09/07/2007      Comment: bilateral - clusters of calcifications, right                breast    LEFT HEART CATH,PERCUTANEOUS                    11/09           Comment: Cardiac Cath, no blockage    SLEEP STUDY ATTENDED                                            Comment: negative    ECHO HEART STRESS                               9/2013          Comment: Echocardiac, Stress, WNL    MYOCARDL PERF REST STRESS                       7/2014          Comment: Myocardial Perfusion, Exercise    KNEE SCOPE,MED OR LAT MENIS REPAIR                       Comment: R knee torn meniscus    Social History     Social History   • Marital status:      Spouse name: Ulices   • Number of children: 3   • Years of education: N/A     Occupational History   • 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[employer]     Social History Main Topics   • Smoking status: Never Smoker   • Smokeless tobacco: Never Used   • Alcohol use 0.0 oz/week     0 Standard drinks or equivalent per week      Comment: 2 drinks per week   • Drug use: No   • Sexual activity: Yes     Partners: Male     Other Topics Concern   • Not on file     Social History Narrative     Exercise:Yes  Caffeine: No    Family History   Problem Relation Age of Onset   • Cancer Other      maternal cousin had breast cancer     Review of patient's family status indicates:    Mother                         Alive                       Comment: osteoporosis, HTN    Father                         Alive                     Sister                         Alive                     Sister                         Alive                     Brother                        Alive                     Brother                        Alive                     Other                                                      No family history of uterine , cervical , ovarian, colon   cancer.    REVIEW OFSYSTMES: Menses are irregular .  She has not had history of an abnormal PAP.    She  has not had a history of an abnormal mammogram. Colonoscopy:  never .   Last eye exam was greater than 2 years ago.   Patient denies headaches, vision changes, lightheadedness/dizziness, SOB, chest pain, palpitations, breast pain, abdominal pain, change in bowel habits, melena, hematochezia, vaginal discharge , urinary urgency, frequency, dysuria, myalgias, fatigue or concerns regarding depression .     Immunization History   Administered Date(s) Administered   • INFLUENZA QUADRIVALENT 10/08/2014, 12/07/2015, 10/17/2016   • Influenza 10/13/2010, 09/28/2012, 10/01/2013   • Pneumococcal Polysaccharide Adult 08/27/2010   • Td:Adult type tetanus/diphtheria 11/23/1993, 11/08/2003   • Tdap 08/27/2010           PHYSICAL EXAM:    VITAL SIGNS:   Visit Vitals   • /78   • Pulse 64   • Ht 5' 6.5\" (1.689 m)   • Wt 70.3 kg   • LMP 11/14/2016 (Approximate)   • BMI 24.64 kg/m2    weight is down 2 pounds the past year.  GENERAL APPEARANCE:Healthy. Affect is varied and appropriate   SKIN:Skin color, texture, turgor are normal. There are no bruises, rashes or lesions.  HEAD:normocephalic. No masses, lesions, tenderness or abnormalities  EARS:External ears normal. Canals clear. Tympanic membranes are clear with all landmarks noted.  EYES:PERRL, EOMI, sclera white and nonicteric, no conjunctival erythema, exudate or swelling; normal lids  NOSE:normal  MOUTH:mucosa pink and moist.  THROAT:Lips, mucosa, and tongue normal. Teeth and gums normal. Oropharynx clear  NECK: symmetric, supple, without adenopathy and thyroid normal size, non-tender,  without nodularity  CHEST:symmetrical, no chest deformities noted and no chest wall tenderness  BACK:straight, symmetric and nontender to palpation  LUNGS:clear to auscultation  BREAST:breasts symmetric, no dominant or suspicious mass, no skin or nipple changes and no axillary  adenopathy  HEART:regular rate and rhythm and no murmurs, clicks, or gallops  ABDOMEN:soft, non-tender, bowel sounds normal, no masses, hepatomegaly or splenomegaly noted  RECTAL: Deferred recently done   PELVIC: Deferred completed in October 2016 with Pap and recent uterine biopsy  EXTREMITIES:no clubbing, cyanosis, edema or deformity noted  NEUROLOGIC:CN II-XII intact. Sensory and motor grossly intact. Reflexes normal and symmetric    ASSESSMENT: 56 year old female physical exam                               1. Routine general medical examination at a health care facility    2. Screening for colon cancer    3. Coronary Spasm                  PLAN:   Continue close follow-up with cardiology  Schedule colonoscopy  FLP. Will not repeat her BMP as this was just done in December  Eye exam  Immunizations up-to-date  Follow up in 1 year for complete physical exam, discussed calcium, folic acid, vitamin D supplements, discussed  exercise, caffeine, alcohol use, mammogram, colonoscopy, labs ordered , eye exam   Patient instructed to call if she has not received her test results within 2 weeks of them being completed.    She is in agreement with the above.     See orders.    SOB, BILLINGS

## 2020-10-29 NOTE — CONSULT NOTE ADULT - ASSESSMENT
Patient is a 71yo M with PMHx HTN, HLD, DM2, BPH s/p TURP, CAD s/p stent 2007, COPD, and SCLC (dx spring 2019) s/p carboplatin/etoposide and radiation with partial response now on maintenance atezolizumab immunotherapy every 3 weeks (c/b brain metastases now s/p gamma knife radiation therapy with improved brain MRI) who presents with worsening BILLINGS [id number] found to be hypoxic and with interval progression of bilateral lung opacities on CT concerning for infectious vs inflammatory etiology. Started on IV steroids for possible pneumonitis, course c/b steroid induced hyperglycemia. Endocrine consulted for steroid induced hyperglycemia.    Plan:  1. Steroid Induced Hyperglycemia  - based upon insulin used in past 24 hours, can increase lantus and premeal insulin  - 24 U of lantus at night  - 12 U premeal insulin  - will continue adjusting insulin as needed  - lactate 1.3 on VBG  - if patient is being discharged on steroids or a steroid taper, will likely need basal + bolus insulin management and dosage adjustment, otherwise can continue on original home diabetes medication regimen    2. HLD  - continue with home atorvastatin  - lovaza dosage normally 2 g BID, can increase lovaza to that dosage unless there is a reason he is on 1 g daily  - can check lipid panel as it has been a year since last lipid panel    3. HTN  - continue with metoprolol  - will defer adjustment of HTN medications to primary team Patient is a 69yo M with PMHx HTN, HLD, DM2, BPH s/p TURP, CAD s/p stent 2007, COPD, and SCLC (dx spring 2019) s/p carboplatin/etoposide and radiation with partial response now on maintenance atezolizumab immunotherapy every 3 weeks (c/b brain metastases now s/p gamma knife radiation therapy with improved brain MRI) who presents with worsening BILLINGS [id number] found to be hypoxic and with interval progression of bilateral lung opacities on CT concerning for infectious vs inflammatory etiology. Started on IV steroids for possible pneumonitis, course c/b steroid induced hyperglycemia. Endocrine consulted for steroid induced hyperglycemia.    Plan:  1. Steroid Induced Hyperglycemia  - based upon insulin used in past 24 hours, can increase lantus and premeal insulin  - 24 U of lantus at night  - 12 U premeal insulin  - will continue adjusting insulin as needed  - moderate correctional insulin sliding scale as pt on steroids  - lactate 1.3 on VBG  - if patient is being discharged on steroids or a steroid taper, will likely need basal + bolus insulin management and dosage adjustment, otherwise can continue on original home diabetes medication regimen    2. HLD  - continue with home atorvastatin  - lovaza dosage normally 2 g BID, can increase lovaza to that dosage unless there is a reason he is on 1 g daily  - can check lipid panel as it has been a year since last lipid panel    3. HTN  - continue with metoprolol  - will defer adjustment of HTN medications to primary team Patient is a 69yo M with PMHx HTN, HLD, DM2, BPH s/p TURP, CAD s/p stent 2007, COPD, and SCLC (dx spring 2019) s/p carboplatin/etoposide and radiation with partial response now on maintenance atezolizumab immunotherapy every 3 weeks (c/b brain metastases now s/p gamma knife radiation therapy with improved brain MRI) who presents with worsening BILLINGS [id number] found to be hypoxic and with interval progression of bilateral lung opacities on CT concerning for infectious vs inflammatory etiology. Started on IV steroids for possible pneumonitis, course c/b steroid induced hyperglycemia. Endocrine consulted for steroid induced hyperglycemia.    Plan:  1. Steroid Induced Hyperglycemia  - based upon insulin used in past 24 hours, can increase lantus and premeal insulin  - 24 U of lantus at night  - 12 U premeal Admelog TID  - will continue adjusting insulin as needed  - moderate correctional insulin sliding scale before meals and moderate bedtime scale as pt on steroids  - lactate 3.9 initially then decreased to 1.3 on VBG  - if patient is being discharged on steroids or a steroid taper, will likely need basal + bolus insulin management and dosage adjustment, otherwise can continue on original home diabetes medication regimen    2. HLD  - continue with home atorvastatin  - lovaza dosage normally 2 g BID, can increase lovaza to that dosage unless there is a reason he is on 1 g daily  - can check lipid panel as it has been a year since last lipid panel    3. HTN  - continue with metoprolol  - will defer adjustment of HTN medications to primary team

## 2020-10-29 NOTE — PROGRESS NOTE ADULT - ASSESSMENT
Patient is a 69 yo M w/ HTN, HLD, DM, BPH s/p TURP, CAD s/p stent 2007, COPD, and SCLC (dx spring 2019) s/p carboplatin/etoposide and radiation with partial response now on maintenance atezolizumab immunotherapy every 3 weeks (c/b brain metastases now s/p gamma knife radiation therapy with improved brain MRI) admitted on 10/21 after being found to be hypoxemic at oncology clinic. As per patient and chart review, endorses worsening BILLINGS and dry cough x 1 month. On image review, patient has had progression of peribronchovascular opacities since CT chest 8/31/2020 with marked progression between CT from 10/19/2020 and 10/21/2020. S/P Bronch w/ BAL and transbronchial bx on 10/23. Improved with steroids for treatment of pneumonitis.

## 2020-10-29 NOTE — PROGRESS NOTE ADULT - ATTENDING COMMENTS
Clinically patient feels improved  Cont steroids, fungitell negative  Pathology pending  Weans oxygen as tolerated   Will need pulmonary follow up as outpatient

## 2020-10-29 NOTE — PHYSICAL THERAPY INITIAL EVALUATION ADULT - PREDICTED DURATION OF THERAPY (DAYS/WKS), PT EVAL
d/c home; no PT needs required. Patient demonstrating safe ambulation and performed safe stair negotiation-->will not be placed on PT program.

## 2020-10-29 NOTE — PROGRESS NOTE ADULT - ATTENDING COMMENTS
Patient seen and examined, d/w Dr. Ibanez, agree with above.      ID# [id number] Patient reports feeling well, breathing is comfortable on supplemental O2. Is trying to be patient with remaining in hospital. Understands plan to continue steroids as per oncology. Will f/u oncology recs re: steroid dosing/duration, when can transition to PO. Per d/w pulm, they defer steroid course to oncology. Patient with steroid induced hyperglycemia, will continue to monitor FS and adjust insulin regimen as necessary. Given potential duration of steroid therapy, will obtain endocrine evaluation to assist with glycemic control and ensure good outpatient followup.

## 2020-10-29 NOTE — PROGRESS NOTE ADULT - PROBLEM SELECTOR PLAN 1
Likely pneumonitis (3% w/ atezolizumab) improved with steroids. s/p Bronch w/ BAL and transbronchial biopsy 10/23. BAL cx NGTD. BAL cyto unremarkable. Fungitell negative. Quant negative.  - f/u transbronchial biopsy path  - c/w Solumedrol 75mg IV daily for likely pneumonitis. Expect slow taper over several weeks if continued improvement. Defer to oncology for taper/transition to PO  - Wean O2 as tolerated  - No objections to discharge from pulmonary standpoint with close pulmonary/oncology follow up once change to PO steroids and home O2 delivered.  - Needs outpatient pulmonary follow up with Dr. Jacobs upon discharge at [address] [place] [address], [address] ([phone]).  Please e-mail [email or web address] to make an appointment for the patient.  Please include the name, , and a phone number for you and the patient)    Shad Coffey MD  Pulmonary & Critical Care Fellow  [phone] Likely pneumonitis (3% w/ atezolizumab) improved with steroids. s/p Bronch w/ BAL and transbronchial biopsy 10/23. BAL cx NGTD. BAL cyto unremarkable. Fungitell negative. Quant negative.  - f/u transbronchial biopsy path  - c/w Solumedrol 75mg IV daily for likely pneumonitis. Expect slow taper over several weeks if continued improvement. Defer to oncology for taper/transition to PO  - Wean O2 as tolerated  - No objections to discharge from pulmonary standpoint with close pulmonary/oncology follow up once change to PO steroids and home O2 delivered.  - Interval CT chest in 4 to 6 weeks  - Needs outpatient pulmonary follow up with Dr. Jacobs upon discharge at [address] [place] [address], [facility] ([phone]).  Please e-mail [email or web address] to make an appointment for the patient.  Please include the name, , and a phone number for you and the patient)    Shad Coffey MD  Pulmonary & Critical Care Fellow  [phone] Likely pneumonitis (3% w/ atezolizumab) improved with steroids. s/p Bronch w/ BAL and transbronchial biopsy 10/23. BAL cx NGTD. BAL cyto unremarkable. Fungitell negative. Quant negative.  - f/u transbronchial biopsy path  - c/w Solumedrol 75mg IV daily for likely pneumonitis. Expect slow taper over several weeks if continued improvement. Defer to oncology for taper/transition to PO  - Wean O2 as tolerated  - No objections to discharge from pulmonary standpoint with close pulmonary/oncology follow up once change to PO steroids and home O2 delivered.  - Please start prophylactic Bactrim given expected extended duration of steroids  - Interval CT chest in 4 to 6 weeks  - Needs outpatient pulmonary follow up with Dr. Jacobs upon discharge at [address] [place] [address], [address] ([phone]).  Please e-mail [email or web address] to make an appointment for the patient.  Please include the name, , and a phone number for you and the patient)    Shad Coffey MD  Pulmonary & Critical Care Fellow  [phone]

## 2020-10-29 NOTE — PHYSICAL THERAPY INITIAL EVALUATION ADULT - PERTINENT HX OF CURRENT PROBLEM, REHAB EVAL
Patient is a 70 year old male admitted to [facility] on 10/21 sent from oncology clinic for hypoxia. PMH: HTN, HLD, IDDM, BPH s/p TURP, CAD s/p stent 2007, COPD, and SCLC (dx spring 2019) (c/b brain metastases now s/p gamma knife radiation therapy with improved brain MRI).

## 2020-10-29 NOTE — CONSULT NOTE ADULT - SUBJECTIVE AND OBJECTIVE BOX
HPI:  Patient is a 69yo M with PMHx HTN, HLD, IDDM, BPH s/p TURP, CAD s/p stent 2007, COPD, and SCLC (dx spring 2019) s/p carboplatin/etoposide and radiation with partial response now on maintenance atezolizumab immunotherapy every 3 weeks (c/b brain metastases now s/p gamma knife radiation therapy with improved brain MRI) who was sent from oncology clinic for hypoxia. He is primarily Burkinan-speaking though understands and speaks some English, and was examined with translation assistance from his daughter Leena at his request. Patient states that he has had worsening dyspnea on exertion over the past month associated with a dry cough not productive of any sputum, worse over the past 5 days. He walks about a mile each morning and has noticed progressively earlier fatigue and dyspnea, often associated with palpitations. He denies chest pain, syncope, shortness of breath at rest, orthopnea, lower extremity swelling, recent illness or sick contacts, recent travel. Over the past month, patient has also developed a non-itchy, nontender rash which started on his palms and spread to his trunk and lower extremities. He was seen by dermatology 9/23/20 who started betamethasone cream. Patient's daughter Leena is an NP and occasionally monitors O2 sat with portable pulse ox, and home sats have been as low as 80-82% with an isolated desaturation to 67% during activity. Patient has a history of COPD though has never required hospitalization and does not use home O2. Of note, patient has been followed with surveillance imaging for lung cancer which in August of this year showed bilateral patchy opacities concerning for infectious vs. inflammatory etiology. Outpatient imaging 10/19/20 prior to outpatient oncology appointment on DOA demonstrated interval progression of these opacities.     ED Course:  Vitals: T98.3F, HR , BP 94//61, RR 26 with SpO2 87% RA -> RR 20 with 92% on 4L  In the ED, the patient was found to be hypoxic and started on nasal cannula which was titrated to 4L. CTA Chest was repeated which showed increase in diffuse bilateral peribronchovascular opacities representing likely infectious vs. pneumonitis etiology. (21 Oct 2020 16:56)    SUBJECTIVE: Patient seen and examined bedside. Currently has no new complaints. In terms of diabetes history, states his A1c was around 8.9 in June and he was started on 10 U of lantus at night. Repeat A1c was 6.2 in October. He is currently on 75 mg IV solumedrol, sugars noted to be increased as a result. He denies headaches, dizziness, nausea/vomiting, abdominal pain, changes in urination.     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 1000 milliLiter(s) (50 mL/Hr) IV Continuous <Continuous>  dextrose 50% Injectable 12.5 Gram(s) IV Push once  dextrose 50% Injectable 25 Gram(s) IV Push once  dextrose 50% Injectable 25 Gram(s) IV Push once  enoxaparin Injectable 40 milliGRAM(s) SubCutaneous daily  famotidine    Tablet 20 milliGRAM(s) Oral two times a day  insulin glargine Injectable (LANTUS) 18 Unit(s) SubCutaneous at bedtime  insulin lispro (ADMELOG) corrective regimen sliding scale   SubCutaneous three times a day before meals  insulin lispro (ADMELOG) corrective regimen sliding scale   SubCutaneous at bedtime  insulin lispro Injectable (ADMELOG) 13 Unit(s) SubCutaneous three times a day before meals  magnesium oxide 400 milliGRAM(s) Oral three times a day with meals  methylPREDNISolone sodium succinate Injectable 75 milliGRAM(s) IV Push daily  metoprolol succinate ER 50 milliGRAM(s) Oral daily  niacin SR 1000 milliGRAM(s) Oral at bedtime  omega-3-Acid Ethyl Esters 1 Gram(s) Oral daily    MEDICATIONS  (PRN):  albuterol/ipratropium for Nebulization 3 milliLiter(s) Nebulizer every 4 hours PRN Shortness of Breath and/or Wheezing  dextrose 40% Gel 15 Gram(s) Oral once PRN Blood Glucose LESS THAN 70 milliGRAM(s)/deciliter  glucagon  Injectable 1 milliGRAM(s) IntraMuscular once PRN Glucose LESS THAN 70 milligrams/deciliter      Allergies    No Known Allergies    Intolerances      Review of Systems:  Constitutional: No fever  Eyes: No blurry vision  Neuro: No tremors  HEENT: No pain  Cardiovascular: No chest pain, palpitations  Respiratory: No SOB, no cough  GI: No nausea, vomiting, abdominal pain  : No dysuria  Skin: no rash  Psych: no depression  Endocrine: no polyuria, polydipsia  Hem/lymph: no swelling  Osteoporosis: no fractures    ALL OTHER SYSTEMS REVIEWED AND NEGATIVE    UNABLE TO OBTAIN    PHYSICAL EXAM:  VITALS: T(C): 36.8 (10-29-20 @ 11:56)  T(F): 98.3 (10-29-20 @ 11:56), Max: 98.3 (10-29-20 @ 11:56)  HR: 73 (10-29-20 @ 11:56) (60 - 73)  BP: 118/54 (10-29-20 @ 11:56) (108/61 - 129/68)  RR:  (18 - 18)  SpO2:  (93% - 98%)  Wt(kg): --  GENERAL: NAD, well-groomed, well-developed  EYES: No proptosis, no lid lag, anicteric  HEENT:  Atraumatic, Normocephalic, moist mucous membranes  THYROID: Normal size, no palpable nodules  RESPIRATORY: Clear to auscultation bilaterally; No rales, rhonchi, wheezing  CARDIOVASCULAR: Regular rate and rhythm; No murmurs; no peripheral edema  GI: Soft, nontender, non distended, normal bowel sounds  SKIN: Dry, intact, No rashes or lesions  MUSCULOSKELETAL: Full range of motion, normal strength  NEURO: sensation intact, extraocular movements intact, no tremor  PSYCH: Alert and oriented x 3, normal affect, normal mood  CUSHING'S SIGNS: no striae      CAPILLARY BLOOD GLUCOSE      POCT Blood Glucose.: 324 mg/dL (29 Oct 2020 17:06)  POCT Blood Glucose.: 364 mg/dL (29 Oct 2020 11:46)  POCT Blood Glucose.: 210 mg/dL (29 Oct 2020 07:41)  POCT Blood Glucose.: 266 mg/dL (28 Oct 2020 21:35)                            12.5   8.57  )-----------( 403      ( 29 Oct 2020 05:11 )             39.3       10-29    135  |  100  |  19  ----------------------------<  232<H>  3.7   |  24  |  0.81    EGFR if : 106  EGFR if non : 91    Ca    9.2      10-29  Mg     1.7     10-29  Phos  3.7     10-29        Thyroid Function Tests:      A1C with Estimated Average Glucose: 6.9 % (10-22-20 @ 05:30)  Hemoglobin A1C, Whole Blood: 9.4 % (11-04-19 @ 16:00)          Radiology:                HPI:  Patient is a 69yo M with PMHx HTN, HLD, IDDM, BPH s/p TURP, CAD s/p stent , COPD, and SCLC (dx spring 2019) s/p carboplatin/etoposide and radiation with partial response now on maintenance atezolizumab immunotherapy every 3 weeks (c/b brain metastases now s/p gamma knife radiation therapy with improved brain MRI) who was sent from oncology clinic for hypoxia. He is primarily [place]-speaking though understands and speaks some English, and was examined with translation assistance from his daughter Leena at his request. Patient states that he has had worsening dyspnea on exertion over the past month associated with a dry cough not productive of any sputum, worse over the past 5 days. He walks about a mile each morning and has noticed progressively earlier fatigue and dyspnea, often associated with palpitations. He denies chest pain, syncope, shortness of breath at rest, orthopnea, lower extremity swelling, recent illness or sick contacts, recent travel. Over the past month, patient has also developed a non-itchy, nontender rash which started on his palms and spread to his trunk and lower extremities. He was seen by dermatology 20 who started betamethasone cream. Patient's daughter Leena is an NP and occasionally monitors O2 sat with portable pulse ox, and home sats have been as low as 80-82% with an isolated desaturation to 67% during activity. Patient has a history of COPD though has never required hospitalization and does not use home O2. Of note, patient has been followed with surveillance imaging for lung cancer which in August of this year showed bilateral patchy opacities concerning for infectious vs. inflammatory etiology. Outpatient imaging 10/19/20 prior to outpatient oncology appointment on DOA demonstrated interval progression of these opacities.     ED Course:  Vitals: T98.3F, HR , BP 94//61, RR 26 with SpO2 87% RA -> RR 20 with 92% on 4L  In the ED, the patient was found to be hypoxic and started on nasal cannula which was titrated to 4L. CTA Chest was repeated which showed increase in diffuse bilateral peribronchovascular opacities representing likely infectious vs. pneumonitis etiology. (21 Oct 2020 16:56)    SUBJECTIVE: Patient seen and examined bedside. Currently has no new complaints. In terms of diabetes history, states his A1c was around 8.9 in  and he was started on 10 U of lantus at night. Repeat A1c was 6.2 in October. He is currently on 75 mg IV solumedrol, sugars noted to be increased as a result. He denies headaches, dizziness, nausea/vomiting, abdominal pain, changes in urination.     PAST MEDICAL & SURGICAL HISTORY:  Weight loss    Smoking history    Urinary calculus    BPH (benign prostatic hypertrophy)    Type 2 diabetes mellitus    Hyperlipidemia    CAD (coronary artery disease)    Hypertension    Benign parotid tumor  s/p excision x 2  and  (left/right)    Renal calculus  s/p laser litho    Stented coronary artery  2009 - BMS to RCA    History of tonsillectomy        FAMILY HISTORY:  FH: liver cancer    FH: HTN (hypertension)        Social History:  Smokinish pack years, quit 1.5 years ago  Alcohol: none  Recreational Drugs: none    Outpatient Medications:  ASA 81  Metoprolol 50 mg daily  Lisinopril 20 mg daily  Atorvastatin 40 mg daily  ASA 81  Niacin 500 mg 2 tabs  Ventolin  Fluticasone    MEDICATIONS  (STANDING):  aspirin enteric coated 81 milliGRAM(s) Oral daily  atorvastatin 40 milliGRAM(s) Oral at bedtime  budesonide 160 MICROgram(s)/formoterol 4.5 MICROgram(s) Inhaler 2 Puff(s) Inhalation two times a day  clobetasol 0.05% Ointment 1 Application(s) Topical two times a day  dextrose 5%. 1000 milliLiter(s) (50 mL/Hr) IV Continuous <Continuous>  dextrose 50% Injectable 12.5 Gram(s) IV Push once  dextrose 50% Injectable 25 Gram(s) IV Push once  dextrose 50% Injectable 25 Gram(s) IV Push once  enoxaparin Injectable 40 milliGRAM(s) SubCutaneous daily  famotidine    Tablet 20 milliGRAM(s) Oral two times a day  insulin glargine Injectable (LANTUS) 18 Unit(s) SubCutaneous at bedtime  insulin lispro (ADMELOG) corrective regimen sliding scale   SubCutaneous three times a day before meals  insulin lispro (ADMELOG) corrective regimen sliding scale   SubCutaneous at bedtime  insulin lispro Injectable (ADMELOG) 13 Unit(s) SubCutaneous three times a day before meals  magnesium oxide 400 milliGRAM(s) Oral three times a day with meals  methylPREDNISolone sodium succinate Injectable 75 milliGRAM(s) IV Push daily  metoprolol succinate ER 50 milliGRAM(s) Oral daily  niacin SR 1000 milliGRAM(s) Oral at bedtime  omega-3-Acid Ethyl Esters 1 Gram(s) Oral daily    MEDICATIONS  (PRN):  albuterol/ipratropium for Nebulization 3 milliLiter(s) Nebulizer every 4 hours PRN Shortness of Breath and/or Wheezing  dextrose 40% Gel 15 Gram(s) Oral once PRN Blood Glucose LESS THAN 70 milliGRAM(s)/deciliter  glucagon  Injectable 1 milliGRAM(s) IntraMuscular once PRN Glucose LESS THAN 70 milligrams/deciliter      Allergies    No Known Allergies    Intolerances      Review of Systems:  Constitutional: No fever  Eyes: No blurry vision  Neuro: No tremors  HEENT: No pain  Cardiovascular: No chest pain, palpitations  Respiratory: no SOB  GI: No nausea, vomiting, abdominal pain  : No dysuria  Skin: no rash  Psych: no depression  Endocrine: no polyuria, polydipsia  Hem/lymph: no swelling  Osteoporosis: no fractures    ALL OTHER SYSTEMS REVIEWED AND NEGATIVE    PHYSICAL EXAM:  VITALS: T(C): 36.8 (10-29-20 @ 11:56)  T(F): 98.3 (10-29-20 @ 11:56), Max: 98.3 (10-29-20 @ 11:56)  HR: 73 (10-29-20 @ 11:56) (60 - 73)  BP: 118/54 (10-29-20 @ 11:56) (108/61 - 129/68)  RR:  (18 - 18)  SpO2:  (93% - 98%)  Wt(kg): --  GENERAL: NAD, lying in bed comfortably  EYES: No proptosis, no lid lag, anicteric  HEENT:  Atraumatic, Normocephalic, moist mucous membranes  THYROID: Normal size, no palpable nodules  RESPIRATORY: Clear to auscultation bilaterally; No rales, rhonchi, wheezing  CARDIOVASCULAR: Regular rate and rhythm; No murmurs; no peripheral edema  GI: Soft, nontender, non distended, normal bowel sounds  SKIN: Dry, intact, rashes on lower extremities  MUSCULOSKELETAL: Full range of motion, normal strength  NEURO: sensation intact, extraocular movements intact, no tremor  PSYCH: Alert and oriented x 3, normal affect, normal mood  CUSHING'S SIGNS: no striae      CAPILLARY BLOOD GLUCOSE      POCT Blood Glucose.: 324 mg/dL (29 Oct 2020 17:06)  POCT Blood Glucose.: 364 mg/dL (29 Oct 2020 11:46)  POCT Blood Glucose.: 210 mg/dL (29 Oct 2020 07:41)  POCT Blood Glucose.: 266 mg/dL (28 Oct 2020 21:35)                            12.5   8.57  )-----------( 403      ( 29 Oct 2020 05:11 )             39.3       10    135  |  100  |  19  ----------------------------<  232<H>  3.7   |  24  |  0.81    EGFR if : 106  EGFR if non : 91    Ca    9.2      10-29  Mg     1.7     10-29  Phos  3.7     10-29        Thyroid Function Tests:      A1C with Estimated Average Glucose: 6.9 % (10-22-20 @ 05:30)  Hemoglobin A1C, Whole Blood: 9.4 % (19 @ 16:00)

## 2020-10-29 NOTE — PHYSICAL THERAPY INITIAL EVALUATION ADULT - PATIENT PROFILE REVIEW, REHAB EVAL
PT orders received: no formal activity order. Consult with NORMA HUGHES, pt may participate in PT evaluation./yes

## 2020-10-29 NOTE — PROGRESS NOTE ADULT - SUBJECTIVE AND OBJECTIVE BOX
Modesto Ibanez MD  Pager [phone]    Patient is a 70y old  Male who presents with a chief complaint of Hypoxia, abnormal CT (29 Oct 2020 08:44)      SUBJECTIVE / OVERNIGHT EVENTS:  Patient was seen and examined at bedside this morning. No acute events overnight.  ID#[id number]; also with translation assistance from daughter Leena over the phone per patient request. Patient has no complaints; he is feeling well. Several mild desats on continuous pulse ox overnight, lowest 84% around 0430 transiently, not sustained. Patient denies shortness of breath, cough, chest pain, fatigue, headache, dizziness, weakness. Counseled patient and family on plans for transition or oral steroid taper for discharge upon surgical path results and adequate glycemic control. Answered questions regarding home O2; SW/CM aware and working on setting up.     ADDITIONAL REVIEW OF SYSTEMS: Otherwise negative.    MEDICATIONS  (STANDING):  aspirin enteric coated 81 milliGRAM(s) Oral daily  atorvastatin 40 milliGRAM(s) Oral at bedtime  budesonide 160 MICROgram(s)/formoterol 4.5 MICROgram(s) Inhaler 2 Puff(s) Inhalation two times a day  clobetasol 0.05% Ointment 1 Application(s) Topical two times a day  dextrose 5%. 1000 milliLiter(s) (50 mL/Hr) IV Continuous <Continuous>  dextrose 50% Injectable 12.5 Gram(s) IV Push once  dextrose 50% Injectable 25 Gram(s) IV Push once  dextrose 50% Injectable 25 Gram(s) IV Push once  enoxaparin Injectable 40 milliGRAM(s) SubCutaneous daily  famotidine    Tablet 20 milliGRAM(s) Oral two times a day  insulin glargine Injectable (LANTUS) 18 Unit(s) SubCutaneous at bedtime  insulin lispro (ADMELOG) corrective regimen sliding scale   SubCutaneous three times a day before meals  insulin lispro (ADMELOG) corrective regimen sliding scale   SubCutaneous at bedtime  insulin lispro Injectable (ADMELOG) 10 Unit(s) SubCutaneous three times a day before meals  magnesium oxide 400 milliGRAM(s) Oral three times a day with meals  methylPREDNISolone sodium succinate Injectable 75 milliGRAM(s) IV Push daily  metoprolol succinate ER 50 milliGRAM(s) Oral daily  niacin SR 1000 milliGRAM(s) Oral at bedtime  omega-3-Acid Ethyl Esters 1 Gram(s) Oral daily    MEDICATIONS  (PRN):  albuterol/ipratropium for Nebulization 3 milliLiter(s) Nebulizer every 4 hours PRN Shortness of Breath and/or Wheezing  dextrose 40% Gel 15 Gram(s) Oral once PRN Blood Glucose LESS THAN 70 milliGRAM(s)/deciliter  glucagon  Injectable 1 milliGRAM(s) IntraMuscular once PRN Glucose LESS THAN 70 milligrams/deciliter      CAPILLARY BLOOD GLUCOSE      POCT Blood Glucose.: 210 mg/dL (29 Oct 2020 07:41)  POCT Blood Glucose.: 266 mg/dL (28 Oct 2020 21:35)  POCT Blood Glucose.: 307 mg/dL (28 Oct 2020 16:37)  POCT Blood Glucose.: 297 mg/dL (28 Oct 2020 11:37)    I&O's Summary      PHYSICAL EXAM:  Vital Signs Last 24 Hrs  T(C): 36.7 (29 Oct 2020 05:17), Max: 36.8 (28 Oct 2020 21:23)  T(F): 98 (29 Oct 2020 05:17), Max: 98.2 (28 Oct 2020 21:23)  HR: 60 (29 Oct 2020 05:17) (60 - 83)  BP: 108/61 (29 Oct 2020 05:17) (108/61 - 146/70)  BP(mean): --  RR: 18 (29 Oct 2020 05:17) (18 - 18)  SpO2: 95% (29 Oct 2020 05:17) (92% - 95%)    GENERAL: NAD, sitting comfortably  HEAD:  Atraumatic, Normocephalic  EYES: EOMI, PERRL, conjunctiva and sclera clear  ENT: Mucous membranes moist, no pharyngeal erythema  NECK: Supple, No Lymphadenopathy  CHEST/LUNG: LCTABL; NC in place, speaking in full sentences  CV: Regular rate and rhythm, S1/S2 equal; No murmurs, rubs, or gallops; No JVD or hepatojugular reflux  ABDOMEN: Soft, Nontender, Nondistended; Bowel sounds present  EXTREMITIES:  2+ Peripheral Pulses, No clubbing, cyanosis, or edema  PSYCH: AAOx3; affect appropriate  NEUROLOGY: no focal motor or sensory deficits, muscle strength 5/5 bilaterally, quadriceps reflex intact  SKIN: Diffuse nontender non-itching blanching macular rash on lower extremities, buttocks, and trunk with 2-3 open blisters on b/l ankle and few lesions on hands/palms; improving    LABS:                        12.5   8.57  )-----------( 403      ( 29 Oct 2020 05:11 )             39.3     10-29    135  |  100  |  19  ----------------------------<  232<H>  3.7   |  24  |  0.81    Ca    9.2      29 Oct 2020 05:11  Phos  3.7     10-29  Mg     1.7     10-29    RADIOLOGY & ADDITIONAL TESTS:  No new interval imaging. Modesto Ibanez MD  Pager [phone]    Patient is a 70y old  Male who presents with a chief complaint of Hypoxia, abnormal CT (29 Oct 2020 08:44)      SUBJECTIVE / OVERNIGHT EVENTS:  Patient was seen and examined at bedside this morning. No acute events overnight.  ID#[id number]; also with translation assistance from daughter Leena over the phone per patient request. Patient has no complaints; he is feeling well. Several mild desats on continuous pulse ox overnight, lowest 84% around 0430 transiently, not sustained. Patient denies shortness of breath, cough, chest pain, fatigue, headache, dizziness, weakness. Counseled patient and family on plans for transition or oral steroid taper for discharge upon surgical path results and adequate glycemic control. Answered questions regarding home O2; SW/CM aware and working on setting up.     ADDITIONAL REVIEW OF SYSTEMS: Otherwise negative.    MEDICATIONS  (STANDING):  aspirin enteric coated 81 milliGRAM(s) Oral daily  atorvastatin 40 milliGRAM(s) Oral at bedtime  budesonide 160 MICROgram(s)/formoterol 4.5 MICROgram(s) Inhaler 2 Puff(s) Inhalation two times a day  clobetasol 0.05% Ointment 1 Application(s) Topical two times a day  dextrose 5%. 1000 milliLiter(s) (50 mL/Hr) IV Continuous <Continuous>  dextrose 50% Injectable 12.5 Gram(s) IV Push once  dextrose 50% Injectable 25 Gram(s) IV Push once  dextrose 50% Injectable 25 Gram(s) IV Push once  enoxaparin Injectable 40 milliGRAM(s) SubCutaneous daily  famotidine    Tablet 20 milliGRAM(s) Oral two times a day  insulin glargine Injectable (LANTUS) 18 Unit(s) SubCutaneous at bedtime  insulin lispro (ADMELOG) corrective regimen sliding scale   SubCutaneous three times a day before meals  insulin lispro (ADMELOG) corrective regimen sliding scale   SubCutaneous at bedtime  insulin lispro Injectable (ADMELOG) 10 Unit(s) SubCutaneous three times a day before meals  magnesium oxide 400 milliGRAM(s) Oral three times a day with meals  methylPREDNISolone sodium succinate Injectable 75 milliGRAM(s) IV Push daily  metoprolol succinate ER 50 milliGRAM(s) Oral daily  niacin SR 1000 milliGRAM(s) Oral at bedtime  omega-3-Acid Ethyl Esters 1 Gram(s) Oral daily    MEDICATIONS  (PRN):  albuterol/ipratropium for Nebulization 3 milliLiter(s) Nebulizer every 4 hours PRN Shortness of Breath and/or Wheezing  dextrose 40% Gel 15 Gram(s) Oral once PRN Blood Glucose LESS THAN 70 milliGRAM(s)/deciliter  glucagon  Injectable 1 milliGRAM(s) IntraMuscular once PRN Glucose LESS THAN 70 milligrams/deciliter      CAPILLARY BLOOD GLUCOSE      POCT Blood Glucose.: 210 mg/dL (29 Oct 2020 07:41)  POCT Blood Glucose.: 266 mg/dL (28 Oct 2020 21:35)  POCT Blood Glucose.: 307 mg/dL (28 Oct 2020 16:37)  POCT Blood Glucose.: 297 mg/dL (28 Oct 2020 11:37)    I&O's Summary      PHYSICAL EXAM:  Vital Signs Last 24 Hrs  T(C): 36.7 (29 Oct 2020 05:17), Max: 36.8 (28 Oct 2020 21:23)  T(F): 98 (29 Oct 2020 05:17), Max: 98.2 (28 Oct 2020 21:23)  HR: 60 (29 Oct 2020 05:17) (60 - 83)  BP: 108/61 (29 Oct 2020 05:17) (108/61 - 146/70)  BP(mean): --  RR: 18 (29 Oct 2020 05:17) (18 - 18)  SpO2: 95% (29 Oct 2020 05:17) (92% - 95%)    GENERAL: NAD, sitting comfortably  HEAD:  Atraumatic, Normocephalic  EYES: EOMI, PERRL, conjunctiva and sclera clear  ENT: Mucous membranes moist, no pharyngeal erythema  NECK: Supple, No Lymphadenopathy  CHEST/LUNG: LCTABL; NC in place, speaking in full sentences  CV: Regular rate and rhythm, S1/S2 equal; No murmurs, rubs, or gallops; No JVD or hepatojugular reflux  ABDOMEN: Soft, Nontender, Nondistended; Bowel sounds present  EXTREMITIES:  2+ Peripheral Pulses, No clubbing, cyanosis, or edema  PSYCH: AAOx3; affect appropriate  NEUROLOGY: no focal motor or sensory deficits, muscle strength 5/5 bilaterally, quadriceps reflex intact  SKIN: Diffuse nontender non-itching blanching macular rash on lower extremities, buttocks, and trunk with 2-3 open blisters on b/l ankle and few lesions on hands/palms; improving    LABS:                        12.5   8.57  )-----------( 403      ( 29 Oct 2020 05:11 )             39.3     10-29    135  |  100  |  19  ----------------------------<  232<H>  3.7   |  24  |  0.81    Ca    9.2      29 Oct 2020 05:11  Phos  3.7     10-29  Mg     1.7     10-29    RADIOLOGY & ADDITIONAL TESTS:  No new interval imaging.    Consultant notes reviewed: Pulm    Providers d/w: Pulm Dr. Coffey -> defer steroid course to oncology

## 2020-10-29 NOTE — PROGRESS NOTE ADULT - SUBJECTIVE AND OBJECTIVE BOX
CHIEF COMPLAINT:    Interval Events: No acute events overnight. On 2 to 3 L nc due to desaturations with ambulation.    REVIEW OF SYSTEMS:  Constitutional: [ ] negative [ ] fevers [ ] chills [ ] weight loss [ ] weight gain  HEENT: [ ] negative [ ] dry eyes [ ] eye irritation [ ] postnasal drip [ ] nasal congestion  CV: [ ] negative  [ ] chest pain [ ] orthopnea [ ] palpitations [ ] murmur  Resp: [ ] negative [ ] cough [ ] shortness of breath [X] dyspnea [ ] wheezing [ ] sputum [ ] hemoptysis  GI: [ ] negative [ ] nausea [ ] vomiting [ ] diarrhea [ ] constipation [ ] abd pain [ ] dysphagia   : [ ] negative [ ] dysuria [ ] nocturia [ ] hematuria [ ] increased urinary frequency  Musculoskeletal: [ ] negative [ ] back pain [ ] myalgias [ ] arthralgias [ ] fracture  Skin: [ ] negative [ ] rash [ ] itch  Neurological: [ ] negative [ ] headache [ ] dizziness [ ] syncope [ ] weakness [ ] numbness  Psychiatric: [ ] negative [ ] anxiety [ ] depression  Endocrine: [ ] negative [ ] diabetes [ ] thyroid problem  Hematologic/Lymphatic: [ ] negative [ ] anemia [ ] bleeding problem  Allergic/Immunologic: [ ] negative [ ] itchy eyes [ ] nasal discharge [ ] hives [ ] angioedema  [X] All other systems negative  [ ] Unable to assess ROS because ________    OBJECTIVE:  ICU Vital Signs Last 24 Hrs  T(C): 36.7 (29 Oct 2020 05:17), Max: 36.8 (28 Oct 2020 21:23)  T(F): 98 (29 Oct 2020 05:17), Max: 98.2 (28 Oct 2020 21:23)  HR: 60 (29 Oct 2020 05:17) (60 - 83)  BP: 108/61 (29 Oct 2020 05:17) (108/61 - 146/70)  BP(mean): --  ABP: --  ABP(mean): --  RR: 18 (29 Oct 2020 05:17) (18 - 18)  SpO2: 95% (29 Oct 2020 05:17) (92% - 95%)        CAPILLARY BLOOD GLUCOSE      POCT Blood Glucose.: 210 mg/dL (29 Oct 2020 07:41)      PHYSICAL EXAM:  General: NAD, resting comfortably on 3 L nc  HEENT: EOMI, PERRLA  Respiratory: CTAB  Cardiovascular: S1S2, RRR  Abdomen: Soft, NTND, BS+  Extremities: No peripheral edema  Skin: B/l shin nodular rash  Neurological: Grossly intact    HOSPITAL MEDICATIONS:  MEDICATIONS  (STANDING):  aspirin enteric coated 81 milliGRAM(s) Oral daily  atorvastatin 40 milliGRAM(s) Oral at bedtime  budesonide 160 MICROgram(s)/formoterol 4.5 MICROgram(s) Inhaler 2 Puff(s) Inhalation two times a day  clobetasol 0.05% Ointment 1 Application(s) Topical two times a day  dextrose 5%. 1000 milliLiter(s) (50 mL/Hr) IV Continuous <Continuous>  dextrose 50% Injectable 12.5 Gram(s) IV Push once  dextrose 50% Injectable 25 Gram(s) IV Push once  dextrose 50% Injectable 25 Gram(s) IV Push once  enoxaparin Injectable 40 milliGRAM(s) SubCutaneous daily  famotidine    Tablet 20 milliGRAM(s) Oral two times a day  insulin glargine Injectable (LANTUS) 18 Unit(s) SubCutaneous at bedtime  insulin lispro (ADMELOG) corrective regimen sliding scale   SubCutaneous three times a day before meals  insulin lispro (ADMELOG) corrective regimen sliding scale   SubCutaneous at bedtime  insulin lispro Injectable (ADMELOG) 10 Unit(s) SubCutaneous three times a day before meals  magnesium oxide 400 milliGRAM(s) Oral three times a day with meals  methylPREDNISolone sodium succinate Injectable 75 milliGRAM(s) IV Push daily  metoprolol succinate ER 50 milliGRAM(s) Oral daily  niacin SR 1000 milliGRAM(s) Oral at bedtime  omega-3-Acid Ethyl Esters 1 Gram(s) Oral daily    MEDICATIONS  (PRN):  albuterol/ipratropium for Nebulization 3 milliLiter(s) Nebulizer every 4 hours PRN Shortness of Breath and/or Wheezing  dextrose 40% Gel 15 Gram(s) Oral once PRN Blood Glucose LESS THAN 70 milliGRAM(s)/deciliter  glucagon  Injectable 1 milliGRAM(s) IntraMuscular once PRN Glucose LESS THAN 70 milligrams/deciliter      LABS:                        12.5   8.57  )-----------( 403      ( 29 Oct 2020 05:11 )             39.3     Hgb Trend: 12.5<--, 12.4<--, 13.7<--, 12.8<--, 12.1<--  10-29    135  |  100  |  19  ----------------------------<  232<H>  3.7   |  24  |  0.81    Ca    9.2      29 Oct 2020 05:11  Phos  3.7     10-29  Mg     1.7     10-29      Creatinine Trend: 0.81<--, 0.84<--, 0.80<--, 0.83<--, 0.81<--, 0.82<--            MICROBIOLOGY:       RADIOLOGY:  [ ] Reviewed and interpreted by me    PULMONARY FUNCTION TESTS:    EKG:

## 2020-10-29 NOTE — DISCHARGE NOTE NURSING/CASE MANAGEMENT/SOCIAL WORK - NSSCNAMETXT_GEN_ALL_CORE
[facility] at home   The nurse will call you the day after discharge to arrange to see you in your home.

## 2020-10-29 NOTE — DISCHARGE NOTE NURSING/CASE MANAGEMENT/SOCIAL WORK - PATIENT PORTAL LINK FT
You can access the FollowMyHealth Patient Portal offered by [facility] by registering at the following website: [email or web address]. By joining [facility] FollowMyHealth portal, you will also be able to view your health information using other applications (apps) compatible with our system.

## 2020-10-29 NOTE — PROGRESS NOTE ADULT - PROBLEM SELECTOR PLAN 5
-Recently started on insulin as outpatient  -Lantus now increased to 18u QHS with Admelog 9u TIDAC and FS and SSI TIDAC/QHS given hyperglycemia after increase in dose of steroids  -Will monitor FS and further adjust regimen as needed  -Regular diet (consistent carb/dash/tlc)  -A1C 6.9

## 2020-10-29 NOTE — PROGRESS NOTE ADULT - ASSESSMENT
Patient is a 69yo M with PMHx HTN, HLD, DM2, BPH s/p TURP, CAD s/p stent 2007, COPD, and SCLC (dx spring 2019) s/p carboplatin/etoposide and radiation with partial response now on maintenance atezolizumab immunotherapy every 3 weeks (c/b brain metastases now s/p gamma knife radiation therapy with improved brain MRI) who presents with worsening BILLINGS [id number] found to be hypoxic and with interval progression of bilateral lung opacities on CT concerning for infectious vs inflammatory etiology. Started on IV steroids for possible pneumonitis. Patient is a 69yo M with PMHx HTN, HLD, DM2, BPH s/p TURP, CAD s/p stent 2007, COPD, and SCLC (dx spring 2019) s/p carboplatin/etoposide and radiation with partial response now on maintenance atezolizumab immunotherapy every 3 weeks (c/b brain metastases now s/p gamma knife radiation therapy with improved brain MRI) who presents with worsening BILLINGS [id number] found to be hypoxic and with interval progression of bilateral lung opacities on CT concerning for infectious vs inflammatory etiology. Started on IV steroids for possible pneumonitis, course c/b steroid induced hyperglycemia.

## 2020-10-29 NOTE — CONSULT NOTE ADULT - ATTENDING COMMENTS
69 y/o M former smoker, SCLC (dx spring 2019) s/p carboplatin/etoposide and radiation with partial response now on maintenance atezolizumab immunotherapy every 3 weeks (c/b brain metastases now s/p gamma knife radiation therapy with improved brain MRI) now admitted for hypoxemia. Patinet with worsening bilateral ground glass infiltrates predominately in peribronchovascular distribution over past month. Patient also with new lower extremity rash in similar time frame thought to be secondary to immunotherapy. Differential remains broad, however suspect likely pneumonitis from atezolizumab, however infection remains in the differential.    - Plan for bronchoscopy with TBBx to r/o infection.  - Hold steroids/abx for now as patient not toxic appearing  - Hold atezolizumab  - Recommend derm consult for biopsy of skin lesions
Rash consistent with lichenoid drug eruption which is one of the most common cutaneous toxicities of immunotherapy. As the rash is grade 1 at this current stage, we recommend treating conservatively with topical treatments for now. There are no systemic manifestations of these drug eruptions and it does not likely have any relation to the pulmonary findings. Interestingly, this cutaneous eruption when associated with immunotherapy has been shown to be a positive prognostic indicator in some cases.    If systemic corticosteroids are started for pneumonitis, this may lead to improvement of the rash. Systemic corticosteroids and phototherapy is typically next in the treatment algorithm when recalcitrant to topical therapies. Will continue to follow and may consider other therapies pending bronch results.     Patient seen and exam today at bedside. Chart, labs, vitals, radiology reviewed as applicable. Note reviewed and edited where appropriate.    Agree with history and physical exam. Agree with assessment and plan.
70M DM2 now exacerbated by steroid. At home he was on Lantus 10 units daily plus metformin 1g BID and Januvia 100mg daily with controlled a1c  (prior to steroid). Clarify duration of steroid to determine dc plan. If dc on steroid will likely need basal bolus plan for dc.  Clarify triglyceride regimen as Lovaza dose is lower than usual. Check lipid profile in AM.  Can follow up with endocrine as outpatient [phone].  Complex patient high level decision making.    Estephania Hillman MD  Division of Endocrinology  Pager: [phone]    If after 6PM or before 9AM, or on weekends/holidays, please call endocrine answering service for assistance ([phone]).  For nonurgent matters email [email or web address] for assistance.

## 2020-10-30 LAB
ANION GAP SERPL CALC-SCNC: 11 MMO/L — SIGNIFICANT CHANGE UP (ref 7–14)
BASOPHILS # BLD AUTO: 0.04 K/UL — SIGNIFICANT CHANGE UP (ref 0–0.2)
BASOPHILS NFR BLD AUTO: 0.5 % — SIGNIFICANT CHANGE UP (ref 0–2)
BUN SERPL-MCNC: 15 MG/DL — SIGNIFICANT CHANGE UP (ref 7–23)
CALCIUM SERPL-MCNC: 9.2 MG/DL — SIGNIFICANT CHANGE UP (ref 8.4–10.5)
CHLORIDE SERPL-SCNC: 102 MMOL/L — SIGNIFICANT CHANGE UP (ref 98–107)
CHOLEST SERPL-MCNC: 158 MG/DL — SIGNIFICANT CHANGE UP (ref 120–199)
CO2 SERPL-SCNC: 24 MMOL/L — SIGNIFICANT CHANGE UP (ref 22–31)
CREAT SERPL-MCNC: 0.76 MG/DL — SIGNIFICANT CHANGE UP (ref 0.5–1.3)
DIRECT LDL: 110 MG/DL — SIGNIFICANT CHANGE UP
EOSINOPHIL # BLD AUTO: 0.06 K/UL — SIGNIFICANT CHANGE UP (ref 0–0.5)
EOSINOPHIL NFR BLD AUTO: 0.8 % — SIGNIFICANT CHANGE UP (ref 0–6)
GLUCOSE SERPL-MCNC: 211 MG/DL — HIGH (ref 70–99)
HCT VFR BLD CALC: 40 % — SIGNIFICANT CHANGE UP (ref 39–50)
HDLC SERPL-MCNC: 39 MG/DL — SIGNIFICANT CHANGE UP (ref 35–55)
HGB BLD-MCNC: 12.3 G/DL — LOW (ref 13–17)
IMM GRANULOCYTES NFR BLD AUTO: 0.7 % — SIGNIFICANT CHANGE UP (ref 0–1.5)
LYMPHOCYTES # BLD AUTO: 0.51 K/UL — LOW (ref 1–3.3)
LYMPHOCYTES # BLD AUTO: 6.9 % — LOW (ref 13–44)
MAGNESIUM SERPL-MCNC: 1.6 MG/DL — SIGNIFICANT CHANGE UP (ref 1.6–2.6)
MCHC RBC-ENTMCNC: 28.8 PG — SIGNIFICANT CHANGE UP (ref 27–34)
MCHC RBC-ENTMCNC: 30.8 % — LOW (ref 32–36)
MCV RBC AUTO: 93.7 FL — SIGNIFICANT CHANGE UP (ref 80–100)
MONOCYTES # BLD AUTO: 0.7 K/UL — SIGNIFICANT CHANGE UP (ref 0–0.9)
MONOCYTES NFR BLD AUTO: 9.4 % — SIGNIFICANT CHANGE UP (ref 2–14)
NEUTROPHILS # BLD AUTO: 6.06 K/UL — SIGNIFICANT CHANGE UP (ref 1.8–7.4)
NEUTROPHILS NFR BLD AUTO: 81.7 % — HIGH (ref 43–77)
NRBC # FLD: 0 K/UL — SIGNIFICANT CHANGE UP (ref 0–0)
PHOSPHATE SERPL-MCNC: 4 MG/DL — SIGNIFICANT CHANGE UP (ref 2.5–4.5)
PLATELET # BLD AUTO: 363 K/UL — SIGNIFICANT CHANGE UP (ref 150–400)
PMV BLD: 9 FL — SIGNIFICANT CHANGE UP (ref 7–13)
POTASSIUM SERPL-MCNC: 3.8 MMOL/L — SIGNIFICANT CHANGE UP (ref 3.5–5.3)
POTASSIUM SERPL-SCNC: 3.8 MMOL/L — SIGNIFICANT CHANGE UP (ref 3.5–5.3)
RBC # BLD: 4.27 M/UL — SIGNIFICANT CHANGE UP (ref 4.2–5.8)
RBC # FLD: 14.3 % — SIGNIFICANT CHANGE UP (ref 10.3–14.5)
SODIUM SERPL-SCNC: 137 MMOL/L — SIGNIFICANT CHANGE UP (ref 135–145)
TRIGL SERPL-MCNC: 196 MG/DL — HIGH (ref 10–149)
WBC # BLD: 7.42 K/UL — SIGNIFICANT CHANGE UP (ref 3.8–10.5)
WBC # FLD AUTO: 7.42 K/UL — SIGNIFICANT CHANGE UP (ref 3.8–10.5)

## 2020-10-30 PROCEDURE — 99233 SBSQ HOSP IP/OBS HIGH 50: CPT

## 2020-10-30 PROCEDURE — 99232 SBSQ HOSP IP/OBS MODERATE 35: CPT

## 2020-10-30 PROCEDURE — 99233 SBSQ HOSP IP/OBS HIGH 50: CPT | Mod: GC

## 2020-10-30 RX ORDER — INSULIN GLARGINE 100 [IU]/ML
26 INJECTION, SOLUTION SUBCUTANEOUS AT BEDTIME
Refills: 0 | Status: DISCONTINUED | OUTPATIENT
Start: 2020-10-30 | End: 2020-10-31

## 2020-10-30 RX ORDER — INSULIN LISPRO 100/ML
VIAL (ML) SUBCUTANEOUS AT BEDTIME
Refills: 0 | Status: DISCONTINUED | OUTPATIENT
Start: 2020-10-30 | End: 2020-11-02

## 2020-10-30 RX ORDER — INSULIN LISPRO 100/ML
14 VIAL (ML) SUBCUTANEOUS
Refills: 0 | Status: DISCONTINUED | OUTPATIENT
Start: 2020-10-30 | End: 2020-10-31

## 2020-10-30 RX ORDER — OMEGA-3 ACID ETHYL ESTERS 1 G
2 CAPSULE ORAL
Refills: 0 | Status: DISCONTINUED | OUTPATIENT
Start: 2020-10-30 | End: 2020-11-02

## 2020-10-30 RX ORDER — INSULIN LISPRO 100/ML
VIAL (ML) SUBCUTANEOUS
Refills: 0 | Status: DISCONTINUED | OUTPATIENT
Start: 2020-10-30 | End: 2020-11-02

## 2020-10-30 RX ADMIN — Medication 81 MILLIGRAM(S): at 12:38

## 2020-10-30 RX ADMIN — Medication 14 UNIT(S): at 17:56

## 2020-10-30 RX ADMIN — Medication 1000 MILLIGRAM(S): at 21:56

## 2020-10-30 RX ADMIN — BUDESONIDE AND FORMOTEROL FUMARATE DIHYDRATE 2 PUFF(S): 160; 4.5 AEROSOL RESPIRATORY (INHALATION) at 08:25

## 2020-10-30 RX ADMIN — Medication 5: at 12:37

## 2020-10-30 RX ADMIN — Medication 1 GRAM(S): at 12:38

## 2020-10-30 RX ADMIN — Medication 50 MILLIGRAM(S): at 18:02

## 2020-10-30 RX ADMIN — MAGNESIUM OXIDE 400 MG ORAL TABLET 400 MILLIGRAM(S): 241.3 TABLET ORAL at 18:02

## 2020-10-30 RX ADMIN — Medication 12 UNIT(S): at 12:37

## 2020-10-30 RX ADMIN — FAMOTIDINE 20 MILLIGRAM(S): 10 INJECTION INTRAVENOUS at 05:02

## 2020-10-30 RX ADMIN — ENOXAPARIN SODIUM 40 MILLIGRAM(S): 100 INJECTION SUBCUTANEOUS at 12:38

## 2020-10-30 RX ADMIN — Medication 8: at 17:56

## 2020-10-30 RX ADMIN — Medication 1 APPLICATION(S): at 05:02

## 2020-10-30 RX ADMIN — MAGNESIUM OXIDE 400 MG ORAL TABLET 400 MILLIGRAM(S): 241.3 TABLET ORAL at 12:38

## 2020-10-30 RX ADMIN — BUDESONIDE AND FORMOTEROL FUMARATE DIHYDRATE 2 PUFF(S): 160; 4.5 AEROSOL RESPIRATORY (INHALATION) at 21:57

## 2020-10-30 RX ADMIN — Medication 75 MILLIGRAM(S): at 05:03

## 2020-10-30 RX ADMIN — INSULIN GLARGINE 26 UNIT(S): 100 INJECTION, SOLUTION SUBCUTANEOUS at 21:56

## 2020-10-30 RX ADMIN — Medication 1 APPLICATION(S): at 18:03

## 2020-10-30 RX ADMIN — FAMOTIDINE 20 MILLIGRAM(S): 10 INJECTION INTRAVENOUS at 18:02

## 2020-10-30 RX ADMIN — Medication 1 TABLET(S): at 12:37

## 2020-10-30 RX ADMIN — MAGNESIUM OXIDE 400 MG ORAL TABLET 400 MILLIGRAM(S): 241.3 TABLET ORAL at 08:25

## 2020-10-30 RX ADMIN — Medication 2: at 08:24

## 2020-10-30 RX ADMIN — ATORVASTATIN CALCIUM 40 MILLIGRAM(S): 80 TABLET, FILM COATED ORAL at 21:57

## 2020-10-30 RX ADMIN — Medication 12 UNIT(S): at 08:24

## 2020-10-30 RX ADMIN — Medication 2 GRAM(S): at 18:03

## 2020-10-30 NOTE — PROGRESS NOTE ADULT - SUBJECTIVE AND OBJECTIVE BOX
Modesto Ibanez MD  Pager [phone]    Patient is a 70y old  Male who presents with a chief complaint of Hypoxia, abnormal CT (29 Oct 2020 17:29)      SUBJECTIVE / OVERNIGHT EVENTS:  Patient was seen and examined at bedside this morning. No acute events overnight. On continuous pulse ox, lowest desat briefly to 86%.  ID# [id number]. Patient reports feeling well this morning, no complaints. Denies fever, chills, shortness of breath, cough, chest pain. Also    ADDITIONAL REVIEW OF SYSTEMS: Otherwise negative.    MEDICATIONS  (STANDING):  aspirin enteric coated 81 milliGRAM(s) Oral daily  atorvastatin 40 milliGRAM(s) Oral at bedtime  budesonide 160 MICROgram(s)/formoterol 4.5 MICROgram(s) Inhaler 2 Puff(s) Inhalation two times a day  clobetasol 0.05% Ointment 1 Application(s) Topical two times a day  dextrose 5%. 1000 milliLiter(s) (50 mL/Hr) IV Continuous <Continuous>  dextrose 50% Injectable 12.5 Gram(s) IV Push once  dextrose 50% Injectable 25 Gram(s) IV Push once  dextrose 50% Injectable 25 Gram(s) IV Push once  enoxaparin Injectable 40 milliGRAM(s) SubCutaneous daily  famotidine    Tablet 20 milliGRAM(s) Oral two times a day  insulin glargine Injectable (LANTUS) 24 Unit(s) SubCutaneous at bedtime  insulin lispro (ADMELOG) corrective regimen sliding scale   SubCutaneous three times a day before meals  insulin lispro (ADMELOG) corrective regimen sliding scale   SubCutaneous at bedtime  insulin lispro Injectable (ADMELOG) 12 Unit(s) SubCutaneous three times a day before meals  magnesium oxide 400 milliGRAM(s) Oral three times a day with meals  methylPREDNISolone sodium succinate Injectable 75 milliGRAM(s) IV Push daily  metoprolol succinate ER 50 milliGRAM(s) Oral daily  niacin SR 1000 milliGRAM(s) Oral at bedtime  omega-3-Acid Ethyl Esters 1 Gram(s) Oral daily    MEDICATIONS  (PRN):  albuterol/ipratropium for Nebulization 3 milliLiter(s) Nebulizer every 4 hours PRN Shortness of Breath and/or Wheezing  dextrose 40% Gel 15 Gram(s) Oral once PRN Blood Glucose LESS THAN 70 milliGRAM(s)/deciliter  glucagon  Injectable 1 milliGRAM(s) IntraMuscular once PRN Glucose LESS THAN 70 milligrams/deciliter      CAPILLARY BLOOD GLUCOSE      POCT Blood Glucose.: 225 mg/dL (30 Oct 2020 07:26)  POCT Blood Glucose.: 221 mg/dL (29 Oct 2020 21:04)  POCT Blood Glucose.: 324 mg/dL (29 Oct 2020 17:06)  POCT Blood Glucose.: 364 mg/dL (29 Oct 2020 11:46)    I&O's Summary      PHYSICAL EXAM:  Vital Signs Last 24 Hrs  T(C): 36.4 (30 Oct 2020 05:01), Max: 36.9 (29 Oct 2020 20:49)  T(F): 97.6 (30 Oct 2020 05:01), Max: 98.4 (29 Oct 2020 20:49)  HR: 60 (30 Oct 2020 05:01) (60 - 73)  BP: 105/60 (30 Oct 2020 05:01) (105/60 - 130/97)  BP(mean): --  RR: 18 (30 Oct 2020 05:01) (18 - 18)  SpO2: 93% (30 Oct 2020 05:01) (92% - 98%)    CONSTITUTIONAL: NAD, lying comfortably  EYES: PERRL; conjunctiva and sclera clear  ENMT: Moist oral mucosa, no pharyngeal erythema or exudates  NECK: Supple, no palpable masses or lymphadenopathy  RESPIRATORY: Normal respiratory effort; lungs are clear to auscultation bilaterally, no wheezes or crackles  CARDIOVASCULAR: Regular rate and rhythm, normal S1 and S2, no murmurs gallops or rubs; No lower extremity edema; No JVD  ABDOMEN: Nondistended, normoactive bowel sounds; Soft, nontender to palpation, no rebound/guarding; No hepatosplenomegaly  EXTREMITIES:  2+ peripheral pulses bilaterally; no joint swelling or tenderness to palpation; no clubbing or cyanosis  PSYCH: Alert and oriented to person, place, and time; affect appropriate  NEUROLOGY: CN 2-12 intact; no gross motor or sensory deficits  SKIN: No rashes; no palpable lesions    LABS:                        12.3   7.42  )-----------( 363      ( 30 Oct 2020 04:30 )             40.0     10-30    137  |  102  |  15  ----------------------------<  211<H>  3.8   |  24  |  0.76    Ca    9.2      30 Oct 2020 04:30  Phos  4.0     10-30  Mg     1.6     10-30                  RADIOLOGY & ADDITIONAL TESTS:   Modesto Ibanez MD  Pager [phone]    Patient is a 70y old  Male who presents with a chief complaint of Hypoxia, abnormal CT (29 Oct 2020 17:29)      SUBJECTIVE / OVERNIGHT EVENTS:  Patient was seen and examined at bedside this morning. No acute events overnight. On continuous pulse ox, lowest desat briefly to 86%.  ID# [id number]. Patient reports feeling well this morning, no complaints. Denies fever, chills, shortness of breath, cough, chest pain. Also spoke with daughter Leena over phone at bedside and updated both patient and family on plan to possibly increase steroids per oncology before converting to PO for extended taper.     ADDITIONAL REVIEW OF SYSTEMS: Otherwise negative.    MEDICATIONS  (STANDING):  aspirin enteric coated 81 milliGRAM(s) Oral daily  atorvastatin 40 milliGRAM(s) Oral at bedtime  budesonide 160 MICROgram(s)/formoterol 4.5 MICROgram(s) Inhaler 2 Puff(s) Inhalation two times a day  clobetasol 0.05% Ointment 1 Application(s) Topical two times a day  dextrose 5%. 1000 milliLiter(s) (50 mL/Hr) IV Continuous <Continuous>  dextrose 50% Injectable 12.5 Gram(s) IV Push once  dextrose 50% Injectable 25 Gram(s) IV Push once  dextrose 50% Injectable 25 Gram(s) IV Push once  enoxaparin Injectable 40 milliGRAM(s) SubCutaneous daily  famotidine    Tablet 20 milliGRAM(s) Oral two times a day  insulin glargine Injectable (LANTUS) 24 Unit(s) SubCutaneous at bedtime  insulin lispro (ADMELOG) corrective regimen sliding scale   SubCutaneous three times a day before meals  insulin lispro (ADMELOG) corrective regimen sliding scale   SubCutaneous at bedtime  insulin lispro Injectable (ADMELOG) 12 Unit(s) SubCutaneous three times a day before meals  magnesium oxide 400 milliGRAM(s) Oral three times a day with meals  methylPREDNISolone sodium succinate Injectable 75 milliGRAM(s) IV Push daily  metoprolol succinate ER 50 milliGRAM(s) Oral daily  niacin SR 1000 milliGRAM(s) Oral at bedtime  omega-3-Acid Ethyl Esters 1 Gram(s) Oral daily    MEDICATIONS  (PRN):  albuterol/ipratropium for Nebulization 3 milliLiter(s) Nebulizer every 4 hours PRN Shortness of Breath and/or Wheezing  dextrose 40% Gel 15 Gram(s) Oral once PRN Blood Glucose LESS THAN 70 milliGRAM(s)/deciliter  glucagon  Injectable 1 milliGRAM(s) IntraMuscular once PRN Glucose LESS THAN 70 milligrams/deciliter      CAPILLARY BLOOD GLUCOSE      POCT Blood Glucose.: 225 mg/dL (30 Oct 2020 07:26)  POCT Blood Glucose.: 221 mg/dL (29 Oct 2020 21:04)  POCT Blood Glucose.: 324 mg/dL (29 Oct 2020 17:06)  POCT Blood Glucose.: 364 mg/dL (29 Oct 2020 11:46)    I&O's Summary      PHYSICAL EXAM:  Vital Signs Last 24 Hrs  T(C): 36.4 (30 Oct 2020 05:01), Max: 36.9 (29 Oct 2020 20:49)  T(F): 97.6 (30 Oct 2020 05:01), Max: 98.4 (29 Oct 2020 20:49)  HR: 60 (30 Oct 2020 05:01) (60 - 73)  BP: 105/60 (30 Oct 2020 05:01) (105/60 - 130/97)  BP(mean): --  RR: 18 (30 Oct 2020 05:01) (18 - 18)  SpO2: 93% (30 Oct 2020 05:01) (92% - 98%)    GENERAL: NAD, sitting comfortably  HEAD:  Atraumatic, Normocephalic  EYES: EOMI, PERRL, conjunctiva and sclera clear  ENT: Mucous membranes moist, no pharyngeal erythema  NECK: Supple, No Lymphadenopathy  CHEST/LUNG: LCTABL; NC in place, speaking in full sentences  CV: Regular rate and rhythm, S1/S2 equal; No murmurs, rubs, or gallops; No JVD or hepatojugular reflux  ABDOMEN: Soft, Nontender, Nondistended; Bowel sounds present  EXTREMITIES:  2+ Peripheral Pulses, No clubbing, cyanosis, or edema  PSYCH: AAOx3; affect appropriate  NEUROLOGY: no focal motor or sensory deficits, muscle strength 5/5 bilaterally, quadriceps reflex intact  SKIN: Diffuse nontender non-itching blanching macular rash on lower extremities, buttocks, and trunk with 2-3 open blisters on b/l ankle and few lesions on hands/palms; improving    LABS:                        12.3   7.42  )-----------( 363      ( 30 Oct 2020 04:30 )             40.0     10-30    137  |  102  |  15  ----------------------------<  211<H>  3.8   |  24  |  0.76    Ca    9.2      30 Oct 2020 04:30  Phos  4.0     10-30  Mg     1.6     10-30      RADIOLOGY & ADDITIONAL TESTS:  Surgical Final Report   Final Diagnosis   Lung, right upper lobe, biopsy   - Chronic interstitial inflammation and type 2 pneumocyte   hyperplasia   - No evidence ofmalignancy Modesto Ibanez MD  Pager [phone]    Patient is a 70y old  Male who presents with a chief complaint of Hypoxia, abnormal CT (29 Oct 2020 17:29)      SUBJECTIVE / OVERNIGHT EVENTS:  Patient was seen and examined at bedside this morning. No acute events overnight. On continuous pulse ox, lowest desat briefly to 86%.  ID# [id number]. Patient reports feeling well this morning, no complaints. Denies fever, chills, shortness of breath, cough, chest pain. Also spoke with daughter Leena over phone at bedside and updated both patient and family on plan to possibly increase steroids per oncology before converting to PO for extended taper.     ADDITIONAL REVIEW OF SYSTEMS: Otherwise negative.    MEDICATIONS  (STANDING):  aspirin enteric coated 81 milliGRAM(s) Oral daily  atorvastatin 40 milliGRAM(s) Oral at bedtime  budesonide 160 MICROgram(s)/formoterol 4.5 MICROgram(s) Inhaler 2 Puff(s) Inhalation two times a day  clobetasol 0.05% Ointment 1 Application(s) Topical two times a day  dextrose 5%. 1000 milliLiter(s) (50 mL/Hr) IV Continuous <Continuous>  dextrose 50% Injectable 12.5 Gram(s) IV Push once  dextrose 50% Injectable 25 Gram(s) IV Push once  dextrose 50% Injectable 25 Gram(s) IV Push once  enoxaparin Injectable 40 milliGRAM(s) SubCutaneous daily  famotidine    Tablet 20 milliGRAM(s) Oral two times a day  insulin glargine Injectable (LANTUS) 24 Unit(s) SubCutaneous at bedtime  insulin lispro (ADMELOG) corrective regimen sliding scale   SubCutaneous three times a day before meals  insulin lispro (ADMELOG) corrective regimen sliding scale   SubCutaneous at bedtime  insulin lispro Injectable (ADMELOG) 12 Unit(s) SubCutaneous three times a day before meals  magnesium oxide 400 milliGRAM(s) Oral three times a day with meals  methylPREDNISolone sodium succinate Injectable 75 milliGRAM(s) IV Push daily  metoprolol succinate ER 50 milliGRAM(s) Oral daily  niacin SR 1000 milliGRAM(s) Oral at bedtime  omega-3-Acid Ethyl Esters 1 Gram(s) Oral daily    MEDICATIONS  (PRN):  albuterol/ipratropium for Nebulization 3 milliLiter(s) Nebulizer every 4 hours PRN Shortness of Breath and/or Wheezing  dextrose 40% Gel 15 Gram(s) Oral once PRN Blood Glucose LESS THAN 70 milliGRAM(s)/deciliter  glucagon  Injectable 1 milliGRAM(s) IntraMuscular once PRN Glucose LESS THAN 70 milligrams/deciliter      CAPILLARY BLOOD GLUCOSE      POCT Blood Glucose.: 225 mg/dL (30 Oct 2020 07:26)  POCT Blood Glucose.: 221 mg/dL (29 Oct 2020 21:04)  POCT Blood Glucose.: 324 mg/dL (29 Oct 2020 17:06)  POCT Blood Glucose.: 364 mg/dL (29 Oct 2020 11:46)    I&O's Summary      PHYSICAL EXAM:  Vital Signs Last 24 Hrs  T(C): 36.4 (30 Oct 2020 05:01), Max: 36.9 (29 Oct 2020 20:49)  T(F): 97.6 (30 Oct 2020 05:01), Max: 98.4 (29 Oct 2020 20:49)  HR: 60 (30 Oct 2020 05:01) (60 - 73)  BP: 105/60 (30 Oct 2020 05:01) (105/60 - 130/97)  BP(mean): --  RR: 18 (30 Oct 2020 05:01) (18 - 18)  SpO2: 93% (30 Oct 2020 05:01) (92% - 98%)    GENERAL: NAD, sitting comfortably  HEAD:  Atraumatic, Normocephalic  EYES: EOMI, PERRL, conjunctiva and sclera clear  ENT: Mucous membranes moist, no pharyngeal erythema  NECK: Supple, No Lymphadenopathy  CHEST/LUNG: LCTABL; NC in place, speaking in full sentences  CV: Regular rate and rhythm, S1/S2 equal; No murmurs, rubs, or gallops; No JVD or hepatojugular reflux  ABDOMEN: Soft, Nontender, Nondistended; Bowel sounds present  EXTREMITIES:  2+ Peripheral Pulses, No clubbing, cyanosis, or edema  PSYCH: AAOx3; affect appropriate  NEUROLOGY: no focal motor or sensory deficits, muscle strength 5/5 bilaterally, quadriceps reflex intact  SKIN: Diffuse nontender non-itching blanching macular rash on lower extremities, buttocks, and trunk with 2-3 open blisters on b/l ankle and few lesions on hands/palms; improving    LABS:                        12.3   7.42  )-----------( 363      ( 30 Oct 2020 04:30 )             40.0     10-30    137  |  102  |  15  ----------------------------<  211<H>  3.8   |  24  |  0.76    Ca    9.2      30 Oct 2020 04:30  Phos  4.0     10-30  Mg     1.6     10-30      RADIOLOGY & ADDITIONAL TESTS:  Surgical Final Report   Final Diagnosis   Lung, right upper lobe, biopsy   - Chronic interstitial inflammation and type 2 pneumocyte   hyperplasia   - No evidence ofmalignancy     Consultant notes reviewed: Pulm, Endo, Heme/onc

## 2020-10-30 NOTE — PROGRESS NOTE ADULT - ASSESSMENT
Patient with extensive stage small cell lung cancer who has been on maintenance immunotherapy with Atezolizumab, p/w worsening SOB and hypoxia to mid-80’s on RA.   CT chest with b/l diffuse opacities concerning for infection vs pneumonitis, on immune checkpoint inhibitor therapy.    -Pulm consult appreciated. S/p bronch w/ BAL and transbronchial biopsy 10/23. BAL cx NGTD.  BAL cultures, fungitell, PCP +NGTD.  Biopsy sig  chronic interstitial inflammation and type 2 pneumocyte hyperplasia.  -Will continue to treat for pneumonitis   - Patient now on  Solumedrol (1mg/kg/day), WOULD transition to oral steroids at an increased dose, given increased supplemental oxygen demand. Please start Prednisone 2mg/kg/day  -Patient will likely need home supplemental oxygen. Please arrange.  -Consider Covid IgG, ordered, pending collection  -Derm Consult recs appreciated, rash improving  -Patient to followup with Dr. Ramirez ([facility]) upon discharge  -C/w Supportive care, pain control, Nutrition, PT, DVT ppx  -Oncology will continue to follow with you      Case d/w Dr. Desire RG  Oncology Physician Assistant  Jo JEAN/[facility]  Pager [phone]    If after 5pm or weekends please page On-call Oncology Fellow

## 2020-10-30 NOTE — PROGRESS NOTE ADULT - PROBLEM SELECTOR PLAN 5
-Recently started on insulin as outpatient  -Lantus now increased to 18u QHS with Admelog 9u TIDAC and FS and SSI TIDAC/QHS given hyperglycemia after increase in dose of steroids  -Will monitor FS and further adjust regimen as needed  -Regular diet (consistent carb/dash/tlc)  -A1C 6.9 -Recently started on insulin as outpatient  -Endocrine consulted for steroid-induced hyperglycemia; recs appreciated  -Lantus now increased to 24u QHS with Admelog 12u TIDAC and FS and SSI TIDAC/QHS given hyperglycemia  -Will monitor FS and further adjust regimen as needed  -Regular diet (consistent carb/dash/tlc)  -A1C 6.9 -Recently started on insulin as outpatient  -Endocrine consulted for steroid-induced hyperglycemia; recs appreciated  -Lantus now increased to 26u QHS with Admelog 14u TIDAC and FS and SSI TIDAC/QHS given hyperglycemia  -Will monitor FS and further adjust regimen as needed  -Regular diet (consistent carb/dash/tlc)  -A1C 6.9

## 2020-10-30 NOTE — PROGRESS NOTE ADULT - ASSESSMENT
Patient is a 71yo M with PMHx HTN, HLD, DM2, BPH s/p TURP, CAD s/p stent 2007, COPD, and SCLC (dx spring 2019) s/p carboplatin/etoposide and radiation with partial response now on maintenance atezolizumab immunotherapy every 3 weeks (c/b brain metastases now s/p gamma knife radiation therapy with improved brain MRI) who presents with worsening BILLINGS [id number] found to be hypoxic and with interval progression of bilateral lung opacities on CT concerning for infectious vs inflammatory etiology. Started on IV steroids for possible pneumonitis, course c/b steroid induced hyperglycemia. Endocrine consulted for steroid induced hyperglycemia.    Plan:  1. Steroid Induced Hyperglycemia  - Target glucose 100-180 mg/dL, above goal with hyperglycemia   - Will increase Lantus to 26 units for tonight   - Will increase Admelog to 14 units TID before meals, please HOLD if NPO   - Will increase Admelog correctional scale to MODERATE before meals and bedtime   - Patient is being discharged on steroids with steroid taper, will lneed basal + bolus insulin management and dosage adjustment while on steroids and steroid titrations, once steroids are completed, patient can resume home diabetes medication regimen (Basaglar, Metformin, Januvia)   - PLEASE NOTIFY ENDOCRINE OF STEROID CHANGES, IF DECREASED, WILL NEED TO DECREASE INSULIN TO PREVENT HYPOGLYCEMIA  - Please send Admelog pen to pharmacy to confirm coverage   - Patient will also need additional supplies ordered:  BD flory pen needles (4 daily)  test strips to check fingersticks 4 times daily (as per insurance)  lancets to check fingersticks 4 times daily (as per insurance)   alcohol pads  - Spoke to patient's daughter, Leena, via phone today to review insulin and diabetes plan, she understands and agrees   - Follow up scheduled for patient:  [address] [id number] [phone]  1. DM educator on 11/10 @ 10:00  2. Dr Dutton on 12/30 @ 3:30      2. HLD  - Fasting lipid panel today: triglycerides 196, target being less than 70   - continue with home atorvastatin  - continue with Niacin as ordered   - lovaza dosage normally 2 g BID, will increase lovaza to that dosage as family in not aware of any known reason why he was on a lower dose   - follow lipids as outpatient       3. HTN  - target BP less then 130/80  - will defer adjustment of HTN medications to primary team    35 minutes with greater than 50% of time spent counseling and coordinating care for uncontrolled diabetes. Education for medication management, and follow up care needed as well as coordination of outpatient follow up and discussion with family care provider     MINISTERIO Palmer-BC  Division of Endocrinology  Pager: TED pager [phone]    If out of hospital/unavailable when paged, please note: patient will be cared for by another provider on the endocrine service.  Please call the endocrine answering service for assistance to reach covering provider ([phone]). For non-urgent matters, please email [email or web address] for assistance.      Patient is a 71yo M with PMHx HTN, HLD, DM2, BPH s/p TURP, CAD s/p stent 2007, COPD, and SCLC (dx spring 2019) s/p carboplatin/etoposide and radiation with partial response now on maintenance atezolizumab immunotherapy every 3 weeks (c/b brain metastases now s/p gamma knife radiation therapy with improved brain MRI) who presents with worsening BILLINGS [id number] found to be hypoxic and with interval progression of bilateral lung opacities on CT concerning for infectious vs inflammatory etiology. Started on IV steroids for possible pneumonitis, course c/b steroid induced hyperglycemia. Endocrine consulted for steroid induced hyperglycemia.    Plan:  1. Steroid Induced Hyperglycemia  - Target glucose 100-180 mg/dL, above goal with hyperglycemia   - Will increase Lantus to 26 units for tonight   - Will increase Admelog to 14 units TID before meals, please HOLD if NPO   - Will increase Admelog correctional scale to MODERATE before meals and bedtime   - Patient is being discharged on steroids with steroid taper, will need basal + bolus insulin management and dosage adjustment while on steroids and steroid titrations, once steroids are completed, patient can resume home diabetes medication regimen (Basaglar, Metformin, Januvia)   - PLEASE NOTIFY ENDOCRINE OF STEROID CHANGES, IF DECREASED, WILL NEED TO DECREASE INSULIN TO PREVENT HYPOGLYCEMIA  - Please send Admelog pen to pharmacy to confirm coverage   - Patient will also need additional supplies ordered:  BD flory pen needles (4 daily)  test strips to check fingersticks 4 times daily (as per insurance)  lancets to check fingersticks 4 times daily (as per insurance)   alcohol pads  - Spoke to patient's daughter, Leena, via phone today to review insulin and diabetes plan, she understands and agrees   - Follow up scheduled for patient:  [address] [place] [address] [phone]  1. DM educator on 11/10 @ 10:00  2. Dr Dutton on 12/30 @ 3:30      2. HLD  - Fasting lipid panel today: triglycerides 196, target being less than 70   - continue with home atorvastatin  - continue with Niacin as ordered   - lovaza dosage normally 2 g BID, will increase lovaza to that dosage as family in not aware of any known reason why he was on a lower dose   - follow lipids as outpatient       3. HTN  - target BP less then 130/80  - will defer adjustment of HTN medications to primary team    35 minutes with greater than 50% of time spent counseling and coordinating care for uncontrolled diabetes. Education for medication management, and follow up care needed as well as coordination of outpatient follow up and discussion with family care provider     MINISTERIO Palmer-BC  Division of Endocrinology  Pager: TED pager [phone]    If out of hospital/unavailable when paged, please note: patient will be cared for by another provider on the endocrine service.  Please call the endocrine answering service for assistance to reach covering provider ([phone]). For non-urgent matters, please email [email or web address] for assistance.      Patient is a 69yo M with PMHx HTN, HLD, DM2, BPH s/p TURP, CAD s/p stent 2007, COPD, and SCLC (dx spring 2019) s/p carboplatin/etoposide and radiation with partial response now on maintenance atezolizumab immunotherapy every 3 weeks (c/b brain metastases now s/p gamma knife radiation therapy with improved brain MRI) who presents with worsening BILLINGS [id number] found to be hypoxic and with interval progression of bilateral lung opacities on CT concerning for infectious vs inflammatory etiology. Started on IV steroids for possible pneumonitis, course c/b steroid induced hyperglycemia. Endocrine consulted for steroid induced hyperglycemia.    Plan:  1. Steroid Induced Hyperglycemia  - Target glucose 100-180 mg/dL, above goal with hyperglycemia   - Will increase Lantus to 26 units for tonight   - Will increase Admelog to 14 units TID before meals, please HOLD if NPO   - Will increase Admelog correctional scale to MODERATE before meals and bedtime   - Patient is being discharged on steroids with steroid taper, will need basal + bolus insulin management (stop outpatient oral regimen while on basal/bolus insulin) and dosage adjustment while on steroids and steroid titrations, once steroids are completed, patient can resume home diabetes medication regimen (Basaglar, Metformin, Januvia)   - PLEASE NOTIFY ENDOCRINE OF STEROID CHANGES, IF DECREASED, WILL NEED TO DECREASE INSULIN TO PREVENT HYPOGLYCEMIA  - Please send Admelog pen to pharmacy to confirm coverage   - Patient will also need additional supplies ordered:  BD flory pen needles (4 daily)  test strips to check fingersticks 4 times daily (as per insurance)  lancets to check fingersticks 4 times daily (as per insurance)   alcohol pads  - Spoke to patient's daughter, Leena, via phone today to review insulin and diabetes plan, she understands and agrees   - Follow up scheduled for patient:  [address] [place] [address]  [phone]  1. DM educator on 11/10 @ 10:00  2. Dr Dutton on 12/30 @ 3:30      2. HLD  - Fasting lipid panel today: triglycerides 196, target being less than 70   - continue with home atorvastatin  - continue with Niacin as ordered   - lovaza dosage normally 2 g BID, will increase lovaza to that dosage as family in not aware of any known reason why he was on a lower dose   - follow lipids as outpatient       3. HTN  - target BP less then 130/80  - will defer adjustment of HTN medications to primary team    35 minutes with greater than 50% of time spent counseling and coordinating care for uncontrolled diabetes. Education for medication management, and follow up care needed as well as coordination of outpatient follow up and discussion with family care provider     MINISTERIO Palmer-BC  Division of Endocrinology  Pager: TED pager [phone]    If out of hospital/unavailable when paged, please note: patient will be cared for by another provider on the endocrine service.  Please call the endocrine answering service for assistance to reach covering provider ([phone]). For non-urgent matters, please email [email or web address] for assistance.

## 2020-10-30 NOTE — PROGRESS NOTE ADULT - SUBJECTIVE AND OBJECTIVE BOX
INTERVAL HPI/OVERNIGHT EVENTS:  Patient seen at bedside.  Patient on supplemental oxygen  No overnight events reported  Spoke to patient with his dtr Leena available by phone,  who provided translation per pt request      VITAL SIGNS:  T(F): 97.4 (10-30-20 @ 11:49)  HR: 73 (10-30-20 @ 11:49)  BP: 124/62 (10-30-20 @ 11:49)  RR: 18 (10-30-20 @ 11:49)  SpO2: 94% (10-30-20 @ 11:49)  Wt(kg): --    PHYSICAL EXAM:  In accordance with current standard limiting patient contact, deferred physical exam  2/2 COVID pandemic  Please refer to physical exam of primary team.    MEDICATIONS  (STANDING):  aspirin enteric coated 81 milliGRAM(s) Oral daily  atorvastatin 40 milliGRAM(s) Oral at bedtime  budesonide 160 MICROgram(s)/formoterol 4.5 MICROgram(s) Inhaler 2 Puff(s) Inhalation two times a day  clobetasol 0.05% Ointment 1 Application(s) Topical two times a day  dextrose 5%. 1000 milliLiter(s) (50 mL/Hr) IV Continuous <Continuous>  dextrose 50% Injectable 12.5 Gram(s) IV Push once  dextrose 50% Injectable 25 Gram(s) IV Push once  dextrose 50% Injectable 25 Gram(s) IV Push once  enoxaparin Injectable 40 milliGRAM(s) SubCutaneous daily  famotidine    Tablet 20 milliGRAM(s) Oral two times a day  insulin glargine Injectable (LANTUS) 24 Unit(s) SubCutaneous at bedtime  insulin lispro (ADMELOG) corrective regimen sliding scale   SubCutaneous three times a day before meals  insulin lispro (ADMELOG) corrective regimen sliding scale   SubCutaneous at bedtime  insulin lispro Injectable (ADMELOG) 12 Unit(s) SubCutaneous three times a day before meals  magnesium oxide 400 milliGRAM(s) Oral three times a day with meals  methylPREDNISolone sodium succinate Injectable 75 milliGRAM(s) IV Push daily  metoprolol succinate ER 50 milliGRAM(s) Oral daily  niacin SR 1000 milliGRAM(s) Oral at bedtime  omega-3-Acid Ethyl Esters 1 Gram(s) Oral daily  trimethoprim  160 mG/sulfamethoxazole 800 mG 1 Tablet(s) Oral <User Schedule>    MEDICATIONS  (PRN):  albuterol/ipratropium for Nebulization 3 milliLiter(s) Nebulizer every 4 hours PRN Shortness of Breath and/or Wheezing  dextrose 40% Gel 15 Gram(s) Oral once PRN Blood Glucose LESS THAN 70 milliGRAM(s)/deciliter  glucagon  Injectable 1 milliGRAM(s) IntraMuscular once PRN Glucose LESS THAN 70 milligrams/deciliter      Allergies    No Known Allergies    Intolerances        LABS:                        12.3   7.42  )-----------( 363      ( 30 Oct 2020 04:30 )             40.0     10-30    137  |  102  |  15  ----------------------------<  211<H>  3.8   |  24  |  0.76    Ca    9.2      30 Oct 2020 04:30  Phos  4.0     10-30  Mg     1.6     10-30            RADIOLOGY & ADDITIONAL TESTS:  Studies reviewed.

## 2020-10-30 NOTE — PROGRESS NOTE ADULT - ASSESSMENT
Patient is a 69 yo M w/ HTN, HLD, DM, BPH s/p TURP, CAD s/p stent 2007, COPD, and SCLC (dx spring 2019) s/p carboplatin/etoposide and radiation with partial response now on maintenance atezolizumab immunotherapy every 3 weeks (c/b brain metastases now s/p gamma knife radiation therapy with improved brain MRI) admitted on 10/21 after being found to be hypoxemic at oncology clinic. As per patient and chart review, endorses worsening BILLINGS and dry cough x 1 month. On image review, patient has had progression of peribronchovascular opacities since CT chest 8/31/2020 with marked progression between CT from 10/19/2020 and 10/21/2020. S/P Bronch w/ BAL and transbronchial bx on 10/23. Improved with steroids for treatment of pneumonitis. 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

## 2020-10-30 NOTE — PROGRESS NOTE ADULT - ATTENDING COMMENTS
S/p bronch and biopsy, with chronic interstitial inflammation. Cultures negative so far.   Would suggest prednisone 2 mg/kd daily, finger stick glucose checks and PPI while on steroids, will need a very slow taper over 6 weeks as outpatient.

## 2020-10-30 NOTE — PROGRESS NOTE ADULT - ATTENDING COMMENTS
Patient seen and examined, d/w Dr. Ibanez, agree with above.     Japanese translation provided by patient's wife at bedside per patient's request. Patient and wife very happy to hear that surgical pathology was negative for malignant cells. With hypoxic respiratory failure, on steroids for suspected pneumonitis, appreciate onc and pulm recommendations, patient transitioned to 75 mg bid PO prednisone (2mg/kg/day daily dose of steroids) with plan for extended slow taper over 6 weeks as per onc. Given continued/prolonged steroid use, will c/w GI prophylaxis and start Bactrim for pcp prophylaxis. Appreciate endocrine assistance with management of steroid induced hyperglycemia, insulin regimen increased, will continue to monitor FS and adjust regimen as needed. Appreciate CM/SW assistance with setup of home O2.

## 2020-10-30 NOTE — PROGRESS NOTE ADULT - SUBJECTIVE AND OBJECTIVE BOX
CHIEF COMPLAINT:    Interval Events: O2 requirements fluctuating, now up to 3 to 4L nc again. Patient however still feels well. Surgical path returned as chronic intersitial inflammation.    REVIEW OF SYSTEMS:  Constitutional: [ ] negative [ ] fevers [ ] chills [ ] weight loss [ ] weight gain  HEENT: [ ] negative [ ] dry eyes [ ] eye irritation [ ] postnasal drip [ ] nasal congestion  CV: [ ] negative  [ ] chest pain [ ] orthopnea [ ] palpitations [ ] murmur  Resp: [ ] negative [ ] cough [ ] shortness of breath [X] dyspnea [ ] wheezing [ ] sputum [ ] hemoptysis  GI: [ ] negative [ ] nausea [ ] vomiting [ ] diarrhea [ ] constipation [ ] abd pain [ ] dysphagia   : [ ] negative [ ] dysuria [ ] nocturia [ ] hematuria [ ] increased urinary frequency  Musculoskeletal: [ ] negative [ ] back pain [ ] myalgias [ ] arthralgias [ ] fracture  Skin: [ ] negative [ ] rash [ ] itch  Neurological: [ ] negative [ ] headache [ ] dizziness [ ] syncope [ ] weakness [ ] numbness  Psychiatric: [ ] negative [ ] anxiety [ ] depression  Endocrine: [ ] negative [ ] diabetes [ ] thyroid problem  Hematologic/Lymphatic: [ ] negative [ ] anemia [ ] bleeding problem  Allergic/Immunologic: [ ] negative [ ] itchy eyes [ ] nasal discharge [ ] hives [ ] angioedema  [X] All other systems negative  [ ] Unable to assess ROS because ________    OBJECTIVE:  ICU Vital Signs Last 24 Hrs  T(C): 36.4 (30 Oct 2020 05:01), Max: 36.9 (29 Oct 2020 20:49)  T(F): 97.6 (30 Oct 2020 05:01), Max: 98.4 (29 Oct 2020 20:49)  HR: 60 (30 Oct 2020 05:01) (60 - 73)  BP: 105/60 (30 Oct 2020 05:01) (105/60 - 130/97)  BP(mean): --  ABP: --  ABP(mean): --  RR: 18 (30 Oct 2020 05:01) (18 - 18)  SpO2: 93% (30 Oct 2020 05:01) (92% - 98%)        CAPILLARY BLOOD GLUCOSE      POCT Blood Glucose.: 225 mg/dL (30 Oct 2020 07:26)      PHYSICAL EXAM:  General: NAD, resting comfortably on 4 L nc  HEENT: EOMI, PERRLA  Respiratory: CTAB  Cardiovascular: S1S2, RRR  Abdomen: Soft, NTND, BS+  Extremities: No peripheral edema  Skin: B/l shin nodular rash    HOSPITAL MEDICATIONS:  MEDICATIONS  (STANDING):  aspirin enteric coated 81 milliGRAM(s) Oral daily  atorvastatin 40 milliGRAM(s) Oral at bedtime  budesonide 160 MICROgram(s)/formoterol 4.5 MICROgram(s) Inhaler 2 Puff(s) Inhalation two times a day  clobetasol 0.05% Ointment 1 Application(s) Topical two times a day  dextrose 5%. 1000 milliLiter(s) (50 mL/Hr) IV Continuous <Continuous>  dextrose 50% Injectable 12.5 Gram(s) IV Push once  dextrose 50% Injectable 25 Gram(s) IV Push once  dextrose 50% Injectable 25 Gram(s) IV Push once  enoxaparin Injectable 40 milliGRAM(s) SubCutaneous daily  famotidine    Tablet 20 milliGRAM(s) Oral two times a day  insulin glargine Injectable (LANTUS) 24 Unit(s) SubCutaneous at bedtime  insulin lispro (ADMELOG) corrective regimen sliding scale   SubCutaneous three times a day before meals  insulin lispro (ADMELOG) corrective regimen sliding scale   SubCutaneous at bedtime  insulin lispro Injectable (ADMELOG) 12 Unit(s) SubCutaneous three times a day before meals  magnesium oxide 400 milliGRAM(s) Oral three times a day with meals  methylPREDNISolone sodium succinate Injectable 75 milliGRAM(s) IV Push daily  metoprolol succinate ER 50 milliGRAM(s) Oral daily  niacin SR 1000 milliGRAM(s) Oral at bedtime  omega-3-Acid Ethyl Esters 1 Gram(s) Oral daily    MEDICATIONS  (PRN):  albuterol/ipratropium for Nebulization 3 milliLiter(s) Nebulizer every 4 hours PRN Shortness of Breath and/or Wheezing  dextrose 40% Gel 15 Gram(s) Oral once PRN Blood Glucose LESS THAN 70 milliGRAM(s)/deciliter  glucagon  Injectable 1 milliGRAM(s) IntraMuscular once PRN Glucose LESS THAN 70 milligrams/deciliter      LABS:                        12.3   7.42  )-----------( 363      ( 30 Oct 2020 04:30 )             40.0     Hgb Trend: 12.3<--, 12.5<--, 12.4<--, 13.7<--, 12.8<--  10-30    137  |  102  |  15  ----------------------------<  211<H>  3.8   |  24  |  0.76    Ca    9.2      30 Oct 2020 04:30  Phos  4.0     10-30  Mg     1.6     10-30      Creatinine Trend: 0.76<--, 0.81<--, 0.84<--, 0.80<--, 0.83<--, 0.81<--            MICROBIOLOGY:       RADIOLOGY:  [ ] Reviewed and interpreted by me    PULMONARY FUNCTION TESTS:    EKG:

## 2020-10-30 NOTE — PROGRESS NOTE ADULT - ASSESSMENT
Patient is a 71yo M with PMHx HTN, HLD, DM2, BPH s/p TURP, CAD s/p stent 2007, COPD, and SCLC (dx spring 2019) s/p carboplatin/etoposide and radiation with partial response now on maintenance atezolizumab immunotherapy every 3 weeks (c/b brain metastases now s/p gamma knife radiation therapy with improved brain MRI) who presents with worsening BILLINGS [id number] found to be hypoxic and with interval progression of bilateral lung opacities on CT concerning for infectious vs inflammatory etiology. Started on IV steroids for possible pneumonitis, course c/b steroid induced hyperglycemia. Patient is a 69yo M with PMHx HTN, HLD, DM2, BPH s/p TURP, CAD s/p stent 2007, COPD, and SCLC (dx spring 2019) s/p carboplatin/etoposide and radiation with partial response now on maintenance atezolizumab immunotherapy every 3 weeks (c/b brain metastases now s/p gamma knife radiation therapy with improved brain MRI) who presents with worsening BILLINGS [id number] found to be hypoxic and with interval progression of bilateral lung opacities on CT concerning for infectious vs inflammatory etiology. Started on IV steroids for possible pneumonitis, course c/b steroid induced hyperglycemia. Plan to transition to oral steroids with prolonged taper as per onc.

## 2020-10-30 NOTE — PROGRESS NOTE ADULT - PROBLEM SELECTOR PLAN 1
-Differential: pneumonitis vs. pneumonia. CT chest showed progression of bilateral diffuse peribronchovascular opacities  -CT A negative for PE  -RVP/COVID 19 negative   -Sputum culture normal respiratory karlie; fungitell negative  -BAL fluid  (10/23) negative for gram stain and NGTD; cytology negative  -Low suspicion for pneumonia - observe off antibiotics  -Wean O2 as tolerated, however patient requires home oxygen given desaturations  -F/u path from bronch  -Started on IV steroids as per heme/onc and pulm for possible pneumonitis 2/2 atezolizumab; increased 10/28 to 75mg IV daily  -Lung cancer management per below -Differential: pneumonitis vs. pneumonia. CT chest showed progression of bilateral diffuse peribronchovascular opacities  -CT A negative for PE  -RVP/COVID 19 negative   -Sputum culture normal respiratory karlie; fungitell negative  -BAL fluid  (10/23) negative for gram stain and NGTD; cytology negative  -Low suspicion for pneumonia - observe off antibiotics  -Wean O2 as tolerated, however patient requires home oxygen given desaturations  -Surical path with chronic inflammation, type 2 pneumocyte hyperplasia, no evidence of malignancy  -Started on IV steroids as per heme/onc and pulm for pneumonitis likely 2/2 atezolizumab; increased 10/28 to 75mg IV daily  -Lung cancer management per below -Differential: pneumonitis vs. pneumonia. CT chest showed progression of bilateral diffuse peribronchovascular opacities  -CT A negative for PE  -RVP/COVID 19 negative   -Sputum culture normal respiratory karlie; fungitell negative  -BAL fluid  (10/23) negative for gram stain and NGTD; cytology negative  -Low suspicion for pneumonia - observe off antibiotics  -Wean O2 as tolerated, however patient requires home oxygen given desaturations  -Surgical path with chronic inflammation, type 2 pneumocyte hyperplasia, no evidence of malignancy  -Started on IV steroids as per heme/onc and pulm for pneumonitis likely 2/2 atezolizumab; increased 10/28 to 75mg IV daily  - transitioned to 2mg/kg/day PO dose on 10/30  -Lung cancer management per below

## 2020-10-30 NOTE — PROGRESS NOTE ADULT - ATTENDING COMMENTS
Clinically patient feels improved  Cont steroids, would add prophylactic bactrim  Pathology c/w chronic interstitial inflammation  Wean oxygen as tolerated   Will need pulmonary follow up as outpatient with surveillance imaging as above

## 2020-10-31 LAB
ANION GAP SERPL CALC-SCNC: 11 MMO/L — SIGNIFICANT CHANGE UP (ref 7–14)
BUN SERPL-MCNC: 16 MG/DL — SIGNIFICANT CHANGE UP (ref 7–23)
CALCIUM SERPL-MCNC: 9 MG/DL — SIGNIFICANT CHANGE UP (ref 8.4–10.5)
CHLORIDE SERPL-SCNC: 101 MMOL/L — SIGNIFICANT CHANGE UP (ref 98–107)
CO2 SERPL-SCNC: 24 MMOL/L — SIGNIFICANT CHANGE UP (ref 22–31)
CREAT SERPL-MCNC: 0.86 MG/DL — SIGNIFICANT CHANGE UP (ref 0.5–1.3)
GLUCOSE SERPL-MCNC: 203 MG/DL — HIGH (ref 70–99)
HCT VFR BLD CALC: 40.1 % — SIGNIFICANT CHANGE UP (ref 39–50)
HGB BLD-MCNC: 12.6 G/DL — LOW (ref 13–17)
MAGNESIUM SERPL-MCNC: 1.6 MG/DL — SIGNIFICANT CHANGE UP (ref 1.6–2.6)
MCHC RBC-ENTMCNC: 29.1 PG — SIGNIFICANT CHANGE UP (ref 27–34)
MCHC RBC-ENTMCNC: 31.4 % — LOW (ref 32–36)
MCV RBC AUTO: 92.6 FL — SIGNIFICANT CHANGE UP (ref 80–100)
NRBC # FLD: 0 K/UL — SIGNIFICANT CHANGE UP (ref 0–0)
PHOSPHATE SERPL-MCNC: 3.4 MG/DL — SIGNIFICANT CHANGE UP (ref 2.5–4.5)
PLATELET # BLD AUTO: 355 K/UL — SIGNIFICANT CHANGE UP (ref 150–400)
PMV BLD: 9.2 FL — SIGNIFICANT CHANGE UP (ref 7–13)
POTASSIUM SERPL-MCNC: 4.1 MMOL/L — SIGNIFICANT CHANGE UP (ref 3.5–5.3)
POTASSIUM SERPL-SCNC: 4.1 MMOL/L — SIGNIFICANT CHANGE UP (ref 3.5–5.3)
RBC # BLD: 4.33 M/UL — SIGNIFICANT CHANGE UP (ref 4.2–5.8)
RBC # FLD: 14.4 % — SIGNIFICANT CHANGE UP (ref 10.3–14.5)
SODIUM SERPL-SCNC: 136 MMOL/L — SIGNIFICANT CHANGE UP (ref 135–145)
WBC # BLD: 7.76 K/UL — SIGNIFICANT CHANGE UP (ref 3.8–10.5)
WBC # FLD AUTO: 7.76 K/UL — SIGNIFICANT CHANGE UP (ref 3.8–10.5)

## 2020-10-31 PROCEDURE — 99233 SBSQ HOSP IP/OBS HIGH 50: CPT | Mod: GC

## 2020-10-31 RX ORDER — INSULIN GLARGINE 100 [IU]/ML
30 INJECTION, SOLUTION SUBCUTANEOUS AT BEDTIME
Refills: 0 | Status: DISCONTINUED | OUTPATIENT
Start: 2020-10-31 | End: 2020-11-01

## 2020-10-31 RX ORDER — INSULIN LISPRO 100/ML
16 VIAL (ML) SUBCUTANEOUS
Refills: 0 | Status: DISCONTINUED | OUTPATIENT
Start: 2020-10-31 | End: 2020-10-31

## 2020-10-31 RX ORDER — INSULIN LISPRO 100/ML
18 VIAL (ML) SUBCUTANEOUS
Refills: 0 | Status: DISCONTINUED | OUTPATIENT
Start: 2020-10-31 | End: 2020-11-01

## 2020-10-31 RX ADMIN — Medication 75 MILLIGRAM(S): at 17:01

## 2020-10-31 RX ADMIN — Medication 14 UNIT(S): at 12:09

## 2020-10-31 RX ADMIN — Medication 75 MILLIGRAM(S): at 06:24

## 2020-10-31 RX ADMIN — MAGNESIUM OXIDE 400 MG ORAL TABLET 400 MILLIGRAM(S): 241.3 TABLET ORAL at 16:58

## 2020-10-31 RX ADMIN — Medication 2: at 21:29

## 2020-10-31 RX ADMIN — Medication 2 GRAM(S): at 06:25

## 2020-10-31 RX ADMIN — ATORVASTATIN CALCIUM 40 MILLIGRAM(S): 80 TABLET, FILM COATED ORAL at 21:29

## 2020-10-31 RX ADMIN — MAGNESIUM OXIDE 400 MG ORAL TABLET 400 MILLIGRAM(S): 241.3 TABLET ORAL at 08:18

## 2020-10-31 RX ADMIN — Medication 81 MILLIGRAM(S): at 12:09

## 2020-10-31 RX ADMIN — Medication 1000 MILLIGRAM(S): at 21:29

## 2020-10-31 RX ADMIN — Medication 6: at 16:58

## 2020-10-31 RX ADMIN — FAMOTIDINE 20 MILLIGRAM(S): 10 INJECTION INTRAVENOUS at 06:24

## 2020-10-31 RX ADMIN — Medication 4: at 08:17

## 2020-10-31 RX ADMIN — MAGNESIUM OXIDE 400 MG ORAL TABLET 400 MILLIGRAM(S): 241.3 TABLET ORAL at 12:10

## 2020-10-31 RX ADMIN — Medication 14 UNIT(S): at 08:18

## 2020-10-31 RX ADMIN — Medication 2 GRAM(S): at 17:01

## 2020-10-31 RX ADMIN — BUDESONIDE AND FORMOTEROL FUMARATE DIHYDRATE 2 PUFF(S): 160; 4.5 AEROSOL RESPIRATORY (INHALATION) at 08:18

## 2020-10-31 RX ADMIN — Medication 1 APPLICATION(S): at 06:26

## 2020-10-31 RX ADMIN — INSULIN GLARGINE 30 UNIT(S): 100 INJECTION, SOLUTION SUBCUTANEOUS at 21:30

## 2020-10-31 RX ADMIN — BUDESONIDE AND FORMOTEROL FUMARATE DIHYDRATE 2 PUFF(S): 160; 4.5 AEROSOL RESPIRATORY (INHALATION) at 21:30

## 2020-10-31 RX ADMIN — Medication 18 UNIT(S): at 16:58

## 2020-10-31 RX ADMIN — ENOXAPARIN SODIUM 40 MILLIGRAM(S): 100 INJECTION SUBCUTANEOUS at 12:09

## 2020-10-31 RX ADMIN — Medication 6: at 12:09

## 2020-10-31 RX ADMIN — FAMOTIDINE 20 MILLIGRAM(S): 10 INJECTION INTRAVENOUS at 17:00

## 2020-10-31 RX ADMIN — Medication 1 APPLICATION(S): at 17:00

## 2020-10-31 NOTE — PROGRESS NOTE ADULT - ASSESSMENT
Patient is a 71yo M with PMHx HTN, HLD, DM2, BPH s/p TURP, CAD s/p stent 2007, COPD, and SCLC (dx spring 2019) s/p carboplatin/etoposide and radiation with partial response now on maintenance atezolizumab immunotherapy every 3 weeks (c/b brain metastases now s/p gamma knife radiation therapy with improved brain MRI) who presents with worsening BILLINGS [id number] found to be hypoxic and with interval progression of bilateral lung opacities on CT concerning for infectious vs inflammatory etiology. Started on IV steroids for possible pneumonitis, course c/b steroid induced hyperglycemia. Plan to transition to oral steroids with prolonged taper as per onc.

## 2020-10-31 NOTE — CHART NOTE - NSCHARTNOTEFT_GEN_A_CORE
Neda reviewed and spoke to team.   Persistently hyperglycemic to 200s.  Recommend to increase Lantus to 30 units sq qhs and Admelog to 18 units sq ac TID.  Continue moderate Humalog correctional scale sq ac and hs  Monitor FS ac and hs  FS goal 100-180.    Please call endocrine prior to discharge for final recs.   [phone].    Dinah Diaz MD  Fellow. Neda reviewed and spoke to team.   Persistently hyperglycemic to 200s.  Recommend to increase Lantus to 30 units sq qhs and Admelog to 18 units sq ac TID.  Continue moderate Admelog correctional scale sq ac and hs  Monitor FS ac and hs  FS goal 100-180.    Please call endocrine prior to discharge for final recs.   [phone].    Dinah Diaz MD  Fellow.

## 2020-10-31 NOTE — PROGRESS NOTE ADULT - SUBJECTIVE AND OBJECTIVE BOX
Modesto Ibanez MD  Pager [phone]    Patient is a 70y old  Male who presents with a chief complaint of Hypoxia, abnormal CT (30 Oct 2020 14:12)      SUBJECTIVE / OVERNIGHT EVENTS:  Patient was seen and examined at bedside this morning. No acute events overnight. Pulse ox with brief desat yesterday afternoon to 77%, lowest sat overnight 86% on 3L NC. Patient was started on oral prednisone this morning with effective dose increase. He reports feeling well and denies any new symptoms; no fever, chills, shortness of breath, cough, n/v/d. He reports that his rash is much improved. Patient understands that hsi discharge is pending long term taper TBD by onc and also dependent upon adequate glycemic control.    ADDITIONAL REVIEW OF SYSTEMS: Otherwise negative.    MEDICATIONS  (STANDING):  aspirin enteric coated 81 milliGRAM(s) Oral daily  atorvastatin 40 milliGRAM(s) Oral at bedtime  budesonide 160 MICROgram(s)/formoterol 4.5 MICROgram(s) Inhaler 2 Puff(s) Inhalation two times a day  clobetasol 0.05% Ointment 1 Application(s) Topical two times a day  dextrose 5%. 1000 milliLiter(s) (50 mL/Hr) IV Continuous <Continuous>  dextrose 50% Injectable 12.5 Gram(s) IV Push once  dextrose 50% Injectable 25 Gram(s) IV Push once  dextrose 50% Injectable 25 Gram(s) IV Push once  enoxaparin Injectable 40 milliGRAM(s) SubCutaneous daily  famotidine    Tablet 20 milliGRAM(s) Oral two times a day  insulin glargine Injectable (LANTUS) 26 Unit(s) SubCutaneous at bedtime  insulin lispro (ADMELOG) corrective regimen sliding scale   SubCutaneous three times a day before meals  insulin lispro (ADMELOG) corrective regimen sliding scale   SubCutaneous at bedtime  insulin lispro Injectable (ADMELOG) 14 Unit(s) SubCutaneous three times a day before meals  magnesium oxide 400 milliGRAM(s) Oral three times a day with meals  metoprolol succinate ER 50 milliGRAM(s) Oral daily  niacin SR 1000 milliGRAM(s) Oral at bedtime  omega-3-Acid Ethyl Esters 2 Gram(s) Oral two times a day  predniSONE   Tablet 75 milliGRAM(s) Oral two times a day  trimethoprim  160 mG/sulfamethoxazole 800 mG 1 Tablet(s) Oral <User Schedule>    MEDICATIONS  (PRN):  albuterol/ipratropium for Nebulization 3 milliLiter(s) Nebulizer every 4 hours PRN Shortness of Breath and/or Wheezing  dextrose 40% Gel 15 Gram(s) Oral once PRN Blood Glucose LESS THAN 70 milliGRAM(s)/deciliter  glucagon  Injectable 1 milliGRAM(s) IntraMuscular once PRN Glucose LESS THAN 70 milligrams/deciliter      CAPILLARY BLOOD GLUCOSE      POCT Blood Glucose.: 227 mg/dL (31 Oct 2020 07:48)  POCT Blood Glucose.: 210 mg/dL (30 Oct 2020 21:30)  POCT Blood Glucose.: 350 mg/dL (30 Oct 2020 17:44)  POCT Blood Glucose.: 297 mg/dL (30 Oct 2020 17:03)  POCT Blood Glucose.: 368 mg/dL (30 Oct 2020 11:46)    I&O's Summary      PHYSICAL EXAM:  Vital Signs Last 24 Hrs  T(C): 36.7 (31 Oct 2020 05:52), Max: 36.7 (30 Oct 2020 20:46)  T(F): 98 (31 Oct 2020 05:52), Max: 98.1 (30 Oct 2020 20:46)  HR: 59 (31 Oct 2020 05:52) (59 - 73)  BP: 108/64 (31 Oct 2020 05:52) (108/64 - 125/62)  BP(mean): --  RR: 18 (31 Oct 2020 05:52) (17 - 18)  SpO2: 95% (31 Oct 2020 05:52) (94% - 95%)    GENERAL: NAD, sitting comfortably  HEAD:  Atraumatic, Normocephalic  EYES: EOMI, PERRL, conjunctiva and sclera clear  ENT: Mucous membranes moist, no pharyngeal erythema  NECK: Supple, No Lymphadenopathy  CHEST/LUNG: LCTABL; NC in place, speaking in full sentences  CV: Regular rate and rhythm, S1/S2 equal; No murmurs, rubs, or gallops; No JVD or hepatojugular reflux  ABDOMEN: Soft, Nontender, Nondistended; Bowel sounds present  EXTREMITIES:  2+ Peripheral Pulses, No clubbing, cyanosis, or edema  PSYCH: AAOx3; affect appropriate  NEUROLOGY: no focal motor or sensory deficits, muscle strength 5/5 bilaterally, quadriceps reflex intact  SKIN: Diffuse nontender non-itching blanching macular rash on lower extremities, buttocks, and trunk with 2-3 open blisters on b/l ankle and few lesions on hands/palms; improving    LABS:                        12.6   7.76  )-----------( 355      ( 31 Oct 2020 06:16 )             40.1     10-31    136  |  101  |  16  ----------------------------<  203<H>  4.1   |  24  |  0.86    Ca    9.0      31 Oct 2020 06:16  Phos  3.4     10-31  Mg     1.6     10-31          RADIOLOGY & ADDITIONAL TESTS:  No new interval imaging

## 2020-10-31 NOTE — PROGRESS NOTE ADULT - ATTENDING COMMENTS
71yo M with PMHx HTN, HLD, DM2, BPH s/p TURP, CAD s/p stent 2007, COPD, and SCLC (dx spring 2019) s/p carboplatin/etoposide and radiation with partial response now on maintenance atezolizumab immunotherapy every 3 weeks (c/b brain metastases now s/p gamma knife radiation therapy with improved brain MRI) admitted for acute hypoxic respiratory failure secondary to acute pneumonitis, currently on PO steroids. Per heme/onc pt will be discharged on current steroid regimen and will follow up outpt within one week of hospital discharge to initiate taper. Per endo will increase basal/bolus regimen. Will require home oxygen supplementation, ambulatory O2 sat was 85% on RA. Team left message to  regarding possible dc tomorrow.

## 2020-10-31 NOTE — PROGRESS NOTE ADULT - PROBLEM SELECTOR PLAN 1
-Differential: pneumonitis vs. pneumonia. 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 pneumonia. 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

## 2020-10-31 NOTE — CHART NOTE - NSCHARTNOTEFT_GEN_A_CORE
Patient with ambulatory desaturation this morning to at least 85% on 3L NC, qualifying for home O2. LUMA/LAYLA is aware and working on approval and delivery of home O2 and supplies for discharge as early as tomorrow 11/1. Patient with ambulatory desaturation this morning to at least 85% on 3L NC (as low as 77% yesterday, 10/30), qualifying for home O2. LUMA/LAYLA is aware and working on approval and delivery of home O2 and supplies for discharge as early as tomorrow 11/1. Patient with ambulatory desaturation this morning to at least 85% on 3L NC (as low as 77% yesterday, 10/30), qualifying for home O2. CM/SW is aware and working on approval and delivery of home O2 and supplies for discharge as early as tomorrow 11/1.    Modesto Ibanez PGY1  House Staff Team 1  P: [phone]

## 2020-10-31 NOTE — PROGRESS NOTE ADULT - PROBLEM SELECTOR PLAN 5
-Recently started on insulin as outpatient  -Endocrine consulted for steroid-induced hyperglycemia; recs appreciated  -Lantus now increased to 26u QHS with Admelog 14u TIDAC and FS and mod SSI TIDAC/QHS given hyperglycemia  -Will monitor FS and further adjust regimen as needed  -Regular diet (consistent carb/dash/tlc)  -A1C 6.9 -Recently started on insulin as outpatient  -Endocrine consulted for steroid-induced hyperglycemia; recs appreciated  -Lantus now increased to 26u QHS  -Admelog now increased to 16u TIDAC on 10/31 given pre-meal hyperglycemia  -FS and mod SSI TIDAC/QHS  -Will monitor FS and further adjust regimen as needed  -Regular diet (consistent carb/dash/tlc)  -A1C 6.9 -Recently started on insulin as outpatient  -Endocrine consulted for steroid-induced hyperglycemia; recs appreciated  -Lantus now increased to 30u QHS, Admelog 18u TIDAC on 10/31 given hyperglycemia  -FS and mod SSI TIDAC/QHS  -Will monitor FS and further adjust regimen as needed  -Plan to speak with endocrine prior to discharge to confirm discharge basal/bolus dose  -Regular diet (consistent carb/dash/tlc)  -A1C 6.9

## 2020-11-01 LAB
ANION GAP SERPL CALC-SCNC: 12 MMO/L — SIGNIFICANT CHANGE UP (ref 7–14)
BUN SERPL-MCNC: 22 MG/DL — SIGNIFICANT CHANGE UP (ref 7–23)
CALCIUM SERPL-MCNC: 9.4 MG/DL — SIGNIFICANT CHANGE UP (ref 8.4–10.5)
CHLORIDE SERPL-SCNC: 94 MMOL/L — LOW (ref 98–107)
CO2 SERPL-SCNC: 23 MMOL/L — SIGNIFICANT CHANGE UP (ref 22–31)
CREAT SERPL-MCNC: 0.83 MG/DL — SIGNIFICANT CHANGE UP (ref 0.5–1.3)
GLUCOSE SERPL-MCNC: 289 MG/DL — HIGH (ref 70–99)
HCT VFR BLD CALC: 40 % — SIGNIFICANT CHANGE UP (ref 39–50)
HGB BLD-MCNC: 12.9 G/DL — LOW (ref 13–17)
MAGNESIUM SERPL-MCNC: 1.8 MG/DL — SIGNIFICANT CHANGE UP (ref 1.6–2.6)
MCHC RBC-ENTMCNC: 28.6 PG — SIGNIFICANT CHANGE UP (ref 27–34)
MCHC RBC-ENTMCNC: 32.3 % — SIGNIFICANT CHANGE UP (ref 32–36)
MCV RBC AUTO: 88.7 FL — SIGNIFICANT CHANGE UP (ref 80–100)
NRBC # FLD: 0 K/UL — SIGNIFICANT CHANGE UP (ref 0–0)
PHOSPHATE SERPL-MCNC: 3.8 MG/DL — SIGNIFICANT CHANGE UP (ref 2.5–4.5)
PLATELET # BLD AUTO: 426 K/UL — HIGH (ref 150–400)
PMV BLD: 8.9 FL — SIGNIFICANT CHANGE UP (ref 7–13)
POTASSIUM SERPL-MCNC: 4 MMOL/L — SIGNIFICANT CHANGE UP (ref 3.5–5.3)
POTASSIUM SERPL-SCNC: 4 MMOL/L — SIGNIFICANT CHANGE UP (ref 3.5–5.3)
RBC # BLD: 4.51 M/UL — SIGNIFICANT CHANGE UP (ref 4.2–5.8)
RBC # FLD: 14.2 % — SIGNIFICANT CHANGE UP (ref 10.3–14.5)
SODIUM SERPL-SCNC: 129 MMOL/L — LOW (ref 135–145)
WBC # BLD: 12.86 K/UL — HIGH (ref 3.8–10.5)
WBC # FLD AUTO: 12.86 K/UL — HIGH (ref 3.8–10.5)

## 2020-11-01 PROCEDURE — 99232 SBSQ HOSP IP/OBS MODERATE 35: CPT

## 2020-11-01 PROCEDURE — 99232 SBSQ HOSP IP/OBS MODERATE 35: CPT | Mod: GC

## 2020-11-01 RX ORDER — INSULIN LISPRO 100/ML
22 VIAL (ML) SUBCUTANEOUS
Refills: 0 | Status: DISCONTINUED | OUTPATIENT
Start: 2020-11-01 | End: 2020-11-02

## 2020-11-01 RX ORDER — INSULIN GLARGINE 100 [IU]/ML
36 INJECTION, SOLUTION SUBCUTANEOUS AT BEDTIME
Refills: 0 | Status: DISCONTINUED | OUTPATIENT
Start: 2020-11-01 | End: 2020-11-02

## 2020-11-01 RX ORDER — METFORMIN HYDROCHLORIDE 850 MG/1
1 TABLET ORAL
Qty: 0 | Refills: 0 | DISCHARGE

## 2020-11-01 RX ORDER — AZTREONAM 2 G
1 VIAL (EA) INJECTION
Qty: 12 | Refills: 0
Start: 2020-11-01

## 2020-11-01 RX ORDER — ZOLPIDEM TARTRATE 10 MG/1
5 TABLET ORAL AT BEDTIME
Refills: 0 | Status: DISCONTINUED | OUTPATIENT
Start: 2020-11-01 | End: 2020-11-02

## 2020-11-01 RX ORDER — SITAGLIPTIN 50 MG/1
1 TABLET, FILM COATED ORAL
Qty: 0 | Refills: 0 | DISCHARGE

## 2020-11-01 RX ADMIN — Medication 75 MILLIGRAM(S): at 17:09

## 2020-11-01 RX ADMIN — Medication 1 APPLICATION(S): at 17:08

## 2020-11-01 RX ADMIN — MAGNESIUM OXIDE 400 MG ORAL TABLET 400 MILLIGRAM(S): 241.3 TABLET ORAL at 07:56

## 2020-11-01 RX ADMIN — Medication 2 GRAM(S): at 17:09

## 2020-11-01 RX ADMIN — FAMOTIDINE 20 MILLIGRAM(S): 10 INJECTION INTRAVENOUS at 05:33

## 2020-11-01 RX ADMIN — MAGNESIUM OXIDE 400 MG ORAL TABLET 400 MILLIGRAM(S): 241.3 TABLET ORAL at 12:10

## 2020-11-01 RX ADMIN — Medication 6: at 17:09

## 2020-11-01 RX ADMIN — Medication 18 UNIT(S): at 07:56

## 2020-11-01 RX ADMIN — ENOXAPARIN SODIUM 40 MILLIGRAM(S): 100 INJECTION SUBCUTANEOUS at 12:09

## 2020-11-01 RX ADMIN — Medication 1 APPLICATION(S): at 05:33

## 2020-11-01 RX ADMIN — BUDESONIDE AND FORMOTEROL FUMARATE DIHYDRATE 2 PUFF(S): 160; 4.5 AEROSOL RESPIRATORY (INHALATION) at 08:02

## 2020-11-01 RX ADMIN — Medication 2: at 21:45

## 2020-11-01 RX ADMIN — ATORVASTATIN CALCIUM 40 MILLIGRAM(S): 80 TABLET, FILM COATED ORAL at 21:45

## 2020-11-01 RX ADMIN — Medication 8: at 12:09

## 2020-11-01 RX ADMIN — FAMOTIDINE 20 MILLIGRAM(S): 10 INJECTION INTRAVENOUS at 17:08

## 2020-11-01 RX ADMIN — BUDESONIDE AND FORMOTEROL FUMARATE DIHYDRATE 2 PUFF(S): 160; 4.5 AEROSOL RESPIRATORY (INHALATION) at 21:44

## 2020-11-01 RX ADMIN — INSULIN GLARGINE 36 UNIT(S): 100 INJECTION, SOLUTION SUBCUTANEOUS at 21:46

## 2020-11-01 RX ADMIN — Medication 22 UNIT(S): at 17:10

## 2020-11-01 RX ADMIN — Medication 2 GRAM(S): at 05:33

## 2020-11-01 RX ADMIN — ZOLPIDEM TARTRATE 5 MILLIGRAM(S): 10 TABLET ORAL at 21:54

## 2020-11-01 RX ADMIN — Medication 6: at 07:56

## 2020-11-01 RX ADMIN — MAGNESIUM OXIDE 400 MG ORAL TABLET 400 MILLIGRAM(S): 241.3 TABLET ORAL at 17:09

## 2020-11-01 RX ADMIN — Medication 1000 MILLIGRAM(S): at 21:45

## 2020-11-01 RX ADMIN — Medication 50 MILLIGRAM(S): at 17:08

## 2020-11-01 RX ADMIN — Medication 18 UNIT(S): at 12:09

## 2020-11-01 RX ADMIN — Medication 81 MILLIGRAM(S): at 12:10

## 2020-11-01 RX ADMIN — Medication 75 MILLIGRAM(S): at 05:33

## 2020-11-01 NOTE — CHART NOTE - NSCHARTNOTEFT_GEN_A_CORE
Patient with ambulatory desaturation this morning to 92% on 4L NC and 81% O2 saturation at rest. Will call CM to update while working on approval and delivery of home O2 and supplies for discharge.    Anurag Hanley MD  Internal Medicine PGY-3  Team 1 [id number] Mr. Fuentes is a 70M with PMH  HTN, HLD, DM2, BPH s/p TURP, CAD s/p stent 2007, COPD, and SCLC (dx spring 2019) s/p carboplatin/etoposide and radiation with partial response now on maintenance atezolizumab immunotherapy every 3 weeks (c/b brain metastases now s/p gamma knife radiation therapy with improved brain MRI) who presents with worsening BILLINGS x1 month found to be hypoxic and with interval progression of bilateral lung opacities on CT concerning s/p bronch in the setting of pneumonitis started on high dose steroids. Patient has been requiring oxygen therapy given lung cancer history and current treatment of pneumonitis. On room air while resting patient's O2 saturation is 81%, and ambulatory O2 saturation on 4L NC is 92%    Anurag Hanley MD  Internal Medicine PGY-3  Team 1 [id number] Mr. Fuentes is a 70M with PMH  HTN, HLD, DM2, BPH s/p TURP, CAD s/p stent 2007, COPD, and SCLC (dx spring 2019) s/p carboplatin/etoposide and radiation with partial response now on maintenance atezolizumab immunotherapy every 3 weeks (c/b brain metastases now s/p gamma knife radiation therapy with improved brain MRI) who presents with worsening BILLINGS x1 month found to be hypoxic and with interval progression of bilateral lung opacities on CT concerning for pneumonitis s/p bronchoscopy started on high dose steroids. Patient has been requiring oxygen therapy given lung cancer history and current treatment of pneumonitis. On room air while resting patient's O2 saturation is 81%, and ambulatory O2 saturation on 4L NC is 92%    Anurag Hanley MD  Internal Medicine PGY-3  Team 1 [id number]

## 2020-11-01 NOTE — PROGRESS NOTE ADULT - PROBLEM SELECTOR PLAN 1
-Differential: pneumonitis vs. pneumonia. 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 pneumonia. 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

## 2020-11-01 NOTE — PROGRESS NOTE ADULT - ASSESSMENT
Patient is a 71yo M with PMHx HTN, HLD, DM2, BPH s/p TURP, CAD s/p stent 2007, COPD, and SCLC (dx spring 2019) s/p carboplatin/etoposide and radiation with partial response now on maintenance atezolizumab immunotherapy every 3 weeks (c/b brain metastases now s/p gamma knife radiation therapy with improved brain MRI) who presents with worsening BILLINGS [id number] found to be hypoxic and with interval progression of bilateral lung opacities on CT concerning for infectious vs inflammatory etiology. Started on IV steroids for pneumonitis now on high dose prednisone, course c/b steroid induced hyperglycemia. Endocrine consulted for steroid induced hyperglycemia.    Plan:  1. Steroid Induced Hyperglycemia  - Target glucose 100-180 mg/dL, above goal with hyperglycemia   - Will increase Lantus to 36 units for tonight   - Will increase Admelog to 22 units TID before meals, please HOLD if NPO   - Will c/w Admelog correctional scale to MODERATE before meals and bedtime   - Patient is being discharged on steroids with steroid taper, will need basal + bolus insulin management (stop outpatient oral regimen while on basal/bolus insulin) and dosage adjustment while on steroids and steroid titrations, once steroids are completed, patient can resume home diabetes medication regimen (Basaglar, Metformin, Januvia)   - Can also consider discharging patient with MEALTIME Admelog correctional scale in addition to standing basal/bolus insulin regimen so patient is able to correct for possible hyperglycemia at home   - PLEASE NOTIFY ENDOCRINE OF STEROID CHANGES, IF DECREASED, WILL NEED TO DECREASE INSULIN TO PREVENT HYPOGLYCEMIA  - Please send Admelog pen to pharmacy to confirm coverage   - Patient will also need additional supplies ordered:  BD flory pen needles (4 daily)  test strips to check fingersticks 4 times daily (as per insurance)  lancets to check fingersticks 4 times daily (as per insurance)   alcohol pads  - Spoke to patient's daughter, Leena, via phone Friday to review insulin and diabetes plan, she understands and agrees   - Follow up scheduled for patient:  [id number] [id number] [phone]  1. DM educator on 11/10 @ 10:00  2. Dr Dutton on 12/30 @ 3:30      2. HLD  - Fasting lipid panel today: triglycerides 196, target being less than 70   - continue with home atorvastatin  - continue with Niacin as ordered   - lovaza dosage normally 2 g BID, increased lovaza to that dosage as family in not aware of any known reason why he was on a lower dose   - follow lipids as outpatient       3. HTN  - target BP less then 130/80  - will defer adjustment of HTN medications to primary team      MINISTERIO Palmer-BC  Division of Endocrinology  Pager: TED pager [phone]    If out of hospital/unavailable when paged, please note: patient will be cared for by another provider on the endocrine service.  Please call the endocrine answering service for assistance to reach covering provider ([phone]). For non-urgent matters, please email [email or web address] for assistance.

## 2020-11-01 NOTE — PROGRESS NOTE ADULT - SUBJECTIVE AND OBJECTIVE BOX
CC: uncontrolled DM 2 with hyperglycemia exacerbated by steroids     HPI: endocrine hx: see complete consult, patient was on basal insulin along with oral DM regimen at home with controlled a1c. Interval hx: Here on high dose steroids exacerbating diabetes with hyperglycemia. Spoke to patient's daughter on Friday, Leena () today, explained that her father will need basal/bolus insulin for discharge for duration of steroids for optimal glycemic control, she is in agreement. Regarding lipid medication regimen: she was unaware of any known reason why the Lovaza dose was not higher (goal of 2 grams BID). 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 1000 milliLiter(s) (50 mL/Hr) IV Continuous <Continuous>  dextrose 50% Injectable 12.5 Gram(s) IV Push once  dextrose 50% Injectable 25 Gram(s) IV Push once  dextrose 50% Injectable 25 Gram(s) IV Push once  enoxaparin Injectable 40 milliGRAM(s) SubCutaneous daily  famotidine    Tablet 20 milliGRAM(s) Oral two times a day  insulin glargine Injectable (LANTUS) 30 Unit(s) SubCutaneous at bedtime  insulin lispro (ADMELOG) corrective regimen sliding scale   SubCutaneous three times a day before meals  insulin lispro (ADMELOG) corrective regimen sliding scale   SubCutaneous at bedtime  insulin lispro Injectable (ADMELOG) 18 Unit(s) SubCutaneous three times a day before meals  magnesium oxide 400 milliGRAM(s) Oral three times a day with meals  metoprolol succinate ER 50 milliGRAM(s) Oral daily  niacin SR 1000 milliGRAM(s) Oral at bedtime  omega-3-Acid Ethyl Esters 2 Gram(s) Oral two times a day  predniSONE   Tablet 75 milliGRAM(s) Oral two times a day  trimethoprim  160 mG/sulfamethoxazole 800 mG 1 Tablet(s) Oral <User Schedule>            No Known Allergies          Review of Systems:  Constitutional: No fever  Eyes: No blurry vision  Neuro: No tremors  HEENT: No pain  Cardiovascular: No chest pain, palpitations  Respiratory: no SOB  GI: No nausea, vomiting, abdominal pain  : No dysuria  Skin: no rash  Psych: no depression  Endocrine: no polyuria, polydipsia      PHYSICAL EXAM:  Vital Signs Last 24 Hrs  T(C): 37.1 (2020 11:), Max: 37.1 (31 Oct 2020 21:26)  T(F): 98.7 (:), Max: 98.7 (31 Oct 2020 21:)  HR: 67 (:) (60 - 128)  BP: 123/101 (:) (94/60 - 123/101)  BP(mean): --  RR: 19 (:) (17 - 21)  SpO2: 90% (:) (80% - 97%)  GENERAL: NAD, sitting up in chair   EYES: No proptosis, no lid lag, anicteric  HEENT:  Atraumatic, Normocephalic, moist mucous membranes  RESPIRATORY: non labored respirations   GI: Soft, nontender, non distended  PSYCH: Alert and oriented x 3, normal affect, normal mood      CAPILLARY BLOOD GLUCOSE      POCT Blood Glucose.: 317 mg/dL (2020 12:05)  POCT Blood Glucose.: 281 mg/dL (2020 07:47)  POCT Blood Glucose.: 261 mg/dL (31 Oct 2020 21:09)  POCT Blood Glucose.: 286 mg/dL (31 Oct 2020 16:47)            129<L>  |  94<L>  |  22  ----------------------------<  289<H>  4.0   |  23  |  0.83    Ca    9.4      2020 05:19  Phos  3.8       Mg     1.8     11-01          10-30 Chol 158  HDL 39 Trig 196<H>    A1C with Estimated Average Glucose: 6.9 % (10-22-20 @ 05:30)  Hemoglobin A1C, Whole Blood: 9.4 % (19 @ 16:00)    Diet, Regular:   Consistent Carbohydrate No Snacks (CSTCHO)  DASH/TLC Sodium & Cholesterol Restricted (DASH) (10-23-20 @ 16:50)

## 2020-11-01 NOTE — PROGRESS NOTE ADULT - SUBJECTIVE AND OBJECTIVE BOX
Department of Internal Medicine  Anurag Hanley M.D. | PGY-3  Pager: [phone] (NS), [phone] (TED)        Patient is a 70y old  Male who presents with a chief complaint of Hypoxia, abnormal CT (01 Nov 2020 12:21)      SUBJECTIVE / OVERNIGHT EVENTS:  - Desaturated to 77% last night sinus tach this AM to 120s  - Denies chest pain, cough, shortness of breath or abdominal pain  - Long conversation with patient and his daughter about the plan including steroids and increasing insulin requirements  - Requesting ambien tonight, poor sleep presumably from steroid use      MEDICATIONS  (STANDING):  aspirin enteric coated 81 milliGRAM(s) Oral daily  atorvastatin 40 milliGRAM(s) Oral at bedtime  budesonide 160 MICROgram(s)/formoterol 4.5 MICROgram(s) Inhaler 2 Puff(s) Inhalation two times a day  clobetasol 0.05% Ointment 1 Application(s) Topical two times a day  dextrose 5%. 1000 milliLiter(s) (50 mL/Hr) IV Continuous <Continuous>  dextrose 50% Injectable 12.5 Gram(s) IV Push once  dextrose 50% Injectable 25 Gram(s) IV Push once  dextrose 50% Injectable 25 Gram(s) IV Push once  enoxaparin Injectable 40 milliGRAM(s) SubCutaneous daily  famotidine    Tablet 20 milliGRAM(s) Oral two times a day  insulin glargine Injectable (LANTUS) 36 Unit(s) SubCutaneous at bedtime  insulin lispro (ADMELOG) corrective regimen sliding scale   SubCutaneous three times a day before meals  insulin lispro (ADMELOG) corrective regimen sliding scale   SubCutaneous at bedtime  insulin lispro Injectable (ADMELOG) 22 Unit(s) SubCutaneous three times a day before meals  magnesium oxide 400 milliGRAM(s) Oral three times a day with meals  metoprolol succinate ER 50 milliGRAM(s) Oral daily  niacin SR 1000 milliGRAM(s) Oral at bedtime  omega-3-Acid Ethyl Esters 2 Gram(s) Oral two times a day  predniSONE   Tablet 75 milliGRAM(s) Oral two times a day  trimethoprim  160 mG/sulfamethoxazole 800 mG 1 Tablet(s) Oral <User Schedule>    MEDICATIONS  (PRN):  albuterol/ipratropium for Nebulization 3 milliLiter(s) Nebulizer every 4 hours PRN Shortness of Breath and/or Wheezing  dextrose 40% Gel 15 Gram(s) Oral once PRN Blood Glucose LESS THAN 70 milliGRAM(s)/deciliter  glucagon  Injectable 1 milliGRAM(s) IntraMuscular once PRN Glucose LESS THAN 70 milligrams/deciliter  zolpidem 5 milliGRAM(s) Oral at bedtime PRN Insomnia  zolpidem 5 milliGRAM(s) Oral at bedtime PRN Insomnia      CAPILLARY BLOOD GLUCOSE      POCT Blood Glucose.: 317 mg/dL (01 Nov 2020 12:05)  POCT Blood Glucose.: 281 mg/dL (01 Nov 2020 07:47)  POCT Blood Glucose.: 261 mg/dL (31 Oct 2020 21:09)  POCT Blood Glucose.: 286 mg/dL (31 Oct 2020 16:47)    I&O's Summary      PHYSICAL EXAM:  Vital Signs Last 24 Hrs  T(C): 37.1 (01 Nov 2020 11:26), Max: 37.1 (31 Oct 2020 21:26)  T(F): 98.7 (01 Nov 2020 11:26), Max: 98.7 (31 Oct 2020 21:26)  HR: 67 (01 Nov 2020 11:26) (60 - 128)  BP: 123/101 (01 Nov 2020 11:26) (94/60 - 123/101)  BP(mean): --  RR: 18 (01 Nov 2020 11:42) (17 - 21)  SpO2: 96% (01 Nov 2020 11:42) (80% - 97%)      GENERAL: NAD, sitting comfortably  HEAD:  Atraumatic, Normocephalic  EYES: EOMI, PERRL, conjunctiva and sclera clear  ENT: Mucous membranes moist, no pharyngeal erythema  NECK: Supple, No Lymphadenopathy  CHEST/LUNG: LCTABL; NC in place, speaking in full sentences  CV: Regular rate and rhythm, S1/S2 equal; No murmurs  ABDOMEN: Soft, Nontender, Nondistended; Bowel sounds present  EXTREMITIES:  2+ Peripheral Pulses, No clubbing, cyanosis, or edema  PSYCH: AAOx3; affect appropriate  NEUROLOGY: no focal motor or sensory deficits, muscle strength 5/5 bilaterally, quadriceps reflex intact  SKIN: Diffuse nontender non-itching blanching macular rash on lower extremities, buttocks, and trunk with 2-3 open blisters on b/l ankle and few lesions on hands/palms; improving    LABS:                        12.9   12.86 )-----------( 426      ( 01 Nov 2020 05:19 )             40.0     11-01    129<L>  |  94<L>  |  22  ----------------------------<  289<H>  4.0   |  23  |  0.83    Ca    9.4      01 Nov 2020 05:19  Phos  3.8     11-01  Mg     1.8     11-01                  RADIOLOGY & ADDITIONAL TESTS:  Results Reviewed:   Imaging Personally Reviewed:  Electrocardiogram Personally Reviewed:    COORDINATION OF CARE:  Care Discussed with Consultants/Other Providers [Y/N]:  Prior or Outpatient Records Reviewed [Y/N]:

## 2020-11-01 NOTE — PROGRESS NOTE ADULT - PROBLEM SELECTOR PLAN 5
-Recently started on insulin as outpatient  -Endocrine consulted for steroid-induced hyperglycemia; recs appreciated  -Lantus now increased to 30u QHS, Admelog 18u TIDAC on 10/31 given hyperglycemia  -FS and mod SSI TIDAC/QHS  -Will monitor FS and further adjust regimen as needed  -Plan to speak with endocrine prior to discharge to confirm discharge basal/bolus dose  -Regular diet (consistent carb/dash/tlc)  -A1C 6.9 -Recently started on insulin as outpatient  -Endocrine consulted for steroid-induced hyperglycemia; recs appreciated  -Lantus now increased to 36U QHS and Admelog 22U TID today will f/u tomorrow to see if telehealth visit can be scheduled sooner, daughter would like medications sent to [facility] downstairs (closed today) will f/u with endo regarding discharge recs tomorrow  -FS and mod SSI TIDAC/QHS  -Will monitor FS and further adjust regimen as needed  -Regular diet (consistent carb/dash/tlc)  -A1C 6.9

## 2020-11-01 NOTE — PROGRESS NOTE ADULT - PROBLEM SELECTOR PLAN 4
-Patient seen by derm for rash as an outpatient who recommended betamethasone ointment which patient has been using without relief  -Dermatology consult, appreciate recs, clobetasol .05% ointment  -Rash improving will be discharged on Betamethasone 2 weeks on 1 week off

## 2020-11-01 NOTE — PROGRESS NOTE ADULT - ASSESSMENT
Patient is a 69yo M with PMHx HTN, HLD, DM2, BPH s/p TURP, CAD s/p stent 2007, COPD, and SCLC (dx spring 2019) s/p carboplatin/etoposide and radiation with partial response now on maintenance atezolizumab immunotherapy every 3 weeks (c/b brain metastases now s/p gamma knife radiation therapy with improved brain MRI) who presents with worsening BILLINGS [id number] found to be hypoxic and with interval progression of bilateral lung opacities on CT concerning for infectious vs inflammatory etiology. Started on IV steroids for possible pneumonitis, course c/b steroid induced hyperglycemia. Plan to transition to oral steroids with prolonged taper as per onc.

## 2020-11-01 NOTE — PROGRESS NOTE ADULT - ATTENDING COMMENTS
69yo M with PMHx HTN, HLD, DM2, BPH s/p TURP, CAD s/p stent 2007, COPD, and SCLC (dx spring 2019) s/p carboplatin/etoposide and radiation with partial response now on maintenance atezolizumab immunotherapy every 3 weeks (c/b brain metastases now s/p gamma knife radiation therapy with improved brain MRI) admitted for acute hypoxic respiratory failure secondary to acute pneumonitis, currently on PO steroids. Per heme/onc pt will be discharged on current steroid regimen and will follow up outpt within one week of hospital discharge to initiate taper. Per endo will increase lantus to 36 units tonight and increase Admelog to 22 units TID before meals. Currently in process to set up home oxygen per . Possible dc tomorrow pending home oxygen equipment delivered. Daughter Leena aware of plan.

## 2020-11-02 ENCOUNTER — TRANSCRIPTION ENCOUNTER (OUTPATIENT)
Age: 70
End: 2020-11-02

## 2020-11-02 VITALS
DIASTOLIC BLOOD PRESSURE: 74 MMHG | HEART RATE: 66 BPM | OXYGEN SATURATION: 97 % | TEMPERATURE: 98 F | SYSTOLIC BLOOD PRESSURE: 128 MMHG | RESPIRATION RATE: 17 BRPM

## 2020-11-02 PROCEDURE — 99239 HOSP IP/OBS DSCHRG MGMT >30: CPT | Mod: GC

## 2020-11-02 PROCEDURE — 99232 SBSQ HOSP IP/OBS MODERATE 35: CPT

## 2020-11-02 RX ORDER — INSULIN ASPART 100 [IU]/ML
1 INJECTION, SOLUTION SUBCUTANEOUS
Qty: 50 | Refills: 0
Start: 2020-11-02

## 2020-11-02 RX ORDER — PANTOPRAZOLE SODIUM 20 MG/1
40 TABLET, DELAYED RELEASE ORAL
Qty: 30 | Refills: 0
Start: 2020-11-02

## 2020-11-02 RX ORDER — INSULIN ASPART 100 [IU]/ML
25 INJECTION, SOLUTION SUBCUTANEOUS
Qty: 80 | Refills: 0
Start: 2020-11-02 | End: 2020-12-01

## 2020-11-02 RX ORDER — INSULIN GLARGINE 100 [IU]/ML
43 INJECTION, SOLUTION SUBCUTANEOUS
Qty: 30 | Refills: 0
Start: 2020-11-02 | End: 2020-12-01

## 2020-11-02 RX ORDER — INSULIN LISPRO 100/ML
25 VIAL (ML) SUBCUTANEOUS
Refills: 0 | Status: DISCONTINUED | OUTPATIENT
Start: 2020-11-02 | End: 2020-11-02

## 2020-11-02 RX ORDER — PANTOPRAZOLE SODIUM 20 MG/1
40 TABLET, DELAYED RELEASE ORAL
Refills: 0 | Status: DISCONTINUED | OUTPATIENT
Start: 2020-11-02 | End: 2020-11-02

## 2020-11-02 RX ORDER — INSULIN ASPART 100 [IU]/ML
25 INJECTION, SOLUTION SUBCUTANEOUS
Qty: 45 | Refills: 0
Start: 2020-11-02 | End: 2020-12-01

## 2020-11-02 RX ORDER — INSULIN ASPART 100 [IU]/ML
10 INJECTION, SOLUTION SUBCUTANEOUS
Qty: 27 | Refills: 0
Start: 2020-11-02

## 2020-11-02 RX ORDER — INSULIN LISPRO 100/ML
25 VIAL (ML) SUBCUTANEOUS
Qty: 30 | Refills: 0
Start: 2020-11-02 | End: 2020-12-01

## 2020-11-02 RX ORDER — INSULIN GLARGINE 100 [IU]/ML
43 INJECTION, SOLUTION SUBCUTANEOUS AT BEDTIME
Refills: 0 | Status: DISCONTINUED | OUTPATIENT
Start: 2020-11-02 | End: 2020-11-02

## 2020-11-02 RX ORDER — INSULIN LISPRO 100/ML
25 VIAL (ML) SUBCUTANEOUS
Qty: 23 | Refills: 0
Start: 2020-11-02 | End: 2020-12-01

## 2020-11-02 RX ORDER — ISOPROPYL ALCOHOL, BENZOCAINE .7; .06 ML/ML; ML/ML
1 SWAB TOPICAL
Qty: 100 | Refills: 1
Start: 2020-11-02 | End: 2020-12-21

## 2020-11-02 RX ORDER — PANTOPRAZOLE SODIUM 20 MG/1
1 TABLET, DELAYED RELEASE ORAL
Qty: 30 | Refills: 0
Start: 2020-11-02

## 2020-11-02 RX ORDER — INSULIN GLARGINE 100 [IU]/ML
10 INJECTION, SOLUTION SUBCUTANEOUS
Qty: 0 | Refills: 0 | DISCHARGE

## 2020-11-02 RX ADMIN — Medication 81 MILLIGRAM(S): at 12:16

## 2020-11-02 RX ADMIN — MAGNESIUM OXIDE 400 MG ORAL TABLET 400 MILLIGRAM(S): 241.3 TABLET ORAL at 07:44

## 2020-11-02 RX ADMIN — FAMOTIDINE 20 MILLIGRAM(S): 10 INJECTION INTRAVENOUS at 05:53

## 2020-11-02 RX ADMIN — Medication 22 UNIT(S): at 07:44

## 2020-11-02 RX ADMIN — Medication 6: at 12:14

## 2020-11-02 RX ADMIN — MAGNESIUM OXIDE 400 MG ORAL TABLET 400 MILLIGRAM(S): 241.3 TABLET ORAL at 12:15

## 2020-11-02 RX ADMIN — Medication 2 GRAM(S): at 17:07

## 2020-11-02 RX ADMIN — ENOXAPARIN SODIUM 40 MILLIGRAM(S): 100 INJECTION SUBCUTANEOUS at 12:24

## 2020-11-02 RX ADMIN — Medication 4: at 17:07

## 2020-11-02 RX ADMIN — Medication 50 MILLIGRAM(S): at 17:07

## 2020-11-02 RX ADMIN — MAGNESIUM OXIDE 400 MG ORAL TABLET 400 MILLIGRAM(S): 241.3 TABLET ORAL at 17:07

## 2020-11-02 RX ADMIN — Medication 2 GRAM(S): at 05:53

## 2020-11-02 RX ADMIN — Medication 4: at 07:43

## 2020-11-02 RX ADMIN — Medication 75 MILLIGRAM(S): at 05:53

## 2020-11-02 RX ADMIN — Medication 22 UNIT(S): at 12:15

## 2020-11-02 RX ADMIN — Medication 1 TABLET(S): at 05:53

## 2020-11-02 RX ADMIN — Medication 75 MILLIGRAM(S): at 17:07

## 2020-11-02 RX ADMIN — Medication 1 APPLICATION(S): at 17:08

## 2020-11-02 RX ADMIN — PANTOPRAZOLE SODIUM 40 MILLIGRAM(S): 20 TABLET, DELAYED RELEASE ORAL at 14:53

## 2020-11-02 RX ADMIN — Medication 1 APPLICATION(S): at 05:51

## 2020-11-02 RX ADMIN — BUDESONIDE AND FORMOTEROL FUMARATE DIHYDRATE 2 PUFF(S): 160; 4.5 AEROSOL RESPIRATORY (INHALATION) at 07:44

## 2020-11-02 RX ADMIN — Medication 25 UNIT(S): at 17:07

## 2020-11-02 NOTE — PROGRESS NOTE ADULT - SUBJECTIVE AND OBJECTIVE BOX
INTERVAL HPI/OVERNIGHT EVENTS:  Patient seen at bedside.  Patient endorsing improvement of  symptoms  On supplemental oxygen  No acute complaints  No overnight events reported      VITAL SIGNS:  T(F): 97.9 (11-02-20 @ 11:46)  HR: 72 (11-02-20 @ 11:46)  BP: 130/60 (11-02-20 @ 11:46)  RR: 18 (11-02-20 @ 11:46)  SpO2: 95% (11-02-20 @ 11:46)  Wt(kg): --    PHYSICAL EXAM:    In accordance with current standard limiting patient contact, deferred physical exam  2/2 COVID pandemic  Please refer to physical exam of primary team.    MEDICATIONS  (STANDING):  aspirin enteric coated 81 milliGRAM(s) Oral daily  atorvastatin 40 milliGRAM(s) Oral at bedtime  budesonide 160 MICROgram(s)/formoterol 4.5 MICROgram(s) Inhaler 2 Puff(s) Inhalation two times a day  clobetasol 0.05% Ointment 1 Application(s) Topical two times a day  dextrose 5%. 1000 milliLiter(s) (50 mL/Hr) IV Continuous <Continuous>  dextrose 50% Injectable 12.5 Gram(s) IV Push once  dextrose 50% Injectable 25 Gram(s) IV Push once  dextrose 50% Injectable 25 Gram(s) IV Push once  enoxaparin Injectable 40 milliGRAM(s) SubCutaneous daily  famotidine    Tablet 20 milliGRAM(s) Oral two times a day  insulin glargine Injectable (LANTUS) 43 Unit(s) SubCutaneous at bedtime  insulin lispro (ADMELOG) corrective regimen sliding scale   SubCutaneous three times a day before meals  insulin lispro (ADMELOG) corrective regimen sliding scale   SubCutaneous at bedtime  insulin lispro Injectable (ADMELOG) 25 Unit(s) SubCutaneous three times a day before meals  magnesium oxide 400 milliGRAM(s) Oral three times a day with meals  metoprolol succinate ER 50 milliGRAM(s) Oral daily  niacin SR 1000 milliGRAM(s) Oral at bedtime  omega-3-Acid Ethyl Esters 2 Gram(s) Oral two times a day  predniSONE   Tablet 75 milliGRAM(s) Oral two times a day  trimethoprim  160 mG/sulfamethoxazole 800 mG 1 Tablet(s) Oral <User Schedule>    MEDICATIONS  (PRN):  albuterol/ipratropium for Nebulization 3 milliLiter(s) Nebulizer every 4 hours PRN Shortness of Breath and/or Wheezing  dextrose 40% Gel 15 Gram(s) Oral once PRN Blood Glucose LESS THAN 70 milliGRAM(s)/deciliter  glucagon  Injectable 1 milliGRAM(s) IntraMuscular once PRN Glucose LESS THAN 70 milligrams/deciliter  zolpidem 5 milliGRAM(s) Oral at bedtime PRN Insomnia  zolpidem 5 milliGRAM(s) Oral at bedtime PRN Insomnia      Allergies    No Known Allergies    Intolerances        LABS:                        12.9   12.86 )-----------( 426      ( 01 Nov 2020 05:19 )             40.0     11-01    129<L>  |  94<L>  |  22  ----------------------------<  289<H>  4.0   |  23  |  0.83    Ca    9.4      01 Nov 2020 05:19  Phos  3.8     11-01  Mg     1.8     11-01            RADIOLOGY & ADDITIONAL TESTS:  Studies reviewed.

## 2020-11-02 NOTE — PROGRESS NOTE ADULT - SUBJECTIVE AND OBJECTIVE BOX
Chief Complaint: DM2 exacerbated by steroid    History: Tolerating po  breathing is improved  ambulating in hallway with O2  No hypoglycemia    MEDICATIONS  (STANDING):  aspirin enteric coated 81 milliGRAM(s) Oral daily  atorvastatin 40 milliGRAM(s) Oral at bedtime  budesonide 160 MICROgram(s)/formoterol 4.5 MICROgram(s) Inhaler 2 Puff(s) Inhalation two times a day  clobetasol 0.05% Ointment 1 Application(s) Topical two times a day  dextrose 5%. 1000 milliLiter(s) (50 mL/Hr) IV Continuous <Continuous>  dextrose 50% Injectable 12.5 Gram(s) IV Push once  dextrose 50% Injectable 25 Gram(s) IV Push once  dextrose 50% Injectable 25 Gram(s) IV Push once  enoxaparin Injectable 40 milliGRAM(s) SubCutaneous daily  famotidine    Tablet 20 milliGRAM(s) Oral two times a day  insulin glargine Injectable (LANTUS) 36 Unit(s) SubCutaneous at bedtime  insulin lispro (ADMELOG) corrective regimen sliding scale   SubCutaneous three times a day before meals  insulin lispro (ADMELOG) corrective regimen sliding scale   SubCutaneous at bedtime  insulin lispro Injectable (ADMELOG) 22 Unit(s) SubCutaneous three times a day before meals  magnesium oxide 400 milliGRAM(s) Oral three times a day with meals  metoprolol succinate ER 50 milliGRAM(s) Oral daily  niacin SR 1000 milliGRAM(s) Oral at bedtime  omega-3-Acid Ethyl Esters 2 Gram(s) Oral two times a day  predniSONE   Tablet 75 milliGRAM(s) Oral two times a day  trimethoprim  160 mG/sulfamethoxazole 800 mG 1 Tablet(s) Oral <User Schedule>    MEDICATIONS  (PRN):  albuterol/ipratropium for Nebulization 3 milliLiter(s) Nebulizer every 4 hours PRN Shortness of Breath and/or Wheezing  dextrose 40% Gel 15 Gram(s) Oral once PRN Blood Glucose LESS THAN 70 milliGRAM(s)/deciliter  glucagon  Injectable 1 milliGRAM(s) IntraMuscular once PRN Glucose LESS THAN 70 milligrams/deciliter  zolpidem 5 milliGRAM(s) Oral at bedtime PRN Insomnia  zolpidem 5 milliGRAM(s) Oral at bedtime PRN Insomnia      Allergies    No Known Allergies    Intolerances      Review of Systems:    ALL OTHER SYSTEMS REVIEWED AND NEGATIVE      PHYSICAL EXAM:  VITALS: T(C): 36.6 (11-02-20 @ 11:46)  T(F): 97.9 (11-02-20 @ 11:46), Max: 98.6 (11-01-20 @ 17:07)  HR: 72 (11-02-20 @ 11:46) (60 - 72)  BP: 130/60 (11-02-20 @ 11:46) (112/62 - 130/60)  RR:  (17 - 18)  SpO2:  (90% - 97%)  Wt(kg): --  GENERAL: NAD, well-groomed, well-developed  EYES: No proptosis, no lid lag, anicteric  HEENT:  Atraumatic, Normocephalic, moist mucous membranes  PSYCH: Alert and oriented x 3, normal affect, normal mood    CAPILLARY BLOOD GLUCOSE      POCT Blood Glucose.: 259 mg/dL (02 Nov 2020 11:37)  POCT Blood Glucose.: 228 mg/dL (02 Nov 2020 07:33)  POCT Blood Glucose.: 286 mg/dL (01 Nov 2020 21:05)  POCT Blood Glucose.: 258 mg/dL (01 Nov 2020 17:00)      11-01    129<L>  |  94<L>  |  22  ----------------------------<  289<H>  4.0   |  23  |  0.83    EGFR if : 105  EGFR if non : 90    Ca    9.4      11-01  Mg     1.8     11-01  Phos  3.8     11-01        A1C with Estimated Average Glucose: 6.9 % (10-22-20 @ 05:30)  Hemoglobin A1C, Whole Blood: 9.4 % (11-04-19 @ 16:00)      Thyroid Function Tests:

## 2020-11-02 NOTE — PROGRESS NOTE ADULT - SUBJECTIVE AND OBJECTIVE BOX
Bibi Noriega PGY1  [id number]    Patient is a 70y old  Male who presents with a chief complaint of Hypoxia, abnormal CT (01 Nov 2020 12:21)      SUBJECTIVE / OVERNIGHT EVENTS:    MEDICATIONS  (STANDING):  aspirin enteric coated 81 milliGRAM(s) Oral daily  atorvastatin 40 milliGRAM(s) Oral at bedtime  budesonide 160 MICROgram(s)/formoterol 4.5 MICROgram(s) Inhaler 2 Puff(s) Inhalation two times a day  clobetasol 0.05% Ointment 1 Application(s) Topical two times a day  dextrose 5%. 1000 milliLiter(s) (50 mL/Hr) IV Continuous <Continuous>  dextrose 50% Injectable 12.5 Gram(s) IV Push once  dextrose 50% Injectable 25 Gram(s) IV Push once  dextrose 50% Injectable 25 Gram(s) IV Push once  enoxaparin Injectable 40 milliGRAM(s) SubCutaneous daily  famotidine    Tablet 20 milliGRAM(s) Oral two times a day  insulin glargine Injectable (LANTUS) 36 Unit(s) SubCutaneous at bedtime  insulin lispro (ADMELOG) corrective regimen sliding scale   SubCutaneous three times a day before meals  insulin lispro (ADMELOG) corrective regimen sliding scale   SubCutaneous at bedtime  insulin lispro Injectable (ADMELOG) 22 Unit(s) SubCutaneous three times a day before meals  magnesium oxide 400 milliGRAM(s) Oral three times a day with meals  metoprolol succinate ER 50 milliGRAM(s) Oral daily  niacin SR 1000 milliGRAM(s) Oral at bedtime  omega-3-Acid Ethyl Esters 2 Gram(s) Oral two times a day  predniSONE   Tablet 75 milliGRAM(s) Oral two times a day  trimethoprim  160 mG/sulfamethoxazole 800 mG 1 Tablet(s) Oral <User Schedule>    MEDICATIONS  (PRN):  albuterol/ipratropium for Nebulization 3 milliLiter(s) Nebulizer every 4 hours PRN Shortness of Breath and/or Wheezing  dextrose 40% Gel 15 Gram(s) Oral once PRN Blood Glucose LESS THAN 70 milliGRAM(s)/deciliter  glucagon  Injectable 1 milliGRAM(s) IntraMuscular once PRN Glucose LESS THAN 70 milligrams/deciliter  zolpidem 5 milliGRAM(s) Oral at bedtime PRN Insomnia  zolpidem 5 milliGRAM(s) Oral at bedtime PRN Insomnia      CAPILLARY BLOOD GLUCOSE      POCT Blood Glucose.: 228 mg/dL (02 Nov 2020 07:33)  POCT Blood Glucose.: 286 mg/dL (01 Nov 2020 21:05)  POCT Blood Glucose.: 258 mg/dL (01 Nov 2020 17:00)  POCT Blood Glucose.: 317 mg/dL (01 Nov 2020 12:05)  POCT Blood Glucose.: 281 mg/dL (01 Nov 2020 07:47)    I&O's Summary      Vital Signs Last 24 Hrs  T(C): 36.4 (02 Nov 2020 05:48), Max: 37.1 (01 Nov 2020 11:26)  T(F): 97.5 (02 Nov 2020 05:48), Max: 98.7 (01 Nov 2020 11:26)  HR: 60 (02 Nov 2020 05:48) (60 - 128)  BP: 112/62 (02 Nov 2020 05:48) (112/62 - 125/51)  BP(mean): --  RR: 18 (02 Nov 2020 05:48) (17 - 21)  SpO2: 95% (02 Nov 2020 05:48) (80% - 97%)    PHYSICAL EXAM:  GENERAL: no distress  PSYCH: A&O x3  HEAD: Atraumatic, Normocephalic  NECK: Supple, No JVD  CHEST/LUNG: clear to auscultation bilaterally  HEART: regular rate and rhythm, no murmurs  ABDOMEN: nontender to palpation, no rebound tenderness/guarding  EXTREMITIES: no edema on bilateral LE  NEUROLOGY: no focal neurologic deficit  SKIN: No rashes or lesions    LABS:                        12.9   12.86 )-----------( 426      ( 01 Nov 2020 05:19 )             40.0      11-01    129<L>  |  94<L>  |  22  ----------------------------<  289<H>  4.0   |  23  |  0.83    Ca    9.4      01 Nov 2020 05:19  Phos  3.8     11-01  Mg     1.8     11-01                RADIOLOGY & ADDITIONAL TESTS:    Imaging Personally Reviewed:    Consultant(s) Notes Reviewed:      Care Discussed with Consultants/Other Providers:   Bibi Noriega PGY1  [id number]    Patient is a 70y old  Male who presents with a chief complaint of Hypoxia, abnormal CT (01 Nov 2020 12:21)    SUBJECTIVE / OVERNIGHT EVENTS:    MEDICATIONS  (STANDING):  aspirin enteric coated 81 milliGRAM(s) Oral daily  atorvastatin 40 milliGRAM(s) Oral at bedtime  budesonide 160 MICROgram(s)/formoterol 4.5 MICROgram(s) Inhaler 2 Puff(s) Inhalation two times a day  clobetasol 0.05% Ointment 1 Application(s) Topical two times a day  dextrose 5%. 1000 milliLiter(s) (50 mL/Hr) IV Continuous <Continuous>  dextrose 50% Injectable 12.5 Gram(s) IV Push once  dextrose 50% Injectable 25 Gram(s) IV Push once  dextrose 50% Injectable 25 Gram(s) IV Push once  enoxaparin Injectable 40 milliGRAM(s) SubCutaneous daily  famotidine    Tablet 20 milliGRAM(s) Oral two times a day  insulin glargine Injectable (LANTUS) 36 Unit(s) SubCutaneous at bedtime  insulin lispro (ADMELOG) corrective regimen sliding scale   SubCutaneous three times a day before meals  insulin lispro (ADMELOG) corrective regimen sliding scale   SubCutaneous at bedtime  insulin lispro Injectable (ADMELOG) 22 Unit(s) SubCutaneous three times a day before meals  magnesium oxide 400 milliGRAM(s) Oral three times a day with meals  metoprolol succinate ER 50 milliGRAM(s) Oral daily  niacin SR 1000 milliGRAM(s) Oral at bedtime  omega-3-Acid Ethyl Esters 2 Gram(s) Oral two times a day  predniSONE   Tablet 75 milliGRAM(s) Oral two times a day  trimethoprim  160 mG/sulfamethoxazole 800 mG 1 Tablet(s) Oral <User Schedule>    MEDICATIONS  (PRN):  albuterol/ipratropium for Nebulization 3 milliLiter(s) Nebulizer every 4 hours PRN Shortness of Breath and/or Wheezing  dextrose 40% Gel 15 Gram(s) Oral once PRN Blood Glucose LESS THAN 70 milliGRAM(s)/deciliter  glucagon  Injectable 1 milliGRAM(s) IntraMuscular once PRN Glucose LESS THAN 70 milligrams/deciliter  zolpidem 5 milliGRAM(s) Oral at bedtime PRN Insomnia  zolpidem 5 milliGRAM(s) Oral at bedtime PRN Insomnia      CAPILLARY BLOOD GLUCOSE      POCT Blood Glucose.: 228 mg/dL (02 Nov 2020 07:33)  POCT Blood Glucose.: 286 mg/dL (01 Nov 2020 21:05)  POCT Blood Glucose.: 258 mg/dL (01 Nov 2020 17:00)  POCT Blood Glucose.: 317 mg/dL (01 Nov 2020 12:05)  POCT Blood Glucose.: 281 mg/dL (01 Nov 2020 07:47)    I&O's Summary      Vital Signs Last 24 Hrs  T(C): 36.4 (02 Nov 2020 05:48), Max: 37.1 (01 Nov 2020 11:26)  T(F): 97.5 (02 Nov 2020 05:48), Max: 98.7 (01 Nov 2020 11:26)  HR: 60 (02 Nov 2020 05:48) (60 - 128)  BP: 112/62 (02 Nov 2020 05:48) (112/62 - 125/51)  BP(mean): --  RR: 18 (02 Nov 2020 05:48) (17 - 21)  SpO2: 95% (02 Nov 2020 05:48) (80% - 97%)    PHYSICAL EXAM:  GENERAL: no distress  PSYCH: A&O x3  HEAD: Atraumatic, Normocephalic  NECK: Supple, No JVD  CHEST/LUNG: clear to auscultation bilaterally  HEART: regular rate and rhythm, no murmurs  ABDOMEN: nontender to palpation, no rebound tenderness/guarding  EXTREMITIES: no edema on bilateral LE  NEUROLOGY: no focal neurologic deficit  SKIN: No rashes or lesions    LABS:                        12.9   12.86 )-----------( 426      ( 01 Nov 2020 05:19 )             40.0      11-01    129<L>  |  94<L>  |  22  ----------------------------<  289<H>  4.0   |  23  |  0.83    Ca    9.4      01 Nov 2020 05:19  Phos  3.8     11-01  Mg     1.8     11-01                RADIOLOGY & ADDITIONAL TESTS:    Imaging Personally Reviewed:    Consultant(s) Notes Reviewed:      Care Discussed with Consultants/Other Providers:   Bibi Noriega PGY1  [id number]    Patient is a 70y old  Male who presents with a chief complaint of Hypoxia, abnormal CT (01 Nov 2020 12:21)    SUBJECTIVE / OVERNIGHT EVENTS:  - no events overnight  - am: pt is breathing comfortably, no shortness of breath, spo2 wnl on 4L NC. Denies chest pain. Says cream is helping his leg rash.     MEDICATIONS  (STANDING):  aspirin enteric coated 81 milliGRAM(s) Oral daily  atorvastatin 40 milliGRAM(s) Oral at bedtime  budesonide 160 MICROgram(s)/formoterol 4.5 MICROgram(s) Inhaler 2 Puff(s) Inhalation two times a day  clobetasol 0.05% Ointment 1 Application(s) Topical two times a day  dextrose 5%. 1000 milliLiter(s) (50 mL/Hr) IV Continuous <Continuous>  dextrose 50% Injectable 12.5 Gram(s) IV Push once  dextrose 50% Injectable 25 Gram(s) IV Push once  dextrose 50% Injectable 25 Gram(s) IV Push once  enoxaparin Injectable 40 milliGRAM(s) SubCutaneous daily  famotidine    Tablet 20 milliGRAM(s) Oral two times a day  insulin glargine Injectable (LANTUS) 36 Unit(s) SubCutaneous at bedtime  insulin lispro (ADMELOG) corrective regimen sliding scale   SubCutaneous three times a day before meals  insulin lispro (ADMELOG) corrective regimen sliding scale   SubCutaneous at bedtime  insulin lispro Injectable (ADMELOG) 22 Unit(s) SubCutaneous three times a day before meals  magnesium oxide 400 milliGRAM(s) Oral three times a day with meals  metoprolol succinate ER 50 milliGRAM(s) Oral daily  niacin SR 1000 milliGRAM(s) Oral at bedtime  omega-3-Acid Ethyl Esters 2 Gram(s) Oral two times a day  predniSONE   Tablet 75 milliGRAM(s) Oral two times a day  trimethoprim  160 mG/sulfamethoxazole 800 mG 1 Tablet(s) Oral <User Schedule>    MEDICATIONS  (PRN):  albuterol/ipratropium for Nebulization 3 milliLiter(s) Nebulizer every 4 hours PRN Shortness of Breath and/or Wheezing  dextrose 40% Gel 15 Gram(s) Oral once PRN Blood Glucose LESS THAN 70 milliGRAM(s)/deciliter  glucagon  Injectable 1 milliGRAM(s) IntraMuscular once PRN Glucose LESS THAN 70 milligrams/deciliter  zolpidem 5 milliGRAM(s) Oral at bedtime PRN Insomnia  zolpidem 5 milliGRAM(s) Oral at bedtime PRN Insomnia      CAPILLARY BLOOD GLUCOSE      POCT Blood Glucose.: 228 mg/dL (02 Nov 2020 07:33)  POCT Blood Glucose.: 286 mg/dL (01 Nov 2020 21:05)  POCT Blood Glucose.: 258 mg/dL (01 Nov 2020 17:00)  POCT Blood Glucose.: 317 mg/dL (01 Nov 2020 12:05)  POCT Blood Glucose.: 281 mg/dL (01 Nov 2020 07:47)    I&O's Summary      Vital Signs Last 24 Hrs  T(C): 36.4 (02 Nov 2020 05:48), Max: 37.1 (01 Nov 2020 11:26)  T(F): 97.5 (02 Nov 2020 05:48), Max: 98.7 (01 Nov 2020 11:26)  HR: 60 (02 Nov 2020 05:48) (60 - 128)  BP: 112/62 (02 Nov 2020 05:48) (112/62 - 125/51)  BP(mean): --  RR: 18 (02 Nov 2020 05:48) (17 - 21)  SpO2: 95% (02 Nov 2020 05:48) (80% - 97%)    PHYSICAL EXAM:  GENERAL: sitting comfortably  PSYCH: A&O x3  HEAD: Atraumatic, Normocephalic  CHEST/LUNG: clear to auscultation bilaterally  HEART: regular rate and rhythm, no murmurs  ABDOMEN: nontender to palpation, no rebound tenderness/guarding  EXTREMITIES: no edema on bilateral LE  NEUROLOGY: no focal neurologic deficit  SKIN: bilateral LE with maculopapular rash    LABS:                        12.9   12.86 )-----------( 426      ( 01 Nov 2020 05:19 )             40.0      11-01    129<L>  |  94<L>  |  22  ----------------------------<  289<H>  4.0   |  23  |  0.83    Ca    9.4      01 Nov 2020 05:19  Phos  3.8     11-01  Mg     1.8     11-01                RADIOLOGY & ADDITIONAL TESTS:    Imaging Personally Reviewed:    Consultant(s) Notes Reviewed:      Care Discussed with Consultants/Other Providers:

## 2020-11-02 NOTE — PROGRESS NOTE ADULT - PROBLEM SELECTOR PROBLEM 5
Insulin dependent type 2 diabetes mellitus

## 2020-11-02 NOTE — PROGRESS NOTE ADULT - PROBLEM SELECTOR PROBLEM 3
Hypertension
COPD (chronic obstructive pulmonary disease)

## 2020-11-02 NOTE — PROGRESS NOTE ADULT - PROBLEM SELECTOR PLAN 5
-Recently started on insulin as outpatient  -Endocrine consulted for steroid-induced hyperglycemia; recs appreciated  -Lantus now increased to 36U QHS and Admelog 22U TID today will f/u tomorrow to see if telehealth visit can be scheduled sooner, daughter would like medications sent to [facility] downstairs (closed today) will f/u with endo regarding discharge recs tomorrow  -FS and mod SSI TIDAC/QHS  -Will monitor FS and further adjust regimen as needed  -Regular diet (consistent carb/dash/tlc)  -A1C 6.9 -Recently started on insulin as outpatient  -Endocrine consulted for steroid-induced hyperglycemia; recs appreciated  -Lantus now increased to 43U QHS and Admelog 25U TID today will f/u tomorrow to see if telehealth visit can be scheduled sooner, daughter would like medications sent to [facility] downstairs (closed today) will f/u with endo regarding discharge recs tomorrow  -FS and mod SSI TIDAC/QHS  -Will monitor FS and further adjust regimen as needed  -Regular diet (consistent carb/dash/tlc)  -A1C 6.9

## 2020-11-02 NOTE — PROGRESS NOTE ADULT - ATTENDING COMMENTS
Estephania Hillman MD  Division of Endocrinology  Pager: [phone]    If after 6PM or before 9AM, or on weekends/holidays, please call endocrine answering service for assistance ([phone]).  For nonurgent matters email [email or web address] for assistance.

## 2020-11-02 NOTE — PROGRESS NOTE ADULT - PROBLEM SELECTOR PLAN 8
DVT - Lovenox  Diet - DASH/CC

## 2020-11-02 NOTE — PROGRESS NOTE ADULT - PROBLEM SELECTOR PLAN 1
-Differential: pneumonitis vs. pneumonia. 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

## 2020-11-02 NOTE — PROGRESS NOTE ADULT - ATTENDING COMMENTS
Supplemental O2 requirement has decreased gradually from admission, initially on 10 lit/min, currently on 4 lit/min.   Currently on prednisone 75mg Bid.  Will need taper over 4-6 weeks. This will be done as outpatient.   Please ensure adequate blood sugar control while on high dose steroids.   C/w PPI to decrease risk of stress ulcers.   Will need close outpatient follow up.   Agree with dc on home O2.

## 2020-11-02 NOTE — PROGRESS NOTE ADULT - PROBLEM SELECTOR PLAN 2
-SCLC (dx spring 2019) s/p carboplatin/etoposide and radiation with partial response now on maintenance atezolizumab immunotherapy every 3 weeks (c/b brain metastases now s/p gamma knife radiation therapy with improved brain MRI)   -Patient follows with oncology Dr. Ramirez outpatient  -Oncology aware, appreciate recs  -Atezolizumab maintenance therapy every 3 weeks, holding for now -SCLC (dx spring 2019) s/p carboplatin/etoposide and radiation with partial response now on maintenance atezolizumab immunotherapy every 3 weeks (c/b brain metastases now s/p gamma knife radiation therapy with improved brain MRI)   -Patient follows with oncology Dr. Ramirez outpatient  -Oncology aware, appreciate recs  -Atezolizumab maintenance therapy every 3 weeks, holding for now; f/u onc outpt

## 2020-11-02 NOTE — PROGRESS NOTE ADULT - REASON FOR ADMISSION
Hypoxia, abnormal CT
acute hypoxic resp failure 2/2 pneumonitis

## 2020-11-02 NOTE — PROGRESS NOTE ADULT - PROBLEM SELECTOR PROBLEM 4
Steroid side effects, initial encounter
Rash

## 2020-11-02 NOTE — PROGRESS NOTE ADULT - NSHPATTENDINGPLANDISCUSS_GEN_ALL_CORE
ARCENIO Dimas
Plan discussed with resident/fellow/nurse.
Joe Suárez MD and Ricardo Noriega MD

## 2020-11-02 NOTE — PROGRESS NOTE ADULT - ATTENDING COMMENTS
A 71yo M with PMHx HTN, HLD, DM2, BPH s/p TURP, CAD s/p stent 2007, COPD, and SCLC (dx spring 2019) s/p carboplatin/etoposide and radiation with partial response now on maintenance atezolizumab immunotherapy every 3 weeks (c/b brain metastases now s/p gamma knife radiation therapy with improved brain MRI) admitted for acute hypoxic respiratory failure secondary to medication induced acute pneumonitis (all other etiologies excluded), symptomatic, requiring oxygen supplementation therefore started on PO steroids. Appreciate Pulm and Heme/Onc recommendations. Patient to continue current steroid regimen and will follow up outpt within one week of hospital discharge to initiate taper. Appreciate Endo recommendations for DC planning. Patient will need CLOSE Endo f/u to titrate down on Insulin regimen as his steroid regimen is reduced. Currently in process to set up home oxygen, per CM all arrangements to be made today prior to dc. Daughter Leena aware of plan.     - Patient is clinically and hemodynamically stable for discharge today.  - Vitals stable, requiring O2 4 L, labs reviewed NA corrected for Glucose : 132.   - Follow up with Pulmonologist in 1-2 weeks post discharge.   - Follow up with Oncologist in 2 weeks post discharge (appt made).  - Follow up with Endocrinologist in 1-2 weeks  (to adjust Insulin as Steroids tapered).  - Follow up with PCP in 1-2 weeks post discharge (pt understands schedule appt).  - DC planning time: 34 min in coordinating dc plan with patient and team.

## 2020-11-02 NOTE — PROGRESS NOTE ADULT - ASSESSMENT
Patient with extensive stage small cell lung cancer who has been on maintenance immunotherapy with Atezolizumab, p/w worsening SOB and hypoxia to mid-80’s on RA.   CT chest with b/l diffuse opacities concerning for infection vs pneumonitis, on immune checkpoint inhibitor therapy.    -Pulm consult appreciated. S/p bronch w/ BAL and transbronchial biopsy 10/23. BAL cx NGTD.  BAL cultures, fungitell, PCP +NGTD.  Biopsy sig  chronic interstitial inflammation and type 2 pneumocyte hyperplasia.  -Will continue to treat for pneumonitis   - Patient now on Prednisone 2mg/kg/day, pt to be on high dose for several weeks and to be tapered as an outpatient.  -Please start PPI while patient is on high dose steroids  -Per chart, patient qualifies for home supplemental oxygen. Patient anticipating delivery to home prior to discharge  -No further oncological interventions indicated   -Consider Covid IgG, ordered, pending collection  -Derm Consult recs appreciated, rash improving  -Patient to followup with Dr. Ramirez ([facility]) upon discharge  -C/w Supportive care, pain control, Nutrition, PT, DVT ppx  -Oncology will continue to follow with you      Case d/w Dr. Desire RG  Oncology Physician Assistant  Jo JEAN/[facility]  Pager [phone]    If after 5pm or weekends please page On-call Oncology Fellow     Patient with extensive stage small cell lung cancer who has been on maintenance immunotherapy with Atezolizumab, p/w worsening SOB and hypoxia to mid-80’s on RA.   CT chest with b/l diffuse opacities concerning for infection vs pneumonitis, on immune checkpoint inhibitor therapy.    -Pulm consult appreciated. S/p bronch w/ BAL and transbronchial biopsy 10/23. BAL cx NGTD.  BAL cultures, fungitell, PCP +NGTD.  Biopsy sig  chronic interstitial inflammation and type 2 pneumocyte hyperplasia.  -Will continue to treat for pneumonitis   - Patient now on Prednisone 2mg/kg/day, pt to be on high dose for several weeks and to be tapered as an outpatient.  -Please start PPI while patient is on high dose steroids and monitor blood glucose levels  -Per chart, patient qualifies for home supplemental oxygen. Patient anticipating delivery to home prior to discharge  -No further oncological interventions indicated   -Consider Covid IgG, ordered, pending collection  -Derm Consult recs appreciated, rash improving  -Patient to followup with Dr. Ramirez ([facility]) upon discharge  -C/w Supportive care, pain control, Nutrition, PT, DVT ppx  -Oncology will continue to follow with you      Case d/w Dr. Desire RG  Oncology Physician Assistant  Jo JEAN/[facility]  Pager [phone]    If after 5pm or weekends please page On-call Oncology Fellow

## 2020-11-02 NOTE — PROGRESS NOTE ADULT - ASSESSMENT
Patient is a 69yo M with PMHx HTN, HLD, DM2, BPH s/p TURP, CAD s/p stent 2007, COPD, and SCLC (dx spring 2019) s/p carboplatin/etoposide and radiation with partial response now on maintenance atezolizumab immunotherapy every 3 weeks (c/b brain metastases now s/p gamma knife radiation therapy with improved brain MRI) who presents with worsening BILLINGS [id number] found to be hypoxic and with interval progression of bilateral lung opacities on CT concerning for infectious vs inflammatory etiology. Started on IV steroids for pneumonitis now on high dose prednisone, course c/b steroid induced hyperglycemia. Endocrine consulted for steroid induced hyperglycemia.    Plan:  1. Steroid Induced Hyperglycemia  - Target glucose 100-180 mg/dL, above goal with hyperglycemia.  -Continues on prednisone 75mg BID  - Will increase Lantus to 43 units qhs  - Will increase Admelog to 25 units TID before meals, please HOLD if NPO   - Will c/w Admelog correctional scale to MODERATE before meals and moderate bedtime scale  - Patient is being discharged on steroids with steroid taper, will need basal + bolus insulin management (stop outpatient oral regimen while on basal/bolus insulin) and dosage adjustment while on steroids and steroid titrations, once steroids are completed, patient can resume home diabetes medication regimen (Basaglar, Metformin, Januvia)   - Can also consider discharging patient with MEALTIME Admelog correctional scale in addition to standing basal/bolus insulin regimen so patient is able to correct for possible hyperglycemia at home   - PLEASE NOTIFY ENDOCRINE OF STEROID CHANGES, IF DECREASED, WILL NEED TO DECREASE INSULIN TO PREVENT HYPOGLYCEMIA  - Please send Admelog pen to pharmacy to confirm coverage   - Patient will also need additional supplies ordered:  BD flory pen needles (4 daily)  test strips to check fingersticks 4 times daily (as per insurance)  lancets to check fingersticks 4 times daily (as per insurance)   alcohol pads  - Endocrine team spoke to patient's daughter, Leena, via phone Friday to review insulin and diabetes plan, she understands and agrees   - Follow up scheduled for patient:  [address] [place] [address] [phone]  1. DM educator on 11/10 @ 10:00  2. Dr Dutton on 12/30 @ 3:30    2. HLD  - Fasting lipid panel today: triglycerides 196, target being less than 70   - continue with home atorvastatin  - continue with Niacin as ordered   - lovaza dosage normally 2 g BID, increased lovaza to that dosage as family in not aware of any known reason why he was on a lower dose   - follow lipids as outpatient     3. HTN  - target BP less then 130/80  - will defer adjustment of HTN medications to primary team

## 2020-11-02 NOTE — PROGRESS NOTE ADULT - PROBLEM SELECTOR PLAN 6
-Continue with home metoprolol  -Hold home lisinopril considering soft blood pressures -Continue with home metoprolol  -Hold home lisinopril considering soft blood pressures  - discharged with home meds

## 2020-11-02 NOTE — PROGRESS NOTE ADULT - PROBLEM SELECTOR PLAN 3
-No hx of hospitalizations, does not use home O2  -Roxy PRN  -Symbicort as interchange for Breo Ellipta  -Maintain goal O2 sat >88%, 88-93% on NC  -Patient requires home O2 as above, appreciate CM/SW assistance -No hx of hospitalizations, does not use home O2  -Duonebs PRN  -Symbicort as interchange for Breo Ellipta  -Maintain goal O2 sat >88%, 88-93% on NC  -pt sent home on oxygen 4L

## 2020-11-02 NOTE — PROGRESS NOTE ADULT - PROBLEM SELECTOR PROBLEM 2
Hyperlipidemia
Lung cancer

## 2020-11-02 NOTE — PROGRESS NOTE ADULT - PROBLEM SELECTOR PLAN 7
-Continue with home atorvastatin

## 2020-11-02 NOTE — PROGRESS NOTE ADULT - ASSESSMENT
Patient is a 71yo M with PMHx HTN, HLD, DM2, BPH s/p TURP, CAD s/p stent 2007, COPD, and SCLC (dx spring 2019) s/p carboplatin/etoposide and radiation with partial response now on maintenance atezolizumab immunotherapy every 3 weeks (c/b brain metastases now s/p gamma knife radiation therapy with improved brain MRI) who presents with worsening BILLINGS [id number] found to be hypoxic and with interval progression of bilateral lung opacities on CT concerning for infectious vs inflammatory etiology. Started on IV steroids for possible pneumonitis, course c/b steroid induced hyperglycemia. Plan to transition to oral steroids with prolonged taper as per onc.

## 2020-11-03 ENCOUNTER — NON-APPOINTMENT (OUTPATIENT)
Age: 70
End: 2020-11-03

## 2020-11-03 NOTE — DISCUSSION/SUMMARY
[FreeTextEntry1] : Spoke with pts wife and stated pt feels good. No distress noted as per wife. Pt needs f/u and has apps for PCP. Needs f/u with Oncology, endocrine, and pulmonary. Note sent to Dr. Hyman.

## 2020-11-04 ENCOUNTER — NON-APPOINTMENT (OUTPATIENT)
Age: 70
End: 2020-11-04

## 2020-11-04 ENCOUNTER — APPOINTMENT (OUTPATIENT)
Dept: SLEEP CENTER | Facility: CLINIC | Age: 70
End: 2020-11-04

## 2020-11-05 ENCOUNTER — NON-APPOINTMENT (OUTPATIENT)
Age: 70
End: 2020-11-05

## 2020-11-06 ENCOUNTER — RX RENEWAL (OUTPATIENT)
Age: 70
End: 2020-11-06

## 2020-11-06 ENCOUNTER — NON-APPOINTMENT (OUTPATIENT)
Age: 70
End: 2020-11-06

## 2020-11-10 ENCOUNTER — APPOINTMENT (OUTPATIENT)
Dept: ENDOCRINOLOGY | Facility: CLINIC | Age: 70
End: 2020-11-10
Payer: MEDICARE

## 2020-11-10 PROCEDURE — 99072 ADDL SUPL MATRL&STAF TM PHE: CPT

## 2020-11-10 PROCEDURE — G0108 DIAB MANAGE TRN  PER INDIV: CPT

## 2020-11-11 ENCOUNTER — APPOINTMENT (OUTPATIENT)
Dept: HEMATOLOGY ONCOLOGY | Facility: CLINIC | Age: 70
End: 2020-11-11
Payer: MEDICARE

## 2020-11-11 VITALS
BODY MASS INDEX: 27.94 KG/M2 | TEMPERATURE: 97.6 F | HEART RATE: 61 BPM | SYSTOLIC BLOOD PRESSURE: 136 MMHG | OXYGEN SATURATION: 93 % | RESPIRATION RATE: 18 BRPM | DIASTOLIC BLOOD PRESSURE: 74 MMHG | HEIGHT: 65.35 IN | WEIGHT: 169.76 LBS

## 2020-11-11 PROCEDURE — 99215 OFFICE O/P EST HI 40 MIN: CPT

## 2020-11-11 PROCEDURE — 99072 ADDL SUPL MATRL&STAF TM PHE: CPT

## 2020-11-12 ENCOUNTER — APPOINTMENT (OUTPATIENT)
Dept: PULMONOLOGY | Facility: CLINIC | Age: 70
End: 2020-11-12
Payer: MEDICARE

## 2020-11-12 VITALS
BODY MASS INDEX: 27.98 KG/M2 | OXYGEN SATURATION: 95 % | TEMPERATURE: 97.3 F | DIASTOLIC BLOOD PRESSURE: 69 MMHG | SYSTOLIC BLOOD PRESSURE: 137 MMHG | HEIGHT: 65.35 IN | WEIGHT: 170 LBS | RESPIRATION RATE: 16 BRPM | HEART RATE: 60 BPM

## 2020-11-12 PROCEDURE — 99215 OFFICE O/P EST HI 40 MIN: CPT | Mod: 25

## 2020-11-12 PROCEDURE — 99072 ADDL SUPL MATRL&STAF TM PHE: CPT

## 2020-11-12 NOTE — PHYSICAL EXAM
[No Acute Distress] : no acute distress [Well Nourished] : well nourished [Well Groomed] : well groomed [No Deformities] : no deformities [Well Developed] : well developed [Normal Oropharynx] : normal oropharynx [Normal Appearance] : normal appearance [Supple] : supple [No Neck Mass] : no neck mass [No JVD] : no jvd [Normal Rate/Rhythm] : normal rate/rhythm [Normal S1, S2] : normal s1, s2 [No Resp Distress] : no resp distress [Normal Rhythm and Effort] : normal rhythm and effort [No Abnormalities] : no abnormalities [Benign] : benign [Not Tender] : not tender [Soft] : soft [Normal Gait] : normal gait [No Clubbing] : no clubbing [No Cyanosis] : no cyanosis [No Edema] : no edema [FROM] : FROM [Normal Color/ Pigmentation] : normal color/ pigmentation [No Focal Deficits] : no focal deficits [No Motor Deficits] : no motor deficits [Oriented x3] : oriented x3 [Normal Mood] : normal mood [Normal Affect] : normal affect [TextBox_2] : 4L supplemental O2 [TextBox_11] : NCAT, EOMI, anicteric, trachea midline, wearing mask [TextBox_68] : coarse expiratory crackles, no wheeze  [TextBox_125] : rash over bilateral lower extremities (improved per patient) - scaly papules and small plaques

## 2020-11-12 NOTE — HISTORY OF PRESENT ILLNESS
[Former] : former [Never] : never [Wheezing] : wheezing [Nasal Passage Blockage (Stuffiness)] : edema [Nonspecific Pain, Swelling, And Stiffness] : chest pain [Fever] : fever [Cough] : coughing [Difficulty Breathing During Exertion] : dyspnea on exertion [Feelings Of Weakness On Exertion] : exercise intolerance [2  -  Slight] : 2, slight [Difficulty Initiating Sleep] : difficulty initiating sleep [Snoring] : snoring [Witnessed Apneas] : witnessed apneas [TextBox_4] : 70M DM2, HTN, CAD s/p PCI (RCA 2007), HLD, and COPD and lung cancer presenting for followup pulmonary evaluation after recent hospitalization. He was seen at [facility] by the Pulmonary Team for progressive hypoxia and abnormal CT chest. He underwent bronchoscopy and transbronchial biopsy which was suggestive of inflammation and was started on Prednisone for treatment of drug induced pneumonitis. He has been continuing to improve since his discharge 11/2/2020. He was last seen in our office 10/13/2020.\par \par He is a long term former smoker but quit at the time of his lung cancer diagnosis. He has known obstructive lung disease with a bronchodilator response. He was switched from Breo to Symbicort while in the hospital. He has a ventolin inhaler but has not used it much. He is not wheezing on exam today and his dyspnea and activity level have slowly been improving.\par \par He was diagnosed with small cell lung cancer 4/2019 and then started on chemotherapy and radiation. He initially underwent Bronchoscopy/EBUS with Dr. Martinez which revealed that bilateral mediastinal LN were positive for small cell carcinoma. He was started on chemotehrapy at that time in May 2019 and achieved partial response. He was then started on maintenance Atezolizumab. He was offered prophylactic cranial irradiation but declined initially. He developed small brain mets in June 2020 and received GK-SRS with Dr. Mari [facility]. He had a CT chest 8/31/2020 that showed a small stable upper lobe opacity. His repeat CT chest done 10/2020 was suggestive of pneumonitis.\par \par He denies chest pain or palpitations. He denies nausea, vomiting, or abdominal pain. He is slowly increasing his activity level. He is able to spend short periods of time without supplemental O2, such as showering, and has saturations 88-92%. He has mostly been on 4L O2 since discharge.\par \par He is taking Prednisone 75mg BID and Bactrim M/W/F.\par  [TextBox_77] : 10-11 pm [TextBox_79] : 4-5 am [TextBox_93] : started on Ambien in conjunction with Immunotherapy for Small Cell Lung Cancer [TextBox_27] : 8/31/2020 - \par  CT Chest No Cont             Final\par \par No Documents Attached\par \par \par \par \par   EXAM:  CT CHEST\par \par \par PROCEDURE DATE:  08/31/2020\par \par \par \par INTERPRETATION:  EXAMINATION: CT CHEST\par \par CLINICAL INDICATION: Lung cancer.\par \par TECHNIQUE: Noncontrast CT of the chest was obtained.\par \par COMPARISON: Multiple CT, most recent 6/8/2020.\par \par FINDINGS:\par \par AIRWAYS AND LUNGS: The central tracheobronchial tree is patent.  Emphysema is present. Left upper lobe ill-defined nodular/linear opacity is similar compared to the prior study. Other bilateral patchy lung opacities are new from the prior study. Calcified bilateral lung nodules.\par \par MEDIASTINUM AND PLEURA: Calcified mediastinal and right hilar lymph nodes. The visualized portion of the thyroid gland is unremarkable. There is no pleural effusion. There is no pneumothorax.\par \par HEART AND VESSELS: The heart is normal in size.  There are atherosclerotic calcifications of the aorta and coronary arteries.  There is no pericardial effusion.\par \par UPPER ABDOMEN: Images of the upper abdomen demonstrate no abnormality.\par \par BONES AND SOFT TISSUES: The bones are unremarkable.  The soft tissues are unremarkable.\par \par TUBES/LINES: None.\par \par IMPRESSION:\par Emphysema is present. Left upper lobe ill-defined nodular/linear opacity is similar compared to the prior study. Other bilateral patchy lung opacities are new from the prior study. These are indeterminate and may be infectious/inflammatory. 1 month follow-up CT needed for complete evaluation [TextBox_42] : PROCEDURE DATE:  10/19/2020  \par  \par \par \par INTERPRETATION:  CLINICAL INDICATION: Lung cancer on immunotherapy, follow-up of lung\par opacities.\par \par Axial CT images of the chest are obtained without intravenous administration of contrast.\par \par Comparison is made with a prior chest CT of August 31, 2020.\par \par No enlarged axillary lymph nodes. Multiple calcified mediastinal and right hilar lymph nodes as on the\par prior study suggestive of prior granulomatous infection.\par \par Heart size is normal. No pericardial effusion. Diffuse atherosclerotic disease with involvement of the\par aorta and the coronary arteries. No pleural effusions.\par \par Evaluation of the upper abdomen demonstrate nodularity of the right adrenal gland appears\par unchanged since March 28, 2019.\par \par Evaluation of the lungs demonstrate widespread bilateral ill-defined opacities throughout both lungs\par with significant interval progression, many of which are new since the prior study.\par The opacities within the lower lungs have a peribronchovascular distribution in a symmetric pattern\par new since the prior study. More confluent patchy peripheral opacities within the right upper lobe\par and the right lower lobe have progressed.\par \par Previously described left upper lobe nodular opacity measuring about 1.5 x 1 cm on image 63 of\par series 3 demonstrate minimal if any interval increase in size since August 31, 2020.\par \par Small bilateral calcified granulomas. No central endobronchial lesions.\par \par Emphysema.\par \par Evaluation of the bones demonstrate small sclerotic foci within the sternum and the thoracic spine\par unchanged since March 28, 2019\par \par IMPRESSION: Widespread bilateral opacities throughout both lungs with significant interval\par progression since August 31, 2020 suggestive of infectious or inflammatory etiology. Findings\par may be due to Covid 19 pneumonia.\par \par Left upper lobe nodular opacity with minimal if any interval increase in size since August 31, 2020\par likely related to the adjacent acute process.\par

## 2020-11-12 NOTE — ASSESSMENT
[FreeTextEntry1] : 70M extensive history including DM2, CAD, COPD, and small cell lung cancer presenting for followup post hospitalization. He was hospitalized at [facility] for hypoxic respiratory failure and abnormal CT chest. He underwent bronchoscopy and was diagnosed with drug induced pneumonitis.\par \par 1. Pneumonitis - patient underwent bronchoscopy and transbronchial biopsy which showed chronic inflammation. He has been started on Prednisone 75mg BID and Bactrim prophylaxis.\par - We will continue 75mg BID x 2 weeks and then decrease to 70mg BID\par - If patient is doing well we will then continue to decrease by 10-20mg BID over the next few months\par - He should remain on Bactrim prophylaxis while on these doses of steroids\par \par 2. Small Cell Lung Cancer - with metastatic disease. Patient was recently hospitalized for pneumonitis and therefore his treatment is on hold\par - patient has received radiation therapy \par - should have a followup CT chest towards end of December or January\par \par 3. COPD - patient is a long term former smoker. He will continue Symbicort and increase his use of Ventolin as needed. We will plan for repeat PFTs in 2021 hopefully once COVID pandemic has eased as well as a 6MWT.\par - Spacer provided\par \par 4. Snoring - patient with reported signs and symptoms of sleep disorder breathing. I have referred him to the [facility] for a diagnostic home sleep study. We discussed potential sleep apnea therapies which patient will consider.\par - Patient/family to plan to reschedule\par \par 5. Chronic Hypoxemic Respiratory Failure - patient currently on 4L supplemental O2. Continue to maintain O2 sats > 90% as able.\par \par 6. Health Maintenance - patient has received Flu shot and pneumococcal vaccine this year.

## 2020-11-12 NOTE — REVIEW OF SYSTEMS
[Cough] : cough [Dyspnea] : dyspnea [SOB on Exertion] : sob on exertion [Rash] : rash [Diabetes] : diabetes [Fever] : no fever [Fatigue] : no fatigue [EDS] : no eds [Chills] : no chills [Poor Appetite] : no poor appetite [Dry Eyes] : no dry eyes [Ear Disturbance] : no ear disturbance [Epistaxis] : no epistaxis [Sore Throat] : no sore throat [Eye Irritation] : no eye irritation [Nasal Congestion] : no nasal congestion [Postnasal Drip] : no postnasal drip [Mouth Ulcers] : no mouth ulcers [Hemoptysis] : no hemoptysis [Chest Tightness] : no chest tightness [Frequent URIs] : no frequent URIs [Sputum] : no sputum [Wheezing] : no wheezing [Chest Discomfort] : no chest discomfort [Claudication] : no claudication [Edema] : no edema [Leg Cramps] : no leg cramps [Orthopnea] : no orthopnea [Syncope] : no syncope [Hay Fever] : no hay fever [Watery Eyes] : no watery eyes [Itchy Eyes] : no itchy eyes [Seasonal Allergies] : no seasonal allergies [Hives] : no hives [GERD] : no gerd [Abdominal Pain] : no abdominal pain [Nausea] : no nausea [Vomiting] : no vomiting [Dysuria] : no urgency [Arthralgias] : no arthralgias [Myalgias] : no myalgias [Itch] : no itch [Easy Bruising] : no easy bruising [Headache] : no headache [Focal Weakness] : no focal weakness [Seizures] : no seizures [Head Injury] : no head injury [Dizziness] : no dizziness [Paralysis] : no paralysis [Panic Attacks] : no panic attacks [Obesity] : no obesity [TextBox_101] : seen by Derm

## 2020-11-12 NOTE — CURRENT MEDS
[Takes medication as prescribed] : takes [None] : Patient does not have any barriers to medication adherence [FreeTextEntry1] : Spacer provided - for improved bronchodilator use

## 2020-11-12 NOTE — REASON FOR VISIT
[Abnormal CXR/ Chest CT] : an abnormal CXR/ chest CT [Lung Cancer] : lung cancer [COPD] : COPD [Follow-Up - From Hospitalization] : a follow-up visit after a recent hospitalization [Patient Declined  Services] : - None: Patient declined  services [FreeTextEntry2] : Daughter - Leena over the phone [TWNoteComboBox1] : Pakistani

## 2020-11-16 ENCOUNTER — OUTPATIENT (OUTPATIENT)
Dept: OUTPATIENT SERVICES | Facility: HOSPITAL | Age: 70
LOS: 1 days | End: 2020-11-16

## 2020-11-16 ENCOUNTER — APPOINTMENT (OUTPATIENT)
Dept: INTERNAL MEDICINE | Facility: CLINIC | Age: 70
End: 2020-11-16

## 2020-11-16 VITALS
DIASTOLIC BLOOD PRESSURE: 73 MMHG | HEART RATE: 68 BPM | BODY MASS INDEX: 28.32 KG/M2 | HEIGHT: 65 IN | SYSTOLIC BLOOD PRESSURE: 142 MMHG | OXYGEN SATURATION: 95 % | WEIGHT: 170 LBS

## 2020-11-16 DIAGNOSIS — N20.0 CALCULUS OF KIDNEY: Chronic | ICD-10-CM

## 2020-11-16 DIAGNOSIS — D11.0 BENIGN NEOPLASM OF PAROTID GLAND: Chronic | ICD-10-CM

## 2020-11-16 DIAGNOSIS — Z95.5 PRESENCE OF CORONARY ANGIOPLASTY IMPLANT AND GRAFT: Chronic | ICD-10-CM

## 2020-11-16 DIAGNOSIS — Z90.89 ACQUIRED ABSENCE OF OTHER ORGANS: Chronic | ICD-10-CM

## 2020-11-17 ENCOUNTER — RX RENEWAL (OUTPATIENT)
Age: 70
End: 2020-11-17

## 2020-11-24 ENCOUNTER — APPOINTMENT (OUTPATIENT)
Dept: ENDOCRINOLOGY | Facility: CLINIC | Age: 70
End: 2020-11-24
Payer: MEDICARE

## 2020-11-24 PROCEDURE — G0108 DIAB MANAGE TRN  PER INDIV: CPT

## 2020-12-01 ENCOUNTER — APPOINTMENT (OUTPATIENT)
Dept: INTERNAL MEDICINE | Facility: CLINIC | Age: 70
End: 2020-12-01

## 2020-12-01 ENCOUNTER — OUTPATIENT (OUTPATIENT)
Dept: OUTPATIENT SERVICES | Facility: HOSPITAL | Age: 70
LOS: 1 days | End: 2020-12-01

## 2020-12-01 VITALS
WEIGHT: 170 LBS | BODY MASS INDEX: 28.32 KG/M2 | DIASTOLIC BLOOD PRESSURE: 69 MMHG | SYSTOLIC BLOOD PRESSURE: 150 MMHG | HEART RATE: 69 BPM | OXYGEN SATURATION: 97 % | RESPIRATION RATE: 12 BRPM | HEIGHT: 65 IN

## 2020-12-01 DIAGNOSIS — N20.0 CALCULUS OF KIDNEY: Chronic | ICD-10-CM

## 2020-12-01 DIAGNOSIS — Z95.5 PRESENCE OF CORONARY ANGIOPLASTY IMPLANT AND GRAFT: Chronic | ICD-10-CM

## 2020-12-01 DIAGNOSIS — D11.0 BENIGN NEOPLASM OF PAROTID GLAND: Chronic | ICD-10-CM

## 2020-12-01 DIAGNOSIS — Z90.89 ACQUIRED ABSENCE OF OTHER ORGANS: Chronic | ICD-10-CM

## 2020-12-02 NOTE — PHYSICAL EXAM
[No Acute Distress] : no acute distress [Well Nourished] : well nourished [Well Developed] : well developed [Normal Voice/Communication] : normal voice/communication [Normal Sclera/Conjunctiva] : normal sclera/conjunctiva [EOMI] : extraocular movements intact [Normal Outer Ear/Nose] : the outer ears and nose were normal in appearance [No JVD] : no jugular venous distention [No Respiratory Distress] : no respiratory distress  [No Accessory Muscle Use] : no accessory muscle use [Soft] : abdomen soft [Coordination Grossly Intact] : coordination grossly intact [No Focal Deficits] : no focal deficits [Speech Grossly Normal] : speech grossly normal [Memory Grossly Normal] : memory grossly normal [Normal Affect] : the affect was normal [Normal Mood] : the mood was normal [Normal Insight/Judgement] : insight and judgment were intact [de-identified] : Video examination [de-identified] : Some pitting in ankles with patient assisted examination [de-identified] : Patient assisted examination [de-identified] : Erythema and possible mild scale in localized region of scalp, does not appear to have significant height, normal follicles.  Hyperpigmented/erythematous lesions on legs significantly decreased since last visit

## 2020-12-02 NOTE — REVIEW OF SYSTEMS
[Easy Bruising] : easy bruising [Negative] : Neurological [Fever] : no fever [Nosebleeds] : no nosebleeds [Melena] : no melena [Hematuria] : no hematuria [Joint Pain] : no joint pain [Back Pain] : no back pain [Joint Swelling] : no joint swelling [Depression] : no depression [Easy Bleeding] : no easy bleeding [FreeTextEntry6] : Stable respiratory status, using portable oxygen [de-identified] : Stable skin rash [de-identified] : Feeling overall more emotional than before [de-identified] : See HPI

## 2020-12-02 NOTE — ASSESSMENT
[FreeTextEntry1] : Return to office in 1 month, will need adjustment of insulin with steroid taper.

## 2020-12-02 NOTE — REVIEW OF SYSTEMS
[Lower Ext Edema] : lower extremity edema [Skin Rash] : skin rash [Negative] : Psychiatric [Fever] : no fever [Chills] : no chills [Fatigue] : no fatigue [Vision Problems] : no vision problems [Chest Pain] : no chest pain [Palpitations] : no palpitations [Abdominal Pain] : no abdominal pain [Nausea] : no nausea [Diarrhea] : no diarrhea [Headache] : no headache [Dizziness] : no dizziness [Confusion] : no confusion [Memory Loss] : no memory loss [FreeTextEntry6] : Stable breathing with home oxygen use [de-identified] : See HPI

## 2020-12-02 NOTE — HISTORY OF PRESENT ILLNESS
[Home] : at home, [unfilled] , at the time of the visit. [Other Location: e.g. Home (Enter Location, City,State)___] : at [unfilled] [Spouse] : spouse [Other:____] : [unfilled] [Verbal consent obtained from patient] : the patient, [unfilled] [FreeTextEntry1] : Hyperglycemia and scalp rash [de-identified] : Visit conducted by clinician in [place]\par 70-year-old man with a history of chronic low back pain, benign prostatic hypertrophy s/p TURP, hypertension, type 2 diabetes mellitus, hyperlipidemia, erectile dysfunction, gastritis, hypomagnesemia, coronary artery disease s/p stent, prior smoker with emphysema and extensive stage SCLC (dx spring 2019) s/p carboplatin/etoposide and radiation with partial response now remains on atezolizumab immunotherapy, recently found to have small brain metastases now status post gamma knife radiation therapy with improved brain MRI who presents for hyperglycemia and follow-up of chronic medical conditions as listed in the problem list.\par He has received his continuous glucose monitor, he has been cutting carbs from his diet he has been using Basaglar 43 units nightly\par Has also been having a rash on his scalp of irritated skin with slight flaking.  Not very itchy.  For less than 1 week.\par Rash on his legs is significantly improved since starting steroids.\par Has been having mild ankle edema since starting his steroids\par Home visiting nurse took his blood pressure on Friday and it was 110/75.  Now 142/73.

## 2020-12-02 NOTE — HISTORY OF PRESENT ILLNESS
[Home] : at home, [unfilled] , at the time of the visit. [Other Location: e.g. Home (Enter Location, City,State)___] : at [unfilled] [Verbal consent obtained from patient] : the patient, [unfilled] [FreeTextEntry8] : Visit conducted by clinician in [place]\fern 70-year-old man with a history of chronic low back pain, benign prostatic hypertrophy s/p TURP, hypertension, type 2 diabetes mellitus, hyperlipidemia, erectile dysfunction, gastritis, hypomagnesemia, coronary artery disease s/p stent, prior smoker with emphysema and extensive stage SCLC (dx spring 2019) s/p carboplatin/etoposide and radiation with partial response now remains on atezolizumab immunotherapy, recently found to have small brain metastases now status post gamma knife radiation therapy with improved brain MRI who presents for bruising on arm and f/u diabetes. \fern Bumped his left arm slightly and developed a bruise a couple of weeks ago which expanded for some time and was very very dark and now over the past week has been lightening in color though had expanded until a couple of days ago.  No other bleeding reported or other significant bruising.  No fevers, no change in hand strength or arm strength.\fern Has been taking 43 lantus and 20/22/22 novolog and with only one hypoglycemia to 74 seen on carmelo without symptoms, otherwise his review of his fingerstick glucose shows a.m. fingersticks ranging from 106-203 and mostly between 101-160 over the past 2 weeks.  Lunchtime fingersticks ranging 111 -228, mostly in mid 100s.  Dinnertime fingersticks , mostly in the low 100s.  Bedtime fingersticks ranging , mostly between .  Has been sticking with his low-carb diet.  On 12/10, his prednisone dose will be lowered to 60 mg twice daily from his current 70 mg twice daily.

## 2020-12-07 ENCOUNTER — OUTPATIENT (OUTPATIENT)
Dept: OUTPATIENT SERVICES | Facility: HOSPITAL | Age: 70
LOS: 1 days | Discharge: HOME | End: 2020-12-07

## 2020-12-07 ENCOUNTER — TRANSCRIPTION ENCOUNTER (OUTPATIENT)
Age: 70
End: 2020-12-07

## 2020-12-07 DIAGNOSIS — Z95.5 PRESENCE OF CORONARY ANGIOPLASTY IMPLANT AND GRAFT: Chronic | ICD-10-CM

## 2020-12-07 DIAGNOSIS — D11.0 BENIGN NEOPLASM OF PAROTID GLAND: Chronic | ICD-10-CM

## 2020-12-07 DIAGNOSIS — C34.90 MALIGNANT NEOPLASM OF UNSPECIFIED PART OF UNSPECIFIED BRONCHUS OR LUNG: ICD-10-CM

## 2020-12-07 DIAGNOSIS — N20.0 CALCULUS OF KIDNEY: Chronic | ICD-10-CM

## 2020-12-07 DIAGNOSIS — Z90.89 ACQUIRED ABSENCE OF OTHER ORGANS: Chronic | ICD-10-CM

## 2020-12-09 ENCOUNTER — APPOINTMENT (OUTPATIENT)
Dept: HEMATOLOGY ONCOLOGY | Facility: CLINIC | Age: 70
End: 2020-12-09
Payer: MEDICARE

## 2020-12-09 VITALS
HEART RATE: 75 BPM | OXYGEN SATURATION: 98 % | DIASTOLIC BLOOD PRESSURE: 77 MMHG | WEIGHT: 168.65 LBS | TEMPERATURE: 98.3 F | BODY MASS INDEX: 28.07 KG/M2 | SYSTOLIC BLOOD PRESSURE: 128 MMHG | RESPIRATION RATE: 14 BRPM

## 2020-12-09 PROCEDURE — 99072 ADDL SUPL MATRL&STAF TM PHE: CPT

## 2020-12-09 PROCEDURE — 99215 OFFICE O/P EST HI 40 MIN: CPT

## 2020-12-11 ENCOUNTER — RESULT REVIEW (OUTPATIENT)
Age: 70
End: 2020-12-11

## 2020-12-11 ENCOUNTER — OUTPATIENT (OUTPATIENT)
Dept: OUTPATIENT SERVICES | Facility: HOSPITAL | Age: 70
LOS: 1 days | End: 2020-12-11
Payer: COMMERCIAL

## 2020-12-11 ENCOUNTER — TRANSCRIPTION ENCOUNTER (OUTPATIENT)
Age: 70
End: 2020-12-11

## 2020-12-11 ENCOUNTER — APPOINTMENT (OUTPATIENT)
Dept: MRI IMAGING | Facility: CLINIC | Age: 70
End: 2020-12-11
Payer: MEDICARE

## 2020-12-11 DIAGNOSIS — N20.0 CALCULUS OF KIDNEY: Chronic | ICD-10-CM

## 2020-12-11 DIAGNOSIS — Z95.5 PRESENCE OF CORONARY ANGIOPLASTY IMPLANT AND GRAFT: Chronic | ICD-10-CM

## 2020-12-11 DIAGNOSIS — C79.31 SECONDARY MALIGNANT NEOPLASM OF BRAIN: ICD-10-CM

## 2020-12-11 DIAGNOSIS — Z90.89 ACQUIRED ABSENCE OF OTHER ORGANS: Chronic | ICD-10-CM

## 2020-12-11 DIAGNOSIS — D11.0 BENIGN NEOPLASM OF PAROTID GLAND: Chronic | ICD-10-CM

## 2020-12-11 PROCEDURE — 70553 MRI BRAIN STEM W/O & W/DYE: CPT

## 2020-12-11 PROCEDURE — A9585: CPT

## 2020-12-11 PROCEDURE — 70553 MRI BRAIN STEM W/O & W/DYE: CPT | Mod: 26

## 2020-12-14 ENCOUNTER — NON-APPOINTMENT (OUTPATIENT)
Age: 70
End: 2020-12-14

## 2020-12-15 ENCOUNTER — APPOINTMENT (OUTPATIENT)
Dept: ENDOCRINOLOGY | Facility: CLINIC | Age: 70
End: 2020-12-15
Payer: MEDICARE

## 2020-12-15 PROCEDURE — 99072 ADDL SUPL MATRL&STAF TM PHE: CPT

## 2020-12-15 PROCEDURE — G0108 DIAB MANAGE TRN  PER INDIV: CPT

## 2020-12-16 ENCOUNTER — APPOINTMENT (OUTPATIENT)
Dept: NEUROSURGERY | Facility: CLINIC | Age: 70
End: 2020-12-16
Payer: MEDICARE

## 2020-12-16 PROCEDURE — 99213 OFFICE O/P EST LOW 20 MIN: CPT

## 2020-12-16 PROCEDURE — 99072 ADDL SUPL MATRL&STAF TM PHE: CPT

## 2020-12-17 VITALS
OXYGEN SATURATION: 98 % | SYSTOLIC BLOOD PRESSURE: 128 MMHG | DIASTOLIC BLOOD PRESSURE: 64 MMHG | RESPIRATION RATE: 18 BRPM | HEART RATE: 69 BPM

## 2020-12-17 NOTE — REASON FOR VISIT
[Follow-Up: _____] : a [unfilled] follow-up visit [Spouse] : spouse [Patient Declined  Services] : - None: Patient declined  services [FreeTextEntry2] : Iris-   [FreeTextEntry1] : Patient comes in for follow-up visit s/p gamma knife. He was hospitalized last month after a reaction to immune therapy. He has been on portable oxygen since discharge. He is doing well. Independent with all ADLs. He walks 1-2 miles daily. He is eating well. He has been experiencing some side effects of the oral steroid dosing- insomnia and some leg weakness. He has bilateral lower extremity edema by the end of the day which resolves after elevation.

## 2020-12-17 NOTE — ASSESSMENT
[FreeTextEntry1] : Discussion:\par - Reviewed MRI results- excellent response to gamma knife\par - MRI +/- contrast in 3 months + OV

## 2020-12-17 NOTE — DATA REVIEWED
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 Status post gamma knife SRS July 2020. Patient now off atelizumab - evaluate for stability/progression/regression of disease.\par \par TECHNIQUE: Multiplanar imaging was performed using T1 weighted, T2 weighted and FLAIR sequences. Diffusion weighted and susceptibility sensitive images were also obtained. Following intravenous gadolinium, multiplanar T1 weighted images were performed. 9 cc Gadavist were administered. 1 cc were discarded.\par \par COMPARISON: MRI Brain 9/11/2020\par \par FINDINGS:\par VENTRICLES AND SULCI: Moderate age-related cerebral atrophy.\par INTRA-AXIAL: No acute intracranial hemorrhage, midline shift, or acute mass effect. Scattered white matter foci of T2/FLAIR high signal, without corresponding enhancing nodules, favoring chronic microvascular ischemia. Previously seen left temporal occipital lesion on image 51 of series 12 measures 4 mm, smaller in size as compared to the prior study. The previously seen left paramedian frontal lobe lesion is no longer visualized. Chronic tiny left cerebellar hemisphere lacunar infarcts.\par EXTRA-AXIAL: No mass or collection.\par VISUALIZED SINUSES: Mucous polyp in the left maxillary sinus. Mucosal thickening bilateral sphenoid sinuses and ethmoid sinuses.\par VISUALIZED MASTOIDS: Moderate left mastoid air cell effusion.\par CALVARIUM: Within normal limits.\par INTRACRANIAL VASCULATURE: Intact.\par \par \par IMPRESSION:\par Previously seen left temporal occipital lobe lesion is significantly smaller in size.\par Previously seen left frontal lobe lesion is no longer visualized.\par Continued follow-up is recommended.\par \par \par \par \par \par VICTORIA KOEHLER MD; Resident Radiology\par This document has been electronically signed.\par CARMEN NAVARRO MD; Attending Radiologist\par This document has been electronically signed. Dec 11 2020 12:31PM

## 2020-12-17 NOTE — HISTORY OF PRESENT ILLNESS
[FreeTextEntry1] : 70 year-old gentleman, \par came for post GK-SRS visit. \par \par Had lung mass FNA biopsy on 04/18/2019 (Diagnosed "Small cell lung carcinoma", T1cN3) \par s/p Chemotherapy (Carbo/Etoposide/Atezolizumab, May, 2019)\par s/p Consolidative thoracic radiotherapy (Aug 2019) \par \par Found small brain mets on left frontal & parietal area \par s/p Gamma Knife Radiosurgery (20Gy/1Fx for each mass, July 2020) \par \par F/u MRI looks great (tumor shrinked/disappeared)

## 2020-12-17 NOTE — REVIEW OF SYSTEMS
[Leg Weakness] : leg weakness [Seizures] : no convulsions [Difficulty Walking] : no difficulty walking [Shortness Of Breath] : no shortness of breath [Wheezing] : no wheezing [Cough] : no cough [SOB on Exertion] : no shortness of breath during exertion [Joint Swelling] : joint swelling [Negative] : Heme/Lymph [FreeTextEntry9] : pedal edema

## 2020-12-17 NOTE — PHYSICAL EXAM
[General Appearance - Alert] : alert [General Appearance - In No Acute Distress] : in no acute distress [Oriented To Time, Place, And Person] : oriented to person, place, and time [Impaired Insight] : insight and judgment were intact [Affect] : the affect was normal [Person] : oriented to person [Place] : oriented to place [Time] : oriented to time [Short Term Intact] : short term memory intact [Remote Intact] : remote memory intact [Span Intact] : the attention span was normal [Concentration Intact] : normal concentrating ability [Fluency] : fluency intact [Comprehension] : comprehension intact [Current Events] : adequate knowledge of current events [Past History] : adequate knowledge of personal past history [Vocabulary] : adequate range of vocabulary [Cranial Nerves Optic (II)] : visual acuity intact bilaterally,  pupils equal round and reactive to light [Cranial Nerves Oculomotor (III)] : extraocular motion intact [Cranial Nerves Trigeminal (V)] : facial sensation intact symmetrically [Cranial Nerves Facial (VII)] : face symmetrical [Cranial Nerves Vestibulocochlear (VIII)] : hearing was intact bilaterally [Cranial Nerves Glossopharyngeal (IX)] : tongue and palate midline [Cranial Nerves Accessory (XI - Cranial And Spinal)] : head turning and shoulder shrug symmetric [Cranial Nerves Hypoglossal (XII)] : there was no tongue deviation with protrusion [Motor Strength] : muscle strength was normal in all four extremities [No Muscle Atrophy] : normal bulk in all four extremities [Sensation Tactile Decrease] : light touch was intact [Romberg's Sign] : Romberg's sign was negtive [Balance] : balance was intact [Limited Balance] : balance was intact [Past-pointing] : there was no past-pointing [Tremor] : no tremor present [2+] : Patella left 2+ [Sclera] : the sclera and conjunctiva were normal [PERRL With Normal Accommodation] : pupils were equal in size, round, reactive to light, with normal accommodation [Extraocular Movements] : extraocular movements were intact [Outer Ear] : the ears and nose were normal in appearance [Oropharynx] : the oropharynx was normal [Neck Appearance] : the appearance of the neck was normal [Neck Cervical Mass (___cm)] : no neck mass was observed [Jugular Venous Distention Increased] : there was no jugular-venous distention [Thyroid Diffuse Enlargement] : the thyroid was not enlarged [Thyroid Nodule] : there were no palpable thyroid nodules [Respiration, Rhythm And Depth] : normal respiratory rhythm and effort [Exaggerated Use Of Accessory Muscles For Inspiration] : no accessory muscle use [Auscultation Breath Sounds / Voice Sounds] : lungs were clear to auscultation bilaterally [Heart Rate And Rhythm] : heart rate was normal and rhythm regular [Heart Sounds] : normal S1 and S2 [Heart Sounds Gallop] : no gallops [Murmurs] : no murmurs [Heart Sounds Pericardial Friction Rub] : no pericardial rub [Abnormal Walk] : normal gait [Nail Clubbing] : no clubbing  or cyanosis of the fingernails [Motor Tone] : muscle strength and tone were normal [] : no rash [FreeTextEntry1] : Poor turgor

## 2020-12-18 ENCOUNTER — APPOINTMENT (OUTPATIENT)
Dept: DISASTER EMERGENCY | Facility: CLINIC | Age: 70
End: 2020-12-18

## 2020-12-20 LAB — SARS-COV-2 N GENE NPH QL NAA+PROBE: NOT DETECTED

## 2020-12-22 ENCOUNTER — APPOINTMENT (OUTPATIENT)
Dept: PULMONOLOGY | Facility: CLINIC | Age: 70
End: 2020-12-22
Payer: MEDICARE

## 2020-12-22 VITALS
DIASTOLIC BLOOD PRESSURE: 70 MMHG | WEIGHT: 170 LBS | RESPIRATION RATE: 18 BRPM | SYSTOLIC BLOOD PRESSURE: 156 MMHG | TEMPERATURE: 98.1 F | OXYGEN SATURATION: 98 % | BODY MASS INDEX: 28.32 KG/M2 | HEIGHT: 65 IN | HEART RATE: 70 BPM

## 2020-12-22 PROCEDURE — 94618 PULMONARY STRESS TESTING: CPT

## 2020-12-22 PROCEDURE — 94726 PLETHYSMOGRAPHY LUNG VOLUMES: CPT

## 2020-12-22 PROCEDURE — ZZZZZ: CPT

## 2020-12-22 PROCEDURE — 99072 ADDL SUPL MATRL&STAF TM PHE: CPT

## 2020-12-22 PROCEDURE — 99215 OFFICE O/P EST HI 40 MIN: CPT | Mod: 25

## 2020-12-22 PROCEDURE — 94729 DIFFUSING CAPACITY: CPT

## 2020-12-22 PROCEDURE — 94010 BREATHING CAPACITY TEST: CPT

## 2020-12-22 NOTE — PROCEDURE
[FreeTextEntry1] : PFTs 12/22/2020 - % (3.96L), FEV1 95% (2.62L), FEV1/FVC 66%, %, DLCO 57%\par 6MWT 12/22/2020 - 331 meters - no stopping without desaturation on 4L NC\par \par PFTs 6/28/2019 - FVC 91% (3.36L), FEV1 76% (2.14L) (POST BD FVC 4.00L/FEV1 2.50L), %, DLCO

## 2020-12-22 NOTE — ASSESSMENT
[FreeTextEntry1] : 70M extensive history including DM2, CAD, COPD, and small cell lung cancer presenting for followup post hospitalization. He was hospitalized at [facility] for hypoxic respiratory failure and abnormal CT chest. He underwent bronchoscopy and was diagnosed with drug induced pneumonitis.\par \par 1. Pneumonitis - patient underwent bronchoscopy and transbronchial biopsy which showed chronic inflammation. - He is currently on Prednisone 60mg BID and Bactrim prophylaxis.He continues to feel well and the plan is to continue to taper Prednisone as long as he is doing well.\par - We will continue 50mg BID x 1 week on 12/24 and then decrease to 40mg BID on 12/31/2020\par - If patient is doing well we will then continue to decrease by 10-20mg BID every 1-2 weeks over the next few months\par - He should remain on Bactrim prophylaxis while on these doses of steroids\par - We will plan to repeat CT chest in February or March 2021 unless needed sooner from Oncologic perspective\par \par 2. Small Cell Lung Cancer - with metastatic disease. Patient was recently hospitalized for pneumonitis and therefore his treatment is on hold\par - patient has received radiation therapy \par - continue to followup with Oncology regarding further treatment options\par \par 3. COPD - patient is a long term former smoker. He will continue Symbicort and increase his use of Ventolin as needed.\par - PFTs and 6MWT done today\par - No evidence of desaturation during 6MWT with 4L O2\par - PFTs remain stable - no BD testing in setting of COVID-19 pandemic. On prior PFTs patient had significant BD response\par \par 4. Snoring - patient with reported signs and symptoms of sleep disorder breathing. I have referred him to the [facility] for a diagnostic home sleep study. We discussed potential sleep apnea therapies which patient will consider.\par - Patient/family to plan to reschedule in future \par \par 5. Chronic Hypoxemic Respiratory Failure - patient currently on 4L supplemental O2. Continue to maintain O2 sats > 90% as able.\par - Can start to wean down FiO2 as able with goal O2 sat > 90%\par \par 6. Health Maintenance - patient has received Flu shot and pneumococcal vaccine this year.

## 2020-12-22 NOTE — PHYSICAL EXAM
[No Acute Distress] : no acute distress [Well Nourished] : well nourished [Well Groomed] : well groomed [No Deformities] : no deformities [Well Developed] : well developed [Normal Oropharynx] : normal oropharynx [Normal Appearance] : normal appearance [Supple] : supple [No Neck Mass] : no neck mass [No JVD] : no jvd [Normal Rate/Rhythm] : normal rate/rhythm [Normal S1, S2] : normal s1, s2 [No Resp Distress] : no resp distress [No Acc Muscle Use] : no acc muscle use [Normal Rhythm and Effort] : normal rhythm and effort [No Abnormalities] : no abnormalities [Benign] : benign [Not Tender] : not tender [Soft] : soft [Normal Gait] : normal gait [No Clubbing] : no clubbing [No Cyanosis] : no cyanosis [No Edema] : no edema [FROM] : FROM [Normal Color/ Pigmentation] : normal color/ pigmentation [No Focal Deficits] : no focal deficits [No Motor Deficits] : no motor deficits [Oriented x3] : oriented x3 [Normal Mood] : normal mood [Normal Affect] : normal affect [TextBox_2] : 4L supplemental O2 [TextBox_11] : NCAT, EOMI, anicteric, trachea midline, wearing mask [TextBox_68] : no wheeze, good air entry [TextBox_125] : rash over bilateral lower extremities (improved per patient) - scaly papules and small plaques

## 2020-12-22 NOTE — REASON FOR VISIT
[Follow-Up - From Hospitalization] : a follow-up visit after a recent hospitalization [Abnormal CXR/ Chest CT] : an abnormal CXR/ chest CT [Lung Cancer] : lung cancer [COPD] : COPD [Patient Declined  Services] : - None: Patient declined  services [FreeTextEntry2] : Daughter - Veronica over the phone [TWNoteComboBox1] : [place]

## 2020-12-22 NOTE — END OF VISIT
[Time Spent: ___ minutes] : I have spent [unfilled] minutes of time on the encounter. [>50% of the face to face encounter time was spent on counseling and/or coordination of care for ___] : Greater than 50% of the face to face encounter time was spent on counseling and/or coordination of care for [unfilled] 97.9

## 2020-12-22 NOTE — REVIEW OF SYSTEMS
[Cough] : cough [SOB on Exertion] : sob on exertion [Diabetes] : diabetes [Fever] : no fever [Fatigue] : no fatigue [EDS] : no eds [Chills] : no chills [Poor Appetite] : no poor appetite [Dry Eyes] : no dry eyes [Ear Disturbance] : no ear disturbance [Epistaxis] : no epistaxis [Sore Throat] : no sore throat [Eye Irritation] : no eye irritation [Nasal Congestion] : no nasal congestion [Postnasal Drip] : no postnasal drip [Mouth Ulcers] : no mouth ulcers [Hemoptysis] : no hemoptysis [Chest Tightness] : no chest tightness [Frequent URIs] : no frequent URIs [Sputum] : no sputum [Dyspnea] : no dyspnea [Wheezing] : no wheezing [Chest Discomfort] : no chest discomfort [Claudication] : no claudication [Edema] : no edema [Leg Cramps] : no leg cramps [Orthopnea] : no orthopnea [Syncope] : no syncope [Hay Fever] : no hay fever [Watery Eyes] : no watery eyes [Itchy Eyes] : no itchy eyes [Seasonal Allergies] : no seasonal allergies [Hives] : no hives [GERD] : no gerd [Abdominal Pain] : no abdominal pain [Nausea] : no nausea [Vomiting] : no vomiting [Dysuria] : no urgency [Arthralgias] : no arthralgias [Myalgias] : no myalgias [Itch] : no itch [Easy Bruising] : no easy bruising [Headache] : no headache [Focal Weakness] : no focal weakness [Seizures] : no seizures [Head Injury] : no head injury [Dizziness] : no dizziness [Paralysis] : no paralysis [Depression] : no depression [Anxiety] : no anxiety [Panic Attacks] : no panic attacks [Obesity] : no obesity [TextBox_101] : seen by Derm previoiusly

## 2020-12-22 NOTE — HISTORY OF PRESENT ILLNESS
[Former] : former [Never] : never [Wheezing] : wheezing [Nasal Passage Blockage (Stuffiness)] : edema [Nonspecific Pain, Swelling, And Stiffness] : chest pain [Fever] : fever [Cough] : coughing [Difficulty Breathing During Exertion] : dyspnea on exertion [Feelings Of Weakness On Exertion] : exercise intolerance [2  -  Slight] : 2, slight [TextBox_4] : 70M DM2, HTN, CAD s/p PCI (RCA 2007), HLD, and COPD and lung cancer presenting for followup pulmonary evaluation. He was last seen in the office 11/12/2020 after a recent hospitalization. While hospitalized he was seen at [facility] by the Pulmonary Team for progressive hypoxia and abnormal CT chest. He underwent bronchoscopy and transbronchial biopsy which was suggestive of inflammation and was started on Prednisone for treatment of drug induced pneumonitis. \par \par He has been on tapering doses of Prednisone and using Bactrim M/W/F. He denies chest pain or palpitations. He denies nausea, vomiting, or abdominal pain. He is slowly increasing his activity level. He is able to spend short periods of time without supplemental O2, such as showering, and saturates > 90%. He has mostly been on 4L O2 since discharge. He is otherwise feeling well and in good spirits. He has been able to increase his activity status and is now walking almost 1 mile twice daily. \par \par He is a long term former smoker but quit at the time of his lung cancer diagnosis. He has known obstructive lung disease with a bronchodilator response. He was switched from Breo to Symbicort while in the hospital. He has a ventolin inhaler but has not used it much. He is not wheezing on exam today and his dyspnea and activity level have slowly been improving.\par \par He was diagnosed with small cell lung cancer 4/2019 and then started on chemotherapy and radiation. He initially underwent Bronchoscopy/EBUS with Dr. Martinez which revealed that bilateral mediastinal LN were positive for small cell carcinoma. He was started on chemotehrapy at that time in May 2019 and achieved partial response. He was then started on maintenance Atezolizumab. He was offered prophylactic cranial irradiation but declined initially. He developed small brain mets in June 2020 and received GK-SRS with Dr. [facility]. He had a CT chest 8/31/2020 that showed a small stable upper lobe opacity. His repeat CT chest done 10/2020 was suggestive of pneumonitis. [TextBox_93] : started on Ambien in conjunction with Immunotherapy for Small Cell Lung Cancer [TextBox_27] : 8/31/2020 - \par  CT Chest No Cont             Final\par \par No Documents Attached\par \par \par \par \par   EXAM:  CT CHEST\par \par \par PROCEDURE DATE:  08/31/2020\par \par \par \par INTERPRETATION:  EXAMINATION: CT CHEST\par \par CLINICAL INDICATION: Lung cancer.\par \par TECHNIQUE: Noncontrast CT of the chest was obtained.\par \par COMPARISON: Multiple CT, most recent 6/8/2020.\par \par FINDINGS:\par \par AIRWAYS AND LUNGS: The central tracheobronchial tree is patent.  Emphysema is present. Left upper lobe ill-defined nodular/linear opacity is similar compared to the prior study. Other bilateral patchy lung opacities are new from the prior study. Calcified bilateral lung nodules.\par \par MEDIASTINUM AND PLEURA: Calcified mediastinal and right hilar lymph nodes. The visualized portion of the thyroid gland is unremarkable. There is no pleural effusion. There is no pneumothorax.\par \par HEART AND VESSELS: The heart is normal in size.  There are atherosclerotic calcifications of the aorta and coronary arteries.  There is no pericardial effusion.\par \par UPPER ABDOMEN: Images of the upper abdomen demonstrate no abnormality.\par \par BONES AND SOFT TISSUES: The bones are unremarkable.  The soft tissues are unremarkable.\par \par TUBES/LINES: None.\par \par IMPRESSION:\par Emphysema is present. Left upper lobe ill-defined nodular/linear opacity is similar compared to the prior study. Other bilateral patchy lung opacities are new from the prior study. These are indeterminate and may be infectious/inflammatory. 1 month follow-up CT needed for complete evaluation [TextBox_42] : PROCEDURE DATE:  10/19/2020  \par  \par \par \par INTERPRETATION:  CLINICAL INDICATION: Lung cancer on immunotherapy, follow-up of lung\par opacities.\par \par Axial CT images of the chest are obtained without intravenous administration of contrast.\par \par Comparison is made with a prior chest CT of August 31, 2020.\par \par No enlarged axillary lymph nodes. Multiple calcified mediastinal and right hilar lymph nodes as on the\par prior study suggestive of prior granulomatous infection.\par \par Heart size is normal. No pericardial effusion. Diffuse atherosclerotic disease with involvement of the\par aorta and the coronary arteries. No pleural effusions.\par \par Evaluation of the upper abdomen demonstrate nodularity of the right adrenal gland appears\par unchanged since March 28, 2019.\par \par Evaluation of the lungs demonstrate widespread bilateral ill-defined opacities throughout both lungs\par with significant interval progression, many of which are new since the prior study.\par The opacities within the lower lungs have a peribronchovascular distribution in a symmetric pattern\par new since the prior study. More confluent patchy peripheral opacities within the right upper lobe\par and the right lower lobe have progressed.\par \par Previously described left upper lobe nodular opacity measuring about 1.5 x 1 cm on image 63 of\par series 3 demonstrate minimal if any interval increase in size since August 31, 2020.\par \par Small bilateral calcified granulomas. No central endobronchial lesions.\par \par Emphysema.\par \par Evaluation of the bones demonstrate small sclerotic foci within the sternum and the thoracic spine\par unchanged since March 28, 2019\par \par IMPRESSION: Widespread bilateral opacities throughout both lungs with significant interval\par progression since August 31, 2020 suggestive of infectious or inflammatory etiology. Findings\par may be due to Covid 19 pneumonia.\par \par Left upper lobe nodular opacity with minimal if any interval increase in size since August 31, 2020\par likely related to the adjacent acute process.\par  [TextBox_57] : CT 10/21/2020 - PROCEDURE DATE: Oct 21 2020\par \par \par \par INTERPRETATION: INDICATION, CLINICAL INFORMATION: Shortness of breath, lung cancer\par \par TECHNIQUE: CT pulmonary angiogram of the chest was performed. Coronal and sagittal images were reconstructed. Maximum intensity projection images were generated. Images were obtained after the uneventful administration of 90 cc nonionic intravenous Omnipaque 350. 10 cc of Omnipaque 350 was discarded.\par \par \par COMPARISON: 10/19/2020.\par \par FINDINGS:\par \par PULMONARY VESSELS: No pulmonary embolus. Main pulmonary artery normal in diameter.\par \par HEART AND VASCULATURE: Cardiomegaly. No pericardial effusion. Right coronary stents.\par \par No aortic aneurysm or dissection.\par \par LUNGS, AIRWAYS, PLEURA: Patent central airways. Emphysema. Interval increase of diffuse bilateral peribronchovascular opacities.\par \par No pleural effusions or pneumothorax.\par \par MEDIASTINUM: Enlarged subcarinal and right hilar lymph nodes measuring 1.4, 1.2 cm short axis. Calcified mediastinal and right hilar lymph nodes.\par \par UPPER ABDOMEN: Within normal limits.\par \par BONES AND SOFT TISSUES: No aggressive osseous lesion.\par \par LOWER NECK: Within normal limits.\par \par IMPRESSION:\par \par No pulmonary embolus.\par \par Interval increase of diffuse bilateral peribronchovascular opacities which may represent pneumonitis or infection.

## 2020-12-23 ENCOUNTER — APPOINTMENT (OUTPATIENT)
Dept: RADIATION ONCOLOGY | Facility: CLINIC | Age: 70
End: 2020-12-23
Payer: MEDICARE

## 2020-12-23 VITALS
TEMPERATURE: 96.8 F | OXYGEN SATURATION: 95 % | WEIGHT: 170.75 LBS | HEART RATE: 94 BPM | DIASTOLIC BLOOD PRESSURE: 64 MMHG | SYSTOLIC BLOOD PRESSURE: 111 MMHG | RESPIRATION RATE: 16 BRPM | BODY MASS INDEX: 28.41 KG/M2

## 2020-12-23 PROCEDURE — 99213 OFFICE O/P EST LOW 20 MIN: CPT

## 2020-12-23 PROCEDURE — 99072 ADDL SUPL MATRL&STAF TM PHE: CPT

## 2020-12-23 NOTE — PHYSICAL EXAM
[General Appearance - Well Developed] : well developed [General Appearance - Well Nourished] : well nourished [General Appearance - In No Acute Distress] : in no acute distress [Outer Ear] : the ears and nose were normal in appearance [Normal] : no focal deficits [Oriented To Time, Place, And Person] : oriented to person, place, and time [Not Anxious] : not anxious [de-identified] : bilateral LE +1 edema [de-identified] : CN II-XII grossly intact.  Excellent comprehension.

## 2020-12-23 NOTE — HISTORY OF PRESENT ILLNESS
[FreeTextEntry1] : Mr. Ness presents for follow up. He underwent gamma knife treatment for a total of 2000 cgy of radiation to a left frontal and left parietal brain lesion on 7/1/2020. \par \par ONCOLOGY HISTORY\par He is a 69 year old male with a history of small cell lung cancer.  This was initially diagnosed in 4/2019 through an ebus of bilateral level 4 lymph nodes in 4/2019 after presenting with 6 months of worsening cough, fatigue, and insomnia. A CT chest showed a lung mass at that time. He was initially treated with carbo/etoposide/atezoluzimab starting in 5/2019 with 4 cycles completed in 7/2019, followed by atezolizumab maintenance . Consolidative RT completed in 8/2019. He elected against prophylactic whole brain radiation. \par \par Due to frequent headaches, forgetfulness, and reflexes bring worse, a brain MRI was ordered on 6/10/2020. \par \par This brain MRI revealed two brain lesions consistent with metastases. \par 1: left posterior temporal/occipital cortex,  approximately 1.4 x 1.2 cm. \par 2:  high medial left frontal cortex, approximately 0.7 x 0.6 cm Small amount of surrounding edema is identified. \par \par At the time of initial consult he endorsed occassional headaches, manageable.  Denied focal weakness, nausea, vomiting, or other complaints.  Maintains an excellent KPS.  His daughter, a physician, is serving as the  during this conversation.\par \par He was treated with GK SRS to the two lesions on 7/1/2020\par \par 9/17/2020- Mr. Ness presents today for follow up. He is seen with the assistance of [facility]  [id number]. MRI brain done 9/11/2020 showed maarked improvement and treatment response since prior exam. Previously visualized enhancing lesion in the left posterior temporal lobe is markedly decreased in size since prior exam, measuring approximately 0.6 x 0.6 cm, and previously measured 1.4 x 1.2 cm. There is no vasogenic edema surrounding this lesion. Previously visualized enhancing lesion in the high medial left frontal cortex, is markedly decreased in size since prior exam, nearly nonexistent measuring 0.1 cm, and previously measuring 0.7 x 0.6 cm. Minimal surrounding vasogenic edema remains. No new enhancing lesions are identified. \par \par He continues following with Dr. Ramirez. continues on atezoluzumab maintenance. CT chest 8/31/2020 showed Emphysema is present. Left upper lobe ill-defined nodular/linear opacity is similar compared to the prior study. Other bilateral patchy lung opacities are new from the prior study. These are indeterminate and may be infectious/inflammatory. 1 month follow-up CT needed for complete evaluation. \par \par Today he feels very well. Notes some headaches over the last week, not lasting long. Denies nausea, vomiting, focal weakness, Overall feeling well. Hearing may be a little worse recently.\par \par 12/22/2020- Mr. Ness presents today for follow up. [facility]  [id number] used for visit today.  MRI brain 12/11/2020 showed previously seen left temporal occipital lobe lesion is significantly smaller in size.  Previously seen left frontal lobe lesion is no longer visualized.  Continued follow-up is recommended.\par \par CT Chest 10/19/2020 showed widespread bilateral opacities throughout both lungs with significant interval progression since August 31, 2020 suggestive of infectious or inflammatory etiology. Findings may be due to Covid 19 pneumonia.\par \par Atezoluzumab discontinued this fall due to hospitalization with pneumonitis. Due for surveillance imaging at the end of December. Currently on 120 mg of prednisone for pneumonitis.  Today he denies headaches. Says he feels slightly confused sometimes, attributes this to steroids. Denies nausea, vomiting, focal weakness. Slight bilateral LE swelling, +1

## 2020-12-23 NOTE — REVIEW OF SYSTEMS
[Loss of Hearing] : loss of hearing [Shortness Of Breath] : shortness of breath [Negative] : Allergic/Immunologic [Confused] : confusion [Insomnia] : insomnia [Dysphagia] : no dysphagia [Dizziness] : no dizziness [FreeTextEntry4] : pain to mouth and trouble swallowing with pain due to blisters [FreeTextEntry6] : Using O2 3L O2, weaning [FreeTextEntry9] : pain to back X 15 years. Legs feel weak [de-identified] : skin rash to hands [de-identified] : denies headaches. Sometimes feels confused

## 2020-12-24 ENCOUNTER — APPOINTMENT (OUTPATIENT)
Dept: ULTRASOUND IMAGING | Facility: IMAGING CENTER | Age: 70
End: 2020-12-24
Payer: MEDICARE

## 2020-12-24 ENCOUNTER — NON-APPOINTMENT (OUTPATIENT)
Age: 70
End: 2020-12-24

## 2020-12-24 ENCOUNTER — RESULT REVIEW (OUTPATIENT)
Age: 70
End: 2020-12-24

## 2020-12-24 ENCOUNTER — OUTPATIENT (OUTPATIENT)
Dept: OUTPATIENT SERVICES | Facility: HOSPITAL | Age: 70
LOS: 1 days | End: 2020-12-24
Payer: COMMERCIAL

## 2020-12-24 DIAGNOSIS — D11.0 BENIGN NEOPLASM OF PAROTID GLAND: Chronic | ICD-10-CM

## 2020-12-24 DIAGNOSIS — N20.0 CALCULUS OF KIDNEY: Chronic | ICD-10-CM

## 2020-12-24 DIAGNOSIS — Z95.5 PRESENCE OF CORONARY ANGIOPLASTY IMPLANT AND GRAFT: Chronic | ICD-10-CM

## 2020-12-24 DIAGNOSIS — M79.89 OTHER SPECIFIED SOFT TISSUE DISORDERS: ICD-10-CM

## 2020-12-24 DIAGNOSIS — Z90.89 ACQUIRED ABSENCE OF OTHER ORGANS: Chronic | ICD-10-CM

## 2020-12-24 PROCEDURE — 93970 EXTREMITY STUDY: CPT | Mod: 26

## 2020-12-24 PROCEDURE — 93970 EXTREMITY STUDY: CPT

## 2020-12-28 ENCOUNTER — OUTPATIENT (OUTPATIENT)
Dept: OUTPATIENT SERVICES | Facility: HOSPITAL | Age: 70
LOS: 1 days | End: 2020-12-28
Payer: COMMERCIAL

## 2020-12-28 ENCOUNTER — APPOINTMENT (OUTPATIENT)
Dept: CT IMAGING | Facility: CLINIC | Age: 70
End: 2020-12-28
Payer: MEDICARE

## 2020-12-28 DIAGNOSIS — C34.12 MALIGNANT NEOPLASM OF UPPER LOBE, LEFT BRONCHUS OR LUNG: ICD-10-CM

## 2020-12-28 DIAGNOSIS — Z00.8 ENCOUNTER FOR OTHER GENERAL EXAMINATION: ICD-10-CM

## 2020-12-28 DIAGNOSIS — Z95.5 PRESENCE OF CORONARY ANGIOPLASTY IMPLANT AND GRAFT: Chronic | ICD-10-CM

## 2020-12-28 DIAGNOSIS — D11.0 BENIGN NEOPLASM OF PAROTID GLAND: Chronic | ICD-10-CM

## 2020-12-28 DIAGNOSIS — N20.0 CALCULUS OF KIDNEY: Chronic | ICD-10-CM

## 2020-12-28 DIAGNOSIS — Z90.89 ACQUIRED ABSENCE OF OTHER ORGANS: Chronic | ICD-10-CM

## 2020-12-28 PROCEDURE — 71260 CT THORAX DX C+: CPT | Mod: 26

## 2020-12-28 PROCEDURE — 71260 CT THORAX DX C+: CPT

## 2020-12-28 PROCEDURE — 82565 ASSAY OF CREATININE: CPT

## 2020-12-29 ENCOUNTER — NON-APPOINTMENT (OUTPATIENT)
Age: 70
End: 2020-12-29

## 2020-12-30 ENCOUNTER — APPOINTMENT (OUTPATIENT)
Dept: ENDOCRINOLOGY | Facility: CLINIC | Age: 70
End: 2020-12-30
Payer: MEDICARE

## 2020-12-30 ENCOUNTER — APPOINTMENT (OUTPATIENT)
Dept: HEMATOLOGY ONCOLOGY | Facility: CLINIC | Age: 70
End: 2020-12-30
Payer: MEDICARE

## 2020-12-30 VITALS
HEIGHT: 65 IN | OXYGEN SATURATION: 94 % | BODY MASS INDEX: 27.99 KG/M2 | DIASTOLIC BLOOD PRESSURE: 77 MMHG | SYSTOLIC BLOOD PRESSURE: 119 MMHG | HEART RATE: 122 BPM | TEMPERATURE: 98.5 F | WEIGHT: 168 LBS | RESPIRATION RATE: 14 BRPM

## 2020-12-30 VITALS
RESPIRATION RATE: 14 BRPM | HEART RATE: 86 BPM | DIASTOLIC BLOOD PRESSURE: 83 MMHG | SYSTOLIC BLOOD PRESSURE: 160 MMHG | OXYGEN SATURATION: 98 % | TEMPERATURE: 98.1 F | BODY MASS INDEX: 28.07 KG/M2 | WEIGHT: 168.65 LBS

## 2020-12-30 DIAGNOSIS — Z80.43 FAMILY HISTORY OF MALIGNANT NEOPLASM OF TESTIS: ICD-10-CM

## 2020-12-30 LAB — HBA1C MFR BLD HPLC: 7.9

## 2020-12-30 PROCEDURE — 99072 ADDL SUPL MATRL&STAF TM PHE: CPT

## 2020-12-30 PROCEDURE — 99214 OFFICE O/P EST MOD 30 MIN: CPT

## 2020-12-30 PROCEDURE — 99204 OFFICE O/P NEW MOD 45 MIN: CPT

## 2020-12-30 PROCEDURE — 83036 HEMOGLOBIN GLYCOSYLATED A1C: CPT | Mod: QW

## 2021-01-06 ENCOUNTER — TRANSCRIPTION ENCOUNTER (OUTPATIENT)
Age: 71
End: 2021-01-06

## 2021-01-07 PROBLEM — Z80.43 FAMILY HISTORY OF MALIGNANT NEOPLASM OF TESTIS: Status: ACTIVE | Noted: 2019-04-30

## 2021-01-07 NOTE — PHYSICAL EXAM
[Alert] : alert [No Acute Distress] : no acute distress [Normal Sclera/Conjunctiva] : normal sclera/conjunctiva [No Proptosis] : no proptosis [Normal Outer Ear/Nose] : the ears and nose were normal in appearance [Normal Hearing] : hearing was normal [No Neck Mass] : no neck mass was observed [No Respiratory Distress] : no respiratory distress [Clear to Auscultation] : lungs were clear to auscultation bilaterally [Normal S1, S2] : normal S1 and S2 [Normal Rate] : heart rate was normal [Not Tender] : non-tender [Soft] : abdomen soft [Normal Gait] : normal gait [No Rash] : no rash [No Tremors] : no tremors [Oriented x3] : oriented to person, place, and time [Normal Affect] : the affect was normal [Normal Mood] : the mood was normal [Right Foot Was Examined] : right foot ~C was examined [Left Foot Was Examined] : left foot ~C was examined [Normal] : normal [2+] : 2+ in the dorsalis pedis [Foot Ulcers] : no foot ulcers [Acne] : no acne [Vibration Dec.] : normal vibratory sensation at the level of the toes [Position Sense Dec.] : normal position sense at the level of the toes

## 2021-01-07 NOTE — HISTORY OF PRESENT ILLNESS
[FreeTextEntry1] : 70 yr old M with metastatic Lung CA, CAD and COPD here for initial visit for Type 2 DM\par A1C is 7.9\par Current regimen: Basaglar 35 Units HS Novolog 20-25-22\par He moderates his intake of carbs, drinks crystal light and coke zero\par Has not had optho screening\par No neuropathy\par For HTG, takes fish oil 500 mg 1 X day, atorvastatin 40mg daily, Niacin 2 tablets at night\par He is on a prednisone taper with plan to decrease to 40mg BID from 50mg BID on 12/31\par Taper directed by pulmonary team\par He uses a [employer] for glucose monitoring:\par 12/17-12/30\par Target Range 38%\par High 32%\par Very high 24%\par Low/Very low 6%\par Glucose spike occur post lunch and post dinner and remains elevated through the night\par \par

## 2021-01-07 NOTE — ASSESSMENT
[FreeTextEntry1] : 70 yr old M with metastatic Lung CA, CAD and COPD here with T2DM/steroid exacerbated hyperglycemia\par 1) T2DM A1C 7.9\par Counselled on diet and exercise modification/importance of glycemic control/Hypoglycemia mgmt\par Cont basaglar/novolog\par Will adjust insulin regimen in accordance with steroid taper-decrease 10% with decrease in prednisone\par Provided optho referral\par Foot Exam normal\par Pt has seen nutritionist\par Continue glucose monitoring with [facility]\par 2) HTN\par Check Urine Micro/Creat Ratio\par Pt is on Lisinopril 20mg daily\par 3) HLD\par Check Lipid panel\par Cont atorvastatin, niacin, fish oil\par \par \par

## 2021-01-07 NOTE — REVIEW OF SYSTEMS
[All other systems negative] : All other systems negative [Fatigue] : no fatigue [Decreased Appetite] : appetite not decreased [Visual Field Defect] : no visual field defect [Dysphagia] : no dysphagia [Dysphonia] : no dysphonia [Chest Pain] : no chest pain [Palpitations] : no palpitations [Shortness Of Breath] : no shortness of breath [Nausea] : no nausea [Vomiting] : no vomiting [Polyuria] : no polyuria [Joint Pain] : no joint pain [Headaches] : no headaches [Tremors] : no tremors [Depression] : no depression [Cold Intolerance] : no cold intolerance [Heat Intolerance] : no heat intolerance [Easy Bleeding] : no ~M tendency for easy bleeding [Easy Bruising] : no tendency for easy bruising

## 2021-01-13 ENCOUNTER — TRANSCRIPTION ENCOUNTER (OUTPATIENT)
Age: 71
End: 2021-01-13

## 2021-01-14 LAB
ALBUMIN SERPL ELPH-MCNC: 4 G/DL
ALP BLD-CCNC: 124 U/L
ALT SERPL-CCNC: 39 U/L
ANION GAP SERPL CALC-SCNC: 12 MMOL/L
AST SERPL-CCNC: 13 U/L
BILIRUB SERPL-MCNC: 0.4 MG/DL
BUN SERPL-MCNC: 28 MG/DL
CALCIUM SERPL-MCNC: 9.7 MG/DL
CHLORIDE SERPL-SCNC: 93 MMOL/L
CHOLEST SERPL-MCNC: 254 MG/DL
CO2 SERPL-SCNC: 28 MMOL/L
CREAT SERPL-MCNC: 1.08 MG/DL
CREAT SPEC-SCNC: 144 MG/DL
GLUCOSE SERPL-MCNC: 182 MG/DL
HDLC SERPL-MCNC: 96 MG/DL
LDLC SERPL CALC-MCNC: 130 MG/DL
MICROALBUMIN 24H UR DL<=1MG/L-MCNC: 6.6 MG/DL
MICROALBUMIN/CREAT 24H UR-RTO: 46 MG/G
NONHDLC SERPL-MCNC: 158 MG/DL
POTASSIUM SERPL-SCNC: 4.4 MMOL/L
PROT SERPL-MCNC: 5.7 G/DL
SODIUM SERPL-SCNC: 133 MMOL/L
TRIGL SERPL-MCNC: 140 MG/DL

## 2021-01-15 ENCOUNTER — APPOINTMENT (OUTPATIENT)
Dept: INTERNAL MEDICINE | Facility: CLINIC | Age: 71
End: 2021-01-15
Payer: MEDICARE

## 2021-01-15 ENCOUNTER — OUTPATIENT (OUTPATIENT)
Dept: OUTPATIENT SERVICES | Facility: HOSPITAL | Age: 71
LOS: 1 days | End: 2021-01-15

## 2021-01-15 DIAGNOSIS — Z90.89 ACQUIRED ABSENCE OF OTHER ORGANS: Chronic | ICD-10-CM

## 2021-01-15 DIAGNOSIS — N20.0 CALCULUS OF KIDNEY: Chronic | ICD-10-CM

## 2021-01-15 DIAGNOSIS — Z85.841 PERSONAL HISTORY OF MALIGNANT NEOPLASM OF BRAIN: ICD-10-CM

## 2021-01-15 DIAGNOSIS — Z95.5 PRESENCE OF CORONARY ANGIOPLASTY IMPLANT AND GRAFT: Chronic | ICD-10-CM

## 2021-01-15 DIAGNOSIS — D11.0 BENIGN NEOPLASM OF PAROTID GLAND: Chronic | ICD-10-CM

## 2021-01-15 PROCEDURE — 99213 OFFICE O/P EST LOW 20 MIN: CPT | Mod: 95

## 2021-01-15 NOTE — PHYSICAL EXAM
[No Acute Distress] : no acute distress [Well Nourished] : well nourished [Well Developed] : well developed [Normal Sclera/Conjunctiva] : normal sclera/conjunctiva [EOMI] : extraocular movements intact [Normal Outer Ear/Nose] : the outer ears and nose were normal in appearance [No Respiratory Distress] : no respiratory distress  [No Joint Swelling] : no joint swelling [No Accessory Muscle Use] : no accessory muscle use [Grossly Normal Strength/Tone] : grossly normal strength/tone [de-identified] : Video examination.  Segovia facies [de-identified] : Able to bend forward, able to lift legs off seat [de-identified] : Limited examination by video, patient assisted [de-identified] : Balance to gait, independent

## 2021-01-15 NOTE — HISTORY OF PRESENT ILLNESS
[Home] : at home, [unfilled] , at the time of the visit. [Medical Office: ([facility])___] : at the medical office located in  [Spouse] : spouse [Verbal consent obtained from patient] : the patient, [unfilled] [FreeTextEntry1] : Acute visit for low back pain x 4 weeks [de-identified] : 70-year-old man with a history of chronic low back pain, benign prostatic hypertrophy s/p TURP, hypertension, type 2 diabetes mellitus, hyperlipidemia, erectile dysfunction, gastritis, hypomagnesemia, coronary artery disease s/p stent, prior smoker with emphysema and extensive stage SCLC (dx spring 2019) s/p carboplatin/etoposide and radiation with partial response, then on atezolizumab c/b pneumonitis in 10/2020 treated with high dose steroids, with small brain metastases s/p gamma knife radiation therapy in 7/2020 with improved brain MRI who presents for low back pain for the past 4 weeks.  Has always had some milder low back pain, now much more severe over the past 4 weeks in the center of his lower back.  Denies radiation to the legs.  Very occasionally has some radiation into the left buttocks.  No history of trauma.  Has been on high-dose steroids since late October, initially at 75 mg twice daily now tapered down over time to 20 mg twice daily last week but he was unable to tolerate due to weakness and was increased back to 30 mg twice daily by pulmonary.  Last PET scan was last spring without new bony mets to spine.  CT of the abdomen pelvis in July 2020 without bony mets or compression fractures noted.  Patient reports no falls, has a cane at home but generally is able to walk independently.  He has been using Tiger balm which has not been helping with his pain, nor have warm compresses.  He takes Tylenol which does help his pain a bit.  No bowel or bladder changes.

## 2021-01-15 NOTE — REVIEW OF SYSTEMS
[Fatigue] : fatigue [Back Pain] : back pain [Easy Bruising] : easy bruising [Negative] : Genitourinary [Fever] : no fever [Night Sweats] : no night sweats [Joint Pain] : no joint pain [Joint Stiffness] : no joint stiffness [Muscle Weakness] : no muscle weakness [Joint Swelling] : no joint swelling [Dizziness] : no dizziness [Unsteady Walk] : no ataxia [FreeTextEntry5] : Some lower extremity edema [FreeTextEntry6] : Improvement in shortness of breath [FreeTextEntry9] : See HPI [de-identified] : Improving skin rash on legs [de-identified] : Improving depression

## 2021-01-16 ENCOUNTER — OUTPATIENT (OUTPATIENT)
Dept: OUTPATIENT SERVICES | Facility: HOSPITAL | Age: 71
LOS: 1 days | End: 2021-01-16
Payer: COMMERCIAL

## 2021-01-16 ENCOUNTER — APPOINTMENT (OUTPATIENT)
Dept: RADIOLOGY | Facility: CLINIC | Age: 71
End: 2021-01-16
Payer: MEDICARE

## 2021-01-16 DIAGNOSIS — N20.0 CALCULUS OF KIDNEY: Chronic | ICD-10-CM

## 2021-01-16 DIAGNOSIS — Z90.89 ACQUIRED ABSENCE OF OTHER ORGANS: Chronic | ICD-10-CM

## 2021-01-16 DIAGNOSIS — D11.0 BENIGN NEOPLASM OF PAROTID GLAND: Chronic | ICD-10-CM

## 2021-01-16 DIAGNOSIS — M54.5 LOW BACK PAIN: ICD-10-CM

## 2021-01-16 DIAGNOSIS — Z95.5 PRESENCE OF CORONARY ANGIOPLASTY IMPLANT AND GRAFT: Chronic | ICD-10-CM

## 2021-01-16 PROCEDURE — 72110 X-RAY EXAM L-2 SPINE 4/>VWS: CPT | Mod: 26

## 2021-01-16 PROCEDURE — 72110 X-RAY EXAM L-2 SPINE 4/>VWS: CPT

## 2021-01-19 ENCOUNTER — APPOINTMENT (OUTPATIENT)
Dept: INTERNAL MEDICINE | Facility: CLINIC | Age: 71
End: 2021-01-19

## 2021-01-19 DIAGNOSIS — M54.5 LOW BACK PAIN: ICD-10-CM

## 2021-01-20 ENCOUNTER — APPOINTMENT (OUTPATIENT)
Dept: INTERNAL MEDICINE | Facility: CLINIC | Age: 71
End: 2021-01-20

## 2021-01-20 ENCOUNTER — NON-APPOINTMENT (OUTPATIENT)
Age: 71
End: 2021-01-20

## 2021-01-25 ENCOUNTER — APPOINTMENT (OUTPATIENT)
Dept: RADIOLOGY | Facility: CLINIC | Age: 71
End: 2021-01-25
Payer: MEDICARE

## 2021-01-25 ENCOUNTER — OUTPATIENT (OUTPATIENT)
Dept: OUTPATIENT SERVICES | Facility: HOSPITAL | Age: 71
LOS: 1 days | End: 2021-01-25
Payer: COMMERCIAL

## 2021-01-25 DIAGNOSIS — Z95.5 PRESENCE OF CORONARY ANGIOPLASTY IMPLANT AND GRAFT: Chronic | ICD-10-CM

## 2021-01-25 DIAGNOSIS — M43.9 DEFORMING DORSOPATHY, UNSPECIFIED: ICD-10-CM

## 2021-01-25 DIAGNOSIS — D11.0 BENIGN NEOPLASM OF PAROTID GLAND: Chronic | ICD-10-CM

## 2021-01-25 DIAGNOSIS — Z90.89 ACQUIRED ABSENCE OF OTHER ORGANS: Chronic | ICD-10-CM

## 2021-01-25 DIAGNOSIS — N20.0 CALCULUS OF KIDNEY: Chronic | ICD-10-CM

## 2021-01-25 PROCEDURE — 77080 DXA BONE DENSITY AXIAL: CPT

## 2021-01-25 PROCEDURE — 77080 DXA BONE DENSITY AXIAL: CPT | Mod: 26

## 2021-01-29 ENCOUNTER — NON-APPOINTMENT (OUTPATIENT)
Age: 71
End: 2021-01-29

## 2021-02-01 ENCOUNTER — APPOINTMENT (OUTPATIENT)
Dept: PULMONOLOGY | Facility: CLINIC | Age: 71
End: 2021-02-01
Payer: MEDICARE

## 2021-02-01 PROCEDURE — 99215 OFFICE O/P EST HI 40 MIN: CPT | Mod: 95

## 2021-02-01 NOTE — ASSESSMENT
[FreeTextEntry1] : 70M extensive history including DM2, CAD, COPD, and small cell lung cancer presenting for followup TELEHEALTH VISIT due to pneumonitis. He was hospitalized at [facility] for hypoxic respiratory failure and abnormal CT chest. He underwent bronchoscopy and was diagnosed with drug induced pneumonitis. He is doing well on tapering doses of steroids and reduced supplemental O2 requirements.\par \par 1. Pneumonitis - patient underwent bronchoscopy and transbronchial biopsy which showed chronic inflammation. - He is currently on Prednisone 20mg BID and Bactrim prophylaxis.He continues to feel well and the plan is to continue to taper Prednisone as long as he is doing well.\par - We will plan to taper Prednisone to 20mg AM and 10mg PM for 1 week and then reduce to 20mg daily (or 10mg BID) and monitor for continued response\par - He should remain on Bactrim prophylaxis while on these doses of steroids\par - Repeat CT chest from 12/28/2020 shows resolution of previously noted opacities\par \par 2. Small Cell Lung Cancer - with metastatic disease. Patient was recently hospitalized for pneumonitis and therefore his treatment is on hold\par - patient has received radiation therapy \par - continue to followup with Oncology regarding further treatment options - currently his treatments are on hold.\par - He has improved from a pulmonary standpoint and I do not have any objections to further therapy if it is thought necessary from an Oncologic perspective.\par \par 3. COPD - patient is a long term former smoker. He will continue Symbicort and increase his use of Ventolin as needed.\par - PFTs and 6MWT done at last visit\par - No evidence of desaturation during 6MWT with 4L O2\par - PFTs remain stable - no BD testing in setting of COVID-19 pandemic. On prior PFTs patient had significant BD response\par \par 4. Snoring - patient with reported signs and symptoms of sleep disorder breathing. I have referred him to the [facility] for a diagnostic home sleep study. We discussed potential sleep apnea therapies which patient will consider.\par - Patient/family to plan to reschedule in future as he is not currently interested in this\par \par 5. Chronic Hypoxemic Respiratory Failure - patient currently on 2L supplemental O2. Continue to maintain O2 sats > 90% as able.\par - Can start to wean down FiO2 as able with goal O2 sat > 90%\par \par 6. Health Maintenance - patient has received Flu shot and pneumococcal vaccine this year.\par - Family trying to arrange for COVID-19 vaccine which is appropriate - unfortunately having difficulty finding a vaccination site/appointment\par - Daughter (Leena) will email me in 2 weeks to let me know how he is progressing. We discussed planned steroid taper and signs/symptoms that may suggest an increase in steroid dosing.

## 2021-02-01 NOTE — REVIEW OF SYSTEMS
[SOB on Exertion] : sob on exertion [Edema] : edema [Back Pain] : back pain [Diabetes] : diabetes [Fever] : no fever [Fatigue] : no fatigue [Chills] : no chills [Nasal Congestion] : no nasal congestion [Cough] : no cough [Hemoptysis] : no hemoptysis [Sputum] : no sputum [Wheezing] : no wheezing [Chest Discomfort] : no chest discomfort [Angioedema] : no angioedema [Nausea] : no nausea [Vomiting] : no vomiting [Depression] : no depression [Anxiety] : no anxiety [TextBox_91] : recent compression fracture noted

## 2021-02-01 NOTE — HISTORY OF PRESENT ILLNESS
[Former] : former [Never] : never [Nasal Passage Blockage (Stuffiness)] : edema [Wheezing] : wheezing [Nonspecific Pain, Swelling, And Stiffness] : chest pain [Fever] : fever [Cough] : coughing [Difficulty Breathing During Exertion] : dyspnea on exertion [Feelings Of Weakness On Exertion] : exercise intolerance [Continuous] : Continuous [NC] : Nasal Cannula [24 hrs] : 24 hours/day [2  -  Slight] : 2, slight [TextBox_4] : 70M DM2, HTN, CAD s/p PCI (RCA 2007), HLD, and COPD and lung cancer presenting for followup pulmonary evaluation. He was last seen in the office 12./22/2020 for followup management of steroid taper in the setting of pneumonitis. Today he presents for a PULMONARY TELEHEALTH VISIT with his wife and daughter.\par \par Since his last visit he has been doing well. Unfortunately he developed back pain and was found to have a compression fracture. He has been using Tylenol (up to 2grams daily) and intermittently a Lidocaine patch. He has been recommended to start bisphosphonate therapy. We continue to lower his PRednisone and currently he is on 20mg AM/PM (40mg total). During our reduction he had one hiccup of increased fatigue and dyspnea where we transiently increased back to 30mg BID but now he is continuing his taper and feeling well. He continues to take Bactrim prophylaxis.\par \par This treatment was started after a hospitalization in 2020 for shortness of breath and abnormal CT chest. He underwent bronchoscopy and transbronchial biopsy which was suggestive of inflammation and was started on Prednisone for treatment of drug induced pneumonitis. \par \par He has been on tapering doses of Prednisone and using Bactrim M/W/F. He denies chest pain or palpitations. He denies nausea, vomiting, or abdominal pain. He is slowly increasing his activity level. He is able to spend short periods of time without supplemental O2, such as showering, and saturates > 90%. He is otherwise feeling well and in good spirits. His activity level has been somewhat limited by his back pain recently. He continues on supplemental O2 but is now down to 2L with O2 sats > 90%. Overall he is feeling well.\par \par He is a long term former smoker but quit at the time of his lung cancer diagnosis. He has known obstructive lung disease with a bronchodilator response. He was switched from Breo to Symbicort while in the hospital. He has a Ventolin inhaler but has not used it much. \par \par He was diagnosed with small cell lung cancer 4/2019 and then started on chemotherapy and radiation. He initially underwent Bronchoscopy/EBUS with Dr. Martinez which revealed that bilateral mediastinal LN were positive for small cell carcinoma. He was started on chemotehrapy at that time in May 2019 and achieved partial response. He was then started on maintenance Atezolizumab. He was offered prophylactic cranial irradiation but declined initially. He developed small brain mets in June 2020 and received GK-SRS with Dr. [facility]. He had a CT chest 8/31/2020 that showed a small stable upper lobe opacity. His repeat CT chest done 10/2020 was suggestive of pneumonitis. He had a repeat CT chest done 12/28/2020 which showed improvement.\par \par He continues to snore but is not yet ready to have an HST. He feel better on mornings where he sleeps more than 5 hours. [FreeTextEntry1] : 2 [TextBox_42] : 12/28/2020 - INTERPRETATION:  Reason for Exam: Left upper lobe lung cancer\par \par CT of the chest was performed from the thoracic inlet to the level of the adrenal glands following IV contrast injection of  50 cc of Omnipaque 350. No immediate complications were reported.\par \par Comparison: October 21, 2020\par \par Tubes/Lines: None\par \par Mediastinum and Heart: Aorta and pulmonary arteries are normal in size. No pericardial effusion. Multiple calcified lymph nodes in the mediastinum are unchanged since prior. Coronary artery calcifications are noted.. Thyroid gland appears unremarkable.\par \par Lungs, Pleura, and Airways: Again seen is a left upper lobe spiculated nodule which measures approximately 1.4 cm which is unchanged since previous exam. Previously seen areas of bilateral peribronchovascular groundglass abnormalities and consolidations have essentially resolved. Minimal residual peribronchial vascular and linear scarring with traction bronchiolectasis noted. Focal area of distortion in the right middle lobe with calcification and traction bronchiectasis is stable.\par \par Visualized Abdomen: Postcontrast appearance of the upper abdomen is unremarkable.\par \par Bones and soft tissues: Unremarkable.\par \par \par IMPRESSION:\par \par When compared with October 21, 2020:\par \par Peribronchovascular groundglass abnormality and consolidations consistent with pneumonitis has resolved.\par \par Unchanged left upper lobe 1.4 cm spiculated nodule in keeping with known malignancy. Follow-up recommended to assess for change.\par

## 2021-02-01 NOTE — REASON FOR VISIT
[Home] : at home, [unfilled] , at the time of the visit. [Other Location: e.g. Home (Enter Location, City,State)___] : at [unfilled] [Spouse] : spouse [Family Member] : family member [Patient Declined  Services] : - None: Patient declined  services [Verbal consent obtained from patient] : the patient, [unfilled] [Follow-Up] : a follow-up visit [Abnormal CXR/ Chest CT] : an abnormal CXR/ chest CT [Lung Cancer] : lung cancer [COPD] : COPD [FreeTextEntry2] : Leena - daughter [TWNoteComboBox1] : English [TextBox_44] : pneumonitis

## 2021-02-01 NOTE — PHYSICAL EXAM
[No Acute Distress] : no acute distress [Well Nourished] : well nourished [Normal Appearance] : normal appearance [Well Groomed] : well groomed [No Deformities] : no deformities [Well Developed] : well developed [Normal Oropharynx] : normal oropharynx [No JVD] : no jvd [No Acc Muscle Use] : no acc muscle use [Oriented x3] : oriented x3 [Normal Mood] : normal mood [Normal Affect] : normal affect [TextBox_11] : anicteric, EOMI, no thrush, MMM, sore on right side of tongue (improved per patient/family) [TextBox_44] : FROM

## 2021-02-05 ENCOUNTER — APPOINTMENT (OUTPATIENT)
Dept: PHYSICAL MEDICINE AND REHAB | Facility: CLINIC | Age: 71
End: 2021-02-05

## 2021-02-08 ENCOUNTER — OUTPATIENT (OUTPATIENT)
Dept: OUTPATIENT SERVICES | Facility: HOSPITAL | Age: 71
LOS: 1 days | Discharge: HOME | End: 2021-02-08

## 2021-02-08 DIAGNOSIS — C34.90 MALIGNANT NEOPLASM OF UNSPECIFIED PART OF UNSPECIFIED BRONCHUS OR LUNG: ICD-10-CM

## 2021-02-08 DIAGNOSIS — Z95.5 PRESENCE OF CORONARY ANGIOPLASTY IMPLANT AND GRAFT: Chronic | ICD-10-CM

## 2021-02-08 DIAGNOSIS — Z90.89 ACQUIRED ABSENCE OF OTHER ORGANS: Chronic | ICD-10-CM

## 2021-02-08 DIAGNOSIS — D11.0 BENIGN NEOPLASM OF PAROTID GLAND: Chronic | ICD-10-CM

## 2021-02-08 DIAGNOSIS — N20.0 CALCULUS OF KIDNEY: Chronic | ICD-10-CM

## 2021-02-09 ENCOUNTER — APPOINTMENT (OUTPATIENT)
Dept: PHYSICAL MEDICINE AND REHAB | Facility: CLINIC | Age: 71
End: 2021-02-09

## 2021-02-10 ENCOUNTER — RESULT REVIEW (OUTPATIENT)
Age: 71
End: 2021-02-10

## 2021-02-10 ENCOUNTER — APPOINTMENT (OUTPATIENT)
Dept: HEMATOLOGY ONCOLOGY | Facility: CLINIC | Age: 71
End: 2021-02-10
Payer: MEDICARE

## 2021-02-10 VITALS
WEIGHT: 173 LBS | OXYGEN SATURATION: 96 % | DIASTOLIC BLOOD PRESSURE: 72 MMHG | HEIGHT: 65 IN | SYSTOLIC BLOOD PRESSURE: 111 MMHG | RESPIRATION RATE: 14 BRPM | TEMPERATURE: 97.3 F | HEART RATE: 120 BPM | BODY MASS INDEX: 28.82 KG/M2

## 2021-02-10 DIAGNOSIS — M43.9 DEFORMING DORSOPATHY, UNSPECIFIED: ICD-10-CM

## 2021-02-10 LAB
BASOPHILS # BLD AUTO: 0.05 K/UL — SIGNIFICANT CHANGE UP (ref 0–0.2)
BASOPHILS NFR BLD AUTO: 0.4 % — SIGNIFICANT CHANGE UP (ref 0–2)
EOSINOPHIL # BLD AUTO: 0.02 K/UL — SIGNIFICANT CHANGE UP (ref 0–0.5)
EOSINOPHIL NFR BLD AUTO: 0.2 % — SIGNIFICANT CHANGE UP (ref 0–6)
HCT VFR BLD CALC: 38.9 % — LOW (ref 39–50)
HGB BLD-MCNC: 12.7 G/DL — LOW (ref 13–17)
IMM GRANULOCYTES NFR BLD AUTO: 2.6 % — HIGH (ref 0–1.5)
LYMPHOCYTES # BLD AUTO: 0.28 K/UL — LOW (ref 1–3.3)
LYMPHOCYTES # BLD AUTO: 2.4 % — LOW (ref 13–44)
MCHC RBC-ENTMCNC: 32.6 G/DL — SIGNIFICANT CHANGE UP (ref 32–36)
MCHC RBC-ENTMCNC: 32.7 PG — SIGNIFICANT CHANGE UP (ref 27–34)
MCV RBC AUTO: 100.3 FL — HIGH (ref 80–100)
MONOCYTES # BLD AUTO: 0.71 K/UL — SIGNIFICANT CHANGE UP (ref 0–0.9)
MONOCYTES NFR BLD AUTO: 6.2 % — SIGNIFICANT CHANGE UP (ref 2–14)
NEUTROPHILS # BLD AUTO: 10.08 K/UL — HIGH (ref 1.8–7.4)
NEUTROPHILS NFR BLD AUTO: 88.2 % — HIGH (ref 43–77)
NRBC # BLD: 0 /100 WBCS — SIGNIFICANT CHANGE UP (ref 0–0)
PLATELET # BLD AUTO: 243 K/UL — SIGNIFICANT CHANGE UP (ref 150–400)
RBC # BLD: 3.88 M/UL — LOW (ref 4.2–5.8)
RBC # FLD: 20.5 % — HIGH (ref 10.3–14.5)
WBC # BLD: 11.44 K/UL — HIGH (ref 3.8–10.5)
WBC # FLD AUTO: 11.44 K/UL — HIGH (ref 3.8–10.5)

## 2021-02-10 PROCEDURE — 99072 ADDL SUPL MATRL&STAF TM PHE: CPT

## 2021-02-10 PROCEDURE — 99215 OFFICE O/P EST HI 40 MIN: CPT

## 2021-02-12 ENCOUNTER — NON-APPOINTMENT (OUTPATIENT)
Age: 71
End: 2021-02-12

## 2021-02-12 LAB
ALBUMIN SERPL ELPH-MCNC: 4.2 G/DL
ALP BLD-CCNC: 120 U/L
ALT SERPL-CCNC: 33 U/L
ANION GAP SERPL CALC-SCNC: 13 MMOL/L
AST SERPL-CCNC: 19 U/L
BILIRUB SERPL-MCNC: 0.4 MG/DL
BUN SERPL-MCNC: 23 MG/DL
CALCIUM SERPL-MCNC: 9.6 MG/DL
CHLORIDE SERPL-SCNC: 95 MMOL/L
CO2 SERPL-SCNC: 28 MMOL/L
CREAT SERPL-MCNC: 1.24 MG/DL
GLUCOSE SERPL-MCNC: 126 MG/DL
LDH SERPL-CCNC: 439 U/L
POTASSIUM SERPL-SCNC: 4.5 MMOL/L
PROT SERPL-MCNC: 6.1 G/DL
SODIUM SERPL-SCNC: 136 MMOL/L

## 2021-02-12 NOTE — PHYSICAL EXAM
[Ambulatory and capable of all self care but unable to carry out any work activities] : Status 2- Ambulatory and capable of all self care but unable to carry out any work activities. Up and about more than 50% of waking hours [Normal] : affect appropriate [de-identified] : Wearing O2 N/C [de-identified] : No icterus  [de-identified] : MMM O/P clear. Ulceration right lateral tongue [de-identified] : Supple No LAD  [de-identified] : Somewhat distant BS [de-identified] : No edema  [de-identified] : S1 S2  [de-identified] : No spine/CVA tenderness  [de-identified] : Erythematous patches on LE's.  Dry scalp

## 2021-02-12 NOTE — HISTORY OF PRESENT ILLNESS
[Disease: _____________________] : Disease: [unfilled] [T: ___] : T[unfilled] [N: ___] : N[unfilled] [de-identified] : The patient has a heavy smoking history who recently quit. He developed a worsening cough roughly 6 months ago followed by fatigue and insomnia roughly 5 months ago. He saw his PCP and was referred to pulmonary. He was referred for a CT chest for lung cancer screening which revealed a left upper lobe lung mass with associated lymphadenopathy and suspicious bony lesions. He was then sent for a PET/CT scan which have activity in these areas. He underwent a bronchoscopy with EBUS biopsy of bilateral mediastinal lymph nodes that were positive for small cell carcinoma.  Started first line systemic therapy with Carbo/Etoposide/Atezolizumab in May 2019.  Achieved [place].  Received 4 cycles completed early July 2019 followed by Atezolizumab maintenance since late July 2019.  Received consolidative Thoracic RT completed late Aug 2019.  He declined PCI.  Developed small brain metastases on Brain MRI in June 2020 and received GK-SRS for 2 small lesions in early July 2020. Patient hospitalized 10/21-11/2 with pneumonitis 2/2 immunotherapy and discharged on Prednisone 2mg/kg/day and Bactrim M/W/F. Atezolizumab discontinued; plan to observe patient off systemic therapy and monitor for progression of disease.\par \par  [de-identified] : -Lymph node 4L and 4R EBUS guided FNA 4/18/19: Positive for malignant cells. Small cell carcinoma. [de-identified] : ****Azeri translation provided by patient's wife\par \par Patient continues on observation after developing pneumonitis.  He continues on steroid taper managed by Pulmonary, currently on 20mg daily.  Breathing continues to improve.  He is down to 2L when using O2 and is able to remove the O2 for ~1 hour at a time with O2 sat 88-93%.  Continues Bactrim three times weekly as prophylaxis and PPI while on prednisone.\par He has back pain that is severe at times.  Taking Oxycodone 5mg  2-3 times a day for severe pain and Tylenol 1000mg 2-3 times a day for moderate pain. Recent spine XR with age indeterminate mild to moderate wedge-shaped anterior compression deformity of L1 superior endplate and to more subtle extent L2 superior endplate. His PCP started him on Alendronate 70mg PO weekly following bone density test that revealed Bone density is osteopenic with moderate risk for fracture. Currently using a cane for ambulation, however he does note that he is unsteady at times due to back pain. Was referred to PM&R by his PCP but missed appointment because they got lost; they will be calling to reschedule the consultation. He is having increased episodes of constipation with use of opiods.  Mouth sores have been improved on magic mouthwash; currently has one sore on the right lateral tongue. Rash on extremities is about the same as prior; has not been using betamethasone. \par \par \par

## 2021-02-12 NOTE — HISTORY OF PRESENT ILLNESS
[Disease: _____________________] : Disease: [unfilled] [T: ___] : T[unfilled] [N: ___] : N[unfilled] [de-identified] : The patient has a heavy smoking history who recently quit. He developed a worsening cough roughly 6 months ago followed by fatigue and insomnia roughly 5 months ago. He saw his PCP and was referred to pulmonary. He was referred for a CT chest for lung cancer screening which revealed a left upper lobe lung mass with associated lymphadenopathy and suspicious bony lesions. He was then sent for a PET/CT scan which have activity in these areas. He underwent a bronchoscopy with EBUS biopsy of bilateral mediastinal lymph nodes that were positive for small cell carcinoma.  Started first line systemic therapy with Carbo/Etoposide/Atezolizumab in May 2019.  Achieved [place].  Received 4 cycles completed early July 2019 followed by Atezolizumab maintenance since late July 2019.  Received consolidative Thoracic RT completed late Aug 2019.  He declined PCI.  Developed small brain metastases on Brain MRI in June 2020 and received GK-SRS for 2 small lesions in early July 2020. Patient hospitalized 10/21-11/2 with pneumonitis 2/2 immunotherapy and discharged on Prednisone 2mg/kg/day and Bactrim M/W/F. Atezolizumab discontinued; plan to observe patient off systemic therapy and monitor for progression of disease.\par \par  [de-identified] : -Lymph node 4L and 4R EBUS guided FNA 4/18/19: Positive for malignant cells. Small cell carcinoma. [de-identified] : ****Malay translation provided by patient's wife\par \par Patient continues on observation after developing pneumonitis.  He continues on steroid taper managed by Pulmonary, currently on 20mg daily.  Breathing continues to improve.  He is down to 2L when using O2 and is able to remove the O2 for ~1 hour at a time with O2 sat 88-93%.  Continues Bactrim three times weekly as prophylaxis and PPI while on prednisone.\par He has back pain that is severe at times.  Taking Oxycodone 5mg  2-3 times a day for severe pain and Tylenol 1000mg 2-3 times a day for moderate pain. Recent spine XR with age indeterminate mild to moderate wedge-shaped anterior compression deformity of L1 superior endplate and to more subtle extent L2 superior endplate. His PCP started him on Alendronate 70mg PO weekly following bone density test that revealed Bone density is osteopenic with moderate risk for fracture. Currently using a cane for ambulation, however he does note that he is unsteady at times due to back pain. Was referred to PM&R by his PCP but missed appointment because they got lost; they will be calling to reschedule the consultation. He is having increased episodes of constipation with use of opiods.  Mouth sores have been improved on magic mouthwash; currently has one sore on the right lateral tongue. Rash on extremities is about the same as prior; has not been using betamethasone. \par \par \par

## 2021-02-12 NOTE — PHYSICAL EXAM
[Ambulatory and capable of all self care but unable to carry out any work activities] : Status 2- Ambulatory and capable of all self care but unable to carry out any work activities. Up and about more than 50% of waking hours [Normal] : affect appropriate [de-identified] : Wearing O2 N/C [de-identified] : No icterus  [de-identified] : MMM O/P clear. Ulceration right lateral tongue [de-identified] : Supple No LAD  [de-identified] : Somewhat distant BS [de-identified] : S1 S2  [de-identified] : No edema  [de-identified] : No spine/CVA tenderness  [de-identified] : Erythematous patches on LE's.  Dry scalp

## 2021-02-12 NOTE — RESULTS/DATA
[FreeTextEntry1] : -CT Chest 3/28/19:  \par 1. A left upper lobe spiculated nodule is concerning for primary lung cancer. \par 2. Enlarged chest lymph node measuring 16 mm. \par 3. Indeterminate sclerotic lesions of the right fourth rib and T6 vertebral body. \par \par -PET/CT 4/4/19:  FDG-PET/CT scan: \par 1. FDG avid spiculated left upper lobe lung nodule, worrisome for malignancy. \par 2. FDG avid AP window lymph node, suspicious for metastasis. \par 3. Indeterminate sclerotic right fourth rib and T6 vertebral body foci. \par \par -MRI Brain 5/5/19:  Small vessel white matter ischemic changes. No hemorrhage, mass or acute infarct. No abnormal enhancement to suggest brain metastases. Nonspecific left mastoid effusion. \par \par -MRI Thoracic Spine 5/5/19: Focus of low signal intensity on the T1 and T2-weighted images within the anterior T6 vertebral body corresponds to sclerosis on PET/CT and \par likely represents benign sclerosis or bone island. No evidence of lytic or blastic metastases. No abnormal signal or enhancement. \par \par -PET/CT 5/30/19:  Abnormal FDG-PET/CT scan: \par 1. Hypermetabolic spiculated left upper lobe pulmonary nodule in bilateral mediastinal and hilar lymph nodes are decreased in size and metabolism as compared to prior study from 4/4/2019, compatible with a partial response to interval therapy. Scattered calcified and noncalcified bilateral pulmonary nodules, unchanged. \par 2. Diffuse bone marrow hypermetabolism, increased as compared to prior study, likely is secondary to recent treatment with colony-stimulating factors. Small sclerotic densities in right fourth rib and body of T6 vertebra are unchanged. \par \par -CT Chest 7/12/19:  \par 1. In comparison with 5/30/2019, 1.5 cm left upper lobe spiculated nodule is decrease in size. No new or enlarging pulmonary nodule. \par 2. Stable subcentimeter mediastinal and hilar lymph nodes. \par \par -CT Chest 9/23/19: \par 1. The spiculated left upper lobe nodule is unchanged. \par 2. The AP window lymph node is unchanged. \par \par -MRI Brain 11/15/19:  No change from 5/5/2019. Microvascular disease. No abnormal enhancement to suggest brain metastases. \par \par -CT Chest 12/16/19:  Left upper lobe nodule minimally decreased in size since September 23, 2019.  Adjacent ill-defined opacities within the left upper lobe and the superior segment of the left lower lobe new since September 23, 2019 maybe sequela of radiation therapy. 3 month follow-up noncontrast chest CT can be performed for further evaluation. Small aortopulmonary window lymph node is unchanged since September 23, 2019. \par \par -CT Chest 3/13/20:  When compared with December 16, 2019: Left suprahilar 1.1 x 0.9 cm nodule is seen, slightly decreased in size when compared with prior. Focal areas of architectural distortion noted in the left upper lobe with additional linear opacities and groundglass opacity without change from prior and may reflect evolving posttreatment change. Multiple bilateral calcified nodules are identified, unchanged. \par \par -CT Chest 6/8/20:  Since 3/13/2020, the left upper lobe nodule and the paramediastinal opacities are unchanged. \par \par -MRI Brain 6/24/20: Enhancing lesions are identified consistent with metastasis given patient's history.\par \par -CT A/P 7/15/20: No evidence of abdominal metastasis. Stable transverse colon lipoma. \par \par -CT Chest 8/31/20:  Emphysema is present. Left upper lobe ill-defined nodular/linear opacity is similar compared to the prior study. Other bilateral patchy lung opacities are new from the prior study. These are indeterminate and may be infectious/inflammatory. 1 month follow-up CT needed for complete evaluation. \par \par -CT Chest 10/19/20:  Widespread bilateral opacities throughout both lungs with significant interval progression since August 31, 2020 suggestive of infectious or inflammatory etiology. Findings may be due to Covid 19 pneumonia.  Left upper lobe nodular opacity with minimal if any interval increase in size since August 31, 2020 likely related to the adjacent acute process.\par \par -CT Angio Chest 10/21/20: No pulmonary embolus. Interval increase of diffuse bilateral peribronchovascular opacities with may represent pneumonitis or infection.\par \par Reviewed:\par \par -MRI Brain 12/11/20:  Previously seen left temporal occipital lobe lesion is significantly smaller in size.  Previously seen left frontal lobe lesion is no longer visualized.\par \par -LE Dopplers 12/24/20:  No evidence of deep venous thrombosis in either lower extremity.\par \par -CT Chest 12/28/20:  When compared with October 21, 2020:  Peribronchovascular groundglass abnormality and consolidations consistent with pneumonitis has resolved.  Unchanged left upper lobe 1.4 cm spiculated nodule in keeping with known malignancy. Follow-up recommended to assess for change.\par \par -XR Spine 1/18/21: Age indeterminate mild to moderate wedge-shaped anterior compression deformity of L1 superior endplate and to more subtle extent L2 superior endplate, however new from prior, correlate clinically with MRI suggested to further assess if warranted.\par Redemonstrated mid and lower lumbar predominant disc space narrowing with small disc margin osteophytes.\par Slight L2 on L3 and L3 on L4 retrolistheses however without associated spondylolysis defects. Remaining vertebral body alignment maintained.\par Unremarkable SI joints and partially visualized hips.\par Generalized osteopenia otherwise no discrete lytic or blastic lesions.\par Atherosclerotic abdominal aortic calcifications.\par \par -Bone density 1/25/21: Bone density is osteopenic. Patient is at moderate risk for fracture.\par \par

## 2021-02-12 NOTE — ASSESSMENT
[FreeTextEntry1] : Extensive Stage Small cell lung cancer.  Started first line Carbo/Etoposide/Atezo in early May 2019, achieved [place].  Completed 4 cycles followed by Atezolizumab maintenance since July 2019.  Received consolidative Thoracic RT completed late August 2019.  He declined PCI.  Developed Brain metastases in June 2020 s/p GK-SRS in July 2020. Hospitalized at [facility] 10/21-11/2/20 for pneumonitis, likely 2/2 immunotherapy currently on steroids and supplemental O2.\par -Continue to observe off treatment for now and monitor for progression of disease\par -F/U with Pulmonary for pneumonitis management.  Improved clinically and on imaging.  Steroid taper to be guided by Pulmonary\par -Continue supplemental O2 prn\par -Continue prophylactic Bactrim M/W/F and PPI while on steroids\par -F/U with endocrine for management of DM while on steroids\par -Labs today\par -Back pain: continue with Oxy for severe pain and Tylenol for moderate pain as per PCP. Will obtain MRI L-spine to r/o metastatic disease/cause for fractures; we can call them with results\par -Osteopenia: Alendronate 70mg PO weekly as per PCP\par -Insomnia: Temazepam. Requests refill today. Istop reviewed\par -Recommend rollator walker: patient has compression fractures that hinders his ambulation and impairs his ability to participate in mobility related ADL's. The patient is safely able to use the equipment and would be a fall risk without it. His functional mobility deficit can be resolved with use of this equipment. \par -Constipation: recommend Colace/Senna bowel regimen \par -Mouth sores: continue magic mouthwash\par -Rash: Continue betamethasone on LE prn and f/u with derm.  Suggested dandruff shampoo for dry scalp\par -Surveillance Brain MRI Dec 2020 with sustained intracranial response; for surveillance scan in March 2021 and f/u with Dr. Guerra \par -Continue Mg supplement for hypomagnesemia with Mag 800/400/800 as recommended by his PCP.\par -Imaging in late March with follow up for review or sooner if needed\par -Information sheet given with directions for making appointments

## 2021-02-12 NOTE — ASSESSMENT
[FreeTextEntry1] : Extensive Stage Small cell lung cancer.  Started first line Carbo/Etoposide/Atezo in early May 2019, achieved [place].  Completed 4 cycles followed by Atezolizumab maintenance since July 2019.  Received consolidative Thoracic RT completed late August 2019.  He declined PCI.  Developed Brain metastases in June 2020 s/p GK-SRS in July 2020. Hospitalized at [facility] 10/21-11/2/20 for pneumonitis, likely 2/2 immunotherapy currently on steroids and supplemental O2.\par -Continue to observe off treatment for now and monitor for progression of disease\par -F/U with Pulmonary for pneumonitis management.  Improved clinically and on imaging.  Steroid taper to be guided by Pulmonary\par -Continue supplemental O2 prn\par -Continue prophylactic Bactrim M/W/F and PPI while on steroids\par -F/U with endocrine for management of DM while on steroids\par -Labs today\par -Back pain: continue with Oxy for severe pain and Tylenol for moderate pain as per PCP. Will obtain MRI L-spine to r/o metastatic disease/cause for fractures; we can call them with results\par -Osteopenia: Alendronate 70mg PO weekly as per PCP\par -Insomnia: Temazepam. Requests refill today. Istop reviewed\par -Recommend rollator walker: patient has compression fractures that hinders his ambulation and impairs his ability to participate in mobility related ADL's. The patient is safely able to use the equipment and would be a fall risk without it. His functional mobility deficit can be resolved with use of this equipment. \par -Constipation: recommend Colace/Senna bowel regimen \par -Mouth sores: continue magic mouthwash\par -Rash: Continue betamethasone on LE prn and f/u with derm.  Suggested dandruff shampoo for dry scalp\par -Surveillance Brain MRI Dec 2020 with sustained intracranial response; for surveillance scan in March 2021 and f/u with Dr. Guerra \par -Continue Mg supplement for hypomagnesemia with Mag 800/400/800 as recommended by his PCP.\par -Imaging in late March with follow up for review or sooner if needed\par -Information sheet given with directions for making appointments

## 2021-02-18 ENCOUNTER — APPOINTMENT (OUTPATIENT)
Dept: PHYSICAL MEDICINE AND REHAB | Facility: CLINIC | Age: 71
End: 2021-02-18

## 2021-02-19 ENCOUNTER — APPOINTMENT (OUTPATIENT)
Dept: PHYSICAL MEDICINE AND REHAB | Facility: CLINIC | Age: 71
End: 2021-02-19

## 2021-02-19 ENCOUNTER — APPOINTMENT (OUTPATIENT)
Dept: MRI IMAGING | Facility: CLINIC | Age: 71
End: 2021-02-19
Payer: MEDICARE

## 2021-02-19 ENCOUNTER — OUTPATIENT (OUTPATIENT)
Dept: OUTPATIENT SERVICES | Facility: HOSPITAL | Age: 71
LOS: 1 days | End: 2021-02-19
Payer: COMMERCIAL

## 2021-02-19 DIAGNOSIS — M54.5 LOW BACK PAIN: ICD-10-CM

## 2021-02-19 DIAGNOSIS — D11.0 BENIGN NEOPLASM OF PAROTID GLAND: Chronic | ICD-10-CM

## 2021-02-19 DIAGNOSIS — Z90.89 ACQUIRED ABSENCE OF OTHER ORGANS: Chronic | ICD-10-CM

## 2021-02-19 DIAGNOSIS — Z95.5 PRESENCE OF CORONARY ANGIOPLASTY IMPLANT AND GRAFT: Chronic | ICD-10-CM

## 2021-02-19 DIAGNOSIS — N20.0 CALCULUS OF KIDNEY: Chronic | ICD-10-CM

## 2021-02-19 DIAGNOSIS — C34.12 MALIGNANT NEOPLASM OF UPPER LOBE, LEFT BRONCHUS OR LUNG: ICD-10-CM

## 2021-02-19 PROCEDURE — 72158 MRI LUMBAR SPINE W/O & W/DYE: CPT

## 2021-02-19 PROCEDURE — 72158 MRI LUMBAR SPINE W/O & W/DYE: CPT | Mod: 26

## 2021-02-19 PROCEDURE — A9585: CPT

## 2021-02-22 ENCOUNTER — APPOINTMENT (OUTPATIENT)
Dept: PHYSICAL MEDICINE AND REHAB | Facility: CLINIC | Age: 71
End: 2021-02-22
Payer: MEDICARE

## 2021-02-22 PROCEDURE — 99072 ADDL SUPL MATRL&STAF TM PHE: CPT

## 2021-02-22 PROCEDURE — 99204 OFFICE O/P NEW MOD 45 MIN: CPT

## 2021-02-22 NOTE — REASON FOR VISIT
[Initial Evaluation] : an initial evaluation [FreeTextEntry1] : [id number] [TWNoteComboBox1] : [place]

## 2021-02-22 NOTE — PHYSICAL EXAM
[FreeTextEntry1] : PE:\par Constitutional: In NAD, calm and cooperative\par HEENT: NCAT, Anicteric sclera, no lid-lag\par Cardio: Extremities appear pink and well perfused, no peripheral edema\par Respiratory: Normal respiratory effort on room air, no accessory muscle use\par Skin: no rashes seen on exposed skin, no visible abrasions\par Psych: Normal affect, intact judgment and insight\par Neuro: Awake, alert and oriented x 3, see below for focused neurological exam\par MSK (Back)\par 	Inspection: no gross swelling identified\par 	Palpation:Minimal Tenderness of the bilateral lower thoracic/upper lumbar paraspinals\par 	Strength: 5/5 strength in bilateral lower extremities\par 	Reflexes: 2+ Patella reflex bilaterally, 1+ Achilles reflex bilaterally, negative clonus bilaterally\par 	Sensation: Intact to light touch in bilateral lower extremities\par Special tests:\par SLR:negative bilaterally\par ELIZABETH:negative bilaterally\par FADIR: negative bilaterally

## 2021-02-22 NOTE — ASSESSMENT
[FreeTextEntry1] : Mr. EVER GOYAL is a 70 year old male who presents with acute on chronic low back pain, likely due to osteoporotic compression fractures, as well as lumbar stenosis and spondylosis. Denies any red flag signs at this time. Will recommend:\par - Due to patient's severe pain in setting of compression fractures and metastatic CA, will give another week's worth of Oxycodone 5mg BID, #14. I-STOP checked, [id number]. Patient advised that if he requires this long term, his PCP/Heme/Onc should be the one's writing this medication. \par - Referred patient to IR Dr. Guy for evaluation of the compression fractures and whether kyphoplasty will be an option.\par - Referred patient to ortho spine for evaluation. \par \par He will return after he is seen by the specialists above. Patient educated on red flag signs including any changes to their bowel/bladder control, groin numbness or new weakness. Patient knows to seek immediate attention by calling 911 or going to nearest ER if these symptoms appear. \par \par

## 2021-02-22 NOTE — HISTORY OF PRESENT ILLNESS
[FreeTextEntry1] : Mr. EVER GOYAL is a 70 year old male who presents with low back pain. Of note patient has a history of small cell lung CA followed by Heme/Onc. \par \par Location:Low back\par Onset:Chronic for years, 2 months ago it got worse but does not remember any specific triggering event\par Provocation/Palliative:Worse with laying down flat, activity/better somewhat sitting\par Quality:Achy Sharp\par Radiation:Across his upper lumbar spine bilaterally\par Severity:7/10 today\par Timing:Varies day to day\par \par Denies any associated numbness. Denies any associated leg weakness. Denies any loss of bowel/bladder control or any groin numbness.\par Previous medications trialed:Oxycodone 5mg with good relief, Tylenol\par Previous procedures relevant to complaint:None\par Conservative therapy tried?:Oxycodone\par

## 2021-02-22 NOTE — DATA REVIEWED
[FreeTextEntry1] : MRI L Spine: Multiple compression fracture seen, mod to severe spondylosis\par \par   MR Lumbar Spine w/wo IV Cont             Final\par \par No Documents Attached\par \par \par \par \par   EXAM:  MR SPINE LUMBAR WAW IC\par \par \par PROCEDURE DATE:  02/19/2021\par \par \par \par INTERPRETATION:  MR LUMBAR SPINE WITHOUT AND WITH IV CONTRAST\par \par CLINICAL INFORMATION: Lower back pain history of lung cancer with compression fractures of L1 and L2. Evaluate for metastatic disease.\par TECHNIQUE: Multiplanar, multisequence MR imaging was obtained of the lumbar spine without and with intravenous contrast, 10 mL Gadavist, 0 mL was discarded .\par COMPARISON: Radiographs dated 1/16/2021. CT abdomen and pelvis dated 7/15/2020.\par \par FINDINGS:\par \par SPINAL ALIGNMENT: Mild kyphosis centered at the T12 and L1 levels. Varying levels of disc space narrowing.\par DISTAL CORD AND CONUS: No cord signal abnormality. Conus ends at the level of T12-L1.\par SI JOINTS: Unremarkable.\par MARROW: There is heterogeneous T1, T2 bone marrow with areas of edema and enhancement with scattered degenerative endplate changes. There are T12-L4 superior endplate compression fractures with edema and enhancement, which are new compared to 7/15/2020. Partially imaged edema and enhancement along the partially visualized inferior T10 vertebra\par PARASPINAL MUSCLE AND SOFT TISSUES: There is mild fatty infiltration and atrophy of the paraspinal muscles.\par INTRAABDOMINAL/INTRAPELVIC SOFT TISSUES: Unremarkable.\par \par DISC LEVEL EVALUATION: Multilevel degenerative changes of the discs, most severe at L5-S1.\par \par T10/T11: Only evaluated in the sagittal plane. No spinal canal stenosis or neural foraminal narrowing.\par T11/T12: Only evaluated in the sagittal plane. Mild T12 superior endplate compression fracture without retropulsion. No central canal or foraminal narrowing.\par T12/L1: Only evaluated in the sagittal plane. Moderate L1 superior endplate compression fracture with mild osseous retropulsion without spinal canal stenosis. No foraminal narrowing.\par L1/L2: Mild superior L2 endplate compression fracture. No spinal canal stenosis or neural foraminal narrowing. Lateral recesses are preserved.\par L2/L3: Mild L3 superior endplate compression fracture. Diffuse disc bulge flattening the ventral thecal sac and narrowing the lateral recesses. Mild to moderate bilateral facet arthrosis. Combination of disc and facet degeneration results in mild bilateral foraminal narrowing. Mild bilateral lateral recess narrowing is worse on the left. No central canal stenosis.\par L3/L4: L4 superior endplate subcortical fracture without compression deformity. Diffuse disc bulge indenting the ventral thecal sac and narrowing the lateral recesses and resulting in mild spinal canal stenosis. Disc contacts the exited left L3 and descending left L4 nerve roots. Mild bilateral facet arthrosis and ligamentum flavum thickening. Combination of disc and facet degeneration results in severe bilateral foraminal narrowing.\par L4/L5: Diffuse disc bulge with superimposed left paracentral foraminal extrusion which narrows the left lateral recess. Disc contacts the exiting left L4 and descending left L5 nerves. Mild right and moderate left facet arthrosis and ligamentum flavum thickening. Combination of disc and facet degeneration results in mild to moderate right and severe left foraminal narrowing. No central canal stenosis.\par L5/S1: Mild disc bulge with osseous ridging asymmetric to the left. No spinal canal stenosis. Mild bilateral facet arthrosis and ligamentum flavum thickening. Combination of disc and facet degeneration results in mild right and moderate to severe left foraminal narrowing.\par \par IMPRESSION:\par 1.  Multiple compression fractures including T12-L4 Favor multiple osteoporotic compression fractures. New compared to prior CT dated 7/15/2020..\par 2.  Suspected T10 fracture, incompletely imaged on this study. Dedicated thoracic spine MRI can be performed for further evaluation.\par 3.  Multilevel lumbar spondylosis, most notable with contact on the left exited L3, descending L4, exiting L4 and descending L5 nerves. Spinal canal stenosis and foraminal narrowing, as described.\par \par \par \par \par \par ISABEL VINCENT MD; Fellow Radiology\par This document has been electronically signed.\par JD BARTON MD; Attending Radiologist\par This document has been electronically signed. Feb 22 2021 11:31AM\par \par  \par \par  Ordered by: THOR PATE       Collected/Examined: [id number] 01:14PM       \par Verification Required       Stage: Final       \par  Performed at: [facility]       Resulted: [id number] 11:34AM       Last Updated: [id number] 11:35AM       Accession: [id number]

## 2021-02-23 ENCOUNTER — APPOINTMENT (OUTPATIENT)
Dept: NEUROSURGERY | Facility: CLINIC | Age: 71
End: 2021-02-23
Payer: MEDICARE

## 2021-02-23 ENCOUNTER — RX RENEWAL (OUTPATIENT)
Age: 71
End: 2021-02-23

## 2021-02-23 VITALS
HEART RATE: 104 BPM | OXYGEN SATURATION: 96 % | SYSTOLIC BLOOD PRESSURE: 110 MMHG | BODY MASS INDEX: 28.82 KG/M2 | DIASTOLIC BLOOD PRESSURE: 62 MMHG | TEMPERATURE: 98 F | RESPIRATION RATE: 16 BRPM | HEIGHT: 65 IN | WEIGHT: 173 LBS

## 2021-02-23 PROCEDURE — 99072 ADDL SUPL MATRL&STAF TM PHE: CPT

## 2021-02-23 PROCEDURE — 99213 OFFICE O/P EST LOW 20 MIN: CPT

## 2021-03-01 NOTE — PHYSICAL EXAM
[General Appearance - Alert] : alert [General Appearance - In No Acute Distress] : in no acute distress [General Appearance - Well-Appearing] : healthy appearing [Oriented To Time, Place, And Person] : oriented to person, place, and time [Person] : oriented to person [Place] : oriented to place [Time] : oriented to time [Pain] : was painful [Sclera] : the sclera and conjunctiva were normal [Outer Ear] : the ears and nose were normal in appearance [Neck Appearance] : the appearance of the neck was normal [] : no respiratory distress [Heart Rate And Rhythm] : heart rate was normal and rhythm regular [Skin Color & Pigmentation] : normal skin color and pigmentation [FreeTextEntry2] : Thoracic Spinous Process Tenderness [FreeTextEntry1] : Walking with walker

## 2021-03-01 NOTE — ASSESSMENT
[FreeTextEntry1] : Impression:\par Multilevel vertebral compression fractures; T12-L4, likely osteoporotic\par ? fracture T10? partially imaged\par The pain is located at the waist level\par \par We discussed indications, risks and benefits of kyphoplasty.\par \par Plan:\par TLSO Brace while out of bed\par MRI T Spine wo contrast\par Follow up with me after imaging to discuss kyphoplasty

## 2021-03-01 NOTE — REVIEW OF SYSTEMS
[SOB on Exertion] : shortness of breath during exertion [As Noted in HPI] : as noted in HPI [Negative] : Heme/Lymph [FreeTextEntry6] : Wears 1L NC for "most of the day" [FreeTextEntry9] : Back pain

## 2021-03-01 NOTE — REASON FOR VISIT
[New Patient Visit] : a new patient visit [Referred By: _________] : Patient was referred by [facility] [FreeTextEntry1] : Multiple Compression Fractures

## 2021-03-01 NOTE — HISTORY OF PRESENT ILLNESS
[de-identified] : Mr. Ness is a pleasant 69yo male with a PMH of lung cancer who presents today for evaluation of multiple compression fractures.  He started having back pain about 2 months ago without inciting incident.  He states the pain was located in his mid-lower back and radiated across his back, no where else.  Since that time the pain is improved.  It is still a 5/10, relieved at rest.  It is worse when he is trying to get out of bed, going from sitting to standing or coughing.  Denies lower extremity weakness/numbness/tingling, bowel or bladder changes.\par \par This visit was done using pacific  video with  # [id number].\par \par

## 2021-03-02 ENCOUNTER — APPOINTMENT (OUTPATIENT)
Dept: ORTHOPEDIC SURGERY | Facility: CLINIC | Age: 71
End: 2021-03-02
Payer: MEDICARE

## 2021-03-02 VITALS — TEMPERATURE: 96.1 F

## 2021-03-02 VITALS
SYSTOLIC BLOOD PRESSURE: 135 MMHG | DIASTOLIC BLOOD PRESSURE: 75 MMHG | HEIGHT: 65 IN | HEART RATE: 93 BPM | WEIGHT: 173 LBS | BODY MASS INDEX: 28.82 KG/M2

## 2021-03-02 DIAGNOSIS — Z82.49 FAMILY HISTORY OF ISCHEMIC HEART DISEASE AND OTHER DISEASES OF THE CIRCULATORY SYSTEM: ICD-10-CM

## 2021-03-02 DIAGNOSIS — Z92.21 PERSONAL HISTORY OF ANTINEOPLASTIC CHEMOTHERAPY: ICD-10-CM

## 2021-03-02 PROCEDURE — 99205 OFFICE O/P NEW HI 60 MIN: CPT

## 2021-03-02 PROCEDURE — 99072 ADDL SUPL MATRL&STAF TM PHE: CPT

## 2021-03-02 NOTE — DISCUSSION/SUMMARY
[de-identified] : Patient will have a CT ordered of the lumbar spine to ensure there are no osteolytic processes related to his history of lung cancer. Patient wishes to return after his already scheduled MRI to discuss kyphoplasty, we discussed that he has multiple thoracic compression and may need multiple surgeries. At next clinical visit we will obtain lumbar Xrays. Patient will return after his CT \par \par Patient with multiple medical comorbidity increasing the risk of perioperative and postoperative complications as well as diminished spine outcomes as per the current medical literature. These include but are not limited to obesity, anxiety/depression, cardiac illness, kidney disease, peripheral vascular disease, history of cancer, COPD, dysmetabolic syndrome including but not limited to diabetes, hyperlipidemia, hypertension. Patient is being referred back to primary care provider for medical optimization, as well as other appropriate specialists as needed for optimization prior to spine surgery. \par \par

## 2021-03-02 NOTE — PHYSICAL EXAM
[Poor Appearance] : well-appearing [Acute Distress] : not in acute distress [Obese] : not obese [de-identified] : CONSTITUTIONAL: The patient is a very pleasant individual who is well-nourished and who appears stated age.\par PSYCHIATRIC: The patient is alert and oriented X 3 and in no apparent distress, and participates with orthopedic evaluation well.\par HEAD: Atraumatic and is nonsyndromic in appearance.\par EENT: No visible thyromegaly, EOMI.\par RESPIRATORY: Respiratory rate is regular, not dyspneic on examination.\par LYMPHATICS: There is no inguinal lymphadenopathy\par INTEGUMENTARY: Skin is clean, dry, and intact about the bilateral lower extremities and lumbar spine.\par VASCULAR: There is brisk capillary refill about the bilateral lower extremities.\par NEUROLOGIC: There are no pathologic reflexes. There is no decrease in sensation of the bilateral lower extremities on Wartenberg pinwheel examination. Deep tendon reflexes are well maintained at 2+/4 of the bilateral lower extremities and are symmetric.\par MUSCULOSKELETAL: There is no visible muscular atrophy. Manual motor strength is well maintained in the bilateral lower extremities. The patient ambulates in a non-myelopathic manner. Negative tension sign and straight leg raise bilaterally. Quad extension, ankle dorsiflexion, EHL, plantar flexion, and ankle eversion are well preserved. Normal secondary orthopaedic exam of bilateral hips, greater trochanteric area, knees and ankles. Mechanically oriented lower back pain consistent with compression fractures.  [de-identified] : Lumbar MRI is reviewed it shows multiple thoracic compression fractures as stated from T12-L4 as suspected lower thoracic compression fractures as well

## 2021-03-02 NOTE — HISTORY OF PRESENT ILLNESS
[de-identified] : 70 year old M presents for an initial evaluation of thoracic compression fractures and wishes to discuss a kyphoplasty, he has a history of small cell lung cancer and follows up with heme / onc for management. He also sees physiatry. He states his back pain has been worsening, he was referred to us by Dr. Mullins for discussion of possible kyphoplasty for his fractures. While seated his pain is a 2/10 but when he stands or walks it is a 10/10. He is scheduled for an MRI tomorrow.  [Ataxia] : no ataxia [Incontinence] : no incontinence [Loss of Dexterity] : good dexterity [Urinary Ret.] : no urinary retention

## 2021-03-03 ENCOUNTER — APPOINTMENT (OUTPATIENT)
Dept: MRI IMAGING | Facility: CLINIC | Age: 71
End: 2021-03-03
Payer: MEDICARE

## 2021-03-03 ENCOUNTER — OUTPATIENT (OUTPATIENT)
Dept: OUTPATIENT SERVICES | Facility: HOSPITAL | Age: 71
LOS: 1 days | End: 2021-03-03
Payer: COMMERCIAL

## 2021-03-03 DIAGNOSIS — M43.9 DEFORMING DORSOPATHY, UNSPECIFIED: ICD-10-CM

## 2021-03-03 DIAGNOSIS — Z90.89 ACQUIRED ABSENCE OF OTHER ORGANS: Chronic | ICD-10-CM

## 2021-03-03 DIAGNOSIS — N20.0 CALCULUS OF KIDNEY: Chronic | ICD-10-CM

## 2021-03-03 DIAGNOSIS — Z95.5 PRESENCE OF CORONARY ANGIOPLASTY IMPLANT AND GRAFT: Chronic | ICD-10-CM

## 2021-03-03 DIAGNOSIS — D11.0 BENIGN NEOPLASM OF PAROTID GLAND: Chronic | ICD-10-CM

## 2021-03-03 DIAGNOSIS — Z00.8 ENCOUNTER FOR OTHER GENERAL EXAMINATION: ICD-10-CM

## 2021-03-03 PROCEDURE — 72146 MRI CHEST SPINE W/O DYE: CPT | Mod: 26

## 2021-03-03 PROCEDURE — 72146 MRI CHEST SPINE W/O DYE: CPT

## 2021-03-05 ENCOUNTER — APPOINTMENT (OUTPATIENT)
Dept: MRI IMAGING | Facility: CLINIC | Age: 71
End: 2021-03-05

## 2021-03-15 ENCOUNTER — RX RENEWAL (OUTPATIENT)
Age: 71
End: 2021-03-15

## 2021-03-17 ENCOUNTER — NON-APPOINTMENT (OUTPATIENT)
Age: 71
End: 2021-03-17

## 2021-03-18 ENCOUNTER — APPOINTMENT (OUTPATIENT)
Dept: NEUROSURGERY | Facility: CLINIC | Age: 71
End: 2021-03-18
Payer: MEDICARE

## 2021-03-18 VITALS — BODY MASS INDEX: 28.82 KG/M2 | WEIGHT: 173 LBS | HEIGHT: 65 IN

## 2021-03-18 PROCEDURE — 99213 OFFICE O/P EST LOW 20 MIN: CPT | Mod: 95

## 2021-03-18 RX ORDER — PREDNISONE 20 MG/1
20 TABLET ORAL TWICE DAILY
Qty: 180 | Refills: 1 | Status: DISCONTINUED | COMMUNITY
Start: 2020-12-07 | End: 2021-03-18

## 2021-03-18 RX ORDER — SULFAMETHOXAZOLE AND TRIMETHOPRIM 800; 160 MG/1; MG/1
800-160 TABLET ORAL
Qty: 30 | Refills: 1 | Status: DISCONTINUED | COMMUNITY
Start: 2020-11-06 | End: 2021-03-18

## 2021-03-18 RX ORDER — PREDNISONE 50 MG/1
50 TABLET ORAL DAILY
Qty: 45 | Refills: 1 | Status: DISCONTINUED | COMMUNITY
Start: 2020-11-06 | End: 2021-03-18

## 2021-03-18 RX ORDER — PREDNISONE 20 MG/1
20 TABLET ORAL
Qty: 60 | Refills: 1 | Status: DISCONTINUED | COMMUNITY
Start: 2020-11-12 | End: 2021-03-18

## 2021-03-18 NOTE — REASON FOR VISIT
[Follow-Up: _____] : a [unfilled] follow-up visit [Home] : at home, [unfilled] , at the time of the visit. [Medical Office: ([facility])___] : at the medical office located in  [Verbal consent obtained from patient] : the patient, [unfilled]

## 2021-03-18 NOTE — ASSESSMENT
[FreeTextEntry1] : Impression:\par MRI T-spine with subacute T10 and T12 fractures\par MRI L-spine with L1-L4 chronic fractures\par \par The pain has improved significantly.\par \par I encouraged progressive ambulation, brace and physical therapy.\par \par No indication for kyphoplasty at this juncture.\par \par \par Plan:\par Brace - has script\par PT - has script\par Follow up with Dr. Mullins for pain management\par Follow up with me for new/ worsening pain

## 2021-03-18 NOTE — DATA REVIEWED
[de-identified] : EXAM: MR SPINE THORACIC\par \par \par PROCEDURE DATE: 03/03/2021\par \par \par \par INTERPRETATION: THORACIC SPINE MRI\par \par CLINICAL INFORMATION: Back pain. Patient with left upper lobe lung cancer. Evaluate for compression fracture.\par TECHNIQUE: Multiplanar and multisequence MR imaging was obtained of the thoracic spine.\par COMPARISON: Chest CT 28 December 2020. Thoracic spine MRI 5 May 2019.\par \par FINDINGS:\par \par SPINAL ALIGNMENT: Thoracic kyphosis is maintained. No spondylolisthesis.\par BONE MARROW: Subacute wedge compression fracture of the T10 vertebral body with less than 50% height loss and no retropulsion of fragments. Subacute wedge compression fracture of the superior endplate of T12 with less than 50% height loss and no retropulsion of fragments. These fractures are new when compared to CT of December 2020. Chronic appearing wedge compression deformity of the L1 vertebral body, unchanged from prior imaging. Remaining vertebral body heights are maintained. There is focal increased T1 signal in the T4-T7 vertebral bodies, which may be sequelae of prior radiation therapy. No change in the previously described nonaggressive appearing sclerotic lesion in the T6 vertebral body.\par FACET JOINTS: Facet joints are well aligned at all levels.\par CORD: The cord is normal in caliber and signal characteristics.\par DISC SPACES: No loss of intervertebral disc height.\par PARASPINAL MUSCLES/SOFT TISSUES: Mild atrophy of the posterior paraspinous musculature.\par THORACIC AND ABDOMINAL SOFT TISSUES: The known neoplasm in the patient's left upper lobe of the lung is not well evaluated on current study.\par \par DISC LEVEL EVALUATION:\par \par T1/T2: No central stenosis or neural foraminal narrowing.\par T2/T3: No central stenosis or neural foraminal narrowing.\par T3/T4: No central stenosis or neural foraminal narrowing.\par T4/T5: No central stenosis or neural foraminal narrowing.\par T5/T6: No central stenosis or neural foraminal narrowing.\par T6/T7: No central stenosis or neural foraminal narrowing.\par T7/T8: No central stenosis or neural foraminal narrowing.\par T8/T9: No central stenosis or neural foraminal narrowing.\par T9/T10: Bilateral facet arthropathy causing mild bilateral neural foraminal narrowing. No central stenosis.\par T10/T11: Bilateral facet arthropathy causing mild bilateral neural foraminal narrowing. No central stenosis.\par T11/T12: No central stenosis or neural foraminal narrowing.\par T12/L1: No central stenosis or neural foraminal narrowing.\par \par IMPRESSION:\par 1. Subacute wedge compression fractures of the T10 and T12 vertebral bodies.\par 2. Chronic wedge compression deformity of the L1 vertebral body.\par 3. Mild bilateral neural foraminal narrowing at the T9-T10 and T10-T11 levels, as described above.\par \par \par \par \par \par \par NIRMAL MAYEN MD; Attending Radiologist\par This document has been electronically signed. Mar 5 2021 1:17PM\par \par

## 2021-03-18 NOTE — HISTORY OF PRESENT ILLNESS
[FreeTextEntry1] : Mr. Ness is a pleasant 69yo male with a PMH of lung cancer who presents today for follow up and MRI review of multiple compression fractures. He started having back pain about 3 months ago without inciting incident. He states the pain was located in his mid-lower back and radiated across his back, no where else. Since that time the pain continues to improved. It is a 2/10 (down from a 5/10 at our last visit), relieved at rest. It is still worse when he is trying to get out of bed, going from sitting to standing or coughing.  He went to get his brace but is still waiting for insurance approval.  Denies lower extremity weakness/numbness/tingling, bowel or bladder changes.\par

## 2021-03-19 ENCOUNTER — RX RENEWAL (OUTPATIENT)
Age: 71
End: 2021-03-19

## 2021-03-20 ENCOUNTER — OUTPATIENT (OUTPATIENT)
Dept: OUTPATIENT SERVICES | Facility: HOSPITAL | Age: 71
LOS: 1 days | Discharge: HOME | End: 2021-03-20

## 2021-03-20 DIAGNOSIS — N20.0 CALCULUS OF KIDNEY: Chronic | ICD-10-CM

## 2021-03-20 DIAGNOSIS — Z90.89 ACQUIRED ABSENCE OF OTHER ORGANS: Chronic | ICD-10-CM

## 2021-03-20 DIAGNOSIS — Z95.5 PRESENCE OF CORONARY ANGIOPLASTY IMPLANT AND GRAFT: Chronic | ICD-10-CM

## 2021-03-20 DIAGNOSIS — D11.0 BENIGN NEOPLASM OF PAROTID GLAND: Chronic | ICD-10-CM

## 2021-03-20 DIAGNOSIS — C34.90 MALIGNANT NEOPLASM OF UNSPECIFIED PART OF UNSPECIFIED BRONCHUS OR LUNG: ICD-10-CM

## 2021-03-22 ENCOUNTER — OUTPATIENT (OUTPATIENT)
Dept: OUTPATIENT SERVICES | Facility: HOSPITAL | Age: 71
LOS: 1 days | End: 2021-03-22
Payer: COMMERCIAL

## 2021-03-22 ENCOUNTER — APPOINTMENT (OUTPATIENT)
Dept: CT IMAGING | Facility: CLINIC | Age: 71
End: 2021-03-22
Payer: MEDICARE

## 2021-03-22 DIAGNOSIS — C34.12 MALIGNANT NEOPLASM OF UPPER LOBE, LEFT BRONCHUS OR LUNG: ICD-10-CM

## 2021-03-22 DIAGNOSIS — D11.0 BENIGN NEOPLASM OF PAROTID GLAND: Chronic | ICD-10-CM

## 2021-03-22 DIAGNOSIS — Z95.5 PRESENCE OF CORONARY ANGIOPLASTY IMPLANT AND GRAFT: Chronic | ICD-10-CM

## 2021-03-22 DIAGNOSIS — N20.0 CALCULUS OF KIDNEY: Chronic | ICD-10-CM

## 2021-03-22 DIAGNOSIS — Z90.89 ACQUIRED ABSENCE OF OTHER ORGANS: Chronic | ICD-10-CM

## 2021-03-22 PROCEDURE — 71260 CT THORAX DX C+: CPT

## 2021-03-22 PROCEDURE — 71260 CT THORAX DX C+: CPT | Mod: 26

## 2021-03-22 PROCEDURE — 74177 CT ABD & PELVIS W/CONTRAST: CPT

## 2021-03-22 PROCEDURE — 74177 CT ABD & PELVIS W/CONTRAST: CPT | Mod: 26

## 2021-03-24 ENCOUNTER — APPOINTMENT (OUTPATIENT)
Dept: HEMATOLOGY ONCOLOGY | Facility: CLINIC | Age: 71
End: 2021-03-24
Payer: MEDICARE

## 2021-03-24 ENCOUNTER — APPOINTMENT (OUTPATIENT)
Dept: ENDOCRINOLOGY | Facility: CLINIC | Age: 71
End: 2021-03-24
Payer: MEDICARE

## 2021-03-24 VITALS
SYSTOLIC BLOOD PRESSURE: 126 MMHG | DIASTOLIC BLOOD PRESSURE: 80 MMHG | TEMPERATURE: 98.1 F | OXYGEN SATURATION: 93 % | HEART RATE: 82 BPM | HEIGHT: 65 IN | WEIGHT: 176 LBS | BODY MASS INDEX: 29.32 KG/M2

## 2021-03-24 VITALS
HEIGHT: 65 IN | WEIGHT: 177.47 LBS | HEART RATE: 117 BPM | SYSTOLIC BLOOD PRESSURE: 162 MMHG | DIASTOLIC BLOOD PRESSURE: 78 MMHG | OXYGEN SATURATION: 94 % | RESPIRATION RATE: 16 BRPM | BODY MASS INDEX: 29.57 KG/M2 | TEMPERATURE: 97.1 F

## 2021-03-24 LAB — HBA1C MFR BLD HPLC: 6.3

## 2021-03-24 PROCEDURE — 99072 ADDL SUPL MATRL&STAF TM PHE: CPT

## 2021-03-24 PROCEDURE — 99214 OFFICE O/P EST MOD 30 MIN: CPT

## 2021-03-24 PROCEDURE — 83036 HEMOGLOBIN GLYCOSYLATED A1C: CPT | Mod: QW

## 2021-03-24 PROCEDURE — 99214 OFFICE O/P EST MOD 30 MIN: CPT | Mod: 25

## 2021-03-28 ENCOUNTER — FORM ENCOUNTER (OUTPATIENT)
Age: 71
End: 2021-03-28

## 2021-03-29 ENCOUNTER — TRANSCRIPTION ENCOUNTER (OUTPATIENT)
Age: 71
End: 2021-03-29

## 2021-03-30 ENCOUNTER — TRANSCRIPTION ENCOUNTER (OUTPATIENT)
Age: 71
End: 2021-03-30

## 2021-03-30 ENCOUNTER — APPOINTMENT (OUTPATIENT)
Dept: DISASTER EMERGENCY | Facility: HOSPITAL | Age: 71
End: 2021-03-30

## 2021-03-30 ENCOUNTER — OUTPATIENT (OUTPATIENT)
Dept: OUTPATIENT SERVICES | Facility: HOSPITAL | Age: 71
LOS: 1 days | End: 2021-03-30
Payer: MEDICARE

## 2021-03-30 VITALS
RESPIRATION RATE: 18 BRPM | WEIGHT: 177.91 LBS | SYSTOLIC BLOOD PRESSURE: 154 MMHG | OXYGEN SATURATION: 98 % | HEART RATE: 95 BPM | DIASTOLIC BLOOD PRESSURE: 69 MMHG | TEMPERATURE: 99 F | HEIGHT: 60 IN

## 2021-03-30 VITALS
HEART RATE: 76 BPM | SYSTOLIC BLOOD PRESSURE: 103 MMHG | OXYGEN SATURATION: 98 % | DIASTOLIC BLOOD PRESSURE: 67 MMHG | TEMPERATURE: 99 F | RESPIRATION RATE: 19 BRPM

## 2021-03-30 DIAGNOSIS — D11.0 BENIGN NEOPLASM OF PAROTID GLAND: Chronic | ICD-10-CM

## 2021-03-30 DIAGNOSIS — N20.0 CALCULUS OF KIDNEY: Chronic | ICD-10-CM

## 2021-03-30 DIAGNOSIS — U07.1 COVID-19: ICD-10-CM

## 2021-03-30 DIAGNOSIS — Z90.89 ACQUIRED ABSENCE OF OTHER ORGANS: Chronic | ICD-10-CM

## 2021-03-30 DIAGNOSIS — Z95.5 PRESENCE OF CORONARY ANGIOPLASTY IMPLANT AND GRAFT: Chronic | ICD-10-CM

## 2021-03-30 PROCEDURE — M0239: CPT

## 2021-03-30 RX ORDER — SODIUM CHLORIDE 9 MG/ML
250 INJECTION INTRAMUSCULAR; INTRAVENOUS; SUBCUTANEOUS
Refills: 0 | Status: DISCONTINUED | OUTPATIENT
Start: 2021-03-30 | End: 2021-04-13

## 2021-03-30 RX ADMIN — SODIUM CHLORIDE 25 MILLILITER(S): 9 INJECTION INTRAMUSCULAR; INTRAVENOUS; SUBCUTANEOUS at 09:42

## 2021-03-30 NOTE — HISTORY OF PRESENT ILLNESS
[FreeTextEntry1] : 70 yr old M with metastatic Lung CA, CAD and COPD here for f/u visit for Type 2 DM\par A1C is 6.3 today\par He was on metformin and januvia in the past\par Current regimen: Basaglar 35 Units HS Novolog 20-25-22\par Was on tapering dose of prednisone but has been off since March 7th\par He moderates his intake of carbs, drinks crystal light and coke zero\par Saw optho Jan 2021\par No neuropathy\par For HTG, takes fish oil 500 mg 1 X day, atorvastatin 80mg daily, Niacin 2 tablets at night\par Taper directed by pulmonary team\par He uses a Free Style Osiel for glucose monitoring:\par 3/11-3/24\par Target Range 84%\par High 14%\par Very high 2%\par Low/Very low 0%\par Glucose spikes occur post lunch and post dinner \par \par \par

## 2021-03-30 NOTE — ASSESSMENT
[FreeTextEntry1] : 70 yr old M with metastatic Lung CA, CAD and COPD here with T2DM now off steroids\par 1) T2DM A1C 7.9-----6.3\par Counselled on diet and exercise modification/importance of glycemic control/Hypoglycemia mgmt\par Cont basaglar/novolog\par Check C-peptide\par Will start metformin 500mg BID to be increased to 1g BID if tolerating\par UTD with optho\par Foot Exam normal\par Continue glucose monitoring with Osiel\par 2) HTN\par Check Urine Micro/Creat Ratio\par Pt is on Lisinopril 20mg daily\par 3) HLD\par Check Lipid panel\par Cont atorvastatin, niacin, fish oil\par 4) Osteoporosis\par Mgmt per medicine\par \par \par

## 2021-03-30 NOTE — PHYSICAL EXAM
[Alert] : alert [No Acute Distress] : no acute distress [Normal Sclera/Conjunctiva] : normal sclera/conjunctiva [No Proptosis] : no proptosis [Normal Outer Ear/Nose] : the ears and nose were normal in appearance [Normal Hearing] : hearing was normal [No Neck Mass] : no neck mass was observed [No Respiratory Distress] : no respiratory distress [Clear to Auscultation] : lungs were clear to auscultation bilaterally [Normal S1, S2] : normal S1 and S2 [Normal Rate] : heart rate was normal [Not Tender] : non-tender [Soft] : abdomen soft [Normal Gait] : normal gait [No Rash] : no rash [Right Foot Was Examined] : right foot ~C was examined [Left Foot Was Examined] : left foot ~C was examined [Normal] : normal [2+] : 2+ in the dorsalis pedis [No Tremors] : no tremors [Oriented x3] : oriented to person, place, and time [Normal Affect] : the affect was normal [Normal Mood] : the mood was normal [Foot Ulcers] : no foot ulcers [Acne] : no acne [Vibration Dec.] : normal vibratory sensation at the level of the toes [Position Sense Dec.] : normal position sense at the level of the toes

## 2021-03-30 NOTE — CHART NOTE - NSCHARTNOTEFT_GEN_A_CORE
CC: Monoclonal Antibody Infusion/COVID 19 Positive  70yMale with PMHx of cancer, stroke, DM, HTN, immune suppressive disease and symptoms of fever, cough, malaise.    exam/findings:  T(C): 37.2 (03-30-21 @ 09:15), Max: 37.2 (03-30-21 @ 09:15)  HR: 95 (03-30-21 @ 09:15) (95 - 95)  BP: 154/69 (03-30-21 @ 09:15) (154/69 - 154/69)  RR: 18 (03-30-21 @ 09:15) (18 - 18)  SpO2: 98% (03-30-21 @ 09:15) (98% - 98%)      PE:   Appearance: NAD	  HEENT:   Normal oral mucosa,   Lymphatic: No lymphadenopathy  Cardiovascular: Normal S1 S2, No JVD, No murmurs, No edema  Respiratory: Lungs clear to auscultation	  Gastrointestinal:  Soft, Non-tender, + BS	  Skin: warm and dry  Neurologic: Non-focal  Extremities: Normal range of motion, no calf tenderness or edema    ASSESSMENT:  Pt is a 70y with PMHx of lung cancer, immune suppressive disease, DM, Stroke, HTN, Covid 19 Positive with symptoms in the last 10 days, who presents to infusion center for Monoclonal antibody infusion Casirivimab/Imdevimab.  Symptoms/ Criteria: Fever, cough, malaise.  Risk Profile includes: Immune suppressive disease, Age over 65, HTN    PLAN:  - infusion procedure explained to patient   -Consent for monoclonal antibody infusion obtained   - Risk & benifits discussed/all questions answered  -infuse  Casirivimab/Imdevimab IV over one hour   -observe patient for one hour post infusion      I have reviewed the Casirivimab/Imdevimab Emergency Use Authorization (EAU) and I have provided the patient or patient's caregiver with the following information:  1. FDA has authorized emergency use of Casirivimab/Imdevimab, which is not FDA-approved biologic product.  2. The patient or patient's caregiver has the option to accept or refuse administration of Casirivimab/Imdevimab  3. The significant known and benefits are unknown.  4. Information on available alternative treatments and risks and benefits of those alternatives.

## 2021-03-31 ENCOUNTER — TRANSCRIPTION ENCOUNTER (OUTPATIENT)
Age: 71
End: 2021-03-31

## 2021-04-01 ENCOUNTER — APPOINTMENT (OUTPATIENT)
Dept: NEUROSURGERY | Facility: CLINIC | Age: 71
End: 2021-04-01

## 2021-04-01 ENCOUNTER — APPOINTMENT (OUTPATIENT)
Dept: PULMONOLOGY | Facility: CLINIC | Age: 71
End: 2021-04-01
Payer: MEDICARE

## 2021-04-01 PROCEDURE — 99202 OFFICE O/P NEW SF 15 MIN: CPT | Mod: 95

## 2021-04-01 NOTE — HISTORY OF PRESENT ILLNESS
[Home] : at home, [unfilled] , at the time of the visit. [Medical Office: ([facility])___] : at the medical office located in  [Spouse] : spouse [Verbal consent obtained from patient] : the patient, [unfilled] [FreeTextEntry1] : 70 year old male with history of lung cancer O2 dependent diagnosed with COVID last Sunday.  He received MAB therapy this past Tuesday.  At first, pt was having fever and myalgias.  He has not had a fever in over 2 days but continues to have myalgias.  \par He uses oxygen prior to COVID virus and is on 3 L.  His oxygen saturations are anywhere from 90-96% on 3 L of O2.  He has a mild cough with occasional sputum production.  \par He used to be a heavy smoker but recently quit.  \par \par \par I was present for the key portions of the history elicited by the nurse practitioner. I agree with the assessment and plan as written\par

## 2021-04-01 NOTE — PLAN
[FreeTextEntry1] : 70 year old male with history of lung cancer O2 dependent diagnosed with COVID last Sunday.  He will start decadron 6 mg for 10 days.  Will also check labs.  Follow up on Monday to assess status.

## 2021-04-02 ENCOUNTER — NON-APPOINTMENT (OUTPATIENT)
Age: 71
End: 2021-04-02

## 2021-04-02 ENCOUNTER — LABORATORY RESULT (OUTPATIENT)
Age: 71
End: 2021-04-02

## 2021-04-02 ENCOUNTER — RX RENEWAL (OUTPATIENT)
Age: 71
End: 2021-04-02

## 2021-04-05 LAB
ALBUMIN SERPL ELPH-MCNC: 4 G/DL
ALP BLD-CCNC: 84 U/L
ALT SERPL-CCNC: 12 U/L
ANION GAP SERPL CALC-SCNC: 16 MMOL/L
AST SERPL-CCNC: 17 U/L
BASOPHILS # BLD AUTO: 0.05 K/UL
BASOPHILS NFR BLD AUTO: 0.9 %
BILIRUB SERPL-MCNC: 0.2 MG/DL
BUN SERPL-MCNC: 13 MG/DL
CALCIUM SERPL-MCNC: 9.5 MG/DL
CHLORIDE SERPL-SCNC: 100 MMOL/L
CO2 SERPL-SCNC: 26 MMOL/L
CREAT SERPL-MCNC: 0.79 MG/DL
CRP SERPL-MCNC: 36 MG/L
DEPRECATED D DIMER PPP IA-ACNC: 183 NG/ML DDU
EOSINOPHIL # BLD AUTO: 0 K/UL
EOSINOPHIL NFR BLD AUTO: 0 %
FERRITIN SERPL-MCNC: 99 NG/ML
GLUCOSE SERPL-MCNC: 104 MG/DL
HCT VFR BLD CALC: 37 %
HGB BLD-MCNC: 11.6 G/DL
LYMPHOCYTES # BLD AUTO: 0.7 K/UL
LYMPHOCYTES NFR BLD AUTO: 11.7 %
MAN DIFF?: NORMAL
MCHC RBC-ENTMCNC: 31.4 GM/DL
MCHC RBC-ENTMCNC: 31.5 PG
MCV RBC AUTO: 100.5 FL
MONOCYTES # BLD AUTO: 0.43 K/UL
MONOCYTES NFR BLD AUTO: 7.2 %
NEUTROPHILS # BLD AUTO: 4.78 K/UL
NEUTROPHILS NFR BLD AUTO: 80.2 %
PLATELET # BLD AUTO: 302 K/UL
POTASSIUM SERPL-SCNC: 4.2 MMOL/L
PROCALCITONIN SERPL-MCNC: 0.08 NG/ML
PROT SERPL-MCNC: 5.7 G/DL
RBC # BLD: 3.68 M/UL
RBC # FLD: 13.3 %
SARS-COV-2 N GENE NPH QL NAA+PROBE: DETECTED
SODIUM SERPL-SCNC: 142 MMOL/L
WBC # FLD AUTO: 5.96 K/UL

## 2021-04-06 RX ORDER — METFORMIN HYDROCHLORIDE 500 MG/1
500 TABLET, COATED ORAL
Qty: 60 | Refills: 3 | Status: DISCONTINUED | COMMUNITY
Start: 2021-03-24 | End: 2021-04-06

## 2021-04-07 ENCOUNTER — NON-APPOINTMENT (OUTPATIENT)
Age: 71
End: 2021-04-07

## 2021-04-07 ENCOUNTER — RX RENEWAL (OUTPATIENT)
Age: 71
End: 2021-04-07

## 2021-04-12 ENCOUNTER — APPOINTMENT (OUTPATIENT)
Dept: MRI IMAGING | Facility: CLINIC | Age: 71
End: 2021-04-12
Payer: MEDICARE

## 2021-04-12 ENCOUNTER — OUTPATIENT (OUTPATIENT)
Dept: OUTPATIENT SERVICES | Facility: HOSPITAL | Age: 71
LOS: 1 days | End: 2021-04-12
Payer: COMMERCIAL

## 2021-04-12 DIAGNOSIS — D11.0 BENIGN NEOPLASM OF PAROTID GLAND: Chronic | ICD-10-CM

## 2021-04-12 DIAGNOSIS — Z95.5 PRESENCE OF CORONARY ANGIOPLASTY IMPLANT AND GRAFT: Chronic | ICD-10-CM

## 2021-04-12 DIAGNOSIS — Z90.89 ACQUIRED ABSENCE OF OTHER ORGANS: Chronic | ICD-10-CM

## 2021-04-12 DIAGNOSIS — N20.0 CALCULUS OF KIDNEY: Chronic | ICD-10-CM

## 2021-04-12 DIAGNOSIS — Z00.8 ENCOUNTER FOR OTHER GENERAL EXAMINATION: ICD-10-CM

## 2021-04-12 PROCEDURE — 70553 MRI BRAIN STEM W/O & W/DYE: CPT

## 2021-04-12 PROCEDURE — 70553 MRI BRAIN STEM W/O & W/DYE: CPT | Mod: 26

## 2021-04-12 PROCEDURE — A9585: CPT

## 2021-04-13 ENCOUNTER — APPOINTMENT (OUTPATIENT)
Dept: RADIATION ONCOLOGY | Facility: CLINIC | Age: 71
End: 2021-04-13
Payer: MEDICARE

## 2021-04-13 PROCEDURE — 99212 OFFICE O/P EST SF 10 MIN: CPT | Mod: 95

## 2021-04-13 RX ORDER — DEXAMETHASONE 6 MG/1
6 TABLET ORAL DAILY
Qty: 10 | Refills: 0 | Status: DISCONTINUED | COMMUNITY
Start: 2021-04-02 | End: 2021-04-13

## 2021-04-19 ENCOUNTER — APPOINTMENT (OUTPATIENT)
Dept: PULMONOLOGY | Facility: CLINIC | Age: 71
End: 2021-04-19
Payer: MEDICARE

## 2021-04-19 PROCEDURE — 99214 OFFICE O/P EST MOD 30 MIN: CPT | Mod: 95

## 2021-04-19 NOTE — PHYSICAL EXAM
[No Acute Distress] : no acute distress [Well Nourished] : well nourished [Normal Appearance] : normal appearance [Well Groomed] : well groomed [No Deformities] : no deformities [Well Developed] : well developed [No Resp Distress] : no resp distress [No Acc Muscle Use] : no acc muscle use [Oriented x3] : oriented x3 [Normal Mood] : normal mood [Normal Affect] : normal affect [TextBox_11] : anicteric, EOMI

## 2021-04-19 NOTE — ASSESSMENT
[FreeTextEntry1] : 70M extensive history including DM2, CAD, COPD, and small cell lung cancer presenting for followup TELEHEALTH VISIT due to recent COVID-19 infection.\par \par He recently completed treatment for pneumonitis. He was hospitalized at [facility] for hypoxic respiratory failure and abnormal CT chest. He underwent bronchoscopy and was diagnosed with drug induced pneumonitis. He is doing well and had completed steroids and was down to 1L O2 prior to COVID diagnosis.\par \par 1. COVID-19 - patient now with improvement in symptoms. He did receive Monoclonal antibody therapy. He was seen by Dr. Dave via [facility] and was given Dexamethasone 6mg x 10 days which he has completed. His D-dimer was 180s and he was enrolled in the PREVENT-HD Xarelto trial\par \par 2. Pneumonitis - patient underwent bronchoscopy and transbronchial biopsy which showed chronic inflammation. - He has completed a long taper of Prednisone and is doing well. \par - Repeat CT chest shows resolution of previously noted opacities\par \par 3. Small Cell Lung Cancer - with metastatic disease. Most recent CT with slight enlargement of spiculated nodule (now 1.7 cm)\par - patient has received radiation therapy - has some evidence of fibrosis which may be related to RT\par - continue to followup with Oncology regarding further treatment options - there does appear to be increase in size of his primary tumor\par - He has improved from a pulmonary standpoint and I do not have any objections to further therapy if it is thought necessary from an Oncologic perspective.\par \par 4. COPD - patient is a long term former smoker. He will continue Symbicort and increase his use of Ventolin as needed.\par - PFTs and 6MWT done previously\par - No evidence of desaturation during 6MWT with 4L O2\par - PFTs remain stable - no BD testing in setting of COVID-19 pandemic. On prior PFTs patient had significant BD response\par \par 5. Snoring - patient with reported signs and symptoms of sleep disorder breathing. I have referred him to the [facility] for a diagnostic home sleep study. We discussed potential sleep apnea therapies which patient will consider.\par - Patient/family to plan to reschedule in future as he is not currently interested in this\par \par 6. Chronic Hypoxemic Respiratory Failure - patient currently on 3L supplemental O2. Continue to maintain O2 sats > 90% as able.\par - Can start to wean down FiO2 as able with goal O2 sat > 90%\par \par 7. Health Maintenance - patient has received Flu shot and pneumococcal vaccine this year.\par - COVID vaccine will have to wait 90 days post MAB therapy\par - Patient/Family will call or email me with any questions\par - We will discuss further after followup CT chest

## 2021-04-19 NOTE — CONSULT LETTER
[Dear  ___] : Dear  [unfilled], [Courtesy Letter:] : I had the pleasure of seeing your patient, [unfilled], in my office today. [Please see my note below.] : Please see my note below. [Consult Closing:] : Thank you very much for allowing me to participate in the care of this patient.  If you have any questions, please do not hesitate to contact me. [Sincerely,] : Sincerely, [FreeTextEntry3] : Don Jacobs MD\par  [DrDavid  ___] : Dr. CORTES [DrDavid ___] : Dr. CORTES

## 2021-04-19 NOTE — REVIEW OF SYSTEMS
[Loss of Hearing] : loss of hearing [Shortness Of Breath] : shortness of breath [Negative] : ENT [Dysphagia] : no dysphagia [Confused] : no confusion [Dizziness] : no dizziness [Insomnia] : no insomnia [FreeTextEntry6] : Using O2 3L O2, weaning [FreeTextEntry7] : with diarrhea [de-identified] : denies headaches. Sometimes feels confused

## 2021-04-19 NOTE — HISTORY OF PRESENT ILLNESS
[Former] : former [Never] : never [Wheezing] : wheezing [Nasal Passage Blockage (Stuffiness)] : edema [Fever] : fever [Nonspecific Pain, Swelling, And Stiffness] : chest pain [Cough] : coughing [Difficulty Breathing During Exertion] : dyspnea on exertion [Feelings Of Weakness On Exertion] : exercise intolerance [Continuous] : Continuous [NC] : Nasal Cannula [24 hrs] : 24 hours/day [2  -  Slight] : 2, slight [TextBox_4] : 70M DM2, HTN, CAD s/p PCI (RCA 2007), HLD, and COPD and lung cancer presenting for followup pulmonary evaluation. Today he presents for a PULMONARY TELEHEALTH VISIT with his wife after a recent COVID-19 diagnosis.\par \par He started to have myalgias in late March and was diagnosed with COVID-19. He did not have loss of smell or taste. He received MAB therapy and was given a 10 day course of Dexamethasone by Dr. Dave ([facility]). He had lab work that revealed a normal D-dimer. He was enrolled in the PREVENT-HD AC trial (Xarelto vs Placebo x 35 days). He is now doing well. He does not have any cough or sputum production. He does have a continued requirement for 3L O2 which is more than his baseline of 1L post pneumonitis treatment. He is now out of quarantine. His daughter (Leena) recently got  and he was able to walk her down the aisle, without O2, and dance a little bit which is what was most important to him. He is in good spirits today and is with his wife at home.\par \par At the time of his last visit he was being seen in followup for his pneumonitis which was diagnosed at [facility]. He completed a prolonged course of steroids. Now that he has completed Dexamethasone he is off steroids again. His FS are better controlled off steroids. His pain is overall improving. He now does have known compression fractures and has been started on bisphosphonate therapy. He is off Bactrim which was for PCP prophylaxis\par \par This pneumonitis treatment was started after a hospitalization in 2020 for shortness of breath and abnormal CT chest. He underwent bronchoscopy and transbronchial biopsy which was suggestive of inflammation and was started on Prednisone for treatment of drug induced pneumonitis. \par \par He denies chest pain or palpitations. He denies nausea, vomiting, or abdominal pain. He is slowly increasing his activity level. He is able to spend short periods of time without supplemental O2, such as showering, and saturates > 90%. He is otherwise feeling well and in good spirits. He continues on supplemental O2 but is now up to 3L with O2 sats ~ 95%. Overall he is feeling well.\par \par He is a long term former smoker but quit at the time of his lung cancer diagnosis. He has known obstructive lung disease with a bronchodilator response. He was switched from Breo to Symbicort while in the hospital. He has a Ventolin inhaler but has not used it much. \par \par He was diagnosed with small cell lung cancer 4/2019 and then started on chemotherapy and radiation. He initially underwent Bronchoscopy/EBUS with Dr. Martinez which revealed that bilateral mediastinal LN were positive for small cell carcinoma. He was started on chemotehrapy at that time in May 2019 and achieved partial response. He was then started on maintenance Atezolizumab. He was offered prophylactic cranial irradiation but declined initially. He developed small brain mets in June 2020 and received GK-SRS with Dr. Mari [facility]. He had a CT chest 8/31/2020 that showed a small stable upper lobe opacity. His repeat CT chest done 10/2020 was suggestive of pneumonitis. He had a repeat CT chest done 12/28/2020 which showed improvement.\par \par He continues to snore but is not yet ready to have an HST. He feel better on mornings where he sleeps more than 5 hours. [FreeTextEntry1] : 3 [TextBox_27] : 12/28/2020 - INTERPRETATION:  Reason for Exam: Left upper lobe lung cancer\par \par CT of the chest was performed from the thoracic inlet to the level of the adrenal glands following IV contrast injection of  50 cc of Omnipaque 350. No immediate complications were reported.\par \par Comparison: October 21, 2020\par \par Tubes/Lines: None\par \par Mediastinum and Heart: Aorta and pulmonary arteries are normal in size. No pericardial effusion. Multiple calcified lymph nodes in the mediastinum are unchanged since prior. Coronary artery calcifications are noted.. Thyroid gland appears unremarkable.\par \par Lungs, Pleura, and Airways: Again seen is a left upper lobe spiculated nodule which measures approximately 1.4 cm which is unchanged since previous exam. Previously seen areas of bilateral peribronchovascular groundglass abnormalities and consolidations have essentially resolved. Minimal residual peribronchial vascular and linear scarring with traction bronchiolectasis noted. Focal area of distortion in the right middle lobe with calcification and traction bronchiectasis is stable.\par \par Visualized Abdomen: Postcontrast appearance of the upper abdomen is unremarkable.\par \par Bones and soft tissues: Unremarkable.\par \par \par IMPRESSION:\par \par When compared with October 21, 2020:\par \par Peribronchovascular groundglass abnormality and consolidations consistent with pneumonitis has resolved.\par \par Unchanged left upper lobe 1.4 cm spiculated nodule in keeping with known malignancy. Follow-up recommended to assess for change.\par  [TextBox_42] : 3/24/21 - FINDINGS:\par CHEST:\par LUNGS AND LARGE AIRWAYS: Patent central airways. Spiculated left upper lobe lesion is slightly larger, roughly measuring 1.7 cm in comparison to the previous measurement of 1.4 cm. There has been interval development of pleural-based irregular opacities in the left lung apex and left paramediastinal upper lobe suggesting scarring/atelectasis. Multifocal bilateral reticulonodular scarring with associated traction bronchiectasis involving both lungs, moderate pulmonary emphysema and bilateral calcified granulomata are similar to the prior study.\par PLEURA: No pleural effusion.\par VESSELS: Coronary artery calcification. Unremarkable central pulmonary arteries.\par HEART: Heart size is normal. No pericardial effusion.\par MEDIASTINUM AND SANDI: No lymphadenopathy. Redemonstration of calcified mediastinal and hilar nodes.\par CHEST WALL AND LOWER NECK: Within normal limits.\par \par ABDOMEN AND PELVIS:\par LIVER: Within normal limits.\par BILE DUCTS: Normal caliber.\par GALLBLADDER: Cholelithiasis.\par SPLEEN: Within normal limits.\par PANCREAS: Within normal limits.\par ADRENALS: Approximately 1 cm sized right adrenal nodule, essentially unchanged.\par KIDNEYS/URETERS: Within normal limits.\par \par BLADDER: Mural thickening that may represent a combination of partial distention and sequela of prior chronic outlet obstruction. Probable TURP.\par REPRODUCTIVE ORGANS: Postsurgical changes, stable.\par \par BOWEL: No bowel obstruction. Appendix is normal. Uncomplicated predominantly sigmoid diverticulosis. Transverse colon lipoma demonstrating change in position and configuration indicative of fat content and a pedicle.\par PERITONEUM: No ascites.\par VESSELS: Atherosclerotic changes.\par RETROPERITONEUM/LYMPH NODES: No lymphadenopathy.\par ABDOMINAL WALL: Within normal limits.\par BONES: Increasing endplate compressions and heterogeneous density involving multiple lower thoracic and lumbar vertebrae. No significant retropulsion is seen. Possibility of osseous metastases cannot be excluded.\par \par IMPRESSION:\par Slightly increased left upper lobe spiculated lesion.\par \par Redemonstration of multifocal chronic lung disease.\par \par Increasing endplate compressions and  heterogeneous density involving the thoracolumbar vertebral bodies. Cannot exclude possibility of metastases.\par \par Additional findings as above.

## 2021-04-19 NOTE — REASON FOR VISIT
[Home] : at home, [unfilled] , at the time of the visit. [Medical Office: ([facility])___] : at the medical office located in  [Spouse] : spouse [Ad Hoc ] : provided by an ad hoc  [Verbal consent obtained from patient] : the patient, [unfilled] [Follow-Up] : a follow-up visit [Lung Cancer] : lung cancer [Emphysema] : emphysema [FreeTextEntry4] : Wife [TWNoteComboBox1] : [place] [TextBox_44] : COVID-19

## 2021-04-19 NOTE — PHYSICAL EXAM
[General Appearance - Well Nourished] : well nourished [General Appearance - In No Acute Distress] : in no acute distress [Not Anxious] : not anxious [Normal] : well developed, well nourished, in no acute distress [de-identified] : wearing nasal O2 [de-identified] : bilateral LE +1 edema [de-identified] : no facial weakness demonstrated

## 2021-04-19 NOTE — HISTORY OF PRESENT ILLNESS
[Home] : at home, [unfilled] , at the time of the visit. [Medical Office: ([facility])___] : at the medical office located in  [Verbal consent obtained from patient] : the patient, [unfilled] [FreeTextEntry1] : Mr. Ness presents for follow up. He underwent gamma knife treatment for a total of 2000 cgy of radiation to a left frontal and left parietal brain lesion on 7/1/2020. \par \par ONCOLOGY HISTORY\par He is a 69 year old male with a history of small cell lung cancer.  This was initially diagnosed in 4/2019 through an ebus of bilateral level 4 lymph nodes in 4/2019 after presenting with 6 months of worsening cough, fatigue, and insomnia. A CT chest showed a lung mass at that time. He was initially treated with carbo/etoposide/atezoluzimab starting in 5/2019 with 4 cycles completed in 7/2019, followed by atezolizumab maintenance . Consolidative RT completed in 8/2019. He elected against prophylactic whole brain radiation. \par \par Due to frequent headaches, forgetfulness, and reflexes bring worse, a brain MRI was ordered on 6/10/2020. \par \par This brain MRI revealed two brain lesions consistent with metastases. \par 1: left posterior temporal/occipital cortex,  approximately 1.4 x 1.2 cm. \par 2:  high medial left frontal cortex, approximately 0.7 x 0.6 cm Small amount of surrounding edema is identified. \par \par At the time of initial consult he endorsed occassional headaches, manageable.  Denied focal weakness, nausea, vomiting, or other complaints.  Maintains an excellent KPS.  His daughter, a physician, is serving as the  during this conversation.\par \par He was treated with GK SRS to the two lesions on 7/1/2020\par \par 9/17/2020- Mr. Ness presents today for follow up. He is seen with the assistance of [facility]  [id number]. MRI brain done 9/11/2020 showed maarked improvement and treatment response since prior exam. Previously visualized enhancing lesion in the left posterior temporal lobe is markedly decreased in size since prior exam, measuring approximately 0.6 x 0.6 cm, and previously measured 1.4 x 1.2 cm. There is no vasogenic edema surrounding this lesion. Previously visualized enhancing lesion in the high medial left frontal cortex, is markedly decreased in size since prior exam, nearly nonexistent measuring 0.1 cm, and previously measuring 0.7 x 0.6 cm. Minimal surrounding vasogenic edema remains. No new enhancing lesions are identified. \par \par He continues following with Dr. Ramirez. continues on atezoluzumab maintenance. CT chest 8/31/2020 showed Emphysema is present. Left upper lobe ill-defined nodular/linear opacity is similar compared to the prior study. Other bilateral patchy lung opacities are new from the prior study. These are indeterminate and may be infectious/inflammatory. 1 month follow-up CT needed for complete evaluation. \par \par Today he feels very well. Notes some headaches over the last week, not lasting long. Denies nausea, vomiting, focal weakness, Overall feeling well. Hearing may be a little worse recently.\par \par 12/22/2020- Mr. Ness presents today for follow up. [facility]  [id number] used for visit today.  MRI brain 12/11/2020 showed previously seen left temporal occipital lobe lesion is significantly smaller in size.  Previously seen left frontal lobe lesion is no longer visualized.  Continued follow-up is recommended.\par \par CT Chest 10/19/2020 showed widespread bilateral opacities throughout both lungs with significant interval progression since August 31, 2020 suggestive of infectious or inflammatory etiology. Findings may be due to Covid 19 pneumonia.\par \par Atezoluzumab discontinued this fall due to hospitalization with pneumonitis. Due for surveillance imaging at the end of December. Currently on 120 mg of prednisone for pneumonitis.  Today he denies headaches. Says he feels slightly confused sometimes, attributes this to steroids. Denies nausea, vomiting, focal weakness. Slight bilateral LE swelling, +1\par \par 4/13/2021- Mr. Ness presents today for follow up. He is seen through TELEHEALTH for which he provides verbal consent on 4/13/2021 at 3:19 PM. [facility]  [id number] used for visit. he underwent a follow up brain MRI on 4/12/2021. This showed Previously noted enhancing lesions are no longer identified. Continued close interval follow-up is recommended.\par He was diagnosed with covid-19 on 4/2. Has continued to follow with Dr. Ramirez. \par Today he is feeling  well. Remains on 3L O2, which he was on prior to COVID. Feels like antibody therapy helped him a lot. No headaches, no focal weakness.

## 2021-04-23 ENCOUNTER — RX RENEWAL (OUTPATIENT)
Age: 71
End: 2021-04-23

## 2021-05-05 ENCOUNTER — RX RENEWAL (OUTPATIENT)
Age: 71
End: 2021-05-05

## 2021-05-17 ENCOUNTER — OUTPATIENT (OUTPATIENT)
Dept: OUTPATIENT SERVICES | Facility: HOSPITAL | Age: 71
LOS: 1 days | End: 2021-05-17

## 2021-05-17 ENCOUNTER — APPOINTMENT (OUTPATIENT)
Dept: INTERNAL MEDICINE | Facility: CLINIC | Age: 71
End: 2021-05-17
Payer: MEDICARE

## 2021-05-17 ENCOUNTER — RESULT CHARGE (OUTPATIENT)
Age: 71
End: 2021-05-17

## 2021-05-17 VITALS
HEART RATE: 84 BPM | OXYGEN SATURATION: 97 % | DIASTOLIC BLOOD PRESSURE: 72 MMHG | RESPIRATION RATE: 15 BRPM | HEIGHT: 65 IN | BODY MASS INDEX: 29.66 KG/M2 | SYSTOLIC BLOOD PRESSURE: 128 MMHG | WEIGHT: 178 LBS

## 2021-05-17 VITALS — TEMPERATURE: 98.6 F

## 2021-05-17 DIAGNOSIS — I25.10 ATHEROSCLEROTIC HEART DISEASE OF NATIVE CORONARY ARTERY WITHOUT ANGINA PECTORIS: ICD-10-CM

## 2021-05-17 DIAGNOSIS — Z95.5 PRESENCE OF CORONARY ANGIOPLASTY IMPLANT AND GRAFT: Chronic | ICD-10-CM

## 2021-05-17 DIAGNOSIS — K12.1 OTHER FORMS OF STOMATITIS: ICD-10-CM

## 2021-05-17 DIAGNOSIS — M81.0 AGE-RELATED OSTEOPOROSIS WITHOUT CURRENT PATHOLOGICAL FRACTURE: ICD-10-CM

## 2021-05-17 DIAGNOSIS — N20.0 CALCULUS OF KIDNEY: Chronic | ICD-10-CM

## 2021-05-17 DIAGNOSIS — D11.0 BENIGN NEOPLASM OF PAROTID GLAND: Chronic | ICD-10-CM

## 2021-05-17 DIAGNOSIS — E83.42 HYPOMAGNESEMIA: ICD-10-CM

## 2021-05-17 DIAGNOSIS — I10 ESSENTIAL (PRIMARY) HYPERTENSION: ICD-10-CM

## 2021-05-17 DIAGNOSIS — R60.0 LOCALIZED EDEMA: ICD-10-CM

## 2021-05-17 DIAGNOSIS — E11.65 TYPE 2 DIABETES MELLITUS WITH HYPERGLYCEMIA: ICD-10-CM

## 2021-05-17 DIAGNOSIS — K29.70 GASTRITIS, UNSPECIFIED, WITHOUT BLEEDING: ICD-10-CM

## 2021-05-17 DIAGNOSIS — Z90.89 ACQUIRED ABSENCE OF OTHER ORGANS: Chronic | ICD-10-CM

## 2021-05-17 LAB — GLUCOSE BLDC GLUCOMTR-MCNC: 85

## 2021-05-17 PROCEDURE — 99214 OFFICE O/P EST MOD 30 MIN: CPT

## 2021-05-17 RX ORDER — LIDOCAINE HYDROCHLORIDE 20 MG/ML
2 SOLUTION ORAL; TOPICAL
Qty: 100 | Refills: 5 | Status: DISCONTINUED | COMMUNITY
Start: 2019-08-21 | End: 2021-05-17

## 2021-05-17 RX ORDER — LIDOCAINE 5% 700 MG/1
5 PATCH TOPICAL
Qty: 1 | Refills: 3 | Status: DISCONTINUED | COMMUNITY
Start: 2021-01-20 | End: 2021-05-17

## 2021-05-17 RX ORDER — LANCETS 30 GAUGE
EACH MISCELLANEOUS
Qty: 3 | Refills: 3 | Status: DISCONTINUED | COMMUNITY
Start: 2020-11-10 | End: 2021-05-17

## 2021-05-17 RX ORDER — RIVAROXABAN 10 MG/1
10 TABLET, FILM COATED ORAL
Refills: 0 | Status: DISCONTINUED | COMMUNITY
Start: 2021-04-13 | End: 2021-05-17

## 2021-05-17 RX ORDER — LIDOCAINE HYDROCHLORIDE 20 MG/ML
2 SOLUTION ORAL; TOPICAL
Qty: 1 | Refills: 1 | Status: DISCONTINUED | COMMUNITY
Start: 2020-12-14 | End: 2021-05-17

## 2021-05-17 RX ORDER — BETAMETHASONE DIPROPIONATE 0.5 MG/G
0.05 OINTMENT, AUGMENTED TOPICAL TWICE DAILY
Qty: 1 | Refills: 5 | Status: DISCONTINUED | COMMUNITY
Start: 2020-09-23 | End: 2021-05-17

## 2021-05-17 RX ORDER — INSULIN ASPART 100 [IU]/ML
100 INJECTION, SOLUTION INTRAVENOUS; SUBCUTANEOUS
Qty: 40 | Refills: 0 | Status: DISCONTINUED | COMMUNITY
Start: 2020-12-23 | End: 2021-05-17

## 2021-05-17 RX ORDER — CLOBETASOL PROPIONATE 0.05 G/100ML
0.05 SHAMPOO TOPICAL DAILY
Qty: 1 | Refills: 2 | Status: DISCONTINUED | COMMUNITY
Start: 2020-12-11 | End: 2021-05-17

## 2021-05-17 NOTE — PHYSICAL EXAM
[No Acute Distress] : no acute distress [Well Nourished] : well nourished [Well Developed] : well developed [Normal Sclera/Conjunctiva] : normal sclera/conjunctiva [EOMI] : extraocular movements intact [No JVD] : no jugular venous distention [No Lymphadenopathy] : no lymphadenopathy [No Respiratory Distress] : no respiratory distress  [No Accessory Muscle Use] : no accessory muscle use [Clear to Auscultation] : lungs were clear to auscultation bilaterally [Normal Rate] : normal rate  [Regular Rhythm] : with a regular rhythm [Normal S1, S2] : normal S1 and S2 [Grossly Normal Strength/Tone] : grossly normal strength/tone [Coordination Grossly Intact] : coordination grossly intact [Speech Grossly Normal] : speech grossly normal [Memory Grossly Normal] : memory grossly normal [Normal Insight/Judgement] : insight and judgment were intact [de-identified] : Wearing O2 via nasal cannula, at 2 L/min.  [de-identified] : Well-healed parotidectomy scars bilaterally [de-identified] : 1+ bilateral lower extremity edema; legs and feet are warm and well perfused. [de-identified] : Hyperpigmented macules and patches along bilateral lower extremities without any erythema, scaling, lichenification, or warmth [de-identified] : Shows no difficulty getting off examination table and walking with his Rollator up the hallway and back

## 2021-05-17 NOTE — HISTORY OF PRESENT ILLNESS
[FreeTextEntry1] : Follow-up chronic medical conditions [de-identified] : Visit conducted in [place] by clinician. Patient is here today with his wife. \par Mr. Ness is a 70 year old man with h/o hypertension, type 2 diabetes mellitus, hyperlipidemia, benign prostatic hypertrophy s/p TURP with erectile dysfunction, gastritis, hypomagnesemia, coronary artery disease s/p RCA stenting in 2007, former smoker (quit 2019) with COPD and metastatic SCLC (dx spring 2019) s/p carboplatin/etoposide and radiation with partial response, then on atezolizumab c/b pneumonitis in 10/2020 treated with high dose steroids c/b vertebral compression fractures, with small brain metastases 6/2020 s/p gamma knife radiation therapy in 7/2020 who presents for follow up of chronic medical conditions. \fern Was tapered off steroids for pneumonitis by 3/7/2021, and stopped Bactrim prophylaxis at that time.  Repeat CT chest showed resolution of prior opacities and remained off atezolizumab.\fern Tested COVID positive on 3/29/21 and received monoclonal Ab infusion and given decadron 6 mg for 10 days by [facility] program; completed 35d participation in PREVENT-HD rivaroxaban trial. His symptoms improved, but as before, he remains on O2 supplementation. Had already received one dose of COVID vaccine prior to his illness. Reports since COVID course he has had a little more fatigue. Also with some sadness and anxiety about his health. \par He started taking alendronate 70 mg in January 2021, and he is tolerating this medication which he takes weekly on Mondays.  Interim MRI spine did not show metastatic disease.  Saw PMR and was given back brace and referred for physical therapy and also saw neurosurgery and ortho spine; kyphoplasty option is on the table but his pain is well controlled at this time.  His pain is controlled with Tylenol.  Not using lidocaine patches as they do not adhere well to him.  Reports his pain today is 2 out of 10 with 2 extra strength Tylenol.  He does still have 1 pill of oxycodone left from his prior prescription which he is saving in case of severe pain.  He is able to walk 4 blocks without symptoms using his Rollator. Denies any falls. Using O2 via nasal cannula at 2 L/min.  Not using oxygen at nighttime, O2 sat at night around 90%.  Using Symbicort, and rarely as needed Ventolin.  Following with pulmonary for COPD, history of pneumonitis.\par His hemoglobin A1c was 6.3% on 3/24/2021, down from 7.9% on 12/30/2020, has a continuous glucose monitor.  Currently on Basaglar 16 units nightly with NovoLog 16 units before every meal and Metformin  mg 2 tabs twice daily - was started on Metformin  mg twice daily on 3/24 but had diarrhea to this; now reports slightly looser stools once or twice a week maximum which he tolerates well, very occasionally will use Imodium OTC.  Denies any significant hypoglycemia, lowest a few days ago was 65, he felt slightly cold, and ate some candy and his sugars came up to 75, then 97, then 137.  Does not take NovoLog when he does not eat.  Has a reasonably good appetite. No trouble taking his atorvastatin, no symptoms with niacin, also taking fish oil 1 g daily.  Denies any chest pain.  Has noted that for a few months he has had lower extremity edema bilaterally and expected to improve after being tapered off steroids but has continued.  Does not elevate legs, does not wear compression stockings.  Last TTE in 2011 with LVH and normal LVEF.\par Reports his gastritis pain completely resolved after increasing famotidine 20 mg to twice daily from daily on 4/6/2021.  Reports no leg or finger cramping which were his prior symptoms of hypomagnesemia.  His last magnesium level was 2.0 on 7/29/2020.  He is taking mag oxide 400/800/400.  His mouth sores have completely resolved.  The rashes on his legs also improved but left slightly hyperpigmented scars.  Denies any scalp flaking recently.\par Overall, reports that he is feeling better and is going for walks, watching Netflix, spending time in his garden.

## 2021-05-17 NOTE — ASSESSMENT
[FreeTextEntry1] : Lower extremity edema: Uncontrolled. May be due to prior steroid use, venous insufficiency, HF -encouraged to wear compression stockings when he is walking around, elevate legs at nighttime.  No signs of hyperpigmentation at ankles or any calf tenderness.  Will obtain TTE, last was in 2011 and he has a history of CAD status post MI with PCI as well as LVH.\par \par Type 2 diabetes: Controlled.  A1c at goal in late March continue Basaglar 16 units nightly with NovoLog 16 units before meals and Metformin ER 1000 mg twice daily.  Ophthalmology last in January 2021; foot examination done on 3/24/2021 and was normal per endocrine note.  On atorvastatin 80 mg nightly (last lipids 1/6/2021) and lisinopril 20 mg daily (last UAC 46 on 1/6/2021; last serum creatinine 0.79 on 4/5/2021). \par \par Hypertension: Controlled.  Continue lisinopril 20 mg daily, metoprolol succinate 50 mg daily.  Consider if elevated blood pressure in the future increasing his lisinopril given his UAC> 30. \par \par Osteoporosis: Stable.  Osteopenic  level T score on DEXA with vertebral compression fractures in setting of high dose prolonged steroid use.  Now off steroids.  Started alendronate 70 mg weekly in January 2021.  We discussed avoidance of falls.  He is using Rollator.  His pain is controlled with Tylenol as needed.  We discussed repeat DEXA in January 2022 to assess improvement.\par \par Gastritis: Well-controlled.  Continue famotidine 20 mg twice daily. \par \par Hypomagnesemia: No recent symptoms.  Last magnesium level was normal on 7/29/2020.  We will repeat level at this time.  He prefers to combine his blood draw with his next oncology visit, requisition was printed and handed to patient.\par \par Mouth sores: Resolved\par \par Rash on legs: Resolved. No active lesions, hyperpigmentation is scarring from prior rash, this was explained to the patient and he was reassured.  No need to continue topical betamethasone at this time.\par \par Healthcare maintenance: His second dose of the Covid vaccine will wait 90 days after his monoclonal antibody therapy, would be due in July. Requested refills sent to [facility] pharmacy on file in [place]. \par \par RTO 3 months - has onc, endocrine and f/u surveillance imaging scheduled - encouraged to make f/u appt with Dr. Guy as well for his back. He is opting against kyphoplasty at this time but is aware that if he were to schedule it, would need preop medical appt before surgery.

## 2021-05-31 ENCOUNTER — TRANSCRIPTION ENCOUNTER (OUTPATIENT)
Age: 71
End: 2021-05-31

## 2021-06-11 ENCOUNTER — OUTPATIENT (OUTPATIENT)
Dept: OUTPATIENT SERVICES | Facility: HOSPITAL | Age: 71
LOS: 1 days | End: 2021-06-11
Payer: COMMERCIAL

## 2021-06-11 ENCOUNTER — APPOINTMENT (OUTPATIENT)
Dept: CT IMAGING | Facility: CLINIC | Age: 71
End: 2021-06-11
Payer: MEDICARE

## 2021-06-11 DIAGNOSIS — C34.12 MALIGNANT NEOPLASM OF UPPER LOBE, LEFT BRONCHUS OR LUNG: ICD-10-CM

## 2021-06-11 DIAGNOSIS — Z90.89 ACQUIRED ABSENCE OF OTHER ORGANS: Chronic | ICD-10-CM

## 2021-06-11 DIAGNOSIS — D11.0 BENIGN NEOPLASM OF PAROTID GLAND: Chronic | ICD-10-CM

## 2021-06-11 DIAGNOSIS — N20.0 CALCULUS OF KIDNEY: Chronic | ICD-10-CM

## 2021-06-11 DIAGNOSIS — Z95.5 PRESENCE OF CORONARY ANGIOPLASTY IMPLANT AND GRAFT: Chronic | ICD-10-CM

## 2021-06-11 PROCEDURE — 71260 CT THORAX DX C+: CPT | Mod: 26

## 2021-06-11 PROCEDURE — 74160 CT ABDOMEN W/CONTRAST: CPT | Mod: 26

## 2021-06-11 PROCEDURE — 74160 CT ABDOMEN W/CONTRAST: CPT

## 2021-06-11 PROCEDURE — 71260 CT THORAX DX C+: CPT

## 2021-06-14 ENCOUNTER — APPOINTMENT (OUTPATIENT)
Dept: PHYSICAL MEDICINE AND REHAB | Facility: CLINIC | Age: 71
End: 2021-06-14
Payer: MEDICARE

## 2021-06-14 ENCOUNTER — APPOINTMENT (OUTPATIENT)
Dept: CARDIOLOGY | Facility: CLINIC | Age: 71
End: 2021-06-14
Payer: MEDICARE

## 2021-06-14 VITALS
HEIGHT: 62 IN | HEART RATE: 88 BPM | BODY MASS INDEX: 31.28 KG/M2 | OXYGEN SATURATION: 97 % | TEMPERATURE: 98 F | WEIGHT: 170 LBS | RESPIRATION RATE: 14 BRPM | SYSTOLIC BLOOD PRESSURE: 131 MMHG | DIASTOLIC BLOOD PRESSURE: 75 MMHG

## 2021-06-14 PROCEDURE — 93306 TTE W/DOPPLER COMPLETE: CPT

## 2021-06-14 PROCEDURE — 99213 OFFICE O/P EST LOW 20 MIN: CPT

## 2021-06-14 NOTE — PHYSICAL EXAM
[FreeTextEntry1] : PE:\par Constitutional: In NAD, calm and cooperative\par HEENT: NCAT, Anicteric sclera, no lid-lag\par Cardio: Extremities appear pink and well perfused, no peripheral edema\par Respiratory: Normal respiratory effort on room air, no accessory muscle use\par Skin: no rashes seen on exposed skin, no visible abrasions\par Psych: Normal affect, intact judgment and insight\par Neuro: Awake, alert and oriented x 3, see below for focused neurological exam\par MSK (Back)\par 	Inspection: no gross swelling identified\par 	Palpation:No Tenderness of the bilateral lower thoracic/upper lumbar paraspinals\par 	Strength: 5/5 strength in bilateral lower extremities\par 	Reflexes: 2+ Patella reflex bilaterally, 2+ Achilles reflex bilaterally, negative clonus bilaterally\par 	Sensation: Intact to light touch in bilateral lower extremities\par Special tests:\par SLR:negative bilaterally\par ELIZABETH:negative bilaterally\par FADIR: negative bilaterally

## 2021-06-14 NOTE — ASSESSMENT
[FreeTextEntry1] : Mr. EVER GOYAL is a 70 year old male with a history of metastatic cancer who presented with acute on chronic low back pain, likely due to osteoporotic compression fractures, as well as lumbar stenosis and spondylosis, much improved over time.Denies any red flag signs at this time. Will recommend:\par - Patient to continue wearing supportive brace but only when walking as I advised him that it can weaken his abdominal muscles\par - Will give new Rx for PT for help strengthen his core. \par \par Follow up as needed.  Patient educated on red flag signs including any changes to their bowel/bladder control, groin numbness or new weakness. Patient knows to seek immediate attention by calling 911 or going to nearest ER if these symptoms appear. \par \par

## 2021-06-14 NOTE — HISTORY OF PRESENT ILLNESS
[FreeTextEntry1] : Mr. EVER CHAVEZ is a 70 year old  male who presents for follow up. At last visit, patient was referred to Dr. Guy for his compression fx, referred to Ortho spine and given a short course of Oxycodone for pain given his fractures and hx of metatstatic cancer. Overall he is doing much better, denies any new symptoms, with his pain under good control. He got a lot of pain relief from PT and his TLSO. Now just wearing supportive back brace while walking. \par \par Location:Low back\par Onset:Chronic for years, but in early 1/2021it got worse but does not remember any specific triggering event\par Provocation/Palliative:Worse with laying down flat, activity/better somewhat sitting\par Quality:Achy Sharp\par Radiation:Across his upper lumbar spine bilaterally\par Severity:1/10 today\par Timing:Varies day to day\par \par No bowel/bladder changes. No groin numbness.

## 2021-06-15 ENCOUNTER — OUTPATIENT (OUTPATIENT)
Dept: OUTPATIENT SERVICES | Facility: HOSPITAL | Age: 71
LOS: 1 days | Discharge: HOME | End: 2021-06-15

## 2021-06-15 ENCOUNTER — TRANSCRIPTION ENCOUNTER (OUTPATIENT)
Age: 71
End: 2021-06-15

## 2021-06-15 DIAGNOSIS — Z90.89 ACQUIRED ABSENCE OF OTHER ORGANS: Chronic | ICD-10-CM

## 2021-06-15 DIAGNOSIS — Z95.5 PRESENCE OF CORONARY ANGIOPLASTY IMPLANT AND GRAFT: Chronic | ICD-10-CM

## 2021-06-15 DIAGNOSIS — N20.0 CALCULUS OF KIDNEY: Chronic | ICD-10-CM

## 2021-06-15 DIAGNOSIS — D11.0 BENIGN NEOPLASM OF PAROTID GLAND: Chronic | ICD-10-CM

## 2021-06-15 DIAGNOSIS — C34.90 MALIGNANT NEOPLASM OF UNSPECIFIED PART OF UNSPECIFIED BRONCHUS OR LUNG: ICD-10-CM

## 2021-06-16 ENCOUNTER — APPOINTMENT (OUTPATIENT)
Dept: HEMATOLOGY ONCOLOGY | Facility: CLINIC | Age: 71
End: 2021-06-16
Payer: MEDICARE

## 2021-06-16 VITALS
HEIGHT: 64.17 IN | DIASTOLIC BLOOD PRESSURE: 83 MMHG | SYSTOLIC BLOOD PRESSURE: 149 MMHG | BODY MASS INDEX: 30.41 KG/M2 | WEIGHT: 178.13 LBS | RESPIRATION RATE: 14 BRPM | HEART RATE: 102 BPM | OXYGEN SATURATION: 95 % | TEMPERATURE: 97.9 F

## 2021-06-16 PROCEDURE — 99214 OFFICE O/P EST MOD 30 MIN: CPT

## 2021-06-29 ENCOUNTER — NON-APPOINTMENT (OUTPATIENT)
Age: 71
End: 2021-06-29

## 2021-06-29 ENCOUNTER — EMERGENCY (EMERGENCY)
Facility: HOSPITAL | Age: 71
LOS: 0 days | Discharge: HOME | End: 2021-06-29
Attending: STUDENT IN AN ORGANIZED HEALTH CARE EDUCATION/TRAINING PROGRAM
Payer: MEDICARE

## 2021-06-29 VITALS
HEART RATE: 61 BPM | DIASTOLIC BLOOD PRESSURE: 66 MMHG | SYSTOLIC BLOOD PRESSURE: 152 MMHG | OXYGEN SATURATION: 94 % | RESPIRATION RATE: 24 BRPM

## 2021-06-29 VITALS — HEIGHT: 64 IN | WEIGHT: 177.91 LBS

## 2021-06-29 DIAGNOSIS — Z95.5 PRESENCE OF CORONARY ANGIOPLASTY IMPLANT AND GRAFT: ICD-10-CM

## 2021-06-29 DIAGNOSIS — I10 ESSENTIAL (PRIMARY) HYPERTENSION: ICD-10-CM

## 2021-06-29 DIAGNOSIS — N20.0 CALCULUS OF KIDNEY: Chronic | ICD-10-CM

## 2021-06-29 DIAGNOSIS — Z87.442 PERSONAL HISTORY OF URINARY CALCULI: ICD-10-CM

## 2021-06-29 DIAGNOSIS — Z90.89 ACQUIRED ABSENCE OF OTHER ORGANS: Chronic | ICD-10-CM

## 2021-06-29 DIAGNOSIS — Z85.118 PERSONAL HISTORY OF OTHER MALIGNANT NEOPLASM OF BRONCHUS AND LUNG: ICD-10-CM

## 2021-06-29 DIAGNOSIS — E11.9 TYPE 2 DIABETES MELLITUS WITHOUT COMPLICATIONS: ICD-10-CM

## 2021-06-29 DIAGNOSIS — Z79.4 LONG TERM (CURRENT) USE OF INSULIN: ICD-10-CM

## 2021-06-29 DIAGNOSIS — Z79.82 LONG TERM (CURRENT) USE OF ASPIRIN: ICD-10-CM

## 2021-06-29 DIAGNOSIS — I25.10 ATHEROSCLEROTIC HEART DISEASE OF NATIVE CORONARY ARTERY WITHOUT ANGINA PECTORIS: ICD-10-CM

## 2021-06-29 DIAGNOSIS — M54.5 LOW BACK PAIN: ICD-10-CM

## 2021-06-29 DIAGNOSIS — E78.5 HYPERLIPIDEMIA, UNSPECIFIED: ICD-10-CM

## 2021-06-29 DIAGNOSIS — Z79.899 OTHER LONG TERM (CURRENT) DRUG THERAPY: ICD-10-CM

## 2021-06-29 DIAGNOSIS — D11.0 BENIGN NEOPLASM OF PAROTID GLAND: ICD-10-CM

## 2021-06-29 DIAGNOSIS — Z95.5 PRESENCE OF CORONARY ANGIOPLASTY IMPLANT AND GRAFT: Chronic | ICD-10-CM

## 2021-06-29 DIAGNOSIS — N40.0 BENIGN PROSTATIC HYPERPLASIA WITHOUT LOWER URINARY TRACT SYMPTOMS: ICD-10-CM

## 2021-06-29 DIAGNOSIS — D11.0 BENIGN NEOPLASM OF PAROTID GLAND: Chronic | ICD-10-CM

## 2021-06-29 PROCEDURE — 72131 CT LUMBAR SPINE W/O DYE: CPT

## 2021-06-29 PROCEDURE — 93005 ELECTROCARDIOGRAM TRACING: CPT

## 2021-06-29 PROCEDURE — 99284 EMERGENCY DEPT VISIT MOD MDM: CPT | Mod: 25

## 2021-06-29 PROCEDURE — 72131 CT LUMBAR SPINE W/O DYE: CPT | Mod: 26,MG

## 2021-06-29 PROCEDURE — 99284 EMERGENCY DEPT VISIT MOD MDM: CPT

## 2021-06-29 PROCEDURE — 93010 ELECTROCARDIOGRAM REPORT: CPT

## 2021-06-29 PROCEDURE — G1004: CPT

## 2021-06-29 RX ORDER — OXYCODONE AND ACETAMINOPHEN 5; 325 MG/1; MG/1
1 TABLET ORAL
Qty: 8 | Refills: 0
Start: 2021-06-29 | End: 2021-06-30

## 2021-06-29 RX ORDER — LIDOCAINE 4 G/100G
1 CREAM TOPICAL ONCE
Refills: 0 | Status: COMPLETED | OUTPATIENT
Start: 2021-06-29 | End: 2021-06-29

## 2021-06-29 RX ORDER — LIDOCAINE 4 G/100G
1 CREAM TOPICAL
Qty: 10 | Refills: 0
Start: 2021-06-29 | End: 2021-07-08

## 2021-06-29 RX ORDER — ACETAMINOPHEN 500 MG
975 TABLET ORAL ONCE
Refills: 0 | Status: COMPLETED | OUTPATIENT
Start: 2021-06-29 | End: 2021-06-29

## 2021-06-29 RX ORDER — CYCLOBENZAPRINE HYDROCHLORIDE 10 MG/1
1 TABLET, FILM COATED ORAL
Qty: 9 | Refills: 0
Start: 2021-06-29 | End: 2021-07-01

## 2021-06-29 RX ORDER — OXYCODONE AND ACETAMINOPHEN 5; 325 MG/1; MG/1
1 TABLET ORAL ONCE
Refills: 0 | Status: DISCONTINUED | OUTPATIENT
Start: 2021-06-29 | End: 2021-06-29

## 2021-06-29 RX ADMIN — OXYCODONE AND ACETAMINOPHEN 1 TABLET(S): 5; 325 TABLET ORAL at 11:55

## 2021-06-29 RX ADMIN — LIDOCAINE 1 PATCH: 4 CREAM TOPICAL at 10:57

## 2021-06-29 NOTE — ED PROVIDER NOTE - OBJECTIVE STATEMENT
71 y/o M with PMHx of HTN, HLD, DM2, BPH s/p TURP, CAD s/p stent 2007, COPD, Small cell Lung Ca (dx spring 2019) s/p carboplatin/etoposide and radiation with partial response then placed on maintenance atezolizumab immunotherapy every 3 weeks (c/b brain metastases now s/p gamma knife radiation therapy with improved brain MRI), Osteoporosis, presents to the ED c/o lower back pain s/p fall on 06/25. Pt tripped over his dog and landed on his knees. Denies head strike. Wife states pt had mild pain after fall but reports on 06/26 he stood up with severe low back pain. On blood thinners. Wife called doctor but the next available appointment is 07/08 so came to the ED for eval. 71 y/o M with PMHx of HTN, HLD, DM2, BPH s/p TURP, CAD s/p stent 2007, COPD, Small cell Lung Ca (dx spring 2019) s/p carboplatin/etoposide and radiation with partial response then placed on maintenance atezolizumab immunotherapy every 3 weeks (c/b brain metastases now s/p gamma knife radiation therapy with improved brain MRI), Osteoporosis, presents to the ED c/o lower back pain s/p fall on 06/25. Pt tripped over his dog and landed on his knees. Denies head strike. Pt had mild pain after fall but reports the next day he stood up with severe low back pain. On blood thinners. Wife called doctor but the next available appointment is 07/08 so came to the ED for eval. 71 y/o M with PMHx of HTN, HLD, DM2, BPH s/p TURP, CAD s/p stent 2007, COPD, Small cell Lung Ca (dx spring 2019) s/p carboplatin/etoposide and radiation with partial response then placed on maintenance atezolizumab immunotherapy every 3 weeks (c/b brain metastases now s/p gamma knife radiation therapy with improved brain MRI), Osteoporosis, presents to the ED c/o lower back pain s/p fall on 06/25. Pt tripped over his dog and landed on his knees. Denies head strike. Pt had mild pain after fall but reports the next day he stood up with severe low back pain. On blood thinners. Wife called MD but the next available appointment is 07/08 so came to the ED for eval.

## 2021-06-29 NOTE — ED PROVIDER NOTE - PROGRESS NOTE DETAILS
Gustavo ZARAGOZA for Dr. Burr: #: [phone] all dc instructions given to pt and wife at beside, pt already has appointment with spine specialist and comfortable to be dc home Gustavo Burr: pt ambulating with walker with steady gait, perla dao.

## 2021-06-29 NOTE — ED ADULT NURSE NOTE - CAS ELECT INFOMATION PROVIDED
Bed: 09  Expected date:   Expected time:   Means of arrival:   Comments:  Luis Galvan RN  04/20/21 0237 DC instructions

## 2021-06-29 NOTE — ED ADULT NURSE NOTE - OBJECTIVE STATEMENT
Pt presents to er with complaints of falling onto his knees after tripping over his dog at home, pt reports he has lung cancer and is on home o2, pt denies chest pain, no change in breathing pattern as per pt/wife at bedside, respirations unlabored, safety maintained, will continue to monitor.

## 2021-06-29 NOTE — ED ADULT NURSE NOTE - NS_ED_NURSE_TEACHING_TOPIC_ED_A_ED
Pt educated on follow up with orthopedics specialist on July 8th corie scheduled for, pt reports decrease of pain and observed ambulatory to wheelchair without difficulty and spinal brace in place with home o2 on 2L NC at time of d/c home./Orthopedic

## 2021-06-29 NOTE — ED ADULT NURSE REASSESSMENT NOTE - NS ED NURSE REASSESS COMMENT FT1
Pt able to ambulate with rolling walker without difficulty at this time, states the percocet helped him and wants to go home,  aware.

## 2021-06-29 NOTE — ED ADULT TRIAGE NOTE - CHIEF COMPLAINT QUOTE
pt presents to ED c/o lower back pain. states he fell on Friday, + blood thinners. denies head strike. pain since fall. + home 02 w/ hx of Lung CA. not actively on chemo.

## 2021-06-29 NOTE — ED PROVIDER NOTE - NS ED MD DISPO DISCHARGE CCDA
Refill request for     morphine ER (MATTHEW) 20 MG 24 hr capsule 120 capsule 0 9/11/2018     Sig - Route: Take 2 capsules by mouth 2 times daily. - Oral      butalbital-APAP-caffeine (FIORICET) -40 MG per tablet 180 tablet 0 9/12/2018     Sig - Route: Take 2 tablets by mouth every 8 hours as needed for Pain. MUST LAST 30 DAYS - Oral      Last seen 8-8-18  No fuv scheduled    PDMP reviewed; no aberrant behavior identified. Patient/Caregiver provided printed discharge information.

## 2021-07-08 ENCOUNTER — APPOINTMENT (OUTPATIENT)
Dept: PHYSICAL MEDICINE AND REHAB | Facility: CLINIC | Age: 71
End: 2021-07-08
Payer: MEDICARE

## 2021-07-08 VITALS
BODY MASS INDEX: 29.02 KG/M2 | HEART RATE: 80 BPM | HEIGHT: 64 IN | WEIGHT: 170 LBS | RESPIRATION RATE: 14 BRPM | DIASTOLIC BLOOD PRESSURE: 80 MMHG | TEMPERATURE: 97 F | SYSTOLIC BLOOD PRESSURE: 122 MMHG

## 2021-07-08 PROCEDURE — 99214 OFFICE O/P EST MOD 30 MIN: CPT

## 2021-07-09 NOTE — ASSESSMENT
[FreeTextEntry1] : Mr. EVER GOYAL is a 70 year old male with a history of metastatic cancer who presented with acute on chronic low back pain, likely due to osteoporotic compression fractures, as well as lumbar stenosis and spondylosis, much improved over time but then had recent fall, worsening his pain. New CT L Spine did not reveal any new fractures. Patient's pain has improved but still occasionally having pain significant enough for an oxycodone or tylenol. Denies any red flag signs at this time. Will recommend:\par - Patient to continue wearing supportive brace but only when walking as I advised him that it can weaken his abdominal muscles\par - Will give Oxycodone 5mg BID PRN x 1 week, #14. I-STOP checked, #[id number]. He will try to stick with only Tylenol. Patient educated on use of currently prescribed medication, about the intended use, expected outcomes, potential side effects and addictive potential. Was also advised not to take with any other sedatives, including alcohol and/or benzodiazepines (unless otherwise prescribed for psych disorder and benefits outweigh risks) and not to drive and/or operate heavy machinery while under the influence. \par - He will continue occasional Cyclobenzaprine QHs, rarely needs to take. Patient aware of side effects including sedation. \par \par Follow up as needed.  Patient educated on red flag signs including any changes to their bowel/bladder control, groin numbness or new weakness. Patient knows to seek immediate attention by calling 911 or going to nearest ER if these symptoms appear.

## 2021-07-09 NOTE — HISTORY OF PRESENT ILLNESS
[FreeTextEntry1] : Mr. EVER CHAVEZ is a 70 year old  male who presents for follow up. Since last visit, he had a fall onto his knees, for which he went to the ED. CT L Spine in ED was negative for new fractures. He tripped over his dog. Denies any knee pain. Back pain has improved but still taking occasional oxycodone/tylenol. \par \par Location:Low back\par Onset:Chronic for years, but in early 1/2021 it got worse but does not remember any specific triggering event\par Provocation/Palliative:Worse with laying down flat, activity/better somewhat sitting\par Quality:Achy Sharp\par Radiation:Across his upper lumbar spine bilaterally\par Severity:1-2/10 today\par Timing:Varies day to day\par \par No bowel/bladder changes. No groin numbness.

## 2021-07-15 ENCOUNTER — APPOINTMENT (OUTPATIENT)
Dept: MRI IMAGING | Facility: IMAGING CENTER | Age: 71
End: 2021-07-15
Payer: MEDICARE

## 2021-07-15 ENCOUNTER — OUTPATIENT (OUTPATIENT)
Dept: OUTPATIENT SERVICES | Facility: HOSPITAL | Age: 71
LOS: 1 days | End: 2021-07-15
Payer: MEDICARE

## 2021-07-15 DIAGNOSIS — Z95.5 PRESENCE OF CORONARY ANGIOPLASTY IMPLANT AND GRAFT: Chronic | ICD-10-CM

## 2021-07-15 DIAGNOSIS — N20.0 CALCULUS OF KIDNEY: Chronic | ICD-10-CM

## 2021-07-15 DIAGNOSIS — Z00.8 ENCOUNTER FOR OTHER GENERAL EXAMINATION: ICD-10-CM

## 2021-07-15 DIAGNOSIS — D11.0 BENIGN NEOPLASM OF PAROTID GLAND: Chronic | ICD-10-CM

## 2021-07-15 DIAGNOSIS — C79.31 SECONDARY MALIGNANT NEOPLASM OF BRAIN: ICD-10-CM

## 2021-07-15 DIAGNOSIS — Z90.89 ACQUIRED ABSENCE OF OTHER ORGANS: Chronic | ICD-10-CM

## 2021-07-15 PROCEDURE — 70553 MRI BRAIN STEM W/O & W/DYE: CPT | Mod: 26

## 2021-07-15 PROCEDURE — A9585: CPT

## 2021-07-15 PROCEDURE — 70553 MRI BRAIN STEM W/O & W/DYE: CPT

## 2021-07-16 ENCOUNTER — APPOINTMENT (OUTPATIENT)
Dept: INTERNAL MEDICINE | Facility: CLINIC | Age: 71
End: 2021-07-16
Payer: MEDICARE

## 2021-07-16 VITALS
TEMPERATURE: 97.1 F | OXYGEN SATURATION: 95 % | DIASTOLIC BLOOD PRESSURE: 70 MMHG | BODY MASS INDEX: 30.56 KG/M2 | RESPIRATION RATE: 16 BRPM | SYSTOLIC BLOOD PRESSURE: 124 MMHG | WEIGHT: 179 LBS | HEART RATE: 98 BPM | HEIGHT: 64 IN

## 2021-07-16 DIAGNOSIS — U07.1 COVID-19: ICD-10-CM

## 2021-07-16 DIAGNOSIS — Z87.891 PERSONAL HISTORY OF NICOTINE DEPENDENCE: ICD-10-CM

## 2021-07-16 DIAGNOSIS — Z86.010 PERSONAL HISTORY OF COLONIC POLYPS: ICD-10-CM

## 2021-07-16 DIAGNOSIS — N52.9 MALE ERECTILE DYSFUNCTION, UNSPECIFIED: ICD-10-CM

## 2021-07-16 DIAGNOSIS — Z92.29 PERSONAL HISTORY OF OTHER DRUG THERAPY: ICD-10-CM

## 2021-07-16 DIAGNOSIS — K20.90 ESOPHAGITIS, UNSPECIFIED WITHOUT BLEEDING: ICD-10-CM

## 2021-07-16 DIAGNOSIS — K64.8 OTHER HEMORRHOIDS: ICD-10-CM

## 2021-07-16 DIAGNOSIS — K12.1 OTHER FORMS OF STOMATITIS: ICD-10-CM

## 2021-07-16 DIAGNOSIS — Z87.19 PERSONAL HISTORY OF OTHER DISEASES OF THE DIGESTIVE SYSTEM: ICD-10-CM

## 2021-07-16 DIAGNOSIS — M79.89 OTHER SPECIFIED SOFT TISSUE DISORDERS: ICD-10-CM

## 2021-07-16 DIAGNOSIS — M89.9 DISORDER OF BONE, UNSPECIFIED: ICD-10-CM

## 2021-07-16 DIAGNOSIS — Z86.19 PERSONAL HISTORY OF OTHER INFECTIOUS AND PARASITIC DISEASES: ICD-10-CM

## 2021-07-16 DIAGNOSIS — R25.2 CRAMP AND SPASM: ICD-10-CM

## 2021-07-16 DIAGNOSIS — M65.9 SYNOVITIS AND TENOSYNOVITIS, UNSPECIFIED: ICD-10-CM

## 2021-07-16 DIAGNOSIS — Z01.818 ENCOUNTER FOR OTHER PREPROCEDURAL EXAMINATION: ICD-10-CM

## 2021-07-16 DIAGNOSIS — R91.1 SOLITARY PULMONARY NODULE: ICD-10-CM

## 2021-07-16 DIAGNOSIS — Z86.79 PERSONAL HISTORY OF OTHER DISEASES OF THE CIRCULATORY SYSTEM: ICD-10-CM

## 2021-07-16 DIAGNOSIS — R51.9 HEADACHE, UNSPECIFIED: ICD-10-CM

## 2021-07-16 DIAGNOSIS — J18.9 PNEUMONIA, UNSPECIFIED ORGANISM: ICD-10-CM

## 2021-07-16 DIAGNOSIS — K57.90 DIVERTICULOSIS OF INTESTINE, PART UNSPECIFIED, W/OUT PERFORATION OR ABSCESS W/OUT BLEEDING: ICD-10-CM

## 2021-07-16 DIAGNOSIS — T14.8XXA OTHER INJURY OF UNSPECIFIED BODY REGION, INITIAL ENCOUNTER: ICD-10-CM

## 2021-07-16 DIAGNOSIS — J30.2 OTHER SEASONAL ALLERGIC RHINITIS: ICD-10-CM

## 2021-07-16 DIAGNOSIS — R59.0 LOCALIZED ENLARGED LYMPH NODES: ICD-10-CM

## 2021-07-16 PROCEDURE — 99072 ADDL SUPL MATRL&STAF TM PHE: CPT

## 2021-07-16 PROCEDURE — 99204 OFFICE O/P NEW MOD 45 MIN: CPT

## 2021-07-18 PROBLEM — K12.1 ORAL ULCERATION: Status: RESOLVED | Noted: 2020-12-14 | Resolved: 2021-07-18

## 2021-07-18 PROBLEM — M79.89 SWELLING OF CALF: Status: RESOLVED | Noted: 2020-12-23 | Resolved: 2021-07-18

## 2021-07-18 PROBLEM — R59.0 MEDIASTINAL LYMPHADENOPATHY: Status: RESOLVED | Noted: 2019-04-08 | Resolved: 2021-07-18

## 2021-07-18 PROBLEM — T14.8XXA BRUISING: Status: RESOLVED | Noted: 2020-12-02 | Resolved: 2021-07-18

## 2021-07-18 PROBLEM — Z86.19 HISTORY OF CANDIDIASIS OF MOUTH: Status: RESOLVED | Noted: 2019-07-26 | Resolved: 2021-07-18

## 2021-07-18 PROBLEM — Z86.19 HISTORY OF TINEA CORPORIS: Status: RESOLVED | Noted: 2017-06-06 | Resolved: 2021-07-18

## 2021-07-18 PROBLEM — U07.1 INFECTION DUE TO 2019 NOVEL CORONAVIRUS: Status: RESOLVED | Noted: 2021-04-01 | Resolved: 2021-07-18

## 2021-07-18 PROBLEM — K20.90 ESOPHAGITIS, ACUTE: Status: RESOLVED | Noted: 2019-08-20 | Resolved: 2021-07-18

## 2021-07-18 PROBLEM — R25.2 CRAMP IN LOWER LEG: Status: RESOLVED | Noted: 2019-11-04 | Resolved: 2021-07-18

## 2021-07-18 PROBLEM — R51.9 ACUTE NONINTRACTABLE HEADACHE, UNSPECIFIED HEADACHE TYPE: Status: RESOLVED | Noted: 2020-06-30 | Resolved: 2021-07-18

## 2021-07-18 PROBLEM — Z87.19 HISTORY OF GASTRITIS: Status: RESOLVED | Noted: 2018-11-23 | Resolved: 2021-07-18

## 2021-07-18 PROBLEM — M89.9 LYTIC BONE LESIONS ON XRAY: Status: RESOLVED | Noted: 2019-04-30 | Resolved: 2021-07-18

## 2021-07-18 PROBLEM — Z92.29 HISTORY OF INFLUENZA VACCINATION: Status: RESOLVED | Noted: 2018-11-23 | Resolved: 2021-07-18

## 2021-07-18 PROBLEM — R91.1 LUNG NODULE SEEN ON IMAGING STUDY: Status: RESOLVED | Noted: 2019-04-02 | Resolved: 2021-07-18

## 2021-07-18 PROBLEM — M65.9 TENOSYNOVITIS OF HAND: Status: RESOLVED | Noted: 2020-08-02 | Resolved: 2021-07-18

## 2021-07-18 NOTE — REVIEW OF SYSTEMS
[Negative] : Neurological [FreeTextEntry6] : see HPI [FreeTextEntry7] : see HPI [FreeTextEntry9] : see HPI [de-identified] : see HPI

## 2021-07-18 NOTE — DATA REVIEWED
[FreeTextEntry1] : \par 6/29/21 CT lumbar spine: Unchanged compression deformities of the T12-L3 vertebral bodies.  No destructive lesions are identified.  Multilevel degenerative changes with disc herniations at L3-4 and L4-5

## 2021-07-18 NOTE — HISTORY OF PRESENT ILLNESS
[Spouse] : spouse [] :  [Former Cigarette Smoker] : is a former cigarette smoker [Quit Cigarettes: ___] : quit smoking [unfilled] [Occasional Use] : occasional alcohol use [Never] : has never used illicit drugs [FreeTextEntry1] : \par Wanted new PMD. [Binge Drinking] : denies binge drinking [Patient Concern] : no personal concern about alcohol use [Family Concern] : no family concern about alcohol use [de-identified] : retired -was seafood distributor [de-identified] : smoked 1 ppd x 50 yrs [de-identified] : hx flu shot 10/20, hx Tdap 11/12, hx prevnar 3/15, hx PVX 10/20, no hx shingles vaccine [de-identified] : \par [place]  used: Vietnamese, ID # [id number]\par \par Accompanied by wife, Jacque Abrams\par \par Mr. Ness is a 69 yo M pmhx HTN, DM, HLD, CAD s/p RCA stent (2007), BPH s/p TURP, ED, GERD, hx gastritis, hypomagnesemia, former smoker (quit 2019), COPD (on supplemental O2), osteopenia, stage 4 small cell lung cancer (dx'd 4/19, s/p carboplatin/etoposide and radiation with partial response, then on atezolizumab c/b pneumonitis in 10/2020 treated with high dose steroids c/b vertebral compression fractures, with small brain metastases 6/2020 s/p gamma knife radiation therapy in 7/2020.  Was tapered off steroids for pneumonitis by 3/7/2021, and stopped Bactrim prophylaxis at that time), hx covid infection 3/21 (s/p monoclonal Ab infusion 3/30/21 and decadron 6 mg for 10 days by [facility] program; completed 35d participation in PREVENT-HD rivaroxaban trial) here to establish care\par \par Needs new PMD as prior left practice.  Last seen in 5/21 for f/u.\par \par Feeling well today.\par Needs med refills.\par Not fasting.\par \par hx HH ER visit 6/29/21 c/o back pain after mechanical fall (hx tripped over dog and landed on knees) on 6/25/21\par -had CT -spine w/o new findings\par -d/c'd home w/o med changes per pt\par \par c/o min facial rash x 2 weeks, applying Eucerin cream and improving so does not plan to f/u with his dermatologist.  No other rashes noted.\par denies new topicals/soaps/detergents used prior to onset or travel\par denies itching or pain, fever or chills\par \par hx chronic LBP, hx compression fractures, hx osteopenia-- notes pain back to baseline since 6/21 ER, mainly 1-2/10.  Denies recurrence of fall since 6/21.\par -followed by PMR, last seen 7/21, noted hx ER visit 6/21 and imaging, no changes made.  Advised PT (pending).  F/U pending.\par -followed by neurosx, last seen 3/21- advised PT and back brace use with no surgical intervention planned.\par -followed by ortho, last seen 3/21\par -PT pending, states has appt with PT for initial eval today\par -on Flexeril prn\par -on Tylenol prn\par -on Oxycodone prn per PMR, taking 1-2x/wk at baseline\par -on Alendronate since 1/21, tolerating w/o SEs\par -ambulates with cane or walker\par -using back brace\par -denies focal weakness or incontinence\par \par small cell lung cancer  stage 4 (dx'd 4/19), hx brain mets-\par -followed by oncology, last seen 6/21.  Noted off tx with cont'd monitoring planned.  Planned to f/u in 3 mo with repeat CT scans.\par -followed by rad-onc, last seen 4/21 with 4/21 MRI brain noted JAYCOB.  Advised f/u in 3mo with repeat MRI- states had MRI yesterday (results pending).  Has office f/u 7/21.\par -followed by pulm\par -denies HA or vision trouble\par \par COPD (on supplemental O2), seasonal allergies, former smoker (quit 2019), hx covid infection (s/p dexamethasone course, s/p MAB 3/21)-\par -followed by pulm, last seen 4/21- plans to f/u soon\par -on Symbicort\par \par Reports mild dry cough on/off x 1 mo a/w postnasal drip\par -no recent Flonase use- needs refill\par -using 2L Oxygen 1x/d- O2 sat 90-95% (baseline per pt)\par -denies f/c, CP or sob\par \par DM-\par -followed by endo, last seen 3/21- but kvng RUSH spoken to by daughter (Leena, is an NP) regularly for med adjustments.  Has f/u 7/28/21.\par -on Basaglar 12 u (this dose x 2 weeks per endo) and Novolog 12 TID\par -on metformin  (2) BID since 3/21, mild loose BMs with use- feels not too bothersome, wishes to continue\par -using Freestyle Osiel.  Home fs: (fasting) 120s  (postprandial) 140-150s; rare hypoglycemic sx's (last 3 mo ago)\par -followed by chela, last seen 1/21, told no DM retinopathy\par -reports intermittent heel pain, no hx podiatry evaluation.  Denies foot paresthesias.\par \par GERD- reports controlled\par -on famotidine\par \par Reports nl appetite and BMs.\par -hx intermittent loose BMs- takes Imodium.  Onset ~ 1-2x/wk. Told 2/2 metformin- feels is controlled and wishes to continue.\par -denies dysphagia, n/v, abd pain or BRBPR.\par -hx c-scope/EGD 1/16\par \par HTN, HLD, CAD s/p stent- hx LE edema, reports lessening since off steroids\par -on ASA, metoprolol and Lisinopril\par -on Lipitor, Lovaza and niacin - denies SEs with use\par -had echo 6/21\par -hx cardio eval, not seen in few years, states needs new provider\par -denies calf pain or claudication\par \par Denies  complaints.\par

## 2021-07-18 NOTE — PHYSICAL EXAM
[No Acute Distress] : no acute distress [Normal Sclera/Conjunctiva] : normal sclera/conjunctiva [PERRL] : pupils equal round and reactive to light [EOMI] : extraocular movements intact [Normal Oropharynx] : the oropharynx was normal [Normal Nasal Mucosa] : the nasal mucosa was normal [No Lymphadenopathy] : no lymphadenopathy [Supple] : supple [Thyroid Normal, No Nodules] : the thyroid was normal and there were no nodules present [No Respiratory Distress] : no respiratory distress  [Clear to Auscultation] : lungs were clear to auscultation bilaterally [Normal Rate] : normal rate  [Regular Rhythm] : with a regular rhythm [Normal S1, S2] : normal S1 and S2 [No Murmur] : no murmur heard [Pedal Pulses Present] : the pedal pulses are present [Soft] : abdomen soft [Non Tender] : non-tender [No HSM] : no HSM [Normal Supraclavicular Nodes] : no supraclavicular lymphadenopathy [Normal Posterior Cervical Nodes] : no posterior cervical lymphadenopathy [Normal Anterior Cervical Nodes] : no anterior cervical lymphadenopathy [No CVA Tenderness] : no CVA  tenderness [No Spinal Tenderness] : no spinal tenderness [No Joint Swelling] : no joint swelling [No Focal Deficits] : no focal deficits [Normal Affect] : the affect was normal [Alert and Oriented x3] : oriented to person, place, and time [de-identified] : old surgical scar [de-identified] : trace b/l LE edema, no calf tenderness, chronic LE skin changes [de-identified] : minimal facial dry skin [de-identified] : slow gait with cane

## 2021-07-18 NOTE — HEALTH RISK ASSESSMENT
[Patient reported bone density results were abnormal] : Patient reported bone density results were abnormal [BoneDensityDate] : 01/21 [BoneDensityComments] : osteopenia [ColonoscopyDate] : 01/16 [ColonoscopyComments] : internal hemorrhoids, large lipoma at transverse colon, diverticulosis, rec: repeat in 10 yrs (Dr. Hinds) [HIVDate] : 10/11 [HIVComments] : negative [HepatitisCDate] : 05/19 [HepatitisCComments] : negative

## 2021-07-18 NOTE — ASSESSMENT
[FreeTextEntry1] : \par 71 yo M pmhx HTN, DM, HLD, CAD s/p RCA stent (2007), BPH s/p TURP, ED, GERD, hx gastritis, hypomagnesemia, former smoker (quit 2019), COPD (on supplemental O2), osteopenia, stage 4 small cell lung cancer (dx'd 4/19, s/p carboplatin/etoposide and radiation with partial response, then on atezolizumab c/b pneumonitis in 10/2020 treated with high dose steroids c/b vertebral compression fractures, with small brain metastases 6/2020 s/p gamma knife radiation therapy in 7/2020.  Was tapered off steroids for pneumonitis by 3/7/2021, and stopped Bactrim prophylaxis at that time), hx covid infection 3/21 (s/p monoclonal Ab infusion 3/30/21 and decadron 6 mg for 10 days by [facility] program; completed 35d participation in [facility] rivaroxaban trial) here to establish care\par \par \par hx chronic LBP, hx compression fractures, hx osteopenia- hx mechanical fall s/p ER 6/21 w/o acute findings on CT scan.  Reports now back pain back to baseline.  Denies recurrence of fall since 6/21.\par -1/21 DEXA: osteopenia\par -6/21 CT lumbar spine: Unchanged compression deformities of the T12-L3 vertebral bodies.  No destructive lesions are identified.  Multilevel degenerative changes with disc herniations at L3-4 and L4-5\par -followed by PMR, last seen 7/21, noted hx ER visit 6/21 and imaging, no changes made. Advised PT (pending). F/U pending.\par -followed by neurosx, last seen 3/21- advised PT and back brace use with no surgical intervention planned.\par -followed by ortho, last seen 3/21\par -PT pending, states has appt with PT for initial eval today\par -on Flexeril prn, Tylenol prn and Oxycodone prn (per PMR, taking 1-2x/wk at baseline)\par -on Alendronate since 1/21, tolerating w/o SEs\par -advised start ca/D OTC supp, to also d/w oncology\par -ambulates with cane or walker\par -using back brace\par -fall precautions counseled\par -check vit d\par \par small cell lung cancer stage 4 (dx'd 4/19), hx brain mets, anemia-\par -4/21 cbc wnl, except Hg 11.6\par -followed by oncology, last seen 6/21. Noted off tx with cont'd monitoring planned. Planned to f/u in 3 mo with repeat CT scans.\par -followed by rad-onc, last seen 4/21 with 4/21 MRI brain noted JAYCOB. Advised f/u in 3mo with repeat MRI- states had MRI yesterday (results pending). Has office f/u 7/21.\par -followed by pulm\par -check cbc\par \par COPD (on supplemental O2), seasonal allergies, former smoker (quit 2019), hx covid infection (s/p dexamethasone course, s/p MAB 3/21)- recent mild dry cough a/w postnasal drip, otherwise at baseline, exam unremarkable\par -followed by pulm, last seen 4/21- plans to f/u soon\par -on Symbicort\par -hx Flonase prn, refilled- advised to restart and monitor cough.  To f/u if not resolved or worsened\par \par DM- 3/21 A1c 6.3 (waas 7.9), 1/21 microalbumin 46\par -followed by endo, last seen 3/21- but kvng RUSH spoken to by daughter (Leena, is an NP) regularly for med adjustments. Has f/u 7/28/21.\par -on Basaglar 12 u (this dose x 2 weeks per endo) and Novolog 12 TID\par -on metformin  (2) BID since 3/21, hx mild loose BMs with use - wishes to continue\par -on Lisinopril \par -cont home fs monitoring, using Freestyle Osiel\par -followed by optho, last seen 1/21, told no DM retinopathy\par -reports intermittent heel pain, no hx podiatry evaluation- referral given for eval and DM shoes. Denies foot paresthesias.\par \par HTN, HLD, CAD s/p stent, hx LE edema- BP wnl, reports lessening edema since off steroids 3/21\par -9/20 TSH wnl\par -1/21 Tchol 254   HDL  96\par -4/21 cmp wnl\par -12/20 b/l LE duplex: no DVT b/l\par -6/21 echo 6/21: EF 60-65%, min MR, LV remodeling, nl LV fxn, mild diastolic dysfxn (Stg I)\par -on ASA, metoprolol and Lisinopril\par -on Lipitor, Lovaza and niacin - denies SEs with use\par -hx cardio eval, referral for new given\par -low salt diet advised\par -check bmp\par \par GERD, hx internal hemorrhoids, diverticulosis, colon polyps- reports controlled\par -hx EGD 1/16: nonbleeding erosive gastropathy\par -hx colonoscopy 1/16: internal hemorrhoids, large lipoma at transverse colon, diverticulosis, rec: repeat in 10 yrs (Dr. Hinds)\par -on famotidine\par -advised to avoid reflux aggravating foods\par \par hx hypomagnesium- \par -on oral supplementation\par -check level\par \par hx insomnia-\par -on Temazepam prn per oncology\par \par MISC:  Continued social distancing and measure for covid19 prevention encouraged.  \par -hx pfzer vaccine- 3/15, 7/2/21\par \par \par HCM\par -advised to f/u for CPE\par -hx flu shot 10/20\par -hx Tdap 11/12\par -hx prevnar 3/15\par -hx PVX 10/20\par -advised to check on insurance coverage for shingrix and f/u if desires.  To d/w oncology prior to getting.\par -hx screening colonoscopy 1/16: internal hemorrhoids, large lipoma at transverse colon, diverticulosis, rec: repeat in 10 yrs (Dr. Hinds)\par -hx DEXA 1/21: osteopenia (hx compression fractures)\par -followed by derm, last seen 9/20.  Yearly skin screening advised.  Regular use of sun block for skin cancer prevention counseled.\par \par Pt requests wife, Jacque Abrams, be contacted re: his test results and medical care, (cell) [phone]\par \par Labs drawn in office today.\par

## 2021-07-18 NOTE — COUNSELING
Chief Complaint   Patient presents with   [facility] Follow Up     [facility] EMERGENCY [facility] CENTER - still experiencing SOB     1. Have you been to the ER, urgent care clinic since your last visit? Hospitalized since your last visit? Yes, patient went to [facility] EMERGENCY [facility] ER 11/21/19. 2. Have you seen or consulted any other health care providers outside of the [address] [place] [address] since your last visit? Include any pap smears or colon screening.  No [Fall prevention counseling provided] : Fall prevention counseling provided

## 2021-07-20 ENCOUNTER — NON-APPOINTMENT (OUTPATIENT)
Age: 71
End: 2021-07-20

## 2021-07-20 LAB
25(OH)D3 SERPL-MCNC: 25.6 NG/ML
ANION GAP SERPL CALC-SCNC: 17 MMOL/L
BASOPHILS # BLD AUTO: 0.07 K/UL
BASOPHILS NFR BLD AUTO: 1 %
BUN SERPL-MCNC: 12 MG/DL
CALCIUM SERPL-MCNC: 9.9 MG/DL
CHLORIDE SERPL-SCNC: 100 MMOL/L
CO2 SERPL-SCNC: 23 MMOL/L
CREAT SERPL-MCNC: 0.75 MG/DL
EOSINOPHIL # BLD AUTO: 0.32 K/UL
EOSINOPHIL NFR BLD AUTO: 4.4 %
ESTIMATED AVERAGE GLUCOSE: 143 MG/DL
GLUCOSE SERPL-MCNC: 112 MG/DL
HBA1C MFR BLD HPLC: 6.6 %
HCT VFR BLD CALC: 43.5 %
HGB BLD-MCNC: 13.4 G/DL
IMM GRANULOCYTES NFR BLD AUTO: 0.4 %
LYMPHOCYTES # BLD AUTO: 0.67 K/UL
LYMPHOCYTES NFR BLD AUTO: 9.2 %
MAGNESIUM SERPL-MCNC: 1.3 MG/DL
MAN DIFF?: NORMAL
MCHC RBC-ENTMCNC: 28.4 PG
MCHC RBC-ENTMCNC: 30.8 GM/DL
MCV RBC AUTO: 92.2 FL
MONOCYTES # BLD AUTO: 0.69 K/UL
MONOCYTES NFR BLD AUTO: 9.5 %
NEUTROPHILS # BLD AUTO: 5.49 K/UL
NEUTROPHILS NFR BLD AUTO: 75.5 %
PLATELET # BLD AUTO: 268 K/UL
POTASSIUM SERPL-SCNC: 4.3 MMOL/L
RBC # BLD: 4.72 M/UL
RBC # FLD: 15.6 %
SODIUM SERPL-SCNC: 139 MMOL/L
WBC # FLD AUTO: 7.27 K/UL

## 2021-07-22 ENCOUNTER — APPOINTMENT (OUTPATIENT)
Dept: RADIATION ONCOLOGY | Facility: CLINIC | Age: 71
End: 2021-07-22

## 2021-07-22 ENCOUNTER — APPOINTMENT (OUTPATIENT)
Dept: RADIATION ONCOLOGY | Facility: CLINIC | Age: 71
End: 2021-07-22
Payer: MEDICARE

## 2021-07-22 VITALS
RESPIRATION RATE: 18 BRPM | DIASTOLIC BLOOD PRESSURE: 72 MMHG | OXYGEN SATURATION: 96 % | SYSTOLIC BLOOD PRESSURE: 153 MMHG | HEART RATE: 72 BPM

## 2021-07-22 PROCEDURE — 99213 OFFICE O/P EST LOW 20 MIN: CPT

## 2021-07-22 PROCEDURE — 99072 ADDL SUPL MATRL&STAF TM PHE: CPT

## 2021-07-22 NOTE — VITALS
[80: Normal activity with effort; some signs or symptoms of disease.] : 80: Normal activity with effort; some signs or symptoms of disease.  [Maximal Pain Intensity: 7/10] : 7/10 [Least Pain Intensity: 2/10] : 2/10

## 2021-07-25 NOTE — PHYSICAL EXAM
[General Appearance - Well Nourished] : well nourished [General Appearance - In No Acute Distress] : in no acute distress [Not Anxious] : not anxious [Normal] : no respiratory distress, lungs were clear to auscultation bilaterally [de-identified] : wearing nasal O2 [de-identified] : no facial weakness demonstrated

## 2021-07-25 NOTE — HISTORY OF PRESENT ILLNESS
[Home] : at home, [unfilled] , at the time of the visit. [Medical Office: ([facility])___] : at the medical office located in  [Verbal consent obtained from patient] : the patient, [unfilled] [FreeTextEntry1] : Mr. Ness presents for follow up. He underwent gamma knife treatment for a total of 2000 cgy of radiation to a left frontal and left parietal brain lesion on 7/1/2020. \par \par ONCOLOGY HISTORY\par He is a 69 year old male with a history of small cell lung cancer.  This was initially diagnosed in 4/2019 through an ebus of bilateral level 4 lymph nodes in 4/2019 after presenting with 6 months of worsening cough, fatigue, and insomnia. A CT chest showed a lung mass at that time. He was initially treated with carbo/etoposide/atezoluzimab starting in 5/2019 with 4 cycles completed in 7/2019, followed by atezolizumab maintenance . Consolidative RT completed in 8/2019. He elected against prophylactic whole brain radiation. \par \par Due to frequent headaches, forgetfulness, and reflexes bring worse, a brain MRI was ordered on 6/10/2020. \par \par This brain MRI revealed two brain lesions consistent with metastases. \par 1: left posterior temporal/occipital cortex,  approximately 1.4 x 1.2 cm. \par 2:  high medial left frontal cortex, approximately 0.7 x 0.6 cm Small amount of surrounding edema is identified. \par \par At the time of initial consult he endorsed occassional headaches, manageable.  Denied focal weakness, nausea, vomiting, or other complaints.  Maintains an excellent KPS.  His daughter, a physician, is serving as the  during this conversation.\par \par He was treated with GK SRS to the two lesions on 7/1/2020\par \par 9/17/2020- Mr. Ness presents today for follow up. He is seen with the assistance of [facility]  [id number]. MRI brain done 9/11/2020 showed maarked improvement and treatment response since prior exam. Previously visualized enhancing lesion in the left posterior temporal lobe is markedly decreased in size since prior exam, measuring approximately 0.6 x 0.6 cm, and previously measured 1.4 x 1.2 cm. There is no vasogenic edema surrounding this lesion. Previously visualized enhancing lesion in the high medial left frontal cortex, is markedly decreased in size since prior exam, nearly nonexistent measuring 0.1 cm, and previously measuring 0.7 x 0.6 cm. Minimal surrounding vasogenic edema remains. No new enhancing lesions are identified. \par \par He continues following with Dr. Ramirez. continues on atezoluzumab maintenance. CT chest 8/31/2020 showed Emphysema is present. Left upper lobe ill-defined nodular/linear opacity is similar compared to the prior study. Other bilateral patchy lung opacities are new from the prior study. These are indeterminate and may be infectious/inflammatory. 1 month follow-up CT needed for complete evaluation. \par \par Today he feels very well. Notes some headaches over the last week, not lasting long. Denies nausea, vomiting, focal weakness, Overall feeling well. Hearing may be a little worse recently.\par \par 12/22/2020- Mr. Ness presents today for follow up. [facility]  [id number] used for visit today.  MRI brain 12/11/2020 showed previously seen left temporal occipital lobe lesion is significantly smaller in size.  Previously seen left frontal lobe lesion is no longer visualized.  Continued follow-up is recommended.\par \par CT Chest 10/19/2020 showed widespread bilateral opacities throughout both lungs with significant interval progression since August 31, 2020 suggestive of infectious or inflammatory etiology. Findings may be due to Covid 19 pneumonia.\par \par Atezoluzumab discontinued this fall due to hospitalization with pneumonitis. Due for surveillance imaging at the end of December. Currently on 120 mg of prednisone for pneumonitis.  Today he denies headaches. Says he feels slightly confused sometimes, attributes this to steroids. Denies nausea, vomiting, focal weakness. Slight bilateral LE swelling, +1\par \par 4/13/2021- Mr. Ness presents today for follow up. He is seen through [facility] for which he provides verbal consent on 4/13/2021 at 3:19 PM. [facility]  [id number] used for visit. he underwent a follow up brain MRI on 4/12/2021. This showed Previously noted enhancing lesions are no longer identified. Continued close interval follow-up is recommended.\par He was diagnosed with covid-19 on 4/2. Has continued to follow with Dr. Ramirez. \par Today he is feeling  well. Remains on 3L O2, which he was on prior to COVID. Feels like antibody therapy helped him a lot. No headaches, no focal weakness. \par \par 7/22/2021- Mr. Ness presents today for follow up. [facility]  351 he underwent a brain MRI on 7/15/2021. This showed no significant change. Continues to follow with Dr. Ramirez. Continues to be observed off treatment since 9/2020, with sustained response in June, plan for follow up imaging in in 9/2021.\par Today he is feeling well. Remains on 2L O2 during the day, none at night. No headaches, nausea, vomiting, focal weakness, visual trouble. some trouble swallowing which has been present since he had systemic therapy. Overall feeling well. has some back pain after a recent fall

## 2021-07-25 NOTE — REVIEW OF SYSTEMS
[Loss of Hearing] : loss of hearing [Shortness Of Breath] : shortness of breath [Negative] : Allergic/Immunologic [Dysphagia] : dysphagia [Confused] : no confusion [Dizziness] : no dizziness [Fainting] : no fainting [Difficulty Walking] : no difficulty walking [Insomnia] : no insomnia [FreeTextEntry2] : whole body feels tired [FreeTextEntry4] : some dysphagia  [FreeTextEntry6] : Using O2 2L O2, weaning, not using any O2 at night [FreeTextEntry7] : with diarrhea, 1-2 times per week [de-identified] : denies headaches.

## 2021-07-28 ENCOUNTER — APPOINTMENT (OUTPATIENT)
Dept: ENDOCRINOLOGY | Facility: CLINIC | Age: 71
End: 2021-07-28

## 2021-07-30 ENCOUNTER — APPOINTMENT (OUTPATIENT)
Dept: INTERNAL MEDICINE | Facility: CLINIC | Age: 71
End: 2021-07-30
Payer: MEDICARE

## 2021-07-30 VITALS
OXYGEN SATURATION: 97 % | TEMPERATURE: 98.2 F | BODY MASS INDEX: 29.19 KG/M2 | WEIGHT: 171 LBS | SYSTOLIC BLOOD PRESSURE: 146 MMHG | RESPIRATION RATE: 18 BRPM | HEIGHT: 64 IN | DIASTOLIC BLOOD PRESSURE: 90 MMHG | HEART RATE: 81 BPM

## 2021-07-30 PROCEDURE — G0438: CPT

## 2021-07-30 PROCEDURE — 36415 COLL VENOUS BLD VENIPUNCTURE: CPT

## 2021-07-31 NOTE — HEALTH RISK ASSESSMENT
[Patient reported bone density results were abnormal] : Patient reported bone density results were abnormal [Feels Safe at Home] : Feels safe at home [Smoke Detector] : smoke detector [Carbon Monoxide Detector] : carbon monoxide detector [Seat Belt] :  uses seat belt [With Patient/Caregiver] : , with patient/caregiver [0] : 2) Feeling down, depressed, or hopeless: Not at all (0) [PHQ-2 Negative - No further assessment needed] : PHQ-2 Negative - No further assessment needed [HIV Test offered] : HIV Test offered [Hepatitis C test offered] : Hepatitis C test offered [Fully functional (bathing, dressing, toileting, transferring, walking, feeding)] : Fully functional (bathing, dressing, toileting, transferring, walking, feeding) [One fall no injury in past year] : Patient reported one fall in the past year without injury [Assistive Device] : Patient uses an assistive device [[id number]] : 0 [Guns at Home] : no guns at home [Sunscreen] : does not use sunscreen [BoneDensityDate] : 01/21 [ColonoscopyDate] : 01/16 [BoneDensityComments] : osteopenia [ColonoscopyComments] : internal hemorrhoids, large lipoma at transverse colon, diverticulosis, rec: repeat in 10 yrs (Dr. Hinds) [HIVDate] : 10/11 [HepatitisCDate] : 05/19 [HIVComments] : negative [HepatitisCComments] : negative [AdvancecareDate] : 07/21

## 2021-07-31 NOTE — ASSESSMENT
[FreeTextEntry1] : \par 71 yo M pmhx HTN, DM, HLD, CAD s/p RCA stent (2007), BPH s/p TURP, ED, GERD, hx gastritis, hypomagnesemia, former smoker (quit 2019), COPD (on supplemental O2), osteopenia, stage 4 small cell lung cancer (dx'd 4/19, s/p carboplatin/etoposide and radiation with partial response, then on atezolizumab c/b pneumonitis in 10/2020 treated with high dose steroids c/b vertebral compression fractures, with small brain metastases 6/2020 s/p gamma knife radiation therapy in 7/2020.  Was tapered off steroids for pneumonitis by 3/7/2021, and stopped Bactrim prophylaxis at that time), hx covid infection 3/21 (s/p monoclonal Ab infusion 3/30/21 and decadron 6 mg for 10 days by [facility] program; completed 35d participation in [facility] rivaroxaban trial) here for AWV\par \par \par hx chronic LBP, hx compression fractures, hx osteopenia, vit d insuff- hx mechanical fall s/p ER 6/21 w/o acute findings on CT scan.  Reports back pain back to baseline.  Denies recurrence of fall since 6/21.\par -1/21 DEXA: osteopenia\par -6/21 CT lumbar spine: Unchanged compression deformities of the T12-L3 vertebral bodies.  No destructive lesions are identified.  Multilevel degenerative changes with disc herniations at L3-4 and L4-5\par -7/21 vit d 25\par -followed by PMR, last seen 7/21, noted hx ER visit 6/21 and imaging, no changes made. Advised PT (pending). F/U pending.\par -followed by neurosx, last seen 3/21- advised PT and back brace use with no surgical intervention planned.\par -followed by ortho, last seen 3/21\par -doing PT \par -on Flexeril prn, Tylenol prn and Oxycodone prn (per PMR, taking 1-2x/wk at baseline)\par -on Alendronate since 1/21, tolerating w/o SEs\par -on ca/D OTC supp\par -ambulates with cane or walker\par -using back brace\par -fall precautions counseled\par \par small cell lung cancer stage 4 (dx'd 4/19), hx brain mets, anemia-\par -4/21 cbc wnl, except Hg 11.6\par -7/21 cbc/bmp wnl\par -followed by oncology, last seen 6/21. Noted off tx with cont'd monitoring planned. Planned to f/u in 3 mo with repeat CT scans.\par -followed by rad-onc, last seen 7/21 with 7/21 MRI brain noted JAYCOB. Advised f/u in 3 mo.\par -followed by pulm\par \par COPD (on supplemental O2), seasonal allergies, former smoker (quit 2019), hx covid infection (s/p dexamethasone course, s/p MAB 3/21)- recent mild dry cough a/w postnasal drip- resolved with Flonase prn\par -followed by pulm, last seen 4/21- plans to f/u soon\par -on Symbicort\par -Flonase prn\par \par DM- 7/21 A1c 6.6 (was 6.3), 1/21 microalbumin 46\par -followed by endo, last seen 3/21- but kvng RUSH spoken to by daughter (Leena, is an NP) regularly for med adjustments. Has f/u 7/28/21.\par -on Basaglar 12 u (this dose x 2 weeks per endo) and Novolog 2 TID (this dose since yesterday per pt)\par -on metformin  (2) BID since 3/21, hx mild loose BMs with use - wishes to continue\par -on Lisinopril \par -cont home fs monitoring, using Freestyle Osiel\par -followed by optho, last seen 1/21, told no DM retinopathy\par -seen by optometrist 7/21 with new glasses given\par -reports intermittent heel pain, no hx podiatry evaluation- referral given for eval and DM shoes- pending 8/21. Denies foot paresthesias. DM foot care counseled.\par \par HTN, HLD, CAD s/p stent, hx LE edema- BP wnl, reports lessening edema since off steroids 3/21\par -12/20 b/l LE duplex: no DVT b/l\par -6/21 echo 6/21: EF 60-65%, min MR, LV remodeling, nl LV fxn, mild diastolic dysfxn (Stg I)\par -9/20 TSH wnl\par -1/21 Tchol 254   HDL  96\par -4/21 cmp wnl\par -7/21 cbc/bmp wnl\par -on ASA, metoprolol and Lisinopril\par -on Lipitor, Lovaza and niacin - denies SEs with use\par -hx cardio eval, referral for new given prior- pending\par -low salt diet advised\par \par GERD, hx internal hemorrhoids, diverticulosis, colon polyps- reports controlled\par -hx EGD 1/16: nonbleeding erosive gastropathy\par -hx colonoscopy 1/16: internal hemorrhoids, large lipoma at transverse colon, diverticulosis, rec: repeat in 10 yrs (Dr. Hinds)\par -on famotidine\par -advised to avoid reflux aggravating foods\par \par hx hypomagnesium- hx chronic, mild diarrhea 2/2 metformin\par -declines to change metformin\par -7/21 Mg 1.3\par -on MgOx 400 (1/2/1)\par -increased Mg diet sources encouraged\par -nephrology referral given for eval- pending\par -check level\par \par hx insomnia-\par -on Temazepam prn per oncology\par \par MISC:  Continued social distancing and measure for covid19 prevention encouraged.  \par -hx pfzer vaccine- 3/15, 7/2/21\par \par \par HCM\par -agreeable to HIV/STD screening\par -PSA, TSH screening\par -5/19 hep C screening negative\par -hx flu shot 10/20\par -hx Tdap 11/12\par -hx prevnar 3/15\par -hx PVX 10/20\par -advised to check on insurance coverage for shingrix and f/u if desires.  To d/w oncology prior to getting.\par -hx screening colonoscopy 1/16: internal hemorrhoids, large lipoma at transverse colon, diverticulosis, rec: repeat in 10 yrs (Dr. Hinds)\par -hx DEXA 1/21: osteopenia (hx compression fractures)\par -followed by derm, last seen 9/20.  Yearly skin screening advised.  Regular use of sun block for skin cancer prevention counseled.\par -yearly dental screening advised\par -advised to designate HCP and provide copy of completed form for records\par \par Pt requests wife, Jacque Abrams, be contacted re: his test results and medical care, (cell) [phone]\par \par Labs drawn in office today.\par \par \par

## 2021-07-31 NOTE — REVIEW OF SYSTEMS
[Negative] : Psychiatric [FreeTextEntry6] : see HPI [FreeTextEntry7] : see HPI [FreeTextEntry9] : see HPI

## 2021-07-31 NOTE — HISTORY OF PRESENT ILLNESS
[Spouse] : spouse [] :  [Former Cigarette Smoker] : is a former cigarette smoker [Quit Cigarettes: ___] : quit smoking [unfilled] [Occasional Use] : occasional alcohol use [Never] : has never used illicit drugs [Fair] : fair [Reg. Dental Visits] : He has regular dental visits [Healthy Diet] : He consumes a diverse and healthy diet [Regular Exercise] : He exercises regularly [[employer] ] : provided by [employer]   [FreeTextEntry1] : [id number] [TWNoteComboBox1] : Indonesian [Binge Drinking] : denies binge drinking [Patient Concern] : no personal concern about alcohol use [Family Concern] : no family concern about alcohol use [Vision Problems] : He denies vision problems [Hearing Loss] : He denies hearing loss [de-identified] : 11/19, Dr. Hyman [de-identified] : retired -was seafood distributor [de-identified] : smoked 1 ppd x 50 yrs [de-identified] : hx flu shot 10/20, hx Tdap 11/12, hx prevnar 3/15, hx PVX 10/20, no hx shingles vaccine [de-identified] : \par Accompanied by wife, Jacque Abrams\par \par 69 yo M pmhx HTN, DM, HLD, CAD s/p RCA stent (2007), BPH s/p TURP, ED, GERD, hx gastritis, hypomagnesemia, former smoker (quit 2019), COPD (on supplemental O2), osteopenia, stage 4 small cell lung cancer (dx'd 4/19, s/p carboplatin/etoposide and radiation with partial response, then on atezolizumab c/b pneumonitis in 10/2020 treated with high dose steroids c/b vertebral compression fractures, with small brain metastases 6/2020 s/p gamma knife radiation therapy in 7/2020.  Was tapered off steroids for pneumonitis by 3/7/2021, and stopped Bactrim prophylaxis at that time), hx covid infection 3/21 (s/p monoclonal Ab infusion 3/30/21 and decadron 6 mg for 10 days by [employer] program; completed 35d participation in PREVENT-HD rivaroxaban trial) here to establish care\par \par Last seen in office 7/16/21 as new pt to establish care with labs done.\par \par Feeling well.\par Does not need med refills.\par \par hx HH ER visit 6/29/21 c/o back pain after mechanical fall (hx tripped over dog and landed on knees) on 6/25/21\par -had CT -spine w/o new findings\par -d/c'd home w/o med changes per pt\par \par hx chronic LBP, hx compression fractures, hx osteopenia-- notes pain back to baseline since 6/21 ER, mainly 1-2/10.  Denies recurrence of fall since 6/21.\par -followed by PMR, last seen 7/21, noted hx ER visit 6/21 and imaging, no changes made.  Advised PT.  F/U pending.\par -followed by neurosx, last seen 3/21- advised PT and back brace use with no surgical intervention planned.\par -followed by ortho, last seen 3/21\par -doing PT 2x/wk\par -on Flexeril prn, Tylenol prn; Oxycodone prn per PMR, taking 1-2x/wk at baseline\par -on Alendronate since 1/21, tolerating w/o SEs\par -ambulates with cane or walker\par -using back brace\par -denies focal weakness or incontinence\par \par small cell lung cancer  stage 4 (dx'd 4/19), hx brain mets-\par -followed by oncology, last seen 6/21.  Noted off tx with cont'd monitoring planned.  Planned to f/u in 3 mo with repeat CT scans.\par -followed by rad-onc, last seen 7/21 with 7/21 MRI brain noted JAYCOB.  Advised f/u in 3 mo with repeat MRI.\par -followed by pulm\par -denies HA or vision trouble\par \par COPD (on supplemental O2), seasonal allergies, former smoker (quit 2019), hx covid infection (s/p dexamethasone course, s/p MAB 3/21)-\par -followed by pulm, last seen 4/21- f/u pending\par -on Symbicort\par \par hx mild dry cough on/off x 1 mo a/w postnasal drip- noted now resolved s/p Flonase prn\par -using 2L Oxygen- O2 sat 90-95% (baseline per pt)\par -denies f/c, CP or sob\par \par DM-\par -followed by endo, last seen 3/21- but kvng RUSH spoken to by daughter (Leena, is an NP) regularly for med adjustments.  Has f/u 8/21.\par -on Basaglar 12 u (this dose since 7/21 per endo) and Novolog 2 TID (this dose since yesterday per pt)\par -on metformin  (2) BID since 3/21, mild loose BMs with use- feels not too bothersome, wishes to continue\par -using Freestyle Osiel.  Home fs: (fasting) 100-145  (postprandial) < 211; rare hypoglycemic sx's (last 3 mo ago)\par -followed by optho, last seen 1/21, told no DM retinopathy.  \par -seen by optometrist 7/21 with new glasses given\par -reports intermittent heel pain, no hx podiatry evaluation- pending 8/21.  Denies foot paresthesias.\par \par GERD- reports controlled\par -on famotidine\par \par Reports intentionally trying to lose wt.\par Eating healthy diet\par exercising: walks 1/2 mile daily \par \par Reports nl appetite and BMs.\par -hx intermittent loose BMs- takes Imodium.  Onset ~ 1-2x/wk. Told 2/2 metformin- feels is controlled and wishes to continue.\par -denies dysphagia, n/v, abd pain or BRBPR.\par -hx c-scope/EGD 1/16\par \par hx hypomagnesemia-\par -adherent with oral supplement\par -notes has recently increased MG containing foods\par -not interested to lower metformin\par -nephrology eval pending\par \par HTN, HLD, CAD s/p stent- hx LE edema, reports lessening since off steroids\par -on ASA, metoprolol and Lisinopril\par -on Lipitor, Lovaza and niacin - denies SEs with use\par -had echo 6/21\par -hx cardio eval, not seen in few years, referral to new given prior visit- pending 8/21\par -has low salt/fat diet\par -exercise: walks 1/2 mile daily\par -denies calf pain or claudication\par \par hx prostate surgery > 7 yrs ago, denies hx cancer.  No  f/u since.  \par -denies  complaints.\par \par Denies depression or anxiety.

## 2021-07-31 NOTE — PHYSICAL EXAM
[No Acute Distress] : no acute distress [Normal Sclera/Conjunctiva] : normal sclera/conjunctiva [PERRL] : pupils equal round and reactive to light [EOMI] : extraocular movements intact [Normal Oropharynx] : the oropharynx was normal [Normal Nasal Mucosa] : the nasal mucosa was normal [No Lymphadenopathy] : no lymphadenopathy [Supple] : supple [Thyroid Normal, No Nodules] : the thyroid was normal and there were no nodules present [No Respiratory Distress] : no respiratory distress  [Clear to Auscultation] : lungs were clear to auscultation bilaterally [Normal Rate] : normal rate  [Regular Rhythm] : with a regular rhythm [Normal S1, S2] : normal S1 and S2 [No Murmur] : no murmur heard [Pedal Pulses Present] : the pedal pulses are present [Soft] : abdomen soft [Non Tender] : non-tender [No HSM] : no HSM [Normal Supraclavicular Nodes] : no supraclavicular lymphadenopathy [Normal Posterior Cervical Nodes] : no posterior cervical lymphadenopathy [Normal Anterior Cervical Nodes] : no anterior cervical lymphadenopathy [No CVA Tenderness] : no CVA  tenderness [No Spinal Tenderness] : no spinal tenderness [No Joint Swelling] : no joint swelling [No Focal Deficits] : no focal deficits [Normal Affect] : the affect was normal [Alert and Oriented x3] : oriented to person, place, and time [Well-Appearing] : well-appearing [Normal TMs] : both tympanic membranes were normal [No Rash] : no rash [Normal Gait] : normal gait [de-identified] : trace b/l LE edema, no calf tenderness, chronic LE skin changes [de-identified] : old surgical scar [de-identified] : scattered freckles [de-identified] : slow gait with cane

## 2021-08-02 LAB
C TRACH RRNA SPEC QL NAA+PROBE: NOT DETECTED
HBV CORE IGG+IGM SER QL: NONREACTIVE
HBV SURFACE AB SER QL: NONREACTIVE
HBV SURFACE AG SER QL: NONREACTIVE
HCV AB SER QL: NONREACTIVE
HCV S/CO RATIO: 0.05 S/CO
HIV1+2 AB SPEC QL IA.RAPID: NONREACTIVE
MAGNESIUM SERPL-MCNC: 1.5 MG/DL
N GONORRHOEA RRNA SPEC QL NAA+PROBE: NOT DETECTED
PSA SERPL-MCNC: 1.17 NG/ML
SOURCE AMPLIFICATION: NORMAL
T PALLIDUM AB SER QL IA: NEGATIVE
TSH SERPL-ACNC: 1.34 UIU/ML

## 2021-08-13 ENCOUNTER — APPOINTMENT (OUTPATIENT)
Dept: ENDOCRINOLOGY | Facility: CLINIC | Age: 71
End: 2021-08-13
Payer: MEDICARE

## 2021-08-13 ENCOUNTER — RESULT REVIEW (OUTPATIENT)
Age: 71
End: 2021-08-13

## 2021-08-13 ENCOUNTER — NON-APPOINTMENT (OUTPATIENT)
Age: 71
End: 2021-08-13

## 2021-08-13 PROCEDURE — G0108 DIAB MANAGE TRN  PER INDIV: CPT

## 2021-08-31 ENCOUNTER — APPOINTMENT (OUTPATIENT)
Dept: CARDIOLOGY | Facility: CLINIC | Age: 71
End: 2021-08-31
Payer: MEDICARE

## 2021-08-31 ENCOUNTER — NON-APPOINTMENT (OUTPATIENT)
Age: 71
End: 2021-08-31

## 2021-08-31 VITALS
OXYGEN SATURATION: 95 % | SYSTOLIC BLOOD PRESSURE: 133 MMHG | DIASTOLIC BLOOD PRESSURE: 75 MMHG | HEART RATE: 68 BPM | BODY MASS INDEX: 31.07 KG/M2 | WEIGHT: 182 LBS | HEIGHT: 64 IN

## 2021-08-31 PROCEDURE — 93000 ELECTROCARDIOGRAM COMPLETE: CPT

## 2021-08-31 PROCEDURE — 99205 OFFICE O/P NEW HI 60 MIN: CPT

## 2021-08-31 NOTE — DISCUSSION/SUMMARY
[FreeTextEntry1] : Patient with above hx \par \par chronic BILLINGS stable  possibly due to COPD , treated Lung carcinoma , doubt cardiac component  patient had normal ventricular systolic function . continue home oxygen , antihypertensive medication , \par \par CAD remote hx of RCA stent with multiple risk factors for CAD;  without chest pain , normal LVEF , continue stain , ecotrin , ace , will obtain chemical stress , will discuss with pulmonary regarding use of regadenoson  ( stress drug ) as patient has copd , \par \par HTN : controlled , continue low salt diet and medication , \par \par HLD  uncontrolled  on atorvastatin   would consider switching to crestor 20 mg po daily with zetia 10 mg po daily   blood work after 6 weeks , before considering PCSK inhibitors , \par \par DM : controlled HB a1c 6.6  continue his medication \par \par follow up after 3 months ,  will obtain carotid doppler

## 2021-08-31 NOTE — PHYSICAL EXAM
[Well Developed] : well developed [Well Nourished] : well nourished [Normal Conjunctiva] : normal conjunctiva [Normal Venous Pressure] : normal venous pressure [No Carotid Bruit] : no carotid bruit [Normal Rate] : normal [Normal S1] : normal S1 [Normal S2] : normal S2 [S3] : no S3 [S4] : no S4 [No Murmur] : no murmurs heard [II] : a grade 2 [No Pitting Edema] : no pitting edema present [Rt] : varicose veins of the right leg noted [Lt] : varicose veins of the left leg noted [Right Carotid Bruit] : no bruit heard over the right carotid [Left Carotid Bruit] : no bruit heard over the left carotid [2+] : left 2+ [Right Femoral Bruit] : no bruit heard over the right femoral artery [Left Femoral Bruit] : no bruit heard over the left femoral artery [1+] : left 1+ [No Abnormalities] : the abdominal aorta was not enlarged and no bruit was heard

## 2021-08-31 NOTE — CARDIOLOGY SUMMARY
[de-identified] : 6/14/21  Mild  LVH  EF 60-65% MSMAML without out flow tract obstruction , mild DD

## 2021-08-31 NOTE — HISTORY OF PRESENT ILLNESS
[FreeTextEntry1] : 71 year old male with hx of  HTN , HLD ,  CAD PCI  RCA stent 2007  GERD, former smoker treated Stage 4 small cell lung carcinoma  s/p radiation to chest and head , COPD On home oxygen came to establish cardiac care . Patient does have chronic BILLINGS with fatigue without chest pain for long time , patient is on oxygen for almost  one and half year on oxygen . patient does walk while on oxygen , half to 1 mile daily , \par \par Patient blood pressure is controlled on medication ,diabetes , under care of endo crinology\par \par his lipid profile  showed     HDL 96 he says he is taking atorvastatin 80 mg po daily  \par \par patient does have mild LE swelling got  much better since he was done with chemotherapy , denies orthopnea

## 2021-08-31 NOTE — REASON FOR VISIT
[Symptom and Test Evaluation] : symptom and test evaluation [CV Risk Factors and Non-Cardiac Disease] : CV risk factors and non-cardiac disease [Hyperlipidemia] : hyperlipidemia [Hypertension] : hypertension [Coronary Artery Disease] : coronary artery disease [Other: ____] : [unfilled] [Spouse] : spouse

## 2021-08-31 NOTE — REVIEW OF SYSTEMS
[Fever] : no fever [Headache] : no headache [Feeling Fatigued] : feeling fatigued [Blurry Vision] : no blurred vision [Seeing Double (Diplopia)] : no diplopia [Earache] : no earache [SOB] : no shortness of breath [Dyspnea on exertion] : dyspnea during exertion [Chest Discomfort] : no chest discomfort [Leg Claudication] : no intermittent leg claudication [Palpitations] : no palpitations [Syncope] : no syncope [Abdominal Pain] : no abdominal pain [Nausea] : no nausea [Vomiting] : no vomiting [Urinary Frequency] : no change in urinary frequency [Joint Pain] : no joint pain [Rash] : no rash [Convulsions] : no convulsions [Limb Weakness (Paresis)] : no limb weakness (Paresis) [Confusion] : no confusion was observed [Easy Bleeding] : no tendency for easy bleeding [FreeTextEntry9] : chronic back pain   fracture spine due to osteoporosis

## 2021-09-10 ENCOUNTER — APPOINTMENT (OUTPATIENT)
Dept: CT IMAGING | Facility: CLINIC | Age: 71
End: 2021-09-10
Payer: MEDICARE

## 2021-09-10 ENCOUNTER — OUTPATIENT (OUTPATIENT)
Dept: OUTPATIENT SERVICES | Facility: HOSPITAL | Age: 71
LOS: 1 days | End: 2021-09-10
Payer: MEDICARE

## 2021-09-10 DIAGNOSIS — Z90.89 ACQUIRED ABSENCE OF OTHER ORGANS: Chronic | ICD-10-CM

## 2021-09-10 DIAGNOSIS — Z95.5 PRESENCE OF CORONARY ANGIOPLASTY IMPLANT AND GRAFT: Chronic | ICD-10-CM

## 2021-09-10 DIAGNOSIS — N20.0 CALCULUS OF KIDNEY: Chronic | ICD-10-CM

## 2021-09-10 DIAGNOSIS — D11.0 BENIGN NEOPLASM OF PAROTID GLAND: Chronic | ICD-10-CM

## 2021-09-10 DIAGNOSIS — Z00.8 ENCOUNTER FOR OTHER GENERAL EXAMINATION: ICD-10-CM

## 2021-09-10 PROCEDURE — 82565 ASSAY OF CREATININE: CPT

## 2021-09-10 PROCEDURE — 71260 CT THORAX DX C+: CPT

## 2021-09-10 PROCEDURE — 74177 CT ABD & PELVIS W/CONTRAST: CPT | Mod: 26

## 2021-09-10 PROCEDURE — 71260 CT THORAX DX C+: CPT | Mod: 26

## 2021-09-10 PROCEDURE — 74177 CT ABD & PELVIS W/CONTRAST: CPT

## 2021-09-14 ENCOUNTER — OUTPATIENT (OUTPATIENT)
Dept: OUTPATIENT SERVICES | Facility: HOSPITAL | Age: 71
LOS: 1 days | Discharge: HOME | End: 2021-09-14

## 2021-09-14 DIAGNOSIS — C34.90 MALIGNANT NEOPLASM OF UNSPECIFIED PART OF UNSPECIFIED BRONCHUS OR LUNG: ICD-10-CM

## 2021-09-14 DIAGNOSIS — D11.0 BENIGN NEOPLASM OF PAROTID GLAND: Chronic | ICD-10-CM

## 2021-09-14 DIAGNOSIS — N20.0 CALCULUS OF KIDNEY: Chronic | ICD-10-CM

## 2021-09-14 DIAGNOSIS — Z90.89 ACQUIRED ABSENCE OF OTHER ORGANS: Chronic | ICD-10-CM

## 2021-09-14 DIAGNOSIS — Z95.5 PRESENCE OF CORONARY ANGIOPLASTY IMPLANT AND GRAFT: Chronic | ICD-10-CM

## 2021-09-15 ENCOUNTER — APPOINTMENT (OUTPATIENT)
Dept: HEMATOLOGY ONCOLOGY | Facility: CLINIC | Age: 71
End: 2021-09-15
Payer: MEDICARE

## 2021-09-15 VITALS
HEIGHT: 63.98 IN | DIASTOLIC BLOOD PRESSURE: 78 MMHG | HEART RATE: 74 BPM | BODY MASS INDEX: 29.58 KG/M2 | SYSTOLIC BLOOD PRESSURE: 139 MMHG | OXYGEN SATURATION: 95 % | WEIGHT: 173.28 LBS | TEMPERATURE: 97.9 F | RESPIRATION RATE: 18 BRPM

## 2021-09-15 PROCEDURE — 99214 OFFICE O/P EST MOD 30 MIN: CPT

## 2021-09-30 ENCOUNTER — APPOINTMENT (OUTPATIENT)
Dept: PULMONOLOGY | Facility: CLINIC | Age: 71
End: 2021-09-30
Payer: MEDICARE

## 2021-09-30 VITALS
BODY MASS INDEX: 30.65 KG/M2 | HEART RATE: 74 BPM | DIASTOLIC BLOOD PRESSURE: 71 MMHG | SYSTOLIC BLOOD PRESSURE: 136 MMHG | TEMPERATURE: 96.3 F | HEIGHT: 63 IN | OXYGEN SATURATION: 95 % | WEIGHT: 173 LBS | RESPIRATION RATE: 16 BRPM

## 2021-09-30 DIAGNOSIS — J18.9 PNEUMONIA, UNSPECIFIED ORGANISM: ICD-10-CM

## 2021-09-30 PROCEDURE — 99214 OFFICE O/P EST MOD 30 MIN: CPT

## 2021-09-30 RX ORDER — FLUTICASONE FUROATE AND VILANTEROL TRIFENATATE 200; 25 UG/1; UG/1
200-25 POWDER RESPIRATORY (INHALATION)
Qty: 1 | Refills: 6 | Status: DISCONTINUED | COMMUNITY
Start: 2019-11-25 | End: 2021-09-30

## 2021-09-30 NOTE — HISTORY OF PRESENT ILLNESS
[Former] : former [Never] : never [Wheezing] : wheezing [Nasal Passage Blockage (Stuffiness)] : edema [Nonspecific Pain, Swelling, And Stiffness] : chest pain [Fever] : fever [Cough] : coughing [Difficulty Breathing During Exertion] : dyspnea on exertion [Feelings Of Weakness On Exertion] : exercise intolerance [Continuous] : Continuous [NC] : Nasal Cannula [24 hrs] : 24 hours/day [2  -  Slight] : 2, slight [Nonrestorative Sleep] : nonrestorative sleep [Snoring] : snoring [TextBox_4] : 71M DM2, HTN, CAD s/p PCI (RCA 2007), HLD, and COPD and lung cancer presenting for followup pulmonary evaluation. He is doing well today and presents to the office alone - but his daughter (Veronica) is on the phone for translation purposes.\par \par - Recently saw Dr. Ramirez and is doing well from a small cell cancer standpoint - stable disease\par - He has completed Prednisone for prior pneumonitis\par - He has a dry cough - some improvement with Albuterol\par - He needs refills on Albuterol, symbicort, and Flonase today (provided)\par - He is scheduled for Pfizer booster tomorrow (3rd shot)\par - He continues to snore - referral for HST provided again\par - He is on 1-3L home O2\par - [facility]\par \par \par He is a long term former smoker but quit at the time of his lung cancer diagnosis. He has known obstructive lung disease with a bronchodilator response. He was switched from Breo to Symbicort while in the hospital. He has a Ventolin inhaler and uses it intermittently. He developed pneumonitis from treatment of Small Cell which required hospitalization and then treatment with a prolonged course of steroids. He did develop COVID-19 but fortunately did not require hospitalization.\par \par He was diagnosed with small cell lung cancer 4/2019 and then started on chemotherapy and radiation. He initially underwent Bronchoscopy/EBUS with Dr. Martinez which revealed that bilateral mediastinal LN were positive for small cell carcinoma. He was started on chemotehrapy at that time in May 2019 and achieved partial response. He was then started on maintenance Atezolizumab. He was offered prophylactic cranial irradiation but declined initially. He developed small brain mets in June 2020 and received GK-SRS with Dr. Mari [facility]. He had a CT chest 8/31/2020 that showed a small stable upper lobe opacity. His repeat CT chest done 10/2020 was suggestive of pneumonitis. He had a repeat CT chest done 12/28/2020 which showed improvement.\par \par He continues to snore but is not yet ready to have an HST. He feel better on mornings where he sleeps more than 5 hours. [FreeTextEntry1] : 2 [Difficulty Maintaining Sleep] : does not have difficulty maintaining sleep [Witnessed Apneas] : no witnessed apneas [ESS] : 0 [TextBox_12] : 12/28/2020 - INTERPRETATION:  Reason for Exam: Left upper lobe lung cancer\par \par CT of the chest was performed from the thoracic inlet to the level of the adrenal glands following IV contrast injection of  50 cc of Omnipaque 350. No immediate complications were reported.\par \par Comparison: October 21, 2020\par \par Tubes/Lines: None\par \par Mediastinum and Heart: Aorta and pulmonary arteries are normal in size. No pericardial effusion. Multiple calcified lymph nodes in the mediastinum are unchanged since prior. Coronary artery calcifications are noted.. Thyroid gland appears unremarkable.\par \par Lungs, Pleura, and Airways: Again seen is a left upper lobe spiculated nodule which measures approximately 1.4 cm which is unchanged since previous exam. Previously seen areas of bilateral peribronchovascular groundglass abnormalities and consolidations have essentially resolved. Minimal residual peribronchial vascular and linear scarring with traction bronchiolectasis noted. Focal area of distortion in the right middle lobe with calcification and traction bronchiectasis is stable.\par \par Visualized Abdomen: Postcontrast appearance of the upper abdomen is unremarkable.\par \par Bones and soft tissues: Unremarkable.\par \par \par IMPRESSION:\par \par When compared with October 21, 2020:\par \par Peribronchovascular groundglass abnormality and consolidations consistent with pneumonitis has resolved.\par \par Unchanged left upper lobe 1.4 cm spiculated nodule in keeping with known malignancy. Follow-up recommended to assess for change.\par  [TextBox_27] : 6/11/21 - IMPRESSION:\par Unchanged spiculated left upper lobe mass.\par Left upper lobe subpleural cysts and opacities unchanged from 03/22/2021.\par Emphysema.\par Scattered groundglass pulmonary opacities unchanged from 03/22/2021.\par Compression deformity T10 with sclerotic change similar to 22 2021 [TextBox_42] : 9/10/2021 - FINDINGS:\par \par Chest:\par \par Lungs, Pleura, and Airways: Stable 1.6 cm left upper lobe nodule (4-72), when remeasured on prior in a comparable manner. More conspicuous tubular 9 mm nodule in the anterior left upper lobe (4-78). Patent airways to the segmental bronchi. Moderate emphysema. Multiple calcified granulomas and areas of scarring in both lungs. Unremarkable pleura.\par \par Lymph Nodes and Mediastinum: Subcentimeter right thyroid nodule. No enlarged lymph nodes. Calcified mediastinal and right hilar lymph nodes consistent with prior granulomatous disease.\par \par Heart and Vasculature: Normal heart size. Unremarkable pericardium. Normal caliber aorta and main pulmonary artery. Moderate calcified and noncalcified plaque throughout the aorta. Subjectively mild amount of coronary calcified plaque.\par \par Bones and Soft Tissues: Degenerative changes of the spine. New sclerosis of the anterior left third rib. Unchanged sclerosis and loss of height of the T10 vertebral body. Stable loss of height of T12.\par \par \par Abdomen and Pelvis:\par \par Liver: Calcified granuloma at the dome.\par \par Biliary System: Cholelithiasis.\par \par Spleen:Unremarkable.\par \par Pancreas: Unremarkable.\par \par Adrenals: Stable right adrenal nodules.\par \par Kidneys: Unremarkable.\par \par Bowel: Diverticulosis. Decreased fatty lesion in the transverse colon.\par \par Pelvis: Unremarkable.\par \par Other: No enlarged lymph nodes. No ascites.\par \par Bones and Soft Tissues: Degenerative changes of the spine.\par \par \par \par IMPRESSION:\par \par Since 6/11/2021:\par \par Stable dominant central left upper lobe nodule.\par \par More conspicuous small tubular nodule in the anterior left upper lobe could be mucoid impaction. 3 month follow-up is recommended.\par \par New sclerotic lesion of the left third rib. Stable sclerosis and loss of height of the T10 vertebral body.\par \par No new finding in the abdomen or pelvis.\par

## 2021-09-30 NOTE — REVIEW OF SYSTEMS
[Postnasal Drip] : postnasal drip [SOB on Exertion] : sob on exertion [GERD] : gerd [Back Pain] : back pain [Diabetes] : diabetes [Fever] : no fever [Fatigue] : no fatigue [Chills] : no chills [Nasal Congestion] : no nasal congestion [Cough] : no cough [Hemoptysis] : no hemoptysis [Sputum] : no sputum [Wheezing] : no wheezing [Chest Discomfort] : no chest discomfort [Edema] : no edema [Angioedema] : no angioedema [Abdominal Pain] : no abdominal pain [Nausea] : no nausea [Vomiting] : no vomiting [Rash] : no rash [Easy Bruising] : no easy bruising [Focal Weakness] : no focal weakness [Seizures] : no seizures [Depression] : no depression [Anxiety] : no anxiety [TextBox_91] : compression fracture noted

## 2021-09-30 NOTE — REASON FOR VISIT
[Follow-Up] : a follow-up visit [Lung Cancer] : lung cancer [COPD] : COPD [Patient Declined  Services] : - None: Patient declined  services [FreeTextEntry3] : Daughter Veronica - served as  over the phone

## 2021-09-30 NOTE — PHYSICAL EXAM
[No Acute Distress] : no acute distress [Well Nourished] : well nourished [Normal Appearance] : normal appearance [Well Groomed] : well groomed [No Deformities] : no deformities [Well Developed] : well developed [Normal Oropharynx] : normal oropharynx [No JVD] : no jvd [Normal Rate/Rhythm] : normal rate/rhythm [Normal Pulses] : normal pulses [Normal S1, S2] : normal s1, s2 [No Resp Distress] : no resp distress [No Acc Muscle Use] : no acc muscle use [Normal Rhythm and Effort] : normal rhythm and effort [Clear to Auscultation Bilaterally] : clear to auscultation bilaterally [Benign] : benign [Normal Gait] : normal gait [No Clubbing] : no clubbing [No Cyanosis] : no cyanosis [No Edema] : no edema [FROM] : FROM [Normal Color/ Pigmentation] : normal color/ pigmentation [No Focal Deficits] : no focal deficits [Oriented x3] : oriented x3 [Normal Mood] : normal mood [Normal Affect] : normal affect [TextBox_11] : anicteric, EOMI, MMM, trachea midline [TextBox_68] : good air entry, prolonged expiratory phase [TextBox_44] : FROM

## 2021-09-30 NOTE — CONSULT LETTER
[Dear  ___] : Dear  [unfilled], [Courtesy Letter:] : I had the pleasure of seeing your patient, [unfilled], in my office today. [Please see my note below.] : Please see my note below. [Sincerely,] : Sincerely, [FreeTextEntry3] : Don Jacobs MD, FACP, FCCP\par Division of Pulmonary, Critical Care, and Sleep Medicine\par Associate Professor of Medicine \par [employer] [facility]\par  [DrDavid  ___] : Dr. CORTES

## 2021-09-30 NOTE — ASSESSMENT
[FreeTextEntry1] : 71M extensive history including DM2, CAD, COPD, and small cell lung cancer presenting for followup pulmonary evaluation. Over the last year he was hospitalized for drug induced pneumonitis and then had COVID but was able to be managed at home\par \par 1. Dry cough and dyspnea - overall improved and without acute concern. Continue bronchodilator therapy\par - Continue Flonase\par \par 2. Pneumonitis - RESOLVED - patient underwent bronchoscopy and transbronchial biopsy which showed chronic inflammation. - He had a long course of Prednisone with PCP prophylaxis\par - Repeat CT chest from 12/28/2020 shows resolution of previously noted opacities\par \par 3. Small Cell Lung Cancer - with metastatic disease. He was recently seen by Dr. Ramirez\par - patient has received radiation therapy \par - He has improved from a pulmonary standpoint and I do not have any objections to further therapy if it is thought necessary from an Oncologic perspective.\par \par 4. COPD - patient is a long term former smoker. He will continue Symbicort and increase his use of Ventolin as needed.\par - PFTs and 6MWT done at prior visit\par - No evidence of desaturation during 6MWT with 4L O2\par - PFTs remain stable - no BD testing in setting of COVID-19 pandemic. On prior PFTs patient had significant BD response\par \par 5. Snoring - patient with reported signs and symptoms of sleep disorder breathing. I have referred him to the [facility] for a diagnostic home sleep study. We discussed potential sleep apnea therapies which patient will consider.\par - Patient/family now amenable to scheduling this test\par \par 6. Chronic Hypoxemic Respiratory Failure - patient currently on 1-3L supplemental O2. Continue to maintain O2 sats > 90% as able.\par - Can start to wean down FiO2 as able with goal O2 sat > 90%\par \par 7. Health Maintenance - patient has received Flu shot and pneumococcal vaccine this year.\par Spirometry: reviewed\par Smoking status: Former \par Pneumococcal vaccination status: Completed \par COVID vaccination status: Completed Pfizer series - scheduled for booster tomorrow \par [place] Sleepiness Scale: 0 (9/30/21)\par \par The above plan was discussed with EVER CHAVEZ  in detail. Patient verbalized understanding and agrees with plan as detailed above. Patient was provided education and counselling on current diagnosis/symptoms, diagnostic work up, treatment options and potential side effects of any prescribed therapy/therapies. EVER  was advised to call our clinic at [phone] for any new or worsening symptoms, or with any questions or concerns. In case of acute onset of respiratory symptoms or worsening presentation, patient was advised to present to nearest emergency room for further evaluation. EVER  expressed understanding and all questions/concerns were addressed.\par \par

## 2021-10-14 DIAGNOSIS — R68.89 OTHER GENERAL SYMPTOMS AND SIGNS: ICD-10-CM

## 2021-10-15 ENCOUNTER — APPOINTMENT (OUTPATIENT)
Dept: MRI IMAGING | Facility: IMAGING CENTER | Age: 71
End: 2021-10-15
Payer: MEDICARE

## 2021-10-15 ENCOUNTER — OUTPATIENT (OUTPATIENT)
Dept: OUTPATIENT SERVICES | Facility: HOSPITAL | Age: 71
LOS: 1 days | End: 2021-10-15
Payer: MEDICARE

## 2021-10-15 DIAGNOSIS — Z95.5 PRESENCE OF CORONARY ANGIOPLASTY IMPLANT AND GRAFT: Chronic | ICD-10-CM

## 2021-10-15 DIAGNOSIS — N20.0 CALCULUS OF KIDNEY: Chronic | ICD-10-CM

## 2021-10-15 DIAGNOSIS — Z90.89 ACQUIRED ABSENCE OF OTHER ORGANS: Chronic | ICD-10-CM

## 2021-10-15 DIAGNOSIS — D11.0 BENIGN NEOPLASM OF PAROTID GLAND: Chronic | ICD-10-CM

## 2021-10-15 DIAGNOSIS — C79.31 SECONDARY MALIGNANT NEOPLASM OF BRAIN: ICD-10-CM

## 2021-10-15 PROCEDURE — A9585: CPT

## 2021-10-15 PROCEDURE — 70553 MRI BRAIN STEM W/O & W/DYE: CPT

## 2021-10-15 PROCEDURE — 70553 MRI BRAIN STEM W/O & W/DYE: CPT | Mod: 26

## 2021-10-19 NOTE — VITALS
[Maximal Pain Intensity: 7/10] : 7/10 [Least Pain Intensity: 2/10] : 2/10 [80: Normal activity with effort; some signs or symptoms of disease.] : 80: Normal activity with effort; some signs or symptoms of disease.

## 2021-10-20 ENCOUNTER — APPOINTMENT (OUTPATIENT)
Dept: RADIATION ONCOLOGY | Facility: CLINIC | Age: 71
End: 2021-10-20
Payer: MEDICARE

## 2021-10-20 VITALS
BODY MASS INDEX: 29.73 KG/M2 | SYSTOLIC BLOOD PRESSURE: 138 MMHG | OXYGEN SATURATION: 95 % | WEIGHT: 167.77 LBS | HEART RATE: 68 BPM | HEIGHT: 63 IN | DIASTOLIC BLOOD PRESSURE: 77 MMHG | RESPIRATION RATE: 16 BRPM

## 2021-10-20 PROCEDURE — 99213 OFFICE O/P EST LOW 20 MIN: CPT | Mod: GC

## 2021-10-26 NOTE — HISTORY OF PRESENT ILLNESS
[FreeTextEntry1] : Mr. Cinthya Ness is a 72 yo male with extensive stage small cell lung cancer diagnosed in 4/2019 s/p carbo/ etoposide/ atezolizumab and consolidative RT 8/2019 followed by adjuvant atezolizumab. He was found to have two sub-cm brain metastases treated on 7/1/2020 with GK SRS (L_Frontal, L_Parietal). Patient presents for follow up. \par \par ONCOLOGY HISTORY\par He is a 69 year old male with a history of small cell lung cancer.  This was initially diagnosed in 4/2019 through an ebus of bilateral level 4 lymph nodes in 4/2019 after presenting with 6 months of worsening cough, fatigue, and insomnia. A CT chest showed a lung mass at that time. He was initially treated with carbo/etoposide/atezoluzimab starting in 5/2019 with 4 cycles completed in 7/2019, followed by atezolizumab maintenance . Consolidative RT completed in 8/2019. He elected against prophylactic whole brain radiation. \par \par Due to frequent headaches, forgetfulness, and reflexes bring worse, a brain MRI was ordered on 6/10/2020. \par \par This brain MRI revealed two brain lesions consistent with metastases. \par 1: left posterior temporal/occipital cortex,  approximately 1.4 x 1.2 cm. \par 2:  high medial left frontal cortex, approximately 0.7 x 0.6 cm Small amount of surrounding edema is identified. \par \par At the time of initial consult he endorsed occassional headaches, manageable.  Denied focal weakness, nausea, vomiting, or other complaints.  Maintains an excellent KPS.  His daughter, a physician, is serving as the  during this conversation.\par \par He was treated with GK SRS to the two lesions on 7/1/2020\par \par 9/17/2020- Mr. Ness presents today for follow up. He is seen with the assistance of [facility]  [id number]. MRI brain done 9/11/2020 showed maarked improvement and treatment response since prior exam. Previously visualized enhancing lesion in the left posterior temporal lobe is markedly decreased in size since prior exam, measuring approximately 0.6 x 0.6 cm, and previously measured 1.4 x 1.2 cm. There is no vasogenic edema surrounding this lesion. Previously visualized enhancing lesion in the high medial left frontal cortex, is markedly decreased in size since prior exam, nearly nonexistent measuring 0.1 cm, and previously measuring 0.7 x 0.6 cm. Minimal surrounding vasogenic edema remains. No new enhancing lesions are identified. \par \par He continues following with Dr. Ramirez. continues on atezoluzumab maintenance. CT chest 8/31/2020 showed Emphysema is present. Left upper lobe ill-defined nodular/linear opacity is similar compared to the prior study. Other bilateral patchy lung opacities are new from the prior study. These are indeterminate and may be infectious/inflammatory. 1 month follow-up CT needed for complete evaluation. \par \par Today he feels very well. Notes some headaches over the last week, not lasting long. Denies nausea, vomiting, focal weakness, Overall feeling well. Hearing may be a little worse recently.\par \par 12/22/2020- Mr. Ness presents today for follow up. [employer]  [id number] used for visit today.  MRI brain 12/11/2020 showed previously seen left temporal occipital lobe lesion is significantly smaller in size.  Previously seen left frontal lobe lesion is no longer visualized.  Continued follow-up is recommended.\par \par CT Chest 10/19/2020 showed widespread bilateral opacities throughout both lungs with significant interval progression since August 31, 2020 suggestive of infectious or inflammatory etiology. Findings may be due to Covid 19 pneumonia.\par \par Atezoluzumab discontinued this fall due to hospitalization with pneumonitis. Due for surveillance imaging at the end of December. Currently on 120 mg of prednisone for pneumonitis.  Today he denies headaches. Says he feels slightly confused sometimes, attributes this to steroids. Denies nausea, vomiting, focal weakness. Slight bilateral LE swelling, +1\par \par 4/13/2021- Mr. Ness presents today for follow up. He is seen through TELEHEALTH for which he provides verbal consent on 4/13/2021 at 3:19 PM. [employer]  [id number] used for visit. he underwent a follow up brain MRI on 4/12/2021. This showed Previously noted enhancing lesions are no longer identified. Continued close interval follow-up is recommended.\par He was diagnosed with covid-19 on 4/2. Has continued to follow with Dr. Ramirez. \par Today he is feeling  well. Remains on 3L O2, which he was on prior to COVID. Feels like antibody therapy helped him a lot. No headaches, no focal weakness. \par \par 7/22/2021- Mr. Ness presents today for follow up. [place]   he underwent a brain MRI on 7/15/2021. This showed no significant change. Continues to follow with Dr. Ramirez. Continues to be observed off treatment since 9/2020, with sustained response in June, plan for follow up imaging in in 9/2021.\par Today he is feeling well. Remains on 2L O2 during the day, none at night. No headaches, nausea, vomiting, focal weakness, visual trouble. some trouble swallowing which has been present since he had systemic therapy. Overall feeling well. has some back pain after a recent fall\par \par 10/20/2021- Mr. Ness presents today for follow up. Has continued to follow with Dr. Ramirez. Continues to be monitored off treatment.\par \par CT CAP 9/2021 showed \par Stable dominant central left upper lobe nodule.\par More conspicuous small tubular nodule in the anterior left upper lobe could be mucoid impaction. 3 month follow-up is recommended.\par New sclerotic lesion of the left third rib. Stable sclerosis and loss of height of the T10 vertebral body.\par No new finding in the abdomen or pelvis.\par Brain MRI 10/15/2021 showed Posttreatment changes. No new or enlarging intracranial lesions identified.\par Spoke with patient via  #[id number].  Patient is feeling well and denies any new symptoms.

## 2021-10-26 NOTE — PHYSICAL EXAM
[General Appearance - Well Nourished] : well nourished [General Appearance - In No Acute Distress] : in no acute distress [Normal] : no focal deficits [Not Anxious] : not anxious [Sclera] : the sclera and conjunctiva were normal [Outer Ear] : the ears and nose were normal in appearance [] : no respiratory distress [Oriented To Time, Place, And Person] : oriented to person, place, and time [de-identified] : wearing nasal O2 [de-identified] : no facial weakness demonstrated

## 2021-10-26 NOTE — REVIEW OF SYSTEMS
[Loss of Hearing] : loss of hearing [Shortness Of Breath] : shortness of breath [Negative] : Gastrointestinal [Dysphagia] : no dysphagia [Confused] : no confusion [Dizziness] : no dizziness [Fainting] : no fainting [Difficulty Walking] : no difficulty walking [Insomnia] : no insomnia [FreeTextEntry2] : whole body feels tired [FreeTextEntry6] : Continues to use O2 2L O2 [de-identified] : denies headaches.

## 2021-11-08 ENCOUNTER — APPOINTMENT (OUTPATIENT)
Dept: SLEEP CENTER | Facility: CLINIC | Age: 71
End: 2021-11-08
Payer: MEDICARE

## 2021-11-08 ENCOUNTER — OUTPATIENT (OUTPATIENT)
Dept: OUTPATIENT SERVICES | Facility: HOSPITAL | Age: 71
LOS: 1 days | End: 2021-11-08
Payer: MEDICARE

## 2021-11-08 DIAGNOSIS — Z90.89 ACQUIRED ABSENCE OF OTHER ORGANS: Chronic | ICD-10-CM

## 2021-11-08 DIAGNOSIS — D11.0 BENIGN NEOPLASM OF PAROTID GLAND: Chronic | ICD-10-CM

## 2021-11-08 DIAGNOSIS — N20.0 CALCULUS OF KIDNEY: Chronic | ICD-10-CM

## 2021-11-08 DIAGNOSIS — Z95.5 PRESENCE OF CORONARY ANGIOPLASTY IMPLANT AND GRAFT: Chronic | ICD-10-CM

## 2021-11-08 PROCEDURE — 95806 SLEEP STUDY UNATT&RESP EFFT: CPT | Mod: 26

## 2021-11-08 PROCEDURE — G0399: CPT

## 2021-11-09 ENCOUNTER — APPOINTMENT (OUTPATIENT)
Dept: CARDIOLOGY | Facility: CLINIC | Age: 71
End: 2021-11-09
Payer: MEDICARE

## 2021-11-09 ENCOUNTER — NON-APPOINTMENT (OUTPATIENT)
Age: 71
End: 2021-11-09

## 2021-11-09 VITALS
OXYGEN SATURATION: 97 % | HEIGHT: 63 IN | BODY MASS INDEX: 29.95 KG/M2 | HEART RATE: 70 BPM | WEIGHT: 169 LBS | DIASTOLIC BLOOD PRESSURE: 75 MMHG | SYSTOLIC BLOOD PRESSURE: 138 MMHG

## 2021-11-09 LAB
ALBUMIN SERPL ELPH-MCNC: 4.5 G/DL
ALP BLD-CCNC: 69 U/L
ALT SERPL-CCNC: 20 U/L
ANION GAP SERPL CALC-SCNC: 16 MMOL/L
AST SERPL-CCNC: 18 U/L
BILIRUB SERPL-MCNC: 0.4 MG/DL
BUN SERPL-MCNC: 16 MG/DL
CALCIUM SERPL-MCNC: 9.1 MG/DL
CHLORIDE SERPL-SCNC: 101 MMOL/L
CHOLEST SERPL-MCNC: 114 MG/DL
CK SERPL-CCNC: 92 U/L
CO2 SERPL-SCNC: 22 MMOL/L
CREAT SERPL-MCNC: 0.92 MG/DL
GLUCOSE SERPL-MCNC: 128 MG/DL
HDLC SERPL-MCNC: 49 MG/DL
LDLC SERPL CALC-MCNC: 42 MG/DL
NONHDLC SERPL-MCNC: 65 MG/DL
POTASSIUM SERPL-SCNC: 4.6 MMOL/L
PROT SERPL-MCNC: 6.4 G/DL
SODIUM SERPL-SCNC: 139 MMOL/L
TRIGL SERPL-MCNC: 119 MG/DL

## 2021-11-09 PROCEDURE — 93880 EXTRACRANIAL BILAT STUDY: CPT

## 2021-11-09 PROCEDURE — 93000 ELECTROCARDIOGRAM COMPLETE: CPT

## 2021-11-09 PROCEDURE — 99214 OFFICE O/P EST MOD 30 MIN: CPT

## 2021-11-09 RX ORDER — EZETIMIBE 10 MG/1
10 TABLET ORAL
Qty: 1 | Refills: 1 | Status: DISCONTINUED | COMMUNITY
Start: 2021-08-31 | End: 2021-11-09

## 2021-11-09 NOTE — DISCUSSION/SUMMARY
[FreeTextEntry1] : Patient with above hx \par \par chronic BILLINGS stable  possibly due to COPD , treated Lung carcinoma , doubt cardiac component  patient had normal ventricular systolic function . continue home oxygen , antihypertensive medication , \par \par CAD remote hx of RCA stent with multiple risk factors for CAD;  without chest pain , normal LVEF , continue stain , ecotrin , ace , will obtain chemical stress , will discuss with pulmonary regarding use of regadenoson  ( stress drug ) as patient has copd , \par \par HTN : controlled , continue low salt diet and medication , \par \par HLD controlled  continue  crestor 20 mg po daily , discontinue  zetia 10 mg po daily    monitor renal function \par \par DM : controlled HB a1c 6.6  continue his medication \par \par follow up after 4  months ,

## 2021-11-09 NOTE — CARDIOLOGY SUMMARY
[de-identified] : sinus rhythm  [de-identified] : 6/14/21  Mild  LVH  EF 60-65% MSMAML without out flow tract obstruction , mild DD [de-identified] : 11/9/21 mild carotid disease ( s/p  bilateral CEA)

## 2021-11-09 NOTE — HISTORY OF PRESENT ILLNESS
[FreeTextEntry1] : 71 year old male with hx of  HTN , HLD ,  CAD PCI  RCA stent 2007  GERD, former smoker treated Stage 4 small cell lung carcinoma  s/p radiation to chest and head , COPD On home oxygen came to establish cardiac care . Patient does have chronic BILLINGS with fatigue without chest pain for long time , patient is on oxygen for almost  one and half year on oxygen . patient does walk while on oxygen , half to 1 mile daily , \par \par Patient blood pressure is controlled on medication ,diabetes , under care of endo crinology\par \par his lipid profile  showed TC  114   LDL 42  HDL 49  now on crestor 20 mg   had carotid doppler showed mild disease \par \par patient does have mild LE swelling got  much better since he was done with chemotherapy , denies orthopnea

## 2021-11-10 ENCOUNTER — TRANSCRIPTION ENCOUNTER (OUTPATIENT)
Age: 71
End: 2021-11-10

## 2021-11-19 ENCOUNTER — APPOINTMENT (OUTPATIENT)
Dept: INTERNAL MEDICINE | Facility: CLINIC | Age: 71
End: 2021-11-19
Payer: MEDICARE

## 2021-11-19 VITALS
RESPIRATION RATE: 16 BRPM | HEIGHT: 63 IN | BODY MASS INDEX: 29.77 KG/M2 | HEART RATE: 74 BPM | DIASTOLIC BLOOD PRESSURE: 76 MMHG | SYSTOLIC BLOOD PRESSURE: 154 MMHG | OXYGEN SATURATION: 97 % | WEIGHT: 168 LBS

## 2021-11-19 VITALS — TEMPERATURE: 97.9 F | RESPIRATION RATE: 16 BRPM

## 2021-11-19 PROCEDURE — 99213 OFFICE O/P EST LOW 20 MIN: CPT | Mod: 25

## 2021-11-19 PROCEDURE — 90662 IIV NO PRSV INCREASED AG IM: CPT

## 2021-11-19 PROCEDURE — 36415 COLL VENOUS BLD VENIPUNCTURE: CPT

## 2021-11-19 PROCEDURE — G0008: CPT

## 2021-11-20 LAB
CREAT SPEC-SCNC: 78 MG/DL
ESTIMATED AVERAGE GLUCOSE: 137 MG/DL
HBA1C MFR BLD HPLC: 6.4 %
MAGNESIUM SERPL-MCNC: 1.7 MG/DL
MICROALBUMIN 24H UR DL<=1MG/L-MCNC: 1.4 MG/DL
MICROALBUMIN/CREAT 24H UR-RTO: 18 MG/G

## 2021-11-20 NOTE — HISTORY OF PRESENT ILLNESS
[FreeTextEntry1] : \par f/u [de-identified] : \par Accompanied by wife, Jacque Abrams\par \par 72 yo M pmhx HTN, DM, HLD, CAD s/p RCA stent (2007), BPH s/p TURP, ED, GERD, hx gastritis, hypomagnesemia, former smoker (quit 2019), COPD (on supplemental O2), osteopenia, stage 4 small cell lung cancer (dx'd 4/19, s/p carboplatin/etoposide and radiation with partial response, then on atezolizumab c/b pneumonitis in 10/2020 treated with high dose steroids c/b vertebral compression fractures, with small brain metastases 6/2020 s/p gamma knife radiation therapy in 7/2020.  Was tapered off steroids for pneumonitis by 3/7/2021, and stopped Bactrim prophylaxis at that time), hx covid infection 3/21 (s/p monoclonal Ab infusion 3/30/21 and decadron 6 mg for 10 days by [employer] program; completed 35d participation in PREVENT-HD rivaroxaban trial) for f/u\par \par Last seen in office 7/30/21 for AWV with labs done.\par \par Feeling well.\par Needs med refills.\par \par Reports hx passing citizenship testing on 11/4- awaiting ceremony 12/21, excited for this.\par -did not see neurology re: forgetfulness as felt not needed and not too bothersome\par \par Reports is socially distancing and using precautions for covid prevention.\par Denies sick or covid positive contacts.\par Denies fever, chills, cough or sob.\par -hx pfizer vaccine series- 3/15, 7/2/21.  Reports booster pending 1/22.\par \par hx chronic LBP, hx compression fractures, hx osteopenia-- reports remains at baseline since 6/21 ER, mainly 1-2/10.  Denies recurrence of fall since 6/21.\par -followed by PMR, last seen 7/21, noted hx ER visit 6/21 and imaging, no changes made.  Advised PT.  F/U pending.\par -followed by neurosx, last seen 3/21- advised PT and back brace use with no surgical intervention planned.\par -followed by ortho, last seen 3/21\par -hx PT \par -on Flexeril prn, Tylenol prn and Oxycodone prn per PMR - but mainly taking Tylenol 1-2x/wk at baseline\par -on Alendronate since 1/21, tolerating w/o SEs\par -ambulates with cane or walker\par -using back brace\par -denies focal weakness or incontinence\par \par small cell lung cancer  stage 4 (dx'd 4/19), hx brain mets-\par -followed by oncology, last seen 9/21.  Noted off tx with cont'd monitoring planned.  Planned to f/u in 3 mo with repeat CT scans.\par -followed by rad-onc, last seen 10/21 with 10/21 MRI brain noted JAYCOB.  Advised f/u in 3 mo with repeat MRI.\par -followed by pulm\par -denies HA or vision trouble\par \par COPD (on supp O2), seasonal allergies, former smoker (quit 2019), hx covid infection 3/21- feels is at baseline\par -on Symbicort and Flonase prn\par -using 2L Oxygen (daytime, no night time need)- O2 sat 90-95% (baseline per pt)\par -followed by pulm, last seen 9/21 - w/o med changes, sleep study ordered (pending)\par -hx mild dry cough a/w postnasal drip- noted now resolved s/p Flonase prn\par -denies f/c, CP or sob\par \par DM-\par -followed by endo, has f/u 1/22.\par -on metformin  (2) BID since 3/21, mild loose BMs with use- feels not too bothersome, wishes to continue\par -on Basaglar 12 u (dose since 7/21 per endo)\par -hx Novolog, states now off hx stopped x 45d per endo/daughter discussion due low fs to 80s reported with improved fs\par -using Freestyle Osiel.  Home fs: 130 -135; denies hypoglycemic sx's \par -followed by optho, last seen 1/21, told no DM retinopathy.  \par -seen by optometrist 7/21 with new glasses given\par -followed by podiatry, states seen 10/21 with nails clipped.  Has f/u 1/22.  Denies foot paresthesias.\par \par GERD- reports controlled\par -on famotidine\par \par Reports hx intentionally trying to lose wt.\par Eating healthy diet\par exercising: walks 1 mile/day \par \par Reports nl appetite and BMs.\par -hx intermittent loose BMs- takes Imodium.  Onset ~ 1-2x/wk. Told 2/2 metformin- feels is controlled and wishes to continue.\par -denies dysphagia, n/v, abd pain or BRBPR.\par -hx c-scope/EGD 1/16\par \par hx hypomagnesemia-\par -adherent with oral supplement\par -notes has increased MG containing foods\par -not interested to lower metformin\par -nephrology eval pending\par \par HTN, HLD, CAD s/p stent- hx LE edema, reports lessening since off steroids\par -on ASA, metoprolol and Lisinopril\par -on Lipitor, Lovaza and niacin - denies SEs with use\par -had echo 6/21\par -followed by cardio, seen 11/21 with NST advised (pending), labs done and Zetia d/c'd to cont Crestor.\par -has low salt/fat diet\par -exercise: walks 1 mile/day\par -denies calf pain or claudication\par \par Denies  complaints.

## 2021-11-20 NOTE — PHYSICAL EXAM
[No Acute Distress] : no acute distress [Well-Appearing] : well-appearing [Normal Sclera/Conjunctiva] : normal sclera/conjunctiva [PERRL] : pupils equal round and reactive to light [EOMI] : extraocular movements intact [Normal Oropharynx] : the oropharynx was normal [Normal Nasal Mucosa] : the nasal mucosa was normal [No Lymphadenopathy] : no lymphadenopathy [Supple] : supple [Thyroid Normal, No Nodules] : the thyroid was normal and there were no nodules present [No Respiratory Distress] : no respiratory distress  [Clear to Auscultation] : lungs were clear to auscultation bilaterally [Normal Rate] : normal rate  [Regular Rhythm] : with a regular rhythm [Normal S1, S2] : normal S1 and S2 [No Murmur] : no murmur heard [Pedal Pulses Present] : the pedal pulses are present [Soft] : abdomen soft [Non Tender] : non-tender [No HSM] : no HSM [Normal Supraclavicular Nodes] : no supraclavicular lymphadenopathy [Normal Posterior Cervical Nodes] : no posterior cervical lymphadenopathy [Normal Anterior Cervical Nodes] : no anterior cervical lymphadenopathy [No CVA Tenderness] : no CVA  tenderness [No Spinal Tenderness] : no spinal tenderness [No Joint Swelling] : no joint swelling [No Rash] : no rash [No Focal Deficits] : no focal deficits [Normal Gait] : normal gait [Normal Affect] : the affect was normal [Alert and Oriented x3] : oriented to person, place, and time [No Edema] : there was no peripheral edema [de-identified] : chronic LE skin changes [de-identified] : scattered freckles [de-identified] : slow gait with cane

## 2021-11-21 DIAGNOSIS — G47.33 OBSTRUCTIVE SLEEP APNEA (ADULT) (PEDIATRIC): ICD-10-CM

## 2021-11-23 ENCOUNTER — RESULT REVIEW (OUTPATIENT)
Age: 71
End: 2021-11-23

## 2021-11-24 ENCOUNTER — NON-APPOINTMENT (OUTPATIENT)
Age: 71
End: 2021-11-24

## 2021-12-02 ENCOUNTER — OUTPATIENT (OUTPATIENT)
Dept: OUTPATIENT SERVICES | Facility: HOSPITAL | Age: 71
LOS: 1 days | End: 2021-12-02
Payer: MEDICARE

## 2021-12-02 ENCOUNTER — APPOINTMENT (OUTPATIENT)
Dept: CT IMAGING | Facility: CLINIC | Age: 71
End: 2021-12-02
Payer: MEDICARE

## 2021-12-02 DIAGNOSIS — N20.0 CALCULUS OF KIDNEY: Chronic | ICD-10-CM

## 2021-12-02 DIAGNOSIS — Z95.5 PRESENCE OF CORONARY ANGIOPLASTY IMPLANT AND GRAFT: Chronic | ICD-10-CM

## 2021-12-02 DIAGNOSIS — Z90.89 ACQUIRED ABSENCE OF OTHER ORGANS: Chronic | ICD-10-CM

## 2021-12-02 DIAGNOSIS — D11.0 BENIGN NEOPLASM OF PAROTID GLAND: Chronic | ICD-10-CM

## 2021-12-02 DIAGNOSIS — Z00.8 ENCOUNTER FOR OTHER GENERAL EXAMINATION: ICD-10-CM

## 2021-12-02 PROCEDURE — 82565 ASSAY OF CREATININE: CPT

## 2021-12-02 PROCEDURE — 71260 CT THORAX DX C+: CPT | Mod: 26

## 2021-12-02 PROCEDURE — 74177 CT ABD & PELVIS W/CONTRAST: CPT | Mod: 26

## 2021-12-02 PROCEDURE — 74177 CT ABD & PELVIS W/CONTRAST: CPT

## 2021-12-02 PROCEDURE — 71260 CT THORAX DX C+: CPT

## 2021-12-07 ENCOUNTER — OUTPATIENT (OUTPATIENT)
Dept: OUTPATIENT SERVICES | Facility: HOSPITAL | Age: 71
LOS: 1 days | Discharge: HOME | End: 2021-12-07

## 2021-12-07 DIAGNOSIS — Z90.89 ACQUIRED ABSENCE OF OTHER ORGANS: Chronic | ICD-10-CM

## 2021-12-07 DIAGNOSIS — C34.90 MALIGNANT NEOPLASM OF UNSPECIFIED PART OF UNSPECIFIED BRONCHUS OR LUNG: ICD-10-CM

## 2021-12-07 DIAGNOSIS — Z95.5 PRESENCE OF CORONARY ANGIOPLASTY IMPLANT AND GRAFT: Chronic | ICD-10-CM

## 2021-12-07 DIAGNOSIS — N20.0 CALCULUS OF KIDNEY: Chronic | ICD-10-CM

## 2021-12-07 DIAGNOSIS — D11.0 BENIGN NEOPLASM OF PAROTID GLAND: Chronic | ICD-10-CM

## 2021-12-08 ENCOUNTER — APPOINTMENT (OUTPATIENT)
Dept: HEMATOLOGY ONCOLOGY | Facility: CLINIC | Age: 71
End: 2021-12-08
Payer: MEDICARE

## 2021-12-08 VITALS
HEART RATE: 65 BPM | HEIGHT: 63.07 IN | TEMPERATURE: 97.7 F | OXYGEN SATURATION: 95 % | RESPIRATION RATE: 17 BRPM | SYSTOLIC BLOOD PRESSURE: 150 MMHG | DIASTOLIC BLOOD PRESSURE: 85 MMHG | BODY MASS INDEX: 30.16 KG/M2 | WEIGHT: 170.19 LBS

## 2021-12-08 PROCEDURE — 99214 OFFICE O/P EST MOD 30 MIN: CPT

## 2021-12-31 LAB — SARS-COV-2 N GENE NPH QL NAA+PROBE: NOT DETECTED

## 2022-01-05 ENCOUNTER — APPOINTMENT (OUTPATIENT)
Dept: ENDOCRINOLOGY | Facility: CLINIC | Age: 72
End: 2022-01-05
Payer: MEDICARE

## 2022-01-05 PROCEDURE — 99214 OFFICE O/P EST MOD 30 MIN: CPT | Mod: 95

## 2022-01-09 NOTE — PHYSICAL EXAM
[Alert] : alert [No Acute Distress] : no acute distress [Normal Sclera/Conjunctiva] : normal sclera/conjunctiva [No Proptosis] : no proptosis [Normal Outer Ear/Nose] : the ears and nose were normal in appearance [Normal Hearing] : hearing was normal [No Neck Mass] : no neck mass was observed [No Respiratory Distress] : no respiratory distress [Not Tender] : non-tender [Soft] : abdomen soft [Normal Gait] : normal gait [No Rash] : no rash [No Tremors] : no tremors [Oriented x3] : oriented to person, place, and time [Normal Affect] : the affect was normal [Normal Mood] : the mood was normal [Foot Ulcers] : no foot ulcers [Acne] : no acne

## 2022-01-09 NOTE — HISTORY OF PRESENT ILLNESS
[FreeTextEntry1] : Visit was conducted via telehealth\par 71 yr old M with metastatic Lung CA, CAD and COPD here for f/u visit for Type 2 DM\par A1C is 6.4\par He was on metformin and januvia in the past\par Current regimen: Basaglar 12 Units HS and metformin 1g BID\par Novolog was discontinued at last visit\par Was on tapering dose of prednisone but has been off since March 2021\par He moderates his intake of carbs, drinks crystal light and coke zero\par Saw optho Jan 2021\par No neuropathy\par For HTG, takes fish oil 500 mg 1 X day, atorvastatin 80mg daily, Niacin 2 tablets at night\par Taper directed by pulmonary team\par He uses a Free Style Osiel for glucose monitoring:\par Target Range 93%\par High 7%\par Low 0%\par \par \par

## 2022-01-09 NOTE — ASSESSMENT
[FreeTextEntry1] : 71 yr old M with metastatic Lung CA, CAD and COPD here with T2DM now off steroids\par 1) T2DM A1C 6.4%\par Counselled on diet and exercise modification/importance of glycemic control/Hypoglycemia mgmt\par Cont basaglar/metformin\par Will start Trulicity 0.75mg subq weekly\par UTD with optho\par Continue glucose monitoring with Osiel\par 2) HTN\par Urine Micro/Creat Ratio nl\par Pt is on Lisinopril 20mg daily\par 3) HLD\par LDL 42 Nov 2021\par Cont atorvastatin, niacin, fish oil\par 4) Osteoporosis (osteopenia with compression fractures)\par Mgmt per medicine\par Plan for repeat DEXA Jan 2022\par \par \par

## 2022-01-18 ENCOUNTER — OUTPATIENT (OUTPATIENT)
Dept: OUTPATIENT SERVICES | Facility: HOSPITAL | Age: 72
LOS: 1 days | End: 2022-01-18
Payer: MEDICARE

## 2022-01-18 ENCOUNTER — APPOINTMENT (OUTPATIENT)
Dept: MRI IMAGING | Facility: CLINIC | Age: 72
End: 2022-01-18
Payer: MEDICARE

## 2022-01-18 DIAGNOSIS — Z00.8 ENCOUNTER FOR OTHER GENERAL EXAMINATION: ICD-10-CM

## 2022-01-18 DIAGNOSIS — Z90.89 ACQUIRED ABSENCE OF OTHER ORGANS: Chronic | ICD-10-CM

## 2022-01-18 DIAGNOSIS — N20.0 CALCULUS OF KIDNEY: Chronic | ICD-10-CM

## 2022-01-18 DIAGNOSIS — D11.0 BENIGN NEOPLASM OF PAROTID GLAND: Chronic | ICD-10-CM

## 2022-01-18 DIAGNOSIS — Z95.5 PRESENCE OF CORONARY ANGIOPLASTY IMPLANT AND GRAFT: Chronic | ICD-10-CM

## 2022-01-18 PROCEDURE — 70553 MRI BRAIN STEM W/O & W/DYE: CPT

## 2022-01-18 PROCEDURE — A9585: CPT

## 2022-01-18 PROCEDURE — 70553 MRI BRAIN STEM W/O & W/DYE: CPT | Mod: 26

## 2022-01-19 ENCOUNTER — APPOINTMENT (OUTPATIENT)
Dept: RADIATION ONCOLOGY | Facility: CLINIC | Age: 72
End: 2022-01-19
Payer: MEDICARE

## 2022-01-19 VITALS
DIASTOLIC BLOOD PRESSURE: 82 MMHG | TEMPERATURE: 98.9 F | WEIGHT: 169.32 LBS | HEART RATE: 80 BPM | BODY MASS INDEX: 30 KG/M2 | OXYGEN SATURATION: 95 % | HEIGHT: 63 IN | SYSTOLIC BLOOD PRESSURE: 130 MMHG | RESPIRATION RATE: 18 BRPM

## 2022-01-19 PROCEDURE — 99213 OFFICE O/P EST LOW 20 MIN: CPT

## 2022-01-21 NOTE — PHYSICAL EXAM
[] : no respiratory distress [Normal] : no focal deficits [de-identified] : 2L O2.   [de-identified] : CN II-XII normal.

## 2022-01-21 NOTE — REVIEW OF SYSTEMS
[Shortness Of Breath] : shortness of breath [Negative] : Heme/Lymph [Concentration Impairment: Grade 1] : Concentration Impairment: Grade 1 - Mild inattention or decreased level of concentration [FreeTextEntry6] : Using 2L O2

## 2022-01-21 NOTE — REASON FOR VISIT
[Routine Follow-Up] : routine follow-up visit for [[employer] ] : provided by [employer]   [Interpreters_IDNumber] : [id number] [Interpreters_FullName] : Al  [TWNoteComboBox1] : [place]

## 2022-01-21 NOTE — HISTORY OF PRESENT ILLNESS
[FreeTextEntry1] : Mr. Cinthya Ness is a 70 yo male with extensive stage small cell lung cancer diagnosed in 4/2019 s/p carbo/ etoposide/ atezolizumab and consolidative RT 8/2019 followed by adjuvant atezolizumab. He was found to have two sub-cm brain metastases treated on 7/1/2020 with GK SRS (L_Frontal, L_Parietal). Patient presents for follow up. \par \par ONCOLOGY HISTORY\par He is a 69 year old male with a history of small cell lung cancer.  This was initially diagnosed in 4/2019 through an ebus of bilateral level 4 lymph nodes in 4/2019 after presenting with 6 months of worsening cough, fatigue, and insomnia. A CT chest showed a lung mass at that time. He was initially treated with carbo/etoposide/atezoluzimab starting in 5/2019 with 4 cycles completed in 7/2019, followed by atezolizumab maintenance . Consolidative RT completed in 8/2019. He elected against prophylactic whole brain radiation. \par \par Due to frequent headaches, forgetfulness, and reflexes bring worse, a brain MRI was ordered on 6/10/2020. \par \par This brain MRI revealed two brain lesions consistent with metastases. \par 1: left posterior temporal/occipital cortex,  approximately 1.4 x 1.2 cm. \par 2:  high medial left frontal cortex, approximately 0.7 x 0.6 cm Small amount of surrounding edema is identified. \par \par At the time of initial consult he endorsed occassional headaches, manageable.  Denied focal weakness, nausea, vomiting, or other complaints.  Maintains an excellent KPS.  His daughter, a physician, is serving as the  during this conversation.\par \par He was treated with GK SRS to the two lesions on 7/1/2020\par \par 9/17/2020- Mr. Ness presents today for follow up. He is seen with the assistance of [facility]  [id number]. MRI brain done 9/11/2020 showed maarked improvement and treatment response since prior exam. Previously visualized enhancing lesion in the left posterior temporal lobe is markedly decreased in size since prior exam, measuring approximately 0.6 x 0.6 cm, and previously measured 1.4 x 1.2 cm. There is no vasogenic edema surrounding this lesion. Previously visualized enhancing lesion in the high medial left frontal cortex, is markedly decreased in size since prior exam, nearly nonexistent measuring 0.1 cm, and previously measuring 0.7 x 0.6 cm. Minimal surrounding vasogenic edema remains. No new enhancing lesions are identified. \par \par He continues following with Dr. Ramirez. continues on atezoluzumab maintenance. CT chest 8/31/2020 showed Emphysema is present. Left upper lobe ill-defined nodular/linear opacity is similar compared to the prior study. Other bilateral patchy lung opacities are new from the prior study. These are indeterminate and may be infectious/inflammatory. 1 month follow-up CT needed for complete evaluation. \par \par Today he feels very well. Notes some headaches over the last week, not lasting long. Denies nausea, vomiting, focal weakness, Overall feeling well. Hearing may be a little worse recently.\par \par 12/22/2020- Mr. Ness presents today for follow up. [employer]  [id number] used for visit today.  MRI brain 12/11/2020 showed previously seen left temporal occipital lobe lesion is significantly smaller in size.  Previously seen left frontal lobe lesion is no longer visualized.  Continued follow-up is recommended.\par \par CT Chest 10/19/2020 showed widespread bilateral opacities throughout both lungs with significant interval progression since August 31, 2020 suggestive of infectious or inflammatory etiology. Findings may be due to Covid 19 pneumonia.\par \par Atezoluzumab discontinued this fall due to hospitalization with pneumonitis. Due for surveillance imaging at the end of December. Currently on 120 mg of prednisone for pneumonitis.  Today he denies headaches. Says he feels slightly confused sometimes, attributes this to steroids. Denies nausea, vomiting, focal weakness. Slight bilateral LE swelling, +1\par \par 4/13/2021- Mr. Ness presents today for follow up. He is seen through TELEHEALTH for which he provides verbal consent on 4/13/2021 at 3:19 PM. [employer]  [id number] used for visit. he underwent a follow up brain MRI on 4/12/2021. This showed Previously noted enhancing lesions are no longer identified. Continued close interval follow-up is recommended.\par He was diagnosed with covid-19 on 4/2. Has continued to follow with Dr. Ramirez. \par Today he is feeling  well. Remains on 3L O2, which he was on prior to COVID. Feels like antibody therapy helped him a lot. No headaches, no focal weakness. \par \par 7/22/2021- Mr. Ness presents today for follow up. [place]   he underwent a brain MRI on 7/15/2021. This showed no significant change. Continues to follow with Dr. Ramirez. Continues to be observed off treatment since 9/2020, with sustained response in June, plan for follow up imaging in in 9/2021.\par Today he is feeling well. Remains on 2L O2 during the day, none at night. No headaches, nausea, vomiting, focal weakness, visual trouble. some trouble swallowing which has been present since he had systemic therapy. Overall feeling well. has some back pain after a recent fall\par \par 10/20/2021- Mr. Ness presents today for follow up. Has continued to follow with Dr. Ramirez. Continues to be monitored off treatment.\par \par CT CAP 9/2021 showed \par Stable dominant central left upper lobe nodule.\par More conspicuous small tubular nodule in the anterior left upper lobe could be mucoid impaction. 3 month follow-up is recommended.\par New sclerotic lesion of the left third rib. Stable sclerosis and loss of height of the T10 vertebral body.\par No new finding in the abdomen or pelvis.\par Brain MRI 10/15/2021 showed Posttreatment changes. No new or enlarging intracranial lesions identified.\par Spoke with patient via  #[id number].  Patient is feeling well and denies any new symptoms.  \par \par 1/19/2022: Pt presents for follow-up. Today he reports feeling well. Denies pain, numbness, tingling, or changes in hearing/vision. Endorses short-term memory loss (often related to tasks he wishes to complete). He is currently using 2L oxygen.

## 2022-01-25 LAB
ESTIMATED AVERAGE GLUCOSE: 154 MG/DL
HBA1C MFR BLD HPLC: 7 %

## 2022-02-04 ENCOUNTER — FORM ENCOUNTER (OUTPATIENT)
Age: 72
End: 2022-02-04

## 2022-02-05 ENCOUNTER — APPOINTMENT (OUTPATIENT)
Dept: SLEEP CENTER | Facility: CLINIC | Age: 72
End: 2022-02-05
Payer: MEDICARE

## 2022-02-05 ENCOUNTER — OUTPATIENT (OUTPATIENT)
Dept: OUTPATIENT SERVICES | Facility: HOSPITAL | Age: 72
LOS: 1 days | End: 2022-02-05
Payer: COMMERCIAL

## 2022-02-05 DIAGNOSIS — D11.0 BENIGN NEOPLASM OF PAROTID GLAND: Chronic | ICD-10-CM

## 2022-02-05 DIAGNOSIS — N20.0 CALCULUS OF KIDNEY: Chronic | ICD-10-CM

## 2022-02-05 DIAGNOSIS — Z90.89 ACQUIRED ABSENCE OF OTHER ORGANS: Chronic | ICD-10-CM

## 2022-02-05 DIAGNOSIS — Z95.5 PRESENCE OF CORONARY ANGIOPLASTY IMPLANT AND GRAFT: Chronic | ICD-10-CM

## 2022-02-05 PROCEDURE — 95811 POLYSOM 6/>YRS CPAP 4/> PARM: CPT | Mod: 26

## 2022-02-05 PROCEDURE — 95811 POLYSOM 6/>YRS CPAP 4/> PARM: CPT

## 2022-02-16 RX ORDER — INSULIN GLARGINE 100 [IU]/ML
100 INJECTION, SOLUTION SUBCUTANEOUS
Qty: 5 | Refills: 5 | Status: COMPLETED | COMMUNITY
Start: 2020-06-16 | End: 2022-02-16

## 2022-02-17 DIAGNOSIS — Z23 ENCOUNTER FOR IMMUNIZATION: ICD-10-CM

## 2022-02-18 ENCOUNTER — APPOINTMENT (OUTPATIENT)
Dept: INTERNAL MEDICINE | Facility: CLINIC | Age: 72
End: 2022-02-18
Payer: MEDICARE

## 2022-02-18 VITALS
DIASTOLIC BLOOD PRESSURE: 64 MMHG | SYSTOLIC BLOOD PRESSURE: 110 MMHG | WEIGHT: 169 LBS | TEMPERATURE: 97.7 F | HEIGHT: 63 IN | BODY MASS INDEX: 29.95 KG/M2 | HEART RATE: 79 BPM | RESPIRATION RATE: 16 BRPM

## 2022-02-18 DIAGNOSIS — Z20.822 CONTACT WITH AND (SUSPECTED) EXPOSURE TO COVID-19: ICD-10-CM

## 2022-02-18 PROCEDURE — 36415 COLL VENOUS BLD VENIPUNCTURE: CPT

## 2022-02-18 PROCEDURE — 99214 OFFICE O/P EST MOD 30 MIN: CPT | Mod: 25

## 2022-02-19 NOTE — PHYSICAL EXAM
[No Acute Distress] : no acute distress [Well-Appearing] : well-appearing [Normal Sclera/Conjunctiva] : normal sclera/conjunctiva [EOMI] : extraocular movements intact [PERRL] : pupils equal round and reactive to light [Normal Nasal Mucosa] : the nasal mucosa was normal [Normal Oropharynx] : the oropharynx was normal [No Lymphadenopathy] : no lymphadenopathy [Supple] : supple [Thyroid Normal, No Nodules] : the thyroid was normal and there were no nodules present [No Respiratory Distress] : no respiratory distress  [Clear to Auscultation] : lungs were clear to auscultation bilaterally [Normal Rate] : normal rate  [Regular Rhythm] : with a regular rhythm [Normal S1, S2] : normal S1 and S2 [No Murmur] : no murmur heard [Pedal Pulses Present] : the pedal pulses are present [Soft] : abdomen soft [Non Tender] : non-tender [No HSM] : no HSM [Normal Supraclavicular Nodes] : no supraclavicular lymphadenopathy [Normal Posterior Cervical Nodes] : no posterior cervical lymphadenopathy [Normal Anterior Cervical Nodes] : no anterior cervical lymphadenopathy [No CVA Tenderness] : no CVA  tenderness [No Spinal Tenderness] : no spinal tenderness [No Joint Swelling] : no joint swelling [No Rash] : no rash [No Focal Deficits] : no focal deficits [Normal Gait] : normal gait [Normal Affect] : the affect was normal [Alert and Oriented x3] : oriented to person, place, and time [de-identified] : trace, b/l LE edema, no calf tenderness [de-identified] : scattered freckles [de-identified] : slow gait with cane

## 2022-02-19 NOTE — HISTORY OF PRESENT ILLNESS
[FreeTextEntry1] : \par f/u\par  [de-identified] : \par Accompanied by wife, Jacque Abrams\par \par 70 yo M pmhx HTN, DM, HLD, CAD s/p RCA stent (2007), BPH s/p TURP, ED, GERD, hx gastritis, hypomagnesemia, former smoker (quit 2019), COPD (on supplemental O2), osteopenia, stage 4 small cell lung cancer (dx'd 4/19, s/p carboplatin/etoposide and radiation with partial response, then on atezolizumab c/b pneumonitis in 10/2020 treated with high dose steroids c/b vertebral compression fractures, with small brain metastases 6/2020 s/p gamma knife radiation therapy in 7/2020.  Was tapered off steroids for pneumonitis by 3/7/2021, and stopped Bactrim prophylaxis at that time), hx covid infection 3/21 (s/p monoclonal Ab infusion 3/30/21 and decadron 6 mg for 10 days by [facility] program; completed 35d participation in PRE[facility] rivaroxaban trial) for f/u\par \par Last seen in office 11/19/21 for f/u with labs done.\par \par Feeling well.\par Needs med refills.\par Not fasting.\par \par Reports is socially distancing and using precautions for covid prevention.\par Denies sick or covid positive contacts.\par Denies fever, chills, cough or sob.\par -hx pfizer vaccine series- 3/15, 7/2/21.  Hx booster 1/22.\par \par hx chronic LBP, hx compression fractures, hx osteopenia- reports remains at baseline since 6/21 ER, mainly 1-2/10.  Denies recurrence of fall since 6/21.\par -followed by PMR, last seen 7/21, noted hx ER visit 6/21 and imaging, no changes made.  Advised PT.  F/U pending.\par -followed by neurosx, last seen 3/21- advised PT and back brace use with no surgical intervention planned.\par -followed by ortho, last seen 3/21\par -hx PT \par -on Flexeril prn, Tylenol prn and Oxycodone prn per PMR - but mainly taking Tylenol 1-2x/wk at baseline\par -on Alendronate since 1/21, tolerating w/o SEs\par -ambulates with cane or walker\par -using back brace\par -denies focal weakness or incontinence\par \par small cell lung cancer  stage 4 (dx'd 4/19), hx brain mets-\par -followed by oncology, last seen 12/21 with 10/21 CT scans reviewed, noted stable.  Noted off tx with cont'd monitoring planned.  Planned to f/u in 3 mo with repeat CT scans.\par -followed by rad-onc, last seen 1/22 with 1/22 MRI brain noted JAYCOB.  Advised f/u in 3 mo with repeat MRI.\par -followed by pulm\par -denies HA or vision trouble\par \par COPD (on supp O2), seasonal allergies, former smoker (quit 2019), hx covid infection 3/21- feels is at baseline\par -on Symbicort and Flonase prn\par -using 2L Oxygen (daytime, no night time need)- O2 sat 90-95% (baseline per pt)\par -followed by pulm, last seen 9/21 - w/o med changes, states had sleep study 2 weeks ago (results pending) and has pulm f/u next week with PFT planned.\par -denies f/c, CP or sob\par \par DM-\par -followed by endo, had f/u 1/22 with A1c 6.4, Trulicity started and to stop Basaglar.  F/U pending.\par -on metformin  (2) BID since 3/21, mild loose BMs with use- feels not too bothersome, wishes to continue\par -off Basaglar as started Trulicity per endo 4d ago\par -off Novolog \par -using Freestyle Osiel.  Home fs: 105 -135 ; denies hypoglycemic sx's \par -followed by optho, last seen 1/21, told no DM retinopathy. \par -seen by optometrist 7/21 with new glasses given\par -followed by podiatry, states seen 1/22 with nails clipped. Denies foot paresthesias.\par \par GERD- reports controlled\par -on famotidine\par \par Reports stable wt\par Eating healthy diet, low carb \par exercising: walks 1 mile 2-3x/wk\par \par Reports nl appetite and BMs.\par -hx intermittent loose BMs- takes Imodium prn.  Onset ~ 1-2x/wk. Told 2/2 metformin- feels is controlled and wishes to continue.\par -denies dysphagia, n/v, abd pain or BRBPR.\par -hx c-scope/EGD 1/16\par \par hx hypomagnesemia-\par -adherent with oral supplement\par -notes has increased MG containing foods\par -not interested to lower metformin\par -nephrology eval pending\par \par HTN, HLD, CAD s/p stent- hx LE edema, reports lessening since off steroids\par -on ASA, metoprolol and Lisinopril\par -on Lipitor, Lovaza and niacin - denies SEs with use\par -had echo 6/21\par -followed by cardio, seen 11/21 with NST advised (pending 3/22), labs done and Zetia d/c'd to cont Crestor.  Has f/u 3/7/22.\par -has low salt/fat diet\par -exercise: walks 1 mile 2-3x/wk\par -denies calf pain or claudication\par \par Denies  complaints.

## 2022-02-19 NOTE — ASSESSMENT
[FreeTextEntry1] : \par 70 yo M pmhx HTN, DM, HLD, CAD s/p RCA stent (2007), BPH s/p TURP, ED, GERD, hx gastritis, hypomagnesemia, former smoker (quit 2019), COPD (on supplemental O2), osteopenia, stage 4 small cell lung cancer (dx'd 4/19, s/p carboplatin/etoposide and radiation with partial response, then on atezolizumab c/b pneumonitis in 10/2020 treated with high dose steroids c/b vertebral compression fractures, with small brain metastases 6/2020 s/p gamma knife radiation therapy in 7/2020.  Was tapered off steroids for pneumonitis by 3/7/2021, and stopped Bactrim prophylaxis at that time), hx covid infection 3/21 (s/p monoclonal Ab infusion 3/30/21 and decadron 6 mg for 10 days by [facility] program; completed 35d participation in [facility] rivaroxaban trial) here for f/u\par \par \par hx chronic LBP, hx compression fractures, hx osteopenia, vit d insuff- hx mechanical fall s/p ER 6/21 w/o acute findings on CT scan.  Back pain at baseline.  Denies recurrence of fall since 6/21.\par -1/21 DEXA: osteopenia, repeat due 1/23\par -6/21 CT lumbar spine: Unchanged compression deformities of the T12-L3 vertebral bodies.  No destructive lesions are identified.  Multilevel degenerative changes with disc herniations at L3-4 and L4-5\par -7/21 vit d 25\par -followed by PMR, last seen 7/21, noted hx ER visit 6/21 and imaging, no changes made. Advised PT (pending). F/U pending.\par -followed by neurosx, last seen 3/21- advised PT and back brace use with no surgical intervention planned.\par -followed by ortho, last seen 3/21\par -hx PT \par -on Flexeril prn, Tylenol prn and Oxycodone prn (per PMR), taking mainly Tylenol 1-2x/wk at baseline)\par -on Alendronate since 1/21, tolerating w/o SEs\par -on ca/D OTC supp\par -ambulates with cane or walker\par -using back brace\par -fall precautions counseled\par \par small cell lung cancer stage 4 (dx'd 4/19), hx brain mets, anemia-\par -4/21 cbc wnl, except Hg 11.6\par -7/21 cbc/bmp wnl\par -followed by oncology, last seen 12/21 with 10/21 CT scans reviewed, noted stable. Noted off tx with cont'd monitoring planned. Planned to f/u in 3 mo with repeat CT scans.\par -followed by rad-onc, last seen 1/22 with 1/22 MRI brain noted JAYCOB. Advised f/u in 3 mo with repeat MRI.\par -followed by pulm, has f/u 2/22\par \par COPD (on supp O2), seasonal allergies, former smoker (quit 2019), hx covid infection (s/p dexamethasone course, s/p MAB 3/21)- \par -followed by pulm, last seen 9/21- w/o med changes,  states had sleep study 2 weeks ago (results pending) and has pulm f/u next week with PFT planned.\par -on Symbicort and MDI prn\par -Flonase prn\par \par DM, microalbuminuria- 1/22 A1c 7 (was 6.4), 11/21 microalbumin wnl (was 46)\par -followed by endo, had f/u 1/22 with A1c 6.4, Trulicity started and to Basaglar stop. F/U pending.\par -on metformin  (2) BID since 3/21, mild loose BMs with use- feels not too bothersome, wishes to continue\par -off Basaglar \par -off Novolog\par -on Lisinopril for renal protection\par -cont home fs monitoring, using Freestyle Osiel\par -followed by optho, last seen 1/21, told no DM retinopathy.  Yearly f/u advised- referral given again.\par -seen by optometrist 7/21 with new glasses given\par -followed by podiatry, states seen 1/22 with nails clipped.  Denies foot paresthesias. DM foot care counseled.\par \par HTN, HLD, CAD s/p stent, hx LE edema (reports improved since off steroids 3/21)- BP wnl\par -12/20 b/l LE duplex: no DVT b/l\par -6/21 echo 6/21: EF 60-65%, min MR, LV remodeling, nl LV fxn, mild diastolic dysfxn (Stg I)\par -7/21 cbc/bmp/TSH wnl\par -11/21 Tchol 114  LDL 42 HDL 49\par -11/21 cmp wnl\par -on ASA, metoprolol and Lisinopril\par -on Lipitor, Lovaza and niacin - denies SEs with use\par -off Zetia 11/21 per cardio\par -followed by cardio, seen 11/21 with NST advised (pending 3/22), labs done and Zetia d/c'd to cont Crestor.  Has f/u 3/7/22.\par -low salt diet advised\par \par hx insomnia, snoring-\par -followed by pulm, last seen 9/21- w/o med changes,  states had sleep study 2 weeks ago (results pending) and has pulm f/u next week with PFT planned.\par -on Temazepam prn per oncology\par \par GERD, hx internal hemorrhoids, diverticulosis, colon polyps- reports controlled\par -hx EGD 1/16: nonbleeding erosive gastropathy\par -hx colonoscopy 1/16: internal hemorrhoids, large lipoma at transverse colon, diverticulosis, rec: repeat in 10 yrs (Dr. Hinds)\par -on famotidine\par -advised to avoid reflux aggravating foods\par \par hx hypomagnesium- hx chronic, mild diarrhea 2/2 metformin\par -declines to change metformin\par -7/21 Mg 1.3--> 1.7 in 11/21\par -on MgOx 400 (1/2/1)\par -increased Mg diet sources encouraged\par -nephrology referral given for eval- pending\par -check level\par \par \par MISC:  Continued social distancing and measure for covid19 prevention encouraged.  \par -hx pfizer vaccine series- 3/15, 7/2/21.  Hx booster 1/22.\par \par \par HCM\par -hx CPE 7/21\par -7/21 hep C screening negative\par -7/21 PSA wnl\par -hx flu shot 11/21\par -hx Tdap 11/12\par -hx prevnar 3/15\par -hx PVX 10/20\par -advised to check on insurance coverage for shingrix and f/u if desires.  Advised to d/w oncology prior to getting.\par -hx screening colonoscopy 1/16: internal hemorrhoids, large lipoma at transverse colon, diverticulosis, rec: repeat in 10 yrs (Dr. Hinds)\par -hx DEXA 1/21: osteopenia (hx compression fractures)\par -followed by derm, last seen 9/20.  Yearly skin screening advised.  Regular use of sun block for skin cancer prevention counseled.\par -advised to designate HCP and provide copy of completed form for records\par \par Pt requests wife, Jacque Abrams, be contacted re: his test results and medical care, (cell) [phone]\par \par Labs drawn in office today.\par

## 2022-02-22 LAB
25(OH)D3 SERPL-MCNC: 25.7 NG/ML
ALBUMIN SERPL ELPH-MCNC: 4.4 G/DL
ALP BLD-CCNC: 72 U/L
ALT SERPL-CCNC: 17 U/L
ANION GAP SERPL CALC-SCNC: 15 MMOL/L
AST SERPL-CCNC: 16 U/L
BASOPHILS # BLD AUTO: 0.06 K/UL
BASOPHILS NFR BLD AUTO: 0.9 %
BILIRUB SERPL-MCNC: 0.3 MG/DL
BUN SERPL-MCNC: 13 MG/DL
CALCIUM SERPL-MCNC: 9.6 MG/DL
CHLORIDE SERPL-SCNC: 102 MMOL/L
CO2 SERPL-SCNC: 23 MMOL/L
CREAT SERPL-MCNC: 0.77 MG/DL
EOSINOPHIL # BLD AUTO: 0.19 K/UL
EOSINOPHIL NFR BLD AUTO: 2.8 %
GLUCOSE SERPL-MCNC: 156 MG/DL
HCT VFR BLD CALC: 44.6 %
HGB BLD-MCNC: 13.6 G/DL
IMM GRANULOCYTES NFR BLD AUTO: 0.3 %
LYMPHOCYTES # BLD AUTO: 0.6 K/UL
LYMPHOCYTES NFR BLD AUTO: 8.8 %
MAGNESIUM SERPL-MCNC: 1.4 MG/DL
MAN DIFF?: NORMAL
MCHC RBC-ENTMCNC: 28.9 PG
MCHC RBC-ENTMCNC: 30.5 GM/DL
MCV RBC AUTO: 94.7 FL
MONOCYTES # BLD AUTO: 0.68 K/UL
MONOCYTES NFR BLD AUTO: 10 %
NEUTROPHILS # BLD AUTO: 5.26 K/UL
NEUTROPHILS NFR BLD AUTO: 77.2 %
PLATELET # BLD AUTO: 261 K/UL
POTASSIUM SERPL-SCNC: 4.2 MMOL/L
PROT SERPL-MCNC: 6.2 G/DL
RBC # BLD: 4.71 M/UL
RBC # FLD: 16.1 %
SODIUM SERPL-SCNC: 139 MMOL/L
WBC # FLD AUTO: 6.81 K/UL

## 2022-02-24 LAB — SARS-COV-2 N GENE NPH QL NAA+PROBE: NOT DETECTED

## 2022-02-25 ENCOUNTER — APPOINTMENT (OUTPATIENT)
Dept: PULMONOLOGY | Facility: CLINIC | Age: 72
End: 2022-02-25
Payer: MEDICARE

## 2022-02-25 VITALS
DIASTOLIC BLOOD PRESSURE: 78 MMHG | SYSTOLIC BLOOD PRESSURE: 142 MMHG | HEART RATE: 78 BPM | TEMPERATURE: 98.6 F | WEIGHT: 165 LBS | BODY MASS INDEX: 29.23 KG/M2 | OXYGEN SATURATION: 91 % | HEIGHT: 63 IN

## 2022-02-25 PROCEDURE — 94726 PLETHYSMOGRAPHY LUNG VOLUMES: CPT

## 2022-02-25 PROCEDURE — 99214 OFFICE O/P EST MOD 30 MIN: CPT | Mod: 25

## 2022-02-25 PROCEDURE — 94729 DIFFUSING CAPACITY: CPT

## 2022-02-25 PROCEDURE — ZZZZZ: CPT

## 2022-02-25 PROCEDURE — 94010 BREATHING CAPACITY TEST: CPT | Mod: 59

## 2022-02-25 PROCEDURE — 94621 CARDIOPULM EXERCISE TESTING: CPT

## 2022-02-28 DIAGNOSIS — G47.33 OBSTRUCTIVE SLEEP APNEA (ADULT) (PEDIATRIC): ICD-10-CM

## 2022-02-28 NOTE — PHYSICAL EXAM
[No Acute Distress] : no acute distress [Well Nourished] : well nourished [Normal Appearance] : normal appearance [Well Groomed] : well groomed [No Deformities] : no deformities [Well Developed] : well developed [Normal Oropharynx] : normal oropharynx [No JVD] : no jvd [Normal Rate/Rhythm] : normal rate/rhythm [Normal Pulses] : normal pulses [Normal S1, S2] : normal s1, s2 [No Resp Distress] : no resp distress [No Acc Muscle Use] : no acc muscle use [Normal Rhythm and Effort] : normal rhythm and effort [Clear to Auscultation Bilaterally] : clear to auscultation bilaterally [Benign] : benign [Normal Gait] : normal gait [No Clubbing] : no clubbing [No Cyanosis] : no cyanosis [No Edema] : no edema [FROM] : FROM [Normal Color/ Pigmentation] : normal color/ pigmentation [No Focal Deficits] : no focal deficits [Oriented x3] : oriented x3 [Normal Mood] : normal mood [Normal Affect] : normal affect [TextBox_11] : anicteric, EOMI, MMM, trachea midline [TextBox_44] : FROM [TextBox_68] : good air entry, prolonged expiratory phase

## 2022-02-28 NOTE — HISTORY OF PRESENT ILLNESS
[Former] : former [Never] : never [Wheezing] : wheezing [Nasal Passage Blockage (Stuffiness)] : edema [Nonspecific Pain, Swelling, And Stiffness] : chest pain [Fever] : fever [Cough] : coughing [Difficulty Breathing During Exertion] : dyspnea on exertion [Feelings Of Weakness On Exertion] : exercise intolerance [Continuous] : Continuous [NC] : Nasal Cannula [24 hrs] : 24 hours/day [0.5  -  Very, very slight] : 0.5, very, very slight [Obstructive Sleep Apnea] : obstructive sleep apnea [Nonrestorative Sleep] : nonrestorative sleep [Snoring] : snoring [TextBox_4] : 71M DM2, HTN, CAD s/p PCI (RCA 2007), HLD, and COPD and lung cancer presenting for followup pulmonary evaluation. He is doing well today and presents to the office alone - but his daughter (Veronica) is on the phone for translation purposes.\par \par - CT Scan from 12/2/21 - stable disease - and remains off therapy given prior pneumonitis\par - Remains on supplemental O2 \par - Cough resolved with Symbicort and Albuterol\par - Went for CPAP titration -results pending - but is amenable to starting therapy\par - He has a new grandchild\par - Overall doing very well and in good spirits\par \par PFTs: 2/25/22 - FEV1 2.28L (84%), FVC 3.58L (99%), FEV1/FVC 64%, FEF 25-75 57%, TLC 97%, RV/TLC 32%, ERV 47%, DLCO 64%\par PFTs: 12/22/20 - FEV1 2.62L (95%), FVC 3.98L (109%), FEV1/FVC 66%, FEF 25-75 65%, %, RV/TLC 37%, DLCO 57%\par \par 6MWT: 2/25/22 - 386 meters - 2L O2\par 6MWT: 12/22/20 - 331 meters - 4L O2\par \par He is a long term former smoker but quit at the time of his lung cancer diagnosis. He has known obstructive lung disease with a bronchodilator response. He was switched from Breo to Symbicort while in the hospital. He has a Ventolin inhaler and uses it intermittently. He developed pneumonitis from treatment of Small Cell which required hospitalization and then treatment with a prolonged course of steroids. He did develop COVID-19 but fortunately did not require hospitalization.\par \par He was diagnosed with small cell lung cancer 4/2019 and then started on chemotherapy and radiation. He initially underwent Bronchoscopy/EBUS with Dr. Martinez which revealed that bilateral mediastinal LN were positive for small cell carcinoma. He was started on chemotehrapy at that time in May 2019 and achieved partial response. He was then started on maintenance Atezolizumab. He was offered prophylactic cranial irradiation but declined initially. He developed small brain mets in June 2020 and received GK-SRS with Dr. [facility]. He had a CT chest 8/31/2020 that showed a small stable upper lobe opacity. His repeat CT chest done 10/2020 was suggestive of pneumonitis. He had a repeat CT chest done 12/28/2020 which showed improvement. [FreeTextEntry1] : 1-4 [Difficulty Maintaining Sleep] : does not have difficulty maintaining sleep [Witnessed Apneas] : no witnessed apneas [TextBox_100] : 11/8/21 [TextBox_108] : 8.2 [TextBox_112] : 96.6 [TextBox_116] : 76 [TextBox_104] : 2/5/22 - results pending [ESS] : 0 [TextBox_12] : 12/28/2020 - INTERPRETATION:  Reason for Exam: Left upper lobe lung cancer\par \par CT of the chest was performed from the thoracic inlet to the level of the adrenal glands following IV contrast injection of  50 cc of Omnipaque 350. No immediate complications were reported.\par \par Comparison: October 21, 2020\par \par Tubes/Lines: None\par \par Mediastinum and Heart: Aorta and pulmonary arteries are normal in size. No pericardial effusion. Multiple calcified lymph nodes in the mediastinum are unchanged since prior. Coronary artery calcifications are noted.. Thyroid gland appears unremarkable.\par \par Lungs, Pleura, and Airways: Again seen is a left upper lobe spiculated nodule which measures approximately 1.4 cm which is unchanged since previous exam. Previously seen areas of bilateral peribronchovascular groundglass abnormalities and consolidations have essentially resolved. Minimal residual peribronchial vascular and linear scarring with traction bronchiolectasis noted. Focal area of distortion in the right middle lobe with calcification and traction bronchiectasis is stable.\par \par Visualized Abdomen: Postcontrast appearance of the upper abdomen is unremarkable.\par \par Bones and soft tissues: Unremarkable.\par \par \par IMPRESSION:\par \par When compared with October 21, 2020:\par \par Peribronchovascular groundglass abnormality and consolidations consistent with pneumonitis has resolved.\par \par Unchanged left upper lobe 1.4 cm spiculated nodule in keeping with known malignancy. Follow-up recommended to assess for change.\par  [TextBox_27] : 6/11/21 - IMPRESSION:\par Unchanged spiculated left upper lobe mass.\par Left upper lobe subpleural cysts and opacities unchanged from 03/22/2021.\par Emphysema.\par Scattered groundglass pulmonary opacities unchanged from 03/22/2021.\par Compression deformity T10 with sclerotic change similar to 22 2021 [TextBox_42] : 9/10/2021 - FINDINGS:\par \par Chest:\par \par Lungs, Pleura, and Airways: Stable 1.6 cm left upper lobe nodule (4-72), when remeasured on prior in a comparable manner. More conspicuous tubular 9 mm nodule in the anterior left upper lobe (4-78). Patent airways to the segmental bronchi. Moderate emphysema. Multiple calcified granulomas and areas of scarring in both lungs. Unremarkable pleura.\par \par Lymph Nodes and Mediastinum: Subcentimeter right thyroid nodule. No enlarged lymph nodes. Calcified mediastinal and right hilar lymph nodes consistent with prior granulomatous disease.\par \par Heart and Vasculature: Normal heart size. Unremarkable pericardium. Normal caliber aorta and main pulmonary artery. Moderate calcified and noncalcified plaque throughout the aorta. Subjectively mild amount of coronary calcified plaque.\par \par Bones and Soft Tissues: Degenerative changes of the spine. New sclerosis of the anterior left third rib. Unchanged sclerosis and loss of height of the T10 vertebral body. Stable loss of height of T12.\par \par \par Abdomen and Pelvis:\par \par Liver: Calcified granuloma at the dome.\par \par Biliary System: Cholelithiasis.\par \par Spleen:Unremarkable.\par \par Pancreas: Unremarkable.\par \par Adrenals: Stable right adrenal nodules.\par \par Kidneys: Unremarkable.\par \par Bowel: Diverticulosis. Decreased fatty lesion in the transverse colon.\par \par Pelvis: Unremarkable.\par \par Other: No enlarged lymph nodes. No ascites.\par \par Bones and Soft Tissues: Degenerative changes of the spine.\par \par \par \par IMPRESSION:\par \par Since 6/11/2021:\par \par Stable dominant central left upper lobe nodule.\par \par More conspicuous small tubular nodule in the anterior left upper lobe could be mucoid impaction. 3 month follow-up is recommended.\par \par New sclerotic lesion of the left third rib. Stable sclerosis and loss of height of the T10 vertebral body.\par \par No new finding in the abdomen or pelvis.\par  [TextBox_57] : 12/2/21 -  CT Chest w/ IV Cont \par FINDINGS:\par CHEST:\par LUNGS AND LARGE AIRWAYS: Patent central airways. Emphysema. Spiculated left upper lobe nodule measures 1.3 x 1.1 cm, stable. Stable scattered areas of linear type scarring and atelectasis noted throughout the lungs. Stable 6 mm ovoid opacity in the left upper lobe anteriorly, possibly scarring. Lower lobe stable bronchiectasis. Stable bilateral calcified granulomas.\par PLEURA: No pleural effusion.\par VESSELS: Atherosclerotic changes of the aorta and coronary arteries.\par HEART: Heart size is normal. No pericardial effusion.\par MEDIASTINUM AND SANDI: Calcified mediastinal and right hilar lymph nodes are stable..\par CHEST WALL AND LOWER NECK: Within normal limits.\par \par ABDOMEN AND PELVIS:\par LIVER: Within normal limits.\par BILE DUCTS: Normal caliber.\par GALLBLADDER: Cholelithiasis.\par SPLEEN: Within normal limits.\par PANCREAS: Within normal limits.\par ADRENALS: Stable 1.1 cm right adrenal indeterminate nodule. Adrenal gland within normal limits.\par KIDNEYS/URETERS: Within normal limits.\par \par BLADDER: Within normal limits.\par REPRODUCTIVE ORGANS: TURP defect..\par \par BOWEL: Stable lipoma of the transverse colon. Diverticulosis coli. No bowel obstruction. Appendix is normal.\par PERITONEUM: No ascites.\par VESSELS: Atherosclerotic changes.\par RETROPERITONEUM/LYMPH NODES: No lymphadenopathy.\par ABDOMINAL WALL: Tiny fat-containing umbilical hernia..\par BONES: Chronic compression deformities of T10 and T12-L3. Increased sclerosis of the T10 vertebral body, stable. Stable punctate sclerotic focus in the inferior left scapula. Stable vague sclerosis in the right anterior fourth rib.\par \par IMPRESSION:\par Stable size of a spiculated left upper lobe pulmonary nodule with no new nodules noted.\par No evidence for metastatic disease in the abdomen and pelvis.\par \par --- End of Report --

## 2022-02-28 NOTE — REASON FOR VISIT
[Follow-Up] : a follow-up visit [Lung Cancer] : lung cancer [COPD] : COPD [Interpreters_FullName] : Veronica (Daughter) [TWNoteComboBox1] : [place]

## 2022-02-28 NOTE — REVIEW OF SYSTEMS
[Postnasal Drip] : postnasal drip [SOB on Exertion] : sob on exertion [GERD] : gerd [Diabetes] : diabetes [Fever] : no fever [Fatigue] : no fatigue [Chills] : no chills [Nasal Congestion] : no nasal congestion [Cough] : no cough [Hemoptysis] : no hemoptysis [Sputum] : no sputum [Wheezing] : no wheezing [Chest Discomfort] : no chest discomfort [Edema] : no edema [Angioedema] : no angioedema [Abdominal Pain] : no abdominal pain [Nausea] : no nausea [Vomiting] : no vomiting [Back Pain] : no back pain [Rash] : no rash [Easy Bruising] : no easy bruising [Focal Weakness] : no focal weakness [Seizures] : no seizures [Depression] : no depression [Anxiety] : no anxiety [TextBox_91] : back pain improved

## 2022-02-28 NOTE — ASSESSMENT
[FreeTextEntry1] : 71M extensive history including DM2, CAD, COPD, and small cell lung cancer presenting for followup pulmonary evaluation. Patient has been stable without hospitalization since his last visit. He remains off cancer directed therapies at this time.\par \par 1. Dry cough and dyspnea - overall improved and without acute concern. Continue bronchodilator therapy\par - Continue Flonase\par \par 2. Pneumonitis from 2020 - RESOLVED - patient underwent bronchoscopy and transbronchial biopsy which showed chronic inflammation. - He had a long course of Prednisone with PCP prophylaxis\par - Repeat CT chest from 12/28/2020 shows resolution of previously noted opacities\par \par 3. Small Cell Lung Cancer - with metastatic disease. He was recently seen by Dr. Ramirez\par - patient has received radiation therapy \par - Currently off cancer directed therapies\par - He has improved from a pulmonary standpoint and I do not have any objections to further therapy if it is thought necessary from an Oncologic perspective.\par \par 4. COPD - patient is a long term former smoker. He will continue Symbicort and use Ventolin as needed.\par - PFTs and 6MWT done today\par - No evidence of desaturation during 6MWT with 2L O2 - with improvement in distance\par - PFTs relatively stable - no BD testing in setting of COVID-19 pandemic. On prior PFTs patient had significant BD response\par \par 5. Obstructive Sleep Apnea - patient recently found to have ARIELLE. \par - Await reading of CPAP titration - will then initiate therapy with PAP device. \par - Followup with Sleep Team\par \par 6. Chronic Hypoxemic Respiratory Failure - patient currently on 1-3L supplemental O2. Continue to maintain O2 sats > 90% as able.\par - Can start to wean down FiO2 as able with goal O2 sat > 90%\par \par 7. Health Maintenance\par Spirometry: reviewed\par Smoking status: Former \par Pneumococcal vaccination status: Completed \par COVID vaccination status: Completed Pfizer vaccine\par [place] Sleepiness Scale: 0 (9/30/21)\par \par The above plan was discussed with EVER SCOTT  in detail. Patient verbalized understanding and agrees with plan as detailed above. Patient was provided education and counselling on current diagnosis/symptoms, diagnostic work up, treatment options and potential side effects of any prescribed therapy/therapies. EVER  was advised to call our clinic at [phone] for any new or worsening symptoms, or with any questions or concerns. In case of acute onset of respiratory symptoms or worsening presentation, patient was advised to present to nearest emergency room for further evaluation. EVER  expressed understanding and all questions/concerns were addressed.\par \par

## 2022-03-07 ENCOUNTER — APPOINTMENT (OUTPATIENT)
Dept: CARDIOLOGY | Facility: CLINIC | Age: 72
End: 2022-03-07
Payer: MEDICARE

## 2022-03-07 ENCOUNTER — NON-APPOINTMENT (OUTPATIENT)
Age: 72
End: 2022-03-07

## 2022-03-07 VITALS
WEIGHT: 167 LBS | DIASTOLIC BLOOD PRESSURE: 75 MMHG | SYSTOLIC BLOOD PRESSURE: 129 MMHG | TEMPERATURE: 98.8 F | HEIGHT: 63 IN | HEART RATE: 79 BPM | OXYGEN SATURATION: 96 % | BODY MASS INDEX: 29.59 KG/M2

## 2022-03-07 PROCEDURE — 99214 OFFICE O/P EST MOD 30 MIN: CPT

## 2022-03-07 PROCEDURE — 93000 ELECTROCARDIOGRAM COMPLETE: CPT

## 2022-03-07 NOTE — DISCUSSION/SUMMARY
[FreeTextEntry1] : Patient with above hx \par \par chronic BILLINGS stable  possibly due to COPD , treated Lung carcinoma , doubt cardiac component  patient had normal ventricular systolic function . continue home oxygen , antihypertensive medication , \par \par CAD remote hx of RCA stent with multiple risk factors for CAD;  without chest pain , normal LVEF , continue stain , ecotrin , ace ,  \par \par HTN : controlled , continue low salt diet and medication , \par \par HLD controlled  continue  crestor 20 mg po daily , discontinue  zetia 10 mg po daily    monitor renal function \par \par DM : controlled HB a1c 6.6  continue his medication \par \par follow up after 4  months ,

## 2022-03-07 NOTE — CARDIOLOGY SUMMARY
[de-identified] : 3/7/22 sinus rhythm  [de-identified] : 6/14/21  Mild  LVH  EF 60-65% MSMAML without out flow tract obstruction , mild DD [de-identified] : 11/9/21 mild carotid disease ( s/p  bilateral CEA)

## 2022-03-07 NOTE — HISTORY OF PRESENT ILLNESS
[FreeTextEntry1] : 71 year old male with hx of  HTN , HLD ,  CAD PCI  RCA stent 2007  GERD, former smoker treated Stage 4 small cell lung carcinoma  s/p radiation to chest and head , COPD On home oxygen came for follow up  says he is doing fine ,  will be having allergies in spring time . \par \par \par Patient does have chronic BILLINGS with fatigue without chest pain for long time , patient is on oxygen 2 L  for almost  one and half year on oxygen . patient does walk while on oxygen , half to 1 mile daily , \par \par Patient blood pressure is controlled on medication ,diabetes , under care of endo crinology\par \par his lipid profile  showed TC  114   LDL 42  HDL 49  now on crestor 20 mg   had carotid doppler showed mild disease \par \par patient does have mild LE swelling got  much better since he was done with chemotherapy , denies orthopnea

## 2022-03-07 NOTE — REVIEW OF SYSTEMS
[Feeling Fatigued] : feeling fatigued [Dyspnea on exertion] : dyspnea during exertion [Fever] : no fever [Headache] : no headache [Blurry Vision] : no blurred vision [Seeing Double (Diplopia)] : no diplopia [Earache] : no earache [SOB] : no shortness of breath [Chest Discomfort] : no chest discomfort [Leg Claudication] : no intermittent leg claudication [Palpitations] : no palpitations [Syncope] : no syncope [Abdominal Pain] : no abdominal pain [Nausea] : no nausea [Vomiting] : no vomiting [Urinary Frequency] : no change in urinary frequency [Joint Pain] : no joint pain [Rash] : no rash [Convulsions] : no convulsions [Limb Weakness (Paresis)] : no limb weakness (Paresis) [Confusion] : no confusion was observed [Easy Bleeding] : no tendency for easy bleeding [FreeTextEntry9] : chronic back pain   fracture spine due to osteoporosis

## 2022-03-07 NOTE — PHYSICAL EXAM
[Well Developed] : well developed [Well Nourished] : well nourished [Normal Conjunctiva] : normal conjunctiva [Normal Venous Pressure] : normal venous pressure [No Carotid Bruit] : no carotid bruit [Normal Rate] : normal [Normal S1] : normal S1 [Normal S2] : normal S2 [No Murmur] : no murmurs heard [II] : a grade 2 [No Pitting Edema] : no pitting edema present [Lt] : varicose veins of the left leg noted [Rt] : varicose veins of the right leg noted [2+] : left 2+ [1+] : left 1+ [No Abnormalities] : the abdominal aorta was not enlarged and no bruit was heard [Normal] : clear lung fields, good air entry, no respiratory distress [Soft] : abdomen soft [Normal Bowel Sounds] : normal bowel sounds [Normal Gait] : normal gait [No Edema] : no edema [No Cyanosis] : no cyanosis [Normal Radial B/L] : normal radial B/L [Normal PT B/L] : normal PT B/L [No Rash] : no rash [Moves all extremities] : moves all extremities [Alert and Oriented] : alert and oriented [S3] : no S3 [S4] : no S4 [Right Carotid Bruit] : no bruit heard over the right carotid [Left Carotid Bruit] : no bruit heard over the left carotid [Right Femoral Bruit] : no bruit heard over the right femoral artery [Left Femoral Bruit] : no bruit heard over the left femoral artery

## 2022-03-10 ENCOUNTER — APPOINTMENT (OUTPATIENT)
Dept: CT IMAGING | Facility: CLINIC | Age: 72
End: 2022-03-10
Payer: MEDICARE

## 2022-03-10 ENCOUNTER — OUTPATIENT (OUTPATIENT)
Dept: OUTPATIENT SERVICES | Facility: HOSPITAL | Age: 72
LOS: 1 days | End: 2022-03-10
Payer: MEDICARE

## 2022-03-10 DIAGNOSIS — Z00.8 ENCOUNTER FOR OTHER GENERAL EXAMINATION: ICD-10-CM

## 2022-03-10 DIAGNOSIS — Z90.89 ACQUIRED ABSENCE OF OTHER ORGANS: Chronic | ICD-10-CM

## 2022-03-10 DIAGNOSIS — Z95.5 PRESENCE OF CORONARY ANGIOPLASTY IMPLANT AND GRAFT: Chronic | ICD-10-CM

## 2022-03-10 DIAGNOSIS — C79.51 SECONDARY MALIGNANT NEOPLASM OF BONE: ICD-10-CM

## 2022-03-10 DIAGNOSIS — C34.12 MALIGNANT NEOPLASM OF UPPER LOBE, LEFT BRONCHUS OR LUNG: ICD-10-CM

## 2022-03-10 DIAGNOSIS — N20.0 CALCULUS OF KIDNEY: Chronic | ICD-10-CM

## 2022-03-10 DIAGNOSIS — D11.0 BENIGN NEOPLASM OF PAROTID GLAND: Chronic | ICD-10-CM

## 2022-03-10 PROCEDURE — 71260 CT THORAX DX C+: CPT | Mod: 26

## 2022-03-10 PROCEDURE — 74177 CT ABD & PELVIS W/CONTRAST: CPT

## 2022-03-10 PROCEDURE — 82565 ASSAY OF CREATININE: CPT

## 2022-03-10 PROCEDURE — 71260 CT THORAX DX C+: CPT

## 2022-03-10 PROCEDURE — 74177 CT ABD & PELVIS W/CONTRAST: CPT | Mod: 26

## 2022-03-14 ENCOUNTER — OUTPATIENT (OUTPATIENT)
Dept: OUTPATIENT SERVICES | Facility: HOSPITAL | Age: 72
LOS: 1 days | Discharge: HOME | End: 2022-03-14

## 2022-03-14 DIAGNOSIS — N20.0 CALCULUS OF KIDNEY: Chronic | ICD-10-CM

## 2022-03-14 DIAGNOSIS — Z95.5 PRESENCE OF CORONARY ANGIOPLASTY IMPLANT AND GRAFT: Chronic | ICD-10-CM

## 2022-03-14 DIAGNOSIS — D11.0 BENIGN NEOPLASM OF PAROTID GLAND: Chronic | ICD-10-CM

## 2022-03-14 DIAGNOSIS — C34.90 MALIGNANT NEOPLASM OF UNSPECIFIED PART OF UNSPECIFIED BRONCHUS OR LUNG: ICD-10-CM

## 2022-03-14 DIAGNOSIS — Z90.89 ACQUIRED ABSENCE OF OTHER ORGANS: Chronic | ICD-10-CM

## 2022-03-15 ENCOUNTER — APPOINTMENT (OUTPATIENT)
Dept: HEMATOLOGY ONCOLOGY | Facility: CLINIC | Age: 72
End: 2022-03-15
Payer: MEDICARE

## 2022-03-15 VITALS
SYSTOLIC BLOOD PRESSURE: 168 MMHG | OXYGEN SATURATION: 94 % | TEMPERATURE: 97 F | WEIGHT: 165.35 LBS | BODY MASS INDEX: 29.29 KG/M2 | RESPIRATION RATE: 16 BRPM | DIASTOLIC BLOOD PRESSURE: 84 MMHG | HEART RATE: 78 BPM

## 2022-03-15 PROCEDURE — 99214 OFFICE O/P EST MOD 30 MIN: CPT

## 2022-03-18 NOTE — ASSESSMENT
[FreeTextEntry1] : Extensive Stage Small cell lung cancer.  Started first line Carbo/Etoposide/Atezo in early May 2019, achieved MD.  Completed 4 cycles followed by Atezolizumab maintenance from July 2019.  Received consolidative Thoracic RT completed late August 2019.  He declined PCI.  Developed Brain metastases in June 2020 s/p GK-SRS in July 2020. Hospitalized at [facility] 10/21-11/2/20 for pneumonitis, felt to be secondary to immunotherapy.  Treated with steroids through early March 2021.  Monitored off systemic therapy since last treatment with maintenance Atezolizumab in late Sept 2020.  \par Surveillance/Restaging CT March 2022 with overall sustained response.  Recommend: \par -Continue to observe off treatment and monitor for progression of disease\par -Pulm f/u.  On supplemental O2 prn.  Awaiting CPAP machine.  \par -F/U with endocrine for management of DM and osteoporosis\par -Osteoporotic compression fractures:  follows with PMR\par –Insomnia: on Temazepam; encouraged him to try and titrate off.  I\par -Surveillance Brain MRI Jan 2022 with sustained intracranial response; due for surveillance scan in April 2022 and f/u with Dr. Guerra.    \par -Restaging/Surveillance CT C/A/P in 3 months and follow up to review results or sooner should problems arise\par -Information sheet given with directions for making appointments

## 2022-03-18 NOTE — HISTORY OF PRESENT ILLNESS
[Disease: _____________________] : Disease: [unfilled] [T: ___] : T[unfilled] [N: ___] : N[unfilled] [de-identified] : The patient has a heavy smoking history who recently quit. He developed a worsening cough roughly 6 months ago followed by fatigue and insomnia roughly 5 months ago. He saw his PCP and was referred to pulmonary. He was referred for a CT chest for lung cancer screening which revealed a left upper lobe lung mass with associated lymphadenopathy and suspicious bony lesions. He was then sent for a PET/CT scan which have activity in these areas. He underwent a bronchoscopy with EBUS biopsy of bilateral mediastinal lymph nodes that were positive for small cell carcinoma.  Started first line systemic therapy with Carbo/Etoposide/Atezolizumab in May 2019.  Achieved MO.  Received 4 cycles completed early July 2019 followed by Atezolizumab maintenance since late July 2019.  Received consolidative Thoracic RT completed late Aug 2019.  He declined PCI.  Developed small brain metastases on Brain MRI in June 2020 and received GK-SRS for 2 small lesions in early July 2020. Patient hospitalized 10/21-11/2 with pneumonitis 2/2 immunotherapy and discharged on steroids.   Atezolizumab discontinued after last dose in late September 2020 due to development of pneumonitis.  Patient monitored off systemic therapy.  He has been tapered off Prednisone as of March 2021.   [de-identified] : -Lymph node 4L and 4R EBUS guided FNA 4/18/19: Positive for malignant cells. Small cell carcinoma. [de-identified] : ****Marshallese translation provided by [employer]  #[id number].  \par \par Patient has been off systemic therapy since late September 2020 when Atezolizumab was discontinued after developing pneumonitis.  \par He has been tapered off Prednisone as of March 2021.  Not currently on steroids.  Uses supplemental O2 as needed, mainly during the daytime.  Using a portable unit at today’s visit.  \par He follows with PMR for management of vertebral compression fractures which are suspected to be secondary to osteoporosis.  He is on treatment with Alendronate and follows with Endocrine.  \par He reports insomnia for which he uses Temazepam nightly.  Discussed improving sleep hygiene and attempting to titrate off of this; he reports he will try.  Pulm ordered CPAP machine for him which is pending. \par \par Restaging Brain MRI Jan 2022 with post-treatment changes.  \par Restaging CT C/A/P this month with overall sustained response.

## 2022-03-18 NOTE — RESULTS/DATA
[FreeTextEntry1] : -CT Chest 3/28/19:  \par 1. A left upper lobe spiculated nodule is concerning for primary lung cancer. \par 2. Enlarged chest lymph node measuring 16 mm. \par 3. Indeterminate sclerotic lesions of the right fourth rib and T6 vertebral body. \par \par -PET/CT 4/4/19:  FDG-PET/CT scan: \par 1. FDG avid spiculated left upper lobe lung nodule, worrisome for malignancy. \par 2. FDG avid AP window lymph node, suspicious for metastasis. \par 3. Indeterminate sclerotic right fourth rib and T6 vertebral body foci. \par \par -MRI Brain 5/5/19:  Small vessel white matter ischemic changes. No hemorrhage, mass or acute infarct. No abnormal enhancement to suggest brain metastases. Nonspecific left mastoid effusion. \par \par -MRI Thoracic Spine 5/5/19: Focus of low signal intensity on the T1 and T2-weighted images within the anterior T6 vertebral body corresponds to sclerosis on PET/CT and \par likely represents benign sclerosis or bone island. No evidence of lytic or blastic metastases. No abnormal signal or enhancement. \par \par -PET/CT 5/30/19:  Abnormal FDG-PET/CT scan: \par 1. Hypermetabolic spiculated left upper lobe pulmonary nodule in bilateral mediastinal and hilar lymph nodes are decreased in size and metabolism as compared to prior study from 4/4/2019, compatible with a partial response to interval therapy. Scattered calcified and noncalcified bilateral pulmonary nodules, unchanged. \par 2. Diffuse bone marrow hypermetabolism, increased as compared to prior study, likely is secondary to recent treatment with colony-stimulating factors. Small sclerotic densities in right fourth rib and body of T6 vertebra are unchanged. \par \par -CT Chest 7/12/19:  \par 1. In comparison with 5/30/2019, 1.5 cm left upper lobe spiculated nodule is decrease in size. No new or enlarging pulmonary nodule. \par 2. Stable subcentimeter mediastinal and hilar lymph nodes. \par \par -CT Chest 9/23/19: \par 1. The spiculated left upper lobe nodule is unchanged. \par 2. The AP window lymph node is unchanged. \par \par -MRI Brain 11/15/19:  No change from 5/5/2019. Microvascular disease. No abnormal enhancement to suggest brain metastases. \par \par -CT Chest 12/16/19:  Left upper lobe nodule minimally decreased in size since September 23, 2019.  Adjacent ill-defined opacities within the left upper lobe and the superior segment of the left lower lobe new since September 23, 2019 maybe sequela of radiation therapy. 3 month follow-up noncontrast chest CT can be performed for further evaluation. Small aortopulmonary window lymph node is unchanged since September 23, 2019. \par \par -CT Chest 3/13/20:  When compared with December 16, 2019: Left suprahilar 1.1 x 0.9 cm nodule is seen, slightly decreased in size when compared with prior. Focal areas of architectural distortion noted in the left upper lobe with additional linear opacities and groundglass opacity without change from prior and may reflect evolving posttreatment change. Multiple bilateral calcified nodules are identified, unchanged. \par \par -CT Chest 6/8/20:  Since 3/13/2020, the left upper lobe nodule and the paramediastinal opacities are unchanged. \par \par -MRI Brain 6/24/20: Enhancing lesions are identified consistent with metastasis given patient's history.\par \par -CT A/P 7/15/20: No evidence of abdominal metastasis. Stable transverse colon lipoma. \par \par -CT Chest 8/31/20:  Emphysema is present. Left upper lobe ill-defined nodular/linear opacity is similar compared to the prior study. Other bilateral patchy lung opacities are new from the prior study. These are indeterminate and may be infectious/inflammatory. 1 month follow-up CT needed for complete evaluation. \par \par -CT Chest 10/19/20:  Widespread bilateral opacities throughout both lungs with significant interval progression since August 31, 2020 suggestive of infectious or inflammatory etiology. Findings may be due to Covid 19 pneumonia.  Left upper lobe nodular opacity with minimal if any interval increase in size since August 31, 2020 likely related to the adjacent acute process.\par \par -CT Angio Chest 10/21/20: No pulmonary embolus. Interval increase of diffuse bilateral peribronchovascular opacities with may represent pneumonitis or infection.\par \par -MRI Brain 12/11/20:  Previously seen left temporal occipital lobe lesion is significantly smaller in size.  Previously seen left frontal lobe lesion is no longer visualized.\par \par -LE Dopplers 12/24/20:  No evidence of deep venous thrombosis in either lower extremity.\par \par -CT Chest 12/28/20:  When compared with October 21, 2020:  Peribronchovascular groundglass abnormality and consolidations consistent with pneumonitis has resolved.  Unchanged left upper lobe 1.4 cm spiculated nodule in keeping with known malignancy. Follow-up recommended to assess for change.\par \par -XR Spine 1/18/21: Age indeterminate mild to moderate wedge-shaped anterior compression deformity of L1 superior endplate and to more subtle extent L2 superior endplate, however new from prior, correlate clinically with MRI suggested to further assess if warranted.\par Redemonstrated mid and lower lumbar predominant disc space narrowing with small disc margin osteophytes.\par Slight L2 on L3 and L3 on L4 retrolistheses however without associated spondylolysis defects. Remaining vertebral body alignment maintained.\par Unremarkable SI joints and partially visualized hips.\par Generalized osteopenia otherwise no discrete lytic or blastic lesions.\par Atherosclerotic abdominal aortic calcifications.\par \par -Bone density 1/25/21: Bone density is osteopenic. Patient is at moderate risk for fracture.\par \par -MRI L-Spine 2/19/21:  \par 1. Multiple compression fractures including T12-L4 Favor multiple osteoporotic compression fractures. New compared to prior CT dated 7/15/2020..\par 2. Suspected T10 fracture, incompletely imaged on this study. Dedicated thoracic spine MRI can be performed for further evaluation.\par 3. Multilevel lumbar spondylosis, most notable with contact on the left exited L3, descending L4, exiting L4 and descending L5 nerves. Spinal canal stenosis and foraminal narrowing, as described.\par \par -MRI T-Spine 3/3/21:  \par 1. Subacute wedge compression fractures of the T10 and T12 vertebral bodies.\par 2. Chronic wedge compression deformity of the L1 vertebral body.\par 3. Mild bilateral neural foraminal narrowing at the T9-T10 and T10-T11 levels, as described above.\par \par -CT C/A/P 3/22/21:  Compared with CT Chest 12/28/20 and CT A/P 7/15/20:  Slightly increased left upper lobe spiculated lesion, 1.7cm vs 1.4cm previously.  Redemonstration of multifocal chronic lung disease.  Increasing endplate compressions and heterogeneous density involving the thoracolumbar vertebral bodies. Cannot exclude possibility of metastases.\par \par -MRI Brain 4/12/21:  Previously noted enhancing lesions are no longer identified. Continued close interval follow-up is recommended.\par \par -CT C/A/P 6/11/21:  Unchanged spiculated left upper lobe mass.  Left upper lobe subpleural cysts and opacities unchanged from 03/22/2021.  Emphysema.  Scattered groundglass pulmonary opacities unchanged from 03/22/2021.  Compression deformity T10 with sclerotic change similar to 3/22/21.\par \par -CT L-Spine 6/29/21:  Unchanged compression deformities of the T12-L3 vertebral bodies. No destructive lesions are identified. Multilevel degenerative changes with disc herniations at L3-4 and L4-5 \par \par -MRI Brain 7/15/21:  No significant change when allowing for differences in technique.\par \par -CT C/A/P 9/10/21:  Since 6/11/2021:  Stable dominant central left upper lobe nodule.  More conspicuous small tubular nodule in the anterior left upper lobe could be mucoid impaction. 3 month follow-up is recommended.  New sclerotic lesion of the left third rib. Stable sclerosis and loss of height of the T10 vertebral body.  No new finding in the abdomen or pelvis.\par \par -MRI Brain 10/15/21:  Posttreatment changes. No new or enlarging intracranial lesions identified.\par \par –CT C/A/P 12/2/21:  Stable size of a spiculated left upper lobe pulmonary nodule with no new nodules noted.  No evidence for metastatic disease in the abdomen and pelvis.\par \par Images Reviewed/Interpreted:\par \par –MRI Brain 1/18/22:  5 x 4 mm focus of enhancement in the left posterior temporal lobe is similar in size. The lesion currently demonstrates peripheral enhancement, previously the lesion enhanced in a more solid fashion. Increased mild edema surrounding the lesion likely reflecting posttreatment changes.  No new abnormal parenchymal enhancement. No abnormal leptomeningeal enhancement.\par \par -CT C/A/P 3/10/22:  Stable exam including treated left upper lobe pulmonary nodule.\par \par

## 2022-03-18 NOTE — PHYSICAL EXAM
[Ambulatory and capable of all self care but unable to carry out any work activities] : Status 2- Ambulatory and capable of all self care but unable to carry out any work activities. Up and about more than 50% of waking hours [Normal] : affect appropriate [de-identified] : No icterus  [de-identified] : MMM O/P clear [de-identified] : Supple No LAD  [de-identified] : Somewhat distant BS [de-identified] : S1 S2  [de-identified] : No edema  [de-identified] : No spine/CVA tenderness

## 2022-04-26 ENCOUNTER — OUTPATIENT (OUTPATIENT)
Dept: OUTPATIENT SERVICES | Facility: HOSPITAL | Age: 72
LOS: 1 days | End: 2022-04-26
Payer: COMMERCIAL

## 2022-04-26 ENCOUNTER — APPOINTMENT (OUTPATIENT)
Dept: MRI IMAGING | Facility: CLINIC | Age: 72
End: 2022-04-26
Payer: MEDICARE

## 2022-04-26 DIAGNOSIS — C79.31 SECONDARY MALIGNANT NEOPLASM OF BRAIN: ICD-10-CM

## 2022-04-26 DIAGNOSIS — Z90.89 ACQUIRED ABSENCE OF OTHER ORGANS: Chronic | ICD-10-CM

## 2022-04-26 DIAGNOSIS — D11.0 BENIGN NEOPLASM OF PAROTID GLAND: Chronic | ICD-10-CM

## 2022-04-26 DIAGNOSIS — Z95.5 PRESENCE OF CORONARY ANGIOPLASTY IMPLANT AND GRAFT: Chronic | ICD-10-CM

## 2022-04-26 DIAGNOSIS — N20.0 CALCULUS OF KIDNEY: Chronic | ICD-10-CM

## 2022-04-26 PROCEDURE — 70553 MRI BRAIN STEM W/O & W/DYE: CPT

## 2022-04-26 PROCEDURE — A9585: CPT

## 2022-04-26 PROCEDURE — 70553 MRI BRAIN STEM W/O & W/DYE: CPT | Mod: 26

## 2022-04-28 ENCOUNTER — APPOINTMENT (OUTPATIENT)
Dept: PHYSICAL MEDICINE AND REHAB | Facility: CLINIC | Age: 72
End: 2022-04-28
Payer: MEDICARE

## 2022-04-28 ENCOUNTER — APPOINTMENT (OUTPATIENT)
Dept: RADIATION ONCOLOGY | Facility: CLINIC | Age: 72
End: 2022-04-28
Payer: MEDICARE

## 2022-04-28 VITALS
OXYGEN SATURATION: 92 % | HEART RATE: 87 BPM | RESPIRATION RATE: 18 BRPM | BODY MASS INDEX: 29.26 KG/M2 | SYSTOLIC BLOOD PRESSURE: 137 MMHG | HEIGHT: 63 IN | DIASTOLIC BLOOD PRESSURE: 72 MMHG | WEIGHT: 165.12 LBS | TEMPERATURE: 97.52 F

## 2022-04-28 VITALS — SYSTOLIC BLOOD PRESSURE: 123 MMHG | HEART RATE: 88 BPM | DIASTOLIC BLOOD PRESSURE: 74 MMHG

## 2022-04-28 PROCEDURE — 99213 OFFICE O/P EST LOW 20 MIN: CPT

## 2022-04-28 PROCEDURE — 99214 OFFICE O/P EST MOD 30 MIN: CPT | Mod: GC

## 2022-04-28 NOTE — PHYSICAL EXAM
[FreeTextEntry1] : PE:\par Constitutional: In NAD, calm and cooperative\par MSK (Back)\par 	Inspection: no gross swelling identified\par 	Palpation:Minimal Tenderness of the bilateral lower thoracic/upper lumbar paraspinals\par ROM: pain with 40-50 degrees lumbar flexion\par 	Strength: 5/5 strength in bilateral lower extremities\par 	Reflexes: 2+ Patella reflex bilaterally, 2+ Achilles reflex bilaterally, negative clonus bilaterally\par 	Sensation: Intact to light touch in bilateral lower extremities\par Special tests:\par SLR:negative bilaterally\par Modified slump test: Negative bilaterally\par ELIZABETH:negative bilaterally\par FADIR: negative bilaterally

## 2022-04-28 NOTE — HISTORY OF PRESENT ILLNESS
[FreeTextEntry1] : Mr. EVER CHAVEZ is a 71 year old  male who presents for follow up. He was last seen 7/8/21 for low back pain in the setting of metastatic lung CA with pathologic compression fractures. Since then, his cancer has been in remission. \par \par Since last seen, his low back pain was relatively well controlled. However 2-3 weeks ago while shifting his weight on a chair, he began experiencing a flare up in his low back pain. He notes occasional radiation of pain down bilateral legs to his anterior thigh, never crossing the knee. He denies any numbness or tingling. He takes diclofenac PO as needed in addition to voltaren gel with relief. He has been going into a [employer] which helps his pain. No recent PT, acupunture, or chiropractor. No history of spinal injections.\par \par Location:Low back\par Onset:Chronic for years, but in early 1/2021 worse over past 2-3 weeks while shifting his weight on a chair\par Provocation/Palliative:Worse with laying down flat, activity/better somewhat sitting\par Quality:Achy Sharp\par Radiation: Occasional referral of pain down to bilateral anterior thighs never crossing the knees\par Severity:10/10 at worse, 1-2/10 today\par Timing:Varies day to day\par \par No bowel/bladder changes. No groin numbness.

## 2022-04-28 NOTE — ASSESSMENT
[FreeTextEntry1] : Mr. EVER GOYAL is a 71 year old male with a history of metastatic cancer who presents with acute on chronic low back pain, likely due to osteoporotic compression fractures, as well as lumbar stenosis and spondylosis, much improved over time however recently exacerbated 2-3 weeks ago while shifting his weight on a chair. He notes occasional referral of pain down to bilateral anterior thighs which never crosses the knee. Pain may be from a radiculopathy vs new compression fx.  No red flag signs/symptoms. Will recommend:\par \par -Repeat MRI L spine with/without contrast ordered, r/o metastases, disc pathology or new compression fxs given hx of lung CA.\par - Diclofenac 75mg BID PRN.  Patient advised on cardiac/gi/renal side effects. Patient encouraged to take medication with food and not with other NSAIDs. \par -Patient to continue wearing supportive brace but only when walking as I advised him that it can weaken his abdominal muscles\par \par \par Follow up in 3-4 weeks (after MRI). Patient educated on red flag signs including any changes to their bowel/bladder control, groin numbness or new weakness. Patient knows to seek immediate attention by calling 911 or going to nearest ER if these symptoms appear.

## 2022-04-28 NOTE — HISTORY OF PRESENT ILLNESS
[FreeTextEntry1] : Mr. Cinthya Ness is a 72 yo male with extensive stage small cell lung cancer diagnosed in 4/2019 s/p carbo/ etoposide/ atezolizumab and consolidative RT 8/2019 followed by adjuvant atezolizumab. He was found to have two sub-cm brain metastases treated on 7/1/2020 with GK SRS (L_Frontal, L_Parietal). Patient presents for follow up. \par \par ONCOLOGY HISTORY\par Presented as a 69 year old male with a history of small cell lung cancer.  This was initially diagnosed in 4/2019 through an ebus of bilateral level 4 lymph nodes in 4/2019 after presenting with 6 months of worsening cough, fatigue, and insomnia. A CT chest showed a lung mass at that time. He was initially treated with carbo/etoposide/atezoluzimab starting in 5/2019 with 4 cycles completed in 7/2019, followed by atezolizumab maintenance . Consolidative RT completed in 8/2019. He elected against prophylactic whole brain radiation. \par \par Due to frequent headaches, forgetfulness, and reflexes bring worse, a brain MRI was ordered on 6/10/2020. \par \par This brain MRI revealed two brain lesions consistent with metastases. \par 1: left posterior temporal/occipital cortex,  approximately 1.4 x 1.2 cm. \par 2:  high medial left frontal cortex, approximately 0.7 x 0.6 cm Small amount of surrounding edema is identified. \par \par At the time of initial consult he endorsed occassional headaches, manageable.  Denied focal weakness, nausea, vomiting, or other complaints.  Maintains an excellent KPS.  His daughter, a physician, is serving as the  during this conversation.\par \par He was treated with GK SRS to the two lesions on 7/1/2020\par \par 9/17/2020- Mr. Ness presents today for follow up. He is seen with the assistance of [facility]  [id number]. MRI brain done 9/11/2020 showed maarked improvement and treatment response since prior exam. Previously visualized enhancing lesion in the left posterior temporal lobe is markedly decreased in size since prior exam, measuring approximately 0.6 x 0.6 cm, and previously measured 1.4 x 1.2 cm. There is no vasogenic edema surrounding this lesion. Previously visualized enhancing lesion in the high medial left frontal cortex, is markedly decreased in size since prior exam, nearly nonexistent measuring 0.1 cm, and previously measuring 0.7 x 0.6 cm. Minimal surrounding vasogenic edema remains. No new enhancing lesions are identified. \par \par He continues following with Dr. Ramirez. continues on atezoluzumab maintenance. CT chest 8/31/2020 showed Emphysema is present. Left upper lobe ill-defined nodular/linear opacity is similar compared to the prior study. Other bilateral patchy lung opacities are new from the prior study. These are indeterminate and may be infectious/inflammatory. 1 month follow-up CT needed for complete evaluation. \par \par Today he feels very well. Notes some headaches over the last week, not lasting long. Denies nausea, vomiting, focal weakness, Overall feeling well. Hearing may be a little worse recently.\par \par 12/22/2020- Mr. Ness presents today for follow up. [facility]  [id number] used for visit today.  MRI brain 12/11/2020 showed previously seen left temporal occipital lobe lesion is significantly smaller in size.  Previously seen left frontal lobe lesion is no longer visualized.  Continued follow-up is recommended.\par \par CT Chest 10/19/2020 showed widespread bilateral opacities throughout both lungs with significant interval progression since August 31, 2020 suggestive of infectious or inflammatory etiology. Findings may be due to Covid 19 pneumonia.\par \par Atezoluzumab discontinued this fall due to hospitalization with pneumonitis. Due for surveillance imaging at the end of December. Currently on 120 mg of prednisone for pneumonitis.  Today he denies headaches. Says he feels slightly confused sometimes, attributes this to steroids. Denies nausea, vomiting, focal weakness. Slight bilateral LE swelling, +1\par \par 4/13/2021- Mr. Ness presents today for follow up. He is seen through [facility] for which he provides verbal consent on 4/13/2021 at 3:19 PM. [facility]  [id number] used for visit. he underwent a follow up brain MRI on 4/12/2021. This showed Previously noted enhancing lesions are no longer identified. Continued close interval follow-up is recommended.\par He was diagnosed with covid-19 on 4/2. Has continued to follow with Dr. Ramirez. \par Today he is feeling  well. Remains on 3L O2, which he was on prior to COVID. Feels like antibody therapy helped him a lot. No headaches, no focal weakness. \par \par 7/22/2021- Mr. Ness presents today for follow up. [place]   he underwent a brain MRI on 7/15/2021. This showed no significant change. Continues to follow with Dr. Ramirez. Continues to be observed off treatment since 9/2020, with sustained response in June, plan for follow up imaging in in 9/2021.\par Today he is feeling well. Remains on 2L O2 during the day, none at night. No headaches, nausea, vomiting, focal weakness, visual trouble. some trouble swallowing which has been present since he had systemic therapy. Overall feeling well. has some back pain after a recent fall\par \par 10/20/2021- Mr. Ness presents today for follow up. Has continued to follow with Dr. Ramirez. Continues to be monitored off treatment.\par \par CT CAP 9/2021 showed \par Stable dominant central left upper lobe nodule.\par More conspicuous small tubular nodule in the anterior left upper lobe could be mucoid impaction. 3 month follow-up is recommended.\par New sclerotic lesion of the left third rib. Stable sclerosis and loss of height of the T10 vertebral body.\par No new finding in the abdomen or pelvis.\par Brain MRI 10/15/2021 showed Posttreatment changes. No new or enlarging intracranial lesions identified.\par Spoke with patient via  #[id number].  Patient is feeling well and denies any new symptoms.  \par \par 1/19/2022: Pt presents for follow-up. Today he reports feeling well. Denies pain, numbness, tingling, or changes in hearing/vision. Endorses short-term memory loss (often related to tasks he wishes to complete). He is currently using 2L oxygen.\par \par 4/28/2022- Mr. Fuentes presents today for follow up.  offered, patient refuses, asks wife to translate.\par MRI brain 4/26/22 showed No hydrocephalus, mass effect, acute intracranial hemorrhage, vasogenic edema, or acute territorial infarct. No abnormal parenchymal or leptomeningeal enhancement.  No evidence for intracranial or osseous metastases\par \par CT CAP 3/10/2022 was stable.  Continues to follow with Dr. Ramirez. not on systemic therapy. \par \par He continues to feel very well. Following with PM &R regarding lower back pain, will get a lumbar spine MRI. notes memory trouble.

## 2022-04-28 NOTE — PHYSICAL EXAM
[] : no respiratory distress [Normal] : oriented to person, place and time, the affect was normal, the mood was normal and not anxious [Oriented To Time, Place, And Person] : oriented to person, place, and time [de-identified] : CN II-XII normal.

## 2022-04-28 NOTE — REVIEW OF SYSTEMS
[Shortness Of Breath] : shortness of breath [Negative] : Heme/Lymph [Concentration Impairment: Grade 1] : Concentration Impairment: Grade 1 - Mild inattention or decreased level of concentration [FreeTextEntry9] : notes lower back pain, stable over months

## 2022-04-28 NOTE — REASON FOR VISIT
[Routine Follow-Up] : routine follow-up visit for [[employer] ] : provided by [employer]   [Interpreters_IDNumber] : [id number] [Interpreters_FullName] : Al  [TWNoteComboBox1] : [place]

## 2022-04-28 NOTE — REVIEW OF SYSTEMS
[Negative] : Constitutional [FreeTextEntry2] : No fevers, chills, or night sweats [FreeTextEntry9] : (+) low back pain [de-identified] : (+) radicular pain. No numbness/tingling, weakness, bowel/bladder incontinence

## 2022-05-12 ENCOUNTER — APPOINTMENT (OUTPATIENT)
Dept: INTERNAL MEDICINE | Facility: CLINIC | Age: 72
End: 2022-05-12
Payer: MEDICARE

## 2022-05-12 VITALS
DIASTOLIC BLOOD PRESSURE: 68 MMHG | WEIGHT: 164 LBS | RESPIRATION RATE: 16 BRPM | BODY MASS INDEX: 29.06 KG/M2 | HEART RATE: 94 BPM | OXYGEN SATURATION: 97 % | HEIGHT: 63 IN | SYSTOLIC BLOOD PRESSURE: 132 MMHG | TEMPERATURE: 98 F

## 2022-05-12 DIAGNOSIS — Z87.898 PERSONAL HISTORY OF OTHER SPECIFIED CONDITIONS: ICD-10-CM

## 2022-05-12 DIAGNOSIS — R21 RASH AND OTHER NONSPECIFIC SKIN ERUPTION: ICD-10-CM

## 2022-05-12 PROCEDURE — 36415 COLL VENOUS BLD VENIPUNCTURE: CPT

## 2022-05-12 PROCEDURE — 99214 OFFICE O/P EST MOD 30 MIN: CPT | Mod: 25

## 2022-05-12 NOTE — PHYSICAL EXAM
[No Acute Distress] : no acute distress [Well-Appearing] : well-appearing [Normal Sclera/Conjunctiva] : normal sclera/conjunctiva [PERRL] : pupils equal round and reactive to light [EOMI] : extraocular movements intact [Normal Oropharynx] : the oropharynx was normal [Normal Nasal Mucosa] : the nasal mucosa was normal [No Lymphadenopathy] : no lymphadenopathy [Supple] : supple [Thyroid Normal, No Nodules] : the thyroid was normal and there were no nodules present [No Respiratory Distress] : no respiratory distress  [Clear to Auscultation] : lungs were clear to auscultation bilaterally [Normal Rate] : normal rate  [Regular Rhythm] : with a regular rhythm [Normal S1, S2] : normal S1 and S2 [No Murmur] : no murmur heard [Pedal Pulses Present] : the pedal pulses are present [Soft] : abdomen soft [Non Tender] : non-tender [No HSM] : no HSM [Normal Supraclavicular Nodes] : no supraclavicular lymphadenopathy [Normal Posterior Cervical Nodes] : no posterior cervical lymphadenopathy [Normal Anterior Cervical Nodes] : no anterior cervical lymphadenopathy [No CVA Tenderness] : no CVA  tenderness [No Spinal Tenderness] : no spinal tenderness [No Joint Swelling] : no joint swelling [No Rash] : no rash [No Focal Deficits] : no focal deficits [Normal Gait] : normal gait [Normal Affect] : the affect was normal [Alert and Oriented x3] : oriented to person, place, and time [Normal Outer Ear/Nose] : the outer ears and nose were normal in appearance [No Edema] : there was no peripheral edema [de-identified] : scattered freckles [de-identified] : slow gait with cane

## 2022-05-12 NOTE — REVIEW OF SYSTEMS
[Negative] : Psychiatric [FreeTextEntry6] : see HPI [FreeTextEntry7] : see HPI [FreeTextEntry9] : see HPI [de-identified] : see HPI

## 2022-05-12 NOTE — HISTORY OF PRESENT ILLNESS
[FreeTextEntry1] : \par f/u [de-identified] : \par Accompanied by wife, Jacque Abrams\par \par 70 yo M pmhx HTN, DM, HLD, CAD s/p RCA stent (2007), BPH s/p TURP, ED, GERD, hx gastritis, hypomagnesemia, former smoker (quit 2019), COPD (on supplemental O2), osteopenia, stage 4 small cell lung cancer (dx'd 4/19, s/p carboplatin/etoposide and radiation with partial response, then on atezolizumab c/b pneumonitis in 10/2020 treated with high dose steroids c/b vertebral compression fractures, with small brain metastases 6/2020 s/p gamma knife radiation therapy in 7/2020.  Was tapered off steroids for pneumonitis by 3/7/2021, and stopped Bactrim prophylaxis at that time), hx covid infection 3/21 (s/p monoclonal Ab infusion 3/30/21 and decadron 6 mg for 10 days by [facility] program; completed 35d participation in [facility] rivaroxaban trial) for f/u\par \par Last seen in office 2/18/22 for f/u with labs done.\par \par Feeling well.\par Does not need med refills.\par Last ate 1 hr ago- had eggs\par \par Needs letter for traveling with oxygen tank, going to [place] in few days.\par \par c/o rash- itchy, on face/arms chest today- improved with Benadryl\par -had similar 1 week ago on face/chest- took Benadryl and it resolved until today\par -denies associated facial/lip/tongue swelling, throat pain, abdominal pain or sob\par \par Denies new topicals, body sprays, soap, detergents or fragrances.\par Denies new meds except diclofenac- last used 1 week ago, no rash noted with taking\par Denies new foods.\par Denies bug bites.  Denies hiking or gardening.\par Has 2 dogs at home- for 6 years, no new pets.\par No recent travel.\par Reports hx similar rash years ago- unsure of cause, no doctor eval.\par \par Reports is socially distancing and using precautions for covid prevention.\par Denies sick or covid positive contacts.\par Denies fever, chills, cough or sob.\par -hx pfizer vaccine series- 3/21, 7/21; Hx booster 1/22\par \par hx chronic LBP, hx compression fractures, hx osteopenia- reports recent exacerbation (similar to prior) 1 mo ago as was shifting in his chair while sitting.  Notes pain improved since onset, currently 3/10.\par -followed by PMR, last seen 4/22, noted acute on chronic back pain with possible radiculopathy x 2-3 weeks, MRI ordered (pending), advised diclofenac prn.  Using brace on/off.  F/U pending.\par -followed by neurosx, last seen 3/21- advised PT and back brace use with no surgical intervention planned.\par -followed by ortho, last seen 3/21\par -hx PT \par -on Flexeril prn, diclofenac prn (last 1 week ago), Tylenol prn and Oxycodone prn per PMR (last 6 months ago) - but mainly taking Tylenol 1-2x/wk at baseline\par -on Alendronate since 1/21, tolerating w/o SEs\par -ambulates with cane or walker\par -using back brace\par -denies focal weakness or incontinence\par \par small cell lung cancer  stage 4 (dx'd 4/19), hx brain mets-\par -followed by oncology, last seen 3/22 with 3/22 CT scans reviewed, noted stable.  Noted off tx with cont'd monitoring planned.  Planned to f/u in 3 mo with repeat CT scans.\par -followed by rad-onc, last seen 4/22 with 4/22 MRI brain noted JAYCOB.  Advised f/u in 3 mo with repeat MRI.\par -followed by pulm, last seen 2/22 noted improving cough, stable PFTs w/o med changes made.  Noted awaiting CPAP titration (done since visit, CPAP use advised).  F/U pending 6/22.\par -denies HA or vision trouble\par \par COPD (on supp O2), seasonal allergies, former smoker (quit 2019), hx covid infection 3/21- feels is at baseline\par -on Symbicort and Flonase prn\par -using 2L Oxygen (daytime, no night time need)- O2 sat 91-95% (baseline per pt)\par -followed by pulm, last seen 2/22 noted improving cough, stable PFTs w/o med changes made.  Noted awaiting CPAP titration (done since visit, CPAP use advised).  F/U pending 6/22.\par -on CPAP, states using x 2 weeks now\par -denies f/c, CP or sob\par \par DM-\par -followed by endo, had f/u 1/22 with A1c 6.4, Trulicity started and to stop Basaglar.  F/U pending 8/22.\par -on metformin  (2) BID since 3/21, mild loose BMs with use- feels not too bothersome, wishes to continue\par -off Basaglar as started Trulicity per endo since 1/22\par -off Novolog \par -using Freestyle Osiel.  Home fs: 105 -171 ; denies hypoglycemic sx's \par -followed by optho, last seen 1/21, told no DM retinopathy. F/U pending.\par -seen by optometrist 7/21 with new glasses given\par -followed by podiatry, states seen 3/22 with nails clipped. Denies foot paresthesias.\par \par GERD- reports controlled\par -on famotidine\par \par Has lost few lbs since last visit.\par Eating healthy diet, low carb \par exercising: walks 1 mile daily- done w/o sx's or limitations\par \par Reports nl appetite and BMs.\par -hx intermittent loose BMs- takes Imodium prn.  Onset ~ 1-2x/wk. Told 2/2 metformin- feels is controlled and wishes to continue.\par -denies dysphagia, n/v, abd pain or BRBPR.\par -hx c-scope/EGD 1/16\par \par hx hypomagnesemia-\par -adherent with oral supplement\par -notes has increased MG containing foods\par -not interested to lower metformin\par -nephrology eval pending\par \par HTN, HLD, CAD s/p stent- hx LE edema, reports lessening since off steroids\par -on ASA, metoprolol and Lisinopril\par -on Crestor, Lovaza and niacin - denies SEs with use\par -had echo 6/21\par -followed by cardio, seen 3/22, noted hx echo 6/21 and carotid US 11/21 (mild disease, hx b/l CEA).  No med changes made.  Has f/u 7/22.\par -has low salt/fat diet\par -exercise: walks 1 mile daily\par -denies calf pain or claudication\par \par Denies  complaints.

## 2022-05-12 NOTE — ASSESSMENT
[FreeTextEntry1] : \par 70 yo M pmhx HTN, DM, HLD, CAD s/p RCA stent (2007), BPH s/p TURP, ED, GERD, hx gastritis, hypomagnesemia, former smoker (quit 2019), COPD (on supplemental O2), osteopenia, stage 4 small cell lung cancer (dx'd 4/19, s/p carboplatin/etoposide and radiation with partial response, then on atezolizumab c/b pneumonitis in 10/2020 treated with high dose steroids c/b vertebral compression fractures, with small brain metastases 6/2020 s/p gamma knife radiation therapy in 7/2020.  Was tapered off steroids for pneumonitis by 3/7/2021, and stopped Bactrim prophylaxis at that time), hx covid infection 3/21 (s/p monoclonal Ab infusion 3/30/21 and decadron 6 mg for 10 days by [employer] program; completed 35d participation in [facility] rivaroxaban trial) here for f/u\par \par \par hx skin rash- transient x2, suspect allergic, unclear etiology.\par -A/I referral given, to try and see ASAP prior to travel\par -cont Benadryl prn\par -encouraged sx diary to ID trigger\par -advised prompt ER if worsened or onset facial/tong/lip swelling, throat discomfort, sob, etc.\par \par hx chronic LBP, hx compression fractures, hx osteopenia, vit d insuff- hx mechanical fall s/p ER 6/21 w/o acute findings on CT scan.  Back pain at baseline s/p recent exacerbation.\par -1/21 DEXA: osteopenia, repeat due 1/23\par -6/21 CT lumbar spine: Unchanged compression deformities of the T12-L3 vertebral bodies.  No destructive lesions are identified.  Multilevel degenerative changes with disc herniations at L3-4 and L4-5\par -7/21 vit d 25\par -followed by PMR, last seen 4/22, noted acute on chronic back pain with possible radiculopathy x 2-3 weeks, MRI ordered (pending), advised diclofenac prn. Using brace on/off. F/U pending.\par -followed by neurosx, last seen 3/21- advised PT and back brace use with no surgical intervention planned.\par -followed by ortho, last seen 3/21\par -hx PT \par -on Flexeril prn, Tylenol prn and Oxycodone prn (per PMR), taking mainly Tylenol 1-2x/wk at baseline)\par -recent diclofenac and Voltaren gel prn per PMR\par -on Alendronate since 1/21, tolerating w/o SEs\par -on ca/D OTC supp\par -ambulates with cane or walker\par -using back brace\par -fall precautions counseled\par \par small cell lung cancer stage 4 (dx'd 4/19), hx brain mets, anemia-\par -4/21 cbc wnl, except Hg 11.6\par -7/21 cbc/bmp wnl\par -followed by oncology, last seen 3/22 with 3/22 CT scans reviewed, noted stable. Noted off tx with cont'd monitoring planned. Planned to f/u in 3 mo with repeat CT scans.\par -followed by rad-onc, last seen 4/22 with 4/22 MRI brain noted JAYCOB. Advised f/u in 3 mo with repeat MRI.\par -followed by pulm, last seen 2/22 noted improving cough, stable PFTs w/o med changes made. Noted awaiting CPAP titration (done since visit, CPAP use advised). F/U pending 6/22.\par \par ARIELLE, COPD (on supp O2), seasonal allergies, former smoker (quit 2019), hx covid infection (s/p dexamethasone course, s/p MAB 3/21)- \par -followed by pulm, last seen 2/22 noted improving cough, stable PFTs w/o med changes made. Noted awaiting CPAP titration (done since visit, CPAP use advised). F/U pending 6/22.\par -on Symbicort and MDI prn\par -Flonase prn\par -on CPAP since 4/22, adherence encouraged\par \par DM, microalbuminuria- 1/22 A1c 7 (was 6.4), 11/21 microalbumin wnl (was 46)\par -followed by endo, had f/u 1/22 with A1c 6.4, Trulicity started and to Basaglar stop. F/U pending.\par -on metformin  (2) BID since 3/21, mild loose BMs with use- feels not too bothersome, wishes to continue\par -off Basaglar\par -off Novolog\par -on Trulicity since 1/22\par -on Lisinopril for renal protection\par -cont home fs monitoring, using Freestyle Osiel\par -followed by optho, last seen 1/21, told no DM retinopathy.  Yearly f/u advised- referral given again.\par -seen by optometrist 7/21 with new glasses given\par -followed by podiatry, states seen 1/22 with nails clipped.  Denies foot paresthesias. DM foot care counseled.\par -check A1c, vit B12\par \par HTN, HLD, CAD s/p stent, hx LE edema (reports improved since off steroids 3/21)- BP wnl\par -12/20 b/l LE duplex: no DVT b/l\par -6/21 echo 6/21: EF 60-65%, min MR, LV remodeling, nl LV fxn, mild diastolic dysfxn (Stg I)\par -7/21 cbc/bmp/TSH wnl\par -11/21 Tchol 114  LDL 42 HDL 49\par -11/21 cmp wnl\par -on ASA, metoprolol and Lisinopril\par -on Crestor, Lovaza and niacin - denies SEs with use\par -off Zetia 11/21 per cardio\par -followed by cardio, seen 3/22, noted hx echo 6/21 and carotid US 11/21 (mild disease, hx b/l CEA).  No med changes made.  Has f/u 7/22.\par -low salt diet advised\par -check fasting lipids at nv\par \par hx insomnia, snoring-\par -followed by pulm, last seen 2/22 noted improving cough, stable PFTs w/o med changes made. Noted awaiting CPAP titration (done since visit, CPAP use advised). F/U pending 6/22.\par -on CPAP since 4/22, adherence encouraged\par -on Temazepam prn per oncology, trying to taper down since started with CPAP use \par \par GERD, hx internal hemorrhoids, diverticulosis, colon polyps- reports controlled\par -hx EGD 1/16: nonbleeding erosive gastropathy\par -hx colonoscopy 1/16: internal hemorrhoids, large lipoma at transverse colon, diverticulosis, rec: repeat in 10 yrs (Dr. Hinds)\par -on famotidine\par -advised to avoid reflux aggravating foods\par \par hx hypomagnesium- hx chronic, mild diarrhea 2/2 metformin\par -declines to change metformin\par -11/21 Mg 1.7--> 1.4 in 2/22\par -on MgOx 400 (1/2/1)\par -increased Mg diet sources encouraged\par -nephrology referral given for eval- pending\par -check level\par \par \par MISC:  Continued social distancing and measure for covid19 prevention encouraged.  \par -hx pfizer vaccine series- 3/21, 7/21; Hx booster 1/22\par \par MISC: pt letter to travel with oxygen given as requested\par \par \par HCM\par -hx CPE 7/21, yearly advised\par -7/21 hep C screening negative\par -7/21 PSA wnl\par -hx flu shot 11/21\par -hx Tdap 11/12\par -hx prevnar 3/15\par -hx PVX 10/20\par -advised to check on insurance coverage for shingrix and f/u if desires.  Advised to d/w oncology prior to getting.\par -hx screening colonoscopy 1/16: internal hemorrhoids, large lipoma at transverse colon, diverticulosis, rec: repeat in 10 yrs (Dr. Hinds)\par -hx DEXA 1/21: osteopenia (hx compression fractures)\par -followed by derm, last seen 9/20.  Yearly skin screening advised.  Regular use of sun block for skin cancer prevention counseled.\par -advised to designate HCP and provide copy of completed form for records\par \par Pt requests wife, Jacque Abrams, be contacted re: his test results and medical care, (cell) [phone]\par \par Labs drawn in office today.\par \par \par

## 2022-05-13 LAB
ALBUMIN SERPL ELPH-MCNC: 4.7 G/DL
ALP BLD-CCNC: 76 U/L
ALT SERPL-CCNC: 17 U/L
ANION GAP SERPL CALC-SCNC: 18 MMOL/L
AST SERPL-CCNC: 20 U/L
BILIRUB SERPL-MCNC: 0.4 MG/DL
BUN SERPL-MCNC: 16 MG/DL
CALCIUM SERPL-MCNC: 9.9 MG/DL
CHLORIDE SERPL-SCNC: 101 MMOL/L
CO2 SERPL-SCNC: 22 MMOL/L
CREAT SERPL-MCNC: 0.82 MG/DL
EGFR: 95 ML/MIN/1.73M2
ESTIMATED AVERAGE GLUCOSE: 140 MG/DL
GLUCOSE SERPL-MCNC: 142 MG/DL
HBA1C MFR BLD HPLC: 6.5 %
MAGNESIUM SERPL-MCNC: 1.6 MG/DL
POTASSIUM SERPL-SCNC: 4.9 MMOL/L
PROT SERPL-MCNC: 6.8 G/DL
SODIUM SERPL-SCNC: 140 MMOL/L
VIT B12 SERPL-MCNC: 358 PG/ML

## 2022-05-14 LAB
BASOPHILS # BLD AUTO: 0.15 K/UL
BASOPHILS NFR BLD AUTO: 2 %
EOSINOPHIL # BLD AUTO: 0.24 K/UL
EOSINOPHIL NFR BLD AUTO: 3.2 %
HCT VFR BLD CALC: 48.9 %
HGB BLD-MCNC: 14.7 G/DL
IMM GRANULOCYTES NFR BLD AUTO: 0.4 %
LYMPHOCYTES # BLD AUTO: 0.79 K/UL
LYMPHOCYTES NFR BLD AUTO: 10.5 %
MAN DIFF?: NORMAL
MCHC RBC-ENTMCNC: 29.2 PG
MCHC RBC-ENTMCNC: 30.1 GM/DL
MCV RBC AUTO: 97.2 FL
MONOCYTES # BLD AUTO: 0.64 K/UL
MONOCYTES NFR BLD AUTO: 8.5 %
NEUTROPHILS # BLD AUTO: 5.68 K/UL
NEUTROPHILS NFR BLD AUTO: 75.4 %
PLATELET # BLD AUTO: 315 K/UL
RBC # BLD: 5.03 M/UL
RBC # FLD: 15.6 %
WBC # FLD AUTO: 7.53 K/UL

## 2022-05-18 ENCOUNTER — RESULT REVIEW (OUTPATIENT)
Age: 72
End: 2022-05-18

## 2022-06-07 ENCOUNTER — OUTPATIENT (OUTPATIENT)
Dept: OUTPATIENT SERVICES | Facility: HOSPITAL | Age: 72
LOS: 1 days | Discharge: HOME | End: 2022-06-07

## 2022-06-07 DIAGNOSIS — N20.0 CALCULUS OF KIDNEY: Chronic | ICD-10-CM

## 2022-06-07 DIAGNOSIS — Z90.89 ACQUIRED ABSENCE OF OTHER ORGANS: Chronic | ICD-10-CM

## 2022-06-07 DIAGNOSIS — C34.90 MALIGNANT NEOPLASM OF UNSPECIFIED PART OF UNSPECIFIED BRONCHUS OR LUNG: ICD-10-CM

## 2022-06-07 DIAGNOSIS — D11.0 BENIGN NEOPLASM OF PAROTID GLAND: Chronic | ICD-10-CM

## 2022-06-07 DIAGNOSIS — Z95.5 PRESENCE OF CORONARY ANGIOPLASTY IMPLANT AND GRAFT: Chronic | ICD-10-CM

## 2022-06-13 ENCOUNTER — OUTPATIENT (OUTPATIENT)
Dept: OUTPATIENT SERVICES | Facility: HOSPITAL | Age: 72
LOS: 1 days | End: 2022-06-13
Payer: MEDICARE

## 2022-06-13 ENCOUNTER — APPOINTMENT (OUTPATIENT)
Dept: MRI IMAGING | Facility: CLINIC | Age: 72
End: 2022-06-13
Payer: MEDICARE

## 2022-06-13 ENCOUNTER — APPOINTMENT (OUTPATIENT)
Dept: CT IMAGING | Facility: CLINIC | Age: 72
End: 2022-06-13
Payer: MEDICARE

## 2022-06-13 DIAGNOSIS — C34.12 MALIGNANT NEOPLASM OF UPPER LOBE, LEFT BRONCHUS OR LUNG: ICD-10-CM

## 2022-06-13 DIAGNOSIS — Z90.89 ACQUIRED ABSENCE OF OTHER ORGANS: Chronic | ICD-10-CM

## 2022-06-13 DIAGNOSIS — D11.0 BENIGN NEOPLASM OF PAROTID GLAND: Chronic | ICD-10-CM

## 2022-06-13 DIAGNOSIS — Z95.5 PRESENCE OF CORONARY ANGIOPLASTY IMPLANT AND GRAFT: Chronic | ICD-10-CM

## 2022-06-13 DIAGNOSIS — N20.0 CALCULUS OF KIDNEY: Chronic | ICD-10-CM

## 2022-06-13 DIAGNOSIS — M43.9 DEFORMING DORSOPATHY, UNSPECIFIED: ICD-10-CM

## 2022-06-13 DIAGNOSIS — C34.90 MALIGNANT NEOPLASM OF UNSPECIFIED PART OF UNSPECIFIED BRONCHUS OR LUNG: ICD-10-CM

## 2022-06-13 PROCEDURE — 71260 CT THORAX DX C+: CPT | Mod: 26

## 2022-06-13 PROCEDURE — 71260 CT THORAX DX C+: CPT

## 2022-06-13 PROCEDURE — 74177 CT ABD & PELVIS W/CONTRAST: CPT | Mod: 26

## 2022-06-13 PROCEDURE — 72158 MRI LUMBAR SPINE W/O & W/DYE: CPT

## 2022-06-13 PROCEDURE — 74177 CT ABD & PELVIS W/CONTRAST: CPT

## 2022-06-13 PROCEDURE — A9585: CPT

## 2022-06-13 PROCEDURE — 72158 MRI LUMBAR SPINE W/O & W/DYE: CPT | Mod: 26

## 2022-06-14 ENCOUNTER — NON-APPOINTMENT (OUTPATIENT)
Age: 72
End: 2022-06-14

## 2022-06-14 ENCOUNTER — APPOINTMENT (OUTPATIENT)
Dept: HEMATOLOGY ONCOLOGY | Facility: CLINIC | Age: 72
End: 2022-06-14
Payer: MEDICARE

## 2022-06-14 VITALS
DIASTOLIC BLOOD PRESSURE: 81 MMHG | TEMPERATURE: 97.5 F | WEIGHT: 166.01 LBS | OXYGEN SATURATION: 98 % | HEIGHT: 62.99 IN | HEART RATE: 84 BPM | BODY MASS INDEX: 29.41 KG/M2 | SYSTOLIC BLOOD PRESSURE: 138 MMHG | RESPIRATION RATE: 18 BRPM

## 2022-06-14 PROCEDURE — 99213 OFFICE O/P EST LOW 20 MIN: CPT

## 2022-06-22 ENCOUNTER — APPOINTMENT (OUTPATIENT)
Dept: PULMONOLOGY | Facility: CLINIC | Age: 72
End: 2022-06-22
Payer: MEDICARE

## 2022-06-22 VITALS
TEMPERATURE: 97.6 F | BODY MASS INDEX: 28.7 KG/M2 | HEART RATE: 83 BPM | SYSTOLIC BLOOD PRESSURE: 128 MMHG | WEIGHT: 162 LBS | HEIGHT: 62.99 IN | OXYGEN SATURATION: 94 % | DIASTOLIC BLOOD PRESSURE: 74 MMHG | RESPIRATION RATE: 15 BRPM

## 2022-06-22 PROCEDURE — 99214 OFFICE O/P EST MOD 30 MIN: CPT

## 2022-06-22 NOTE — HISTORY OF PRESENT ILLNESS
[Former] : former [Never] : never [Wheezing] : wheezing [Nasal Passage Blockage (Stuffiness)] : edema [Nonspecific Pain, Swelling, And Stiffness] : chest pain [Fever] : fever [Cough] : coughing [Difficulty Breathing During Exertion] : dyspnea on exertion [Feelings Of Weakness On Exertion] : exercise intolerance [Continuous] : Continuous [NC] : Nasal Cannula [24 hrs] : 24 hours/day [0.5  -  Very, very slight] : 0.5, very, very slight [Obstructive Sleep Apnea] : obstructive sleep apnea [Nonrestorative Sleep] : nonrestorative sleep [Snoring] : snoring [CPAP:] : CPAP [TextBox_4] : 71M DM2, HTN, CAD s/p PCI (RCA 2007), HLD, and COPD and lung cancer presenting for followup pulmonary evaluation. He is doing well today and presents to the office with his wife and grandson (Aung)\par \par - Doing well without pulmonary complaints\par - Started on CPAP therapy - CPAP 11 ([facility]) but having some difficulty tolerating machine. Feels that the pressure is too high. Using on average 1-2 hours per night (19/30 nights). Did not use when on vacation in [place]\par - Planning a trip to [place] with family for 72th birthday\par - CT Scan from 12/2/21 - stable disease - and remains off therapy given prior pneumonitis\par - Remains on supplemental O2 - though using much less frequently\par - Cough resolved with Symbicort and Albuterol\par - Overall doing very well and in good spirits\par \par PFTs: 2/25/22 - FEV1 2.28L (84%), FVC 3.58L (99%), FEV1/FVC 64%, FEF 25-75 57%, TLC 97%, RV/TLC 32%, ERV 47%, DLCO 64%\par PFTs: 12/22/20 - FEV1 2.62L (95%), FVC 3.98L (109%), FEV1/FVC 66%, FEF 25-75 65%, %, RV/TLC 37%, DLCO 57%\par \par 6MWT: 2/25/22 - 386 meters - 2L O2\par 6MWT: 12/22/20 - 331 meters - 4L O2\par \par He is a long term former smoker but quit at the time of his lung cancer diagnosis. He has known obstructive lung disease with a bronchodilator response. He was switched from Breo to Symbicort while in the hospital. He has a Ventolin inhaler and uses it intermittently. He developed pneumonitis from treatment of Small Cell which required hospitalization and then treatment with a prolonged course of steroids. He did develop COVID-19 but fortunately did not require hospitalization.\par \par He was diagnosed with small cell lung cancer 4/2019 and then started on chemotherapy and radiation. He initially underwent Bronchoscopy/EBUS with Dr. Martinez which revealed that bilateral mediastinal LN were positive for small cell carcinoma. He was started on chemotehrapy at that time in May 2019 and achieved partial response. He was then started on maintenance Atezolizumab. He was offered prophylactic cranial irradiation but declined initially. He developed small brain mets in June 2020 and received GK-SRS with Dr. Mari [facility]. He had a CT chest 8/31/2020 that showed a small stable upper lobe opacity. His repeat CT chest done 10/2020 was suggestive of pneumonitis. He had a repeat CT chest done 12/28/2020 which showed improvement. [FreeTextEntry1] : 1-4 [Difficulty Maintaining Sleep] : does not have difficulty maintaining sleep [Witnessed Apneas] : no witnessed apneas [TextBox_100] : 11/8/21 [TextBox_108] : 8.2 [TextBox_112] : 96.6 [TextBox_116] : 76 [TextBox_104] : 2/5/22 [TextBox_131] : 11 [TextBox_127] : 5/22 [TextBox_129] : 6/20/22 [TextBox_133] : 63 (19/30) [TextBox_137] : 7 (2/30) [TextBox_141] : 1 [TextBox_143] : 42 [TextBox_147] : 1.1 [ESS] : 0 [TextBox_12] : 12/28/2020 - INTERPRETATION:  Reason for Exam: Left upper lobe lung cancer\par \par CT of the chest was performed from the thoracic inlet to the level of the adrenal glands following IV contrast injection of  50 cc of Omnipaque 350. No immediate complications were reported.\par \par Comparison: October 21, 2020\par \par Tubes/Lines: None\par \par Mediastinum and Heart: Aorta and pulmonary arteries are normal in size. No pericardial effusion. Multiple calcified lymph nodes in the mediastinum are unchanged since prior. Coronary artery calcifications are noted.. Thyroid gland appears unremarkable.\par \par Lungs, Pleura, and Airways: Again seen is a left upper lobe spiculated nodule which measures approximately 1.4 cm which is unchanged since previous exam. Previously seen areas of bilateral peribronchovascular groundglass abnormalities and consolidations have essentially resolved. Minimal residual peribronchial vascular and linear scarring with traction bronchiolectasis noted. Focal area of distortion in the right middle lobe with calcification and traction bronchiectasis is stable.\par \par Visualized Abdomen: Postcontrast appearance of the upper abdomen is unremarkable.\par \par Bones and soft tissues: Unremarkable.\par \par \par IMPRESSION:\par \par When compared with October 21, 2020:\par \par Peribronchovascular groundglass abnormality and consolidations consistent with pneumonitis has resolved.\par \par Unchanged left upper lobe 1.4 cm spiculated nodule in keeping with known malignancy. Follow-up recommended to assess for change.\par  [TextBox_27] : 12/2/21 -  CT Chest w/ IV Cont \par FINDINGS:\par CHEST:\par LUNGS AND LARGE AIRWAYS: Patent central airways. Emphysema. Spiculated left upper lobe nodule measures 1.3 x 1.1 cm, stable. Stable scattered areas of linear type scarring and atelectasis noted throughout the lungs. Stable 6 mm ovoid opacity in the left upper lobe anteriorly, possibly scarring. Lower lobe stable bronchiectasis. Stable bilateral calcified granulomas.\par PLEURA: No pleural effusion.\par VESSELS: Atherosclerotic changes of the aorta and coronary arteries.\par HEART: Heart size is normal. No pericardial effusion.\par MEDIASTINUM AND SANDI: Calcified mediastinal and right hilar lymph nodes are stable..\par CHEST WALL AND LOWER NECK: Within normal limits.\par \par ABDOMEN AND PELVIS:\par LIVER: Within normal limits.\par BILE DUCTS: Normal caliber.\par GALLBLADDER: Cholelithiasis.\par SPLEEN: Within normal limits.\par PANCREAS: Within normal limits.\par ADRENALS: Stable 1.1 cm right adrenal indeterminate nodule. Adrenal gland within normal limits.\par KIDNEYS/URETERS: Within normal limits.\par \par BLADDER: Within normal limits.\par REPRODUCTIVE ORGANS: TURP defect..\par \par BOWEL: Stable lipoma of the transverse colon. Diverticulosis coli. No bowel obstruction. Appendix is normal.\par PERITONEUM: No ascites.\par VESSELS: Atherosclerotic changes.\par RETROPERITONEUM/LYMPH NODES: No lymphadenopathy.\par ABDOMINAL WALL: Tiny fat-containing umbilical hernia..\par BONES: Chronic compression deformities of T10 and T12-L3. Increased sclerosis of the T10 vertebral body, stable. Stable punctate sclerotic focus in the inferior left scapula. Stable vague sclerosis in the right anterior fourth rib.\par \par IMPRESSION:\par Stable size of a spiculated left upper lobe pulmonary nodule with no new nodules noted.\par No evidence for metastatic disease in the abdomen and pelvis.\par \par --- End of Report -- [TextBox_42] : 6/13/22 - FINDINGS:\par \par LUNGS/AIRWAYS/PLEURA: Stable 1.3 cm nodule in the left upper lobe (3-56). No endobronchial lesion. Emphysema. Multiple calcified granulomas. Stable scarring in both lungs. Unremarkable pleura.\par \par LYMPH NODES/MEDIASTINUM: No enlarged lymph nodes. Calcified lymph nodes compatible with prior granulomatous disease.\par \par HEART/VASCULATURE: Normal heart size. Unremarkable pericardium. Normal caliber aorta and pulmonary artery. Unchanged severe stenosis of the left subclavian artery origin due to calcified and noncalcified plaque. Coronary artery calcifications.\par \par LIVER: Unremarkable.\par \par BILIARY SYSTEM: Cholelithiasis.\par \par SPLEEN:Unremarkable.\par \par PANCREAS: Unremarkable.\par \par ADRENALS: Unremarkable.\par \par KIDNEYS/URINARY TRACT: Unremarkable.\par \par GASTROINTESTINAL TRACT: Stable lipoma in the transverse colon. Diverticulosis.\par \par REPRODUCTIVE ORGANS: Stable enlarged prostate status post TURP.\par \par LYMPH NODES/PERITONEUM/RETROPERITONEUM: Unremarkable.\par \par BONES/SOFT TISSUES: Mild subcutaneous gas in the anterior abdominal wall, likely from a subcutaneous injection. Unchanged loss of height of multiple vertebral bodies, heterogeneous marrow of the mid and lower thoracic spine, and few other areas of sclerosis.\par \par \par IMPRESSION:\par \par Stable 1.3 cm left upper lobe nodule.\par \par No evidence of new or progressive disease.

## 2022-06-22 NOTE — REVIEW OF SYSTEMS
[SOB on Exertion] : sob on exertion [GERD] : gerd [Diabetes] : diabetes [Fever] : no fever [Fatigue] : no fatigue [Chills] : no chills [Nasal Congestion] : no nasal congestion [Postnasal Drip] : no postnasal drip [Cough] : no cough [Hemoptysis] : no hemoptysis [Sputum] : no sputum [Wheezing] : no wheezing [Chest Discomfort] : no chest discomfort [Edema] : no edema [Angioedema] : no angioedema [Abdominal Pain] : no abdominal pain [Nausea] : no nausea [Vomiting] : no vomiting [Back Pain] : no back pain [Rash] : no rash [Easy Bruising] : no easy bruising [Focal Weakness] : no focal weakness [Seizures] : no seizures [Depression] : no depression [Anxiety] : no anxiety [TextBox_30] : SOB improved [TextBox_91] : back pain improved

## 2022-06-22 NOTE — PHYSICAL EXAM
[No Acute Distress] : no acute distress [Well Nourished] : well nourished [Well Groomed] : well groomed [No Deformities] : no deformities [Well Developed] : well developed [Normal Oropharynx] : normal oropharynx [Normal Appearance] : normal appearance [No JVD] : no jvd [Normal Rate/Rhythm] : normal rate/rhythm [Normal Pulses] : normal pulses [Normal S1, S2] : normal s1, s2 [No Resp Distress] : no resp distress [No Acc Muscle Use] : no acc muscle use [Normal Rhythm and Effort] : normal rhythm and effort [Clear to Auscultation Bilaterally] : clear to auscultation bilaterally [Benign] : benign [Normal Gait] : normal gait [No Clubbing] : no clubbing [No Cyanosis] : no cyanosis [No Edema] : no edema [FROM] : FROM [Normal Color/ Pigmentation] : normal color/ pigmentation [No Focal Deficits] : no focal deficits [Oriented x3] : oriented x3 [Normal Mood] : normal mood [Normal Affect] : normal affect [TextBox_11] : anicteric, EOMI, MMM, trachea midline [TextBox_68] : good air entry, prolonged expiratory phase

## 2022-06-22 NOTE — REASON FOR VISIT
[Follow-Up] : a follow-up visit [Sleep Apnea] : sleep apnea [[employer] ] : provided by [employer]   [Lung Cancer] : lung cancer [Time Spent: ____ minutes] : Total time spent using  services: [unfilled] minutes. The patient's primary language is not English thus required  services. [Interpreters_IDNumber] : [id number] [Interpreters_FullName] : Nneka [TWNoteComboBox1] : Cambodian

## 2022-06-22 NOTE — ASSESSMENT
[FreeTextEntry1] : 71M extensive history including DM2, CAD, COPD, and small cell lung cancer presenting for followup pulmonary evaluation. Patient has been stable without hospitalization since his last visit. He remains off cancer directed therapies at this time.\par \par 1.  Obstructive Sleep Apnea - patient recently found to have ARIELLE and CPAP titration indicated CPAP 11\par -  CPAP started but patient having difficulty tolerating pressure - will try and adjust ramp time\par -  Referral to Dr. Leslie provided for further evaluation - may need to consider alternate therapy if unable to tolerate CPAP\par \par 2. Pneumonitis from 2020 - RESOLVED - patient underwent bronchoscopy and transbronchial biopsy which showed chronic inflammation. - He had a long course of Prednisone with PCP prophylaxis\par - Repeat CT chest from 12/28/2020 shows resolution of previously noted opacities\par \par 3. Small Cell Lung Cancer - with metastatic disease. He was recently seen by Dr. Ramirez\par - patient has received radiation therapy \par - Currently off cancer directed therapies\par - He has improved from a pulmonary standpoint and I do not have any objections to further therapy if it is thought necessary from an Oncologic perspective.\par \par 4. COPD - patient is a long term former smoker. He will continue Symbicort and use Ventolin as needed.\par - PFTs and 6MWT done 2/2022\par - No evidence of desaturation during 6MWT with 2L O2 - with improvement in distance\par - PFTs relatively stable - no BD testing in setting of COVID-19 pandemic. On prior PFTs patient had significant BD response\par \par 5. Chronic Hypoxemic Respiratory Failure - patient currently on 1-3L supplemental O2. Continue to maintain O2 sats > 90% as able.\par - Can start to wean down FiO2 as able with goal O2 sat > 90%. Will repeat EOT on RA at future visit\par \par 6. Health Maintenance\par Spirometry: reviewed\par Smoking status: Former \par Pneumococcal vaccination status: Completed \par COVID vaccination status: Completed Pfizer vaccine\par [place] Sleepiness Scale: 0 (9/30/21)\par \par The above plan was discussed with EVER CHAVEZ and his wife in detail. Patient verbalized understanding and agrees with plan as detailed above. Patient was provided education and counselling on current diagnosis/symptoms, diagnostic work up, treatment options and potential side effects of any prescribed therapy/therapies. EVER  was advised to call our clinic at [phone] for any new or worsening symptoms, or with any questions or concerns. In case of acute onset of respiratory symptoms or worsening presentation, patient was advised to present to nearest emergency room for further evaluation. EVER  expressed understanding and all questions/concerns were addressed.\par \par

## 2022-06-23 ENCOUNTER — APPOINTMENT (OUTPATIENT)
Dept: PHYSICAL MEDICINE AND REHAB | Facility: CLINIC | Age: 72
End: 2022-06-23
Payer: MEDICARE

## 2022-06-23 VITALS
WEIGHT: 162 LBS | HEART RATE: 77 BPM | BODY MASS INDEX: 28.7 KG/M2 | SYSTOLIC BLOOD PRESSURE: 138 MMHG | HEIGHT: 63 IN | DIASTOLIC BLOOD PRESSURE: 77 MMHG | RESPIRATION RATE: 12 BRPM

## 2022-06-23 PROCEDURE — 99213 OFFICE O/P EST LOW 20 MIN: CPT | Mod: GC

## 2022-06-23 NOTE — DATA REVIEWED
[FreeTextEntry1] : \par   MR Lumbar Spine w/wo IV Cont             Final\par \par No Documents Attached\par \par \par \par \par   EXAM: [id number] - MR SPINE LUMBAR WAW IC  - ORDERED BY: ALMA ROSA MARCIAL\par \par \par PROCEDURE DATE:  06/13/2022\par \par \par \par INTERPRETATION:  MR LUMBAR SPINE WITHOUT AND WITH IV CONTRAST\par \par HISTORY:  6 months of low back pain. Metastatic small cell lung cancer. Compression fracture.\par \par TECHNIQUE:  Multiplanar MRI of the lumbar spine was performed without and with 7 cc administered Gadavist intravenous gadolinium contrast using 8 sequences.\par \par COMPARISON:  CT of the chest abdomen and pelvis dated 6/13/2022, CT of the lumbar spine dated 6/29/2021, and MRI of the lumbar spine dated 2/19/2021.\par \par FINDINGS:\par \par OSSEOUS STRUCTURES\par Fractures:  Mild chronic superior endplate compression deformities are seen involving the T12, L1, and L2 superior endplates with slight deformity of the L3 superior endplate. No acute fractures are seen.\par Alignment:  Slight retrolisthesis is present at L5-S1.\par Marrow Signal:  There are a few scattered hemangioma/fatty foci. No osseous metastases are seen.\par \par SPINAL CORD\par Signal:  Normal.\par Conus Medullaris: Terminates at L1.\par \par DISC LEVELS\par T12-L1:  Slight disc bulging is present without stenosis.\par \par L1-L2:  Mild disc bulging is present without significant stenosis.\par \par L2-L3: Disc bulging is present with ligament of the hypertrophy contributing to moderate spinal canal stenosis. Mild bilateral neural foraminal stenosis is present.\par \par L3-L4:  Moderate to severe loss of disc height is present posteriorly with disc bulging and ligamentum flavum hypertrophy contributing to moderate spinal canal stenosis. Moderate bilateral neural foraminal stenosis is present.\par \par L4-L5:  Moderate loss of disc height is present with disc bulging, left paracentral to foraminal disc protrusion decreased in size from the prior, and ligamentum flavum hypertrophy contributing to mild spinal canal stenosis with severe narrowing of the left lateral recess. Mild right and moderate to severe left neural foraminal stenosis is present.\par \par L5-S1:  Moderate loss of disc height is present with disc bulging but no spinal canal stenosis. Mild bilateral neural foraminal stenosis is present.\par \par VISUALIZED SACROILIAC JOINTS\par Maintained.\par \par SOFT TISSUES\par Unremarkable.\par \par IMPRESSION:\par 1. No metastatic disease is appreciated.\par 2. Chronic T12 through L3 superior endplate compression deformities appear relatively stable.\par 3. Multilevel degenerative disc disease is mostly stable. Left L4-L5 paracentral to foraminal disc protrusion is decreased in size from the prior.\par 4. L2-L3 and L3-L4 demonstrate moderate spinal canal stenosis.\par 5. L4-L5 demonstrate mild spinal canal stenosis with severe narrowing of the left lateral recess.\par 6. There is variable neural foraminal stenosis including moderate to severe left neural foraminal stenosis at L4-L5.\par \par --- End of Report ---\par \par \par \par \par \par \par GENARO MCGILL MD; Attending Radiologist\par This document has been electronically signed. Jun 14 2022 12:39PM\par \par  \par \par  Ordered by: ALMA ROSA MARCIAL       Collected/Examined: 13Jun2022 12:21PM       \par Verified by: ALMA ROSA MARCIAL 14Jun2022 02:57PM       \par  Result Communication: No patient communication needed at this time;\par Stage: Final       \par  Performed at: [facility]       Resulted: 14Jun2022 12:24PM       Last Updated: 14Jun2022 02:57PM       Accession: [id number]

## 2022-06-23 NOTE — RESULTS/DATA
[FreeTextEntry1] : -CT Chest 3/28/19:  \par 1. A left upper lobe spiculated nodule is concerning for primary lung cancer. \par 2. Enlarged chest lymph node measuring 16 mm. \par 3. Indeterminate sclerotic lesions of the right fourth rib and T6 vertebral body. \par \par -PET/CT 4/4/19:  FDG-PET/CT scan: \par 1. FDG avid spiculated left upper lobe lung nodule, worrisome for malignancy. \par 2. FDG avid AP window lymph node, suspicious for metastasis. \par 3. Indeterminate sclerotic right fourth rib and T6 vertebral body foci. \par \par -MRI Brain 5/5/19:  Small vessel white matter ischemic changes. No hemorrhage, mass or acute infarct. No abnormal enhancement to suggest brain metastases. Nonspecific left mastoid effusion. \par \par -MRI Thoracic Spine 5/5/19: Focus of low signal intensity on the T1 and T2-weighted images within the anterior T6 vertebral body corresponds to sclerosis on PET/CT and \par likely represents benign sclerosis or bone island. No evidence of lytic or blastic metastases. No abnormal signal or enhancement. \par \par -PET/CT 5/30/19:  Abnormal FDG-PET/CT scan: \par 1. Hypermetabolic spiculated left upper lobe pulmonary nodule in bilateral mediastinal and hilar lymph nodes are decreased in size and metabolism as compared to prior study from 4/4/2019, compatible with a partial response to interval therapy. Scattered calcified and noncalcified bilateral pulmonary nodules, unchanged. \par 2. Diffuse bone marrow hypermetabolism, increased as compared to prior study, likely is secondary to recent treatment with colony-stimulating factors. Small sclerotic densities in right fourth rib and body of T6 vertebra are unchanged. \par \par -CT Chest 7/12/19:  \par 1. In comparison with 5/30/2019, 1.5 cm left upper lobe spiculated nodule is decrease in size. No new or enlarging pulmonary nodule. \par 2. Stable subcentimeter mediastinal and hilar lymph nodes. \par \par -CT Chest 9/23/19: \par 1. The spiculated left upper lobe nodule is unchanged. \par 2. The AP window lymph node is unchanged. \par \par -MRI Brain 11/15/19:  No change from 5/5/2019. Microvascular disease. No abnormal enhancement to suggest brain metastases. \par \par -CT Chest 12/16/19:  Left upper lobe nodule minimally decreased in size since September 23, 2019.  Adjacent ill-defined opacities within the left upper lobe and the superior segment of the left lower lobe new since September 23, 2019 maybe sequela of radiation therapy. 3 month follow-up noncontrast chest CT can be performed for further evaluation. Small aortopulmonary window lymph node is unchanged since September 23, 2019. \par \par -CT Chest 3/13/20:  When compared with December 16, 2019: Left suprahilar 1.1 x 0.9 cm nodule is seen, slightly decreased in size when compared with prior. Focal areas of architectural distortion noted in the left upper lobe with additional linear opacities and groundglass opacity without change from prior and may reflect evolving posttreatment change. Multiple bilateral calcified nodules are identified, unchanged. \par \par -CT Chest 6/8/20:  Since 3/13/2020, the left upper lobe nodule and the paramediastinal opacities are unchanged. \par \par -MRI Brain 6/24/20: Enhancing lesions are identified consistent with metastasis given patient's history.\par \par -CT A/P 7/15/20: No evidence of abdominal metastasis. Stable transverse colon lipoma. \par \par -CT Chest 8/31/20:  Emphysema is present. Left upper lobe ill-defined nodular/linear opacity is similar compared to the prior study. Other bilateral patchy lung opacities are new from the prior study. These are indeterminate and may be infectious/inflammatory. 1 month follow-up CT needed for complete evaluation. \par \par -CT Chest 10/19/20:  Widespread bilateral opacities throughout both lungs with significant interval progression since August 31, 2020 suggestive of infectious or inflammatory etiology. Findings may be due to Covid 19 pneumonia.  Left upper lobe nodular opacity with minimal if any interval increase in size since August 31, 2020 likely related to the adjacent acute process.\par \par -CT Angio Chest 10/21/20: No pulmonary embolus. Interval increase of diffuse bilateral peribronchovascular opacities with may represent pneumonitis or infection.\par \par -MRI Brain 12/11/20:  Previously seen left temporal occipital lobe lesion is significantly smaller in size.  Previously seen left frontal lobe lesion is no longer visualized.\par \par -LE Dopplers 12/24/20:  No evidence of deep venous thrombosis in either lower extremity.\par \par -CT Chest 12/28/20:  When compared with October 21, 2020:  Peribronchovascular groundglass abnormality and consolidations consistent with pneumonitis has resolved.  Unchanged left upper lobe 1.4 cm spiculated nodule in keeping with known malignancy. Follow-up recommended to assess for change.\par \par -XR Spine 1/18/21: Age indeterminate mild to moderate wedge-shaped anterior compression deformity of L1 superior endplate and to more subtle extent L2 superior endplate, however new from prior, correlate clinically with MRI suggested to further assess if warranted.\par Redemonstrated mid and lower lumbar predominant disc space narrowing with small disc margin osteophytes.\par Slight L2 on L3 and L3 on L4 retrolistheses however without associated spondylolysis defects. Remaining vertebral body alignment maintained.\par Unremarkable SI joints and partially visualized hips.\par Generalized osteopenia otherwise no discrete lytic or blastic lesions.\par Atherosclerotic abdominal aortic calcifications.\par \par -Bone density 1/25/21: Bone density is osteopenic. Patient is at moderate risk for fracture.\par \par -MRI L-Spine 2/19/21:  \par 1. Multiple compression fractures including T12-L4 Favor multiple osteoporotic compression fractures. New compared to prior CT dated 7/15/2020..\par 2. Suspected T10 fracture, incompletely imaged on this study. Dedicated thoracic spine MRI can be performed for further evaluation.\par 3. Multilevel lumbar spondylosis, most notable with contact on the left exited L3, descending L4, exiting L4 and descending L5 nerves. Spinal canal stenosis and foraminal narrowing, as described.\par \par -MRI T-Spine 3/3/21:  \par 1. Subacute wedge compression fractures of the T10 and T12 vertebral bodies.\par 2. Chronic wedge compression deformity of the L1 vertebral body.\par 3. Mild bilateral neural foraminal narrowing at the T9-T10 and T10-T11 levels, as described above.\par \par -CT C/A/P 3/22/21:  Compared with CT Chest 12/28/20 and CT A/P 7/15/20:  Slightly increased left upper lobe spiculated lesion, 1.7cm vs 1.4cm previously.  Redemonstration of multifocal chronic lung disease.  Increasing endplate compressions and heterogeneous density involving the thoracolumbar vertebral bodies. Cannot exclude possibility of metastases.\par \par -MRI Brain 4/12/21:  Previously noted enhancing lesions are no longer identified. Continued close interval follow-up is recommended.\par \par -CT C/A/P 6/11/21:  Unchanged spiculated left upper lobe mass.  Left upper lobe subpleural cysts and opacities unchanged from 03/22/2021.  Emphysema.  Scattered groundglass pulmonary opacities unchanged from 03/22/2021.  Compression deformity T10 with sclerotic change similar to 3/22/21.\par \par -CT L-Spine 6/29/21:  Unchanged compression deformities of the T12-L3 vertebral bodies. No destructive lesions are identified. Multilevel degenerative changes with disc herniations at L3-4 and L4-5 \par \par -MRI Brain 7/15/21:  No significant change when allowing for differences in technique.\par \par -CT C/A/P 9/10/21:  Since 6/11/2021:  Stable dominant central left upper lobe nodule.  More conspicuous small tubular nodule in the anterior left upper lobe could be mucoid impaction. 3 month follow-up is recommended.  New sclerotic lesion of the left third rib. Stable sclerosis and loss of height of the T10 vertebral body.  No new finding in the abdomen or pelvis.\par \par -MRI Brain 10/15/21:  Posttreatment changes. No new or enlarging intracranial lesions identified.\par \par –CT C/A/P 12/2/21:  Stable size of a spiculated left upper lobe pulmonary nodule with no new nodules noted.  No evidence for metastatic disease in the abdomen and pelvis.\par \par –MRI Brain 1/18/22:  5 x 4 mm focus of enhancement in the left posterior temporal lobe is similar in size. The lesion currently demonstrates peripheral enhancement, previously the lesion enhanced in a more solid fashion. Increased mild edema surrounding the lesion likely reflecting posttreatment changes.  No new abnormal parenchymal enhancement. No abnormal leptomeningeal enhancement.\par \par -CT C/A/P 3/10/22:  Stable exam including treated left upper lobe pulmonary nodule.\par \par Images Reviewed/Interpreted:\par \par – MRI brain 4/26/2022: Negative for metastatic disease.\par \par –CT C/A/P 6/13/22:  Stable 1.3 cm left upper lobe nodule.  No evidence of new or progressive disease.\par \par -MRI L-Spine 6/13/22:  \par 1. No metastatic disease is appreciated.\par 2. Chronic T12 through L3 superior endplate compression deformities appear relatively stable.\par 3. Multilevel degenerative disc disease is mostly stable. Left L4-L5 paracentral to foraminal disc protrusion is decreased in size from the prior.\par 4. L2-L3 and L3-L4 demonstrate moderate spinal canal stenosis.\par 5. L4-L5 demonstrate mild spinal canal stenosis with severe narrowing of the left lateral recess.\par 6. There is variable neural foraminal stenosis including moderate to severe left neural foraminal stenosis at L4-L5.\par \par

## 2022-06-23 NOTE — PHYSICAL EXAM
[FreeTextEntry1] : PE:\par Constitutional: In NAD, calm and cooperative\par MSK (Back)\par 	Inspection: no gross swelling identified\par 	Palpation:Minimal Tenderness of the bilateral lower thoracic/upper lumbar paraspinals\par                 ROM: AROM intact in all planes with mild pain with extension & rotation of the lumbar spine. \par 	Strength: 5/5 strength in bilateral lower extremities\par 	Reflexes: 2+ Patella reflex bilaterally, 2+ Achilles reflex bilaterally, negative clonus bilaterally\par 	Sensation: Intact to light touch in bilateral lower extremities\par Special tests:\par SLR:negative bilaterally\par Modified slump test: Negative bilaterally\par ELIZABETH:negative bilaterally\par FADIR: negative bilaterally

## 2022-06-23 NOTE — ASSESSMENT
[FreeTextEntry1] : Mr. EVER GOYAL is a 71 year old male with a history of metastatic cancer who presented with acute on chronic low back pain, likely due to osteoporotic compression fractures, as well as lumbar stenosis and spondylosis. Pain has improved greatly since last visit. New MRI did not reveal any acute compression fractures nor any new metastatic disease.   No red flag signs/symptoms. Will recommend:\par - Reviewed  MRI L spine with the patient. \par - Continue using occasional Diclofenac 75mg PRN (only takes it about 1x/week).  Patient advised on cardiac/gi/renal side effects. Patient encouraged to take medication with food and not with other NSAIDs. \par \par Follow up as needed. Patient educated on red flag signs including any changes to their bowel/bladder control, groin numbness or new weakness. Patient knows to seek immediate attention by calling 911 or going to nearest ER if these symptoms appear.

## 2022-06-23 NOTE — ASSESSMENT
[FreeTextEntry1] : Extensive Stage Small cell lung cancer.  Started first line Carbo/Etoposide/Atezo in early May 2019, achieved WA.  Completed 4 cycles followed by Atezolizumab maintenance from July 2019.  Received consolidative Thoracic RT completed late August 2019.  He declined PCI.  Developed Brain metastases in June 2020 s/p GK-SRS in July 2020. Hospitalized at [facility] 10/21-11/2/20 for pneumonitis, felt to be secondary to immunotherapy.  Treated with steroids through early March 2021.  Monitored off systemic therapy since last treatment with maintenance Atezolizumab in late Sept 2020.  \par Surveillance/Restaging CT June 2022 with overall sustained response.  Recommend: \par -Continue to observe off treatment and monitor for progression of disease\par -Pulm f/u.  On supplemental O2 prn.  Utilizing CPAP machine.  \par -F/U with endocrine for management of DM and osteoporosis\par -Osteoporotic compression fractures:  follows with PMR.  MRI L-spine June 2022 with non-malignant findings\par –Insomnia: on Temazepam; medication renewed.  I-stop reviewed. \par -Surveillance Brain MRI April 2022 with sustained intracranial response; due for surveillance scan in July 2022 and f/u with Dr. Guerra.    \par -Restaging/Surveillance CT C/A/P in October and follow up to review results or sooner should problems arise\par -Information sheet given with directions for making appointments

## 2022-06-23 NOTE — PHYSICAL EXAM
[Ambulatory and capable of all self care but unable to carry out any work activities] : Status 2- Ambulatory and capable of all self care but unable to carry out any work activities. Up and about more than 50% of waking hours [Normal] : affect appropriate [de-identified] : No icterus  [de-identified] : MMM O/P clear [de-identified] : Supple No LAD  [de-identified] : Somewhat distant BS [de-identified] : S1 S2  [de-identified] : No edema  [de-identified] : No spine/CVA tenderness

## 2022-06-23 NOTE — HISTORY OF PRESENT ILLNESS
[Disease: _____________________] : Disease: [unfilled] [T: ___] : T[unfilled] [N: ___] : N[unfilled] [de-identified] : The patient has a heavy smoking history who recently quit. He developed a worsening cough roughly 6 months ago followed by fatigue and insomnia roughly 5 months ago. He saw his PCP and was referred to pulmonary. He was referred for a CT chest for lung cancer screening which revealed a left upper lobe lung mass with associated lymphadenopathy and suspicious bony lesions. He was then sent for a PET/CT scan which have activity in these areas. He underwent a bronchoscopy with EBUS biopsy of bilateral mediastinal lymph nodes that were positive for small cell carcinoma.  Started first line systemic therapy with Carbo/Etoposide/Atezolizumab in May 2019.  Achieved AR.  Received 4 cycles completed early July 2019 followed by Atezolizumab maintenance since late July 2019.  Received consolidative Thoracic RT completed late Aug 2019.  He declined PCI.  Developed small brain metastases on Brain MRI in June 2020 and received GK-SRS for 2 small lesions in early July 2020. Patient hospitalized 10/21-11/2 with pneumonitis 2/2 immunotherapy and discharged on steroids.   Atezolizumab discontinued after last dose in late September 2020 due to development of pneumonitis.  Patient monitored off systemic therapy.  He has been tapered off Prednisone as of March 2021.   [de-identified] : -Lymph node 4L and 4R EBUS guided FNA 4/18/19: Positive for malignant cells. Small cell carcinoma. [de-identified] : Declined translation services. \par Patient has been off systemic therapy since late September 2020 when Atezolizumab was discontinued after developing pneumonitis.   He has been tapered off Prednisone as of March 2021.  Not currently on steroids.  Uses supplemental O2 as needed, mainly during the daytime.  \par He follows with PMR for management of vertebral compression fractures which are suspected to be secondary to osteoporosis.  He is on treatment with Alendronate and follows with Endocrine.  \par \par He started using a sleep apnea device.  He complains of continued insomnia and that he only gets sleep with use of temazepam; he is requesting a refill and has not filled this medication since March.\par He was on a recent family vacation to the [place] and had an enjoyable time; he was even able to dance.\par \par Restaging CT C/A/P this month with overall sustained response.    \par Sent for MRI L-Spine by PMR this month which was negative for metastatic disease.  \par

## 2022-06-23 NOTE — HISTORY OF PRESENT ILLNESS
[FreeTextEntry1] : Mr. EVER CHAVEZ is a 71 year old  male who presents for follow up. At last visit, he was ordered a repeat MRI, given Diclofenac PRN and told to only sparingly use supportive back brace. Overall the pain is much better. Denies any new symptoms. \par \par [employer] : [id number]\par \par Location:Low back\par Onset:Chronic for years, but worsening in early 1/2021 \par Provocation/Palliative:Worse with laying down flat, activity/better somewhat sitting. Patient needs to switch position to relieve the pain. \par Quality:Achy Sharp\par Radiation: Non Radiating at this time. \par Severity:9/10 at worse, 1/10 today\par Timing:Varies day to day\par \par No bowel/bladder changes. No groin numbness.

## 2022-07-01 ENCOUNTER — FORM ENCOUNTER (OUTPATIENT)
Age: 72
End: 2022-07-01

## 2022-07-05 ENCOUNTER — APPOINTMENT (OUTPATIENT)
Dept: OPHTHALMOLOGY | Facility: CLINIC | Age: 72
End: 2022-07-05

## 2022-07-05 ENCOUNTER — NON-APPOINTMENT (OUTPATIENT)
Age: 72
End: 2022-07-05

## 2022-07-05 PROCEDURE — 92134 CPTRZ OPH DX IMG PST SGM RTA: CPT

## 2022-07-05 PROCEDURE — 92004 COMPRE OPH EXAM NEW PT 1/>: CPT

## 2022-07-11 ENCOUNTER — NON-APPOINTMENT (OUTPATIENT)
Age: 72
End: 2022-07-11

## 2022-07-11 ENCOUNTER — APPOINTMENT (OUTPATIENT)
Dept: CARDIOLOGY | Facility: CLINIC | Age: 72
End: 2022-07-11

## 2022-07-11 VITALS
SYSTOLIC BLOOD PRESSURE: 130 MMHG | HEART RATE: 73 BPM | WEIGHT: 163 LBS | OXYGEN SATURATION: 95 % | HEIGHT: 63 IN | BODY MASS INDEX: 28.88 KG/M2 | DIASTOLIC BLOOD PRESSURE: 70 MMHG

## 2022-07-11 DIAGNOSIS — I25.10 ATHEROSCLEROTIC HEART DISEASE OF NATIVE CORONARY ARTERY W/OUT ANGINA PECTORIS: ICD-10-CM

## 2022-07-11 PROCEDURE — 93000 ELECTROCARDIOGRAM COMPLETE: CPT

## 2022-07-11 PROCEDURE — 99214 OFFICE O/P EST MOD 30 MIN: CPT

## 2022-07-11 NOTE — HISTORY OF PRESENT ILLNESS
[FreeTextEntry1] : 71 year old male with hx of  HTN , HLD ,  CAD PCI  RCA stent 2007  GERD, former smoker treated Stage 4 small cell lung carcinoma  s/p radiation to chest and head , COPD On home oxygen came for follow up  says he is doing fine  \par Patient does have chronic BILLINGS with fatigue without chest pain for long time , patient is on oxygen 2 L  for almost  one and half year on oxygen . patient does walk while on oxygen , half to 1 mile daily ,  his allergies well controlled with allegra ,  patient denies any chest pain \par \par Patient blood pressure is controlled on medication ,diabetes  Hb a1c 6.5 % , under care of endocrinology\par \par his lipid profile  showed TC  114   LDL 42  HDL 49  now on crestor 20 mg   had carotid doppler showed mild disease \par \par patient does have mild LE swelling got  much better since he was done with chemotherapy , denies orthopnea

## 2022-07-11 NOTE — PHYSICAL EXAM
[Well Developed] : well developed [Well Nourished] : well nourished [Normal Conjunctiva] : normal conjunctiva [Normal Venous Pressure] : normal venous pressure [No Carotid Bruit] : no carotid bruit [Normal Rate] : normal [Normal S1] : normal S1 [Normal S2] : normal S2 [No Murmur] : no murmurs heard [II] : a grade 2 [No Pitting Edema] : no pitting edema present [Rt] : varicose veins of the right leg noted [Lt] : varicose veins of the left leg noted [2+] : left 2+ [1+] : left 1+ [No Abnormalities] : the abdominal aorta was not enlarged and no bruit was heard [Normal] : clear lung fields, good air entry, no respiratory distress [Soft] : abdomen soft [Normal Bowel Sounds] : normal bowel sounds [Normal Gait] : normal gait [No Edema] : no edema [No Cyanosis] : no cyanosis [Normal Radial B/L] : normal radial B/L [No Rash] : no rash [Normal PT B/L] : normal PT B/L [Moves all extremities] : moves all extremities [Alert and Oriented] : alert and oriented [S3] : no S3 [S4] : no S4 [Right Carotid Bruit] : no bruit heard over the right carotid [Left Carotid Bruit] : no bruit heard over the left carotid [Right Femoral Bruit] : no bruit heard over the right femoral artery [Left Femoral Bruit] : no bruit heard over the left femoral artery

## 2022-07-11 NOTE — DISCUSSION/SUMMARY
[FreeTextEntry1] : Patient with above hx \par \par chronic BILLINGS stable  possibly due to COPD , treated Lung carcinoma , doubt cardiac component  patient had normal ventricular systolic function . continue home oxygen , antihypertensive medication , \par \par CAD remote hx of RCA stent with multiple risk factors for CAD;  without chest pain , normal LVEF , continue stain , ecotrin , ace ,  \par \par HTN : controlled , continue low salt diet and medication , \par \par HLD controlled  continue  crestor 20 mg po daily , discontinue  zetia 10 mg po daily    monitor renal function \par \par DM : controlled HB a1c 65  continue his medication \par \par follow up after 4  months ,

## 2022-07-11 NOTE — CARDIOLOGY SUMMARY
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

## 2022-07-15 ENCOUNTER — APPOINTMENT (OUTPATIENT)
Dept: PULMONOLOGY | Facility: CLINIC | Age: 72
End: 2022-07-15

## 2022-07-15 PROCEDURE — 99214 OFFICE O/P EST MOD 30 MIN: CPT | Mod: 95

## 2022-07-28 ENCOUNTER — APPOINTMENT (OUTPATIENT)
Dept: CARDIOLOGY | Facility: CLINIC | Age: 72
End: 2022-07-28

## 2022-07-28 PROCEDURE — A9500: CPT

## 2022-07-28 PROCEDURE — 78452 HT MUSCLE IMAGE SPECT MULT: CPT

## 2022-07-28 PROCEDURE — 93016 CV STRESS TEST SUPVJ ONLY: CPT

## 2022-07-28 PROCEDURE — 93017 CV STRESS TEST TRACING ONLY: CPT

## 2022-07-28 PROCEDURE — 93018 CV STRESS TEST I&R ONLY: CPT

## 2022-07-29 ENCOUNTER — OUTPATIENT (OUTPATIENT)
Dept: OUTPATIENT SERVICES | Facility: HOSPITAL | Age: 72
LOS: 1 days | End: 2022-07-29
Payer: MEDICARE

## 2022-07-29 ENCOUNTER — APPOINTMENT (OUTPATIENT)
Dept: MRI IMAGING | Facility: CLINIC | Age: 72
End: 2022-07-29

## 2022-07-29 DIAGNOSIS — Z95.5 PRESENCE OF CORONARY ANGIOPLASTY IMPLANT AND GRAFT: Chronic | ICD-10-CM

## 2022-07-29 DIAGNOSIS — N20.0 CALCULUS OF KIDNEY: Chronic | ICD-10-CM

## 2022-07-29 DIAGNOSIS — Z00.8 ENCOUNTER FOR OTHER GENERAL EXAMINATION: ICD-10-CM

## 2022-07-29 DIAGNOSIS — D11.0 BENIGN NEOPLASM OF PAROTID GLAND: Chronic | ICD-10-CM

## 2022-07-29 DIAGNOSIS — Z90.89 ACQUIRED ABSENCE OF OTHER ORGANS: Chronic | ICD-10-CM

## 2022-07-29 PROCEDURE — 70553 MRI BRAIN STEM W/O & W/DYE: CPT

## 2022-07-29 PROCEDURE — 70553 MRI BRAIN STEM W/O & W/DYE: CPT | Mod: 26

## 2022-07-29 PROCEDURE — A9585: CPT

## 2022-08-04 ENCOUNTER — APPOINTMENT (OUTPATIENT)
Dept: RADIATION ONCOLOGY | Facility: CLINIC | Age: 72
End: 2022-08-04

## 2022-08-04 VITALS
TEMPERATURE: 97 F | OXYGEN SATURATION: 96 % | DIASTOLIC BLOOD PRESSURE: 79 MMHG | HEART RATE: 61 BPM | RESPIRATION RATE: 16 BRPM | BODY MASS INDEX: 29.17 KG/M2 | SYSTOLIC BLOOD PRESSURE: 135 MMHG | WEIGHT: 164.68 LBS

## 2022-08-04 PROCEDURE — 99213 OFFICE O/P EST LOW 20 MIN: CPT

## 2022-08-04 NOTE — REVIEW OF SYSTEMS
[Concentration Impairment: Grade 1] : Concentration Impairment: Grade 1 - Mild inattention or decreased level of concentration [Dizziness] : dizziness [Negative] : ENT [Dysphagia] : no dysphagia [Loss of Hearing] : no loss of hearing [Shortness Of Breath] : no shortness of breath [Confused] : no confusion [Fainting] : no fainting [FreeTextEntry6] : denies SOB, using O2 2L o2 when traveling [FreeTextEntry9] : notes he is overall weak. lower back pain has improved [de-identified] : denies headaches. notes dizziness when he is laying down over the last month, only at night time not progressive.

## 2022-08-04 NOTE — REASON FOR VISIT
[Routine Follow-Up] : routine follow-up visit for [[employer] ] : provided by [employer]   [Interpreters_IDNumber] : [id number] [Interpreters_FullName] : Al  [TWNoteComboBox1] : [place]

## 2022-08-04 NOTE — PHYSICAL EXAM
[] : no respiratory distress [Normal] : oriented to person, place and time, the affect was normal, the mood was normal and not anxious [Oriented To Time, Place, And Person] : oriented to person, place, and time [de-identified] : CN II-XII normal.

## 2022-08-04 NOTE — HISTORY OF PRESENT ILLNESS
[FreeTextEntry1] : Mr. Cinthya Ness is a 72 yo male with extensive stage small cell lung cancer diagnosed in 4/2019 s/p carbo/ etoposide/ atezolizumab and consolidative RT 8/2019 followed by adjuvant atezolizumab. He was found to have two sub-cm brain metastases treated on 7/1/2020 with GK SRS (L_Frontal, L_Parietal). Patient presents for follow up. \par \par ONCOLOGY HISTORY\par Presented as a 69 year old male with a history of small cell lung cancer.  This was initially diagnosed in 4/2019 through an ebus of bilateral level 4 lymph nodes in 4/2019 after presenting with 6 months of worsening cough, fatigue, and insomnia. A CT chest showed a lung mass at that time. He was initially treated with carbo/etoposide/atezoluzimab starting in 5/2019 with 4 cycles completed in 7/2019, followed by atezolizumab maintenance . Consolidative RT completed in 8/2019. He elected against prophylactic whole brain radiation. \par \par Due to frequent headaches, forgetfulness, and reflexes bring worse, a brain MRI was ordered on 6/10/2020. \par \par This brain MRI revealed two brain lesions consistent with metastases. \par 1: left posterior temporal/occipital cortex,  approximately 1.4 x 1.2 cm. \par 2:  high medial left frontal cortex, approximately 0.7 x 0.6 cm Small amount of surrounding edema is identified. \par \par At the time of initial consult he endorsed occassional headaches, manageable.  Denied focal weakness, nausea, vomiting, or other complaints.  Maintains an excellent KPS.  His daughter, a physician, is serving as the  during this conversation.\par \par He was treated with GK SRS to the two lesions on 7/1/2020\par \par 9/17/2020- Mr. Ness presents today for follow up. He is seen with the assistance of [facility]  [id number]. MRI brain done 9/11/2020 showed maarked improvement and treatment response since prior exam. Previously visualized enhancing lesion in the left posterior temporal lobe is markedly decreased in size since prior exam, measuring approximately 0.6 x 0.6 cm, and previously measured 1.4 x 1.2 cm. There is no vasogenic edema surrounding this lesion. Previously visualized enhancing lesion in the high medial left frontal cortex, is markedly decreased in size since prior exam, nearly nonexistent measuring 0.1 cm, and previously measuring 0.7 x 0.6 cm. Minimal surrounding vasogenic edema remains. No new enhancing lesions are identified. \par \par He continues following with Dr. Ramirez. continues on atezoluzumab maintenance. CT chest 8/31/2020 showed Emphysema is present. Left upper lobe ill-defined nodular/linear opacity is similar compared to the prior study. Other bilateral patchy lung opacities are new from the prior study. These are indeterminate and may be infectious/inflammatory. 1 month follow-up CT needed for complete evaluation. \par \par Today he feels very well. Notes some headaches over the last week, not lasting long. Denies nausea, vomiting, focal weakness, Overall feeling well. Hearing may be a little worse recently.\par \par 12/22/2020- Mr. Ness presents today for follow up. [facility]  [id number] used for visit today.  MRI brain 12/11/2020 showed previously seen left temporal occipital lobe lesion is significantly smaller in size.  Previously seen left frontal lobe lesion is no longer visualized.  Continued follow-up is recommended.\par \par CT Chest 10/19/2020 showed widespread bilateral opacities throughout both lungs with significant interval progression since August 31, 2020 suggestive of infectious or inflammatory etiology. Findings may be due to Covid 19 pneumonia.\par \par Atezoluzumab discontinued this fall due to hospitalization with pneumonitis. Due for surveillance imaging at the end of December. Currently on 120 mg of prednisone for pneumonitis.  Today he denies headaches. Says he feels slightly confused sometimes, attributes this to steroids. Denies nausea, vomiting, focal weakness. Slight bilateral LE swelling, +1\par \par 4/13/2021- Mr. Ness presents today for follow up. He is seen through [facility] for which he provides verbal consent on 4/13/2021 at 3:19 PM. [facility]  [id number] used for visit. he underwent a follow up brain MRI on 4/12/2021. This showed Previously noted enhancing lesions are no longer identified. Continued close interval follow-up is recommended.\par He was diagnosed with covid-19 on 4/2. Has continued to follow with Dr. Ramirez. \par Today he is feeling  well. Remains on 3L O2, which he was on prior to COVID. Feels like antibody therapy helped him a lot. No headaches, no focal weakness. \par \par 7/22/2021- Mr. Ness presents today for follow up. [place]   he underwent a brain MRI on 7/15/2021. This showed no significant change. Continues to follow with Dr. Ramirez. Continues to be observed off treatment since 9/2020, with sustained response in June, plan for follow up imaging in in 9/2021.\par Today he is feeling well. Remains on 2L O2 during the day, none at night. No headaches, nausea, vomiting, focal weakness, visual trouble. some trouble swallowing which has been present since he had systemic therapy. Overall feeling well. has some back pain after a recent fall\par \par 10/20/2021- Mr. Ness presents today for follow up. Has continued to follow with Dr. Raimrez. Continues to be monitored off treatment.\par \par CT CAP 9/2021 showed \par Stable dominant central left upper lobe nodule.\par More conspicuous small tubular nodule in the anterior left upper lobe could be mucoid impaction. 3 month follow-up is recommended.\par New sclerotic lesion of the left third rib. Stable sclerosis and loss of height of the T10 vertebral body.\par No new finding in the abdomen or pelvis.\par Brain MRI 10/15/2021 showed Posttreatment changes. No new or enlarging intracranial lesions identified.\par Spoke with patient via  #[id number].  Patient is feeling well and denies any new symptoms.  \par \par 1/19/2022: Pt presents for follow-up. Today he reports feeling well. Denies pain, numbness, tingling, or changes in hearing/vision. Endorses short-term memory loss (often related to tasks he wishes to complete). He is currently using 2L oxygen.\par \par 4/28/2022- Mr. Fuentes presents today for follow up.  offered, patient refuses, asks wife to translate.\par MRI brain 4/26/22 showed No hydrocephalus, mass effect, acute intracranial hemorrhage, vasogenic edema, or acute territorial infarct. No abnormal parenchymal or leptomeningeal enhancement.  No evidence for intracranial or osseous metastases\par \par CT CAP 3/10/2022 was stable.  Continues to follow with Dr. Ramirez. not on systemic therapy. \par \par He continues to feel very well. Following with PM &R regarding lower back pain, will get a lumbar spine MRI. notes memory trouble.\par \par 8/4/2022- Mr Fuentes presents today for follow up.  [id number] used for visit today. MRI brain 7/29/2022 with no final read, appears stable. Continues off of systemic therapy.  CT CAP 6/14/2022 showed Stable 1.3 cm left upper lobe nodule.  No evidence of new or progressive disease.\par \par Feeling well overall. notes some nocturnal dizziness when he lays down over the last month, not progressive. no headaches, no nausea, no focal weakness, no numbness or tingling. lower back pain has improved.

## 2022-08-09 ENCOUNTER — APPOINTMENT (OUTPATIENT)
Dept: INTERNAL MEDICINE | Facility: CLINIC | Age: 72
End: 2022-08-09

## 2022-08-09 VITALS
DIASTOLIC BLOOD PRESSURE: 70 MMHG | TEMPERATURE: 98.6 F | HEART RATE: 74 BPM | OXYGEN SATURATION: 96 % | BODY MASS INDEX: 28.88 KG/M2 | SYSTOLIC BLOOD PRESSURE: 126 MMHG | WEIGHT: 163 LBS | RESPIRATION RATE: 16 BRPM | HEIGHT: 63 IN

## 2022-08-09 PROCEDURE — 99214 OFFICE O/P EST MOD 30 MIN: CPT | Mod: 25

## 2022-08-09 PROCEDURE — 36415 COLL VENOUS BLD VENIPUNCTURE: CPT

## 2022-08-09 NOTE — HISTORY OF PRESENT ILLNESS
[FreeTextEntry1] : \par f/u\par  [de-identified] : \par Accompanied by wife, Jacque Abrams whom pt wishes to translate for him in [place].  Declines formal .\par \par 70 yo M pmhx HTN, DM, HLD, CAD s/p RCA stent (2007), BPH s/p TURP, ED, GERD, hx gastritis, hypomagnesemia, former smoker (quit 2019), COPD (on supplemental O2), osteopenia, stage 4 small cell lung cancer (dx'd 4/19, s/p carboplatin/etoposide and radiation with partial response, then on atezolizumab c/b pneumonitis in 10/2020 treated with high dose steroids c/b vertebral compression fractures, with small brain metastases 6/2020 s/p gamma knife radiation therapy in 7/2020.  Was tapered off steroids for pneumonitis by 3/7/2021, and stopped Bactrim prophylaxis at that time), hx covid infection 3/21 (s/p monoclonal Ab infusion 3/30/21 and decadron 6 mg for 10 days by [facility] program; completed 35d participation in PREVENT-HD rivaroxaban trial) for f/u\par \par Last seen in office 2/18/22 for f/u with labs done.\par \par c/o not feeling well\par \par hx covid infection 3 weeks ago- had flu sx's after exposure to family that tested + for covid.  Did home test 7/15 that was positive and was, seen by pulm and given Paxolvid course- feel is improved, but not resolved sx's.\par +cough, feels is more productive (was dry and less frequent prior to then) x 2 weeks\par +chills x 1 week\par +nasal congestion x 1 week\par +sore throat 4d ago- now resolved\par \par +b/l eye redness and d/c (+clear discharge) and itching since yesterday\par \par States using O2 all the time and at night, as feels is helpful\par O2 sat (off Oxygen) sat 88-92%, with 2L 92-98%\par \par Denies fever, HA, facial pain, ear sx's, eye pain, vision trouble, sob/BILLINGS, CP, myalgias, new joint pain or rash\par Reports appetite and BMs nl, has good po intake\par \par No new sick contacts since covid exposure, though notes grand-son with runny nose recently- told by pediatrician told post-covid, no testing/eval done\par Denies recent travel.\par \par Hx pfizer vaccine series- 3/21, 7/21; Hx boosters 1/22 and 5/22\par No new OTC meds taken\par using Tylenol prn for back pain- last 4d ago\par using Flonase since yesterday\par \par hx itchy rash on face/arms/chest- noted 5/22 visit, states has resolved since visit, is taking Allegra.  A/I eval pending 8/22.\par -denies associated facial/lip/tongue swelling, throat pain, abdominal pain or sob\par \par Denies new topicals, body sprays, soap, detergents or fragrances.\par Denies new meds except diclofenac- last used 1 week prior to rash onset and no rash noted with taking\par Denies new foods.\par Denies bug bites.  Denies hiking or gardening.\par Has 2 dogs at home- for 6 years, no new pets.\par No recent travel.\par Reports hx similar rash years ago- unsure of cause, no doctor eval.\par \par hx chronic LBP, hx compression fractures, hx osteopenia- notes pain improved since onset, well controlled \par -on Flexeril prn, diclofenac prn (taking 1-2x/wk) and Tylenol 1-2x/wk\par -followed by PMR, last seen 6/22 with MRI spine done (no acute findings, no mets dz) \par -followed by neurosx, last seen 3/21- advised PT and back brace use with no surgical intervention planned.\par -followed by ortho, last seen 3/21\par -hx PT \par -on Alendronate since 1/21, tolerating w/o SEs\par -ambulates with cane or walker\par -using back brace\par -denies focal weakness or incontinence\par \par small cell lung cancer  stage 4 (dx'd 4/19), hx brain mets-\par -followed by oncology, last seen 6/22 with 6/22 CT scans reviewed, noted stable.  Noted off tx with cont'd monitoring planned.  Planned to f/u in 3 mo with repeat CT scans.\par -followed by rad-onc, last seen 8/22 with 7/22 MRI brain noted JAYCOB.  Advised f/u in 3 mo with repeat MRI.\par -followed by pulm, last seen 7/22 for covid infection.  F/U pending\par \par DM-\par -followed by endo and on med regimen- has f/u 8/22 \par -using Freestyle Osiel.  Home fs: 105 -140 ; denies hypoglycemic sx's \par -followed by optho, last seen 1/21, told no DM retinopathy. F/U pending.\par -seen by optho eval 6/22, states told nl exam and to f/u in 1 yr\par -followed by podiatry, states seen 3/22 with nails clipped. Denies foot paresthesias.\par \par HTN, HLD, CAD s/p stent- hx LE edema, reports lessening since off steroids\par -on ASA, metoprolol and Lisinopril\par -on Crestor, Lovaza and niacin - denies SEs with use\par -had echo 6/21\par -followed by cardio, seen 7/22, Zetia d/c'd (told not needed), reports hx nl NST 2 weeks ago.\par \par Denies  complaints.

## 2022-08-09 NOTE — PHYSICAL EXAM
[No Acute Distress] : no acute distress [Well-Appearing] : well-appearing [Normal Sclera/Conjunctiva] : normal sclera/conjunctiva [PERRL] : pupils equal round and reactive to light [EOMI] : extraocular movements intact [Normal Outer Ear/Nose] : the outer ears and nose were normal in appearance [Normal Oropharynx] : the oropharynx was normal [Normal Nasal Mucosa] : the nasal mucosa was normal [No Lymphadenopathy] : no lymphadenopathy [Supple] : supple [Thyroid Normal, No Nodules] : the thyroid was normal and there were no nodules present [No Respiratory Distress] : no respiratory distress  [Clear to Auscultation] : lungs were clear to auscultation bilaterally [Normal Rate] : normal rate  [Regular Rhythm] : with a regular rhythm [Normal S1, S2] : normal S1 and S2 [No Murmur] : no murmur heard [Pedal Pulses Present] : the pedal pulses are present [No Edema] : there was no peripheral edema [Soft] : abdomen soft [Non Tender] : non-tender [No HSM] : no HSM [Normal Supraclavicular Nodes] : no supraclavicular lymphadenopathy [Normal Posterior Cervical Nodes] : no posterior cervical lymphadenopathy [Normal Anterior Cervical Nodes] : no anterior cervical lymphadenopathy [No CVA Tenderness] : no CVA  tenderness [No Spinal Tenderness] : no spinal tenderness [No Joint Swelling] : no joint swelling [No Rash] : no rash [No Focal Deficits] : no focal deficits [Normal Gait] : normal gait [Normal Affect] : the affect was normal [Alert and Oriented x3] : oriented to person, place, and time [Normal TMs] : both tympanic membranes were normal [No Accessory Muscle Use] : no accessory muscle use [de-identified] : speaking full sentences w/o trouble [de-identified] : no photophobia, mild conjunctivitis, min yellow d/c on lashes [de-identified] : no sinus tenderness [de-identified] : scattered freckles [de-identified] : slow gait with cane

## 2022-08-09 NOTE — HISTORY OF PRESENT ILLNESS
[FreeTextEntry1] : \par f/u\par  [de-identified] : \par Accompanied by wife, Jacque Abrams whom pt wishes to translate for him in [place]ian.  Declines formal .\par \par 70 yo M pmhx HTN, DM, HLD, CAD s/p RCA stent (2007), BPH s/p TURP, ED, GERD, hx gastritis, hypomagnesemia, former smoker (quit 2019), COPD (on supplemental O2), osteopenia, stage 4 small cell lung cancer (dx'd 4/19, s/p carboplatin/etoposide and radiation with partial response, then on atezolizumab c/b pneumonitis in 10/2020 treated with high dose steroids c/b vertebral compression fractures, with small brain metastases 6/2020 s/p gamma knife radiation therapy in 7/2020.  Was tapered off steroids for pneumonitis by 3/7/2021, and stopped Bactrim prophylaxis at that time), hx covid infection 3/21 (s/p monoclonal Ab infusion 3/30/21 and decadron 6 mg for 10 days by [facility] program; completed 35d participation in PREVENT-HD rivaroxaban trial) for f/u\par \par Last seen in office 2/18/22 for f/u with labs done.\par \par c/o not feeling well\par \par hx covid infection 3 weeks ago- had flu sx's after exposure to family that tested + for covid.  Did home test 7/15 that was positive and was, seen by pulm and given Paxolvid course- feel is improved, but not resolved sx's.\par +cough, feels is more productive (was dry and less frequent prior to then) x 2 weeks\par +chills x 1 week\par +nasal congestion x 1 week\par +sore throat 4d ago- now resolved\par \par +b/l eye redness and d/c (+clear discharge) and itching since yesterday\par \par States using O2 all the time and at night, as feels is helpful\par O2 sat (off Oxygen) sat 88-92%, with 2L 92-98%\par \par Denies fever, HA, facial pain, ear sx's, eye pain, vision trouble, sob/BILLINGS, CP, myalgias, new joint pain or rash\par Reports appetite and BMs nl, has good po intake\par \par No new sick contacts since covid exposure, though notes grand-son with runny nose recently- told by pediatrician told post-covid, no testing/eval done\par Denies recent travel.\par \par Hx pfizer vaccine series- 3/21, 7/21; Hx boosters 1/22 and 5/22\par No new OTC meds taken\par using Tylenol prn for back pain- last 4d ago\par using Flonase since yesterday\par \par hx itchy rash on face/arms/chest- noted 5/22 visit, states has resolved since visit, is taking Allegra.  A/I eval pending 8/22.\par -denies associated facial/lip/tongue swelling, throat pain, abdominal pain or sob\par \par Denies new topicals, body sprays, soap, detergents or fragrances.\par Denies new meds except diclofenac- last used 1 week prior to rash onset and no rash noted with taking\par Denies new foods.\par Denies bug bites.  Denies hiking or gardening.\par Has 2 dogs at home- for 6 years, no new pets.\par No recent travel.\par Reports hx similar rash years ago- unsure of cause, no doctor eval.\par \par hx chronic LBP, hx compression fractures, hx osteopenia- notes pain improved since onset, well controlled \par -on Flexeril prn, diclofenac prn (taking 1-2x/wk) and Tylenol 1-2x/wk\par -followed by PMR, last seen 6/22 with MRI spine done (no acute findings, no mets dz) \par -followed by neurosx, last seen 3/21- advised PT and back brace use with no surgical intervention planned.\par -followed by ortho, last seen 3/21\par -hx PT \par -on Alendronate since 1/21, tolerating w/o SEs\par -ambulates with cane or walker\par -using back brace\par -denies focal weakness or incontinence\par \par small cell lung cancer  stage 4 (dx'd 4/19), hx brain mets-\par -followed by oncology, last seen 6/22 with 6/22 CT scans reviewed, noted stable.  Noted off tx with cont'd monitoring planned.  Planned to f/u in 3 mo with repeat CT scans.\par -followed by rad-onc, last seen 8/22 with 7/22 MRI brain noted JAYCOB.  Advised f/u in 3 mo with repeat MRI.\par -followed by pulm, last seen 7/22 for covid infection.  F/U pending\par \par DM-\par -followed by endo and on med regimen- has f/u 8/22 \par -using Freestyle Osiel.  Home fs: 105 -140 ; denies hypoglycemic sx's \par -followed by optho, last seen 1/21, told no DM retinopathy. F/U pending.\par -seen by optho eval 6/22, states told nl exam and to f/u in 1 yr\par -followed by podiatry, states seen 3/22 with nails clipped. Denies foot paresthesias.\par \par HTN, HLD, CAD s/p stent- hx LE edema, reports lessening since off steroids\par -on ASA, metoprolol and Lisinopril\par -on Crestor, Lovaza and niacin - denies SEs with use\par -had echo 6/21\par -followed by cardio, seen 7/22, Zetia d/c'd (told not needed), reports hx nl NST 2 weeks ago.\par \par Denies  complaints.

## 2022-08-09 NOTE — REVIEW OF SYSTEMS
[Negative] : Gastrointestinal [FreeTextEntry2] : see HPI [FreeTextEntry3] : see HPI [FreeTextEntry4] : see HPI [FreeTextEntry6] : see HPI [FreeTextEntry9] : see HPI [de-identified] : see HPI

## 2022-08-09 NOTE — ASSESSMENT
[FreeTextEntry1] : \par 70 yo M pmhx HTN, DM, HLD, CAD s/p RCA stent (2007), BPH s/p TURP, ED, GERD, hx gastritis, hypomagnesemia, former smoker (quit 2019), COPD (on supplemental O2), osteopenia, stage 4 small cell lung cancer (dx'd 4/19, s/p carboplatin/etoposide and radiation with partial response, then on atezolizumab c/b pneumonitis in 10/2020 treated with high dose steroids c/b vertebral compression fractures, with small brain metastases 6/2020 s/p gamma knife radiation therapy in 7/2020.  Was tapered off steroids for pneumonitis by 3/7/2021, and stopped Bactrim prophylaxis at that time), hx covid infection 3/21 (s/p monoclonal Ab infusion 3/30/21 and decadron 6 mg for 10 days by [employer] program; completed 35d participation in [facility] rivaroxaban trial) here for f/u\par \par hx covid infection 7/22 s/p Paxlovid course.  Now with worsened cough, increase O2 usage and new URI sx's.  Nontoxic appearing, afebrile and VSS\par -check RVP, collected (no hx repeat covid test since home + 7/15/22 per pt)\par -check cxr r/o PNA\par -check cbc/bmp\par -will give empiric Augmentin\par -will give ofloxacin gtt\par -advised pulm f/u\par -advised prompt ER if sx's worsen\par \par hx skin rash- transient x2, suspect allergic, unclear etiology.\par -A/I referral given prior- pending 8/22\par -on Allegra\par -encouraged sx diary to ID trigger\par -advised prompt ER if worsened or onset facial/tong/lip swelling, throat discomfort, sob, etc.\par \par hx chronic LBP, hx compression fractures, hx osteopenia, vit d insuff- controlled\par -hx mechanical fall s/p ER 6/21 w/o acute findings on CT scan.  \par -1/21 DEXA: osteopenia, repeat due 1/23\par -6/21 CT lumbar spine: Unchanged compression deformities of the T12-L3 vertebral bodies.  No destructive lesions are identified.  Multilevel degenerative changes with disc herniations at L3-4 and L4-5\par -7/21 vit d 25\par -followed by PMR, last seen 6/22 with MRI spine done (no acute findings, no mets dz). Using brace on/off. F/U prn.\par -followed by neurosx, last seen 3/21- advised PT and back brace use with no surgical intervention planned.\par -followed by ortho, last seen 3/21\par -hx PT \par -on Flexeril prn, Tylenol and diclofenac prn (advised to take with food)\par -recent diclofenac and Voltaren gel prn per PMR\par -on Alendronate since 1/21, tolerating w/o SEs\par -on ca/D OTC supp\par -ambulates with cane or walker\par -using back brace\par -fall precautions counseled\par \par small cell lung cancer stage 4 (dx'd 4/19), hx brain mets, anemia-\par -4/21 cbc wnl, except Hg 11.6\par -7/21 cbc/bmp wnl\par -followed by oncology, last seen 6/22 with 6/22 CT scans reviewed, noted stable. Noted off tx with cont'd monitoring planned. Planned to f/u in 3 mo with repeat CT scans.\par -followed by rad-onc, last seen 8/22 with 7/22 MRI brain noted JAYCOB. Advised f/u in 3 mo with repeat MRI.\par -followed by pulm, last seen 7/22 for covid infection. F/U pending\par \par ARIELLE, COPD (on supp O2), seasonal allergies, former smoker (quit 2019), hx covid infection (s/p dexamethasone course, s/p MAB 3/21)- \par -followed by pulm\par -on Symbicort and MDI prn\par -Flonase prn\par -on CPAP since 4/22\par -on Temazepam prn per oncology, trying to taper down since started with CPAP use \par \par DM, microalbuminuria- 5/22 A1c 6.5 (was 7), 11/21 microalbumin wnl (was 46)\par -followed by endo, has f/u 8/22 \par -on metformin  (2) BID since 3/21, mild loose BMs with use- feels not too bothersome, wishes to continue\par -off Basaglar\par -off Novolog\par -on Trulicity since 1/22\par -on Lisinopril for renal protection\par -cont home fs monitoring, using Freestyle Osiel\par -followed by optho, last seen 6/22, told no DM retinopathy.  Yearly f/u advised.\par -followed by podiatry, states seen 1/22 with nails clipped.  Denies foot paresthesias. DM foot care counseled.\par \par HTN, HLD, CAD s/p stent, hx LE edema (reports improved since off steroids 3/21)- BP wnl\par -12/20 b/l LE duplex: no DVT b/l\par -6/21 echo 6/21: EF 60-65%, min MR, LV remodeling, nl LV fxn, mild diastolic dysfxn (Stg I)\par -7/21 cbc/bmp/TSH wnl\par -11/21 Tchol 114  LDL 42 HDL 49\par -5/22 cmp wnl\par -on ASA, metoprolol and Lisinopril\par -on Crestor, Lovaza and niacin - denies SEs with use\par -off Zetia \par -followed by cardio, seen 7/22, Zetia d/c'd (told not needed), reports hx nl NST 2 weeks ago.\par -low salt diet advised\par \par \par MISC:  Continued social distancing and measure for covid19 prevention encouraged.  \par -hx pfizer vaccine series- 3/21, 7/21; ; Hx boosters 1/22 and 5/22\par \par \par HCM\par -hx CPE 7/21, yearly advised\par -7/21 hep C screening negative\par -7/21 PSA wnl\par -hx flu shot 11/21\par -hx Tdap 11/12\par -hx prevnar 3/15\par -hx PVX 10/20\par -advised to check on insurance coverage for shingrix and f/u if desires.  Advised to d/w oncology prior to getting.\par -hx screening colonoscopy 1/16: internal hemorrhoids, large lipoma at transverse colon, diverticulosis, rec: repeat in 10 yrs (Dr. Hinds)\par -hx DEXA 1/21: osteopenia (hx compression fractures)\par -followed by derm, last seen 9/20.  Yearly skin screening advised.  Regular use of sun block for skin cancer prevention counseled.\par -advised to designate HCP and provide copy of completed form for records\par \par Pt requests wife, Jacque Abrams, be contacted re: his test results and medical care, (cell) [phone]\par \par Labs drawn in office today.\par

## 2022-08-09 NOTE — PHYSICAL EXAM
[No Acute Distress] : no acute distress [Well-Appearing] : well-appearing [Normal Sclera/Conjunctiva] : normal sclera/conjunctiva [PERRL] : pupils equal round and reactive to light [EOMI] : extraocular movements intact [Normal Outer Ear/Nose] : the outer ears and nose were normal in appearance [Normal Oropharynx] : the oropharynx was normal [Normal Nasal Mucosa] : the nasal mucosa was normal [No Lymphadenopathy] : no lymphadenopathy [Supple] : supple [Thyroid Normal, No Nodules] : the thyroid was normal and there were no nodules present [No Respiratory Distress] : no respiratory distress  [Clear to Auscultation] : lungs were clear to auscultation bilaterally [Normal Rate] : normal rate  [Regular Rhythm] : with a regular rhythm [Normal S1, S2] : normal S1 and S2 [No Murmur] : no murmur heard [Pedal Pulses Present] : the pedal pulses are present [No Edema] : there was no peripheral edema [Soft] : abdomen soft [Non Tender] : non-tender [No HSM] : no HSM [Normal Supraclavicular Nodes] : no supraclavicular lymphadenopathy [Normal Posterior Cervical Nodes] : no posterior cervical lymphadenopathy [Normal Anterior Cervical Nodes] : no anterior cervical lymphadenopathy [No CVA Tenderness] : no CVA  tenderness [No Spinal Tenderness] : no spinal tenderness [No Joint Swelling] : no joint swelling [No Rash] : no rash [No Focal Deficits] : no focal deficits [Normal Gait] : normal gait [Normal Affect] : the affect was normal [Alert and Oriented x3] : oriented to person, place, and time [Normal TMs] : both tympanic membranes were normal [No Accessory Muscle Use] : no accessory muscle use [de-identified] : speaking full sentences w/o trouble [de-identified] : no photophobia, mild conjunctivitis, min yellow d/c on lashes [de-identified] : no sinus tenderness [de-identified] : scattered freckles [de-identified] : slow gait with cane

## 2022-08-09 NOTE — ASSESSMENT
[FreeTextEntry1] : \par 72 yo M pmhx HTN, DM, HLD, CAD s/p RCA stent (2007), BPH s/p TURP, ED, GERD, hx gastritis, hypomagnesemia, former smoker (quit 2019), COPD (on supplemental O2), osteopenia, stage 4 small cell lung cancer (dx'd 4/19, s/p carboplatin/etoposide and radiation with partial response, then on atezolizumab c/b pneumonitis in 10/2020 treated with high dose steroids c/b vertebral compression fractures, with small brain metastases 6/2020 s/p gamma knife radiation therapy in 7/2020.  Was tapered off steroids for pneumonitis by 3/7/2021, and stopped Bactrim prophylaxis at that time), hx covid infection 3/21 (s/p monoclonal Ab infusion 3/30/21 and decadron 6 mg for 10 days by [employer] program; completed 35d participation in [facility] rivaroxaban trial) here for f/u\par \par hx covid infection 7/22 s/p Paxlovid course.  Now with worsened cough, increase O2 usage and new URI sx's.  Nontoxic appearing, afebrile and VSS\par -check RVP, collected (no hx repeat covid test since home + 7/15/22 per pt)\par -check cxr r/o PNA\par -check cbc/bmp\par -will give empiric Augmentin\par -will give ofloxacin gtt\par -advised pulm f/u\par -advised prompt ER if sx's worsen\par \par hx skin rash- transient x2, suspect allergic, unclear etiology.\par -A/I referral given prior- pending 8/22\par -on Allegra\par -encouraged sx diary to ID trigger\par -advised prompt ER if worsened or onset facial/tong/lip swelling, throat discomfort, sob, etc.\par \par hx chronic LBP, hx compression fractures, hx osteopenia, vit d insuff- controlled\par -hx mechanical fall s/p ER 6/21 w/o acute findings on CT scan.  \par -1/21 DEXA: osteopenia, repeat due 1/23\par -6/21 CT lumbar spine: Unchanged compression deformities of the T12-L3 vertebral bodies.  No destructive lesions are identified.  Multilevel degenerative changes with disc herniations at L3-4 and L4-5\par -7/21 vit d 25\par -followed by PMR, last seen 6/22 with MRI spine done (no acute findings, no mets dz). Using brace on/off. F/U prn.\par -followed by neurosx, last seen 3/21- advised PT and back brace use with no surgical intervention planned.\par -followed by ortho, last seen 3/21\par -hx PT \par -on Flexeril prn, Tylenol and diclofenac prn (advised to take with food)\par -recent diclofenac and Voltaren gel prn per PMR\par -on Alendronate since 1/21, tolerating w/o SEs\par -on ca/D OTC supp\par -ambulates with cane or walker\par -using back brace\par -fall precautions counseled\par \par small cell lung cancer stage 4 (dx'd 4/19), hx brain mets, anemia-\par -4/21 cbc wnl, except Hg 11.6\par -7/21 cbc/bmp wnl\par -followed by oncology, last seen 6/22 with 6/22 CT scans reviewed, noted stable. Noted off tx with cont'd monitoring planned. Planned to f/u in 3 mo with repeat CT scans.\par -followed by rad-onc, last seen 8/22 with 7/22 MRI brain noted JAYCOB. Advised f/u in 3 mo with repeat MRI.\par -followed by pulm, last seen 7/22 for covid infection. F/U pending\par \par ARIELLE, COPD (on supp O2), seasonal allergies, former smoker (quit 2019), hx covid infection (s/p dexamethasone course, s/p MAB 3/21)- \par -followed by pulm\par -on Symbicort and MDI prn\par -Flonase prn\par -on CPAP since 4/22\par -on Temazepam prn per oncology, trying to taper down since started with CPAP use \par \par DM, microalbuminuria- 5/22 A1c 6.5 (was 7), 11/21 microalbumin wnl (was 46)\par -followed by endo, has f/u 8/22 \par -on metformin  (2) BID since 3/21, mild loose BMs with use- feels not too bothersome, wishes to continue\par -off Basaglar\par -off Novolog\par -on Trulicity since 1/22\par -on Lisinopril for renal protection\par -cont home fs monitoring, using Freestyle Osiel\par -followed by optho, last seen 6/22, told no DM retinopathy.  Yearly f/u advised.\par -followed by podiatry, states seen 1/22 with nails clipped.  Denies foot paresthesias. DM foot care counseled.\par \par HTN, HLD, CAD s/p stent, hx LE edema (reports improved since off steroids 3/21)- BP wnl\par -12/20 b/l LE duplex: no DVT b/l\par -6/21 echo 6/21: EF 60-65%, min MR, LV remodeling, nl LV fxn, mild diastolic dysfxn (Stg I)\par -7/21 cbc/bmp/TSH wnl\par -11/21 Tchol 114  LDL 42 HDL 49\par -5/22 cmp wnl\par -on ASA, metoprolol and Lisinopril\par -on Crestor, Lovaza and niacin - denies SEs with use\par -off Zetia \par -followed by cardio, seen 7/22, Zetia d/c'd (told not needed), reports hx nl NST 2 weeks ago.\par -low salt diet advised\par \par \par MISC:  Continued social distancing and measure for covid19 prevention encouraged.  \par -hx pfizer vaccine series- 3/21, 7/21; ; Hx boosters 1/22 and 5/22\par \par \par HCM\par -hx CPE 7/21, yearly advised\par -7/21 hep C screening negative\par -7/21 PSA wnl\par -hx flu shot 11/21\par -hx Tdap 11/12\par -hx prevnar 3/15\par -hx PVX 10/20\par -advised to check on insurance coverage for shingrix and f/u if desires.  Advised to d/w oncology prior to getting.\par -hx screening colonoscopy 1/16: internal hemorrhoids, large lipoma at transverse colon, diverticulosis, rec: repeat in 10 yrs (Dr. Hinds)\par -hx DEXA 1/21: osteopenia (hx compression fractures)\par -followed by derm, last seen 9/20.  Yearly skin screening advised.  Regular use of sun block for skin cancer prevention counseled.\par -advised to designate HCP and provide copy of completed form for records\par \par Pt requests wife, Jacque Abrams, be contacted re: his test results and medical care, (cell) [phone]\par \par Labs drawn in office today.\par

## 2022-08-09 NOTE — REVIEW OF SYSTEMS
[Negative] : Gastrointestinal [FreeTextEntry2] : see HPI [FreeTextEntry3] : see HPI [FreeTextEntry4] : see HPI [FreeTextEntry6] : see HPI [FreeTextEntry9] : see HPI [de-identified] : see HPI

## 2022-08-10 ENCOUNTER — NON-APPOINTMENT (OUTPATIENT)
Age: 72
End: 2022-08-10

## 2022-08-10 LAB
ANION GAP SERPL CALC-SCNC: 13 MMOL/L
BASOPHILS # BLD AUTO: 0.04 K/UL
BASOPHILS NFR BLD AUTO: 0.6 %
BUN SERPL-MCNC: 13 MG/DL
CALCIUM SERPL-MCNC: 10.3 MG/DL
CHLORIDE SERPL-SCNC: 97 MMOL/L
CO2 SERPL-SCNC: 26 MMOL/L
CREAT SERPL-MCNC: 0.81 MG/DL
EGFR: 95 ML/MIN/1.73M2
EOSINOPHIL # BLD AUTO: 0.23 K/UL
EOSINOPHIL NFR BLD AUTO: 3.5 %
GLUCOSE SERPL-MCNC: 131 MG/DL
HCT VFR BLD CALC: 42.3 %
HGB BLD-MCNC: 13.7 G/DL
IMM GRANULOCYTES NFR BLD AUTO: 0.3 %
LYMPHOCYTES # BLD AUTO: 0.66 K/UL
LYMPHOCYTES NFR BLD AUTO: 10 %
MAN DIFF?: NORMAL
MCHC RBC-ENTMCNC: 30.3 PG
MCHC RBC-ENTMCNC: 32.4 GM/DL
MCV RBC AUTO: 93.6 FL
MONOCYTES # BLD AUTO: 0.63 K/UL
MONOCYTES NFR BLD AUTO: 9.5 %
NEUTROPHILS # BLD AUTO: 5.05 K/UL
NEUTROPHILS NFR BLD AUTO: 76.1 %
PLATELET # BLD AUTO: 256 K/UL
POTASSIUM SERPL-SCNC: 4.2 MMOL/L
RAPID RVP RESULT: NOT DETECTED
RBC # BLD: 4.52 M/UL
RBC # FLD: 14.6 %
SARS-COV-2 RNA PNL RESP NAA+PROBE: NOT DETECTED
SODIUM SERPL-SCNC: 136 MMOL/L
WBC # FLD AUTO: 6.63 K/UL

## 2022-08-15 ENCOUNTER — OUTPATIENT (OUTPATIENT)
Dept: OUTPATIENT SERVICES | Facility: HOSPITAL | Age: 72
LOS: 1 days | End: 2022-08-15
Payer: COMMERCIAL

## 2022-08-15 ENCOUNTER — APPOINTMENT (OUTPATIENT)
Dept: RADIOLOGY | Facility: CLINIC | Age: 72
End: 2022-08-15

## 2022-08-15 DIAGNOSIS — Z95.5 PRESENCE OF CORONARY ANGIOPLASTY IMPLANT AND GRAFT: Chronic | ICD-10-CM

## 2022-08-15 DIAGNOSIS — D11.0 BENIGN NEOPLASM OF PAROTID GLAND: Chronic | ICD-10-CM

## 2022-08-15 DIAGNOSIS — R05.9 COUGH, UNSPECIFIED: ICD-10-CM

## 2022-08-15 DIAGNOSIS — Z90.89 ACQUIRED ABSENCE OF OTHER ORGANS: Chronic | ICD-10-CM

## 2022-08-15 DIAGNOSIS — N20.0 CALCULUS OF KIDNEY: Chronic | ICD-10-CM

## 2022-08-15 PROCEDURE — 71046 X-RAY EXAM CHEST 2 VIEWS: CPT

## 2022-08-15 PROCEDURE — 71046 X-RAY EXAM CHEST 2 VIEWS: CPT | Mod: 26

## 2022-08-16 ENCOUNTER — APPOINTMENT (OUTPATIENT)
Dept: INTERNAL MEDICINE | Facility: CLINIC | Age: 72
End: 2022-08-16

## 2022-08-17 ENCOUNTER — APPOINTMENT (OUTPATIENT)
Dept: PULMONOLOGY | Facility: CLINIC | Age: 72
End: 2022-08-17

## 2022-08-17 VITALS
HEIGHT: 63 IN | DIASTOLIC BLOOD PRESSURE: 72 MMHG | SYSTOLIC BLOOD PRESSURE: 116 MMHG | BODY MASS INDEX: 28.35 KG/M2 | WEIGHT: 160 LBS | OXYGEN SATURATION: 90 % | TEMPERATURE: 98.6 F | HEART RATE: 81 BPM | RESPIRATION RATE: 15 BRPM

## 2022-08-17 PROCEDURE — 99214 OFFICE O/P EST MOD 30 MIN: CPT

## 2022-08-17 NOTE — PHYSICAL EXAM
[General Appearance - Well Developed] : well developed [Normal Appearance] : normal appearance [General Appearance - Well Nourished] : well nourished [Normal Conjunctiva] : the conjunctiva exhibited no abnormalities [Eyelids - No Xanthelasma] : the eyelids demonstrated no xanthelasmas [IV] : IV [Neck Appearance] : the appearance of the neck was normal [Apical Impulse] : the apical impulse was normal [Abnormal Walk] : normal gait [Cyanosis, Localized] : no localized cyanosis [Skin Color & Pigmentation] : normal skin color and pigmentation [Skin Turgor] : normal skin turgor [] : no rash [No Focal Deficits] : no focal deficits [Oriented To Time, Place, And Person] : oriented to person, place, and time [Impaired Insight] : insight and judgment were intact [Affect] : the affect was normal [Memory Recent] : recent memory was not impaired [FreeTextEntry1] : coarse lung sounds bilaterally. normal inspiratory effort

## 2022-08-17 NOTE — REVIEW OF SYSTEMS
[EDS: ESS=____] : daytime somnolence: ESS=[unfilled] [Fatigue] : fatigue [Shortness Of Breath] : shortness of breath [Snoring] : snoring [Witnessed Apneas] : demonstrated ~M apnea [Frequent Nocturnal Awakenings] : frequent nocturnal awakenings from sleep [Daytime Somnolence: ESS=____] : daytime somnolence: ESS=[unfilled] [Negative] : Psychiatric [A.M. Dry Mouth] : no a.m. dry mouth [Chest Pain] : no chest pain [Palpitations] : no palpitations [Edema] : ~T edema was not present [A.M. Headache] : no headache present upon awakening [Heartburn] : no heartburn [Nocturia] : no nocturia

## 2022-08-17 NOTE — ASSESSMENT
[FreeTextEntry1] : This is a 71-year-old male with extensive PMH HTN DM HLD COPD, small cell lung cancer with brain mets s/p chemoradiation, COVID 3/21 and 7/22 presenting for difficulty tolerating home CPAP. \par \par #ARIELLE with severe oxygen desaturation–reviewed the patient's initial data download which showed elimination of sleep disordered breathing when he uses his machine.  He has been more compliant after taking temazepam as prescribed by his oncologist.  Discussed with the patient my concern of his persistent nocturnal hypoxemia.  Review of the sleep study indicates fragmented sleep with low REM..  I am unsure if CPAP is able to eliminate his persistent hypoxemia.  Therefore I will order an overnight pulse oximeter study while using his APAP device.  I have educated him that he can use his sleeping aid to help him use his machine if needed.\par \par Given his degree of oxygen desaturation, if he cannot tolerate using CPAP, he needs to be given nocturnal oxygen.  The patient is unlikely to have a degree of nocturnal hypoventilation given his normal transcutaneous CO2 during the study.\par \par Will call the patient with the results once they are made available.  We will attempt to expedite the study.

## 2022-08-17 NOTE — HISTORY OF PRESENT ILLNESS
[Obstructive Sleep Apnea] : obstructive sleep apnea [Snoring] : snoring [Witnessed Apneas] : witnessed sleep apnea [Daytime Somnolence] : daytime somnolence [AHI: ___ per hour] : Apnea-hypopnea index:  [unfilled] per hour [CPAP: ___ cmH2O] : CPAP: [unfilled] cmH2O [Date: ___] : Date of most recent diagnostic polysomnogram: [unfilled] [T90%: ___] : T90%: [unfilled]% [Alexander desatuation%: ___] : Alexander desaturation:  [unfilled]% [FreeTextEntry1] : 70 yo M pmhx HTN, DM, HLD, CAD s/p RCA stent (2007), BPH s/p TURP, ED, GERD, hx gastritis, hypomagnesemia, former smoker (quit 2019), COPD (on supplemental O2), osteopenia, stage 4 small cell lung cancer (dx'd 4/19, s/p carboplatin/etoposide and radiation with partial response, then on atezolizumab c/b pneumonitis in 10/2020 treated with high dose steroids c/b vertebral compression fractures, with small brain metastases 6/2020 s/p gamma knife radiation therapy in 7/2020. Was tapered off steroids for pneumonitis by 3/7/2021, and stopped Bactrim prophylaxis at that time), hx covid infection 3/21 and 7/22 referred by pulmonologist for difficulty tolerating CPAP. Pt reports having a sleep study done 3 months ago at [address] [place] [address], was diagnosed with mild sleep apnea AHI 8 but noted to be significantly hypoxemic overnight and started on a home CPAP machine. States that he has an O2 concentrator but lacks an adaptor. For the past 3 months, the patient reports sleep difficulty and difficulty tolerating the CPAP machine. He reports on an average day falling asleep at 10pm for 4-5 hours duration and a latency of 30 min. Usually takes temazepam 10mg to sleep. The patient reports on average being able to only tolerate the CPAP machine for about an hour due to discomfort in the airway, mask slipping, mechanical noise, and inquired about different fit options for the mask. The patient endorses daytime sleepiness, shortness of breath, and productive cough. Denying nocturnal awakenings, am dry mouth, headache, swelling of the extremities. The wife reports continued snoring and has witnessed apneas.\par  [Frequent Nocturnal Awakening] : no nocturnal awakening [Awakes with Headache] : no headache upon awakening [Awakening With Dry Mouth] : no dry mouth upon awakening [ESS] : 11

## 2022-08-18 ENCOUNTER — APPOINTMENT (OUTPATIENT)
Dept: ENDOCRINOLOGY | Facility: CLINIC | Age: 72
End: 2022-08-18

## 2022-08-18 ENCOUNTER — APPOINTMENT (OUTPATIENT)
Dept: PULMONOLOGY | Facility: CLINIC | Age: 72
End: 2022-08-18

## 2022-08-18 VITALS
HEIGHT: 63 IN | WEIGHT: 164 LBS | SYSTOLIC BLOOD PRESSURE: 127 MMHG | HEART RATE: 80 BPM | TEMPERATURE: 98 F | OXYGEN SATURATION: 92 % | DIASTOLIC BLOOD PRESSURE: 71 MMHG | BODY MASS INDEX: 29.06 KG/M2

## 2022-08-18 VITALS
BODY MASS INDEX: 28.7 KG/M2 | TEMPERATURE: 98.6 F | RESPIRATION RATE: 16 BRPM | HEIGHT: 63 IN | SYSTOLIC BLOOD PRESSURE: 131 MMHG | WEIGHT: 162 LBS | DIASTOLIC BLOOD PRESSURE: 77 MMHG | HEART RATE: 77 BPM | OXYGEN SATURATION: 94 %

## 2022-08-18 PROCEDURE — 99213 OFFICE O/P EST LOW 20 MIN: CPT

## 2022-08-18 PROCEDURE — 99214 OFFICE O/P EST MOD 30 MIN: CPT

## 2022-08-18 PROCEDURE — 83036 HEMOGLOBIN GLYCOSYLATED A1C: CPT | Mod: QW

## 2022-08-18 RX ORDER — AMOXICILLIN AND CLAVULANATE POTASSIUM 875; 125 MG/1; MG/1
875-125 TABLET, COATED ORAL
Qty: 14 | Refills: 0 | Status: DISCONTINUED | COMMUNITY
Start: 2022-08-09 | End: 2022-08-18

## 2022-08-18 RX ORDER — OFLOXACIN 3 MG/ML
0.3 SOLUTION/ DROPS OPHTHALMIC
Qty: 1 | Refills: 0 | Status: DISCONTINUED | COMMUNITY
Start: 2022-08-09 | End: 2022-08-18

## 2022-08-18 RX ORDER — NIRMATRELVIR AND RITONAVIR 300-100 MG
20 X 150 MG & KIT ORAL
Qty: 1 | Refills: 0 | Status: DISCONTINUED | COMMUNITY
Start: 2022-07-15 | End: 2022-08-18

## 2022-08-18 NOTE — PHYSICAL EXAM
[No Acute Distress] : no acute distress [Well Nourished] : well nourished [Well Groomed] : well groomed [No Deformities] : no deformities [Well Developed] : well developed [Normal Oropharynx] : normal oropharynx [Normal Appearance] : normal appearance [No JVD] : no jvd [Normal Rate/Rhythm] : normal rate/rhythm [Normal Pulses] : normal pulses [Normal S1, S2] : normal s1, s2 [No Resp Distress] : no resp distress [No Acc Muscle Use] : no acc muscle use [Normal Rhythm and Effort] : normal rhythm and effort [Benign] : benign [Normal Gait] : normal gait [No Clubbing] : no clubbing [No Cyanosis] : no cyanosis [No Edema] : no edema [FROM] : FROM [Normal Color/ Pigmentation] : normal color/ pigmentation [No Focal Deficits] : no focal deficits [Oriented x3] : oriented x3 [Normal Mood] : normal mood [Normal Affect] : normal affect [TextBox_11] : anicteric, EOMI, MMM, trachea midline [TextBox_68] : good air entry, prolonged expiratory phase, faint crackles lower lung fields

## 2022-08-18 NOTE — REASON FOR VISIT
[Follow-Up] : a follow-up visit [Lung Cancer] : lung cancer [Sleep Apnea] : sleep apnea [[employer] ] : provided by [employer]   [Interpreters_IDNumber] : [id number] [Interpreters_FullName] : Nneka [[id number]] : [place]

## 2022-08-18 NOTE — HISTORY OF PRESENT ILLNESS
[Former] : former [Never] : never [Wheezing] : wheezing [Nasal Passage Blockage (Stuffiness)] : edema [Nonspecific Pain, Swelling, And Stiffness] : chest pain [Fever] : fever [Cough] : coughing [Difficulty Breathing During Exertion] : dyspnea on exertion [Feelings Of Weakness On Exertion] : exercise intolerance [Continuous] : Continuous [NC] : Nasal Cannula [24 hrs] : 24 hours/day [0.5  -  Very, very slight] : 0.5, very, very slight [TextBox_4] : 71M DM2, HTN, CAD s/p PCI (RCA 2007), HLD, and COPD and lung cancer presenting for followup pulmonary evaluation. He is doing well today and presents to the office with his wife\par \par - Recently had COVID (7/2022) and treated with Paxlovid\par - Had increased cough and sputum production and was treated with antibiotics\par - Remains on 2L O2 with activity - is 91-93% on RA at rest in the office without difficulty\par - Cough improving\par - Not moving as much as he should\par - Recently saw Dr. Márquez for followup of sleep apnea - remains on PAP therapy but being planned for overnight oximetry\par - Remains on bronchodilators\par - Recent CXR reviewed without acute findings\par \par \par \par PFTs: 2/25/22 - FEV1 2.28L (84%), FVC 3.58L (99%), FEV1/FVC 64%, FEF 25-75 57%, TLC 97%, RV/TLC 32%, ERV 47%, DLCO 64%\par PFTs: 12/22/20 - FEV1 2.62L (95%), FVC 3.98L (109%), FEV1/FVC 66%, FEF 25-75 65%, %, RV/TLC 37%, DLCO 57%\par \par 6MWT: 2/25/22 - 386 meters - 2L O2\par 6MWT: 12/22/20 - 331 meters - 4L O2\par \par He is a long term former smoker but quit at the time of his lung cancer diagnosis. He has known obstructive lung disease with a bronchodilator response. He was switched from Breo to Symbicort while in the hospital. He has a Ventolin inhaler and uses it intermittently. He developed pneumonitis from treatment of Small Cell which required hospitalization and then treatment with a prolonged course of steroids. He did develop COVID-19 but fortunately did not require hospitalization.\par \par He was diagnosed with small cell lung cancer 4/2019 and then started on chemotherapy and radiation. He initially underwent Bronchoscopy/EBUS with Dr. Martinez which revealed that bilateral mediastinal LN were positive for small cell carcinoma. He was started on chemotehrapy at that time in May 2019 and achieved partial response. He was then started on maintenance Atezolizumab. He was offered prophylactic cranial irradiation but declined initially. He developed small brain mets in June 2020 and received GK-SRS with Dr. Mari [facility]. He had a CT chest 8/31/2020 that showed a small stable upper lobe opacity. His repeat CT chest done 10/2020 was suggestive of pneumonitis. He had a repeat CT chest done 12/28/2020 which showed improvement. [FreeTextEntry1] : 1-4 [Obstructive Sleep Apnea] : obstructive sleep apnea [Difficulty Maintaining Sleep] : does not have difficulty maintaining sleep [Nonrestorative Sleep] : nonrestorative sleep [Snoring] : snoring [Witnessed Apneas] : no witnessed apneas [CPAP:] : CPAP [TextBox_100] : 11/8/21 [TextBox_108] : 8.2 [TextBox_112] : 96.6 [TextBox_116] : 76 [TextBox_104] : 2/5/22 [TextBox_131] : 11 [TextBox_127] : 5/22 [TextBox_129] : 6/20/22 [TextBox_133] : 63 (19/30) [TextBox_137] : 7 (2/30) [TextBox_141] : 1 [TextBox_143] : 42 [TextBox_147] : 1.1 [ESS] : 0 [TextBox_12] : 12/28/2020 - INTERPRETATION:  Reason for Exam: Left upper lobe lung cancer\par \par CT of the chest was performed from the thoracic inlet to the level of the adrenal glands following IV contrast injection of  50 cc of Omnipaque 350. No immediate complications were reported.\par \par Comparison: October 21, 2020\par \par Tubes/Lines: None\par \par Mediastinum and Heart: Aorta and pulmonary arteries are normal in size. No pericardial effusion. Multiple calcified lymph nodes in the mediastinum are unchanged since prior. Coronary artery calcifications are noted.. Thyroid gland appears unremarkable.\par \par Lungs, Pleura, and Airways: Again seen is a left upper lobe spiculated nodule which measures approximately 1.4 cm which is unchanged since previous exam. Previously seen areas of bilateral peribronchovascular groundglass abnormalities and consolidations have essentially resolved. Minimal residual peribronchial vascular and linear scarring with traction bronchiolectasis noted. Focal area of distortion in the right middle lobe with calcification and traction bronchiectasis is stable.\par \par Visualized Abdomen: Postcontrast appearance of the upper abdomen is unremarkable.\par \par Bones and soft tissues: Unremarkable.\par \par \par IMPRESSION:\par \par When compared with October 21, 2020:\par \par Peribronchovascular groundglass abnormality and consolidations consistent with pneumonitis has resolved.\par \par Unchanged left upper lobe 1.4 cm spiculated nodule in keeping with known malignancy. Follow-up recommended to assess for change.\par  [TextBox_27] : 12/2/21 -  CT Chest w/ IV Cont \par FINDINGS:\par CHEST:\par LUNGS AND LARGE AIRWAYS: Patent central airways. Emphysema. Spiculated left upper lobe nodule measures 1.3 x 1.1 cm, stable. Stable scattered areas of linear type scarring and atelectasis noted throughout the lungs. Stable 6 mm ovoid opacity in the left upper lobe anteriorly, possibly scarring. Lower lobe stable bronchiectasis. Stable bilateral calcified granulomas.\par PLEURA: No pleural effusion.\par VESSELS: Atherosclerotic changes of the aorta and coronary arteries.\par HEART: Heart size is normal. No pericardial effusion.\par MEDIASTINUM AND SANDI: Calcified mediastinal and right hilar lymph nodes are stable..\par CHEST WALL AND LOWER NECK: Within normal limits.\par \par ABDOMEN AND PELVIS:\par LIVER: Within normal limits.\par BILE DUCTS: Normal caliber.\par GALLBLADDER: Cholelithiasis.\par SPLEEN: Within normal limits.\par PANCREAS: Within normal limits.\par ADRENALS: Stable 1.1 cm right adrenal indeterminate nodule. Adrenal gland within normal limits.\par KIDNEYS/URETERS: Within normal limits.\par \par BLADDER: Within normal limits.\par REPRODUCTIVE ORGANS: TURP defect..\par \par BOWEL: Stable lipoma of the transverse colon. Diverticulosis coli. No bowel obstruction. Appendix is normal.\par PERITONEUM: No ascites.\par VESSELS: Atherosclerotic changes.\par RETROPERITONEUM/LYMPH NODES: No lymphadenopathy.\par ABDOMINAL WALL: Tiny fat-containing umbilical hernia..\par BONES: Chronic compression deformities of T10 and T12-L3. Increased sclerosis of the T10 vertebral body, stable. Stable punctate sclerotic focus in the inferior left scapula. Stable vague sclerosis in the right anterior fourth rib.\par \par IMPRESSION:\par Stable size of a spiculated left upper lobe pulmonary nodule with no new nodules noted.\par No evidence for metastatic disease in the abdomen and pelvis.\par \par --- End of Report -- [TextBox_42] : 6/13/22 - FINDINGS:\par \par LUNGS/AIRWAYS/PLEURA: Stable 1.3 cm nodule in the left upper lobe (3-56). No endobronchial lesion. Emphysema. Multiple calcified granulomas. Stable scarring in both lungs. Unremarkable pleura.\par \par LYMPH NODES/MEDIASTINUM: No enlarged lymph nodes. Calcified lymph nodes compatible with prior granulomatous disease.\par \par HEART/VASCULATURE: Normal heart size. Unremarkable pericardium. Normal caliber aorta and pulmonary artery. Unchanged severe stenosis of the left subclavian artery origin due to calcified and noncalcified plaque. Coronary artery calcifications.\par \par LIVER: Unremarkable.\par \par BILIARY SYSTEM: Cholelithiasis.\par \par SPLEEN:Unremarkable.\par \par PANCREAS: Unremarkable.\par \par ADRENALS: Unremarkable.\par \par KIDNEYS/URINARY TRACT: Unremarkable.\par \par GASTROINTESTINAL TRACT: Stable lipoma in the transverse colon. Diverticulosis.\par \par REPRODUCTIVE ORGANS: Stable enlarged prostate status post TURP.\par \par LYMPH NODES/PERITONEUM/RETROPERITONEUM: Unremarkable.\par \par BONES/SOFT TISSUES: Mild subcutaneous gas in the anterior abdominal wall, likely from a subcutaneous injection. Unchanged loss of height of multiple vertebral bodies, heterogeneous marrow of the mid and lower thoracic spine, and few other areas of sclerosis.\par \par \par IMPRESSION:\par \par Stable 1.3 cm left upper lobe nodule.\par \par No evidence of new or progressive disease.

## 2022-08-18 NOTE — ASSESSMENT
[FreeTextEntry1] : 71M extensive history including DM2, CAD, COPD, and small cell lung cancer presenting for followup pulmonary evaluation. Recent COVID diagnosis and now with improving productive cough\par \par 1. Post-COVID \par - recent COVID diagnosis - treated with Paxlovid with improvement\par - possible superimposed bacterial pneumonia subsequently treated with Augmentin. CXR reviewed without acute findings.\par - patient has had significant symptomatic improvement\par \par 2.  Obstructive Sleep Apnea - patient recently found to have ARIELLE and CPAP titration indicated CPAP 11\par -  Followup with Dr. Leslie. \par \par 3. Small Cell Lung Cancer - with metastatic disease. Followup with Dr. Wesley\par - patient has received radiation therapy \par - Currently off cancer directed therapies\par - He has improved from a pulmonary standpoint and I do not have any objections to further therapy if it is thought necessary from an Oncologic perspective. Pneumonitis from 2020 - RESOLVED - patient underwent bronchoscopy and transbronchial biopsy which showed chronic inflammation. - He had a long course of Prednisone with PCP prophylaxis\par - Repeat CT chest from 12/28/2020 shows resolution of previously noted opacities\par \par 4. COPD - patient is a long term former smoker. He will continue Symbicort and use Ventolin as needed.\par - PFTs and 6MWT done 2/2022. PFTs relatively stable - no BD testing in setting of COVID-19 pandemic. On prior PFTs patient had significant BD response\par - No evidence of desaturation during 6MWT with 2L O2 - with improvement in distance\par \par 5. Chronic Hypoxemic Respiratory Failure - patient currently on 2L supplemental O2. Continue to maintain O2 sats > 90% as able.\par - Can start to wean down FiO2 as able with goal O2 sat > 90%. \par - Continue Flonase for postnasal drip component. If worsens will transition to Astelin.\par \par 6. Health Maintenance\par Spirometry: reviewed\par Smoking status: Former \par Pneumococcal vaccination status: Completed \par COVID vaccination status: Completed P\par [place] Sleepiness Scale: 0 (9/30/21)\par \par The above plan was discussed with EVER CHAVEZ and his wife in detail. Patient verbalized understanding and agrees with plan as detailed above. Patient was provided education and counselling on current diagnosis/symptoms, diagnostic work up, treatment options and potential side effects of any prescribed therapy/therapies. EVER  was advised to call our clinic at [phone] for any new or worsening symptoms, or with any questions or concerns. In case of acute onset of respiratory symptoms or worsening presentation, patient was advised to present to nearest emergency room for further evaluation. EVER  expressed understanding and all questions/concerns were addressed.\par \par

## 2022-08-18 NOTE — REVIEW OF SYSTEMS
[Fever] : no fever [Fatigue] : no fatigue [Chills] : no chills [Nasal Congestion] : no nasal congestion [Postnasal Drip] : no postnasal drip [Cough] : cough [Hemoptysis] : no hemoptysis [Sputum] : no sputum [Wheezing] : no wheezing [SOB on Exertion] : sob on exertion [Chest Discomfort] : no chest discomfort [Edema] : no edema [Angioedema] : no angioedema [GERD] : gerd [Abdominal Pain] : no abdominal pain [Nausea] : no nausea [Vomiting] : no vomiting [Back Pain] : no back pain [Rash] : no rash [Easy Bruising] : no easy bruising [Focal Weakness] : no focal weakness [Seizures] : no seizures [Depression] : no depression [Anxiety] : no anxiety [Diabetes] : diabetes [TextBox_30] : SOB improved and cough post COVID improving

## 2022-08-28 NOTE — PHYSICAL EXAM
[Alert] : alert [No Acute Distress] : no acute distress [Normal Sclera/Conjunctiva] : normal sclera/conjunctiva [No Proptosis] : no proptosis [Normal Outer Ear/Nose] : the ears and nose were normal in appearance [Normal Hearing] : hearing was normal [No Neck Mass] : no neck mass was observed [No Respiratory Distress] : no respiratory distress [Not Tender] : non-tender [Soft] : abdomen soft [Normal Gait] : normal gait [No Rash] : no rash [No Tremors] : no tremors [Oriented x3] : oriented to person, place, and time [Normal Affect] : the affect was normal [Normal Mood] : the mood was normal [Normal S1, S2] : normal S1 and S2 [Normal Rate] : heart rate was normal [Foot Ulcers] : no foot ulcers [Acne] : no acne

## 2022-08-28 NOTE — ASSESSMENT
[FreeTextEntry1] : 72 yr old M with metastatic Lung CA, CAD and COPD here with T2DM\par 1) T2DM A1C 6.7%\par Counselled on diet and exercise modification/importance of glycemic control/Hypoglycemia mgmt\par Cont metformin 500mg  2 tabs twice a day and Trulicity 1.5mg subq weekly\par UTD with ophtho\par Continue glucose monitoring with Osiel\par 2) HTN\par Check Urine Micro/Creat Ratio \par Pt is on Lisinopril 20mg daily\par 3) HLD\par LDL 42 Nov 2021\par Cont crestor  niacin, fish oil\par Check Lipids today\par 4) Osteoporosis (osteopenia with compression fractures)\par Mgmt per medicine\par Cont alendronate \par Plan for repeat DEXA Jan 2023\par \par \par

## 2022-08-28 NOTE — HISTORY OF PRESENT ILLNESS
[FreeTextEntry1] : 72 yr old M with metastatic Lung CA, CAD and COPD here for f/u visit for Type 2 DM\par Now on Home O2 \par A1C is 6.7\par He was on metformin and januvia in the past\par Current regimen:Trulicty 1.5 mg subq weekly metformin 1g BID\par Novolog was discontinued at last visit, now off basaglar\par Was on tapering dose of prednisone but has been off since March 2021\par He moderates his intake of carbs, drinks crystal light and coke zero\par Saw optho 2-3 months ago- no retinopathy\par No neuropathy\par For HTG, takes fish oil 500 mg 1 X day, rosuvastatin 20mg daily, Niacin 2 tablets at night\par He uses a Free Style Osiel for glucose monitoring:\par Target Range 89%\par High 11%\par Low 0%\par \par \par

## 2022-09-01 LAB
ALBUMIN SERPL ELPH-MCNC: 4.6 G/DL
ALP BLD-CCNC: 83 U/L
ALT SERPL-CCNC: 20 U/L
ANION GAP SERPL CALC-SCNC: 16 MMOL/L
AST SERPL-CCNC: 22 U/L
BILIRUB SERPL-MCNC: 0.2 MG/DL
BUN SERPL-MCNC: 16 MG/DL
CALCIUM SERPL-MCNC: 10.1 MG/DL
CHLORIDE SERPL-SCNC: 98 MMOL/L
CHOLEST SERPL-MCNC: 163 MG/DL
CO2 SERPL-SCNC: 26 MMOL/L
CREAT SERPL-MCNC: 0.86 MG/DL
CREAT SPEC-SCNC: 160 MG/DL
EGFR: 93 ML/MIN/1.73M2
GLUCOSE SERPL-MCNC: 131 MG/DL
HBA1C MFR BLD HPLC: 6.7
HDLC SERPL-MCNC: 41 MG/DL
LDLC SERPL CALC-MCNC: 69 MG/DL
MICROALBUMIN 24H UR DL<=1MG/L-MCNC: 1.8 MG/DL
MICROALBUMIN/CREAT 24H UR-RTO: 11 MG/G
NONHDLC SERPL-MCNC: 122 MG/DL
POTASSIUM SERPL-SCNC: 4.5 MMOL/L
PROT SERPL-MCNC: 6.7 G/DL
SODIUM SERPL-SCNC: 139 MMOL/L
TRIGL SERPL-MCNC: 266 MG/DL
TSH SERPL-ACNC: 0.97 UIU/ML

## 2022-09-08 ENCOUNTER — APPOINTMENT (OUTPATIENT)
Dept: SLEEP CENTER | Facility: CLINIC | Age: 72
End: 2022-09-08

## 2022-09-08 ENCOUNTER — APPOINTMENT (OUTPATIENT)
Dept: INTERNAL MEDICINE | Facility: CLINIC | Age: 72
End: 2022-09-08

## 2022-09-08 VITALS
DIASTOLIC BLOOD PRESSURE: 84 MMHG | HEART RATE: 78 BPM | SYSTOLIC BLOOD PRESSURE: 132 MMHG | TEMPERATURE: 98.5 F | BODY MASS INDEX: 29.23 KG/M2 | HEIGHT: 63 IN | WEIGHT: 165 LBS | OXYGEN SATURATION: 96 % | RESPIRATION RATE: 16 BRPM

## 2022-09-08 DIAGNOSIS — E78.5 HYPERLIPIDEMIA, UNSPECIFIED: ICD-10-CM

## 2022-09-08 DIAGNOSIS — E55.9 VITAMIN D DEFICIENCY, UNSPECIFIED: ICD-10-CM

## 2022-09-08 PROCEDURE — 36415 COLL VENOUS BLD VENIPUNCTURE: CPT

## 2022-09-08 PROCEDURE — G0444 DEPRESSION SCREEN ANNUAL: CPT

## 2022-09-08 PROCEDURE — G0439: CPT

## 2022-09-08 NOTE — PHYSICAL EXAM
[No Acute Distress] : no acute distress [Well-Appearing] : well-appearing [Normal Sclera/Conjunctiva] : normal sclera/conjunctiva [PERRL] : pupils equal round and reactive to light [EOMI] : extraocular movements intact [Normal Oropharynx] : the oropharynx was normal [Normal TMs] : both tympanic membranes were normal [Normal Nasal Mucosa] : the nasal mucosa was normal [No Lymphadenopathy] : no lymphadenopathy [Supple] : supple [Thyroid Normal, No Nodules] : the thyroid was normal and there were no nodules present [No Respiratory Distress] : no respiratory distress  [Clear to Auscultation] : lungs were clear to auscultation bilaterally [Normal Rate] : normal rate  [Regular Rhythm] : with a regular rhythm [Normal S1, S2] : normal S1 and S2 [No Murmur] : no murmur heard [Pedal Pulses Present] : the pedal pulses are present [Soft] : abdomen soft [Non Tender] : non-tender [No HSM] : no HSM [Normal Supraclavicular Nodes] : no supraclavicular lymphadenopathy [Normal Posterior Cervical Nodes] : no posterior cervical lymphadenopathy [Normal Anterior Cervical Nodes] : no anterior cervical lymphadenopathy [No CVA Tenderness] : no CVA  tenderness [No Spinal Tenderness] : no spinal tenderness [No Joint Swelling] : no joint swelling [No Rash] : no rash [No Focal Deficits] : no focal deficits [Normal Gait] : normal gait [Normal Affect] : the affect was normal [Alert and Oriented x3] : oriented to person, place, and time [No Edema] : there was no peripheral edema [de-identified] : scattered freckles [de-identified] : slow gait with cane

## 2022-09-08 NOTE — HEALTH RISK ASSESSMENT
[0] : 2) Feeling down, depressed, or hopeless: Not at all (0) [PHQ-2 Negative - No further assessment needed] : PHQ-2 Negative - No further assessment needed [Patient reported bone density results were abnormal] : Patient reported bone density results were abnormal [Feels Safe at Home] : Feels safe at home [Fully functional (bathing, dressing, toileting, transferring, walking, feeding)] : Fully functional (bathing, dressing, toileting, transferring, walking, feeding) [Smoke Detector] : smoke detector [Carbon Monoxide Detector] : carbon monoxide detector [Seat Belt] :  uses seat belt [With Patient/Caregiver] : , with patient/caregiver [No falls in past year] : Patient reported no falls in the past year [HIV test declined] : HIV test declined [Sunscreen] : uses sunscreen [[id number]] : 0 [Guns at Home] : no guns at home [BoneDensityDate] : 01/21 [BoneDensityComments] : osteopenia [ColonoscopyDate] : 01/16 [ColonoscopyComments] : internal hemorrhoids, large lipoma at transverse colon, diverticulosis, rec: repeat in 10 yrs (Dr. Hinds) [HIVDate] : 07/21 [HIVComments] : negative [HepatitisCDate] : 07/21 [HepatitisCComments] : negative [AdvancecareDate] : 09/22

## 2022-09-08 NOTE — HISTORY OF PRESENT ILLNESS
[Spouse] : spouse [] :  [Former Cigarette Smoker] : is a former cigarette smoker [Quit Cigarettes: ___] : quit smoking [unfilled] [Occasional Use] : occasional alcohol use [Never] : has never used illicit drugs [Fair] : fair [Reg. Dental Visits] : He has regular dental visits [Healthy Diet] : He consumes a diverse and healthy diet [Regular Exercise] : He exercises regularly [Binge Drinking] : denies binge drinking [Patient Concern] : no personal concern about alcohol use [Family Concern] : no family concern about alcohol use [Vision Problems] : He denies vision problems [Hearing Loss] : He denies hearing loss [de-identified] : 7/21 [de-identified] : retired -was seafood distributor [de-identified] : smoked 1 ppd x 50 yrs [de-identified] : hx flu shot 11/21, hx Tdap 11/12, hx prevnar 3/15, hx PVX 10/20, no hx shingles vaccine [de-identified] : \par Accompanied by wife, Jacque Abrams.  Declines formal , wishes for wife to translate in Thai as needed.\par \par 73 yo M pmhx HTN, DM, HLD, CAD s/p RCA stent (2007), ARIELLE, BPH s/p TURP, ED, GERD, hx gastritis, hypomagnesemia, former smoker (quit 2019), COPD (on supplemental O2), osteopenia, stage 4 small cell lung cancer (dx'd 4/19, s/p carboplatin/etoposide and radiation with partial response, then on atezolizumab c/b pneumonitis in 10/2020 treated with high dose steroids c/b vertebral compression fractures, with small brain metastases 6/2020 s/p gamma knife radiation therapy in 7/2020. Was tapered off steroids for pneumonitis by 3/7/2021, and stopped Bactrim prophylaxis at that time), hx covid infection 3/21 (s/p monoclonal Ab infusion and in PREVENT-HD rivaroxaban trial), hx covid infection 7/22 s/p Paxlovid course here for AWV\par \par Last seen 8/9/22 c/o new URI sx's, worsened cough and O2 usage s/p tx'd covid infection 7/22 with Paxlovid\par -empiric Augmentin given\par -labs and RVP done\par -cxr ordered, no acute finding noted\par -advised pulm f/u\par \par Feeling well today.\par Does not need med refills.\par Last ate 2.5 hrs ago.\par \par Reports is socially distancing and using precautions for covid prevention.\par Denies sick or covid positive contacts.\par Denies fever, chills, cough or sob.\par -hx pfizer vaccine series- 3/21, 7/21; Hx booster 1/22\par -hx covid infection 7/22- resolved s/p Paxlovid course.\par \par hx itchy rash on face/arms/chest- noted 5/22 visit, states has resolved since visit, is taking Allegra prn for allergy sx's.  Denies rash recurrence. \par -denies associated facial/lip/tongue swelling, throat pain, abdominal pain or sob\par -A/I eval pending\par \par Denies new topicals, body sprays, soap, detergents or fragrances.\par Denies new meds except diclofenac- last used 1 week prior to rash onset and no rash noted with taking\par Denies new foods.\par Denies bug bites. Denies hiking or gardening.\par Has 2 dogs at home- for 6 years, no new pets.\par No recent travel.\par Reports hx similar rash years ago- unsure of cause, no doctor eval.\par \par hx chronic LBP, hx compression fractures, hx osteopenia- notes pain improved since onset, well controlled \par -on Flexeril prn, diclofenac prn (taking 1-2x/wk) and Tylenol 1-2x/wk\par -followed by PMR, last seen 6/22 with MRI spine done (no acute findings, no mets dz) \par -followed by neurosx, last seen 3/21- advised PT and back brace use with no surgical intervention planned.\par -followed by ortho, last seen 3/21\par -hx PT \par -on Alendronate since 1/21, tolerating w/o SEs\par -hx ambulates with cane or walker- no recent need\par -using back brace\par -denies focal weakness or incontinence\par \par small cell lung cancer stage 4 (dx'd 4/19), hx brain mets- notes using O2 at night and 2-3hrs in daytime, O2 90 -95%\par -followed by oncology, last seen 6/22 with 6/22 CT scans reviewed, noted stable. Noted off tx with cont'd monitoring planned. Planned to f/u in 3 mo with repeat CT scans.\par -followed by rad-onc, last seen 8/22 with 7/22 MRI brain noted JAYCOB. Advised f/u in 3 mo with repeat MRI.\par -followed by pulm, last seen 8/22, noted improving s/p recent URI and tx'd covid 7/22 for covid infection. Planned to f/u with sleep specialist re: CPAP titration study - has appt 9/22\par \par DM-\par -followed by endo and on med regimen- had f/u 8/22 with labs done, no changes made\par -using Freestyle Osiel. Home fs: 110 - 135; denies hypoglycemic sx's but notes had 3 episodes of 50 - 60 while sleeping\par -seen by optho eval 6/22, states told nl exam.  Has f/u 11/22.\par -followed by podiatry, states seen 8/22 with nails clipped and topical (? name) given for tinea which has since resolved. Has f/u 9/22. Denies foot paresthesias.\par \par HTN, HLD, CAD s/p stent- hx LE edema, reports lessening since off steroids\par -on ASA, metoprolol and Lisinopril\par -on Crestor, Lovaza and niacin - denies SEs with use\par -had echo 6/21\par -followed by cardio, seen 7/22, Zetia d/c'd (told not needed), reports hx nl NST 7/22.\par -exercise: walks daily 1-1.5 miles- done w/o sx's or limitations\par -denies CP, dizziness or sob\par \par \par Denies  complaints. \par \par Denies depression or anxiety.\par

## 2022-09-08 NOTE — ASSESSMENT
[FreeTextEntry1] : \par 71 yo M pmhx HTN, DM, HLD, CAD s/p RCA stent (2007), ARIELLE, BPH s/p TURP, ED, GERD, hx gastritis, hypomagnesemia, former smoker (quit 2019), COPD (on supplemental O2), osteopenia, stage 4 small cell lung cancer (dx'd 4/19, s/p carboplatin/etoposide and radiation with partial response, then on atezolizumab c/b pneumonitis in 10/2020 treated with high dose steroids c/b vertebral compression fractures, with small brain metastases 6/2020 s/p gamma knife radiation therapy in 7/2020. Was tapered off steroids for pneumonitis by 3/7/2021, and stopped Bactrim prophylaxis at that time), hx covid infection 3/21 (s/p monoclonal Ab infusion and in PREVENT-HD rivaroxaban trial), hx covid infection 7/22 s/p Paxlovid course here for AWV\par \par \par hx skin rash- transient x2, suspect allergic, unclear etiology.  No recent recurrence.\par -A/I referral given prior, pending\par -on Allegra prn\par -encouraged sx diary to ID trigger\par -advised prompt ER if worsened or onset facial/tong/lip swelling, throat discomfort, sob, etc.\par \par hx chronic LBP, hx compression fractures, hx osteopenia, vit d insuff- hx mechanical fall s/p ER 6/21 w/o acute findings on CT scan. Back pain at baseline s/p recent exacerbation.\par -1/21 DEXA: osteopenia, repeat due 1/23\par -6/21 CT lumbar spine: Unchanged compression deformities of the T12-L3 vertebral bodies. No destructive lesions are identified. Multilevel degenerative changes with disc herniations at L3-4 and L4-5\par -followed by PMR, last seen 6/22 with MRI spine done (no acute findings, no mets dz) \par -followed by neurosx, last seen 3/21- advised PT and back brace use with no surgical intervention planned.\par -followed by ortho, last seen 3/21\par -hx PT \par -on Flexeril prn, Tylenol prn and Oxycodone prn (per PMR), taking mainly Tylenol 1-2x/wk at baseline)\par -on diclofenac and Voltaren gel prn per PMR\par -on Alendronate since 1/21, tolerating w/o SEs\par -on ca/D OTC supp\par -hx ambulates with cane or walker prn - no recent need\par -using back brace\par -fall precautions counseled\par \par small cell lung cancer stage 4 (dx'd 4/19), hx brain mets, anemia-\par -followed by oncology, last seen 6/22 with 6/22 CT scans reviewed, noted stable. Noted off tx with cont'd monitoring planned. Planned to f/u in 3 mo with repeat CT scans.\par -followed by rad-onc, last seen 8/22 with 7/22 MRI brain noted JAYCOB. Advised f/u in 3 mo with repeat MRI.\par -followed by pulm, last seen 8/22, noted improving s/p recent URI and tx'd covid 7/22 for covid infection. Planned to f/u with sleep specialist re: CPAP titration study\par \par ARIELLE, COPD (on supp O2), seasonal allergies, former smoker (quit 2019), hx covid infection x2 (last 7/22)-\par -followed by pulm, last seen 8/22, noted improving s/p recent URI and tx'd covid 7/22 for covid infection. Planned to f/u with sleep specialist re: CPAP titration study - pending 9/22\par -on Symbicort and MDI prn\par -Flonase prn\par \par DM, microalbuminuria- 8/22 POCT A1c 6.7 by endo (was 6.5), 8/22 microalbumin wnl \par -followed by endo, seen 8/22 with labs/A1c done and no changes made.  Has f/u 11/22.  Advised to f/u re: low fs reports noted while sleeping on monitor.\par -on metformin  (2) BID since 3/21, mild loose BMs with use- feels not too bothersome, wishes to continue\par -off Basaglar\par -off Novolog\par -on Trulicity since 1/22\par -on Lisinopril for renal protection\par -cont home fs monitoring, using Freestyle Osiel\par -seen by optho eval 6/22, states told nl exam.  Has f/u 11/22.\par -followed by podiatry, states seen 8/22 with nails clipped and topical (? name) given for tinea which has since resolved.  Has f/u 9/22. Denies foot paresthesias.\par -check vit B12\par \par HTN, HLD, CAD s/p stent, hx LE edema (reports improved since off steroids 3/21)- BP wnl\par -12/20 b/l LE duplex: no DVT b/l\par -6/21 echo 6/21: EF 60-65%, min MR, LV remodeling, nl LV fxn, mild diastolic dysfxn (Stg I)\par -7/21 cbc/bmp/TSH wnl\par -8/22 Tchol 163  (was 119) LDL 69 HDL 41 (nonfasting)\par -8/22 cmp wnl\par -on ASA, metoprolol and Lisinopril\par -on Crestor, Lovaza and niacin - denies SEs with use\par -off Zetia 11/21 per cardio\par -followed by cardio, seen 3/22, noted hx echo 6/21 and carotid US 11/21 (mild disease, hx b/l CEA). No med changes made. Has f/u 11/22.\par -low salt diet advised\par -check lipids \par \par hx insomnia, snoring-\par -followed by pulm, last seen 8/22, noted improving s/p recent URI and tx'd covid 7/22 for covid infection. Planned to f/u with sleep specialist re: CPAP titration study- pending 9/22\par -on Temazepam prn per oncology\par \par GERD, hx internal hemorrhoids, diverticulosis, colon polyps- reports controlled\par -hx EGD 1/16: nonbleeding erosive gastropathy\par -hx colonoscopy 1/16: internal hemorrhoids, large lipoma at transverse colon, diverticulosis, rec: repeat in 10 yrs (Dr. Hinds)\par -on famotidine\par -advised to avoid reflux aggravating foods\par \par hx hypomagnesium- hx chronic, mild diarrhea 2/2 metformin\par -declines to change metformin\par -11/21 Mg 1.7--> 1.6, wnl in 5/22\par -on MgOx 400 (1/2/1)\par -increased Mg diet sources encouraged\par -nephrology referral given for eval- pending\par -check level\par \par \par MISC: Continued social distancing and measure for covid19 prevention encouraged. \par -hx pfizer vaccine series- 3/21, 7/21; Hx booster 1/22\par \par \par HCM\par -check screening las; declines HIV/STD screening\par -willing for PSA screening\par -7/21 hep C screening negative\par -hx flu shot 11/21, pt declines today defers to 11/22 at RN visit plans to schedule\par -hx Tdap 11/12, pt declines today defers to 11/22 at RN visit plans to schedule\par -hx prevnar 3/15\par -hx PVX 10/20\par -advised to check on insurance coverage for shingrix and f/u if desires. Advised to d/w oncology prior to getting.\par -hx screening colonoscopy 1/16: internal hemorrhoids, large lipoma at transverse colon, diverticulosis, rec: repeat in 10 yrs (Dr. Hinds)\par -hx DEXA 1/21: osteopenia (hx compression fractures)\par -followed by derm, last seen 9/20. Yearly skin screening advised. Regular use of sun block for skin cancer prevention counseled.\par -yearly dental screening advised\par -advised to designate HCP and provide copy of completed form for records\par \par Pt requests wife, Jacque Abrams, be contacted re: his test results and medical care, (cell) [phone]\par \par Labs drawn in office today.\par \par

## 2022-09-12 LAB
25(OH)D3 SERPL-MCNC: 48.1 NG/ML
ANION GAP SERPL CALC-SCNC: 15 MMOL/L
APPEARANCE: CLEAR
BACTERIA: NEGATIVE
BASOPHILS # BLD AUTO: 0.07 K/UL
BASOPHILS NFR BLD AUTO: 1.1 %
BILIRUBIN URINE: NEGATIVE
BLOOD URINE: NEGATIVE
BUN SERPL-MCNC: 15 MG/DL
CALCIUM SERPL-MCNC: 10 MG/DL
CHLORIDE SERPL-SCNC: 99 MMOL/L
CHOLEST SERPL-MCNC: 155 MG/DL
CO2 SERPL-SCNC: 28 MMOL/L
COLOR: NORMAL
CREAT SERPL-MCNC: 0.9 MG/DL
EGFR: 92 ML/MIN/1.73M2
EOSINOPHIL # BLD AUTO: 0.17 K/UL
EOSINOPHIL NFR BLD AUTO: 2.6 %
FOLATE SERPL-MCNC: 9.6 NG/ML
GLUCOSE QUALITATIVE U: NEGATIVE
GLUCOSE SERPL-MCNC: 112 MG/DL
HCT VFR BLD CALC: 42.2 %
HDLC SERPL-MCNC: 52 MG/DL
HGB BLD-MCNC: 13.3 G/DL
HYALINE CASTS: 0 /LPF
IMM GRANULOCYTES NFR BLD AUTO: 0.3 %
KETONES URINE: NEGATIVE
LDLC SERPL CALC-MCNC: 67 MG/DL
LEUKOCYTE ESTERASE URINE: NEGATIVE
LYMPHOCYTES # BLD AUTO: 0.72 K/UL
LYMPHOCYTES NFR BLD AUTO: 11 %
MAGNESIUM SERPL-MCNC: 1.4 MG/DL
MAN DIFF?: NORMAL
MCHC RBC-ENTMCNC: 29.4 PG
MCHC RBC-ENTMCNC: 31.5 GM/DL
MCV RBC AUTO: 93.4 FL
MICROSCOPIC-UA: NORMAL
MONOCYTES # BLD AUTO: 0.6 K/UL
MONOCYTES NFR BLD AUTO: 9.2 %
NEUTROPHILS # BLD AUTO: 4.97 K/UL
NEUTROPHILS NFR BLD AUTO: 75.8 %
NITRITE URINE: NEGATIVE
NONHDLC SERPL-MCNC: 104 MG/DL
PH URINE: 6.5
PLATELET # BLD AUTO: 223 K/UL
POTASSIUM SERPL-SCNC: 4.6 MMOL/L
PROTEIN URINE: NEGATIVE
PSA SERPL-MCNC: 1.19 NG/ML
RBC # BLD: 4.52 M/UL
RBC # FLD: 14.6 %
RED BLOOD CELLS URINE: 0 /HPF
SODIUM SERPL-SCNC: 141 MMOL/L
SPECIFIC GRAVITY URINE: 1.01
SQUAMOUS EPITHELIAL CELLS: 0 /HPF
TRIGL SERPL-MCNC: 184 MG/DL
TSH SERPL-ACNC: 1.55 UIU/ML
UROBILINOGEN URINE: NORMAL
VIT B12 SERPL-MCNC: 374 PG/ML
WBC # FLD AUTO: 6.55 K/UL
WHITE BLOOD CELLS URINE: 0 /HPF

## 2022-09-13 ENCOUNTER — NON-APPOINTMENT (OUTPATIENT)
Age: 72
End: 2022-09-13

## 2022-09-27 ENCOUNTER — OUTPATIENT (OUTPATIENT)
Dept: OUTPATIENT SERVICES | Facility: HOSPITAL | Age: 72
LOS: 1 days | End: 2022-09-27
Payer: COMMERCIAL

## 2022-09-27 ENCOUNTER — APPOINTMENT (OUTPATIENT)
Dept: SLEEP CENTER | Facility: CLINIC | Age: 72
End: 2022-09-27

## 2022-09-27 DIAGNOSIS — Z95.5 PRESENCE OF CORONARY ANGIOPLASTY IMPLANT AND GRAFT: Chronic | ICD-10-CM

## 2022-09-27 DIAGNOSIS — D11.0 BENIGN NEOPLASM OF PAROTID GLAND: Chronic | ICD-10-CM

## 2022-09-27 DIAGNOSIS — Z90.89 ACQUIRED ABSENCE OF OTHER ORGANS: Chronic | ICD-10-CM

## 2022-09-27 DIAGNOSIS — N20.0 CALCULUS OF KIDNEY: Chronic | ICD-10-CM

## 2022-09-27 PROCEDURE — ZZZZZ: CPT

## 2022-09-27 PROCEDURE — 94762 N-INVAS EAR/PLS OXIMTRY CONT: CPT

## 2022-09-30 ENCOUNTER — OUTPATIENT (OUTPATIENT)
Dept: OUTPATIENT SERVICES | Facility: HOSPITAL | Age: 72
LOS: 1 days | Discharge: HOME | End: 2022-09-30

## 2022-09-30 DIAGNOSIS — Z95.5 PRESENCE OF CORONARY ANGIOPLASTY IMPLANT AND GRAFT: Chronic | ICD-10-CM

## 2022-09-30 DIAGNOSIS — N20.0 CALCULUS OF KIDNEY: Chronic | ICD-10-CM

## 2022-09-30 DIAGNOSIS — C34.90 MALIGNANT NEOPLASM OF UNSPECIFIED PART OF UNSPECIFIED BRONCHUS OR LUNG: ICD-10-CM

## 2022-09-30 DIAGNOSIS — D11.0 BENIGN NEOPLASM OF PAROTID GLAND: Chronic | ICD-10-CM

## 2022-09-30 DIAGNOSIS — Z90.89 ACQUIRED ABSENCE OF OTHER ORGANS: Chronic | ICD-10-CM

## 2022-10-03 ENCOUNTER — OUTPATIENT (OUTPATIENT)
Dept: OUTPATIENT SERVICES | Facility: HOSPITAL | Age: 72
LOS: 1 days | End: 2022-10-03
Payer: COMMERCIAL

## 2022-10-03 ENCOUNTER — APPOINTMENT (OUTPATIENT)
Dept: CT IMAGING | Facility: CLINIC | Age: 72
End: 2022-10-03

## 2022-10-03 DIAGNOSIS — D11.0 BENIGN NEOPLASM OF PAROTID GLAND: Chronic | ICD-10-CM

## 2022-10-03 DIAGNOSIS — C34.12 MALIGNANT NEOPLASM OF UPPER LOBE, LEFT BRONCHUS OR LUNG: ICD-10-CM

## 2022-10-03 DIAGNOSIS — N20.0 CALCULUS OF KIDNEY: Chronic | ICD-10-CM

## 2022-10-03 DIAGNOSIS — Z95.5 PRESENCE OF CORONARY ANGIOPLASTY IMPLANT AND GRAFT: Chronic | ICD-10-CM

## 2022-10-03 DIAGNOSIS — Z90.89 ACQUIRED ABSENCE OF OTHER ORGANS: Chronic | ICD-10-CM

## 2022-10-03 PROCEDURE — 71260 CT THORAX DX C+: CPT

## 2022-10-03 PROCEDURE — 74177 CT ABD & PELVIS W/CONTRAST: CPT | Mod: 26

## 2022-10-03 PROCEDURE — 71260 CT THORAX DX C+: CPT | Mod: 26

## 2022-10-03 PROCEDURE — 74177 CT ABD & PELVIS W/CONTRAST: CPT

## 2022-10-05 ENCOUNTER — APPOINTMENT (OUTPATIENT)
Dept: HEMATOLOGY ONCOLOGY | Facility: CLINIC | Age: 72
End: 2022-10-05

## 2022-10-05 VITALS
HEART RATE: 67 BPM | OXYGEN SATURATION: 96 % | SYSTOLIC BLOOD PRESSURE: 139 MMHG | BODY MASS INDEX: 29.68 KG/M2 | WEIGHT: 167.55 LBS | RESPIRATION RATE: 17 BRPM | DIASTOLIC BLOOD PRESSURE: 79 MMHG | TEMPERATURE: 97.2 F

## 2022-10-05 PROCEDURE — 99213 OFFICE O/P EST LOW 20 MIN: CPT

## 2022-10-07 ENCOUNTER — APPOINTMENT (OUTPATIENT)
Dept: PULMONOLOGY | Facility: CLINIC | Age: 72
End: 2022-10-07

## 2022-10-07 PROCEDURE — 99214 OFFICE O/P EST MOD 30 MIN: CPT | Mod: 95

## 2022-10-11 NOTE — ASSESSMENT
[FreeTextEntry1] : This is a 71-year-old male with extensive PMH HTN DM HLD COPD, small cell lung cancer with brain mets s/p chemoradiation, COVID 3/21 and 7/22 presenting for follow-up.\par \par #ARIELLE with severe oxygen desaturation–reviewed the patient's initial data download which showed elimination of sleep disordered breathing when he uses his machine.  He has been more compliant after taking temazepam as prescribed by his oncologist.  He underwent an overnight pulse oximetry test while using his APAP machine was found to have significant oxygen desaturation consistent with nocturnal hypoventilation.  Discussed with the patient the utility of using APAP with oxygen versus bilevel ventilation.  At this time would suggest the patient undergoing bilevel titration with possible supplemental of oxygen.  Patient agrees to the plan.\par \par \par Will call the patient with the results once they are made available.  We will attempt to expedite the study.

## 2022-10-11 NOTE — HISTORY OF PRESENT ILLNESS
[Obstructive Sleep Apnea] : obstructive sleep apnea [Snoring] : snoring [Witnessed Apneas] : witnessed sleep apnea [Daytime Somnolence] : daytime somnolence [Date: ___] : Date of most recent diagnostic polysomnogram: [unfilled] [AHI: ___ per hour] : Apnea-hypopnea index:  [unfilled] per hour [T90%: ___] : T90%: [unfilled]% [Alexander desatuation%: ___] : Alexander desaturation:  [unfilled]% [CPAP: ___ cmH2O] : CPAP: [unfilled] cmH2O [FreeTextEntry1] : 72 yo M pmhx CAD s/p RCA stent (2007), former smoker (quit 2019), COPD (on supplemental O2), and stage 4 small cell lung cancer here for follow-up.  He underwent an overnight oximetry test while using his APAP machine.  He is here to review his results.\par \par Of note:\par Small cell lung cancer was dx'd 4/19, s/p carboplatin/etoposide and radiation with partial response, then on atezolizumab c/b pneumonitis in 10/2020 treated with high dose steroids c/b vertebral compression fractures, with small brain metastases 6/2020 s/p gamma knife radiation therapy in 7/2020. Was tapered off steroids for pneumonitis by 3/7/2021, and stopped Bactrim prophylaxis at that time), hx covid infection 3/21 and 7/22 referred by pulmonologist for difficulty tolerating CPAP. Pt reports having a sleep study done 3 months ago at [address] [place] [address], was diagnosed with mild sleep apnea AHI 8 but noted to be significantly hypoxemic overnight and started on a home CPAP machine. States that he has an O2 concentrator but lacks an adaptor. For the past 3 months, the patient reports sleep difficulty and difficulty tolerating the CPAP machine. He reports on an average day falling asleep at 10pm for 4-5 hours duration and a latency of 30 min. Usually takes temazepam 10mg to sleep. The patient reports on average being able to only tolerate the CPAP machine for about an hour due to discomfort in the airway, mask slipping, mechanical noise, and inquired about different fit options for the mask. The patient endorses daytime sleepiness, shortness of breath, and productive cough. Denying nocturnal awakenings, am dry mouth, headache, swelling of the extremities. The wife reports continued snoring and has witnessed apneas.\par  [Frequent Nocturnal Awakening] : no nocturnal awakening [Awakes with Headache] : no headache upon awakening [Awakening With Dry Mouth] : no dry mouth upon awakening [ESS] : 11

## 2022-10-13 NOTE — HISTORY OF PRESENT ILLNESS
[Disease: _____________________] : Disease: [unfilled] [T: ___] : T[unfilled] [N: ___] : N[unfilled] [de-identified] : The patient has a heavy smoking history who recently quit. He developed a worsening cough roughly 6 months ago followed by fatigue and insomnia roughly 5 months ago. He saw his PCP and was referred to pulmonary. He was referred for a CT chest for lung cancer screening which revealed a left upper lobe lung mass with associated lymphadenopathy and suspicious bony lesions. He was then sent for a PET/CT scan which have activity in these areas. He underwent a bronchoscopy with EBUS biopsy of bilateral mediastinal lymph nodes that were positive for small cell carcinoma.  Started first line systemic therapy with Carbo/Etoposide/Atezolizumab in May 2019.  Achieved VT.  Received 4 cycles completed early July 2019 followed by Atezolizumab maintenance since late July 2019.  Received consolidative Thoracic RT completed late Aug 2019.  He declined PCI.  Developed small brain metastases on Brain MRI in June 2020 and received GK-SRS for 2 small lesions in early July 2020. Patient hospitalized 10/21-11/2 with pneumonitis 2/2 immunotherapy and discharged on steroids.   Atezolizumab discontinued after last dose in late September 2020 due to development of pneumonitis and he has been monitored off systemic therapy since that time.  He has been tapered off Prednisone as of March 2021.   [de-identified] : -Lymph node 4L and 4R EBUS guided FNA 4/18/19: Positive for malignant cells. Small cell carcinoma. [de-identified] : Declined translation services. \par Patient has been off systemic therapy since late September 2020 when Atezolizumab was discontinued after developing pneumonitis.   He has been tapered off Prednisone as of March 2021.  Not currently on steroids.  Uses supplemental O2 as needed, mainly during the daytime.  \par He follows with PMR for management of vertebral compression fractures which are suspected to be secondary to osteoporosis.  He is on treatment with Alendronate and follows with Endocrine.  \par He overall reports doing well.  He follows with pulmonary and also with a sleep specialist for sleep apnea.\par \par Restaging Brain MRI July 2022 with sustained intracranial response.  \par Restaging CT C/A/P this month with a stable exam and no evidence of new or progressive disease.

## 2022-10-13 NOTE — PHYSICAL EXAM
[Ambulatory and capable of all self care but unable to carry out any work activities] : Status 2- Ambulatory and capable of all self care but unable to carry out any work activities. Up and about more than 50% of waking hours [Normal] : affect appropriate [de-identified] : No icterus  [de-identified] : MMM O/P clear [de-identified] : Supple No LAD  [de-identified] : Somewhat distant BS [de-identified] : S1 S2  [de-identified] : No edema  [de-identified] : No spine/CVA tenderness

## 2022-10-13 NOTE — ASSESSMENT
[FreeTextEntry1] : Extensive Stage Small cell lung cancer.  Started first line Carbo/Etoposide/Atezo in early May 2019, achieved AK.  Completed 4 cycles followed by Atezolizumab maintenance from July 2019.  Received consolidative Thoracic RT completed late August 2019.  He declined PCI.  Developed Brain metastases in June 2020 s/p GK-SRS in July 2020. Hospitalized at [facility] 10/21-11/2/20 for pneumonitis, felt to be secondary to immunotherapy.  Treated with steroids through early March 2021.  Monitored off systemic therapy since last treatment with maintenance Atezolizumab in late Sept 2020.  \par Surveillance/Restaging CT Oct 2022 with overall sustained response.  Recommend: \par -Continue to observe off treatment and monitor for progression of disease\par -Pulm f/u.  On supplemental O2 prn.  Utilizing CPAP machine.  F/U with sleep specialist. \par -F/U with endocrine for management of DM and osteoporosis\par -Osteoporotic compression fractures:  follows with PMR.  MRI L-spine June 2022 with non-malignant findings\par -Surveillance Brain MRI Juyl 2022 with sustained intracranial response; due for surveillance scan in Oct 2022 and f/u with Dr. Guerra (3-month MRI).    \par -Restaging/Surveillance CT C/A/P in 4 months (Feb 2023) and follow up to review results or sooner should problems arise\par -Information sheet given with directions for making appointments

## 2022-10-13 NOTE — RESULTS/DATA
[FreeTextEntry1] : -CT Chest 3/28/19:  \par 1. A left upper lobe spiculated nodule is concerning for primary lung cancer. \par 2. Enlarged chest lymph node measuring 16 mm. \par 3. Indeterminate sclerotic lesions of the right fourth rib and T6 vertebral body. \par \par -PET/CT 4/4/19:  FDG-PET/CT scan: \par 1. FDG avid spiculated left upper lobe lung nodule, worrisome for malignancy. \par 2. FDG avid AP window lymph node, suspicious for metastasis. \par 3. Indeterminate sclerotic right fourth rib and T6 vertebral body foci. \par \par -MRI Brain 5/5/19:  Small vessel white matter ischemic changes. No hemorrhage, mass or acute infarct. No abnormal enhancement to suggest brain metastases. Nonspecific left mastoid effusion. \par \par -MRI Thoracic Spine 5/5/19: Focus of low signal intensity on the T1 and T2-weighted images within the anterior T6 vertebral body corresponds to sclerosis on PET/CT and \par likely represents benign sclerosis or bone island. No evidence of lytic or blastic metastases. No abnormal signal or enhancement. \par \par -PET/CT 5/30/19:  Abnormal FDG-PET/CT scan: \par 1. Hypermetabolic spiculated left upper lobe pulmonary nodule in bilateral mediastinal and hilar lymph nodes are decreased in size and metabolism as compared to prior study from 4/4/2019, compatible with a partial response to interval therapy. Scattered calcified and noncalcified bilateral pulmonary nodules, unchanged. \par 2. Diffuse bone marrow hypermetabolism, increased as compared to prior study, likely is secondary to recent treatment with colony-stimulating factors. Small sclerotic densities in right fourth rib and body of T6 vertebra are unchanged. \par \par -CT Chest 7/12/19:  \par 1. In comparison with 5/30/2019, 1.5 cm left upper lobe spiculated nodule is decrease in size. No new or enlarging pulmonary nodule. \par 2. Stable subcentimeter mediastinal and hilar lymph nodes. \par \par -CT Chest 9/23/19: \par 1. The spiculated left upper lobe nodule is unchanged. \par 2. The AP window lymph node is unchanged. \par \par -MRI Brain 11/15/19:  No change from 5/5/2019. Microvascular disease. No abnormal enhancement to suggest brain metastases. \par \par -CT Chest 12/16/19:  Left upper lobe nodule minimally decreased in size since September 23, 2019.  Adjacent ill-defined opacities within the left upper lobe and the superior segment of the left lower lobe new since September 23, 2019 maybe sequela of radiation therapy. 3 month follow-up noncontrast chest CT can be performed for further evaluation. Small aortopulmonary window lymph node is unchanged since September 23, 2019. \par \par -CT Chest 3/13/20:  When compared with December 16, 2019: Left suprahilar 1.1 x 0.9 cm nodule is seen, slightly decreased in size when compared with prior. Focal areas of architectural distortion noted in the left upper lobe with additional linear opacities and groundglass opacity without change from prior and may reflect evolving posttreatment change. Multiple bilateral calcified nodules are identified, unchanged. \par \par -CT Chest 6/8/20:  Since 3/13/2020, the left upper lobe nodule and the paramediastinal opacities are unchanged. \par \par -MRI Brain 6/24/20: Enhancing lesions are identified consistent with metastasis given patient's history.\par \par -CT A/P 7/15/20: No evidence of abdominal metastasis. Stable transverse colon lipoma. \par \par -CT Chest 8/31/20:  Emphysema is present. Left upper lobe ill-defined nodular/linear opacity is similar compared to the prior study. Other bilateral patchy lung opacities are new from the prior study. These are indeterminate and may be infectious/inflammatory. 1 month follow-up CT needed for complete evaluation. \par \par -CT Chest 10/19/20:  Widespread bilateral opacities throughout both lungs with significant interval progression since August 31, 2020 suggestive of infectious or inflammatory etiology. Findings may be due to Covid 19 pneumonia.  Left upper lobe nodular opacity with minimal if any interval increase in size since August 31, 2020 likely related to the adjacent acute process.\par \par -CT Angio Chest 10/21/20: No pulmonary embolus. Interval increase of diffuse bilateral peribronchovascular opacities with may represent pneumonitis or infection.\par \par -MRI Brain 12/11/20:  Previously seen left temporal occipital lobe lesion is significantly smaller in size.  Previously seen left frontal lobe lesion is no longer visualized.\par \par -LE Dopplers 12/24/20:  No evidence of deep venous thrombosis in either lower extremity.\par \par -CT Chest 12/28/20:  When compared with October 21, 2020:  Peribronchovascular groundglass abnormality and consolidations consistent with pneumonitis has resolved.  Unchanged left upper lobe 1.4 cm spiculated nodule in keeping with known malignancy. Follow-up recommended to assess for change.\par \par -XR Spine 1/18/21: Age indeterminate mild to moderate wedge-shaped anterior compression deformity of L1 superior endplate and to more subtle extent L2 superior endplate, however new from prior, correlate clinically with MRI suggested to further assess if warranted.\par Redemonstrated mid and lower lumbar predominant disc space narrowing with small disc margin osteophytes.\par Slight L2 on L3 and L3 on L4 retrolistheses however without associated spondylolysis defects. Remaining vertebral body alignment maintained.\par Unremarkable SI joints and partially visualized hips.\par Generalized osteopenia otherwise no discrete lytic or blastic lesions.\par Atherosclerotic abdominal aortic calcifications.\par \par -Bone density 1/25/21: Bone density is osteopenic. Patient is at moderate risk for fracture.\par \par -MRI L-Spine 2/19/21:  \par 1. Multiple compression fractures including T12-L4 Favor multiple osteoporotic compression fractures. New compared to prior CT dated 7/15/2020..\par 2. Suspected T10 fracture, incompletely imaged on this study. Dedicated thoracic spine MRI can be performed for further evaluation.\par 3. Multilevel lumbar spondylosis, most notable with contact on the left exited L3, descending L4, exiting L4 and descending L5 nerves. Spinal canal stenosis and foraminal narrowing, as described.\par \par -MRI T-Spine 3/3/21:  \par 1. Subacute wedge compression fractures of the T10 and T12 vertebral bodies.\par 2. Chronic wedge compression deformity of the L1 vertebral body.\par 3. Mild bilateral neural foraminal narrowing at the T9-T10 and T10-T11 levels, as described above.\par \par -CT C/A/P 3/22/21:  Compared with CT Chest 12/28/20 and CT A/P 7/15/20:  Slightly increased left upper lobe spiculated lesion, 1.7cm vs 1.4cm previously.  Redemonstration of multifocal chronic lung disease.  Increasing endplate compressions and heterogeneous density involving the thoracolumbar vertebral bodies. Cannot exclude possibility of metastases.\par \par -MRI Brain 4/12/21:  Previously noted enhancing lesions are no longer identified. Continued close interval follow-up is recommended.\par \par -CT C/A/P 6/11/21:  Unchanged spiculated left upper lobe mass.  Left upper lobe subpleural cysts and opacities unchanged from 03/22/2021.  Emphysema.  Scattered groundglass pulmonary opacities unchanged from 03/22/2021.  Compression deformity T10 with sclerotic change similar to 3/22/21.\par \par -CT L-Spine 6/29/21:  Unchanged compression deformities of the T12-L3 vertebral bodies. No destructive lesions are identified. Multilevel degenerative changes with disc herniations at L3-4 and L4-5 \par \par -MRI Brain 7/15/21:  No significant change when allowing for differences in technique.\par \par -CT C/A/P 9/10/21:  Since 6/11/2021:  Stable dominant central left upper lobe nodule.  More conspicuous small tubular nodule in the anterior left upper lobe could be mucoid impaction. 3 month follow-up is recommended.  New sclerotic lesion of the left third rib. Stable sclerosis and loss of height of the T10 vertebral body.  No new finding in the abdomen or pelvis.\par \par -MRI Brain 10/15/21:  Posttreatment changes. No new or enlarging intracranial lesions identified.\par \par –CT C/A/P 12/2/21:  Stable size of a spiculated left upper lobe pulmonary nodule with no new nodules noted.  No evidence for metastatic disease in the abdomen and pelvis.\par \par –MRI Brain 1/18/22:  5 x 4 mm focus of enhancement in the left posterior temporal lobe is similar in size. The lesion currently demonstrates peripheral enhancement, previously the lesion enhanced in a more solid fashion. Increased mild edema surrounding the lesion likely reflecting posttreatment changes.  No new abnormal parenchymal enhancement. No abnormal leptomeningeal enhancement.\par \par -CT C/A/P 3/10/22:  Stable exam including treated left upper lobe pulmonary nodule.\par \par – MRI brain 4/26/2022: Negative for metastatic disease.\par \par –CT C/A/P 6/13/22:  Stable 1.3 cm left upper lobe nodule.  No evidence of new or progressive disease.\par \par -MRI L-Spine 6/13/22:  \par 1. No metastatic disease is appreciated.\par 2. Chronic T12 through L3 superior endplate compression deformities appear relatively stable.\par 3. Multilevel degenerative disc disease is mostly stable. Left L4-L5 paracentral to foraminal disc protrusion is decreased in size from the prior.\par 4. L2-L3 and L3-L4 demonstrate moderate spinal canal stenosis.\par 5. L4-L5 demonstrate mild spinal canal stenosis with severe narrowing of the left lateral recess.\par 6. There is variable neural foraminal stenosis including moderate to severe left neural foraminal stenosis at L4-L5.\par \par Images Reviewed/Interpreted:\par \par -MRI Brain 7/29/22:  Age-appropriate involutional and ischemic gliotic changes. No acute infarcts, hemorrhage or mass. No abnormal enhancement.  No change since 4/26/2022.\par \par -CT C/A/P 10/3/22:  Stable exam. Stable 1.3 cm left upper lobe nodule. Stable 1.1 cm right adrenal nodule.\par \par

## 2022-10-27 DIAGNOSIS — G47.33 OBSTRUCTIVE SLEEP APNEA (ADULT) (PEDIATRIC): ICD-10-CM

## 2022-11-09 ENCOUNTER — APPOINTMENT (OUTPATIENT)
Dept: ENDOCRINOLOGY | Facility: CLINIC | Age: 72
End: 2022-11-09

## 2022-11-09 VITALS
HEART RATE: 75 BPM | SYSTOLIC BLOOD PRESSURE: 168 MMHG | BODY MASS INDEX: 30.12 KG/M2 | WEIGHT: 170 LBS | OXYGEN SATURATION: 95 % | HEIGHT: 63 IN | DIASTOLIC BLOOD PRESSURE: 81 MMHG

## 2022-11-09 LAB — HBA1C MFR BLD HPLC: 6.3

## 2022-11-09 PROCEDURE — 99214 OFFICE O/P EST MOD 30 MIN: CPT | Mod: 25

## 2022-11-09 PROCEDURE — 83036 HEMOGLOBIN GLYCOSYLATED A1C: CPT | Mod: QW

## 2022-11-09 RX ORDER — FLASH GLUCOSE SENSOR
KIT MISCELLANEOUS
Qty: 1 | Refills: 0 | Status: COMPLETED | COMMUNITY
Start: 2020-11-03 | End: 2022-11-09

## 2022-11-10 ENCOUNTER — APPOINTMENT (OUTPATIENT)
Dept: SLEEP CENTER | Facility: CLINIC | Age: 72
End: 2022-11-10

## 2022-11-10 ENCOUNTER — APPOINTMENT (OUTPATIENT)
Dept: NEPHROLOGY | Facility: CLINIC | Age: 72
End: 2022-11-10

## 2022-11-10 VITALS
HEART RATE: 76 BPM | BODY MASS INDEX: 29.77 KG/M2 | HEIGHT: 63 IN | OXYGEN SATURATION: 95 % | DIASTOLIC BLOOD PRESSURE: 80 MMHG | SYSTOLIC BLOOD PRESSURE: 160 MMHG | WEIGHT: 168 LBS

## 2022-11-10 PROCEDURE — 99205 OFFICE O/P NEW HI 60 MIN: CPT

## 2022-11-10 RX ORDER — DICLOFENAC SODIUM 75 MG/1
75 TABLET, DELAYED RELEASE ORAL
Qty: 60 | Refills: 0 | Status: DISCONTINUED | COMMUNITY
Start: 2022-04-28 | End: 2022-11-10

## 2022-11-10 RX ORDER — INHALER, ASSIST DEVICES
SPACER (EA) MISCELLANEOUS
Qty: 1 | Refills: 5 | Status: DISCONTINUED | COMMUNITY
Start: 2021-10-05 | End: 2022-11-10

## 2022-11-10 RX ORDER — FLUTICASONE PROPIONATE 50 UG/1
50 SPRAY, METERED NASAL
Qty: 16 | Refills: 3 | Status: DISCONTINUED | COMMUNITY
Start: 2019-11-25 | End: 2022-11-10

## 2022-11-10 RX ORDER — ISOPROPYL ALCOHOL 70 ML/100ML
SWAB TOPICAL
Qty: 1 | Refills: 5 | Status: DISCONTINUED | COMMUNITY
Start: 2020-06-16 | End: 2022-11-10

## 2022-11-10 RX ORDER — FEXOFENADINE HYDROCHLORIDE 60 MG/1
60 TABLET, FILM COATED ORAL
Refills: 0 | Status: DISCONTINUED | COMMUNITY
Start: 2022-08-04 | End: 2022-11-10

## 2022-11-10 NOTE — HISTORY OF PRESENT ILLNESS
[FreeTextEntry1] : 49 yr old M with metastatic Lung Cancer CAD , DM type II,  HLD and COPD here for initial evaluation of hypomagnesemia. \par Pt has a history of heavy smoking and  quit 3 years ago after being diagnosed with a left lung mass and associated lymphadenopathy and suspicious bony lesions. HHe underwent a bronchoscopy with EBUS biopsy of bilateral mediastinal lymph nodes that were positive for small cell carcinoma. Started first line systemic therapy with Carbo/Etoposide/Atezolizumab in May 2019. Achieved NM.  Received 4 cycles completed early July 2019 followed by Atezolizumab maintenance since late July 2019. Received consolidative Thoracic RT completed late Aug 2019.  Developed small brain metastases on Brain MRI in June 2020 and received GK-SRS for 2 small lesions in early July 2020. Patient hospitalized 10/21-11/2 with pneumonitis 2/2 immunotherapy and discharged on steroids. Atezolizumab discontinued after last dose in late September 2020 due to development of pneumonitis and he has been monitored off systemic therapy since that time. He has been tapered off Prednisone as of March 2021. \par Uses supplemental O2 as needed, mainly during the daytime. \par \par Pt also has a h/o DM- HbA1c ~ 6.7\par He was on metformin and januvia in the past- Januvia discontinued and he was started on Trulicty 1.5 mg subq weekly metformin 1g BID. Denies retinopathy/ neuropathy. Also denies foamy urine/ hematuria\par Reports daily diarrhea about 3-5 times x day\par Metformin was decreased to 500 mg bid and Trulicity increased to 3 mg.\par \par Denies diuretics/ PPI use.\par \par Originally from [place]\par Ex smoker\par \par Daughter is a NP\par \par \par \par \par \par \par

## 2022-11-10 NOTE — ASSESSMENT
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 from Mg supplements vs Metformin likely further exacerbating Mg++ losses through GI tract\par Will discuss with Endo if Metformin can be stopped and assess response\par increase MgO to 400 mg bid \par \par RTC in 3 months \par \par \par \par \par \par

## 2022-11-10 NOTE — PHYSICAL EXAM
[General Appearance - Alert] : alert [Sclera] : the sclera and conjunctiva were normal [Hearing Threshold Finger Rub Not [place]] : hearing was normal [Auscultation Breath Sounds / Voice Sounds] : lungs were clear to auscultation bilaterally [Heart Sounds] : normal S1 and S2 [Edema] : there was no peripheral edema [Abdomen Soft] : soft [No CVA Tenderness] : no ~M costovertebral angle tenderness [Abnormal Walk] : normal gait [] : no rash [Motor Exam] : the motor exam was normal [Impaired Insight] : insight and judgment were intact [Affect] : the affect was normal [Mood] : the mood was normal

## 2022-11-17 ENCOUNTER — APPOINTMENT (OUTPATIENT)
Dept: MRI IMAGING | Facility: CLINIC | Age: 72
End: 2022-11-17

## 2022-11-17 ENCOUNTER — OUTPATIENT (OUTPATIENT)
Dept: OUTPATIENT SERVICES | Facility: HOSPITAL | Age: 72
LOS: 1 days | End: 2022-11-17
Payer: MEDICARE

## 2022-11-17 DIAGNOSIS — Z90.89 ACQUIRED ABSENCE OF OTHER ORGANS: Chronic | ICD-10-CM

## 2022-11-17 DIAGNOSIS — Z95.5 PRESENCE OF CORONARY ANGIOPLASTY IMPLANT AND GRAFT: Chronic | ICD-10-CM

## 2022-11-17 DIAGNOSIS — D11.0 BENIGN NEOPLASM OF PAROTID GLAND: Chronic | ICD-10-CM

## 2022-11-17 DIAGNOSIS — Z00.8 ENCOUNTER FOR OTHER GENERAL EXAMINATION: ICD-10-CM

## 2022-11-17 DIAGNOSIS — N20.0 CALCULUS OF KIDNEY: Chronic | ICD-10-CM

## 2022-11-17 PROCEDURE — 70553 MRI BRAIN STEM W/O & W/DYE: CPT | Mod: 26

## 2022-11-17 PROCEDURE — 70553 MRI BRAIN STEM W/O & W/DYE: CPT

## 2022-11-17 PROCEDURE — A9585: CPT

## 2022-11-17 NOTE — PHYSICAL EXAM
[Alert] : alert [No Acute Distress] : no acute distress [Normal Sclera/Conjunctiva] : normal sclera/conjunctiva [No Proptosis] : no proptosis [Normal Outer Ear/Nose] : the ears and nose were normal in appearance [Normal Hearing] : hearing was normal [No Neck Mass] : no neck mass was observed [No Respiratory Distress] : no respiratory distress [Normal S1, S2] : normal S1 and S2 [Normal Rate] : heart rate was normal [Not Tender] : non-tender [Soft] : abdomen soft [Normal Gait] : normal gait [No Rash] : no rash [No Tremors] : no tremors [Oriented x3] : oriented to person, place, and time [Normal Affect] : the affect was normal [Normal Mood] : the mood was normal [Foot Ulcers] : no foot ulcers [Acne] : no acne

## 2022-11-17 NOTE — HISTORY OF PRESENT ILLNESS
[FreeTextEntry1] : 72 yr old M with metastatic Lung CA, CAD and COPD here for f/u visit for Type 2 DM\par Now on Home O2 \par A1C is 6.3\par He was on metformin and januvia in the past\par Current regimen:Trulicity 1.5 mg subq weekly and metformin 1g BID\par He is having diarrhea which he thinks is related to metformin\par Novolog and basaglar have been discontinued\par He moderates his intake of carbs, drinks crystal light and coke zero\par Saw optho 2-3 months ago- no retinopathy\par No neuropathy\par For HTG, takes fish oil 500 mg 1 X day, rosuvastatin 20mg daily, Niacin 2 tablets at night\par He uses a [employer] for glucose monitoring:\par \par \par \par

## 2022-11-17 NOTE — ASSESSMENT
[FreeTextEntry1] : 72 yr old M with metastatic Lung CA, CAD and COPD here with T2DM\par 1) T2DM A1C 6.3%\par Counselled on diet and exercise modification/importance of glycemic control/Hypoglycemia mgmt\par Reduce metformin 500mg ER 1 tab twice a day and increase Trulicity 3.0 mg subq weekly\par Continue glucose monitoring with Soiel\par Patient requires use of CGM for alerts for hypo/hyperglycemia and frequent monitoring of glucose for medication adjustment\par UTD with ophtho\par \par 2) HTN\par Urine Micro/Creat Ratio normal Aug 2022\par Pt is on Lisinopril 20mg daily\par \par 3) HLD\par LDL 67 Sept 2022\par Cont crestor  niacin, fish oil\par \par \par 4) Osteoporosis (osteopenia with compression fractures)\par Mgmt per medicine\par Cont alendronate \par Plan for repeat DEXA Jan 2023\par \par \par

## 2022-11-18 ENCOUNTER — APPOINTMENT (OUTPATIENT)
Dept: INTERNAL MEDICINE | Facility: CLINIC | Age: 72
End: 2022-11-18

## 2022-11-18 PROCEDURE — G0008: CPT | Mod: 59

## 2022-11-18 PROCEDURE — 90662 IIV NO PRSV INCREASED AG IM: CPT

## 2022-11-18 PROCEDURE — 90715 TDAP VACCINE 7 YRS/> IM: CPT

## 2022-11-18 PROCEDURE — 90471 IMMUNIZATION ADMIN: CPT

## 2022-11-21 ENCOUNTER — APPOINTMENT (OUTPATIENT)
Dept: PULMONOLOGY | Facility: CLINIC | Age: 72
End: 2022-11-21

## 2022-11-21 VITALS
HEART RATE: 75 BPM | BODY MASS INDEX: 19.21 KG/M2 | RESPIRATION RATE: 17 BRPM | OXYGEN SATURATION: 90 % | SYSTOLIC BLOOD PRESSURE: 154 MMHG | HEIGHT: 78 IN | DIASTOLIC BLOOD PRESSURE: 78 MMHG | WEIGHT: 166 LBS | TEMPERATURE: 97 F

## 2022-11-21 DIAGNOSIS — G47.34 IDIOPATHIC SLEEP RELATED NONOBSTRUCTIVE ALVEOLAR HYPOVENTILATION: ICD-10-CM

## 2022-11-21 PROCEDURE — 99214 OFFICE O/P EST MOD 30 MIN: CPT

## 2022-11-21 NOTE — REASON FOR VISIT
[Follow-Up] : a follow-up visit [Patient Declined  Services] : - None: Patient declined  services [Interpreters_Relationshiptopatient] : Wife [TWNoteComboBox1] : French

## 2022-11-21 NOTE — PHYSICAL EXAM
[No Acute Distress] : no acute distress [Well Nourished] : well nourished [Well Groomed] : well groomed [No Deformities] : no deformities [Well Developed] : well developed [Normal Oropharynx] : normal oropharynx [Normal Appearance] : normal appearance [No JVD] : no jvd [Normal Rate/Rhythm] : normal rate/rhythm [Normal Pulses] : normal pulses [Normal S1, S2] : normal s1, s2 [No Resp Distress] : no resp distress [No Acc Muscle Use] : no acc muscle use [Normal Rhythm and Effort] : normal rhythm and effort [Clear to Auscultation Bilaterally] : clear to auscultation bilaterally [Benign] : benign [Normal Gait] : normal gait [No Clubbing] : no clubbing [No Cyanosis] : no cyanosis [No Edema] : no edema [FROM] : FROM [Normal Color/ Pigmentation] : normal color/ pigmentation [No Focal Deficits] : no focal deficits [Oriented x3] : oriented x3 [Normal Mood] : normal mood [Normal Affect] : normal affect [TextBox_11] : anicteric, EOMI, MMM, trachea midline, no thrush [TextBox_68] : good air entry, prolonged expiratory phase but otherwise clear lungs

## 2022-11-21 NOTE — ASSESSMENT
[FreeTextEntry1] : 72M extensive history including DM2, CAD, COPD, and small cell lung cancer presenting for followup pulmonary evaluation. \par \par 1. Allergy Symptoms and Postnasal drip\par - Continue Allegra\par - Will start a trial of Astelin and monitor for improvement\par \par 2.  Obstructive Sleep Apnea - patient recently found to have ARIELLE and CPAP titration indicated CPAP 11 and nocturnal hypoxemia\par - Now tells me he is using supplemental O2 with CPAP at night\par -  Followup with Dr. Leslie. \par \par 3. Small Cell Lung Cancer - with metastatic disease. Followup with Dr. Ramirez\par - patient has received radiation therapy - f/u with Dr. Guerra\par - Currently off cancer directed therapies\par - Pneumonitis from 2020 resolved\par - Repeat CT chest reviewed (10/3/22) - with stable PATRICIO 1.3 cm nodule and stable 1.1 cm right adrenal nodule\par \par 4. COPD - patient is a long term former smoker. He will continue Symbicort and use Ventolin as needed.\par - PFTs and 6MWT done 2/2022. PFTs relatively stable - no BD testing in setting of COVID-19 pandemic. On prior PFTs patient had significant BD response\par - No evidence of desaturation during 6MWT with 2L O2 - with improvement in distance\par - continue yearly followup\par \par 5. Chronic Hypoxemic Respiratory Failure - patient currently on 2L supplemental O2. Continue to maintain O2 sats > 90% as able.\par - Can start to wean down FiO2 as able with goal O2 sat > 90%. \par - Now requiring O2 with PAP therapy at night \par \par 6. Health Maintenance\par Spirometry: reviewed\par Smoking status: Former \par Pneumococcal vaccination status: Completed \par [place] Sleepiness Scale: 0 (9/30/21)\par \par The above plan was discussed with EVER CHAVEZ and his wife in detail. Patient verbalized understanding and agrees with plan as detailed above. Patient was provided education and counselling on current diagnosis/symptoms, diagnostic work up, treatment options and potential side effects of any prescribed therapy/therapies. EVER  was advised to call our clinic at [phone] for any new or worsening symptoms, or with any questions or concerns. In case of acute onset of respiratory symptoms or worsening presentation, patient was advised to present to nearest emergency room for further evaluation. EVER  expressed understanding and all questions/concerns were addressed.\par \par

## 2022-11-21 NOTE — REVIEW OF SYSTEMS
[Nasal Congestion] : nasal congestion [Postnasal Drip] : postnasal drip [Sinus Problems] : sinus problems [SOB on Exertion] : sob on exertion [GERD] : gerd [Diarrhea] : diarrhea [Diabetes] : diabetes [Fever] : no fever [Fatigue] : no fatigue [Chills] : no chills [Cough] : no cough [Hemoptysis] : no hemoptysis [Sputum] : no sputum [Wheezing] : no wheezing [Chest Discomfort] : no chest discomfort [Edema] : no edema [Angioedema] : no angioedema [Abdominal Pain] : no abdominal pain [Nausea] : no nausea [Vomiting] : no vomiting [Back Pain] : no back pain [Rash] : no rash [Easy Bruising] : no easy bruising [Focal Weakness] : no focal weakness [Seizures] : no seizures [Depression] : no depression [Anxiety] : no anxiety

## 2022-11-21 NOTE — HISTORY OF PRESENT ILLNESS
[Former] : former [Never] : never [Wheezing] : wheezing [Nasal Passage Blockage (Stuffiness)] : edema [Nonspecific Pain, Swelling, And Stiffness] : chest pain [Fever] : fever [Cough] : coughing [Difficulty Breathing During Exertion] : dyspnea on exertion [Feelings Of Weakness On Exertion] : exercise intolerance [Continuous] : Continuous [NC] : Nasal Cannula [24 hrs] : 24 hours/day [0.5  -  Very, very slight] : 0.5, very, very slight [Obstructive Sleep Apnea] : obstructive sleep apnea [Nonrestorative Sleep] : nonrestorative sleep [Snoring] : snoring [CPAP:] : CPAP [TextBox_4] : 72M DM2, HTN, CAD s/p PCI (RCA 2007), HLD, and COPD and lung cancer presenting for followup pulmonary evaluation. He is doing well today and presents to the office with his wife\par \par - Stable from a pulmonary standpoint - and uses O2 with exertion and as needed at home\par - Found to have nocturnal hypoxemia with PAP therapy - and now using O2 with CPAP. Was recommended for BIPAP titration but opted against this for now. Follows with Dr. Leslie\par - Denies cough or sputum production\par - Is having runny nose and allergy symptoms - on Allegra and PRN Flonase\par - Remains on 2L O2 with activity - is 91-93% on RA at rest in the office without difficulty\par - Walking about 0.5 miles per day - but limited by cold recently\par - Remains on bronchodilator (Symbicort)\par - CT chest from 10/3/22 - reviewed - stable PATRIICO 1.3 cm nodule and 1.1 R adrenal nodule\par - Metformin dose recently decreased due to diarrhea and Trulicity increased - diarrhea still persists\par \par \par PFTs: 2/25/22 - FEV1 2.28L (84%), FVC 3.58L (99%), FEV1/FVC 64%, FEF 25-75 57%, TLC 97%, RV/TLC 32%, ERV 47%, DLCO 64%\par PFTs: 12/22/20 - FEV1 2.62L (95%), FVC 3.98L (109%), FEV1/FVC 66%, FEF 25-75 65%, %, RV/TLC 37%, DLCO 57%\par \par 6MWT: 2/25/22 - 386 meters - 2L O2\par 6MWT: 12/22/20 - 331 meters - 4L O2\par \par He is a long term former smoker but quit at the time of his lung cancer diagnosis. He has known obstructive lung disease with a bronchodilator response. He was switched from Breo to Symbicort while in the hospital. He has a Ventolin inhaler and uses it intermittently. He developed pneumonitis from treatment of Small Cell which required hospitalization and then treatment with a prolonged course of steroids. He did develop COVID-19 but fortunately did not require hospitalization.\par \par He was diagnosed with small cell lung cancer 4/2019 and then started on chemotherapy and radiation. He initially underwent Bronchoscopy/EBUS with Dr. Martinez which revealed that bilateral mediastinal LN were positive for small cell carcinoma. He was started on chemotehrapy at that time in May 2019 and achieved partial response. He was then started on maintenance Atezolizumab. He was offered prophylactic cranial irradiation but declined initially. He developed small brain mets in June 2020 and received GK-SRS with Dr. Guerra  Radiation Oncology. He had a CT chest 8/31/2020 that showed a small stable upper lobe opacity. His repeat CT chest done 10/2020 was suggestive of pneumonitis. He had a repeat CT chest done 12/28/2020 which showed improvement. [FreeTextEntry1] : 1-4 [Difficulty Maintaining Sleep] : does not have difficulty maintaining sleep [Witnessed Apneas] : no witnessed apneas [TextBox_100] : 11/8/21 [TextBox_108] : 8.2 [TextBox_112] : 96.6 [TextBox_116] : 76 [TextBox_104] : 2/5/22 [TextBox_131] : 11 [TextBox_127] : 5/22 [TextBox_129] : 6/20/22 [TextBox_133] : 63 (19/30) [TextBox_137] : 7 (2/30) [TextBox_141] : 1 [TextBox_143] : 42 [TextBox_147] : 1.1 [ESS] : 0 [TextBox_12] : 12/28/2020 - INTERPRETATION:  Reason for Exam: Left upper lobe lung cancer\par \par CT of the chest was performed from the thoracic inlet to the level of the adrenal glands following IV contrast injection of  50 cc of Omnipaque 350. No immediate complications were reported.\par \par Comparison: October 21, 2020\par \par Tubes/Lines: None\par \par Mediastinum and Heart: Aorta and pulmonary arteries are normal in size. No pericardial effusion. Multiple calcified lymph nodes in the mediastinum are unchanged since prior. Coronary artery calcifications are noted.. Thyroid gland appears unremarkable.\par \par Lungs, Pleura, and Airways: Again seen is a left upper lobe spiculated nodule which measures approximately 1.4 cm which is unchanged since previous exam. Previously seen areas of bilateral peribronchovascular groundglass abnormalities and consolidations have essentially resolved. Minimal residual peribronchial vascular and linear scarring with traction bronchiolectasis noted. Focal area of distortion in the right middle lobe with calcification and traction bronchiectasis is stable.\par \par Visualized Abdomen: Postcontrast appearance of the upper abdomen is unremarkable.\par \par Bones and soft tissues: Unremarkable.\par \par \par IMPRESSION:\par \par When compared with October 21, 2020:\par \par Peribronchovascular groundglass abnormality and consolidations consistent with pneumonitis has resolved.\par \par Unchanged left upper lobe 1.4 cm spiculated nodule in keeping with known malignancy. Follow-up recommended to assess for change.\par  [TextBox_27] : 12/2/21 -  CT Chest w/ IV Cont \par FINDINGS:\par CHEST:\par LUNGS AND LARGE AIRWAYS: Patent central airways. Emphysema. Spiculated left upper lobe nodule measures 1.3 x 1.1 cm, stable. Stable scattered areas of linear type scarring and atelectasis noted throughout the lungs. Stable 6 mm ovoid opacity in the left upper lobe anteriorly, possibly scarring. Lower lobe stable bronchiectasis. Stable bilateral calcified granulomas.\par PLEURA: No pleural effusion.\par VESSELS: Atherosclerotic changes of the aorta and coronary arteries.\par HEART: Heart size is normal. No pericardial effusion.\par MEDIASTINUM AND SANDI: Calcified mediastinal and right hilar lymph nodes are stable..\par CHEST WALL AND LOWER NECK: Within normal limits.\par \par ABDOMEN AND PELVIS:\par LIVER: Within normal limits.\par BILE DUCTS: Normal caliber.\par GALLBLADDER: Cholelithiasis.\par SPLEEN: Within normal limits.\par PANCREAS: Within normal limits.\par ADRENALS: Stable 1.1 cm right adrenal indeterminate nodule. Adrenal gland within normal limits.\par KIDNEYS/URETERS: Within normal limits.\par \par BLADDER: Within normal limits.\par REPRODUCTIVE ORGANS: TURP defect..\par \par BOWEL: Stable lipoma of the transverse colon. Diverticulosis coli. No bowel obstruction. Appendix is normal.\par PERITONEUM: No ascites.\par VESSELS: Atherosclerotic changes.\par RETROPERITONEUM/LYMPH NODES: No lymphadenopathy.\par ABDOMINAL WALL: Tiny fat-containing umbilical hernia..\par BONES: Chronic compression deformities of T10 and T12-L3. Increased sclerosis of the T10 vertebral body, stable. Stable punctate sclerotic focus in the inferior left scapula. Stable vague sclerosis in the right anterior fourth rib.\par \par IMPRESSION:\par Stable size of a spiculated left upper lobe pulmonary nodule with no new nodules noted.\par No evidence for metastatic disease in the abdomen and pelvis.\par \par --- End of Report -- [TextBox_42] : 10/3/22 - FINDINGS: The quality of the images are degraded by respiratory motion.\par CHEST:\par LUNGS AND LARGE AIRWAYS: Patent central airways. Emphysema. This table bilateral areas of bandlike atelectasis/scarring within the lung bases. Stable 1.3 cm right upper lobe nodule. Stable scattered calcified nodules. No new or enlarging nodule.\par PLEURA: No pleural effusion.\par VESSELS: Coronary and aortic calcifications.\par HEART: Heart size is normal. No pericardial effusion.\par MEDIASTINUM AND SANDI: Stable nonenlarged calcified hilar and mediastinal lymph nodes.\par CHEST WALL AND LOWER NECK: Within normal limits.\par \par ABDOMEN AND PELVIS:\par LIVER: Within normal limits.\par BILE DUCTS: Normal caliber.\par GALLBLADDER: Within normal limits.\par SPLEEN: Within normal limits.\par PANCREAS: Within normal limits.\par ADRENALS: Stable 1.1 cm right adrenal nodule.\par KIDNEYS/URETERS: Within normal limits.\par \par BLADDER: Severely urinary bladder wall thickening.\par REPRODUCTIVE ORGANS: Prostate within normal limits.\par \par BOWEL: No bowel obstruction. Appendix is normal. Sigmoid colon diverticulosis.\par PERITONEUM: No ascites.\par VESSELS: Atherosclerotic changes.\par RETROPERITONEUM/LYMPH NODES: No lymphadenopathy.\par ABDOMINAL WALL: Within normal limits.\par BONES: Stable small sclerotic lesion within T6 vertebral body. Stable multilevel compression deformity of the spine most severe at T10.\par \par IMPRESSION:\par Stable exam. Stable 1.3 cm left upper lobe nodule. Stable 1.1 cm right adrenal nodule.

## 2022-11-23 ENCOUNTER — APPOINTMENT (OUTPATIENT)
Dept: RADIATION ONCOLOGY | Facility: CLINIC | Age: 72
End: 2022-11-23

## 2022-11-23 ENCOUNTER — NON-APPOINTMENT (OUTPATIENT)
Age: 72
End: 2022-11-23

## 2022-11-23 VITALS
WEIGHT: 169.53 LBS | OXYGEN SATURATION: 92 % | SYSTOLIC BLOOD PRESSURE: 163 MMHG | HEART RATE: 71 BPM | DIASTOLIC BLOOD PRESSURE: 94 MMHG | RESPIRATION RATE: 17 BRPM | HEIGHT: 63 IN | TEMPERATURE: 96.98 F | BODY MASS INDEX: 30.04 KG/M2

## 2022-11-23 PROCEDURE — 99214 OFFICE O/P EST MOD 30 MIN: CPT

## 2022-11-23 NOTE — HISTORY OF PRESENT ILLNESS
[FreeTextEntry1] : Mr. Cinthya Ness is a 71 yo male with extensive stage small cell lung cancer diagnosed in 4/2019 s/p carbo/ etoposide/ atezolizumab and consolidative RT 8/2019 followed by adjuvant atezolizumab. He was found to have two sub-cm brain metastases treated on 7/1/2020 with GK SRS (L_Frontal, L_Parietal). Patient presents for follow up. \par \par ONCOLOGY HISTORY\par Presented as a 69 year old male with a history of small cell lung cancer.  This was initially diagnosed in 4/2019 through an ebus of bilateral level 4 lymph nodes in 4/2019 after presenting with 6 months of worsening cough, fatigue, and insomnia. A CT chest showed a lung mass at that time. He was initially treated with carbo/etoposide/atezoluzimab starting in 5/2019 with 4 cycles completed in 7/2019, followed by atezolizumab maintenance . Consolidative RT completed in 8/2019. He elected against prophylactic whole brain radiation. \par \par Due to frequent headaches, forgetfulness, and reflexes bring worse, a brain MRI was ordered on 6/10/2020. \par \par This brain MRI revealed two brain lesions consistent with metastases. \par 1: left posterior temporal/occipital cortex,  approximately 1.4 x 1.2 cm. \par 2:  high medial left frontal cortex, approximately 0.7 x 0.6 cm Small amount of surrounding edema is identified. \par \par At the time of initial consult he endorsed occassional headaches, manageable.  Denied focal weakness, nausea, vomiting, or other complaints.  Maintains an excellent KPS.  His daughter, a physician, is serving as the  during this conversation.\par \par He was treated with GK SRS to the two lesions on 7/1/2020\par \par 9/17/2020- Mr. Ness presents today for follow up. He is seen with the assistance of [facility]  [id number]. MRI brain done 9/11/2020 showed maarked improvement and treatment response since prior exam. Previously visualized enhancing lesion in the left posterior temporal lobe is markedly decreased in size since prior exam, measuring approximately 0.6 x 0.6 cm, and previously measured 1.4 x 1.2 cm. There is no vasogenic edema surrounding this lesion. Previously visualized enhancing lesion in the high medial left frontal cortex, is markedly decreased in size since prior exam, nearly nonexistent measuring 0.1 cm, and previously measuring 0.7 x 0.6 cm. Minimal surrounding vasogenic edema remains. No new enhancing lesions are identified. \par \par He continues following with Dr. Ramirez. continues on atezoluzumab maintenance. CT chest 8/31/2020 showed Emphysema is present. Left upper lobe ill-defined nodular/linear opacity is similar compared to the prior study. Other bilateral patchy lung opacities are new from the prior study. These are indeterminate and may be infectious/inflammatory. 1 month follow-up CT needed for complete evaluation. \par \par Today he feels very well. Notes some headaches over the last week, not lasting long. Denies nausea, vomiting, focal weakness, Overall feeling well. Hearing may be a little worse recently.\par \par 12/22/2020- Mr. Ness presents today for follow up. [facility]  [id number] used for visit today.  MRI brain 12/11/2020 showed previously seen left temporal occipital lobe lesion is significantly smaller in size.  Previously seen left frontal lobe lesion is no longer visualized.  Continued follow-up is recommended.\par \par CT Chest 10/19/2020 showed widespread bilateral opacities throughout both lungs with significant interval progression since August 31, 2020 suggestive of infectious or inflammatory etiology. Findings may be due to Covid 19 pneumonia.\par \par Atezoluzumab discontinued this fall due to hospitalization with pneumonitis. Due for surveillance imaging at the end of December. Currently on 120 mg of prednisone for pneumonitis.  Today he denies headaches. Says he feels slightly confused sometimes, attributes this to steroids. Denies nausea, vomiting, focal weakness. Slight bilateral LE swelling, +1\par \par 4/13/2021- Mr. Ness presents today for follow up. He is seen through [facility] for which he provides verbal consent on 4/13/2021 at 3:19 PM. [facility]  [id number] used for visit. he underwent a follow up brain MRI on 4/12/2021. This showed Previously noted enhancing lesions are no longer identified. Continued close interval follow-up is recommended.\par He was diagnosed with covid-19 on 4/2. Has continued to follow with Dr. Ramirez. \par Today he is feeling  well. Remains on 3L O2, which he was on prior to COVID. Feels like antibody therapy helped him a lot. No headaches, no focal weakness. \par \par 7/22/2021- Mr. Ness presents today for follow up. [place]   he underwent a brain MRI on 7/15/2021. This showed no significant change. Continues to follow with Dr. Ramirez. Continues to be observed off treatment since 9/2020, with sustained response in June, plan for follow up imaging in in 9/2021.\par Today he is feeling well. Remains on 2L O2 during the day, none at night. No headaches, nausea, vomiting, focal weakness, visual trouble. some trouble swallowing which has been present since he had systemic therapy. Overall feeling well. has some back pain after a recent fall\par \par 10/20/2021- Mr. Ness presents today for follow up. Has continued to follow with Dr. Ramirez. Continues to be monitored off treatment.\par \par CT CAP 9/2021 showed \par Stable dominant central left upper lobe nodule.\par More conspicuous small tubular nodule in the anterior left upper lobe could be mucoid impaction. 3 month follow-up is recommended.\par New sclerotic lesion of the left third rib. Stable sclerosis and loss of height of the T10 vertebral body.\par No new finding in the abdomen or pelvis.\par Brain MRI 10/15/2021 showed Posttreatment changes. No new or enlarging intracranial lesions identified.\par Spoke with patient via  #[id number].  Patient is feeling well and denies any new symptoms.  \par \par 1/19/2022: Pt presents for follow-up. Today he reports feeling well. Denies pain, numbness, tingling, or changes in hearing/vision. Endorses short-term memory loss (often related to tasks he wishes to complete). He is currently using 2L oxygen.\par \par 4/28/2022- Mr. Fuentes presents today for follow up.  offered, patient refuses, asks wife to translate.\par MRI brain 4/26/22 showed No hydrocephalus, mass effect, acute intracranial hemorrhage, vasogenic edema, or acute territorial infarct. No abnormal parenchymal or leptomeningeal enhancement.  No evidence for intracranial or osseous metastases\par \par CT CAP 3/10/2022 was stable.  Continues to follow with Dr. Ramirez. not on systemic therapy. \par \par He continues to feel very well. Following with PM &R regarding lower back pain, will get a lumbar spine MRI. notes memory trouble.\par \par 8/4/2022- Mr Fuentes presents today for follow up.  [id number] used for visit today. MRI brain 7/29/2022 with no final read, appears stable. Continues off of systemic therapy.  CT CAP 6/14/2022 showed Stable 1.3 cm left upper lobe nodule.  No evidence of new or progressive disease.\par \par Feeling well overall. notes some nocturnal dizziness when he lays down over the last month, not progressive. no headaches, no nausea, no focal weakness, no numbness or tingling. lower back pain has improved. \par \par 11/23/2022- Mr. Fuentes presents today for follow up. Brain MRI 11/17/2022 showed Similar-appearing (since 7/29/2022) 6 x 5 mm enhancing parenchymal nodule in the posterior left temporal lobe with small surrounding vasogenic edema.\par No abnormal leptomeningeal enhancement.\par \par Continues off systemic therapy. \par \par CT CAP 10/3/2022 stable.\par Patient is doing well today. States 3 brief episodes of mild headache lasting a few seconds. No motor or sensory deficits noted. Minor facial asymmetry. Patient denies any nausea, vomiting or constipation.

## 2022-11-23 NOTE — REASON FOR VISIT
[Brain Metastasis] : brain metastasis [FreeTextEntry3] : Daughter in the room and was able to translate  [TWNoteComboBox1] : Nigerien

## 2022-11-23 NOTE — REVIEW OF SYSTEMS
[Dysphagia] : no dysphagia [Loss of Hearing] : no loss of hearing [Shortness Of Breath] : no shortness of breath [Confused] : no confusion [Dizziness] : no dizziness [Fainting] : no fainting [FreeTextEntry6] : patient states 3 episodes of brief mild headache, no medication needd

## 2022-11-28 ENCOUNTER — NON-APPOINTMENT (OUTPATIENT)
Age: 72
End: 2022-11-28

## 2022-11-28 ENCOUNTER — APPOINTMENT (OUTPATIENT)
Dept: CARDIOLOGY | Facility: CLINIC | Age: 72
End: 2022-11-28

## 2022-11-28 VITALS
BODY MASS INDEX: 30.12 KG/M2 | WEIGHT: 170 LBS | DIASTOLIC BLOOD PRESSURE: 74 MMHG | SYSTOLIC BLOOD PRESSURE: 148 MMHG | HEIGHT: 63 IN | HEART RATE: 80 BPM | OXYGEN SATURATION: 90 %

## 2022-11-28 DIAGNOSIS — N52.9 MALE ERECTILE DYSFUNCTION, UNSPECIFIED: ICD-10-CM

## 2022-11-28 PROCEDURE — 99214 OFFICE O/P EST MOD 30 MIN: CPT | Mod: 25

## 2022-11-28 PROCEDURE — 93000 ELECTROCARDIOGRAM COMPLETE: CPT

## 2022-11-28 NOTE — PHYSICAL EXAM
[Well Developed] : well developed [Well Nourished] : well nourished [Normal Conjunctiva] : normal conjunctiva [Normal Venous Pressure] : normal venous pressure [No Carotid Bruit] : no carotid bruit [Normal Rate] : normal [Normal S1] : normal S1 [Normal S2] : normal S2 [No Murmur] : no murmurs heard [II] : a grade 2 [No Pitting Edema] : no pitting edema present [Rt] : varicose veins of the right leg noted [Lt] : varicose veins of the left leg noted [2+] : left 2+ [1+] : left 1+ [No Abnormalities] : the abdominal aorta was not enlarged and no bruit was heard [Normal] : clear lung fields, good air entry, no respiratory distress [Soft] : abdomen soft [Normal Bowel Sounds] : normal bowel sounds [Normal Gait] : normal gait [No Edema] : no edema [No Cyanosis] : no cyanosis [Normal Radial B/L] : normal radial B/L [Normal PT B/L] : normal PT B/L [No Rash] : no rash [Moves all extremities] : moves all extremities [Alert and Oriented] : alert and oriented [S3] : no S3 [S4] : no S4 [Right Carotid Bruit] : no bruit heard over the right carotid [Left Carotid Bruit] : no bruit heard over the left carotid [Right Femoral Bruit] : no bruit heard over the right femoral artery [Left Femoral Bruit] : no bruit heard over the left femoral artery

## 2022-11-28 NOTE — HISTORY OF PRESENT ILLNESS
[FreeTextEntry1] : 72 year old male with hx of  HTN , HLD ,  CAD PCI  RCA stent 2007  GERD, former smoker treated Stage 4 small cell lung carcinoma  s/p radiation to chest and head , COPD On home oxygen came for follow up  says he is doing fine , wants viagra prescription  , patient had  stress test in july without evidence of ischemia \par  \par Patient does have chronic BILLINGS with fatigue without chest pain for long time , patient is on oxygen 2 L  for almost  one and half year on oxygen . patient does walk while on oxygen , half to 1 mile daily ,  his allergies well controlled with allegra ,  patient denies any chest pain \par \par Patient blood pressure is controlled on medication ,diabetes  Hb a1c 6.3 % , under care of endocrinology\par \par his lipid profile  showed       LDL 67   HDL 49  now on crestor 20 mg   had carotid doppler showed mild disease \par \par patient does have mild LE swelling got  much better since he was done with chemotherapy , denies orthopnea

## 2022-11-28 NOTE — CARDIOLOGY SUMMARY
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

## 2022-11-28 NOTE — REASON FOR VISIT
[Symptom and Test Evaluation] : symptom and test evaluation [CV Risk Factors and Non-Cardiac Disease] : CV risk factors and non-cardiac disease [Hyperlipidemia] : hyperlipidemia [Hypertension] : hypertension [Coronary Artery Disease] : coronary artery disease [Other: ____] : [unfilled] [Spouse] : spouse [Arrhythmia/ECG Abnorrmalities] : arrhythmia/ECG abnormalities

## 2022-12-08 ENCOUNTER — APPOINTMENT (OUTPATIENT)
Dept: INTERNAL MEDICINE | Facility: CLINIC | Age: 72
End: 2022-12-08

## 2022-12-08 VITALS
BODY MASS INDEX: 29.59 KG/M2 | OXYGEN SATURATION: 98 % | HEART RATE: 86 BPM | RESPIRATION RATE: 16 BRPM | SYSTOLIC BLOOD PRESSURE: 128 MMHG | HEIGHT: 63 IN | DIASTOLIC BLOOD PRESSURE: 50 MMHG | TEMPERATURE: 211.1 F | WEIGHT: 167 LBS

## 2022-12-08 DIAGNOSIS — R60.0 LOCALIZED EDEMA: ICD-10-CM

## 2022-12-08 PROCEDURE — 99214 OFFICE O/P EST MOD 30 MIN: CPT

## 2022-12-08 NOTE — HISTORY OF PRESENT ILLNESS
[FreeTextEntry1] : \par f/u [de-identified] : \par Accompanied by wife, Jacque Abrams.  Declines formal , wishes for wife to translate in [place]divian as needed.\par \par 73 yo M pmhx HTN, DM, HLD, CAD s/p RCA stent (2007), ARIELLE, BPH s/p TURP, ED, GERD, hx gastritis, hypomagnesemia, former smoker (quit 2019), COPD (on supplemental O2), osteopenia, stage 4 small cell lung cancer (dx'd 4/19, s/p carboplatin/etoposide and radiation with partial response, then on atezolizumab c/b pneumonitis in 10/2020 treated with high dose steroids c/b vertebral compression fractures, with small brain metastases 6/2020 s/p gamma knife radiation therapy in 7/2020. Was tapered off steroids for pneumonitis by 3/7/2021, and stopped Bactrim prophylaxis at that time), hx covid infection 3/21 (s/p monoclonal Ab infusion and in PREVENT-HD rivaroxaban trial), hx covid infection 7/22 s/p Paxlovid course here for f/u\par \par Last seen 9/8/22 for AWV.\par \par Feeling well today.\par Needs med refills.\par Not fasting - wants labs slip\par \par Reports is socially distancing and using precautions for covid prevention.\par Denies sick or covid positive contacts.\par Denies fever, chills, cough or sob.\par -hx pfizer vaccine series- 3/21, 7/21; Hx boosters x2 (last 11/22)\par -hx covid infection 7/22- resolved s/p Paxlovid course.\par \par hx itchy rash on face/arms/chest- noted 5/22 visit, gets on/off sometimes, resolves with Allegra use\par -denies associated facial/lip/tongue swelling, throat pain, abdominal pain or sob\par -A/I eval pending 1/23\par \par hx chronic LBP, hx compression fractures, hx osteopenia- notes pain improved since onset, well controlled \par -on Flexeril prn, diclofenac prn (taking 1-2x/wk) and Tylenol 1-2x/wk\par -followed by PMR, last seen 6/22 with MRI spine done (no acute findings, no mets dz) \par -followed by neurosx, last seen 3/21- advised PT and back brace use with no surgical intervention planned.\par -followed by ortho, last seen 3/21\par -hx PT \par -on Alendronate since 1/21, tolerating w/o SEs\par -hx ambulates with cane or walker- no recent need\par -using back brace\par -denies focal weakness or incontinence\par \par small cell lung cancer stage 4 (dx'd 4/19), hx brain mets- notes using O2 at night and 2-3hrs in daytime, O2 90 -95%\par -followed by oncology, last seen 10/22 noted off tx with cont'd monitoring planned, CT CAP for surveillance ordered (done 10/22, stable). Has f/u 2/23.\par -followed by rad-onc, last seen 11/22 with 11/22 MRI brain and 11/22 CT scans reviewed- noted stable. Advised f/u in 3 mo with repeat MRI.\par -followed by pulm, last seen 11/22, 10/22 CT chest reviewed. No med changes made.  Noted O2 use with CPAP and f/u with sleep specialist advised.\par \par Reports home O2 92-98%, also using with CPAP\par Denies cough or sob.\par \par ARIELLE- reports sleeping well\par -followed by sleep specialist, seen 10/22 states advised to cont CPAP with O2\par -on Temazepam, plans to wean off as feels may not need it\par \par DM-\par -followed by endo and on med regimen- had f/u 11/22 with POCT A1c 6.3, metformin decreased to 500 (1) bid and Trulicity increased to 3 qwk. Told MD leaving and advised to see colleague in office in 2/23- also wants referral today to local MD\par -using Freestyle Osiel. Home fs: 110 - 135; denies hypoglycemic sx's\par -seen by optho isabella 6/22, states told nl exam.  Has f/u 12/22.\par -followed by podiatry, states seen 9/22 with nails clipped.  Denies foot paresthesias.\par \par HTN, HLD, CAD s/p stent- hx LE edema, reports lessening since off steroids\par -on ASA, metoprolol and Lisinopril\par -on Crestor, Lovaza and niacin - denies SEs with use\par -had echo 6/21\par -followed by cardio, seen 11/22, noted doing well.  Viagra prn given (use pending).  Has f/u 4/23.\par -reports hx nl NST 7/22.\par -exercise: walks daily 1-1.5 miles- done w/o sx's or limitations\par -denies CP, dizziness or sob\par \par hx hypomagnesium- hx chronic, mild diarrhea 2/2 metformin\par -followed by nephrology, seen 11/22 felt low Mg 2/2 Carboplatin induced tubular damage with increase in MgOx to 400 bid advised.  Advised to d/c endo re: d/c metformin (states since dose decreased by endo).  Has f/u 2/23.\par \par Feels diarrhea overall happening less since cut down on metformin.  Having 1-2x/wk (was 4-5x prior)\par Denies n/v or abdominal pain.\par \par Denies  complaints. \par -no recent need for Viagra\par \par

## 2022-12-08 NOTE — ASSESSMENT
[FreeTextEntry1] : \par 71 yo M pmhx HTN, DM, HLD, CAD s/p RCA stent (2007), ARIELLE, BPH s/p TURP, ED, GERD, hx gastritis, hypomagnesemia, former smoker (quit 2019), COPD (on supplemental O2), osteopenia, stage 4 small cell lung cancer (dx'd 4/19, s/p carboplatin/etoposide and radiation with partial response, then on atezolizumab c/b pneumonitis in 10/2020 treated with high dose steroids c/b vertebral compression fractures, with small brain metastases 6/2020 s/p gamma knife radiation therapy in 7/2020. Was tapered off steroids for pneumonitis by 3/7/2021, and stopped Bactrim prophylaxis at that time), hx covid infection 3/21 (s/p monoclonal Ab infusion and in PREVENT-HD rivaroxaban trial), hx covid infection 7/22 s/p Paxlovid course here for f/u\par \par \par hx skin rash- gets on/off sporadically, resolves with Allegra use\par -on Allegra prn\par -A/I referral given prior, pending 1/23\par -encouraged sx diary to ID trigger\par -advised prompt ER if worsened or onset facial/tong/lip swelling, throat discomfort, sob, etc.\par \par hx chronic LBP, hx compression fractures, hx osteopenia, vit d insuff- hx mechanical fall s/p ER 6/21 w/o acute findings on CT scan. Back pain at baseline s/p recent exacerbation.\par -1/21 DEXA: osteopenia, repeat due 1/23\par -6/21 CT lumbar spine: Unchanged compression deformities of the T12-L3 vertebral bodies. No destructive lesions are identified. Multilevel degenerative changes with disc herniations at L3-4 and L4-5\par -followed by PMR, last seen 6/22 with MRI spine done (no acute findings, no mets dz) \par -followed by neurosx, last seen 3/21- advised PT and back brace use with no surgical intervention planned.\par -followed by ortho, last seen 3/21\par -hx PT \par -on Flexeril prn, Tylenol prn and Oxycodone prn (per PMR), taking mainly Tylenol 1-2x/wk at baseline)\par -on diclofenac and Voltaren gel prn per PMR\par -on Alendronate since 1/21, tolerating w/o SEs\par -on ca/D OTC supp\par -hx ambulates with cane or walker prn - no recent need\par -using back brace\par -fall precautions counseled\par \par small cell lung cancer stage 4 (dx'd 4/19), hx brain mets, anemia-\par -followed by oncology, last seen 10/22 noted off tx with cont'd monitoring planned, CT CAP for surveillance ordered (done 10/22, stable). Has f/u 2/23.\par -followed by rad-onc, last seen 11/22 with 11/22 MRI brain and 11/22 CT scans reviewed- noted stable. Advised f/u in 3 mo with repeat MRI.\par -followed by pulm, last seen 11/22, 10/22 CT chest reviewed. No med changes made.  Noted O2 use with CPAP and f/u with sleep specialist advised.\par \par ARIELLE, COPD (on supp O2), seasonal allergies, former smoker (quit 2019), hx covid infection x2 (last 7/22)-\par -followed by pulm, last seen 11/22, 10/22 CT chest reviewed. No med changes made.  Noted O2 use with CPAP and f/u with sleep specialist advised.\par -on Symbicort and MDI prn\par -Flonase prn\par -followed by sleep specialist, seen 10/22 states advised to cont CPAP with O2\par -on Temazepam, plans to wean off as feels may not need it\par \par DM, microalbuminuria- 11/22 POCT A1c 6.3 (was 6.7), 8/22 microalbumin wnl \par -followed by endo and on med regimen- had f/u 11/22 with POCT A1c 6.3, metformin decreased to 500 (1) bid and Trulicity increased to 3 qwk. Told MD leaving and advised to see colleague in office in 2/23- also wants referral today to local MD - given\par -on metformin  (1) BID (decreased to current since 11/22)\par -off Basaglar\par -off Novolog\par -on Trulicity since 1/22 (increased to 3 in 11/22)\par -on Lisinopril for renal protection\par -cont home fs monitoring, using Freestyle Osiel\par -seen by optho eval 6/22, states told nl exam.  Has f/u 12/22.\par -followed by podiatry, states seen 9/22 with nails clipped. Denies foot paresthesias.\par -A1c due 2/23\par -check bmp\par \par HTN, HLD, CAD s/p stent, hx LE edema (reports improved since off steroids 3/21)- BP wnl\par -12/20 b/l LE duplex: no DVT b/l\par -6/21 echo 6/21: EF 60-65%, min MR, LV remodeling, nl LV fxn, mild diastolic dysfxn (Stg I)\par -7/21 cbc/bmp/TSH wnl\par -9/22 Tchol 155  LDL 67 HDL 52\par -8/22 cmp wnl\par -9/22 bmp wnl\par -on ASA, metoprolol and Lisinopril\par -on Crestor, Lovaza and niacin - denies SEs with use\par -off Zetia 11/21 per cardio\par -followed by cardio, seen 11/22, noted doing well.  Viagra prn given.  Has f/u 4/23.\par -low salt diet advised\par -check lipids \par \par GERD, hx internal hemorrhoids, diverticulosis, colon polyps- reports controlled\par -hx EGD 1/16: nonbleeding erosive gastropathy\par -hx colonoscopy 1/16: internal hemorrhoids, large lipoma at transverse colon, diverticulosis, rec: repeat in 10 yrs (Dr. Hinds)\par -on famotidine\par -advised to avoid reflux aggravating foods\par \par hx hypomagnesium- hx chronic, mild diarrhea 2/2 metformin\par -declines to change metformin\par -on MgOx 400 bid\par -followed by nephrology, seen 11/22 felt low Mg 2/2 Carboplatin induced tubular damage with increase in MgOx to 400 bid advised.  Advised to d/c endo re: d/c metformin (states since dose decreased by endo).  Has f/u 2/23.\par -check level\par \par \par MISC: Continued social distancing and measure for covid19 prevention encouraged. \par -hx pfizer vaccine series- 3/21, 7/21; Hx boosters x2 (last 11/22)\par \par \par HCM\par -hx CPE 9/22\par -8/22 cmp wnl\par -9/22 cbc/bmp/TSH/B12/folate/PSA wnl\par -7/21 hep C screening negative\par -hx flu shot 11/22\par -hx Tdap 11/22\par -hx prevnar 3/15\par -hx PVX 10/20\par -advised to check on insurance coverage for shingrix and f/u if desires. Advised to d/w oncology prior to getting.\par -hx screening colonoscopy 1/16: internal hemorrhoids, large lipoma at transverse colon, diverticulosis, rec: repeat in 10 yrs (Dr. Hinds)\par -hx DEXA 1/21: osteopenia (hx compression fractures).  Repeat due 1/23, Rx given\par -followed by derm, last seen 9/20. Yearly skin screening advised. Regular use of sun block for skin cancer prevention counseled.\par -advised to designate HCP and provide copy of completed form for records\par \par Pt requests wife, Jacque Abrams, be contacted re: his test results and medical care, (cell) [phone]\par \par

## 2022-12-08 NOTE — REVIEW OF SYSTEMS
[Negative] : Psychiatric [FreeTextEntry6] : see HPI [FreeTextEntry7] : see HPI [FreeTextEntry9] : see HPI [de-identified] : see HPI

## 2022-12-08 NOTE — PHYSICAL EXAM
[No Acute Distress] : no acute distress [Well-Appearing] : well-appearing [Normal Sclera/Conjunctiva] : normal sclera/conjunctiva [PERRL] : pupils equal round and reactive to light [EOMI] : extraocular movements intact [Normal Oropharynx] : the oropharynx was normal [Normal Nasal Mucosa] : the nasal mucosa was normal [No Lymphadenopathy] : no lymphadenopathy [Supple] : supple [Thyroid Normal, No Nodules] : the thyroid was normal and there were no nodules present [No Respiratory Distress] : no respiratory distress  [Clear to Auscultation] : lungs were clear to auscultation bilaterally [Normal Rate] : normal rate  [Regular Rhythm] : with a regular rhythm [Normal S1, S2] : normal S1 and S2 [No Murmur] : no murmur heard [Pedal Pulses Present] : the pedal pulses are present [No Edema] : there was no peripheral edema [Soft] : abdomen soft [Non Tender] : non-tender [No HSM] : no HSM [Normal Supraclavicular Nodes] : no supraclavicular lymphadenopathy [Normal Posterior Cervical Nodes] : no posterior cervical lymphadenopathy [Normal Anterior Cervical Nodes] : no anterior cervical lymphadenopathy [No CVA Tenderness] : no CVA  tenderness [No Spinal Tenderness] : no spinal tenderness [No Joint Swelling] : no joint swelling [No Rash] : no rash [No Focal Deficits] : no focal deficits [Normal Gait] : normal gait [Normal Affect] : the affect was normal [Alert and Oriented x3] : oriented to person, place, and time [de-identified] : scattered freckles

## 2022-12-12 ENCOUNTER — NON-APPOINTMENT (OUTPATIENT)
Age: 72
End: 2022-12-12

## 2022-12-12 ENCOUNTER — APPOINTMENT (OUTPATIENT)
Dept: OPHTHALMOLOGY | Facility: CLINIC | Age: 72
End: 2022-12-12

## 2022-12-12 PROCEDURE — 92134 CPTRZ OPH DX IMG PST SGM RTA: CPT

## 2022-12-12 PROCEDURE — 92014 COMPRE OPH EXAM EST PT 1/>: CPT

## 2023-01-26 ENCOUNTER — RESULT REVIEW (OUTPATIENT)
Age: 73
End: 2023-01-26

## 2023-02-02 ENCOUNTER — OUTPATIENT (OUTPATIENT)
Dept: OUTPATIENT SERVICES | Facility: HOSPITAL | Age: 73
LOS: 1 days | Discharge: HOME | End: 2023-02-02

## 2023-02-02 DIAGNOSIS — C34.90 MALIGNANT NEOPLASM OF UNSPECIFIED PART OF UNSPECIFIED BRONCHUS OR LUNG: ICD-10-CM

## 2023-02-02 DIAGNOSIS — Z90.89 ACQUIRED ABSENCE OF OTHER ORGANS: Chronic | ICD-10-CM

## 2023-02-02 DIAGNOSIS — D11.0 BENIGN NEOPLASM OF PAROTID GLAND: Chronic | ICD-10-CM

## 2023-02-02 DIAGNOSIS — Z95.5 PRESENCE OF CORONARY ANGIOPLASTY IMPLANT AND GRAFT: Chronic | ICD-10-CM

## 2023-02-02 DIAGNOSIS — N20.0 CALCULUS OF KIDNEY: Chronic | ICD-10-CM

## 2023-02-06 ENCOUNTER — OUTPATIENT (OUTPATIENT)
Dept: OUTPATIENT SERVICES | Facility: HOSPITAL | Age: 73
LOS: 1 days | End: 2023-02-06
Payer: MEDICARE

## 2023-02-06 ENCOUNTER — APPOINTMENT (OUTPATIENT)
Dept: CT IMAGING | Facility: CLINIC | Age: 73
End: 2023-02-06
Payer: MEDICARE

## 2023-02-06 DIAGNOSIS — N20.0 CALCULUS OF KIDNEY: Chronic | ICD-10-CM

## 2023-02-06 DIAGNOSIS — C34.12 MALIGNANT NEOPLASM OF UPPER LOBE, LEFT BRONCHUS OR LUNG: ICD-10-CM

## 2023-02-06 DIAGNOSIS — Z90.89 ACQUIRED ABSENCE OF OTHER ORGANS: Chronic | ICD-10-CM

## 2023-02-06 DIAGNOSIS — D11.0 BENIGN NEOPLASM OF PAROTID GLAND: Chronic | ICD-10-CM

## 2023-02-06 DIAGNOSIS — Z95.5 PRESENCE OF CORONARY ANGIOPLASTY IMPLANT AND GRAFT: Chronic | ICD-10-CM

## 2023-02-06 PROCEDURE — 71260 CT THORAX DX C+: CPT | Mod: 26

## 2023-02-06 PROCEDURE — 74177 CT ABD & PELVIS W/CONTRAST: CPT | Mod: 26

## 2023-02-06 PROCEDURE — 74177 CT ABD & PELVIS W/CONTRAST: CPT

## 2023-02-06 PROCEDURE — 71260 CT THORAX DX C+: CPT

## 2023-02-08 ENCOUNTER — APPOINTMENT (OUTPATIENT)
Dept: HEMATOLOGY ONCOLOGY | Facility: CLINIC | Age: 73
End: 2023-02-08
Payer: MEDICARE

## 2023-02-08 VITALS
TEMPERATURE: 97.2 F | OXYGEN SATURATION: 94 % | BODY MASS INDEX: 29.84 KG/M2 | HEIGHT: 62.99 IN | RESPIRATION RATE: 16 BRPM | SYSTOLIC BLOOD PRESSURE: 174 MMHG | DIASTOLIC BLOOD PRESSURE: 80 MMHG | HEART RATE: 68 BPM | WEIGHT: 168.43 LBS

## 2023-02-08 PROCEDURE — 99213 OFFICE O/P EST LOW 20 MIN: CPT

## 2023-02-09 ENCOUNTER — APPOINTMENT (OUTPATIENT)
Dept: NEPHROLOGY | Facility: CLINIC | Age: 73
End: 2023-02-09
Payer: MEDICARE

## 2023-02-09 VITALS
RESPIRATION RATE: 14 BRPM | SYSTOLIC BLOOD PRESSURE: 116 MMHG | DIASTOLIC BLOOD PRESSURE: 72 MMHG | HEIGHT: 62 IN | WEIGHT: 168 LBS | BODY MASS INDEX: 30.91 KG/M2 | OXYGEN SATURATION: 95 % | HEART RATE: 80 BPM

## 2023-02-09 PROCEDURE — 99214 OFFICE O/P EST MOD 30 MIN: CPT | Mod: 25

## 2023-02-09 PROCEDURE — 36415 COLL VENOUS BLD VENIPUNCTURE: CPT

## 2023-02-11 ENCOUNTER — APPOINTMENT (OUTPATIENT)
Dept: ORTHOPEDIC SURGERY | Facility: CLINIC | Age: 73
End: 2023-02-11
Payer: MEDICARE

## 2023-02-11 VITALS
BODY MASS INDEX: 30.91 KG/M2 | DIASTOLIC BLOOD PRESSURE: 76 MMHG | SYSTOLIC BLOOD PRESSURE: 124 MMHG | WEIGHT: 168 LBS | HEART RATE: 81 BPM | TEMPERATURE: 98.1 F | HEIGHT: 62 IN

## 2023-02-11 PROCEDURE — 99213 OFFICE O/P EST LOW 20 MIN: CPT

## 2023-02-11 PROCEDURE — 73630 X-RAY EXAM OF FOOT: CPT | Mod: LT

## 2023-02-11 NOTE — REASON FOR VISIT
[Initial Visit] : an initial visit for [[employer] ] : provided by [employer]   [FreeTextEntry2] : Left foot pain [Interpreters_IDNumber] : [id number] [Interpreters_FullName] : Hayder Fountain [TWNoteComboBox1] : [place] Islander

## 2023-02-11 NOTE — HISTORY OF PRESENT ILLNESS
[FreeTextEntry1] : The patient is a 72-year-old male who presents with left foot pain. The patient states that he has had the pain for the past 1 year.  The patient was having intermittent pain to the left foot.  20 days ago he took an accidental fall and states that during the fall he injured the left foot.  He tripped over his dog.  The patient is having pain on the lateral aspect of the foot.  The patient is currently on supplemental oxygen with a history of lung cancer with metastases to his brain.  He is currently walking with minimal pain and using slip on shoes.  No numbness or tingling to his feet.

## 2023-02-11 NOTE — PHYSICAL EXAM
[de-identified] : Left foot Physical Examination:\par \par General: Alert and oriented x3.  In no acute distress.  Pleasant in nature with a normal affect.  No apparent respiratory distress. \par Erythema, Warmth, Rubor: Negative\par Swelling: Negative\par \par ROM Ankle:\par 1. Dorsiflexion: 10 degrees\par 2. Plantarflexion: 40 degrees\par 3. Inversion: 30 degrees\par 4. Eversion: 30 degrees\par \par ROM of digits: Normal\par \par Pes Planus: Negative\par Pes Cavus: Negative\par \par Bunion: Negative\par Glendy's Bunion (Bunionette): Negative\par Hammer Toe Deformity/Deformities: Negative\par \par Tenderness to Palpation: \par 1. Heel Pain: Negative\par 2. Midfoot Pain: Negative\par 3. First MTP Joint: Negative\par 4. Lis Franc Joint: Negative\par \par Tenderness Metatarsals:\par 1st MT: Negative\par 2nd MT: Negative\par 3rd MT: Negative\par 4th MT: Negative\par 5th MT: Negative\par Base of the 5th MT: Positive\par \par Ligament Pain:\par 1. Lis Franc Ligament: Negative\par 2. Plantar Fascia Ligament: Negative\par \par Strength: \par 5/5 TA/GS/EHL/FHL/EDL/ADD/ABD\par \par Pulses: 2+ DP/PT Pulses\par \par Capillary Refill Toes: <2 seconds\par \par Neuro: Intact motor and sensory throughout\par \par Additional Test:\par 1. Davis's Squeeze Test: Negative\par 2. Calcaneal Squeeze Test: Negative [de-identified] : 3 views x-rays left foot reviewed, 2/11/2023: There could be a small possible nondisplaced fracture at the base of the fifth metatarsal.  No other abnormality seen.

## 2023-02-11 NOTE — DISCUSSION/SUMMARY
[de-identified] : At this point in time there might be a small, possible small nondisplaced fracture at the base of the fifth metatarsal.  The patient's pain has improved since the initial injury.  He is walking without assistance and states that wearing sneakers does not really bother him.  At this time want to hold off on advanced imaging.  I did explain to him that even if there is a small crack at the base of the fifth metatarsal, this is a self-limiting injury and usually takes 4 to 6 weeks to heal.  The patient will continue to monitor his foot and offload the foot with proper sneakers.  He can continue to walk as tolerated.  If his pain scale spikes, he should return to office for a new x-ray.  If he continues to improve by 6 weeks out from the injury he can return to normal activities if he has no pain.  He could use Tylenol if he has pain or discomfort.  He could follow-up on an as-needed basis.  All of his questions were answered.

## 2023-02-13 LAB
ALBUMIN SERPL ELPH-MCNC: 4.5 G/DL
ANION GAP SERPL CALC-SCNC: 14 MMOL/L
BUN SERPL-MCNC: 18 MG/DL
CALCIUM SERPL-MCNC: 9.6 MG/DL
CHLORIDE SERPL-SCNC: 102 MMOL/L
CO2 SERPL-SCNC: 23 MMOL/L
CREAT SERPL-MCNC: 0.85 MG/DL
CREAT SPEC-SCNC: 67 MG/DL
EGFR: 93 ML/MIN/1.73M2
GLUCOSE SERPL-MCNC: 139 MG/DL
MAGNESIUM SERPL-MCNC: 1.6 MG/DL
MAGNESIUM UR-MCNC: 6.6 MG/DL
PHOSPHATE SERPL-MCNC: 3 MG/DL
POTASSIUM SERPL-SCNC: 4.3 MMOL/L
SODIUM SERPL-SCNC: 139 MMOL/L

## 2023-02-13 NOTE — ASSESSMENT
[FreeTextEntry1] : Hypomagnesemia\par Metastatic Lung Ca\par DM- on Metformin and Trulicity\par \par Serum Mg++ levels have been low since 2019\par Hypomagnesemia likely from Carboplatin induced renal tubular damage\par Pt has been relatively resistant to magnesium supplementation\par Also has diarrhea ? from Mg supplements vs Metformin likely further exacerbating Mg++ losses through GI tract\par Repeat labs today; Renal panel, magnesium levels, urine studies to calculate fractional excretion of Mg\par Hb A1c 6.3- has upcoming Endo appt- consider stopping metformin\par \par HTN: Bp at goal\par Continue Lisinopril and metoprolol\par \par \par \par RTC in 4 months \par \par Addendum;\par Labs reviewed\par Serum Mg 1.6\par Fractional excretion of Magnesium = 4.93% consistent with renal magnesium wasting\par Will start Amiloride 2.5 mg daily for renal magnesium sparing effect\par Decrease Lisinopril to 10 mg daily\par repeat BMP / Mg levels in 1 week of initiation of diuretic\par \par \par \par \par \par \par \par

## 2023-02-13 NOTE — PHYSICAL EXAM
[General Appearance - Alert] : alert [Sclera] : the sclera and conjunctiva were normal [Hearing Threshold Finger Rub Not [place]] : hearing was normal [Auscultation Breath Sounds / Voice Sounds] : lungs were clear to auscultation bilaterally [Heart Sounds] : normal S1 and S2 [Edema] : there was no peripheral edema [Abdomen Soft] : soft [No CVA Tenderness] : no ~M costovertebral angle tenderness [Abnormal Walk] : normal gait [] : no rash [Motor Exam] : the motor exam was normal [Impaired Insight] : insight and judgment were intact [Affect] : the affect was normal [Mood] : the mood was normal [FreeTextEntry1] : on supplemental O2 via NC

## 2023-02-13 NOTE — HISTORY OF PRESENT ILLNESS
[FreeTextEntry1] : 49 yr old M with metastatic Lung Cancer CAD , DM type II,  HLD and COPD here for initial evaluation of hypomagnesemia. \par Pt has a history of heavy smoking and  quit 3 years ago after being diagnosed with a left lung mass and associated lymphadenopathy and suspicious bony lesions. HHe underwent a bronchoscopy with EBUS biopsy of bilateral mediastinal lymph nodes that were positive for small cell carcinoma. Started first line systemic therapy with Carbo/Etoposide/Atezolizumab in May 2019. Achieved MT.  Received 4 cycles completed early July 2019 followed by Atezolizumab maintenance since late July 2019. Received consolidative Thoracic RT completed late Aug 2019.  Developed small brain metastases on Brain MRI in June 2020 and received GK-SRS for 2 small lesions in early July 2020. Patient hospitalized 10/21-11/2 with pneumonitis 2/2 immunotherapy and discharged on steroids. Atezolizumab discontinued after last dose in late September 2020 due to development of pneumonitis and he has been monitored off systemic therapy since that time. He has been tapered off Prednisone as of March 2021. \par Uses supplemental O2 as needed, mainly during the daytime. \par \par Pt also has a h/o DM- HbA1c ~ 6.7\par He was on metformin and januvia in the past- Januvia discontinued and he was started on Trulicty 1.5 mg subq weekly metformin 1g BID. Denies retinopathy/ neuropathy. Also denies foamy urine/ hematuria\par Reports daily diarrhea about 3-5 times x day\par Metformin was decreased to 500 mg bid and Trulicity increased to 3 mg.\par \par Denies diuretics/ PPI use.\par \par Originally from [place]\par Ex smoker\par \par Daughter is a NP\par \par -------------------------------------------------------------\par \par 02/09/2023\par Pt presents for f/u of hypomagnesemia\par Pt seen and examined; feels well\par \par Diarrhea has somewhat improved- He is on metformin 500 mg bid.\par He takes Mg oxide 400 mg morning and evening and 800 mg in afternoon.\par \par \par \par \par \par \par

## 2023-02-15 ENCOUNTER — NON-APPOINTMENT (OUTPATIENT)
Age: 73
End: 2023-02-15

## 2023-02-16 NOTE — HISTORY OF PRESENT ILLNESS
[Disease: _____________________] : Disease: [unfilled] [T: ___] : T[unfilled] [N: ___] : N[unfilled] [de-identified] : The patient has a heavy smoking history who recently quit. He developed a worsening cough roughly 6 months ago followed by fatigue and insomnia roughly 5 months ago. He saw his PCP and was referred to pulmonary. He was referred for a CT chest for lung cancer screening which revealed a left upper lobe lung mass with associated lymphadenopathy and suspicious bony lesions. He was then sent for a PET/CT scan which have activity in these areas. He underwent a bronchoscopy with EBUS biopsy of bilateral mediastinal lymph nodes that were positive for small cell carcinoma.  Started first line systemic therapy with Carbo/Etoposide/Atezolizumab in May 2019.  Achieved [place].  Received 4 cycles completed early July 2019 followed by Atezolizumab maintenance since late July 2019.  Received consolidative Thoracic RT completed late Aug 2019.  He declined PCI.  Developed small brain metastases on Brain MRI in June 2020 and received GK-SRS for 2 small lesions in early July 2020. Patient hospitalized 10/21-11/2 with pneumonitis 2/2 immunotherapy and discharged on steroids.   Atezolizumab discontinued after last dose in late September 2020 due to development of pneumonitis and he has been monitored off systemic therapy since that time.  He has been tapered off Prednisone as of March 2021.   [de-identified] : -Lymph node 4L and 4R EBUS guided FNA 4/18/19: Positive for malignant cells. Small cell carcinoma. [de-identified] : [employer] [facility] #[id number] utilized. \par Patient has been off systemic therapy since late September 2020 when Atezolizumab was discontinued after developing pneumonitis.   He has been tapered off Prednisone as of March 2021.  Not currently on steroids.  Uses supplemental O2 as needed, mainly at night periodically.    \par He follows with PMR for management of vertebral compression fractures which are suspected to be secondary to osteoporosis.  He is on treatment with Alendronate and follows with Endocrine.  \par He overall reports doing well.  He follows with pulmonary and also with a sleep specialist for sleep apnea.  He is on EPAP at night.  \par He c/o a sore left foot on the lateral side; he wanted an Xray.  Denies trauma.  He plans to see Ortho however, so this was deferred.  Reports allergy sx; saw allergist and improved with medical management.  \par \par Restaging Brain MRI Nov 2022 with sustained intracranial response.  \par Restaging CT C/A/P this month with a stable exam and no evidence of new or progressive disease.

## 2023-02-16 NOTE — PHYSICAL EXAM
[Ambulatory and capable of all self care but unable to carry out any work activities] : Status 2- Ambulatory and capable of all self care but unable to carry out any work activities. Up and about more than 50% of waking hours [Normal] : affect appropriate [de-identified] : No icterus  [de-identified] : MMM O/P clear [de-identified] : Supple No LAD  [de-identified] : Somewhat distant BS [de-identified] : S1 S2  [de-identified] : No edema  [de-identified] : No spine/CVA tenderness

## 2023-02-16 NOTE — RESULTS/DATA
[FreeTextEntry1] : -CT Chest 3/28/19:  \par 1. A left upper lobe spiculated nodule is concerning for primary lung cancer. \par 2. Enlarged chest lymph node measuring 16 mm. \par 3. Indeterminate sclerotic lesions of the right fourth rib and T6 vertebral body. \par \par -PET/CT 4/4/19:  FDG-PET/CT scan: \par 1. FDG avid spiculated left upper lobe lung nodule, worrisome for malignancy. \par 2. FDG avid AP window lymph node, suspicious for metastasis. \par 3. Indeterminate sclerotic right fourth rib and T6 vertebral body foci. \par \par -MRI Brain 5/5/19:  Small vessel white matter ischemic changes. No hemorrhage, mass or acute infarct. No abnormal enhancement to suggest brain metastases. Nonspecific left mastoid effusion. \par \par -MRI Thoracic Spine 5/5/19: Focus of low signal intensity on the T1 and T2-weighted images within the anterior T6 vertebral body corresponds to sclerosis on PET/CT and \par likely represents benign sclerosis or bone island. No evidence of lytic or blastic metastases. No abnormal signal or enhancement. \par \par -PET/CT 5/30/19:  Abnormal FDG-PET/CT scan: \par 1. Hypermetabolic spiculated left upper lobe pulmonary nodule in bilateral mediastinal and hilar lymph nodes are decreased in size and metabolism as compared to prior study from 4/4/2019, compatible with a partial response to interval therapy. Scattered calcified and noncalcified bilateral pulmonary nodules, unchanged. \par 2. Diffuse bone marrow hypermetabolism, increased as compared to prior study, likely is secondary to recent treatment with colony-stimulating factors. Small sclerotic densities in right fourth rib and body of T6 vertebra are unchanged. \par \par -CT Chest 7/12/19:  \par 1. In comparison with 5/30/2019, 1.5 cm left upper lobe spiculated nodule is decrease in size. No new or enlarging pulmonary nodule. \par 2. Stable subcentimeter mediastinal and hilar lymph nodes. \par \par -CT Chest 9/23/19: \par 1. The spiculated left upper lobe nodule is unchanged. \par 2. The AP window lymph node is unchanged. \par \par -MRI Brain 11/15/19:  No change from 5/5/2019. Microvascular disease. No abnormal enhancement to suggest brain metastases. \par \par -CT Chest 12/16/19:  Left upper lobe nodule minimally decreased in size since September 23, 2019.  Adjacent ill-defined opacities within the left upper lobe and the superior segment of the left lower lobe new since September 23, 2019 maybe sequela of radiation therapy. 3 month follow-up noncontrast chest CT can be performed for further evaluation. Small aortopulmonary window lymph node is unchanged since September 23, 2019. \par \par -CT Chest 3/13/20:  When compared with December 16, 2019: Left suprahilar 1.1 x 0.9 cm nodule is seen, slightly decreased in size when compared with prior. Focal areas of architectural distortion noted in the left upper lobe with additional linear opacities and groundglass opacity without change from prior and may reflect evolving posttreatment change. Multiple bilateral calcified nodules are identified, unchanged. \par \par -CT Chest 6/8/20:  Since 3/13/2020, the left upper lobe nodule and the paramediastinal opacities are unchanged. \par \par -MRI Brain 6/24/20: Enhancing lesions are identified consistent with metastasis given patient's history.\par \par -CT A/P 7/15/20: No evidence of abdominal metastasis. Stable transverse colon lipoma. \par \par -CT Chest 8/31/20:  Emphysema is present. Left upper lobe ill-defined nodular/linear opacity is similar compared to the prior study. Other bilateral patchy lung opacities are new from the prior study. These are indeterminate and may be infectious/inflammatory. 1 month follow-up CT needed for complete evaluation. \par \par -CT Chest 10/19/20:  Widespread bilateral opacities throughout both lungs with significant interval progression since August 31, 2020 suggestive of infectious or inflammatory etiology. Findings may be due to Covid 19 pneumonia.  Left upper lobe nodular opacity with minimal if any interval increase in size since August 31, 2020 likely related to the adjacent acute process.\par \par -CT Angio Chest 10/21/20: No pulmonary embolus. Interval increase of diffuse bilateral peribronchovascular opacities with may represent pneumonitis or infection.\par \par -MRI Brain 12/11/20:  Previously seen left temporal occipital lobe lesion is significantly smaller in size.  Previously seen left frontal lobe lesion is no longer visualized.\par \par -LE Dopplers 12/24/20:  No evidence of deep venous thrombosis in either lower extremity.\par \par -CT Chest 12/28/20:  When compared with October 21, 2020:  Peribronchovascular groundglass abnormality and consolidations consistent with pneumonitis has resolved.  Unchanged left upper lobe 1.4 cm spiculated nodule in keeping with known malignancy. Follow-up recommended to assess for change.\par \par -XR Spine 1/18/21: Age indeterminate mild to moderate wedge-shaped anterior compression deformity of L1 superior endplate and to more subtle extent L2 superior endplate, however new from prior, correlate clinically with MRI suggested to further assess if warranted.\par Redemonstrated mid and lower lumbar predominant disc space narrowing with small disc margin osteophytes.\par Slight L2 on L3 and L3 on L4 retrolistheses however without associated spondylolysis defects. Remaining vertebral body alignment maintained.\par Unremarkable SI joints and partially visualized hips.\par Generalized osteopenia otherwise no discrete lytic or blastic lesions.\par Atherosclerotic abdominal aortic calcifications.\par \par -Bone density 1/25/21: Bone density is osteopenic. Patient is at moderate risk for fracture.\par \par -MRI L-Spine 2/19/21:  \par 1. Multiple compression fractures including T12-L4 Favor multiple osteoporotic compression fractures. New compared to prior CT dated 7/15/2020..\par 2. Suspected T10 fracture, incompletely imaged on this study. Dedicated thoracic spine MRI can be performed for further evaluation.\par 3. Multilevel lumbar spondylosis, most notable with contact on the left exited L3, descending L4, exiting L4 and descending L5 nerves. Spinal canal stenosis and foraminal narrowing, as described.\par \par -MRI T-Spine 3/3/21:  \par 1. Subacute wedge compression fractures of the T10 and T12 vertebral bodies.\par 2. Chronic wedge compression deformity of the L1 vertebral body.\par 3. Mild bilateral neural foraminal narrowing at the T9-T10 and T10-T11 levels, as described above.\par \par -CT C/A/P 3/22/21:  Compared with CT Chest 12/28/20 and CT A/P 7/15/20:  Slightly increased left upper lobe spiculated lesion, 1.7cm vs 1.4cm previously.  Redemonstration of multifocal chronic lung disease.  Increasing endplate compressions and heterogeneous density involving the thoracolumbar vertebral bodies. Cannot exclude possibility of metastases.\par \par -MRI Brain 4/12/21:  Previously noted enhancing lesions are no longer identified. Continued close interval follow-up is recommended.\par \par -CT C/A/P 6/11/21:  Unchanged spiculated left upper lobe mass.  Left upper lobe subpleural cysts and opacities unchanged from 03/22/2021.  Emphysema.  Scattered groundglass pulmonary opacities unchanged from 03/22/2021.  Compression deformity T10 with sclerotic change similar to 3/22/21.\par \par -CT L-Spine 6/29/21:  Unchanged compression deformities of the T12-L3 vertebral bodies. No destructive lesions are identified. Multilevel degenerative changes with disc herniations at L3-4 and L4-5 \par \par -MRI Brain 7/15/21:  No significant change when allowing for differences in technique.\par \par -CT C/A/P 9/10/21:  Since 6/11/2021:  Stable dominant central left upper lobe nodule.  More conspicuous small tubular nodule in the anterior left upper lobe could be mucoid impaction. 3 month follow-up is recommended.  New sclerotic lesion of the left third rib. Stable sclerosis and loss of height of the T10 vertebral body.  No new finding in the abdomen or pelvis.\par \par -MRI Brain 10/15/21:  Posttreatment changes. No new or enlarging intracranial lesions identified.\par \par –CT C/A/P 12/2/21:  Stable size of a spiculated left upper lobe pulmonary nodule with no new nodules noted.  No evidence for metastatic disease in the abdomen and pelvis.\par \par –MRI Brain 1/18/22:  5 x 4 mm focus of enhancement in the left posterior temporal lobe is similar in size. The lesion currently demonstrates peripheral enhancement, previously the lesion enhanced in a more solid fashion. Increased mild edema surrounding the lesion likely reflecting posttreatment changes.  No new abnormal parenchymal enhancement. No abnormal leptomeningeal enhancement.\par \par -CT C/A/P 3/10/22:  Stable exam including treated left upper lobe pulmonary nodule.\par \par – MRI brain 4/26/2022: Negative for metastatic disease.\par \par –CT C/A/P 6/13/22:  Stable 1.3 cm left upper lobe nodule.  No evidence of new or progressive disease.\par \par -MRI L-Spine 6/13/22:  \par 1. No metastatic disease is appreciated.\par 2. Chronic T12 through L3 superior endplate compression deformities appear relatively stable.\par 3. Multilevel degenerative disc disease is mostly stable. Left L4-L5 paracentral to foraminal disc protrusion is decreased in size from the prior.\par 4. L2-L3 and L3-L4 demonstrate moderate spinal canal stenosis.\par 5. L4-L5 demonstrate mild spinal canal stenosis with severe narrowing of the left lateral recess.\par 6. There is variable neural foraminal stenosis including moderate to severe left neural foraminal stenosis at L4-L5.\par \par -MRI Brain 7/29/22:  Age-appropriate involutional and ischemic gliotic changes. No acute infarcts, hemorrhage or mass. No abnormal enhancement.  No change since 4/26/2022.\par \par -CT C/A/P 10/3/22:  Stable exam. Stable 1.3 cm left upper lobe nodule. Stable 1.1 cm right adrenal nodule.\par \par Images Reviewed/Interpreted:\par \par -MRI Brain 11/17/22:  Similar-appearing (since 7/29/2022) 6 x 5 mm enhancing parenchymal nodule in the posterior left temporal lobe with small surrounding vasogenic edema.  No abnormal leptomeningeal enhancement.\par \par -CT C/A/P 2/6/23:  Interval disease stability in the chest, abdomen, and pelvis compared to 10/3/2022:  Unchanged left upper lobe 1.3 cm lesion.  Unchanged right adrenal gland 1.2 cm nodule.\par \par

## 2023-02-16 NOTE — ASSESSMENT
[FreeTextEntry1] : Extensive Stage Small cell lung cancer.  Started first line Carbo/Etoposide/Atezo in early May 2019, achieved [place].  Completed 4 cycles followed by Atezolizumab maintenance from July 2019.  Received consolidative Thoracic RT completed late August 2019.  He declined PCI.  Developed Brain metastases in June 2020 s/p GK-SRS in July 2020. Hospitalized at [facility] 10/21-11/2/20 for pneumonitis, felt to be secondary to immunotherapy.  Treated with steroids through early March 2021.  Monitored off systemic therapy since last treatment with maintenance Atezolizumab in late Sept 2020.  \par Surveillance/Restaging CT Feb 2023 with overall sustained response.  Recommend: \par -Continue to observe off treatment and monitor for progression of disease\par -Pulm f/u.  On supplemental O2 prn.  Utilizing EPAP machine.  F/U with sleep specialist. \par -F/U with endocrine for management of DM and osteoporosis\par -Osteoporotic compression fractures:  follows with PMR.  MRI L-spine June 2022 with non-malignant findings\par -To see Ortho for left foot complaints\par -Surveillance Brain MRI Nov 2022 with sustained intracranial response; due for surveillance scan in Feb 2023 and f/u with Dr. Guerra (3-month MRI).    \par -Restaging/Surveillance CT C/A/P in 4 months (June 2023) and follow up to review results or sooner should problems arise\par -Information sheet given with directions for making appointments

## 2023-02-22 ENCOUNTER — APPOINTMENT (OUTPATIENT)
Dept: MRI IMAGING | Facility: CLINIC | Age: 73
End: 2023-02-22
Payer: MEDICARE

## 2023-02-22 ENCOUNTER — OUTPATIENT (OUTPATIENT)
Dept: OUTPATIENT SERVICES | Facility: HOSPITAL | Age: 73
LOS: 1 days | End: 2023-02-22
Payer: MEDICARE

## 2023-02-22 DIAGNOSIS — N20.0 CALCULUS OF KIDNEY: Chronic | ICD-10-CM

## 2023-02-22 DIAGNOSIS — C79.31 SECONDARY MALIGNANT NEOPLASM OF BRAIN: ICD-10-CM

## 2023-02-22 DIAGNOSIS — Z90.89 ACQUIRED ABSENCE OF OTHER ORGANS: Chronic | ICD-10-CM

## 2023-02-22 DIAGNOSIS — D11.0 BENIGN NEOPLASM OF PAROTID GLAND: Chronic | ICD-10-CM

## 2023-02-22 DIAGNOSIS — Z95.5 PRESENCE OF CORONARY ANGIOPLASTY IMPLANT AND GRAFT: Chronic | ICD-10-CM

## 2023-02-22 PROCEDURE — A9585: CPT

## 2023-02-22 PROCEDURE — 70553 MRI BRAIN STEM W/O & W/DYE: CPT

## 2023-02-22 PROCEDURE — 70553 MRI BRAIN STEM W/O & W/DYE: CPT | Mod: 26

## 2023-02-27 ENCOUNTER — APPOINTMENT (OUTPATIENT)
Dept: PULMONOLOGY | Facility: CLINIC | Age: 73
End: 2023-02-27
Payer: MEDICARE

## 2023-02-27 VITALS
SYSTOLIC BLOOD PRESSURE: 126 MMHG | TEMPERATURE: 98.2 F | DIASTOLIC BLOOD PRESSURE: 79 MMHG | OXYGEN SATURATION: 97 % | HEIGHT: 63 IN | BODY MASS INDEX: 29.59 KG/M2 | RESPIRATION RATE: 16 BRPM | HEART RATE: 68 BPM | WEIGHT: 167 LBS

## 2023-02-27 VITALS
OXYGEN SATURATION: 92 % | WEIGHT: 165 LBS | BODY MASS INDEX: 28.87 KG/M2 | DIASTOLIC BLOOD PRESSURE: 69 MMHG | HEIGHT: 63.5 IN | HEART RATE: 71 BPM | SYSTOLIC BLOOD PRESSURE: 107 MMHG

## 2023-02-27 DIAGNOSIS — Z00.00 ENCOUNTER FOR GENERAL ADULT MEDICAL EXAMINATION W/OUT ABNORMAL FINDINGS: ICD-10-CM

## 2023-02-27 PROCEDURE — 94726 PLETHYSMOGRAPHY LUNG VOLUMES: CPT

## 2023-02-27 PROCEDURE — 94618 PULMONARY STRESS TESTING: CPT

## 2023-02-27 PROCEDURE — 94010 BREATHING CAPACITY TEST: CPT

## 2023-02-27 PROCEDURE — ZZZZZ: CPT

## 2023-02-27 PROCEDURE — 94729 DIFFUSING CAPACITY: CPT

## 2023-02-27 PROCEDURE — 99215 OFFICE O/P EST HI 40 MIN: CPT | Mod: 25

## 2023-02-27 NOTE — HISTORY OF PRESENT ILLNESS
[Wheezing] : wheezing [Nasal Passage Blockage (Stuffiness)] : edema [Nonspecific Pain, Swelling, And Stiffness] : chest pain [Fever] : fever [Cough] : coughing [Difficulty Breathing During Exertion] : dyspnea on exertion [Feelings Of Weakness On Exertion] : exercise intolerance [Continuous] : Continuous [NC] : Nasal Cannula [24 hrs] : 24 hours/day [0.5  -  Very, very slight] : 0.5, very, very slight [Obstructive Sleep Apnea] : obstructive sleep apnea [Nonrestorative Sleep] : nonrestorative sleep [Snoring] : snoring [CPAP:] : CPAP [TextBox_4] : 72M DM2, HTN, CAD s/p PCI (RCA 2007), HLD, and COPD and lung cancer presenting for followup pulmonary evaluation. He is doing well today and presents to the office with his wife\par \par - Planning for a trip to [facility] later this year - will be taking his O2\par - Stable from a pulmonary standpoint - and uses O2 with exertion and as needed at home\par - Found to have nocturnal hypoxemia with PAP therapy - and now using O2 with CPAP. Follows with Dr. Leslie\par - Denies cough or sputum production\par - Remains on 2L O2 with activity\par - Walking about 1 miles per day - but limited by cold recently\par - Remains on bronchodilator (Symbicort)\par - CT chest from 2/6/23 reviewed - stable\par \par \par PFTs: 2/27/23 - FEV1 2.55L (98%), FVC 4.20L (121%), FEV1/FVC 61%, FEF 25-75 56%, %, RV/TLC 38%, [id number]%, DLCO 58%\par PFTs: 2/25/22 - FEV1 2.28L (84%), FVC 3.58L (99%), FEV1/FVC 64%, FEF 25-75 57%, TLC 97%, RV/TLC 32%, ERV 47%, DLCO 64%\par PFTs: 12/22/20 - FEV1 2.62L (95%), FVC 3.98L (109%), FEV1/FVC 66%, FEF 25-75 65%, %, RV/TLC 37%, DLCO 57%\par \par 6MWT: 2/27/23 - 376 meters - 2L O2\par 6MWT: 2/25/22 - 386 meters - 2L O2\par 6MWT: 12/22/20 - 331 meters - 4L O2\par \par He is a long term former smoker but quit at the time of his lung cancer diagnosis. He has known obstructive lung disease with a bronchodilator response. He was switched from Breo to Symbicort while in the hospital. He has a Ventolin inhaler and uses it intermittently. He developed pneumonitis from treatment of Small Cell which required hospitalization and then treatment with a prolonged course of steroids. He did develop COVID-19 but fortunately did not require hospitalization.\par \par He was diagnosed with small cell lung cancer 4/2019 and then started on chemotherapy and radiation. He initially underwent Bronchoscopy/EBUS with Dr. Martinez which revealed that bilateral mediastinal LN were positive for small cell carcinoma. He was started on chemotehrapy at that time in May 2019 and achieved partial response. He was then started on maintenance Atezolizumab. He was offered prophylactic cranial irradiation but declined initially. He developed small brain mets in June 2020 and received GK-SRS with Dr. Mari [facility]. He had a CT chest 8/31/2020 that showed a small stable upper lobe opacity. His repeat CT chest done 10/2020 was suggestive of pneumonitis. He had a repeat CT chest done 12/28/2020 which showed improvement. [FreeTextEntry1] : 1-4 [Difficulty Maintaining Sleep] : does not have difficulty maintaining sleep [Witnessed Apneas] : no witnessed apneas [TextBox_100] : 11/8/21 [TextBox_108] : 8.2 [TextBox_112] : 96.6 [TextBox_116] : 76 [TextBox_104] : 2/5/22 [TextBox_131] : 11 [TextBox_127] : 5/22 [TextBox_129] : 6/20/22 [TextBox_133] : 63 (19/30) [TextBox_137] : 7 (2/30) [TextBox_141] : 1 [TextBox_143] : 42 [TextBox_147] : 1.1 [ESS] : 0 [TextBox_12] : 12/28/2020 - INTERPRETATION:  Reason for Exam: Left upper lobe lung cancer\par \par CT of the chest was performed from the thoracic inlet to the level of the adrenal glands following IV contrast injection of  50 cc of Omnipaque 350. No immediate complications were reported.\par \par Comparison: October 21, 2020\par \par Tubes/Lines: None\par \par Mediastinum and Heart: Aorta and pulmonary arteries are normal in size. No pericardial effusion. Multiple calcified lymph nodes in the mediastinum are unchanged since prior. Coronary artery calcifications are noted.. Thyroid gland appears unremarkable.\par \par Lungs, Pleura, and Airways: Again seen is a left upper lobe spiculated nodule which measures approximately 1.4 cm which is unchanged since previous exam. Previously seen areas of bilateral peribronchovascular groundglass abnormalities and consolidations have essentially resolved. Minimal residual peribronchial vascular and linear scarring with traction bronchiolectasis noted. Focal area of distortion in the right middle lobe with calcification and traction bronchiectasis is stable.\par \par Visualized Abdomen: Postcontrast appearance of the upper abdomen is unremarkable.\par \par Bones and soft tissues: Unremarkable.\par \par \par IMPRESSION:\par \par When compared with October 21, 2020:\par \par Peribronchovascular groundglass abnormality and consolidations consistent with pneumonitis has resolved.\par \par Unchanged left upper lobe 1.4 cm spiculated nodule in keeping with known malignancy. Follow-up recommended to assess for change.\par  [TextBox_27] : 10/3/22 - FINDINGS: The quality of the images are degraded by respiratory motion.\par CHEST:\par LUNGS AND LARGE AIRWAYS: Patent central airways. Emphysema. This table bilateral areas of bandlike atelectasis/scarring within the lung bases. Stable 1.3 cm right upper lobe nodule. Stable scattered calcified nodules. No new or enlarging nodule.\par PLEURA: No pleural effusion.\par VESSELS: Coronary and aortic calcifications.\par HEART: Heart size is normal. No pericardial effusion.\par MEDIASTINUM AND SANDI: Stable nonenlarged calcified hilar and mediastinal lymph nodes.\par CHEST WALL AND LOWER NECK: Within normal limits.\par \par ABDOMEN AND PELVIS:\par LIVER: Within normal limits.\par BILE DUCTS: Normal caliber.\par GALLBLADDER: Within normal limits.\par SPLEEN: Within normal limits.\par PANCREAS: Within normal limits.\par ADRENALS: Stable 1.1 cm right adrenal nodule.\par KIDNEYS/URETERS: Within normal limits.\par \par BLADDER: Severely urinary bladder wall thickening.\par REPRODUCTIVE ORGANS: Prostate within normal limits.\par \par BOWEL: No bowel obstruction. Appendix is normal. Sigmoid colon diverticulosis.\par PERITONEUM: No ascites.\par VESSELS: Atherosclerotic changes.\par RETROPERITONEUM/LYMPH NODES: No lymphadenopathy.\par ABDOMINAL WALL: Within normal limits.\par BONES: Stable small sclerotic lesion within T6 vertebral body. Stable multilevel compression deformity of the spine most severe at T10.\par \par IMPRESSION:\par Stable exam. Stable 1.3 cm left upper lobe nodule. Stable 1.1 cm right adrenal nodule. [TextBox_42] : 2/8/23 - FINDINGS:\par \par AIRWAYS, LUNGS, PLEURA: Emphysema. Right middle lobe, lingular, bilateral lower lobe bronchiolectasis with associated scarring. Left upper lobe apicoposterior segment nodular lesion measuring 1.3 x 1 cm (image 129, series 4) is unchanged compared to 10/3/2022. Many calcified lung granulomas. No pleural effusion.\par \par MEDIASTINUM: Normal heart size. No pericardial effusion. Thoracic aorta normal caliber.  No large mediastinal lymph nodes. Calcified mediastinal and right hilar nodes.\par \par ABDOMEN and PELVIS: Cholelithiasis. Unchanged right adrenal 1.2 cm nodule. The abdominal and pelvic organs are otherwise unremarkable.\par \par No bowel obstruction. Colonic diverticulosis. No free fluid. No abdominal or pelvic lymphadenopathy. Abdominal aorta normal caliber.\par \par BONES: Unchanged sclerotic lesion within T6 vertebral body and loss of height of multiple lower thoracic and lumbar vertebral bodies.\par \par SOFT TISSUES: Unremarkable.\par \par IMPRESSION:\par \par Interval disease stability in the chest, abdomen, and pelvis compared to 10/3/2022:\par \par Unchanged left upper lobe 1.3 cm lesion.\par \par Unchanged right adrenal gland 1.2 cm nodule.\par

## 2023-02-27 NOTE — ASSESSMENT
[FreeTextEntry1] : 72M extensive history including DM2, CAD, COPD, Chronic Hypoxemic Respiratory Failure on O2, Obstructive Sleep Apnea, and small cell lung cancer presenting for followup pulmonary evaluation. \par - He is doing well from a pulmonary standpoint\par \par 1.  Obstructive Sleep Apnea - patient recently found to have ARIELLE and CPAP titration indicated CPAP 11 and nocturnal hypoxemia. Using O2 with CPAP QHS.\par -  Followup with Dr. Leslie. \par \par 2. Small Cell Lung Cancer - with metastatic disease. Followup with Dr. Ramirez\par - patient has received radiation therapy - f/u with Dr. Guerra\par - Currently off cancer directed therapies\par - Pneumonitis from 2020 resolved\par - Repeat CT chest reviewed (2/2023) - with stable PATRICIO nodule\par \par 3. COPD - patient is a long term former smoker. He will continue Symbicort and use Ventolin as needed.\par - PFTs and 6MWT done today and stable/slightly improved\par - No evidence of desaturation during 6MWT with 2L O2 - minimal decrease in distance compared to 2022\par - continue yearly followup\par \par 4. Chronic Hypoxemic Respiratory Failure - patient currently on 2L supplemental O2. Continue to maintain O2 sats > 90% as able.\par - Recommend to use O2 ATC, with the possible need to increase to 4L, while in [facility] given increased altitude\par - Now requiring O2 with PAP therapy at night \par \par 5. Health Maintenance\par Spirometry: reviewed\par Smoking status: Former \par Pneumococcal vaccination status: Completed \par [place] Sleepiness Scale: 0 (9/30/21)\par \par The above plan was discussed with EVER CHAVEZ and his wife in detail. Patient verbalized understanding and agrees with plan as detailed above. Patient was provided education and counselling on current diagnosis/symptoms, diagnostic work up, treatment options and potential side effects of any prescribed therapy/therapies. EVER  was advised to call our clinic at [phone] for any new or worsening symptoms, or with any questions or concerns. In case of acute onset of respiratory symptoms or worsening presentation, patient was advised to present to nearest emergency room for further evaluation. EVER  expressed understanding and all questions/concerns were addressed.\par \par

## 2023-02-27 NOTE — REVIEW OF SYSTEMS
[Postnasal Drip] : postnasal drip [SOB on Exertion] : sob on exertion [GERD] : gerd [Diabetes] : diabetes [Fever] : no fever [Fatigue] : no fatigue [Chills] : no chills [Nasal Congestion] : no nasal congestion [Sinus Problems] : no sinus problems [Cough] : no cough [Hemoptysis] : no hemoptysis [Sputum] : no sputum [Wheezing] : no wheezing [Chest Discomfort] : no chest discomfort [Edema] : no edema [Angioedema] : no angioedema [Abdominal Pain] : no abdominal pain [Nausea] : no nausea [Vomiting] : no vomiting [Diarrhea] : no diarrhea [Back Pain] : no back pain [Rash] : no rash [Easy Bruising] : no easy bruising [Focal Weakness] : no focal weakness [Seizures] : no seizures [Depression] : no depression [Anxiety] : no anxiety

## 2023-02-27 NOTE — PHYSICAL EXAM
[No Acute Distress] : no acute distress [Well Nourished] : well nourished [Well Groomed] : well groomed [No Deformities] : no deformities [Well Developed] : well developed [Normal Oropharynx] : normal oropharynx [Normal Appearance] : normal appearance [No JVD] : no jvd [Normal Rate/Rhythm] : normal rate/rhythm [Normal Pulses] : normal pulses [Normal S1, S2] : normal s1, s2 [No Resp Distress] : no resp distress [No Acc Muscle Use] : no acc muscle use [Normal Rhythm and Effort] : normal rhythm and effort [Clear to Auscultation Bilaterally] : clear to auscultation bilaterally [Benign] : benign [Normal Gait] : normal gait [No Clubbing] : no clubbing [No Cyanosis] : no cyanosis [No Edema] : no edema [FROM] : FROM [Normal Color/ Pigmentation] : normal color/ pigmentation [No Focal Deficits] : no focal deficits [Oriented x3] : oriented x3 [Normal Mood] : normal mood [Normal Affect] : normal affect [TextBox_11] : anicteric, EOMI, MMM, trachea midline, no thrush [TextBox_68] : good air entry, prolonged expiratory phase, no rhonchi

## 2023-02-27 NOTE — REASON FOR VISIT
[Follow-Up] : a follow-up visit [Lung Cancer] : lung cancer [Sleep Apnea] : sleep apnea [COPD] : COPD [Spouse] : spouse [Patient Declined  Services] : - None: Patient declined  services [Interpreters_Relationshiptopatient] : wife

## 2023-02-28 ENCOUNTER — NON-APPOINTMENT (OUTPATIENT)
Age: 73
End: 2023-02-28

## 2023-02-28 LAB
ANION GAP SERPL CALC-SCNC: 14 MMOL/L
BUN SERPL-MCNC: 14 MG/DL
CALCIUM SERPL-MCNC: 10.3 MG/DL
CHLORIDE SERPL-SCNC: 99 MMOL/L
CO2 SERPL-SCNC: 26 MMOL/L
CREAT SERPL-MCNC: 0.99 MG/DL
EGFR: 82 ML/MIN/1.73M2
GLUCOSE SERPL-MCNC: 148 MG/DL
MAGNESIUM SERPL-MCNC: 1.8 MG/DL
POTASSIUM SERPL-SCNC: 4.5 MMOL/L
SODIUM SERPL-SCNC: 139 MMOL/L

## 2023-03-01 ENCOUNTER — NON-APPOINTMENT (OUTPATIENT)
Age: 73
End: 2023-03-01

## 2023-03-02 ENCOUNTER — OUTPATIENT (OUTPATIENT)
Dept: OUTPATIENT SERVICES | Facility: HOSPITAL | Age: 73
LOS: 1 days | End: 2023-03-02
Payer: MEDICARE

## 2023-03-02 ENCOUNTER — APPOINTMENT (OUTPATIENT)
Dept: RADIOLOGY | Facility: CLINIC | Age: 73
End: 2023-03-02
Payer: MEDICARE

## 2023-03-02 ENCOUNTER — APPOINTMENT (OUTPATIENT)
Dept: PULMONOLOGY | Facility: CLINIC | Age: 73
End: 2023-03-02

## 2023-03-02 DIAGNOSIS — M85.80 OTHER SPECIFIED DISORDERS OF BONE DENSITY AND STRUCTURE, UNSPECIFIED SITE: ICD-10-CM

## 2023-03-02 DIAGNOSIS — Z90.89 ACQUIRED ABSENCE OF OTHER ORGANS: Chronic | ICD-10-CM

## 2023-03-02 DIAGNOSIS — Z95.5 PRESENCE OF CORONARY ANGIOPLASTY IMPLANT AND GRAFT: Chronic | ICD-10-CM

## 2023-03-02 DIAGNOSIS — D11.0 BENIGN NEOPLASM OF PAROTID GLAND: Chronic | ICD-10-CM

## 2023-03-02 DIAGNOSIS — N20.0 CALCULUS OF KIDNEY: Chronic | ICD-10-CM

## 2023-03-02 PROCEDURE — 77080 DXA BONE DENSITY AXIAL: CPT

## 2023-03-03 PROCEDURE — 77080 DXA BONE DENSITY AXIAL: CPT | Mod: 26

## 2023-03-05 DIAGNOSIS — Z87.09 PERSONAL HISTORY OF OTHER DISEASES OF THE RESPIRATORY SYSTEM: ICD-10-CM

## 2023-03-05 DIAGNOSIS — Z86.69 PERSONAL HISTORY OF OTHER DISEASES OF THE NERVOUS SYSTEM AND SENSE ORGANS: ICD-10-CM

## 2023-03-05 DIAGNOSIS — Z85.89 PERSONAL HISTORY OF MALIGNANT NEOPLASM OF OTHER ORGANS AND SYSTEMS: ICD-10-CM

## 2023-03-06 ENCOUNTER — APPOINTMENT (OUTPATIENT)
Dept: INTERNAL MEDICINE | Facility: CLINIC | Age: 73
End: 2023-03-06
Payer: MEDICARE

## 2023-03-06 VITALS
SYSTOLIC BLOOD PRESSURE: 148 MMHG | TEMPERATURE: 98 F | WEIGHT: 166 LBS | OXYGEN SATURATION: 95 % | BODY MASS INDEX: 29.41 KG/M2 | HEART RATE: 92 BPM | RESPIRATION RATE: 16 BRPM | HEIGHT: 63 IN | DIASTOLIC BLOOD PRESSURE: 72 MMHG

## 2023-03-06 DIAGNOSIS — Z87.39 PERSONAL HISTORY OF OTHER DISEASES OF THE MUSCULOSKELETAL SYSTEM AND CONNECTIVE TISSUE: ICD-10-CM

## 2023-03-06 PROCEDURE — 99214 OFFICE O/P EST MOD 30 MIN: CPT | Mod: 25

## 2023-03-06 PROCEDURE — 36415 COLL VENOUS BLD VENIPUNCTURE: CPT

## 2023-03-06 NOTE — HISTORY OF PRESENT ILLNESS
[FreeTextEntry1] : \par f/u [de-identified] : \par Accompanied by wife, Jacque Abrams.  Declines formal , wishes for wife to translate in [place]ovan as needed.\par \par 71 yo M pmhx HTN, DM, HLD, CAD s/p RCA stent (2007), ARIELLE, BPH s/p TURP, ED, GERD, hx gastritis, hypomagnesemia, former smoker (quit 2019), COPD (on supplemental O2), osteopenia, stage 4 small cell lung cancer (dx'd 4/19, s/p carboplatin/etoposide and radiation with partial response, then on atezolizumab c/b pneumonitis in 10/2020 treated with high dose steroids c/b vertebral compression fractures, with small brain metastases 6/2020 s/p gamma knife radiation therapy in 7/2020. Was tapered off steroids for pneumonitis by 3/7/2021, and stopped Bactrim prophylaxis at that time), hx covid infection 3/21 (s/p monoclonal Ab infusion and in PREVENT-HD rivaroxaban trial), hx covid infection 7/22 s/p Paxlovid course here for f/u\par \par Feeling well today.\par Does not need med refills.\par Last ate 2 hrs ago.\par \par Reports is socially distancing and using precautions for covid prevention.\par Denies sick or covid positive contacts.\par Denies fever, chills, cough or sob.\par -hx pfizer vaccine series- 3/21, 7/21; Hx boosters x2 (last 11/22)\par -hx covid infection 7/22- resolved s/p Paxlovid course.\par \par \par hx chronic LBP, hx compression fractures, hx osteopenia- notes pain improved since onset, well controlled \par -on Flexeril prn, diclofenac prn (taking 1-2x/wk) and Tylenol 1-2x/wk\par -followed by PMR, last seen 6/22 with MRI spine done (no acute findings, no mets dz) \par -followed by neurosx, last seen 3/21- advised PT and back brace use with no surgical intervention planned.\par -followed by ortho, last seen 3/21\par -hx PT \par -on Alendronate since 1/21, tolerating w/o SEs\par -hx ambulates with cane or walker- no recent need\par -using back brace\par -denies focal weakness or incontinence\par \par small cell lung cancer stage 4 (dx'd 4/19), hx brain mets- notes using O2 at night and 2-3hrs in daytime, O2 90 -95%\par -followed by oncology, last seen 10/23 noted off tx with cont'd monitoring planned, CT CAP for surveillance ordered (done 2/23, stable). Has f/u 6/23.\par -followed by rad-onc, has f/u 3/23.  hx MRI brain and CT scans 2/23 noted stable findings.\par -followed by pulm, last seen 2/23, 2/23 CT chest reviewed. No med changes made.  \par \par Reports home O2 92-98%, also using with CPAP - feels is sleeping better with use\par Denies cough or sob.\par \par ARIELLE- reports sleeping well\par -followed by sleep specialist, seen 10/22 states advised to cont CPAP with O2\par -on Temazepam, plans to wean off as feels may not need it\par \par DM-\par -followed by endo and on med regimen- had f/u 11/22 with POCT A1c 6.3, metformin decreased to 500 (1) bid and Trulicity increased to 3 qwk. Told MD leaving and advised to see colleague in office in 4/23\par -using Freestyle Osiel. Home fs: (fasting) 100 - 140; postprandial: < 220, denies hypoglycemic sx's\par -seen by optho eval 12/22, states told nl exam to f/u in 4 mo\par -followed by podiatry, states seen 9/22 with nails clipped.  Denies foot paresthesias.\par \par HTN, HLD, CAD s/p stent- hx LE edema, reports lessening since off steroids\par -on ASA, metoprolol and Lisinopril\par -on Crestor, Lovaza and niacin - denies SEs with use\par -had echo 6/21\par -followed by cardio, seen 11/22, noted doing well.  Viagra prn given (use pending).  Has f/u 4/23.\par -reports hx nl NST 7/22.\par -exercise: walks daily 1-1.5 miles- done w/o sx's or limitations\par -denies CP, dizziness or sob\par \par hx hypomagnesium- hx chronic, mild diarrhea 2/2 metformin\par -followed by nephrology, felt low Mg 2/2 Carboplatin induced tubular damage with increase in MgOx to 400 bid advised.  Last seen 2/23 with Amiloride 5 started and Lisinopril lowered to 10 from 20.  Had repeat lab #2 last week- told normal.  Has f/u 8/23.\par \par Feels diarrhea overall happening less since cut down on metformin.  Having 1-2x/wk (was 4-5x prior)\par Denies n/v or abdominal pain.\par \par Denies  complaints. \par -no recent need for Viagra\par \par

## 2023-03-06 NOTE — ASSESSMENT
[FreeTextEntry1] : \par 73 yo M pmhx HTN, DM, HLD, CAD s/p RCA stent (2007), ARIELLE, BPH s/p TURP, ED, GERD, hx gastritis, hypomagnesemia, former smoker (quit 2019), COPD (on supplemental O2), osteopenia, stage 4 small cell lung cancer (dx'd 4/19, s/p carboplatin/etoposide and radiation with partial response, then on atezolizumab c/b pneumonitis in 10/2020 treated with high dose steroids c/b vertebral compression fractures, with small brain metastases 6/2020 s/p gamma knife radiation therapy in 7/2020. Was tapered off steroids for pneumonitis by 3/7/2021, and stopped Bactrim prophylaxis at that time), hx covid infection 3/21 (s/p monoclonal Ab infusion and in PREVENT-HD rivaroxaban trial), hx covid infection 7/22 s/p Paxlovid course here for f/u\par \par \par \par hx chronic LBP, hx compression fractures, hx osteopenia, vit d insuff- hx mechanical fall s/p ER 6/21 w/o acute findings on CT scan. Back pain at baseline s/p recent exacerbation.\par -1/21 DEXA: osteopenia, repeat due 1/23\par -6/21 CT lumbar spine: Unchanged compression deformities of the T12-L3 vertebral bodies. No destructive lesions are identified. Multilevel degenerative changes with disc herniations at L3-4 and L4-5\par -followed by PMR, last seen 6/22 with MRI spine done (no acute findings, no mets dz) \par -followed by neurosx, last seen 3/21- advised PT and back brace use with no surgical intervention planned.\par -followed by ortho, last seen 3/21\par -hx PT \par -on Flexeril prn, Tylenol prn and Oxycodone prn (per PMR), taking mainly Tylenol 1-2x/wk at baseline)\par -on diclofenac and Voltaren gel prn per PMR\par -on Alendronate since 1/21, tolerating w/o SEs\par -on ca/D OTC supp\par -hx ambulates with cane or walker prn - no recent need\par -using back brace\par -fall precautions counseled\par \par small cell lung cancer stage 4 (dx'd 4/19), hx brain mets, anemia-\par -followed by oncology, last seen 10/23 noted off tx with cont'd monitoring planned, CT CAP for surveillance ordered (done 2/23, stable). Has f/u 6/23.\par -followed by rad-onc, has f/u 3/23.  hx MRI brain and CT scans 2/23 noted stable findings.\par -followed by pulm, last seen 2/23, 2/23 CT chest reviewed. No med changes made.  \par \par ARIELLE, COPD (on supp O2), seasonal allergies, former smoker (quit 2019), hx covid infection x2 (last 7/22)-\par -followed by pulm\par -on Symbicort and MDI prn\par -Flonase prn\par -followed by sleep specialist, seen 10/22 states advised to cont CPAP with O2\par -on Temazepam, plans to wean off as feels may not need it\par \par DM, microalbuminuria- 11/22 POCT A1c 6.3 (was 6.7), 8/22 microalbumin wnl \par -followed by endo and on med regimen- had f/u 11/22 with POCT A1c 6.3, metformin decreased to 500 (1) bid and Trulicity increased to 3 qwk. Told MD leaving and advised to see colleague in office in 4/23\par -on metformin  (1) BID (decreased to current since 11/22)\par -off Basaglar\par -off Novolog\par -on Trulicity since 1/22 (increased to 3 in 11/22)\par -on Lisinopril for renal protection\par -cont home fs monitoring, using Freestyle Osiel\par -seen by optho eval 12/22, states told nl exam to f/u in 4 mo\par -followed by podiatry, states seen 9/22 with nails clipped. Denies foot paresthesias.\par -check A1c, bmp\par \par HTN, HLD, CAD s/p stent, hx LE edema (reports improved since off steroids 3/21)- BP wnl\par -12/20 b/l LE duplex: no DVT b/l\par -6/21 echo 6/21: EF 60-65%, min MR, LV remodeling, nl LV fxn, mild diastolic dysfxn (Stg I)\par -7/21 cbc/bmp/TSH wnl\par -9/22 Tchol 155  LDL 67 HDL 52\par -8/22 cmp wnl\par -9/22 bmp wnl\par -on ASA, metoprolol and Lisinopril 10 (decreased from 20 by renal in 2/23)\par -on Crestor, Lovaza and niacin - denies SEs with use\par -off Zetia 11/21 per cardio\par -followed by cardio, seen 11/22, noted doing well.  Viagra prn given.  Has f/u 4/23.\par -low salt diet advised\par -check lipids \par \par GERD, hx internal hemorrhoids, diverticulosis, colon polyps- reports controlled\par -hx EGD 1/16: nonbleeding erosive gastropathy\par -hx colonoscopy 1/16: internal hemorrhoids, large lipoma at transverse colon, diverticulosis, rec: repeat in 10 yrs (Dr. Hinds)\par -on famotidine\par -advised to avoid reflux aggravating foods\par \par hx hypomagnesium- hx chronic, mild diarrhea 2/2 metformin\par -declines to change metformin\par -on MgOx 400 bid\par -followed by nephrology, felt low Mg 2/2 Carboplatin induced tubular damage with increase in MgOx to 400 bid advised.  Last seen 2/23 with Amiloride 5 started and Lisinopril lowered to 10 from 20.  Had repeat lab #2 last week- told normal.  Has f/u 8/23.\par -2/23 Mg 1.8 (was 1.6 in 2/23)\par \par hx skin rash- gets on/off sporadically, resolves with Allegra use\par -on Allegra prn\par -followed by A/I, seen 1/23.  To forward records.\par -encouraged sx diary to ID trigger\par -advised prompt ER if worsened or onset facial/tong/lip swelling, throat discomfort, sob, etc.\par \par \par MISC: Continued social distancing and measure for covid19 prevention encouraged. \par -hx pfizer vaccine series- 3/21, 7/21; Hx boosters x2 (last 11/22)\par \par \par HCM\par -hx CPE 9/22\par -8/22 cmp wnl\par -9/22 cbc/bmp/TSH/B12/folate/PSA wnl\par -7/21 hep C screening negative\par -hx flu shot 11/22\par -hx Tdap 11/22\par -hx prevnar 3/15\par -hx PVX 10/20\par -advised to check on insurance coverage for shingrix and f/u if desires. Advised to d/w oncology prior to getting.\par -hx screening colonoscopy 1/16: internal hemorrhoids, large lipoma at transverse colon, diverticulosis, rec: repeat in 10 yrs (Dr. Hinds)\par -hx DEXA 3/23: osteopenia (hx compression fractures). \par -followed by derm, last seen 9/20. Yearly skin screening advised. Regular use of sun block for skin cancer prevention counseled.\par -advised to designate HCP and provide copy of completed form for records\par \par Pt requests wife, Jacque Abrams, be contacted re: his test results and medical care, (cell) [phone]\par \par Labs drawn in office today.\par \par \par

## 2023-03-06 NOTE — PHYSICAL EXAM
[No Acute Distress] : no acute distress [Well-Appearing] : well-appearing [Normal Sclera/Conjunctiva] : normal sclera/conjunctiva [PERRL] : pupils equal round and reactive to light [EOMI] : extraocular movements intact [Normal Oropharynx] : the oropharynx was normal [Normal Nasal Mucosa] : the nasal mucosa was normal [No Lymphadenopathy] : no lymphadenopathy [Supple] : supple [Thyroid Normal, No Nodules] : the thyroid was normal and there were no nodules present [No Respiratory Distress] : no respiratory distress  [Clear to Auscultation] : lungs were clear to auscultation bilaterally [Normal Rate] : normal rate  [Regular Rhythm] : with a regular rhythm [Normal S1, S2] : normal S1 and S2 [No Murmur] : no murmur heard [Pedal Pulses Present] : the pedal pulses are present [No Edema] : there was no peripheral edema [Soft] : abdomen soft [Non Tender] : non-tender [No HSM] : no HSM [No CVA Tenderness] : no CVA  tenderness [No Spinal Tenderness] : no spinal tenderness [No Joint Swelling] : no joint swelling [No Rash] : no rash [No Focal Deficits] : no focal deficits [Normal Gait] : normal gait [Normal Affect] : the affect was normal [Alert and Oriented x3] : oriented to person, place, and time [de-identified] : scattered freckles

## 2023-03-06 NOTE — REVIEW OF SYSTEMS
[Negative] : Psychiatric [FreeTextEntry6] : see HPI [FreeTextEntry7] : see HPI [FreeTextEntry9] : see HPI [de-identified] : see HPI

## 2023-03-09 ENCOUNTER — APPOINTMENT (OUTPATIENT)
Dept: RADIATION ONCOLOGY | Facility: CLINIC | Age: 73
End: 2023-03-09
Payer: MEDICARE

## 2023-03-09 VITALS
HEART RATE: 83 BPM | TEMPERATURE: 97.7 F | BODY MASS INDEX: 29.14 KG/M2 | DIASTOLIC BLOOD PRESSURE: 69 MMHG | OXYGEN SATURATION: 93 % | RESPIRATION RATE: 17 BRPM | SYSTOLIC BLOOD PRESSURE: 107 MMHG | WEIGHT: 164.46 LBS | HEIGHT: 63 IN

## 2023-03-09 PROCEDURE — 99213 OFFICE O/P EST LOW 20 MIN: CPT

## 2023-03-09 NOTE — REVIEW OF SYSTEMS
[Negative] : Gastrointestinal [Fatigue: Grade 1 - Fatigue relieved by rest] : Fatigue: Grade 1 - Fatigue relieved by rest [Concentration Impairment: Grade 1] : Concentration Impairment: Grade 1 - Mild inattention or decreased level of concentration [Dizziness: Grade 0] : Dizziness: Grade 0  [Headache: Grade 1 - Mild pain] : Headache: Grade 1 - Mild pain [Peripheral Motor Neuropathy: Grade 0] : Peripheral Motor Neuropathy: Grade 0 [Peripheral Sensory Neuropathy: Grade 0] : Peripheral Sensory Neuropathy: Grade 0 [Insomnia: Grade 1 - Mild difficulty falling asleep, staying asleep or waking up early] : Insomnia: Grade 1 - Mild difficulty falling asleep, staying asleep or waking up early [Dysphagia] : no dysphagia [Loss of Hearing] : no loss of hearing [Shortness Of Breath] : no shortness of breath [Confused] : no confusion [Dizziness] : no dizziness [Fainting] : no fainting [FreeTextEntry6] : patient states 3 episodes of brief mild headache, no medication needd

## 2023-03-09 NOTE — HISTORY OF PRESENT ILLNESS
[FreeTextEntry1] : Mr. Cinthya Ness is a 71 yo male with PMH DM2, HTN, CAD s/p PCI (RCA 2007), HLD, and COPD , Found with extensive stage small cell lung cancer diagnosed in 4/2019 s/p carbo/ etoposide/ atezolizumab and consolidative RT 8/2019 followed by adjuvant atezolizumab. He was found to have two sub-cm brain metastases treated on 7/1/2020 with GK SRS (L_Frontal, L_Parietal). Patient presents for follow up. \par \par ONCOLOGY HISTORY\par Presented as a 69 year old male with a history of small cell lung cancer.  This was initially diagnosed in 4/2019 through an ebus of bilateral level 4 lymph nodes in 4/2019 after presenting with 6 months of worsening cough, fatigue, and insomnia. A CT chest showed a lung mass at that time. He was initially treated with carbo/etoposide/atezoluzimab starting in 5/2019 with 4 cycles completed in 7/2019, followed by atezolizumab maintenance . Consolidative RT completed in 8/2019. He elected against prophylactic whole brain radiation. \par \par Due to frequent headaches, forgetfulness, and reflexes bring worse, a brain MRI was ordered on 6/10/2020. \par \par This brain MRI revealed two brain lesions consistent with metastases. \par 1: left posterior temporal/occipital cortex,  approximately 1.4 x 1.2 cm. \par 2:  high medial left frontal cortex, approximately 0.7 x 0.6 cm Small amount of surrounding edema is identified. \par \par At the time of initial consult he endorsed occassional headaches, manageable.  Denied focal weakness, nausea, vomiting, or other complaints.  Maintains an excellent KPS.  His daughter, a physician, is serving as the  during this conversation.\par \par He was treated with GK SRS to the two lesions on 7/1/2020\par \par 9/17/2020- Mr. Ness presents today for follow up. He is seen with the assistance of [facility]  [id number]. MRI brain done 9/11/2020 showed maarked improvement and treatment response since prior exam. Previously visualized enhancing lesion in the left posterior temporal lobe is markedly decreased in size since prior exam, measuring approximately 0.6 x 0.6 cm, and previously measured 1.4 x 1.2 cm. There is no vasogenic edema surrounding this lesion. Previously visualized enhancing lesion in the high medial left frontal cortex, is markedly decreased in size since prior exam, nearly nonexistent measuring 0.1 cm, and previously measuring 0.7 x 0.6 cm. Minimal surrounding vasogenic edema remains. No new enhancing lesions are identified. \par \par He continues following with Dr. Ramirez. continues on atezoluzumab maintenance. CT chest 8/31/2020 showed Emphysema is present. Left upper lobe ill-defined nodular/linear opacity is similar compared to the prior study. Other bilateral patchy lung opacities are new from the prior study. These are indeterminate and may be infectious/inflammatory. 1 month follow-up CT needed for complete evaluation. \par \par Today he feels very well. Notes some headaches over the last week, not lasting long. Denies nausea, vomiting, focal weakness, Overall feeling well. Hearing may be a little worse recently.\par \par 12/22/2020- Mr. Ness presents today for follow up. [employer]  [id number] used for visit today.  MRI brain 12/11/2020 showed previously seen left temporal occipital lobe lesion is significantly smaller in size.  Previously seen left frontal lobe lesion is no longer visualized.  Continued follow-up is recommended.\par \par CT Chest 10/19/2020 showed widespread bilateral opacities throughout both lungs with significant interval progression since August 31, 2020 suggestive of infectious or inflammatory etiology. Findings may be due to Covid 19 pneumonia.\par \par Atezoluzumab discontinued this fall due to hospitalization with pneumonitis. Due for surveillance imaging at the end of December. Currently on 120 mg of prednisone for pneumonitis.  Today he denies headaches. Says he feels slightly confused sometimes, attributes this to steroids. Denies nausea, vomiting, focal weakness. Slight bilateral LE swelling, +1\par \par 4/13/2021- Mr. Ness presents today for follow up. He is seen through [facility] for which he provides verbal consent on 4/13/2021 at 3:19 PM. [employer]  [id number] used for visit. he underwent a follow up brain MRI on 4/12/2021. This showed Previously noted enhancing lesions are no longer identified. Continued close interval follow-up is recommended.\par He was diagnosed with covid-19 on 4/2. Has continued to follow with Dr. Ramirez. \par Today he is feeling  well. Remains on 3L O2, which he was on prior to COVID. Feels like antibody therapy helped him a lot. No headaches, no focal weakness. \par \par 7/22/2021- Mr. Ness presents today for follow up. [facility]   he underwent a brain MRI on 7/15/2021. This showed no significant change. Continues to follow with Dr. Ramirez. Continues to be observed off treatment since 9/2020, with sustained response in June, plan for follow up imaging in in 9/2021.\par Today he is feeling well. Remains on 2L O2 during the day, none at night. No headaches, nausea, vomiting, focal weakness, visual trouble. some trouble swallowing which has been present since he had systemic therapy. Overall feeling well. has some back pain after a recent fall\par \par 10/20/2021- Mr. Ness presents today for follow up. Has continued to follow with Dr. Ramirez. Continues to be monitored off treatment.\par \par CT CAP 9/2021 showed \par Stable dominant central left upper lobe nodule.\par More conspicuous small tubular nodule in the anterior left upper lobe could be mucoid impaction. 3 month follow-up is recommended.\par New sclerotic lesion of the left third rib. Stable sclerosis and loss of height of the T10 vertebral body.\par No new finding in the abdomen or pelvis.\par Brain MRI 10/15/2021 showed Posttreatment changes. No new or enlarging intracranial lesions identified.\par Spoke with patient via  #[id number].  Patient is feeling well and denies any new symptoms.  \par \par 1/19/2022: Pt presents for follow-up. Today he reports feeling well. Denies pain, numbness, tingling, or changes in hearing/vision. Endorses short-term memory loss (often related to tasks he wishes to complete). He is currently using 2L oxygen.\par \par 4/28/2022- Mr. Fuentes presents today for follow up.  offered, patient refuses, asks wife to translate.\par MRI brain 4/26/22 showed No hydrocephalus, mass effect, acute intracranial hemorrhage, vasogenic edema, or acute territorial infarct. No abnormal parenchymal or leptomeningeal enhancement.  No evidence for intracranial or osseous metastases\par \par CT CAP 3/10/2022 was stable.  Continues to follow with Dr. Ramirez. not on systemic therapy. \par \par He continues to feel very well. Following with PM &R regarding lower back pain, will get a lumbar spine MRI. notes memory trouble.\par \par 8/4/2022- Mr Fuentes presents today for follow up.  [id number] used for visit today. MRI brain 7/29/2022 with no final read, appears stable. Continues off of systemic therapy.  CT CAP 6/14/2022 showed Stable 1.3 cm left upper lobe nodule.  No evidence of new or progressive disease.\par \par Feeling well overall. notes some nocturnal dizziness when he lays down over the last month, not progressive. no headaches, no nausea, no focal weakness, no numbness or tingling. lower back pain has improved. \par \par 11/23/2022- Mr. Fuentes presents today for follow up. Brain MRI 11/17/2022 showed Similar-appearing (since 7/29/2022) 6 x 5 mm enhancing parenchymal nodule in the posterior left temporal lobe with small surrounding vasogenic edema.\par No abnormal leptomeningeal enhancement.\par \par Continues off systemic therapy. \par \par CT CAP 10/3/2022 stable.\par Patient is doing well today. States 3 brief episodes of mild headache lasting a few seconds. No motor or sensory deficits noted. Minor facial asymmetry. Patient denies any nausea, vomiting or constipation. \par \par 3/9/2023 Mr Fuentes presents for follow up. Brain MRI 2/22/23 showed IMPRESSION:\par Stable appearing nodule lateral left temporal occipital junction with mild surrounding edema.\par No new lesions seen\par \par 2/6/23 CT C/A/P IMPRESSION:\par Interval disease stability in the chest, abdomen, and pelvis compared to \par 10/3/2022:\par Unchanged left upper lobe 1.3 cm lesion.\par Unchanged right adrenal gland 1.2 cm nodule.\par He continues to follow with Pulmonology

## 2023-03-09 NOTE — PHYSICAL EXAM
[] : no respiratory distress [Normal] : oriented to person, place and time, the affect was normal, the mood was normal and not anxious [Oriented To Time, Place, And Person] : oriented to person, place, and time [de-identified] : CN II-XII normal.

## 2023-03-09 NOTE — REASON FOR VISIT
[Routine Follow-Up] : routine follow-up visit for [Brain Metastasis] : brain metastasis [Spouse] : spouse [Other: _____] : [unfilled] [[employer] ] : provided by [employer]   [FreeTextEntry3] : Daughter in the room and was able to translate  [TWNoteComboBox1] : Serbian

## 2023-03-13 LAB
ALBUMIN SERPL ELPH-MCNC: 4.7 G/DL
ALP BLD-CCNC: 94 U/L
ALT SERPL-CCNC: 24 U/L
ANION GAP SERPL CALC-SCNC: 15 MMOL/L
AST SERPL-CCNC: 21 U/L
BILIRUB SERPL-MCNC: 0.5 MG/DL
BUN SERPL-MCNC: 23 MG/DL
CALCIUM SERPL-MCNC: 10.8 MG/DL
CHLORIDE SERPL-SCNC: 97 MMOL/L
CHOLEST SERPL-MCNC: 170 MG/DL
CO2 SERPL-SCNC: 22 MMOL/L
CREAT SERPL-MCNC: 1.07 MG/DL
EGFR: 75 ML/MIN/1.73M2
ESTIMATED AVERAGE GLUCOSE: 157 MG/DL
GLUCOSE SERPL-MCNC: 125 MG/DL
HBA1C MFR BLD HPLC: 7.1 %
HDLC SERPL-MCNC: 42 MG/DL
LDLC SERPL CALC-MCNC: 75 MG/DL
NONHDLC SERPL-MCNC: 128 MG/DL
POTASSIUM SERPL-SCNC: 5.1 MMOL/L
PROT SERPL-MCNC: 7 G/DL
SODIUM SERPL-SCNC: 134 MMOL/L
TRIGL SERPL-MCNC: 265 MG/DL

## 2023-04-06 ENCOUNTER — APPOINTMENT (OUTPATIENT)
Dept: ENDOCRINOLOGY | Facility: CLINIC | Age: 73
End: 2023-04-06
Payer: MEDICARE

## 2023-04-06 VITALS
HEART RATE: 79 BPM | SYSTOLIC BLOOD PRESSURE: 107 MMHG | OXYGEN SATURATION: 94 % | DIASTOLIC BLOOD PRESSURE: 61 MMHG | BODY MASS INDEX: 28.88 KG/M2 | HEIGHT: 63 IN | WEIGHT: 163 LBS

## 2023-04-06 PROCEDURE — 99213 OFFICE O/P EST LOW 20 MIN: CPT | Mod: 25

## 2023-04-06 PROCEDURE — 95251 CONT GLUC MNTR ANALYSIS I&R: CPT

## 2023-04-10 ENCOUNTER — NON-APPOINTMENT (OUTPATIENT)
Age: 73
End: 2023-04-10

## 2023-04-10 ENCOUNTER — APPOINTMENT (OUTPATIENT)
Dept: CARDIOLOGY | Facility: CLINIC | Age: 73
End: 2023-04-10
Payer: MEDICARE

## 2023-04-10 VITALS
BODY MASS INDEX: 29.34 KG/M2 | HEIGHT: 63 IN | HEART RATE: 71 BPM | OXYGEN SATURATION: 94 % | WEIGHT: 165.56 LBS | SYSTOLIC BLOOD PRESSURE: 100 MMHG | DIASTOLIC BLOOD PRESSURE: 60 MMHG

## 2023-04-10 PROCEDURE — 99214 OFFICE O/P EST MOD 30 MIN: CPT | Mod: 25

## 2023-04-10 PROCEDURE — 93000 ELECTROCARDIOGRAM COMPLETE: CPT

## 2023-04-10 RX ORDER — AZELASTINE HYDROCHLORIDE 137 UG/1
137 SPRAY, METERED NASAL TWICE DAILY
Qty: 1 | Refills: 3 | Status: DISCONTINUED | COMMUNITY
Start: 2022-11-21 | End: 2023-04-10

## 2023-04-10 RX ORDER — AMILORIDE HYDROCHLORIDE 5 MG/1
5 TABLET ORAL DAILY
Qty: 90 | Refills: 2 | Status: DISCONTINUED | COMMUNITY
Start: 2023-02-28 | End: 2023-04-10

## 2023-04-10 NOTE — HISTORY OF PRESENT ILLNESS
[FreeTextEntry1] : 72 year old male with hx of  HTN , HLD ,  CAD PCI  RCA stent 2007  GERD, former smoker treated Stage 4 small cell lung carcinoma  s/p radiation to chest and head , COPD On home oxygen came for follow up says he is feeling fine , \par  \par Patient does have chronic BILLINGS with fatigue without chest pain for long time , patient is on oxygen 2 L .as needed  patient does walk while on oxygen , half to 1 mile daily ,  his allergies well controlled with allegra ,  patient denies any chest pain   patient had normal chemical stress test \par \par Patient blood pressure is controlled on medication ,diabetes  Hb a1c 7.1  % sugar 139  , under care of endocrinology sodium level 134 \par \par his lipid profile  showed    LDL 67   HDL 49  now on crestor 20 mg   had carotid doppler showed mild disease \par \par patient does have mild LE swelling got  much better since he was done with chemotherapy , denies orthopnea

## 2023-04-10 NOTE — PHYSICAL EXAM
[Well Nourished] : well nourished [Well Developed] : well developed [Normal Conjunctiva] : normal conjunctiva [Normal Venous Pressure] : normal venous pressure [No Carotid Bruit] : no carotid bruit [Normal Rate] : normal [Normal S1] : normal S1 [Normal S2] : normal S2 [No Murmur] : no murmurs heard [II] : a grade 2 [No Pitting Edema] : no pitting edema present [Rt] : varicose veins of the right leg noted [Lt] : varicose veins of the left leg noted [2+] : left 2+ [1+] : left 1+ [No Abnormalities] : the abdominal aorta was not enlarged and no bruit was heard [Normal] : clear lung fields, good air entry, no respiratory distress [Normal Bowel Sounds] : normal bowel sounds [Soft] : abdomen soft [Normal Gait] : normal gait [No Edema] : no edema [No Cyanosis] : no cyanosis [Normal Radial B/L] : normal radial B/L [No Rash] : no rash [Normal PT B/L] : normal PT B/L [Moves all extremities] : moves all extremities [Alert and Oriented] : alert and oriented [S3] : no S3 [S4] : no S4 [Right Carotid Bruit] : no bruit heard over the right carotid [Left Carotid Bruit] : no bruit heard over the left carotid [Right Femoral Bruit] : no bruit heard over the right femoral artery [Left Femoral Bruit] : no bruit heard over the left femoral artery

## 2023-04-10 NOTE — REASON FOR VISIT
[Symptom and Test Evaluation] : symptom and test evaluation [CV Risk Factors and Non-Cardiac Disease] : CV risk factors and non-cardiac disease [Arrhythmia/ECG Abnorrmalities] : arrhythmia/ECG abnormalities [Hypertension] : hypertension [Hyperlipidemia] : hyperlipidemia [Coronary Artery Disease] : coronary artery disease [Other: ____] : [unfilled] [Spouse] : spouse [[employer] ] : provided by [employer]   [Time Spent: ____ minutes] : Total time spent using  services: [unfilled] minutes. The patient's primary language is not English thus required  services. [Interpreters_IDNumber] : [id number] [Interpreters_FullName] : theresa

## 2023-04-10 NOTE — DISCUSSION/SUMMARY
[FreeTextEntry1] : Patient with above hx \par \par chronic BILLINGS stable  possibly due to COPD , treated Lung carcinoma ,improving  doubt cardiac component  patient had normal ventricular systolic function, . evidence of ischemia   continue home oxygen as needed  antihypertensive medication , \par \par SLeep apnea : uses cpap regularly \par \par CAD remote hx of RCA stent with multiple risk factors for CAD;  without chest pain , normal LVEF , no evidence of ischemia continue stain , ecotrin , ace ,  \par \par HTN : controlled , continue low salt diet and medication , \par \par HLD controlled  continue  crestor 20 mg po daily ,    monitor renal function \par \par DM : controlled HB a1c 6.5  continue his medication \par \par follow up after 4  months ,

## 2023-04-10 NOTE — CARDIOLOGY SUMMARY
[de-identified] : 4/10/23  sinus rhythm Poor R wave progression  [de-identified] : 7/28/22  no evidence of chemically induced ischemia  [de-identified] : 6/14/21  Mild  LVH  EF 60-65% MSM AML without out flow tract obstruction , mild DD [de-identified] : 11/9/21 mild carotid disease ( s/p  bilateral CEA)

## 2023-04-17 NOTE — ASSESSMENT
[Diabetes Foot Care] : diabetes foot care [Long Term Vascular Complications] : long term vascular complications of diabetes [Carbohydrate Consistent Diet] : carbohydrate consistent diet [Importance of Diet and Exercise] : importance of diet and exercise to improve glycemic control, achieve weight loss and improve cardiovascular health [Hypoglycemia Management] : hypoglycemia management [Self Monitoring of Blood Glucose] : self monitoring of blood glucose [Injection Technique, Storage, Sharps Disposal] : injection technique, storage, and sharps disposal [Retinopathy Screening] : Patient was referred to ophthalmology for retinopathy screening [Diabetic Medications] : Risks and benefits of diabetic medications were discussed [FreeTextEntry1] : Type 2 DM\par Recent HgbA1c 7.1% from March 2023, at goal \par Reviewed carmelo sensor tracoing today, noted TIR is 82%, 16% high and 0% very high, 0% lows\par Continue Trulicity 1.5mg weekly (unable to tolerate 3mg dose due to diarrhea) \par Continue Metformin 500mg BID\par Will consider use of insulin if no further improvement/worsening A1c noted at next visit\par Last LDL is 75 from March 2023\par \par Answered all questions today; patient verbalized understanding of the above\par RTC in 3 months

## 2023-04-17 NOTE — PHYSICAL EXAM
[Alert] : alert [No Acute Distress] : no acute distress [Well Developed] : well developed [Normal Sclera/Conjunctiva] : normal sclera/conjunctiva [EOMI] : extra ocular movement intact [No Proptosis] : no proptosis [No Lid Lag] : no lid lag [Normal Hearing] : hearing was normal [No LAD] : no lymphadenopathy [Supple] : the neck was supple [Thyroid Not Enlarged] : the thyroid was not enlarged [No Thyroid Nodules] : no palpable thyroid nodules [No Respiratory Distress] : no respiratory distress [Normal S1, S2] : normal S1 and S2 [Normal Rate] : heart rate was normal [Not Tender] : non-tender [Soft] : abdomen soft [No Stigmata of Cushings Syndrome] : no stigmata of Cushings Syndrome [Normal Gait] : normal gait [No Rash] : no rash [Acanthosis Nigricans] : no acanthosis nigricans [Foot Ulcers] : no foot ulcers [Acne] : no acne [No Tremors] : no tremors [Normal Sensation on Monofilament Testing] : normal sensation on monofilament testing of lower extremities [Oriented x3] : oriented to person, place, and time [Normal Affect] : the affect was normal [Normal Insight/Judgement] : insight and judgment were intact [Normal Mood] : the mood was normal

## 2023-04-17 NOTE — HISTORY OF PRESENT ILLNESS
[Continuous Glucose Monitoring] : Continuous Glucose Monitoring: Yes [Osiel] : Osiel [FreeTextEntry1] : This is a 71 yo male w/ h/o metastatic lung cancer on home oxygen, type 2 DM who presents for diabetes follow up.\par \par \par He checks fingerstick glucose 2-3x, AM fasting ranges from 130-140, uses carmelo CGM. \par He is not on any more steroids, noted recent A1c 7.1%. He is on Trulicity 1.5mg weekly (unable to tolerate 3mg weekly dose as per telephone call in March 2023 due to complaint of diarrhea which has since resolved) and Metformin [Finger Stick] : Finger Stick: No [Hypoglycemia] : Patient is not hypoglycemic. [FreeTextEntry2] : 82 [FreeTextEntry3] : 16+2 [FreeTextEntry4] : 0+0 [de-identified] : 7.0 [FreeTextEntry5] : 156 [FreeTextEntry6] : 20.3

## 2023-05-05 ENCOUNTER — NON-APPOINTMENT (OUTPATIENT)
Age: 73
End: 2023-05-05

## 2023-05-08 NOTE — PROGRESS NOTE ADULT - PROBLEM SELECTOR PLAN 4
-Patient seen by derm for rash as an outpatient who recommended betamethasone ointment which patient has been using without relief  -Dermatology consult, will f/u recs -Patient seen by derm for rash as an outpatient who recommended betamethasone ointment which patient has been using without relief  -Dermatology consult, appreciate recs, clobetasol .05% ointment Detail Level: None

## 2023-05-11 ENCOUNTER — NON-APPOINTMENT (OUTPATIENT)
Age: 73
End: 2023-05-11

## 2023-05-12 ENCOUNTER — NON-APPOINTMENT (OUTPATIENT)
Age: 73
End: 2023-05-12

## 2023-06-01 ENCOUNTER — OUTPATIENT (OUTPATIENT)
Dept: OUTPATIENT SERVICES | Facility: HOSPITAL | Age: 73
LOS: 1 days | Discharge: HOME | End: 2023-06-01

## 2023-06-01 DIAGNOSIS — N20.0 CALCULUS OF KIDNEY: Chronic | ICD-10-CM

## 2023-06-01 DIAGNOSIS — D11.0 BENIGN NEOPLASM OF PAROTID GLAND: Chronic | ICD-10-CM

## 2023-06-01 DIAGNOSIS — Z95.5 PRESENCE OF CORONARY ANGIOPLASTY IMPLANT AND GRAFT: Chronic | ICD-10-CM

## 2023-06-01 DIAGNOSIS — C34.90 MALIGNANT NEOPLASM OF UNSPECIFIED PART OF UNSPECIFIED BRONCHUS OR LUNG: ICD-10-CM

## 2023-06-01 DIAGNOSIS — Z90.89 ACQUIRED ABSENCE OF OTHER ORGANS: Chronic | ICD-10-CM

## 2023-06-06 ENCOUNTER — APPOINTMENT (OUTPATIENT)
Dept: INTERNAL MEDICINE | Facility: CLINIC | Age: 73
End: 2023-06-06
Payer: MEDICARE

## 2023-06-06 VITALS
HEIGHT: 63 IN | SYSTOLIC BLOOD PRESSURE: 128 MMHG | OXYGEN SATURATION: 97 % | HEART RATE: 81 BPM | TEMPERATURE: 97.8 F | RESPIRATION RATE: 16 BRPM | BODY MASS INDEX: 29.59 KG/M2 | DIASTOLIC BLOOD PRESSURE: 70 MMHG | WEIGHT: 167 LBS

## 2023-06-06 DIAGNOSIS — F32.89 OTHER SPECIFIED DEPRESSIVE EPISODES: ICD-10-CM

## 2023-06-06 DIAGNOSIS — S99.922A UNSPECIFIED INJURY OF LEFT FOOT, INITIAL ENCOUNTER: ICD-10-CM

## 2023-06-06 DIAGNOSIS — M79.672 PAIN IN LEFT FOOT: ICD-10-CM

## 2023-06-06 DIAGNOSIS — M54.50 LOW BACK PAIN, UNSPECIFIED: ICD-10-CM

## 2023-06-06 DIAGNOSIS — Z87.898 PERSONAL HISTORY OF OTHER SPECIFIED CONDITIONS: ICD-10-CM

## 2023-06-06 PROCEDURE — 99214 OFFICE O/P EST MOD 30 MIN: CPT

## 2023-06-06 NOTE — REVIEW OF SYSTEMS
[Negative] : Psychiatric [Earache] : no earache [Sore Throat] : no sore throat [FreeTextEntry4] : see HPI [FreeTextEntry6] : see HPI [FreeTextEntry7] : see HPI [FreeTextEntry9] : see HPI [de-identified] : see HPI

## 2023-06-06 NOTE — ASSESSMENT
[FreeTextEntry1] : \par 71 yo M pmhx HTN, DM, HLD, CAD s/p RCA stent (2007), ARIELLE, BPH s/p TURP, ED, GERD, hx gastritis, hypomagnesemia, former smoker (quit 2019), COPD (on supplemental O2), osteopenia, stage 4 small cell lung cancer (dx'd 4/19, s/p carboplatin/etoposide and radiation with partial response, then on atezolizumab c/b pneumonitis in 10/2020 treated with high dose steroids c/b vertebral compression fractures, with small brain metastases 6/2020 s/p gamma knife radiation therapy in 7/2020. Was tapered off steroids for pneumonitis by 3/7/2021, and stopped Bactrim prophylaxis at that time), hx covid infection 3/21 (s/p monoclonal Ab infusion and in PREVENT-HD rivaroxaban trial), hx covid infection 7/22 s/p Paxlovid course here for f/u\par \par \par hx chronic LBP, hx compression fractures, hx osteopenia, vit d insuff- hx mechanical fall s/p ER 6/21 w/o acute findings on CT scan. Back pain at baseline s/p recent exacerbation.\par -1/21 DEXA: osteopenia, repeat due 1/23\par -6/21 CT lumbar spine: Unchanged compression deformities of the T12-L3 vertebral bodies. No destructive lesions are identified. Multilevel degenerative changes with disc herniations at L3-4 and L4-5\par -followed by PMR, last seen 6/22 with MRI spine done (no acute findings, no mets dz) \par -followed by neurosx, last seen 3/21- advised PT and back brace use with no surgical intervention planned.\par -followed by ortho, last seen 3/21\par -hx PT \par -on Flexeril prn, Tylenol prn and Oxycodone prn (per PMR), taking mainly Tylenol 1-2x/wk at baseline)\par -on diclofenac and Voltaren gel prn per PMR\par -on Alendronate since 1/21, tolerating w/o SEs\par -on ca/D OTC supp\par -hx ambulates with cane or walker prn - no recent need\par -using back brace prn\par -fall precautions counseled\par \par small cell lung cancer stage 4 (dx'd 4/19), hx brain mets, anemia-\par -followed by oncology, last seen 10/23 noted off tx with cont'd monitoring planned, CT CAP for surveillance ordered (done 2/23, stable). Has f/u 6/23.\par -followed by rad-onc, last seen 3/23.  hx MRI brain and CT scans 2/23 noted stable findings.  To f/u in 6mo.\par -followed by pulm, last seen 2/23, 2/23 CT chest reviewed. No med changes made.  \par \par ARIELLE, COPD (on supp O2), seasonal allergies, former smoker (quit 2019), hx covid infection x2 (last 7/22)-\par -followed by pulm\par -on Symbicort and MDI prn\par -Flonase prn\par -followed by sleep specialist, seen 10/22 states advised to cont CPAP with O2\par -on Temazepam, plans to wean off as feels may not need it\par \par DM, microalbuminuria- 3/23 A1c 7.5 (was 6.3), 8/22 microalbumin wnl \par -followed by endo and on med regimen- last seen 3/23\par -followed by nutrition at endo office, has appt 6/23\par -on metformin  (1) BID (decreased to current since 11/22)\par -off Basaglar\par -off Novolog\par -on Trulicity since 1/22 (at 1.5 due to diarrhea with higher dose)\par -on ARB for renal protection\par -cont home fs monitoring, using Freestyle Osiel\par -seen by optho eval 12/22, states told nl exam to f/u 6/23\par -followed by podiatry, states seen q 3mo with nails clipped. Denies foot paresthesias.\par -check A1c, bmp, microalbumin\par \par HTN, HLD, CAD s/p stent, hx LE edema (reports improved since off steroids 3/21)- BP wnl\par -12/20 b/l LE duplex: no DVT b/l\par -6/21 echo 6/21: EF 60-65%, min MR, LV remodeling, nl LV fxn, mild diastolic dysfxn (Stg I)\par -7/21 cbc/bmp/TSH wnl\par -3/23 Tchol 170  LDL 75 HDL 42\par -3/23 cmp wnl, except Na 134 ca 10.8- repeat pending, will do today\par -on ASA, metoprolol and Valsartan 80 (since 5/23, off Lisinopril per A/I for rhinitis contribution)\par -on Crestor, Lovaza and niacin - denies SEs with use\par -off Zetia 11/21 per cardio\par -followed by cardio, seen 4/23 w/o changes made, to f/u in 4mo. \par -low salt diet advised\par -check lipids, bmp\par \par GERD, hx internal hemorrhoids, diverticulosis, colon polyps- reports controlled\par -hx EGD 1/16: nonbleeding erosive gastropathy\par -hx colonoscopy 1/16: internal hemorrhoids, large lipoma at transverse colon, diverticulosis, rec: repeat in 10 yrs (Dr. Hinds)\par -on famotidine\par -advised to avoid reflux aggravating foods\par \par hx hypomagnesium- hx chronic, mild diarrhea 2/2 metformin\par -declines to change metformin\par -on MgOx 400 bid\par -followed by nephrology, felt low Mg 2/2 Carboplatin induced tubular damage with increase in MgOx to 400 bid advised.  Last seen 2/23 with Amiloride 5 started and Lisinopril lowered to 10 from 20.  Had repeat labs done- told normal.  Has f/u 8/23.\par -2/23 Mg 1.8 (was 1.6 in 2/23)\par \par hx skin rash, allergic rhinitis- \par -on Allegra prn\par -followed by [facility]- seen 5/23 with ipratropium nasal started.  Advised to consider change from ACE-I to ARB- cardio f/u re: this pending\par \par \par MISC: Continued social distancing and measure for covid19 prevention encouraged. \par -hx pfizer vaccine series- 3/21, 7/21; Hx boosters (last 11/22)\par \par \par HCM\par -hx CPE 9/22,  yearly advised\par -9/22 cbc/bmp/TSH/B12/folate/PSA wnl\par -7/21 hep C screening negative\par -hx flu shot 11/22\par -hx Tdap 11/22\par -hx prevnar 3/15\par -hx PVX 10/20\par -advised to check on insurance coverage for shingrix and f/u if desires. Advised to d/w oncology prior to getting.\par -hx screening colonoscopy 1/16: internal hemorrhoids, large lipoma at transverse colon, diverticulosis, rec: repeat in 10 yrs (Dr. Hinds)\par -hx DEXA 3/23: osteopenia (hx compression fractures). \par -followed by derm, yearly skin screening advised. Regular use of sun block for skin cancer prevention counseled.\par -advised to designate HCP and provide copy of completed form for records\par \par Pt requests wife, Jacque Abrams, be contacted re: his test results and medical care, (cell) [phone]\par \par

## 2023-06-06 NOTE — PHYSICAL EXAM
[No Acute Distress] : no acute distress [Well-Appearing] : well-appearing [Normal Sclera/Conjunctiva] : normal sclera/conjunctiva [PERRL] : pupils equal round and reactive to light [EOMI] : extraocular movements intact [Normal Oropharynx] : the oropharynx was normal [Normal Nasal Mucosa] : the nasal mucosa was normal [No Lymphadenopathy] : no lymphadenopathy [Supple] : supple [Thyroid Normal, No Nodules] : the thyroid was normal and there were no nodules present [No Respiratory Distress] : no respiratory distress  [Clear to Auscultation] : lungs were clear to auscultation bilaterally [Normal Rate] : normal rate  [Regular Rhythm] : with a regular rhythm [Normal S1, S2] : normal S1 and S2 [No Murmur] : no murmur heard [Pedal Pulses Present] : the pedal pulses are present [No Edema] : there was no peripheral edema [Soft] : abdomen soft [Non Tender] : non-tender [No HSM] : no HSM [No CVA Tenderness] : no CVA  tenderness [No Spinal Tenderness] : no spinal tenderness [No Joint Swelling] : no joint swelling [No Rash] : no rash [No Focal Deficits] : no focal deficits [Normal Gait] : normal gait [Normal Affect] : the affect was normal [Alert and Oriented x3] : oriented to person, place, and time [Normal Supraclavicular Nodes] : no supraclavicular lymphadenopathy [Normal Posterior Cervical Nodes] : no posterior cervical lymphadenopathy [Normal Anterior Cervical Nodes] : no anterior cervical lymphadenopathy [de-identified] : scattered freckles

## 2023-06-06 NOTE — HEALTH RISK ASSESSMENT
[0] : 2) Feeling down, depressed, or hopeless: Not at all (0) [PHQ-2 Negative - No further assessment needed] : PHQ-2 Negative - No further assessment needed [[id number]] : 0

## 2023-06-06 NOTE — INTERPRETER SERVICES
[[employer] ] : provided by [employer]   [Interpreters_IDNumber] : [id number] [Interpreters_FullName] : Abbi [TWNoteComboBox1] : English

## 2023-06-06 NOTE — HISTORY OF PRESENT ILLNESS
[FreeTextEntry1] : \par f/u [de-identified] : \par 71 yo M pmhx HTN, DM, HLD, CAD s/p RCA stent (2007), ARIELLE, BPH s/p TURP, ED, GERD, hx gastritis, hypomagnesemia, former smoker (quit 2019), COPD (on supplemental O2), osteopenia, stage 4 small cell lung cancer (dx'd 4/19, s/p carboplatin/etoposide and radiation with partial response, then on atezolizumab c/b pneumonitis in 10/2020 treated with high dose steroids c/b vertebral compression fractures, with small brain metastases 6/2020 s/p gamma knife radiation therapy in 7/2020. Was tapered off steroids for pneumonitis by 3/7/2021, and stopped Bactrim prophylaxis at that time), hx covid infection 3/21 (s/p monoclonal Ab infusion and in PREVENT-HD rivaroxaban trial), hx covid infection 7/22 s/p Paxlovid course here for f/u\par \par Last seen 3/6/23 for f/u.\par \par Feeling well today.\par Needs med refills.\par Last ate 2 hrs ago, wants lab slip to do fasting\par \par Reports is socially distancing and using precautions for covid prevention.\par Denies sick or covid positive contacts.\par Denies fever, chills, cough or sob.\par -hx pfizer vaccine series- 3/21, 7/21; Hx boosters (last 11/22)\par -hx covid infection 7/22- resolved s/p Paxlovid course.\par \par \par Followed by A/I- saw new last week, advised med changes.  Notes rhinitis is getting less.  Has f/u next 6/16/23.\par lisinopril stopped (off x 3 weeks) and changed to new med Valsartan 80 per cardiology as advised to be off ACE-I per A/I for rhinitis\par states tried ipratropium, felt made runny nose worse so stopped\par on abx (? name) since 6/3/23 per A/I with 90% improved sx's\par \par hx chronic LBP, hx compression fractures, hx osteopenia- notes pain improved since onset, well controlled \par -on Flexeril prn, diclofenac prn (taking 1-2x/wk) and Tylenol 1-2x/wk\par -followed by PMR, last seen 6/22 with MRI spine done (no acute findings, no mets dz) \par -followed by neurosx, last seen 3/21- advised PT and back brace use with no surgical intervention planned.\par -followed by ortho, last seen 3/21\par -hx PT \par -on Alendronate since 1/21, tolerating w/o SEs\par -hx ambulates with cane or walker- no recent need\par -using back brace on/off\par -denies focal weakness or incontinence\par \par small cell lung cancer stage 4 (dx'd 4/19), hx brain mets- notes using O2 at night and 2-3hrs in daytime, O2 92 -95%\par -followed by oncology, last seen 10/23 noted off tx with cont'd monitoring planned, CT CAP for surveillance ordered (done 2/23, stable). Has f/u 6/23.\par -followed by rad-onc, last seen 3/23.  hx MRI brain and CT scans 2/23 noted stable findings.   To f/u in 6mo.\par -followed by pulm, last seen 2/23, 2/23 CT chest reviewed. No med changes made.  \par \par ARIELLE- reports sleeping well\par -followed by sleep specialist, seen 10/22 states advised to cont CPAP with O2\par -on Temazepam prn\par \par DM-\par -followed by endo and on med regimen- last seen 3/23\par -using Freestyle Osiel. Home fs: (fasting) 100 - 160; postprandial: < 300, denies hypoglycemic sx's\par -seen by optho eval 12/22, states told nl exam to f/u 6/23\par -followed by podiatry, states seen q 3mo with nails clipped.  Denies foot paresthesias.\par \par HTN, HLD, CAD s/p stent- hx LE edema, reports lessening since off steroids\par -on ASA, metoprolol and Valsartan 80 (since 5/23, off Lisinopril per A/I for rhinitis contribution)\par -on Crestor, Lovaza and niacin - denies SEs with use\par -had echo 6/21\par -followed by cardio, seen 4/23 w/o changes made, to f/u in 4mo.  \par -reports hx nl NST 7/22.\par -exercise: walks daily 1 mile- done w/o sx's or limitations\par -denies CP, dizziness or sob\par \par hx hypomagnesium- hx chronic, mild diarrhea 2/2 metformin\par -followed by nephrology, felt low Mg 2/2 Carboplatin induced tubular damage with increase in MgOx to 400 bid advised.  Last seen 2/23 with Amiloride 5 started and Lisinopril lowered to 10 from 20.  Had repeat labs- told normal.  Has f/u 8/23.\par \par Feels diarrhea overall happening less since cut down on metformin.  Having 1-2x/wk (was 4-5x prior)\par Denies n/v or abdominal pain.\par \par Denies  complaints. \par

## 2023-06-12 ENCOUNTER — APPOINTMENT (OUTPATIENT)
Dept: CT IMAGING | Facility: CLINIC | Age: 73
End: 2023-06-12
Payer: MEDICARE

## 2023-06-12 ENCOUNTER — NON-APPOINTMENT (OUTPATIENT)
Age: 73
End: 2023-06-12

## 2023-06-12 ENCOUNTER — APPOINTMENT (OUTPATIENT)
Dept: OPHTHALMOLOGY | Facility: CLINIC | Age: 73
End: 2023-06-12
Payer: MEDICARE

## 2023-06-12 ENCOUNTER — OUTPATIENT (OUTPATIENT)
Dept: OUTPATIENT SERVICES | Facility: HOSPITAL | Age: 73
LOS: 1 days | End: 2023-06-12
Payer: MEDICARE

## 2023-06-12 DIAGNOSIS — N20.0 CALCULUS OF KIDNEY: Chronic | ICD-10-CM

## 2023-06-12 DIAGNOSIS — D11.0 BENIGN NEOPLASM OF PAROTID GLAND: Chronic | ICD-10-CM

## 2023-06-12 DIAGNOSIS — Z95.5 PRESENCE OF CORONARY ANGIOPLASTY IMPLANT AND GRAFT: Chronic | ICD-10-CM

## 2023-06-12 DIAGNOSIS — Z90.89 ACQUIRED ABSENCE OF OTHER ORGANS: Chronic | ICD-10-CM

## 2023-06-12 DIAGNOSIS — C34.12 MALIGNANT NEOPLASM OF UPPER LOBE, LEFT BRONCHUS OR LUNG: ICD-10-CM

## 2023-06-12 PROCEDURE — 74177 CT ABD & PELVIS W/CONTRAST: CPT | Mod: 26

## 2023-06-12 PROCEDURE — 74177 CT ABD & PELVIS W/CONTRAST: CPT

## 2023-06-12 PROCEDURE — 71260 CT THORAX DX C+: CPT

## 2023-06-12 PROCEDURE — 92134 CPTRZ OPH DX IMG PST SGM RTA: CPT

## 2023-06-12 PROCEDURE — 92083 EXTENDED VISUAL FIELD XM: CPT

## 2023-06-12 PROCEDURE — 71260 CT THORAX DX C+: CPT | Mod: 26

## 2023-06-12 PROCEDURE — 92014 COMPRE OPH EXAM EST PT 1/>: CPT

## 2023-06-13 LAB
ALBUMIN SERPL ELPH-MCNC: 4.2 G/DL
ALP BLD-CCNC: 85 U/L
ALT SERPL-CCNC: 23 U/L
ANION GAP SERPL CALC-SCNC: 14 MMOL/L
AST SERPL-CCNC: 19 U/L
BILIRUB SERPL-MCNC: 0.3 MG/DL
BUN SERPL-MCNC: 19 MG/DL
CALCIUM SERPL-MCNC: 9.4 MG/DL
CHLORIDE SERPL-SCNC: 107 MMOL/L
CHOLEST SERPL-MCNC: 129 MG/DL
CO2 SERPL-SCNC: 23 MMOL/L
CREAT SERPL-MCNC: 0.91 MG/DL
CREAT SPEC-SCNC: 182 MG/DL
EGFR: 91 ML/MIN/1.73M2
ESTIMATED AVERAGE GLUCOSE: 169 MG/DL
GLUCOSE SERPL-MCNC: 173 MG/DL
HBA1C MFR BLD HPLC: 7.5 %
HDLC SERPL-MCNC: 41 MG/DL
LDLC SERPL CALC-MCNC: 53 MG/DL
MICROALBUMIN 24H UR DL<=1MG/L-MCNC: <1.2 MG/DL
MICROALBUMIN/CREAT 24H UR-RTO: NORMAL MG/G
NONHDLC SERPL-MCNC: 88 MG/DL
POTASSIUM SERPL-SCNC: 4.9 MMOL/L
PROT SERPL-MCNC: 6 G/DL
SODIUM SERPL-SCNC: 144 MMOL/L
TRIGL SERPL-MCNC: 175 MG/DL

## 2023-06-14 ENCOUNTER — APPOINTMENT (OUTPATIENT)
Dept: HEMATOLOGY ONCOLOGY | Facility: CLINIC | Age: 73
End: 2023-06-14
Payer: MEDICARE

## 2023-06-14 VITALS
BODY MASS INDEX: 30.07 KG/M2 | WEIGHT: 169.73 LBS | SYSTOLIC BLOOD PRESSURE: 136 MMHG | OXYGEN SATURATION: 96 % | HEART RATE: 71 BPM | TEMPERATURE: 97.7 F | DIASTOLIC BLOOD PRESSURE: 80 MMHG | RESPIRATION RATE: 16 BRPM

## 2023-06-14 DIAGNOSIS — G47.00 INSOMNIA, UNSPECIFIED: ICD-10-CM

## 2023-06-14 PROCEDURE — 99213 OFFICE O/P EST LOW 20 MIN: CPT

## 2023-06-15 NOTE — HISTORY OF PRESENT ILLNESS
[Disease: _____________________] : Disease: [unfilled] [T: ___] : T[unfilled] [N: ___] : N[unfilled] [de-identified] : The patient has a heavy smoking history who recently quit. He developed a worsening cough roughly 6 months ago followed by fatigue and insomnia roughly 5 months ago. He saw his PCP and was referred to pulmonary. He was referred for a CT chest for lung cancer screening which revealed a left upper lobe lung mass with associated lymphadenopathy and suspicious bony lesions. He was then sent for a PET/CT scan which have activity in these areas. He underwent a bronchoscopy with EBUS biopsy of bilateral mediastinal lymph nodes that were positive for small cell carcinoma.  Started first line systemic therapy with Carbo/Etoposide/Atezolizumab in May 2019.  Achieved MA.  Received 4 cycles completed early July 2019 followed by Atezolizumab maintenance since late July 2019.  Received consolidative Thoracic RT completed late Aug 2019.  He declined PCI.  Developed small brain metastases on Brain MRI in June 2020 and received GK-SRS for 2 small lesions in early July 2020. Patient hospitalized 10/21-11/2 with pneumonitis 2/2 immunotherapy and discharged on steroids.   Atezolizumab discontinued after last dose in late September 2020 due to development of pneumonitis and he has been monitored off systemic therapy since that time.  He has been tapered off Prednisone as of March 2021.   [de-identified] : -Lymph node 4L and 4R EBUS guided FNA 4/18/19: Positive for malignant cells. Small cell carcinoma. [de-identified] : Declined  services today. \par Patient has been off systemic therapy since late September 2020 when Atezolizumab was discontinued after developing pneumonitis.   He has been tapered off Prednisone as of March 2021.  Not currently on steroids.  \par Using supplemental O2 as needed, less than previous, mainly at night periodically.    \par He follows with PMR for management of vertebral compression fractures which are suspected to be secondary to osteoporosis.  He is on treatment with Alendronate and follows with Endocrine.  \par He has continued insomnia for which temazepam is helpful.  I-stop reviewed. \par \par Brain MRI from Feb 2023 with sustained intracranial response.  \par Restaging CT C/A/P this month with a stable exam and no evidence of new or progressive disease.

## 2023-06-15 NOTE — ASSESSMENT
[FreeTextEntry1] : Extensive Stage Small cell lung cancer.  Started first line Carbo/Etoposide/Atezo in early May 2019, achieved OR.  Completed 4 cycles followed by Atezolizumab maintenance from July 2019.  Received consolidative Thoracic RT completed late August 2019.  He declined PCI.  Developed Brain metastases in June 2020 s/p GK-SRS in July 2020. Hospitalized at [facility] 10/21-11/2/20 for pneumonitis, felt to be secondary to immunotherapy.  Treated with steroids through early March 2021.  Monitored off systemic therapy since last treatment with maintenance Atezolizumab in late Sept 2020.  \par Surveillance/Restaging CT June 2023 with overall sustained response.  Recommend: \par -Continue to observe off treatment and monitor for progression of disease\par -Pulm f/u.  On supplemental O2 prn.  Utilizing EPAP machine.  F/U with sleep specialist. \par -F/U with endocrine for management of DM and osteoporosis\par -Osteoporotic compression fractures:  follows with PMR.  MRI L-spine June 2022 with non-malignant findings\par -Surveillance Brain MRI Feb 2023 with sustained intracranial response; due for surveillance scan in Aug 2023 and f/u with Dr. Guerra (6-month MRI).    \par -Restaging/Surveillance CT C/A/P in 4 months (Oct 2023) and follow up to review results or sooner should problems arise\par -Information sheet given with directions for making appointments

## 2023-06-15 NOTE — RESULTS/DATA
[FreeTextEntry1] : Images Reviewed/Interpreted:\par \par –MRI Brain 2/22/23:  Stable appearing nodule lateral left temporal occipital junction with mild surrounding edema.  No new lesions seen. \par \par –CT CAP 6/12/23:  No interval change in a 1.3 cm nodule in the left upper lobe since CT chest February 6, 2023.  Unchanged right adrenal gland 1.2 cm nodule.\par \par

## 2023-06-15 NOTE — PHYSICAL EXAM
[Ambulatory and capable of all self care but unable to carry out any work activities] : Status 2- Ambulatory and capable of all self care but unable to carry out any work activities. Up and about more than 50% of waking hours [Normal] : affect appropriate [de-identified] : No icterus  [de-identified] : MMM O/P clear [de-identified] : Supple No LAD  [de-identified] : Somewhat distant BS [de-identified] : S1 S2  [de-identified] : No edema  [de-identified] : No spine/CVA tenderness

## 2023-07-18 ENCOUNTER — APPOINTMENT (OUTPATIENT)
Dept: ENDOCRINOLOGY | Facility: CLINIC | Age: 73
End: 2023-07-18

## 2023-08-04 ENCOUNTER — RX RENEWAL (OUTPATIENT)
Age: 73
End: 2023-08-04

## 2023-08-10 NOTE — ASU PATIENT PROFILE, ADULT - TOBACCO CESSATION EDUCATION/COUNSELLING(PROVIDED IF TOBACCO USED IN THE PAST 30 DAYS) NON CORE MEASURE SITES
Patient's HM shows they are overdue for Colonoscopy   CareEverywhere and  files searched  without success. Results attached to order and HM updated  Note added to upcoming appointment to discuss Care Gap.
Offered and patient declined

## 2023-08-14 ENCOUNTER — APPOINTMENT (OUTPATIENT)
Dept: CARDIOLOGY | Facility: CLINIC | Age: 73
End: 2023-08-14
Payer: MEDICARE

## 2023-08-14 VITALS
WEIGHT: 173 LBS | BODY MASS INDEX: 30.65 KG/M2 | OXYGEN SATURATION: 94 % | DIASTOLIC BLOOD PRESSURE: 60 MMHG | SYSTOLIC BLOOD PRESSURE: 130 MMHG | HEIGHT: 63 IN | TEMPERATURE: 97.7 F | HEART RATE: 67 BPM | RESPIRATION RATE: 14 BRPM

## 2023-08-14 VITALS
DIASTOLIC BLOOD PRESSURE: 60 MMHG | WEIGHT: 173 LBS | BODY MASS INDEX: 30.65 KG/M2 | OXYGEN SATURATION: 94 % | HEART RATE: 67 BPM | SYSTOLIC BLOOD PRESSURE: 130 MMHG

## 2023-08-14 PROCEDURE — 99214 OFFICE O/P EST MOD 30 MIN: CPT | Mod: 25

## 2023-08-14 PROCEDURE — 93000 ELECTROCARDIOGRAM COMPLETE: CPT

## 2023-08-14 NOTE — CARDIOLOGY SUMMARY
[de-identified] : 8/14/23   sinus rhythm Poor R wave progression  [de-identified] : 7/28/22  no evidence of chemically induced ischemia  [de-identified] : 6/14/21  Mild  LVH  EF 60-65% MSM AML without out flow tract obstruction , mild DD [de-identified] : 11/9/21 mild carotid disease ( s/p  bilateral CEA)

## 2023-08-14 NOTE — DISCUSSION/SUMMARY
[FreeTextEntry1] : Patient with above hx   chronic BILLINGS stable  possibly due to COPD , treated Lung carcinoma ,improving  doubt cardiac component  patient had normal ventricular systolic function, . evidence of ischemia   continue home oxygen as needed  antihypertensive medication ,   SLeep apnea : uses cpap regularly   CAD remote hx of RCA stent with multiple risk factors for CAD;  without chest pain , normal LVEF , no evidence of ischemia continue stain , ecotrin , ace ,    HTN : controlled , continue low salt diet and medication ,   HLD controlled  continue  crestor 20 mg po daily ,    monitor renal function   DM : controlled HB a1c 6.5  continue his medication   follow up after 4  months ,   [EKG obtained to assist in diagnosis and management of assessed problem(s)] : EKG obtained to assist in diagnosis and management of assessed problem(s)

## 2023-08-14 NOTE — HISTORY OF PRESENT ILLNESS
[FreeTextEntry1] : 72 year old male with hx of  HTN , HLD ,  CAD PCI  RCA stent 2007  GERD, former smoker treated Stage 4 small cell lung carcinoma  s/p radiation to chest and head , COPD On home oxygen came for follow up says he is feeling fine ,   Patient does have chronic BILLINGS with fatigue without chest pain for long time , patient is on oxygen 2 L .as needed  patient does walk while on oxygen , half to 1 mile daily ,  his allergies well controlled with allegra ,  patient denies any chest pain   patient had normal chemical stress test   Patient blood pressure is controlled on medication ,diabetes  Hb a1c 7.5  % sugar 139  , under care of endocrinology sodium level 134   his lipid profile  showed   TC 129LDL 53  HDL 49  now on crestor 20 mg   had carotid doppler showed mild disease   patient does have mild LE swelling got  much better since he was done with chemotherapy , denies orthopnea

## 2023-08-14 NOTE — REASON FOR VISIT
[Symptom and Test Evaluation] : symptom and test evaluation [CV Risk Factors and Non-Cardiac Disease] : CV risk factors and non-cardiac disease [Arrhythmia/ECG Abnorrmalities] : arrhythmia/ECG abnormalities [Hyperlipidemia] : hyperlipidemia [Hypertension] : hypertension [Coronary Artery Disease] : coronary artery disease [Other: ____] : [unfilled] [[employer] ] : provided by [employer]   [Time Spent: ____ minutes] : Total time spent using  services: [unfilled] minutes. The patient's primary language is not English thus required  services. [Interpreters_IDNumber] : [id number] [Interpreters_FullName] : Luma

## 2023-08-24 ENCOUNTER — NON-APPOINTMENT (OUTPATIENT)
Age: 73
End: 2023-08-24

## 2023-08-24 ENCOUNTER — APPOINTMENT (OUTPATIENT)
Dept: NEPHROLOGY | Facility: CLINIC | Age: 73
End: 2023-08-24
Payer: MEDICARE

## 2023-08-24 VITALS
WEIGHT: 170 LBS | SYSTOLIC BLOOD PRESSURE: 136 MMHG | DIASTOLIC BLOOD PRESSURE: 64 MMHG | HEART RATE: 58 BPM | OXYGEN SATURATION: 94 % | HEIGHT: 63 IN | BODY MASS INDEX: 30.12 KG/M2

## 2023-08-24 PROCEDURE — 99214 OFFICE O/P EST MOD 30 MIN: CPT

## 2023-08-24 NOTE — HISTORY OF PRESENT ILLNESS
[FreeTextEntry1] : 49 yr old M with metastatic Lung Cancer CAD , DM type II,  HLD and COPD here for initial evaluation of hypomagnesemia.  Pt has a history of heavy smoking and  quit 3 years ago after being diagnosed with a left lung mass and associated lymphadenopathy and suspicious bony lesions. HHe underwent a bronchoscopy with EBUS biopsy of bilateral mediastinal lymph nodes that were positive for small cell carcinoma. Started first line systemic therapy with Carbo/Etoposide/Atezolizumab in May 2019. Achieved DE.  Received 4 cycles completed early July 2019 followed by Atezolizumab maintenance since late July 2019. Received consolidative Thoracic RT completed late Aug 2019.  Developed small brain metastases on Brain MRI in June 2020 and received GK-SRS for 2 small lesions in early July 2020. Patient hospitalized 10/21-11/2 with pneumonitis 2/2 immunotherapy and discharged on steroids. Atezolizumab discontinued after last dose in late September 2020 due to development of pneumonitis and he has been monitored off systemic therapy since that time. He has been tapered off Prednisone as of March 2021.  Uses supplemental O2 as needed, mainly during the daytime.   Pt also has a h/o DM- HbA1c ~ 6.7 He was on metformin and januvia in the past- Januvia discontinued and he was started on Trulicty 1.5 mg subq weekly metformin 1g BID. Denies retinopathy/ neuropathy. Also denies foamy urine/ hematuria Reports daily diarrhea about 3-5 times x day Metformin was decreased to 500 mg bid and Trulicity increased to 3 mg.  Denies diuretics/ PPI use.  Originally from [place] Ex smoker  Daughter is a NP  -------------------------------------------------------------  02/09/2023 Pt presents for f/u of hypomagnesemia Pt seen and examined; feels well  Diarrhea has somewhat improved- He is on metformin 500 mg bid. He takes Mg oxide 400 mg morning and evening and 800 mg in afternoon.  =----------------------------------------------------  08/24 pt presents for follow up of hypomagnesemia. pt seen and examined; feels well. c/o back pain  Diarrhea has improved a lot He is taking Mg-citrate 500 mg bid Switched to Valsartan from Lisinopril by Cardiogy

## 2023-08-24 NOTE — ASSESSMENT
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

## 2023-08-24 NOTE — PHYSICAL EXAM
[General Appearance - Alert] : alert [Sclera] : the sclera and conjunctiva were normal [Hearing Threshold Finger Rub Not [place]] : hearing was normal [Auscultation Breath Sounds / Voice Sounds] : lungs were clear to auscultation bilaterally [FreeTextEntry1] : on supplemental O2 via NC [Heart Sounds] : normal S1 and S2 [Edema] : there was no peripheral edema [Abdomen Soft] : soft [No CVA Tenderness] : no ~M costovertebral angle tenderness [Abnormal Walk] : normal gait [] : no rash [Motor Exam] : the motor exam was normal [Impaired Insight] : insight and judgment were intact [Affect] : the affect was normal [Mood] : the mood was normal

## 2023-08-25 LAB
25(OH)D3 SERPL-MCNC: 48.3 NG/ML
MAGNESIUM SERPL-MCNC: 1.4 MG/DL

## 2023-09-18 ENCOUNTER — RESULT REVIEW (OUTPATIENT)
Age: 73
End: 2023-09-18

## 2023-09-26 ENCOUNTER — NON-APPOINTMENT (OUTPATIENT)
Age: 73
End: 2023-09-26

## 2023-09-26 DIAGNOSIS — L65.9 NONSCARRING HAIR LOSS, UNSPECIFIED: ICD-10-CM

## 2023-09-27 PROBLEM — L65.9 HAIR LOSS: Status: ACTIVE | Noted: 2023-09-27

## 2023-10-02 ENCOUNTER — NON-APPOINTMENT (OUTPATIENT)
Age: 73
End: 2023-10-02

## 2023-10-02 ENCOUNTER — APPOINTMENT (OUTPATIENT)
Dept: DERMATOLOGY | Facility: CLINIC | Age: 73
End: 2023-10-02
Payer: MEDICARE

## 2023-10-02 ENCOUNTER — APPOINTMENT (OUTPATIENT)
Dept: OPHTHALMOLOGY | Facility: CLINIC | Age: 73
End: 2023-10-02
Payer: MEDICARE

## 2023-10-02 VITALS — HEIGHT: 62 IN | BODY MASS INDEX: 30.36 KG/M2 | WEIGHT: 165 LBS

## 2023-10-02 PROCEDURE — 11900 INJECT SKIN LESIONS </W 7: CPT

## 2023-10-02 PROCEDURE — 99214 OFFICE O/P EST MOD 30 MIN: CPT | Mod: 25

## 2023-10-02 PROCEDURE — 92012 INTRM OPH EXAM EST PATIENT: CPT

## 2023-10-02 PROCEDURE — 95060 OPH MUCOUS MEMBRANE TESTS: CPT

## 2023-10-03 NOTE — ED PROVIDER NOTE - PATIENT PORTAL LINK FT
Jose Carlos po, vss   You can access the FollowMyHealth Patient Portal offered by [facility] by registering at the following website: [email or web address]. By joining [facility]’s FollowMyHealth portal, you will also be able to view your health information using other applications (apps) compatible with our system.

## 2023-10-04 ENCOUNTER — APPOINTMENT (OUTPATIENT)
Dept: CT IMAGING | Facility: CLINIC | Age: 73
End: 2023-10-04

## 2023-10-04 ENCOUNTER — OUTPATIENT (OUTPATIENT)
Dept: OUTPATIENT SERVICES | Facility: HOSPITAL | Age: 73
LOS: 1 days | End: 2023-10-04
Payer: MEDICARE

## 2023-10-04 DIAGNOSIS — D11.0 BENIGN NEOPLASM OF PAROTID GLAND: Chronic | ICD-10-CM

## 2023-10-04 DIAGNOSIS — N20.0 CALCULUS OF KIDNEY: Chronic | ICD-10-CM

## 2023-10-04 DIAGNOSIS — Z90.89 ACQUIRED ABSENCE OF OTHER ORGANS: Chronic | ICD-10-CM

## 2023-10-04 DIAGNOSIS — Z95.5 PRESENCE OF CORONARY ANGIOPLASTY IMPLANT AND GRAFT: Chronic | ICD-10-CM

## 2023-10-04 DIAGNOSIS — C34.12 MALIGNANT NEOPLASM OF UPPER LOBE, LEFT BRONCHUS OR LUNG: ICD-10-CM

## 2023-10-04 PROCEDURE — 71260 CT THORAX DX C+: CPT | Mod: 26

## 2023-10-04 PROCEDURE — 74177 CT ABD & PELVIS W/CONTRAST: CPT | Mod: 26

## 2023-10-04 PROCEDURE — 71260 CT THORAX DX C+: CPT

## 2023-10-04 PROCEDURE — 74177 CT ABD & PELVIS W/CONTRAST: CPT

## 2023-10-05 ENCOUNTER — APPOINTMENT (OUTPATIENT)
Dept: PULMONOLOGY | Facility: CLINIC | Age: 73
End: 2023-10-05
Payer: MEDICARE

## 2023-10-05 VITALS
BODY MASS INDEX: 31.47 KG/M2 | HEART RATE: 118 BPM | RESPIRATION RATE: 15 BRPM | OXYGEN SATURATION: 93 % | DIASTOLIC BLOOD PRESSURE: 79 MMHG | HEIGHT: 62 IN | WEIGHT: 171 LBS | SYSTOLIC BLOOD PRESSURE: 120 MMHG

## 2023-10-05 DIAGNOSIS — J43.2 CENTRILOBULAR EMPHYSEMA: ICD-10-CM

## 2023-10-05 DIAGNOSIS — M43.9 DEFORMING DORSOPATHY, UNSPECIFIED: ICD-10-CM

## 2023-10-05 DIAGNOSIS — J30.9 ALLERGIC RHINITIS, UNSPECIFIED: ICD-10-CM

## 2023-10-05 LAB
RAPID RVP RESULT: DETECTED
RV+EV RNA SPEC QL NAA+PROBE: DETECTED
SARS-COV-2 RNA PNL RESP NAA+PROBE: NOT DETECTED

## 2023-10-05 PROCEDURE — 99215 OFFICE O/P EST HI 40 MIN: CPT

## 2023-10-10 ENCOUNTER — OUTPATIENT (OUTPATIENT)
Dept: OUTPATIENT SERVICES | Facility: HOSPITAL | Age: 73
LOS: 1 days | Discharge: HOME | End: 2023-10-10

## 2023-10-10 DIAGNOSIS — Z90.89 ACQUIRED ABSENCE OF OTHER ORGANS: Chronic | ICD-10-CM

## 2023-10-10 DIAGNOSIS — D11.0 BENIGN NEOPLASM OF PAROTID GLAND: Chronic | ICD-10-CM

## 2023-10-10 DIAGNOSIS — C34.90 MALIGNANT NEOPLASM OF UNSPECIFIED PART OF UNSPECIFIED BRONCHUS OR LUNG: ICD-10-CM

## 2023-10-10 DIAGNOSIS — Z95.5 PRESENCE OF CORONARY ANGIOPLASTY IMPLANT AND GRAFT: Chronic | ICD-10-CM

## 2023-10-10 DIAGNOSIS — N20.0 CALCULUS OF KIDNEY: Chronic | ICD-10-CM

## 2023-10-11 ENCOUNTER — APPOINTMENT (OUTPATIENT)
Dept: HEMATOLOGY ONCOLOGY | Facility: CLINIC | Age: 73
End: 2023-10-11
Payer: MEDICARE

## 2023-10-11 VITALS
RESPIRATION RATE: 16 BRPM | TEMPERATURE: 97.4 F | OXYGEN SATURATION: 93 % | WEIGHT: 173.28 LBS | DIASTOLIC BLOOD PRESSURE: 85 MMHG | BODY MASS INDEX: 31.69 KG/M2 | HEART RATE: 81 BPM | SYSTOLIC BLOOD PRESSURE: 124 MMHG

## 2023-10-11 PROCEDURE — 99213 OFFICE O/P EST LOW 20 MIN: CPT

## 2023-10-17 ENCOUNTER — APPOINTMENT (OUTPATIENT)
Dept: INTERNAL MEDICINE | Facility: CLINIC | Age: 73
End: 2023-10-17

## 2023-10-17 ENCOUNTER — APPOINTMENT (OUTPATIENT)
Dept: INTERNAL MEDICINE | Facility: CLINIC | Age: 73
End: 2023-10-17
Payer: MEDICARE

## 2023-10-17 VITALS
DIASTOLIC BLOOD PRESSURE: 82 MMHG | TEMPERATURE: 98.1 F | OXYGEN SATURATION: 93 % | HEIGHT: 62 IN | SYSTOLIC BLOOD PRESSURE: 128 MMHG | RESPIRATION RATE: 16 BRPM | BODY MASS INDEX: 31.28 KG/M2 | HEART RATE: 89 BPM | WEIGHT: 170 LBS

## 2023-10-17 DIAGNOSIS — M85.80 OTHER SPECIFIED DISORDERS OF BONE DENSITY AND STRUCTURE, UNSPECIFIED SITE: ICD-10-CM

## 2023-10-17 DIAGNOSIS — H91.90 UNSPECIFIED HEARING LOSS, UNSPECIFIED EAR: ICD-10-CM

## 2023-10-17 DIAGNOSIS — R80.9 PROTEINURIA, UNSPECIFIED: ICD-10-CM

## 2023-10-17 PROCEDURE — G0444 DEPRESSION SCREEN ANNUAL: CPT

## 2023-10-17 PROCEDURE — G0439: CPT

## 2023-10-17 RX ORDER — ALENDRONATE SODIUM 70 MG/1
70 TABLET ORAL
Qty: 12 | Refills: 3 | Status: ACTIVE | COMMUNITY
Start: 2021-01-29 | End: 1900-01-01

## 2023-10-22 LAB
25(OH)D3 SERPL-MCNC: 38 NG/ML
ALBUMIN SERPL ELPH-MCNC: 4.5 G/DL
ALP BLD-CCNC: 94 U/L
ALT SERPL-CCNC: 24 U/L
ANION GAP SERPL CALC-SCNC: 13 MMOL/L
APPEARANCE: CLEAR
AST SERPL-CCNC: 21 U/L
BASOPHILS # BLD AUTO: 0.08 K/UL
BASOPHILS NFR BLD AUTO: 1.1 %
BILIRUB SERPL-MCNC: 0.3 MG/DL
BILIRUBIN URINE: NEGATIVE
BLOOD URINE: NEGATIVE
BUN SERPL-MCNC: 21 MG/DL
CALCIUM SERPL-MCNC: 9.9 MG/DL
CHLORIDE SERPL-SCNC: 98 MMOL/L
CHOLEST SERPL-MCNC: 157 MG/DL
CO2 SERPL-SCNC: 24 MMOL/L
COLOR: YELLOW
CREAT SERPL-MCNC: 1.04 MG/DL
EGFR: 77 ML/MIN/1.73M2
EOSINOPHIL # BLD AUTO: 0.26 K/UL
EOSINOPHIL NFR BLD AUTO: 3.5 %
ESTIMATED AVERAGE GLUCOSE: 203 MG/DL
GLUCOSE QUALITATIVE U: NEGATIVE MG/DL
GLUCOSE SERPL-MCNC: 189 MG/DL
HBA1C MFR BLD HPLC: 8.7 %
HCT VFR BLD CALC: 45.8 %
HDLC SERPL-MCNC: 38 MG/DL
HGB BLD-MCNC: 14.5 G/DL
IMM GRANULOCYTES NFR BLD AUTO: 0.9 %
KETONES URINE: NEGATIVE MG/DL
LDLC SERPL CALC-MCNC: 67 MG/DL
LEUKOCYTE ESTERASE URINE: NEGATIVE
LYMPHOCYTES # BLD AUTO: 0.89 K/UL
LYMPHOCYTES NFR BLD AUTO: 11.9 %
MAGNESIUM SERPL-MCNC: 1.9 MG/DL
MAN DIFF?: NORMAL
MCHC RBC-ENTMCNC: 30.3 PG
MCHC RBC-ENTMCNC: 31.7 GM/DL
MCV RBC AUTO: 95.8 FL
MONOCYTES # BLD AUTO: 0.67 K/UL
MONOCYTES NFR BLD AUTO: 9 %
NEUTROPHILS # BLD AUTO: 5.48 K/UL
NEUTROPHILS NFR BLD AUTO: 73.6 %
NITRITE URINE: NEGATIVE
NONHDLC SERPL-MCNC: 119 MG/DL
PH URINE: 5.5
PLATELET # BLD AUTO: 292 K/UL
POTASSIUM SERPL-SCNC: 4.7 MMOL/L
PROT SERPL-MCNC: 6.8 G/DL
PROTEIN URINE: NEGATIVE MG/DL
PSA SERPL-MCNC: 1.29 NG/ML
RBC # BLD: 4.78 M/UL
RBC # FLD: 14.6 %
SODIUM SERPL-SCNC: 136 MMOL/L
SPECIFIC GRAVITY URINE: 1.02
TRIGL SERPL-MCNC: 330 MG/DL
TSH SERPL-ACNC: 2.04 UIU/ML
UROBILINOGEN URINE: 0.2 MG/DL
WBC # FLD AUTO: 7.45 K/UL

## 2023-10-23 ENCOUNTER — RESULT REVIEW (OUTPATIENT)
Age: 73
End: 2023-10-23

## 2023-10-23 ENCOUNTER — NON-APPOINTMENT (OUTPATIENT)
Age: 73
End: 2023-10-23

## 2023-10-24 ENCOUNTER — APPOINTMENT (OUTPATIENT)
Dept: MRI IMAGING | Facility: CLINIC | Age: 73
End: 2023-10-24
Payer: MEDICARE

## 2023-10-24 ENCOUNTER — OUTPATIENT (OUTPATIENT)
Dept: OUTPATIENT SERVICES | Facility: HOSPITAL | Age: 73
LOS: 1 days | End: 2023-10-24
Payer: MEDICARE

## 2023-10-24 DIAGNOSIS — N20.0 CALCULUS OF KIDNEY: Chronic | ICD-10-CM

## 2023-10-24 DIAGNOSIS — Z90.89 ACQUIRED ABSENCE OF OTHER ORGANS: Chronic | ICD-10-CM

## 2023-10-24 DIAGNOSIS — C79.31 SECONDARY MALIGNANT NEOPLASM OF BRAIN: ICD-10-CM

## 2023-10-24 DIAGNOSIS — D11.0 BENIGN NEOPLASM OF PAROTID GLAND: Chronic | ICD-10-CM

## 2023-10-24 DIAGNOSIS — Z95.5 PRESENCE OF CORONARY ANGIOPLASTY IMPLANT AND GRAFT: Chronic | ICD-10-CM

## 2023-10-24 PROCEDURE — A9585: CPT

## 2023-10-24 PROCEDURE — 70553 MRI BRAIN STEM W/O & W/DYE: CPT

## 2023-10-24 PROCEDURE — 70553 MRI BRAIN STEM W/O & W/DYE: CPT | Mod: 26

## 2023-10-25 ENCOUNTER — APPOINTMENT (OUTPATIENT)
Dept: RADIATION ONCOLOGY | Facility: CLINIC | Age: 73
End: 2023-10-25
Payer: MEDICARE

## 2023-10-25 ENCOUNTER — NON-APPOINTMENT (OUTPATIENT)
Age: 73
End: 2023-10-25

## 2023-10-25 VITALS
SYSTOLIC BLOOD PRESSURE: 118 MMHG | HEART RATE: 96 BPM | BODY MASS INDEX: 31.24 KG/M2 | OXYGEN SATURATION: 90 % | RESPIRATION RATE: 16 BRPM | DIASTOLIC BLOOD PRESSURE: 72 MMHG | WEIGHT: 169.75 LBS | TEMPERATURE: 94.8 F | HEIGHT: 62 IN

## 2023-10-25 PROCEDURE — 99213 OFFICE O/P EST LOW 20 MIN: CPT

## 2023-10-26 ENCOUNTER — APPOINTMENT (OUTPATIENT)
Dept: ENDOCRINOLOGY | Facility: CLINIC | Age: 73
End: 2023-10-26
Payer: MEDICARE

## 2023-10-26 VITALS
OXYGEN SATURATION: 87 % | HEIGHT: 62 IN | SYSTOLIC BLOOD PRESSURE: 124 MMHG | DIASTOLIC BLOOD PRESSURE: 78 MMHG | HEART RATE: 102 BPM | BODY MASS INDEX: 31.1 KG/M2 | WEIGHT: 169 LBS

## 2023-10-26 PROCEDURE — 95251 CONT GLUC MNTR ANALYSIS I&R: CPT

## 2023-10-26 PROCEDURE — 99213 OFFICE O/P EST LOW 20 MIN: CPT | Mod: 25

## 2023-10-26 RX ORDER — FLASH GLUCOSE SENSOR
KIT MISCELLANEOUS
Qty: 6 | Refills: 2 | Status: ACTIVE | COMMUNITY
Start: 2020-11-03 | End: 1900-01-01

## 2023-11-07 ENCOUNTER — APPOINTMENT (OUTPATIENT)
Dept: DERMATOLOGY | Facility: CLINIC | Age: 73
End: 2023-11-07

## 2023-11-09 ENCOUNTER — APPOINTMENT (OUTPATIENT)
Dept: OTOLARYNGOLOGY | Facility: CLINIC | Age: 73
End: 2023-11-09
Payer: MEDICARE

## 2023-11-09 VITALS — BODY MASS INDEX: 31.28 KG/M2 | HEIGHT: 62 IN | WEIGHT: 170 LBS

## 2023-11-09 DIAGNOSIS — H69.93 UNSPECIFIED EUSTACHIAN TUBE DISORDER, BILATERAL: ICD-10-CM

## 2023-11-09 DIAGNOSIS — H90.3 SENSORINEURAL HEARING LOSS, BILATERAL: ICD-10-CM

## 2023-11-09 PROCEDURE — 92553 AUDIOMETRY AIR & BONE: CPT

## 2023-11-09 PROCEDURE — 99203 OFFICE O/P NEW LOW 30 MIN: CPT

## 2023-11-09 PROCEDURE — 92567 TYMPANOMETRY: CPT

## 2023-11-21 ENCOUNTER — APPOINTMENT (OUTPATIENT)
Dept: DERMATOLOGY | Facility: CLINIC | Age: 73
End: 2023-11-21
Payer: MEDICARE

## 2023-11-21 PROCEDURE — 11900 INJECT SKIN LESIONS </W 7: CPT

## 2023-11-21 PROCEDURE — 99213 OFFICE O/P EST LOW 20 MIN: CPT | Mod: 25

## 2023-12-04 ENCOUNTER — APPOINTMENT (OUTPATIENT)
Dept: CARDIOLOGY | Facility: CLINIC | Age: 73
End: 2023-12-04
Payer: MEDICARE

## 2023-12-04 ENCOUNTER — APPOINTMENT (OUTPATIENT)
Dept: OPHTHALMOLOGY | Facility: CLINIC | Age: 73
End: 2023-12-04
Payer: MEDICARE

## 2023-12-04 ENCOUNTER — NON-APPOINTMENT (OUTPATIENT)
Age: 73
End: 2023-12-04

## 2023-12-04 VITALS
OXYGEN SATURATION: 92 % | DIASTOLIC BLOOD PRESSURE: 66 MMHG | HEART RATE: 77 BPM | BODY MASS INDEX: 30.73 KG/M2 | WEIGHT: 167 LBS | SYSTOLIC BLOOD PRESSURE: 98 MMHG | HEIGHT: 62 IN

## 2023-12-04 VITALS — SYSTOLIC BLOOD PRESSURE: 110 MMHG | DIASTOLIC BLOOD PRESSURE: 60 MMHG

## 2023-12-04 DIAGNOSIS — I25.10 ATHEROSCLEROTIC HEART DISEASE OF NATIVE CORONARY ARTERY W/OUT ANGINA PECTORIS: ICD-10-CM

## 2023-12-04 PROCEDURE — 92134 CPTRZ OPH DX IMG PST SGM RTA: CPT

## 2023-12-04 PROCEDURE — 99214 OFFICE O/P EST MOD 30 MIN: CPT | Mod: 25

## 2023-12-04 PROCEDURE — 92014 COMPRE OPH EXAM EST PT 1/>: CPT

## 2023-12-04 PROCEDURE — 93000 ELECTROCARDIOGRAM COMPLETE: CPT

## 2023-12-04 RX ORDER — ROSUVASTATIN CALCIUM 20 MG/1
20 TABLET, FILM COATED ORAL DAILY
Qty: 90 | Refills: 1 | Status: ACTIVE | COMMUNITY
Start: 2021-08-31 | End: 1900-01-01

## 2023-12-11 ENCOUNTER — APPOINTMENT (OUTPATIENT)
Dept: OPHTHALMOLOGY | Facility: CLINIC | Age: 73
End: 2023-12-11

## 2023-12-18 ENCOUNTER — APPOINTMENT (OUTPATIENT)
Dept: PULMONOLOGY | Facility: CLINIC | Age: 73
End: 2023-12-18
Payer: MEDICARE

## 2023-12-18 VITALS
SYSTOLIC BLOOD PRESSURE: 112 MMHG | BODY MASS INDEX: 30.76 KG/M2 | RESPIRATION RATE: 15 BRPM | OXYGEN SATURATION: 99 % | WEIGHT: 167.13 LBS | DIASTOLIC BLOOD PRESSURE: 74 MMHG | TEMPERATURE: 97.7 F | HEIGHT: 62 IN | HEART RATE: 98 BPM

## 2023-12-18 PROCEDURE — 99214 OFFICE O/P EST MOD 30 MIN: CPT

## 2023-12-18 NOTE — PROCEDURE
[FreeTextEntry1] : Maskfitting Osman Fuentes had a maskfitting due to congestion while using his nasal mask.  He was fit with a [employer] VItera mask, size Medium.  Please order a new Vitera mask from his home care company, as well as heated tubing for his machine.

## 2023-12-18 NOTE — ASSESSMENT
[FreeTextEntry1] : This is a 71-year-old male with extensive PMH HTN DM HLD COPD, small cell lung cancer with brain mets s/p chemoradiation, COVID 3/21 and 7/22 presenting for follow-up.   #ARIELLE with severe oxygen desaturation-reviewed the patient's initial data download which showed elimination of sleep disordered breathing when he uses his machine. He has been more compliant after taking temazepam as prescribed by his oncologist. He is tolerating his CPAP device well and has no more sleep disordered breathing. He does have supplemental oxygen that flows with it. Will need overnight oximetry study to determine if his PAP and oxygen is sufficient.  - Follow up overnight oximetry study - Follow up how he feels with his new full-face mask.   Follow up in 6 months or sooner if needed.

## 2023-12-18 NOTE — HISTORY OF PRESENT ILLNESS
[Obstructive Sleep Apnea] : obstructive sleep apnea [Snoring] : snoring [Witnessed Apneas] : witnessed sleep apnea [Daytime Somnolence] : daytime somnolence [Date: ___] : Date of most recent diagnostic polysomnogram: [unfilled] [AHI: ___ per hour] : Apnea-hypopnea index:  [unfilled] per hour [T90%: ___] : T90%: [unfilled]% [Alexander desatuation%: ___] : Alexander desaturation:  [unfilled]% [CPAP: ___ cmH2O] : CPAP: [unfilled] cmH2O [% Days used: ____] : Days used: [unfilled] % [% Days used > 4 hrs: ____] : Days used > 4 hrs: [unfilled] % [Mean nightly usage: ___ hrs] : Mean nightly usage: [unfilled] hrs [___ min(s)] : [unfilled] min(s) [Therapy based AHI: ___ /hr] : Therapy based AHI: [unfilled] / hr [FreeTextEntry1] : 70 yo M pmhx CAD s/p RCA stent (2007), former smoker (quit 2019), COPD (on supplemental O2), and stage 4 small cell lung cancer here for follow-up. He continues to use his bilevel device and is tolerating it well. Has significant allergies which he is receiving allergy shots for. Because of his allergies, at times he does not feel comfortable with his nasal mask.   Of note: Small cell lung cancer was dx'd 4/19, s/p carboplatin/etoposide and radiation with partial response, then on atezolizumab c/b pneumonitis in 10/2020 treated with high dose steroids c/b vertebral compression fractures, with small brain metastases 6/2020 s/p gamma knife radiation therapy in 7/2020. Was tapered off steroids for pneumonitis by 3/7/2021, and stopped Bactrim prophylaxis at that time), hx covid infection 3/21 and 7/22 referred by pulmonologist for difficulty tolerating CPAP. Pt reports having a sleep study done 3 months ago at [address] [place] [address], was diagnosed with mild sleep apnea AHI 8 but noted to be significantly hypoxemic overnight and started on a home CPAP machine. States that he has an O2 concentrator but lacks an adaptor. For the past 3 months, the patient reports sleep difficulty and difficulty tolerating the CPAP machine. He reports on an average day falling asleep at 10pm for 4-5 hours duration and a latency of 30 min. Usually takes temazepam 10mg to sleep. The patient reports on average being able to only tolerate the CPAP machine for about an hour due to discomfort in the airway, mask slipping, mechanical noise, and inquired about different fit options for the mask. The patient endorses daytime sleepiness, shortness of breath, and productive cough. Denying nocturnal awakenings, am dry mouth, headache, swelling of the extremities. The wife reports continued snoring and has witnessed apneas.  [Frequent Nocturnal Awakening] : no nocturnal awakening [Awakes with Headache] : no headache upon awakening [Awakening With Dry Mouth] : no dry mouth upon awakening [ESS] : 11

## 2024-01-08 ENCOUNTER — RX CHANGE (OUTPATIENT)
Age: 74
End: 2024-01-08

## 2024-01-09 ENCOUNTER — RX CHANGE (OUTPATIENT)
Age: 74
End: 2024-01-09

## 2024-01-11 ENCOUNTER — OUTPATIENT (OUTPATIENT)
Dept: OUTPATIENT SERVICES | Facility: HOSPITAL | Age: 74
LOS: 1 days | End: 2024-01-11
Payer: MEDICARE

## 2024-01-11 ENCOUNTER — APPOINTMENT (OUTPATIENT)
Dept: SLEEP CENTER | Facility: CLINIC | Age: 74
End: 2024-01-11
Payer: MEDICARE

## 2024-01-11 DIAGNOSIS — Z90.89 ACQUIRED ABSENCE OF OTHER ORGANS: Chronic | ICD-10-CM

## 2024-01-11 DIAGNOSIS — Z95.5 PRESENCE OF CORONARY ANGIOPLASTY IMPLANT AND GRAFT: Chronic | ICD-10-CM

## 2024-01-11 DIAGNOSIS — D11.0 BENIGN NEOPLASM OF PAROTID GLAND: Chronic | ICD-10-CM

## 2024-01-11 DIAGNOSIS — N20.0 CALCULUS OF KIDNEY: Chronic | ICD-10-CM

## 2024-01-11 PROCEDURE — 94762 N-INVAS EAR/PLS OXIMTRY CONT: CPT

## 2024-01-11 PROCEDURE — ZZZZZ: CPT

## 2024-01-15 ENCOUNTER — RX CHANGE (OUTPATIENT)
Age: 74
End: 2024-01-15

## 2024-01-16 ENCOUNTER — APPOINTMENT (OUTPATIENT)
Dept: DERMATOLOGY | Facility: CLINIC | Age: 74
End: 2024-01-16
Payer: MEDICARE

## 2024-01-16 DIAGNOSIS — L60.3 NAIL DYSTROPHY: ICD-10-CM

## 2024-01-16 PROCEDURE — 99213 OFFICE O/P EST LOW 20 MIN: CPT | Mod: 25

## 2024-01-16 PROCEDURE — 11900 INJECT SKIN LESIONS </W 7: CPT

## 2024-01-16 NOTE — HISTORY OF PRESENT ILLNESS
[FreeTextEntry1] : hair loss [de-identified] : 73 year old male. hair loss. occurred several years prior. s/p ILK. rash on face.

## 2024-01-16 NOTE — ASSESSMENT
[FreeTextEntry1] : rash c.w desonide; SED  AA Risks and benefits were discussed including including atrophy, discoloration Intralesional triamcinolone, concentration  2.5 mg/cc; Total volume   0.;2   cc  Sites: 1  nails mens multivitamin, centrum; SED

## 2024-01-16 NOTE — PHYSICAL EXAM
[FreeTextEntry3] : AAOx3, pleasant, NAD, no visual lymphadenopathy hair, scalp, face, nose, eyelids, ears, lips, oropharynx, neck, chest, abdomen, back, right arm, left arm, nails, and hands examined with all normal findings, pertinent findings include:  Scaling patches located on face- brow, NLF, cheeks; perinasal Regular, annular patch of alopecia without surface change- exclamation point hairs at the periphery on right scalp

## 2024-01-18 DIAGNOSIS — G47.33 OBSTRUCTIVE SLEEP APNEA (ADULT) (PEDIATRIC): ICD-10-CM

## 2024-01-22 ENCOUNTER — APPOINTMENT (OUTPATIENT)
Dept: INTERNAL MEDICINE | Facility: CLINIC | Age: 74
End: 2024-01-22
Payer: MEDICARE

## 2024-01-22 VITALS
TEMPERATURE: 98.2 F | RESPIRATION RATE: 16 BRPM | DIASTOLIC BLOOD PRESSURE: 80 MMHG | HEIGHT: 62 IN | HEART RATE: 85 BPM | WEIGHT: 167 LBS | BODY MASS INDEX: 30.73 KG/M2 | SYSTOLIC BLOOD PRESSURE: 136 MMHG | OXYGEN SATURATION: 94 %

## 2024-01-22 DIAGNOSIS — Z86.39 PERSONAL HISTORY OF OTHER ENDOCRINE, NUTRITIONAL AND METABOLIC DISEASE: ICD-10-CM

## 2024-01-22 DIAGNOSIS — G47.33 OBSTRUCTIVE SLEEP APNEA (ADULT) (PEDIATRIC): ICD-10-CM

## 2024-01-22 DIAGNOSIS — Z23 ENCOUNTER FOR IMMUNIZATION: ICD-10-CM

## 2024-01-22 DIAGNOSIS — E83.42 HYPOMAGNESEMIA: ICD-10-CM

## 2024-01-22 DIAGNOSIS — R05.1 ACUTE COUGH: ICD-10-CM

## 2024-01-22 PROCEDURE — G0009: CPT

## 2024-01-22 PROCEDURE — 36415 COLL VENOUS BLD VENIPUNCTURE: CPT

## 2024-01-22 PROCEDURE — 99214 OFFICE O/P EST MOD 30 MIN: CPT | Mod: 25

## 2024-01-22 PROCEDURE — 90662 IIV NO PRSV INCREASED AG IM: CPT

## 2024-01-22 PROCEDURE — 90677 PCV20 VACCINE IM: CPT

## 2024-01-22 PROCEDURE — G0008: CPT

## 2024-01-22 NOTE — REVIEW OF SYSTEMS
[Negative] : Psychiatric [Earache] : no earache [Sore Throat] : no sore throat [FreeTextEntry4] : see HPI [FreeTextEntry7] : see HPI [FreeTextEntry6] : see HPI [FreeTextEntry9] : see HPI [de-identified] : see HPI

## 2024-01-22 NOTE — PHYSICAL EXAM
[No Acute Distress] : no acute distress [Well-Appearing] : well-appearing [Normal Sclera/Conjunctiva] : normal sclera/conjunctiva [PERRL] : pupils equal round and reactive to light [EOMI] : extraocular movements intact [Normal Oropharynx] : the oropharynx was normal [Normal TMs] : both tympanic membranes were normal [Normal Nasal Mucosa] : the nasal mucosa was normal [No Lymphadenopathy] : no lymphadenopathy [Supple] : supple [Thyroid Normal, No Nodules] : the thyroid was normal and there were no nodules present [No Respiratory Distress] : no respiratory distress  [Clear to Auscultation] : lungs were clear to auscultation bilaterally [Normal Rate] : normal rate  [Regular Rhythm] : with a regular rhythm [Normal S1, S2] : normal S1 and S2 [No Murmur] : no murmur heard [Pedal Pulses Present] : the pedal pulses are present [No Edema] : there was no peripheral edema [Soft] : abdomen soft [Non Tender] : non-tender [Non-distended] : non-distended [No Masses] : no abdominal mass palpated [No HSM] : no HSM [Normal Supraclavicular Nodes] : no supraclavicular lymphadenopathy [Normal Posterior Cervical Nodes] : no posterior cervical lymphadenopathy [Normal Anterior Cervical Nodes] : no anterior cervical lymphadenopathy [No CVA Tenderness] : no CVA  tenderness [No Spinal Tenderness] : no spinal tenderness [No Joint Swelling] : no joint swelling [No Rash] : no rash [No Focal Deficits] : no focal deficits [Normal Gait] : normal gait [Normal Affect] : the affect was normal [Alert and Oriented x3] : oriented to person, place, and time [de-identified] : no sinus tenderness [de-identified] : scattered freckles

## 2024-01-22 NOTE — HISTORY OF PRESENT ILLNESS
[FreeTextEntry1] : f/u [de-identified] : 72 yo M pmhx HTN, DM, HLD, CAD s/p RCA stent (2007), ARIELLE, BPH s/p TURP, ED, GERD, hx gastritis, hypomagnesemia, former smoker (quit 2019), COPD (on supplemental O2), osteopenia, stage 4 small cell lung cancer (dx'd 4/19, s/p carboplatin/etoposide and radiation with partial response, then on atezolizumab c/b pneumonitis in 10/2020 treated with high dose steroids c/b vertebral compression fractures, with small brain metastases 6/2020 s/p gamma knife radiation therapy in 7/2020. Was tapered off steroids for pneumonitis by 3/7/2021, and stopped Bactrim prophylaxis at that time), hx covid infection 3/21 (s/p monoclonal Ab infusion and in PREVENT-HD rivaroxaban trial), hx covid infection 7/22 s/p Paxlovid course here for f/u  Last seen 10/17/23 for AWV.  Feeling well today. Last ate 1 hr ago- wants labs today  hx chronic LBP, hx compression fractures, hx osteopenia- notes pain improved since onset, well controlled  -on Flexeril prn, diclofenac prn (taking 1-2x/wk) and Tylenol 1-2x/wk -followed by PMR, last seen 6/22 with MRI spine done (no acute findings, no mets dz)  -followed by neurosx, last seen 3/21- advised PT and back brace use with no surgical intervention planned. -followed by ortho, last seen 3/21 -hx PT  -on Alendronate since 1/21, tolerating w/o SEs -hx ambulates with cane or walker- no longer needed -using back brace on/off -denies focal weakness or incontinence  small cell lung cancer stage 4 (dx'd 4/19), hx brain mets- notes using O2 at night and 2-3hrs in daytime, O2 93 -95% -followed by oncology, last seen 10/23 noted off tx with cont'd monitoring planned, CT CAP for surveillance ordered (done 10/23, stable). Has f/u 2/24 -followed by rad-onc, last seen 10/23. hx MRI brain and CT scans 10/23 noted stable findings. To repeat MRI in 6mo. -followed by pulm, last seen 10/23, 10/23 CT chest reviewed. No med changes made.  ARIELLE- reports sleeping well -followed by sleep specialist, seen 12/23, advised to cont CPAP with O2 -on Temazepam prn  DM- -followed by endo and on med regimen- last seen 10/23 with metformin stopped and Trulicity increased to 3mg. Has f/u 1/24 -using Freestyle Osiel. Home fs: (fasting) 1  postprandial: <250, denies hypoglycemic sx's -seen by optho eval 11/23, told minimal cataract and no intervention advised, given new glasses.  Reports hx dry eyes- resolved with gtt.  Denies vision trouble. -followed by podiatry, states seen q 3mo with nails clipped.  Denies foot paresthesias.  HTN, HLD, CAD s/p stent- hx LE edema, reports lessening since off steroids -on ASA and Valsartan 80 (since 5/23, off Lisinopril per A/I for rhinitis contribution) -on Crestor, Lovaza and niacin - denies SEs with use -states off metoprolol per cardio for ? sneezing relation -had echo 6/21 -followed by cardio, seen 12/23 - has f/u 4/24 -reports hx nl NST 7/22. -exercise: walks daily 1 mile- done w/o sx's or limitations -denies CP, dizziness or sob  hx hypomagnesium- hx chronic, mild diarrhea 2/2 metformin -followed by nephrology, seen 8/23 w/o changes.  Has f/u 2/24. -on oral mg  hx loose BMs- notes better since off metformin. Denies n/v or abdominal pain.  Denies  complaints.   hx hearing loss-  reports using hearing aid with help -seen by [facility] in 11/23 with hearing aids advised to follow-up in 1 year

## 2024-01-23 ENCOUNTER — NON-APPOINTMENT (OUTPATIENT)
Age: 74
End: 2024-01-23

## 2024-01-23 LAB
ALBUMIN SERPL ELPH-MCNC: 4.6 G/DL
ALP BLD-CCNC: 97 U/L
ALT SERPL-CCNC: 23 U/L
ANION GAP SERPL CALC-SCNC: 12 MMOL/L
AST SERPL-CCNC: 18 U/L
BILIRUB SERPL-MCNC: 0.3 MG/DL
BUN SERPL-MCNC: 13 MG/DL
CALCIUM SERPL-MCNC: 10 MG/DL
CHLORIDE SERPL-SCNC: 102 MMOL/L
CHOLEST SERPL-MCNC: 159 MG/DL
CO2 SERPL-SCNC: 27 MMOL/L
CREAT SERPL-MCNC: 0.98 MG/DL
EGFR: 82 ML/MIN/1.73M2
ESTIMATED AVERAGE GLUCOSE: 200 MG/DL
GLUCOSE SERPL-MCNC: 184 MG/DL
HBA1C MFR BLD HPLC: 8.6 %
HDLC SERPL-MCNC: 44 MG/DL
LDLC SERPL CALC-MCNC: 77 MG/DL
NONHDLC SERPL-MCNC: 115 MG/DL
POTASSIUM SERPL-SCNC: 4.8 MMOL/L
PROT SERPL-MCNC: 6.7 G/DL
SODIUM SERPL-SCNC: 141 MMOL/L
TRIGL SERPL-MCNC: 231 MG/DL

## 2024-01-26 ENCOUNTER — APPOINTMENT (OUTPATIENT)
Dept: PULMONOLOGY | Facility: CLINIC | Age: 74
End: 2024-01-26
Payer: MEDICARE

## 2024-01-26 DIAGNOSIS — J96.11 CHRONIC RESPIRATORY FAILURE WITH HYPOXIA: ICD-10-CM

## 2024-01-26 DIAGNOSIS — U07.1 COVID-19: ICD-10-CM

## 2024-01-26 DIAGNOSIS — Z99.81 CHRONIC RESPIRATORY FAILURE WITH HYPOXIA: ICD-10-CM

## 2024-01-26 PROCEDURE — 99443: CPT

## 2024-01-26 RX ORDER — SILDENAFIL 50 MG/1
50 TABLET ORAL
Qty: 30 | Refills: 0 | Status: DISCONTINUED | COMMUNITY
Start: 2022-11-28 | End: 2024-01-26

## 2024-01-26 NOTE — ASSESSMENT
[FreeTextEntry1] : 73M extensive history including DM2, CAD, COPD, Chronic Hypoxemic Respiratory Failure on O2, Obstructive Sleep Apnea treated w/cpap , and small cell lung cancer    He is now covid positive -start paxlovid -maintain hydration -maintain o2 sat>90% - continue symbicort -mucinex otc  -advised to hold rosuvastatin x 5 days while on paxlovid and no sildenafil prn while on paxlovid (pt reports he is not currently taking it)  will call/ follow up with worsens or not improved by next week  If you have any questions  I can be reached  at # [phone] (office).  Frances Jones, MINISTERIO-C

## 2024-01-26 NOTE — REASON FOR VISIT
Spoke with mom on phone today 8/14/19-mother forgot about appt and does not want to r/s for f/u  She feels as though she is doing well  She is almost crawling and pulling to  crib  She does not notice preference anymore in regards to C/S motion  Pt will be d/c at this time  [Home] : at home, [unfilled] , at the time of the visit. [Medical Office: ([facility])___] : at the medical office located in  [Verbal consent obtained from patient] : the patient, [unfilled] [Follow-Up] : a follow-up visit [Lung Cancer] : lung cancer [Spouse] : spouse [Patient Declined  Services] : - None: Patient declined  services

## 2024-01-26 NOTE — HISTORY OF PRESENT ILLNESS
[Former] : former [Never] : never [Wheezing] : wheezing [Nasal Passage Blockage (Stuffiness)] : edema [Nonspecific Pain, Swelling, And Stiffness] : chest pain [Fever] : fever [Difficulty Breathing During Exertion] : dyspnea on exertion [Feelings Of Weakness On Exertion] : exercise intolerance [Cough] : coughing [Continuous] : Continuous [NC] : Nasal Cannula [24 hrs] : 24 hours/day [0.5  -  Very, very slight] : 0.5, very, very slight [Obstructive Sleep Apnea] : obstructive sleep apnea [Nonrestorative Sleep] : nonrestorative sleep [Snoring] : snoring [CPAP:] : CPAP [FreeTextEntry1] : 1-4 [TextBox_4] : 73M DM2, HTN, CAD s/p PCI (RCA 2007), HLD, and COPD and lung cancer presenting for follow-up pulmonary evaluation. He is doing well today and presents to the office with his wife  - Has had a cough for 2 days with AM mucus. No fevers or chills - Had surveillance CT done yesterday (10/4) - not yet read by radiology but does have some increased motion and nonspecific ground glass opacities in PATRICIO and bilateral bases to my evaluation which may be related to motion - attempted to discuss with thoracic radiology during visit but unable  - Using supplemental O2 QHS and PRN - O2 sats generally > 92% even with exertion - Using CPAP with O2 QHS - no problems and states he gets updated supplies. Last seen by Dr. Leslie about 1 year ago. They will schedule follow-up with Dr. Leslie - Walking and exercising regularly - Remains on Symbicort daily - has not required BID and not requiring Ventolin   PFTs: 2/27/23 - FEV1 2.55L (98%), FVC 4.20L (121%), FEV1/FVC 61%, FEF 25-75 56%, %, RV/TLC 38%, UMP543%, DLCO 58% PFTs: 2/25/22 - FEV1 2.28L (84%), FVC 3.58L (99%), FEV1/FVC 64%, FEF 25-75 57%, TLC 97%, RV/TLC 32%, ERV 47%, DLCO 64% PFTs: 12/22/20 - FEV1 2.62L (95%), FVC 3.98L (109%), FEV1/FVC 66%, FEF 25-75 65%, %, RV/TLC 37%, DLCO 57%  6MWT: 2/27/23 - 376 meters - 2L O2 6MWT: 2/25/22 - 386 meters - 2L O2 6MWT: 12/22/20 - 331 meters - 4L O2  From prior evaluation: He is a long-term former smoker but quit at the time of his lung cancer diagnosis. He has known obstructive lung disease with a bronchodilator response. He was switched from Breo to Symbicort while in the hospital. He has a Ventolin inhaler and uses it intermittently. He developed pneumonitis from treatment of Small Cell which required hospitalization and then treatment with a prolonged course of steroids. He did develop COVID-19 but fortunately did not require hospitalization.  He was diagnosed with small cell lung cancer 4/2019 and then started on chemotherapy and radiation. He initially underwent Bronchoscopy/EBUS with Dr. Martinez which revealed that bilateral mediastinal LN were positive for small cell carcinoma. He was started on chemotehrapy at that time in May 2019 and achieved partial response. He was then started on maintenance Atezolizumab. He was offered prophylactic cranial irradiation but declined initially. He developed small brain mets in June 2020 and received GK-SRS with Dr. Mari [facility]. He had a CT chest 8/31/2020 that showed a small stable upper lobe opacity. His repeat CT chest done 10/2020 was suggestive of pneumonitis. He had a repeat CT chest done 12/28/2020 which showed improvement.   1/2024 TTM with pt via  tested positive for covid today on home test- wife is also positive has been symptomatic x 2 days- congested and feels feverish (but no documented fever) received covid vac x 3 (last vaccinated 2022)  [Difficulty Maintaining Sleep] : does not have difficulty maintaining sleep [Witnessed Apneas] : no witnessed apneas [TextBox_108] : 8.2 [TextBox_100] : 11/8/21 [TextBox_112] : 96.6 [TextBox_116] : 76 [TextBox_104] : 2/5/22 [TextBox_131] : 11 [TextBox_127] : 5/22 [TextBox_129] : 6/20/22 [TextBox_133] : 63 (19/30) [TextBox_141] : 1 [TextBox_137] : 7 (2/30) [TextBox_147] : 1.1 [TextBox_143] : 42 [ESS] : 0 [TextBox_27] : 10/3/22 - FINDINGS: The quality of the images are degraded by respiratory motion.\par  CHEST:\par  LUNGS AND LARGE AIRWAYS: Patent central airways. Emphysema. This table bilateral areas of bandlike atelectasis/scarring within the lung bases. Stable 1.3 cm right upper lobe nodule. Stable scattered calcified nodules. No new or enlarging nodule.\par  PLEURA: No pleural effusion.\par  VESSELS: Coronary and aortic calcifications.\par  HEART: Heart size is normal. No pericardial effusion.\par  MEDIASTINUM AND SANDI: Stable nonenlarged calcified hilar and mediastinal lymph nodes.\par  CHEST WALL AND LOWER NECK: Within normal limits.\par  \par  ABDOMEN AND PELVIS:\par  LIVER: Within normal limits.\par  BILE DUCTS: Normal caliber.\par  GALLBLADDER: Within normal limits.\par  SPLEEN: Within normal limits.\par  PANCREAS: Within normal limits.\par  ADRENALS: Stable 1.1 cm right adrenal nodule.\par  KIDNEYS/URETERS: Within normal limits.\par  \par  BLADDER: Severely urinary bladder wall thickening.\par  REPRODUCTIVE ORGANS: Prostate within normal limits.\par  \par  BOWEL: No bowel obstruction. Appendix is normal. Sigmoid colon diverticulosis.\par  PERITONEUM: No ascites.\par  VESSELS: Atherosclerotic changes.\par  RETROPERITONEUM/LYMPH NODES: No lymphadenopathy.\par  ABDOMINAL WALL: Within normal limits.\par  BONES: Stable small sclerotic lesion within T6 vertebral body. Stable multilevel compression deformity of the spine most severe at T10.\par  \par  IMPRESSION:\par  Stable exam. Stable 1.3 cm left upper lobe nodule. Stable 1.1 cm right adrenal nodule. [TextBox_12] : 12/28/2020 - INTERPRETATION:  Reason for Exam: Left upper lobe lung cancer\par  \par  CT of the chest was performed from the thoracic inlet to the level of the adrenal glands following IV contrast injection of  50 cc of Omnipaque 350. No immediate complications were reported.\par  \par  Comparison: October 21, 2020\par  \par  Tubes/Lines: None\par  \par  Mediastinum and Heart: Aorta and pulmonary arteries are normal in size. No pericardial effusion. Multiple calcified lymph nodes in the mediastinum are unchanged since prior. Coronary artery calcifications are noted.. Thyroid gland appears unremarkable.\par  \par  Lungs, Pleura, and Airways: Again seen is a left upper lobe spiculated nodule which measures approximately 1.4 cm which is unchanged since previous exam. Previously seen areas of bilateral peribronchovascular groundglass abnormalities and consolidations have essentially resolved. Minimal residual peribronchial vascular and linear scarring with traction bronchiolectasis noted. Focal area of distortion in the right middle lobe with calcification and traction bronchiectasis is stable.\par  \par  Visualized Abdomen: Postcontrast appearance of the upper abdomen is unremarkable.\par  \par  Bones and soft tissues: Unremarkable.\par  \par  \par  IMPRESSION:\par  \par  When compared with October 21, 2020:\par  \par  Peribronchovascular groundglass abnormality and consolidations consistent with pneumonitis has resolved.\par  \par  Unchanged left upper lobe 1.4 cm spiculated nodule in keeping with known malignancy. Follow-up recommended to assess for change.\par   [TextBox_42] : 2/8/23 - FINDINGS:\par  \par  AIRWAYS, LUNGS, PLEURA: Emphysema. Right middle lobe, lingular, bilateral lower lobe bronchiolectasis with associated scarring. Left upper lobe apicoposterior segment nodular lesion measuring 1.3 x 1 cm (image 129, series 4) is unchanged compared to 10/3/2022. Many calcified lung granulomas. No pleural effusion.\par  \par  MEDIASTINUM: Normal heart size. No pericardial effusion. Thoracic aorta normal caliber.  No large mediastinal lymph nodes. Calcified mediastinal and right hilar nodes.\par  \par  ABDOMEN and PELVIS: Cholelithiasis. Unchanged right adrenal 1.2 cm nodule. The abdominal and pelvic organs are otherwise unremarkable.\par  \par  No bowel obstruction. Colonic diverticulosis. No free fluid. No abdominal or pelvic lymphadenopathy. Abdominal aorta normal caliber.\par  \par  BONES: Unchanged sclerotic lesion within T6 vertebral body and loss of height of multiple lower thoracic and lumbar vertebral bodies.\par  \par  SOFT TISSUES: Unremarkable.\par  \par  IMPRESSION:\par  \par  Interval disease stability in the chest, abdomen, and pelvis compared to 10/3/2022:\par  \par  Unchanged left upper lobe 1.3 cm lesion.\par  \par  Unchanged right adrenal gland 1.2 cm nodule.\par

## 2024-01-27 ENCOUNTER — NON-APPOINTMENT (OUTPATIENT)
Age: 74
End: 2024-01-27

## 2024-01-30 ENCOUNTER — APPOINTMENT (OUTPATIENT)
Dept: ENDOCRINOLOGY | Facility: CLINIC | Age: 74
End: 2024-01-30
Payer: MEDICARE

## 2024-01-30 VITALS
DIASTOLIC BLOOD PRESSURE: 92 MMHG | HEART RATE: 89 BPM | BODY MASS INDEX: 31.1 KG/M2 | HEIGHT: 62 IN | SYSTOLIC BLOOD PRESSURE: 164 MMHG | OXYGEN SATURATION: 88 % | WEIGHT: 169 LBS

## 2024-01-30 PROCEDURE — 95251 CONT GLUC MNTR ANALYSIS I&R: CPT

## 2024-01-30 PROCEDURE — 99213 OFFICE O/P EST LOW 20 MIN: CPT | Mod: 25

## 2024-01-30 NOTE — HISTORY OF PRESENT ILLNESS
[Continuous Glucose Monitoring] : Continuous Glucose Monitoring: Yes [Osiel] : Osiel [FreeTextEntry1] : This is a 74 yo male w/ h/o metastatic lung cancer on home oxygen, type 2 DM who presents for diabetes follow up.  He uses carmelo CGM, gets sensors from [employer]. He notes he has not been following diabetic diet since recent holidays  He is on Trulicity 3mg weekly (previously was unable to tolerate 3mg weekly dose as per telephone call in March 2023 due to complaint of diarrhea which has since resolved) and Metformin was stopped due to diarrhea. He notes sugars are still high. He sees nephrology for hypomagensemia..  [FreeTextEntry2] : 38 [FreeTextEntry3] : 53+9 [FreeTextEntry4] : 0+0 [de-identified] : - [FreeTextEntry5] : 196 [FreeTextEntry6] : 16.6

## 2024-01-30 NOTE — ASSESSMENT
[Diabetes Foot Care] : diabetes foot care [Long Term Vascular Complications] : long term vascular complications of diabetes [Carbohydrate Consistent Diet] : carbohydrate consistent diet [Importance of Diet and Exercise] : importance of diet and exercise to improve glycemic control, achieve weight loss and improve cardiovascular health [Hypoglycemia Management] : hypoglycemia management [Self Monitoring of Blood Glucose] : self monitoring of blood glucose [Retinopathy Screening] : Patient was referred to ophthalmology for retinopathy screening [Diabetic Medications] : Risks and benefits of diabetic medications were discussed [FreeTextEntry1] : Type 2 DM Recent HgbA1c is 8.6% in Jan 2024, not at goal A1c is not at goal and likely exacerbated by dietary noncompliance from recent holidays; patient was again counseled regarding diet and exercise. Discussed adding basal insulin however patient declined today Reviewed carmelo sensor tracing today, noted TIR is 38%, 53% high and 9% very high, 0% lows;  Unable to tolerate Metformin due to GI effect Continue Trulicity 3mg weekly Discussed indications, benefits and potential side effects of SGTL2i; patient agreed to try Start Jardiance 25mg daily  Answered all questions today; patient verbalized understanding of the above RTO in 3 months.

## 2024-01-30 NOTE — PHYSICAL EXAM
[Alert] : alert [No Acute Distress] : no acute distress [Well Developed] : well developed [Normal Sclera/Conjunctiva] : normal sclera/conjunctiva [EOMI] : extra ocular movement intact [No Proptosis] : no proptosis [No Lid Lag] : no lid lag [Normal Hearing] : hearing was normal [No LAD] : no lymphadenopathy [Supple] : the neck was supple [Thyroid Not Enlarged] : the thyroid was not enlarged [No Thyroid Nodules] : no palpable thyroid nodules [No Respiratory Distress] : no respiratory distress [Normal S1, S2] : normal S1 and S2 [Normal Rate] : heart rate was normal [Not Tender] : non-tender [Soft] : abdomen soft [No Stigmata of Cushings Syndrome] : no stigmata of Cushings Syndrome [Normal Gait] : normal gait [No Rash] : no rash [No Tremors] : no tremors [Normal Sensation on Monofilament Testing] : normal sensation on monofilament testing of lower extremities [Oriented x3] : oriented to person, place, and time [Normal Affect] : the affect was normal [Normal Insight/Judgement] : insight and judgment were intact [Normal Mood] : the mood was normal [Acanthosis Nigricans] : no acanthosis nigricans [Foot Ulcers] : no foot ulcers [Acne] : no acne

## 2024-01-30 NOTE — REASON FOR VISIT
[Follow - Up] : a follow-up visit [DM Type 2] : DM Type 2 [[employer] ] : provided by [employer]   [Time Spent: ____ minutes] : Total time spent using  services: [unfilled] minutes. The patient's primary language is not English thus required  services. [Interpreters_IDNumber] : [id number] [Interpreters_FullName] : Anat [TWNoteComboBox1] : Citizen of [place]

## 2024-02-02 ENCOUNTER — OUTPATIENT (OUTPATIENT)
Dept: OUTPATIENT SERVICES | Facility: HOSPITAL | Age: 74
LOS: 1 days | Discharge: HOME | End: 2024-02-02

## 2024-02-02 DIAGNOSIS — N20.0 CALCULUS OF KIDNEY: Chronic | ICD-10-CM

## 2024-02-02 DIAGNOSIS — Z95.5 PRESENCE OF CORONARY ANGIOPLASTY IMPLANT AND GRAFT: Chronic | ICD-10-CM

## 2024-02-02 DIAGNOSIS — Z90.89 ACQUIRED ABSENCE OF OTHER ORGANS: Chronic | ICD-10-CM

## 2024-02-02 DIAGNOSIS — C34.90 MALIGNANT NEOPLASM OF UNSPECIFIED PART OF UNSPECIFIED BRONCHUS OR LUNG: ICD-10-CM

## 2024-02-02 DIAGNOSIS — D11.0 BENIGN NEOPLASM OF PAROTID GLAND: Chronic | ICD-10-CM

## 2024-02-06 ENCOUNTER — APPOINTMENT (OUTPATIENT)
Dept: NEPHROLOGY | Facility: CLINIC | Age: 74
End: 2024-02-06
Payer: MEDICARE

## 2024-02-06 VITALS
BODY MASS INDEX: 30.91 KG/M2 | OXYGEN SATURATION: 93 % | WEIGHT: 168 LBS | SYSTOLIC BLOOD PRESSURE: 162 MMHG | HEART RATE: 76 BPM | DIASTOLIC BLOOD PRESSURE: 74 MMHG | HEIGHT: 62 IN

## 2024-02-06 PROCEDURE — 99214 OFFICE O/P EST MOD 30 MIN: CPT

## 2024-02-06 NOTE — PHYSICAL EXAM
[General Appearance - Alert] : alert [Sclera] : the sclera and conjunctiva were normal [Hearing Threshold Finger Rub Not [place]] : hearing was normal [Auscultation Breath Sounds / Voice Sounds] : lungs were clear to auscultation bilaterally [Heart Sounds] : normal S1 and S2 [Edema] : there was no peripheral edema [Abdomen Soft] : soft [No CVA Tenderness] : no ~M costovertebral angle tenderness [Abnormal Walk] : normal gait [] : no rash [Motor Exam] : the motor exam was normal [Impaired Insight] : insight and judgment were intact [Affect] : the affect was normal [Mood] : the mood was normal [FreeTextEntry1] : on supplemental O2 via NC

## 2024-02-06 NOTE — HISTORY OF PRESENT ILLNESS
[FreeTextEntry1] : 49 yr old M with metastatic Lung Cancer CAD , DM type II,  HLD and COPD here for initial evaluation of hypomagnesemia.  Pt has a history of heavy smoking and  quit 3 years ago after being diagnosed with a left lung mass and associated lymphadenopathy and suspicious bony lesions. HHe underwent a bronchoscopy with EBUS biopsy of bilateral mediastinal lymph nodes that were positive for small cell carcinoma. Started first line systemic therapy with Carbo/Etoposide/Atezolizumab in May 2019. Achieved AK.  Received 4 cycles completed early July 2019 followed by Atezolizumab maintenance since late July 2019. Received consolidative Thoracic RT completed late Aug 2019.  Developed small brain metastases on Brain MRI in June 2020 and received GK-SRS for 2 small lesions in early July 2020. Patient hospitalized 10/21-11/2 with pneumonitis 2/2 immunotherapy and discharged on steroids. Atezolizumab discontinued after last dose in late September 2020 due to development of pneumonitis and he has been monitored off systemic therapy since that time. He has been tapered off Prednisone as of March 2021.  Uses supplemental O2 as needed, mainly during the daytime.   Pt also has a h/o DM- HbA1c ~ 6.7 He was on metformin and januvia in the past- Januvia discontinued and he was started on Trulicty 1.5 mg subq weekly metformin 1g BID. Denies retinopathy/ neuropathy. Also denies foamy urine/ hematuria Reports daily diarrhea about 3-5 times x day Metformin was decreased to 500 mg bid and Trulicity increased to 3 mg.  Denies diuretics/ PPI use.  Originally from [place] Ex smoker  Daughter is a NP  -------------------------------------------------------------  02/09/2023 Pt presents for f/u of hypomagnesemia Pt seen and examined; feels well  Diarrhea has somewhat improved- He is on metformin 500 mg bid. He takes Mg oxide 400 mg morning and evening and 800 mg in afternoon.  =----------------------------------------------------  08/24 pt presents for follow up of hypomagnesemia. pt seen and examined; feels well. c/o back pain  Diarrhea has improved a lot He is taking Mg-citrate 500 mg bid Switched to Valsartan from Lisinopril by Cardiology  -----------------------------------------------  02/06/2024  Pt presents for f/u of hypomagnesemia - Sushant, ID# [id number] Pt seen and examined; feels well No acute complaint  Started on Jardiance by Endocrinology c/o 'penile discomfort'- denies dysuria, hematuria, urgency

## 2024-02-06 NOTE — ASSESSMENT
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 penile discomfort please see PCP for exam/ evaluation   RTC in 6months

## 2024-02-07 ENCOUNTER — OUTPATIENT (OUTPATIENT)
Dept: OUTPATIENT SERVICES | Facility: HOSPITAL | Age: 74
LOS: 1 days | End: 2024-02-07
Payer: MEDICARE

## 2024-02-07 ENCOUNTER — APPOINTMENT (OUTPATIENT)
Dept: CT IMAGING | Facility: CLINIC | Age: 74
End: 2024-02-07
Payer: MEDICARE

## 2024-02-07 DIAGNOSIS — N20.0 CALCULUS OF KIDNEY: Chronic | ICD-10-CM

## 2024-02-07 DIAGNOSIS — Z90.89 ACQUIRED ABSENCE OF OTHER ORGANS: Chronic | ICD-10-CM

## 2024-02-07 DIAGNOSIS — C34.12 MALIGNANT NEOPLASM OF UPPER LOBE, LEFT BRONCHUS OR LUNG: ICD-10-CM

## 2024-02-07 DIAGNOSIS — D11.0 BENIGN NEOPLASM OF PAROTID GLAND: Chronic | ICD-10-CM

## 2024-02-07 DIAGNOSIS — Z95.5 PRESENCE OF CORONARY ANGIOPLASTY IMPLANT AND GRAFT: Chronic | ICD-10-CM

## 2024-02-07 PROCEDURE — 71260 CT THORAX DX C+: CPT | Mod: 26

## 2024-02-07 PROCEDURE — 74177 CT ABD & PELVIS W/CONTRAST: CPT | Mod: 26

## 2024-02-07 PROCEDURE — 74177 CT ABD & PELVIS W/CONTRAST: CPT

## 2024-02-07 PROCEDURE — 71260 CT THORAX DX C+: CPT

## 2024-02-09 DIAGNOSIS — E83.42 HYPOMAGNESEMIA: ICD-10-CM

## 2024-02-14 ENCOUNTER — APPOINTMENT (OUTPATIENT)
Dept: HEMATOLOGY ONCOLOGY | Facility: CLINIC | Age: 74
End: 2024-02-14
Payer: MEDICARE

## 2024-02-14 ENCOUNTER — NON-APPOINTMENT (OUTPATIENT)
Age: 74
End: 2024-02-14

## 2024-02-14 VITALS
WEIGHT: 164.02 LBS | DIASTOLIC BLOOD PRESSURE: 78 MMHG | HEART RATE: 85 BPM | BODY MASS INDEX: 30 KG/M2 | TEMPERATURE: 97.1 F | OXYGEN SATURATION: 95 % | RESPIRATION RATE: 17 BRPM | SYSTOLIC BLOOD PRESSURE: 115 MMHG

## 2024-02-14 PROCEDURE — G2211 COMPLEX E/M VISIT ADD ON: CPT

## 2024-02-14 PROCEDURE — 99213 OFFICE O/P EST LOW 20 MIN: CPT

## 2024-02-14 NOTE — HISTORY OF PRESENT ILLNESS
[Disease: _____________________] : Disease: [unfilled] [T: ___] : T[unfilled] [N: ___] : N[unfilled] [de-identified] : The patient has a heavy smoking history who recently quit. He developed a worsening cough roughly 6 months ago followed by fatigue and insomnia roughly 5 months ago. He saw his PCP and was referred to pulmonary. He was referred for a CT chest for lung cancer screening which revealed a left upper lobe lung mass with associated lymphadenopathy and suspicious bony lesions. He was then sent for a PET/CT scan which have activity in these areas. He underwent a bronchoscopy with EBUS biopsy of bilateral mediastinal lymph nodes that were positive for small cell carcinoma.  Started first line systemic therapy with Carbo/Etoposide/Atezolizumab in May 2019.  Achieved DE.  Received 4 cycles completed early July 2019 followed by Atezolizumab maintenance since late July 2019.  Received consolidative Thoracic RT completed late Aug 2019.  He declined PCI.  Developed small brain metastases on Brain MRI in June 2020 and received GK-SRS for 2 small lesions in early July 2020. Patient hospitalized 10/21-11/2 with pneumonitis 2/2 immunotherapy and discharged on steroids.   Atezolizumab discontinued after last dose in late September 2020 due to development of pneumonitis and he has been monitored off systemic therapy since that time.  He has been tapered off Prednisone as of March 2021.   [de-identified] : -Lymph node 4L and 4R EBUS guided FNA 4/18/19: Positive for malignant cells. Small cell carcinoma. [de-identified] : Spouse provided translation where needed.   Patient has been off systemic therapy since late September 2020 when Atezolizumab was discontinued after developing pneumonitis.   He has been tapered off Prednisone as of March 2021.  Not currently on steroids.   Using supplemental O2 as needed, mainly at night and periodically during the day.     He had COVID last month, managed by pulmonary.  He reports significant allergy symptoms and has been following with an allergist.  Restaging brain MRI from October 2023 with sustained intracranial response; a 6-month MRI is planned. Restaging body CT this month with overall sustained response.

## 2024-02-14 NOTE — GOALS
[FreeTextEntry1] : Leena Velez [FreeTextEntry2] : Dtr [FreeTextEntry3] : [phone] [FreeTextEntry6] : Veronica Gleason [FreeTextEntry5] : Dtr [FreeTextEntry7] :  Form provided.  Copy to be provided once completed

## 2024-02-14 NOTE — PHYSICAL EXAM
[Ambulatory and capable of all self care but unable to carry out any work activities] : Status 2- Ambulatory and capable of all self care but unable to carry out any work activities. Up and about more than 50% of waking hours [Normal] : affect appropriate [de-identified] : No icterus  [de-identified] : MMM O/P clear [de-identified] : Supple No LAD  [de-identified] : Somewhat distant BS [de-identified] : S1 S2  [de-identified] : No edema  [de-identified] : No spine/CVA tenderness

## 2024-02-14 NOTE — RESULTS/DATA
[FreeTextEntry1] : Images Reviewed/Interpreted:  -MRI Brain 10/24/23:  Stable appearing nodule lateral left temporal occipital junction with mild surrounding edema. No new parenchymal lesions seen. Region of signal abnormality right greater wing of the sphenoid bone seen best on FLAIR imaging stable from prior exam. Stable osseous metastasis cannot be excluded.  -CT C/A/P 2/7/24:  Stable exam compared to October 2023.   Unchanged 1.2 cm left upper lobe nodule. No new nodules or adenopathy.  No metastatic disease in the abdomen or pelvis.

## 2024-02-14 NOTE — ASSESSMENT
[FreeTextEntry1] : Extensive Stage Small cell lung cancer. Started first line Carbo/Etoposide/Atezo in early May 2019, achieved ND. Completed 4 cycles followed by Atezolizumab maintenance from July 2019. Received consolidative Thoracic RT completed late August 2019. He declined PCI. Developed Brain metastases in June 2020 s/p GK-SRS in July 2020. Hospitalized at [facility] 10/21-11/2/20 for pneumonitis, felt to be secondary to immunotherapy. Treated with steroids through early March 2021. Monitored off systemic therapy since last treatment with maintenance Atezolizumab in late Sept 2020. Surveillance/Restaging Brain MRI Oct 2023 with sustained intracranial response.  Surveillance/Restaging body CT Feb 2024 with sustained response.   Recommend: -Continue to observe off treatment and monitor for progression of disease -Pulm f/u. On supplemental O2 prn. Utilizing EPAP machine. F/U with sleep specialist. -F/U with endocrine for management of DM and osteoporosis -Osteoporotic compression fractures: followed with PMR. MRI L-spine June 2022 with non-malignant findings -Due for Brain MRI in April with Rad-Onc f/u  -Restaging/Surveillance CT C/A/P in 4 months (June 2024) and follow up to review results or sooner should problems arise  -Information sheet given with directions for making appointments.

## 2024-02-27 ENCOUNTER — APPOINTMENT (OUTPATIENT)
Dept: DERMATOLOGY | Facility: CLINIC | Age: 74
End: 2024-02-27
Payer: MEDICARE

## 2024-02-27 PROCEDURE — 99213 OFFICE O/P EST LOW 20 MIN: CPT | Mod: 25

## 2024-02-27 PROCEDURE — 11900 INJECT SKIN LESIONS </W 7: CPT

## 2024-02-27 NOTE — HISTORY OF PRESENT ILLNESS
[FreeTextEntry1] : hair loss [de-identified] : 73 year old male. hair loss. occurred several years prior. s/p ILK. rash on face.

## 2024-03-27 ENCOUNTER — RX RENEWAL (OUTPATIENT)
Age: 74
End: 2024-03-27

## 2024-04-08 ENCOUNTER — NON-APPOINTMENT (OUTPATIENT)
Age: 74
End: 2024-04-08

## 2024-04-08 ENCOUNTER — APPOINTMENT (OUTPATIENT)
Dept: CARDIOLOGY | Facility: CLINIC | Age: 74
End: 2024-04-08
Payer: MEDICARE

## 2024-04-08 VITALS
SYSTOLIC BLOOD PRESSURE: 110 MMHG | OXYGEN SATURATION: 93 % | WEIGHT: 167 LBS | HEART RATE: 72 BPM | BODY MASS INDEX: 30.54 KG/M2 | DIASTOLIC BLOOD PRESSURE: 70 MMHG

## 2024-04-08 DIAGNOSIS — Z95.5 PRESENCE OF CORONARY ANGIOPLASTY IMPLANT AND GRAFT: ICD-10-CM

## 2024-04-08 DIAGNOSIS — J44.9 CHRONIC OBSTRUCTIVE PULMONARY DISEASE, UNSPECIFIED: ICD-10-CM

## 2024-04-08 PROCEDURE — 93306 TTE W/DOPPLER COMPLETE: CPT

## 2024-04-08 PROCEDURE — G2211 COMPLEX E/M VISIT ADD ON: CPT

## 2024-04-08 PROCEDURE — 93000 ELECTROCARDIOGRAM COMPLETE: CPT

## 2024-04-08 PROCEDURE — 99214 OFFICE O/P EST MOD 30 MIN: CPT

## 2024-04-08 NOTE — HISTORY OF PRESENT ILLNESS
[FreeTextEntry1] : 73 year old male with hx of  HTN , HLD ,  CAD PCI  RCA stent 2007  GERD, former smoker treated Stage 4 small cell lung carcinoma  s/p radiation to chest and head , COPD On home oxygen at night came for follow up says he is feeling fine ,    Patient does have chronic BILLINGS with fatigue without chest pain for long time ,  now he can walk  one mile without oxygen ,   his allergies well controlled with allegra ,  patient denies any chest pain   patient had normal chemical stress test   Patient blood pressure is controlled on medication  on valsartan 80 mg po daily  ,,diabetes  Hb a1c 8.6 % sugar 139  , under care of endocrinology sodium level 134   his lipid profile  showed   TC  159  LDL 77     on crestor 20 mg ,  had carotid doppler showed mild disease.   patient does have mild LE swelling got  much better since he was done with chemotherapy , denies orthopnea

## 2024-04-08 NOTE — REASON FOR VISIT
[Symptom and Test Evaluation] : symptom and test evaluation [CV Risk Factors and Non-Cardiac Disease] : CV risk factors and non-cardiac disease [Arrhythmia/ECG Abnorrmalities] : arrhythmia/ECG abnormalities [Hyperlipidemia] : hyperlipidemia [Hypertension] : hypertension [Coronary Artery Disease] : coronary artery disease [Other: ____] : [unfilled] [[employer] ] : provided by [employer]   [Time Spent: ____ minutes] : Total time spent using  services: [unfilled] minutes. The patient's primary language is not English thus required  services. [Interpreters_IDNumber] : [id number] [Interpreters_FullName] : jv

## 2024-04-08 NOTE — PHYSICAL EXAM
[Well Developed] : well developed [Well Nourished] : well nourished [Normal Conjunctiva] : normal conjunctiva [Normal Venous Pressure] : normal venous pressure [No Carotid Bruit] : no carotid bruit [Normal Rate] : normal [Normal S1] : normal S1 [Normal S2] : normal S2 [S3] : no S3 [S4] : no S4 [No Murmur] : no murmurs heard [II] : a grade 2 [No Pitting Edema] : no pitting edema present [Rt] : varicose veins of the right leg noted [Lt] : varicose veins of the left leg noted [Right Carotid Bruit] : no bruit heard over the right carotid [Left Carotid Bruit] : no bruit heard over the left carotid [2+] : left 2+ [Right Femoral Bruit] : no bruit heard over the right femoral artery [Left Femoral Bruit] : no bruit heard over the left femoral artery [1+] : left 1+ [No Abnormalities] : the abdominal aorta was not enlarged and no bruit was heard [Normal] : clear lung fields, good air entry, no respiratory distress [Soft] : abdomen soft [Normal Bowel Sounds] : normal bowel sounds [Normal Gait] : normal gait [No Edema] : no edema [No Cyanosis] : no cyanosis [Normal Radial B/L] : normal radial B/L [Normal PT B/L] : normal PT B/L [No Rash] : no rash [Moves all extremities] : moves all extremities [Alert and Oriented] : alert and oriented

## 2024-04-08 NOTE — CARDIOLOGY SUMMARY
[de-identified] : 4/8/24   sinus rhythm left axis Poor R wave progression  [de-identified] : 7/28/22  no evidence of chemically induced ischemia  [de-identified] : 6/14/21  Mild  LVH  EF 60-65% MSM AML without out flow tract obstruction , mild DD [de-identified] : 11/9/21 mild carotid disease ( s/p  bilateral CEA)

## 2024-04-08 NOTE — DISCUSSION/SUMMARY
[FreeTextEntry1] : Patient with above hx   chronic BILLINGS stable  possibly due to COPD , treated Lung carcinoma ,improving  doubt cardiac component  patient had normal ventricular systolic function, .  continue home oxygen as needed  antihypertensive medication ,   SLeep apnea : uses cpap regularly   CAD remote hx of RCA stent with multiple risk factors for CAD;  without chest pain , no evidence of ischemia continue stain , ecotrin , ace ,    HTN : controlled , continue low salt diet and medication ,   HLD elevated TG  LDL at target   continue  crestor 20 mg po daily ,      DM : poorly controlled  HB a1c 8 .5  continue his medication  trulicity ,  repeat blood work , follow up with primary   follow up after 4  months ,   [EKG obtained to assist in diagnosis and management of assessed problem(s)] : EKG obtained to assist in diagnosis and management of assessed problem(s)

## 2024-04-09 ENCOUNTER — APPOINTMENT (OUTPATIENT)
Dept: DERMATOLOGY | Facility: CLINIC | Age: 74
End: 2024-04-09
Payer: MEDICARE

## 2024-04-09 DIAGNOSIS — L63.9 ALOPECIA AREATA, UNSPECIFIED: ICD-10-CM

## 2024-04-09 DIAGNOSIS — L30.9 DERMATITIS, UNSPECIFIED: ICD-10-CM

## 2024-04-09 DIAGNOSIS — R21 RASH AND OTHER NONSPECIFIC SKIN ERUPTION: ICD-10-CM

## 2024-04-09 PROCEDURE — 11900 INJECT SKIN LESIONS </W 7: CPT

## 2024-04-09 PROCEDURE — 99213 OFFICE O/P EST LOW 20 MIN: CPT | Mod: 25

## 2024-04-09 NOTE — HISTORY OF PRESENT ILLNESS
[FreeTextEntry1] : hair loss [de-identified] : 73 year old male. hair loss. occurred several years prior. s/p ILK. rash on face.

## 2024-04-16 DIAGNOSIS — L21.9 SEBORRHEIC DERMATITIS, UNSPECIFIED: ICD-10-CM

## 2024-04-22 ENCOUNTER — APPOINTMENT (OUTPATIENT)
Dept: INTERNAL MEDICINE | Facility: CLINIC | Age: 74
End: 2024-04-22
Payer: MEDICARE

## 2024-04-22 ENCOUNTER — LABORATORY RESULT (OUTPATIENT)
Age: 74
End: 2024-04-22

## 2024-04-22 VITALS
HEART RATE: 75 BPM | SYSTOLIC BLOOD PRESSURE: 124 MMHG | DIASTOLIC BLOOD PRESSURE: 72 MMHG | OXYGEN SATURATION: 94 % | WEIGHT: 165 LBS | BODY MASS INDEX: 30.36 KG/M2 | HEIGHT: 62 IN | TEMPERATURE: 97.8 F

## 2024-04-22 DIAGNOSIS — R30.0 DYSURIA: ICD-10-CM

## 2024-04-22 DIAGNOSIS — I10 ESSENTIAL (PRIMARY) HYPERTENSION: ICD-10-CM

## 2024-04-22 DIAGNOSIS — N40.0 BENIGN PROSTATIC HYPERPLASIA WITHOUT LOWER URINARY TRACT SYMPMS: ICD-10-CM

## 2024-04-22 DIAGNOSIS — B37.9 CANDIDIASIS, UNSPECIFIED: ICD-10-CM

## 2024-04-22 DIAGNOSIS — B37.41 CANDIDAL CYSTITIS AND URETHRITIS: ICD-10-CM

## 2024-04-22 PROCEDURE — 99214 OFFICE O/P EST MOD 30 MIN: CPT

## 2024-04-22 PROCEDURE — 36415 COLL VENOUS BLD VENIPUNCTURE: CPT

## 2024-04-22 PROCEDURE — G2211 COMPLEX E/M VISIT ADD ON: CPT | Mod: NC,1L

## 2024-04-22 RX ORDER — KETOCONAZOLE 20.5 MG/ML
2 SHAMPOO, SUSPENSION TOPICAL
Qty: 2 | Refills: 9 | Status: DISCONTINUED | COMMUNITY
Start: 2024-04-16 | End: 2024-04-22

## 2024-04-22 RX ORDER — ALBUTEROL SULFATE 90 UG/1
108 (90 BASE) INHALANT RESPIRATORY (INHALATION)
Qty: 1 | Refills: 6 | Status: DISCONTINUED | COMMUNITY
Start: 2021-09-30 | End: 2024-04-22

## 2024-04-22 RX ORDER — AMILORIDE HYDROCHLORIDE 5 MG/1
5 TABLET ORAL DAILY
Qty: 90 | Refills: 2 | Status: DISCONTINUED | COMMUNITY
Start: 2024-02-09 | End: 2024-04-22

## 2024-04-22 RX ORDER — DESONIDE 0.5 MG/G
0.05 CREAM TOPICAL
Qty: 1 | Refills: 1 | Status: DISCONTINUED | COMMUNITY
Start: 2023-10-02 | End: 2024-04-22

## 2024-04-22 RX ORDER — VALSARTAN 80 MG/1
80 TABLET, COATED ORAL DAILY
Qty: 90 | Refills: 1 | Status: DISCONTINUED | COMMUNITY
Start: 2023-12-04 | End: 2024-04-22

## 2024-04-22 RX ORDER — BUDESONIDE AND FORMOTEROL FUMARATE DIHYDRATE 80; 4.5 UG/1; UG/1
80-4.5 AEROSOL RESPIRATORY (INHALATION) TWICE DAILY
Qty: 1 | Refills: 2 | Status: DISCONTINUED | COMMUNITY
Start: 1900-01-01 | End: 2024-04-22

## 2024-04-23 PROBLEM — R30.0 DYSURIA: Status: ACTIVE | Noted: 2024-04-22

## 2024-04-23 RX ORDER — TEMAZEPAM 15 MG/1
15 CAPSULE ORAL
Refills: 0 | Status: ACTIVE | COMMUNITY

## 2024-04-23 RX ORDER — AMMONIUM LACTATE 12 %
12 CREAM (GRAM) TOPICAL
Qty: 280 | Refills: 0 | Status: ACTIVE | COMMUNITY
Start: 2024-02-22

## 2024-04-23 RX ORDER — LISINOPRIL 20 MG/1
20 TABLET ORAL
Refills: 0 | Status: ACTIVE | COMMUNITY

## 2024-04-23 RX ORDER — EPINEPHRINE 0.3 MG/.3ML
0.3 INJECTION INTRAMUSCULAR
Qty: 2 | Refills: 0 | Status: ACTIVE | COMMUNITY
Start: 2023-12-14

## 2024-04-23 RX ORDER — IPRATROPIUM BROMIDE 42 UG/1
0.06 SPRAY NASAL
Qty: 45 | Refills: 0 | Status: ACTIVE | COMMUNITY
Start: 2023-11-14

## 2024-04-23 NOTE — REVIEW OF SYSTEMS
[Fever] : no fever [Chills] : no chills [Discharge] : no discharge [Pain] : no pain [Earache] : no earache [Chest Pain] : no chest pain [Shortness Of Breath] : no shortness of breath [Cough] : no cough [Abdominal Pain] : no abdominal pain [Vomiting] : no vomiting [Itching] : no itching [Skin Rash] : no skin rash [Headache] : no headache [Dizziness] : no dizziness [Fainting] : no fainting [Suicidal] : not suicidal [Insomnia] : no insomnia [Anxiety] : no anxiety [Easy Bleeding] : no easy bleeding [Easy Bruising] : no easy bruising [Swollen Glands] : no swollen glands [FreeTextEntry8] : penile burning

## 2024-04-23 NOTE — HISTORY OF PRESENT ILLNESS
[FreeTextEntry1] : f/u establish care [de-identified] : 74 yo M pmhx HTN, DM, HLD, CAD s/p RCA stent (2007), ARIELLE, BPH s/p TURP, ED, GERD, hx gastritis, hypomagnesemia, former smoker (quit 2019), COPD (on supplemental O2), osteopenia, stage 4 small cell lung cancer (dx'd 4/19, s/p carboplatin/etoposide and radiation with partial response, then on atezolizumab c/b pneumonitis in 10/2020 treated with high dose steroids c/b vertebral compression fractures, with small brain metastases 6/2020 s/p gamma knife radiation therapy in 7/2020. Was tapered off steroids for pneumonitis by 3/7/2021, and stopped Bactrim prophylaxis at that time), hx covid infection 3/21 (s/p monoclonal Ab infusion and in PREVENT-HD rivaroxaban trial), hx covid infection 7/22 s/p Paxlovid course here for f/u.  Last seen by Dr Cisneros on Jan/22/24  Since that was started on Jardiance by adrian in addition to Trulicity reports has been having burning on penile area since started on Jardiance. Concerned that his medication list is getting longer. had been non-compliant with diet and exercise.

## 2024-05-02 DIAGNOSIS — N39.0 URINARY TRACT INFECTION, SITE NOT SPECIFIED: ICD-10-CM

## 2024-05-02 DIAGNOSIS — B37.2 CANDIDIASIS OF SKIN AND NAIL: ICD-10-CM

## 2024-05-02 LAB
ALBUMIN SERPL ELPH-MCNC: 4.7 G/DL
ALP BLD-CCNC: 103 U/L
ALT SERPL-CCNC: 36 U/L
ANION GAP SERPL CALC-SCNC: 14 MMOL/L
APPEARANCE: CLEAR
AST SERPL-CCNC: 28 U/L
BILIRUB SERPL-MCNC: 0.3 MG/DL
BILIRUBIN URINE: NEGATIVE
BLOOD URINE: NEGATIVE
BUN SERPL-MCNC: 24 MG/DL
CALCIUM SERPL-MCNC: 10.2 MG/DL
CHLORIDE SERPL-SCNC: 101 MMOL/L
CHOLEST SERPL-MCNC: 207 MG/DL
CO2 SERPL-SCNC: 24 MMOL/L
COLOR: YELLOW
CREAT SERPL-MCNC: 1.04 MG/DL
CREAT SPEC-SCNC: 50 MG/DL
EGFR: 77 ML/MIN/1.73M2
ESTIMATED AVERAGE GLUCOSE: 192 MG/DL
GLUCOSE QUALITATIVE U: >=1000 MG/DL
GLUCOSE SERPL-MCNC: 207 MG/DL
HBA1C MFR BLD HPLC: 8.3 %
HDLC SERPL-MCNC: 40 MG/DL
KETONES URINE: NEGATIVE MG/DL
LDLC SERPL CALC-MCNC: 81 MG/DL
LEUKOCYTE ESTERASE URINE: NEGATIVE
MICROALBUMIN 24H UR DL<=1MG/L-MCNC: <1.2 MG/DL
MICROALBUMIN/CREAT 24H UR-RTO: NORMAL MG/G
NITRITE URINE: POSITIVE
NONHDLC SERPL-MCNC: 167 MG/DL
PH URINE: 5.5
POTASSIUM SERPL-SCNC: 4.7 MMOL/L
PROT SERPL-MCNC: 6.9 G/DL
PROTEIN URINE: NEGATIVE MG/DL
SODIUM SERPL-SCNC: 139 MMOL/L
SPECIFIC GRAVITY URINE: >1.03
TRIGL SERPL-MCNC: 540 MG/DL
UROBILINOGEN URINE: 0.2 MG/DL

## 2024-05-03 PROBLEM — B37.41 YEAST CYSTITIS: Status: ACTIVE | Noted: 2024-05-03

## 2024-05-03 PROBLEM — B37.9 YEAST INFECTION: Status: ACTIVE | Noted: 2024-05-03

## 2024-05-03 LAB
CHOLEST SERPL-MCNC: 167 MG/DL
HDLC SERPL-MCNC: 36 MG/DL
LDLC SERPL CALC-MCNC: 86 MG/DL
NONHDLC SERPL-MCNC: 131 MG/DL
TRIGL SERPL-MCNC: 274 MG/DL

## 2024-05-06 ENCOUNTER — APPOINTMENT (OUTPATIENT)
Dept: OPHTHALMOLOGY | Facility: CLINIC | Age: 74
End: 2024-05-06
Payer: MEDICARE

## 2024-05-06 ENCOUNTER — NON-APPOINTMENT (OUTPATIENT)
Age: 74
End: 2024-05-06

## 2024-05-06 PROCEDURE — 92134 CPTRZ OPH DX IMG PST SGM RTA: CPT

## 2024-05-06 PROCEDURE — 92083 EXTENDED VISUAL FIELD XM: CPT

## 2024-05-06 PROCEDURE — 92014 COMPRE OPH EXAM EST PT 1/>: CPT

## 2024-05-15 ENCOUNTER — RX RENEWAL (OUTPATIENT)
Age: 74
End: 2024-05-15

## 2024-05-15 ENCOUNTER — RX CHANGE (OUTPATIENT)
Age: 74
End: 2024-05-15

## 2024-05-15 RX ORDER — METFORMIN ER 500 MG 500 MG/1
500 TABLET ORAL
Qty: 180 | Refills: 1 | Status: ACTIVE | COMMUNITY
Start: 2021-04-06 | End: 1900-01-01

## 2024-05-16 ENCOUNTER — APPOINTMENT (OUTPATIENT)
Dept: ENDOCRINOLOGY | Facility: CLINIC | Age: 74
End: 2024-05-16
Payer: MEDICARE

## 2024-05-16 VITALS
SYSTOLIC BLOOD PRESSURE: 128 MMHG | HEIGHT: 62 IN | OXYGEN SATURATION: 90 % | BODY MASS INDEX: 30.44 KG/M2 | DIASTOLIC BLOOD PRESSURE: 76 MMHG | WEIGHT: 165.38 LBS | HEART RATE: 79 BPM

## 2024-05-16 DIAGNOSIS — Z79.4 TYPE 2 DIABETES MELLITUS WITH HYPERGLYCEMIA: ICD-10-CM

## 2024-05-16 DIAGNOSIS — E11.65 TYPE 2 DIABETES MELLITUS WITH HYPERGLYCEMIA: ICD-10-CM

## 2024-05-16 LAB — GLUCOSE BLDC GLUCOMTR-MCNC: 144

## 2024-05-16 PROCEDURE — 99214 OFFICE O/P EST MOD 30 MIN: CPT | Mod: 25

## 2024-05-16 PROCEDURE — 82962 GLUCOSE BLOOD TEST: CPT

## 2024-05-16 PROCEDURE — 95251 CONT GLUC MNTR ANALYSIS I&R: CPT

## 2024-05-16 RX ORDER — DULAGLUTIDE 3 MG/.5ML
3 INJECTION, SOLUTION SUBCUTANEOUS
Qty: 3 | Refills: 1 | Status: ACTIVE | COMMUNITY
Start: 2022-01-09 | End: 1900-01-01

## 2024-05-17 NOTE — ASSESSMENT
[Diabetes Foot Care] : diabetes foot care [Long Term Vascular Complications] : long term vascular complications of diabetes [Carbohydrate Consistent Diet] : carbohydrate consistent diet [Importance of Diet and Exercise] : importance of diet and exercise to improve glycemic control, achieve weight loss and improve cardiovascular health [Hypoglycemia Management] : hypoglycemia management [Self Monitoring of Blood Glucose] : self monitoring of blood glucose [Retinopathy Screening] : Patient was referred to ophthalmology for retinopathy screening ambulatory [Diabetic Medications] : Risks and benefits of diabetic medications were discussed [FreeTextEntry1] : Type 2 DM Recent HgbA1c is 8.3% in April2024, not at goal A1c is not at goal and likely exacerbated by dietary noncompliance which has been an issue in the past as well and again patient was counseled regarding diet and exercise. Discussed adding basal insulin however patient declined today. He declined to make any changes in his medication regimen and to work on lifestyle modifications first Reviewed carmelo sensor tracing today, noted TIR is 56%, 37% high and 7% very high, 0% lows;  Unable to tolerate Metformin due to GI effect Continue Trulicity 3mg weekly Stopped Jardiance due to penile burning. Contiinue Rosuvastatin 20mg for hyperlipidemia, noted elevation in triglyceride to 274m,g/dl and LDL 86mg/dl; cardiology recommending to start fish oil and omega 3  Answered all questions today; patient verbalized understanding of the above RTO in 3 months.

## 2024-05-17 NOTE — HISTORY OF PRESENT ILLNESS
[Continuous Glucose Monitoring] : Continuous Glucose Monitoring: Yes [Osiel] : Osiel [FreeTextEntry1] : This is a 72 yo male w/ h/o metastatic lung cancer on home oxygen, type 2 DM who presents for diabetes follow up.  Patient notes he had A1c checked at his PCP office in April 2024 and noted A1c to be 8.3% which is not much improved since previous office visit and increase metformin XL to 1000 mg given that he has history of GI intolerance.  He was also started on Jardiance at last endocrinology visit to help bring his sugars down however he notes he has been having pain now burning sensation.  He is additionally on Trulicity 3 mg weekly he notes penile burning with Jardiance and stopped 3 weeks ago He uses carmelo CGM, gets sensors from [employer]. He notes he has not been following diabetic diet since recent holidays . He sees nephrology for hypomagnesemia..  [FreeTextEntry2] : 38 [FreeTextEntry3] : 53+9 [FreeTextEntry4] : 0+0 [de-identified] : - [FreeTextEntry5] : 196 [FreeTextEntry6] : 16.6

## 2024-05-20 ENCOUNTER — APPOINTMENT (OUTPATIENT)
Dept: RADIOLOGY | Facility: CLINIC | Age: 74
End: 2024-05-20
Payer: MEDICARE

## 2024-05-20 ENCOUNTER — OUTPATIENT (OUTPATIENT)
Dept: OUTPATIENT SERVICES | Facility: HOSPITAL | Age: 74
LOS: 1 days | End: 2024-05-20
Payer: MEDICARE

## 2024-05-20 ENCOUNTER — APPOINTMENT (OUTPATIENT)
Dept: INTERNAL MEDICINE | Facility: CLINIC | Age: 74
End: 2024-05-20
Payer: MEDICARE

## 2024-05-20 VITALS
TEMPERATURE: 98.3 F | WEIGHT: 165 LBS | SYSTOLIC BLOOD PRESSURE: 128 MMHG | HEIGHT: 62 IN | DIASTOLIC BLOOD PRESSURE: 72 MMHG | OXYGEN SATURATION: 94 % | HEART RATE: 72 BPM | BODY MASS INDEX: 30.36 KG/M2

## 2024-05-20 DIAGNOSIS — M54.9 DORSALGIA, UNSPECIFIED: ICD-10-CM

## 2024-05-20 DIAGNOSIS — E78.5 HYPERLIPIDEMIA, UNSPECIFIED: ICD-10-CM

## 2024-05-20 PROCEDURE — G2211 COMPLEX E/M VISIT ADD ON: CPT

## 2024-05-20 PROCEDURE — 72070 X-RAY EXAM THORAC SPINE 2VWS: CPT

## 2024-05-20 PROCEDURE — 72100 X-RAY EXAM L-S SPINE 2/3 VWS: CPT

## 2024-05-20 PROCEDURE — 72100 X-RAY EXAM L-S SPINE 2/3 VWS: CPT | Mod: 26

## 2024-05-20 PROCEDURE — 72070 X-RAY EXAM THORAC SPINE 2VWS: CPT | Mod: 26

## 2024-05-20 PROCEDURE — 99214 OFFICE O/P EST MOD 30 MIN: CPT

## 2024-05-20 RX ORDER — OXYCODONE 5 MG/1
5 TABLET ORAL TWICE DAILY
Qty: 10 | Refills: 0 | Status: ACTIVE | COMMUNITY
Start: 2024-05-20 | End: 1900-01-01

## 2024-05-20 RX ORDER — SULFAMETHOXAZOLE AND TRIMETHOPRIM 800; 160 MG/1; MG/1
800-160 TABLET ORAL
Qty: 3 | Refills: 0 | Status: DISCONTINUED | COMMUNITY
Start: 2024-05-02 | End: 2024-05-20

## 2024-05-20 RX ORDER — MAGNESIUM OXIDE 241.3 MG/1000MG
400 TABLET ORAL
Refills: 0 | Status: DISCONTINUED | COMMUNITY
Start: 2020-01-15 | End: 2024-05-20

## 2024-05-20 RX ORDER — FLUCONAZOLE 150 MG/1
150 TABLET ORAL
Qty: 1 | Refills: 1 | Status: DISCONTINUED | COMMUNITY
Start: 2024-05-03 | End: 2024-05-20

## 2024-05-20 RX ORDER — NYSTATIN AND TRIAMCINOLONE ACETONIDE 100000; 1 MG/G; MG/G
100000-0.1 CREAM TOPICAL TWICE DAILY
Qty: 1 | Refills: 0 | Status: DISCONTINUED | COMMUNITY
Start: 2024-05-02 | End: 2024-05-20

## 2024-05-20 NOTE — PHYSICAL EXAM
[No Acute Distress] : no acute distress [Well-Appearing] : well-appearing [Normal Sclera/Conjunctiva] : normal sclera/conjunctiva [No Respiratory Distress] : no respiratory distress  [No Accessory Muscle Use] : no accessory muscle use [Clear to Auscultation] : lungs were clear to auscultation bilaterally [Normal Rate] : normal rate  [No Edema] : there was no peripheral edema [Soft] : abdomen soft [Normal Anterior Cervical Nodes] : no anterior cervical lymphadenopathy [No CVA Tenderness] : no CVA  tenderness [No Spinal Tenderness] : no spinal tenderness [No Rash] : no rash [Normal Gait] : normal gait [Normal Affect] : the affect was normal

## 2024-05-20 NOTE — HISTORY OF PRESENT ILLNESS
[FreeTextEntry1] : f/u [de-identified] : 74 yo M pmhx HTN, DM, HLD, CAD s/p RCA stent (2007), ARIELLE, BPH s/p TURP, ED, GERD, hx gastritis, hypomagnesemia, former smoker (quit 2019), COPD (on supplemental O2), osteopenia, stage 4 small cell lung cancer (dx'd 4/19, s/p carboplatin/etoposide and radiation with partial response, then on atezolizumab c/b pneumonitis in 10/2020 treated with high dose steroids c/b vertebral compression fractures, with small brain metastases 6/2020 s/p gamma knife radiation therapy in 7/2020. Was tapered off steroids for pneumonitis by 3/7/2021, and stopped Bactrim prophylaxis at that time), hx covid infection 3/21 (s/p monoclonal Ab infusion and in PREVENT-HD rivaroxaban trial), hx covid infection 7/22 s/p Paxlovid course here for f/u.  Last seen by Dr Gregorio 5/2/24  Interval: seen by adrian stopped Jardiance as was causing penile burning currently on Trulicity 3 mg and metformin  mg BID Plan for  controlled dite and f/u A1c in 3 months per adrian  Also made a road trip to [place] and had sever back discomfort worried about vertebral fracture as had in the past, denies any problem with bowel and bladder. Als advised to use  NC 02  when out side by pulmunologits has been talked left over oxycodone  he had has an apptment tiher ortho on 5/24/24

## 2024-05-20 NOTE — REVIEW OF SYSTEMS
[Fever] : no fever [Discharge] : no discharge [Earache] : no earache [Chest Pain] : no chest pain [Shortness Of Breath] : no shortness of breath [Cough] : no cough [Abdominal Pain] : no abdominal pain [Dysuria] : no dysuria [Hematuria] : hematuria [Back Pain] : back pain [Itching] : no itching [Skin Rash] : no skin rash [Headache] : no headache [Memory Loss] : no memory loss [Suicidal] : not suicidal

## 2024-05-23 ENCOUNTER — APPOINTMENT (OUTPATIENT)
Dept: PHYSICAL MEDICINE AND REHAB | Facility: CLINIC | Age: 74
End: 2024-05-23
Payer: MEDICARE

## 2024-05-23 VITALS
SYSTOLIC BLOOD PRESSURE: 120 MMHG | DIASTOLIC BLOOD PRESSURE: 70 MMHG | HEIGHT: 62 IN | HEART RATE: 71 BPM | BODY MASS INDEX: 30.36 KG/M2 | RESPIRATION RATE: 15 BRPM | OXYGEN SATURATION: 96 % | WEIGHT: 165 LBS

## 2024-05-23 PROCEDURE — 99214 OFFICE O/P EST MOD 30 MIN: CPT

## 2024-05-23 PROCEDURE — G2211 COMPLEX E/M VISIT ADD ON: CPT

## 2024-05-23 NOTE — ASSESSMENT
[FreeTextEntry1] : Mr. EVER GOYAL is a 73 year old male with a history of metastatic cancer who presented with acute on chronic low back pain, possibly due to a lumbar radiculopathy, as well as spondylosis. No red flag signs/symptoms. Will recommend: - Will order MRI L Spine given radicular pain and hx of CA - Advised sparingly use of Oxycodone, he has not needed it every day. Advised him of R/B/A of medication. He currently does not need any more.  - Advised him to try to substitute Tylenol 1000mg instead of taking the Mobic given hx of cardiac stents/CAD.  - Will start PT 2-3x/week for stretching, strengthening (especially of core muscles), ROM exercises, HEP and modalities PRN including myofascial release, moist heat    Follow up in 4 weeks. Patient educated on red flag signs including any changes to their bowel/bladder control, groin numbness or new weakness. Patient knows to seek immediate attention by calling 911 or going to nearest ER if these symptoms appear.   This patient is being managed for a complex chronic pain that requires ongoing medical management. The nature of this condition requires a longitudinal relationship and monitoring over time for appropriate treatment.

## 2024-05-23 NOTE — DATA REVIEWED
[FreeTextEntry1] : X-ray T/L Spine 5/20/24 reviewed and interpreted by me: multiple compression fx seen  A1C 4/2024 reviewed CMP 4/2024 reviewed  EXAM: [id number] - CT ABDOMEN AND PELVIS OC IC  - ORDERED BY: CHRISTIANO KETAING  EXAM: [id number] - CT CHEST IC  - ORDERED BY: CHRISTIANO KEATING   PROCEDURE DATE:  02/07/2024    INTERPRETATION:  CLINICAL INDICATION: Extensive stage small cell lung cancer being monitored off systemic therapy since September 2020 after developing pneumonitis from immunotherapy. Surveillance/restaging study  TECHNIQUE: Enhanced helical images were obtained of the chest, abdomen and pelvis. Coronal and sagittal images were reconstructed.  Images were obtained after the uneventful administration of 90 cc of nonionic intravenous contrast (Omnipaque 350) and enteric contrast.  COMPARISON: CT chest abdomen pelvis 10/4/2023  FINDINGS: Lungs/Airways/Pleura: The central airways are patent. No pleural effusion. Emphysema is present. Azygous fissure incidentally noted. Unchanged solid 1.2 x 0.9 cm left upper lobe peribronchovascular nodule (3:63), occluding the subsegmental bronchus. Unchanged mild atelectasis/scarring in the medial right middle and bilateral lower lobes with associated mild bronchiectasis. Numerous calcified granulomas are scattered throughout both lungs. No new nodules.  Mediastinum/Lymph nodes: Unchanged small calcified lower right paratracheal, subcarinal and right hilar lymph nodes, likely related to old granulomatous disease. No new/enlarging adenopathy. Unchanged subcentimeter partially calcified right thyroid nodule.  Heart and Vessels: The heart is normal in size. No pericardial effusion. The great vessels are normal in size. Diffuse aortic calcified and noncalcified plaque. Coronary artery calcification.  Liver: Unremarkable.  Bile ducts: Normal caliber.  Gallbladder: Cholelithiasis.  Pancreas: Unchanged 4 mm enhancing focus in the body (3:156).  Spleen: Unremarkable.  Adrenal glands: Stable 1 cm right adrenal nodule (3:150), present since 2008.  Kidneys: Unremarkable.  Bowel: No bowel obstruction. Colonic diverticulosis. Unchanged transverse colon lipoma (3:175). Nondistended appendix.  Abdominal lymph nodes: No lymphadenopathy.  Peritoneum: No ascites.  Pelvis: Enlarged prostate with TURP defect.  Bones/soft tissues: Thoracolumbar degenerative disease with unchanged multilevel compression deformities, most severe at T10. Small left abdominal wall intramuscular lipoma (3:172).  IMPRESSION: Stable exam compared to October 2023.  Unchanged 1.2 cm left upper lobe nodule. No new nodules or adenopathy.  No metastatic disease in the abdomen or pelvis.  --- End of Report ---       EDMOND SMITH M.D., Attending Radiologist This document has been electronically signed. Feb 7 20

## 2024-05-23 NOTE — REASON FOR VISIT
[Follow-Up] : a follow-up visit [[employer] ] : provided by [employer]   [Interpreters_IDNumber] : [id number] [TWNoteComboBox1] : [place]

## 2024-05-23 NOTE — HISTORY OF PRESENT ILLNESS
[FreeTextEntry1] : Mr. EVER CHAVEZ is a 73 year old male who presents for follow up. At last visit in 6/2022, he was continued on occasional diclofenac. His pain recently worsened for which he was ordered PT and given Mobic. He was doing well but then his pain started worsening after a long car drive last week. He was given Oxycodone by his PCP as well as Mobic with some relief. His Stage IV Lung CA is currently under control.    Location:R Low back/buttock Onset:Chronic for years, but worsening since early 5/2023 Provocation/Palliative:Worse with laying down flat, activity/better somewhat sitting. Patient needs to switch position to relieve the pain. Quality:Achy Sharp Radiation: Can radiate down R lateral leg at times Severity:Varies between 6/10 and 10/10 Timing:Varies day to day  No bowel/bladder changes. No groin numbness.

## 2024-05-23 NOTE — PHYSICAL EXAM
[FreeTextEntry1] : PE:  Constitutional: In NAD, calm and cooperative  MSK (Back)  Inspection: no gross swelling identified  Palpation:Minimal Tenderness of the R lower lumbar paraspinals  ROM: Limited lumbar flexion to 30-40 degrees due to pain, limited extension to 10 degrees due to pain  Strength: 5/5 strength in bilateral lower extremities  Reflexes: 2+ Patella reflex bilaterally, 2+ Achilles reflex bilaterally, negative clonus bilaterally  Sensation: Intact to light touch in bilateral lower extremities  Special tests: SLR:negative bilaterally ELIZABETH:negative bilaterally FADIR: negative bilaterally.

## 2024-05-28 ENCOUNTER — APPOINTMENT (OUTPATIENT)
Dept: PHYSICAL MEDICINE AND REHAB | Facility: AMBULATORY MEDICAL SERVICES | Age: 74
End: 2024-05-28
Payer: MEDICARE

## 2024-05-28 PROCEDURE — 99214 OFFICE O/P EST MOD 30 MIN: CPT | Mod: 95

## 2024-05-28 PROCEDURE — G2211 COMPLEX E/M VISIT ADD ON: CPT

## 2024-05-28 RX ORDER — OXYCODONE 5 MG/1
5 TABLET ORAL
Qty: 14 | Refills: 0 | Status: ACTIVE | COMMUNITY
Start: 2024-05-28 | End: 1900-01-01

## 2024-05-29 NOTE — ASSESSMENT
[FreeTextEntry1] : Mr. EVER GOYAL is a 73 year old male with a history of metastatic cancer who presented with acute on chronic low back pain, possibly due to a lumbar radiculopathy, as well as spondylosis. No red flag signs/symptoms. Will recommend: - Pending MRI L Spine given radicular pain and hx of CA - Will give Oxycodone 5mg BID PRN, #14. Patient educated on use of currently prescribed medication, about the intended use, expected outcomes, potential side effects and addictive potential. Was also advised not to take with any other sedatives, including alcohol and/or benzodiazepines (unless otherwise prescribed for psych disorder and benefits outweigh risks) and not to drive and/or operate heavy machinery while under the influence.  - Again advised him to try to substitute Tylenol 1000mg instead of taking the Mobic given hx of cardiac stents/CAD. - Will start PT 2-3x/week for stretching, strengthening (especially of core muscles), ROM exercises, HEP and modalities PRN including myofascial release, moist heat   Follow up after MRI.  Patient educated on red flag signs including any changes to their bowel/bladder control, groin numbness or new weakness. Patient knows to seek immediate attention by calling 911 or going to nearest ER if these symptoms appear.  This patient is being managed for a complex chronic pain that requires ongoing medical management. The nature of this condition requires a longitudinal relationship and monitoring over time for appropriate treatment.

## 2024-05-29 NOTE — HISTORY OF PRESENT ILLNESS
[FreeTextEntry1] : This visit was conducted via an audiovisual call. Patient confirmed they were at home at [address] [place] [address] . Dr. Mullins [place] [address] . Patient consented to the televisit.  Mr. EVER CHAVEZ is a 73 year old male who presents for follow up. At last visit, he was ordered an MRI L Spine, spoke about sparingly using oxycodone, substitute Tylenol for Mobic and started on PT. He has since been having some more pain, requiring an oxycodone 5mg occasionally. Denies any side effects from medication. Denies new pain. MRI L spine is scheduled for next week. He is pending to start PT as well.    Location:R Low back/buttock Onset:Chronic for years, but worsening since early 5/2023 Provocation/Palliative:Worse with laying down flat, activity/better somewhat sitting. Patient needs to switch position to relieve the pain. Quality:Achy Sharp Radiation: Can radiate down R lateral leg at times Severity:Varies between 6/10 and 10/10 Timing:Varies day to day  No bowel/bladder changes. No groin numbness.   Patient offered but denied interpretor. Patient preferred his wife to interpret.

## 2024-06-04 ENCOUNTER — RESULT REVIEW (OUTPATIENT)
Age: 74
End: 2024-06-04

## 2024-06-07 ENCOUNTER — APPOINTMENT (OUTPATIENT)
Dept: MRI IMAGING | Facility: CLINIC | Age: 74
End: 2024-06-07

## 2024-06-07 ENCOUNTER — OUTPATIENT (OUTPATIENT)
Dept: OUTPATIENT SERVICES | Facility: HOSPITAL | Age: 74
LOS: 1 days | End: 2024-06-07
Payer: MEDICARE

## 2024-06-07 DIAGNOSIS — Z90.89 ACQUIRED ABSENCE OF OTHER ORGANS: Chronic | ICD-10-CM

## 2024-06-07 DIAGNOSIS — D11.0 BENIGN NEOPLASM OF PAROTID GLAND: Chronic | ICD-10-CM

## 2024-06-07 DIAGNOSIS — C34.90 MALIGNANT NEOPLASM OF UNSPECIFIED PART OF UNSPECIFIED BRONCHUS OR LUNG: ICD-10-CM

## 2024-06-07 DIAGNOSIS — Z95.5 PRESENCE OF CORONARY ANGIOPLASTY IMPLANT AND GRAFT: Chronic | ICD-10-CM

## 2024-06-07 PROCEDURE — 72158 MRI LUMBAR SPINE W/O & W/DYE: CPT | Mod: 26

## 2024-06-07 PROCEDURE — A9585: CPT

## 2024-06-07 PROCEDURE — 72158 MRI LUMBAR SPINE W/O & W/DYE: CPT

## 2024-06-11 ENCOUNTER — RX RENEWAL (OUTPATIENT)
Age: 74
End: 2024-06-11

## 2024-06-11 RX ORDER — EMPAGLIFLOZIN 25 MG/1
25 TABLET, FILM COATED ORAL
Qty: 90 | Refills: 1 | Status: ACTIVE | COMMUNITY
Start: 2024-01-30 | End: 1900-01-01

## 2024-06-12 ENCOUNTER — OUTPATIENT (OUTPATIENT)
Dept: OUTPATIENT SERVICES | Facility: HOSPITAL | Age: 74
LOS: 1 days | End: 2024-06-12
Payer: MEDICARE

## 2024-06-12 ENCOUNTER — APPOINTMENT (OUTPATIENT)
Dept: CT IMAGING | Facility: CLINIC | Age: 74
End: 2024-06-12
Payer: MEDICARE

## 2024-06-12 DIAGNOSIS — D11.0 BENIGN NEOPLASM OF PAROTID GLAND: Chronic | ICD-10-CM

## 2024-06-12 DIAGNOSIS — Z95.5 PRESENCE OF CORONARY ANGIOPLASTY IMPLANT AND GRAFT: Chronic | ICD-10-CM

## 2024-06-12 DIAGNOSIS — Z90.89 ACQUIRED ABSENCE OF OTHER ORGANS: Chronic | ICD-10-CM

## 2024-06-12 DIAGNOSIS — N20.0 CALCULUS OF KIDNEY: Chronic | ICD-10-CM

## 2024-06-12 DIAGNOSIS — C34.12 MALIGNANT NEOPLASM OF UPPER LOBE, LEFT BRONCHUS OR LUNG: ICD-10-CM

## 2024-06-12 PROCEDURE — 71260 CT THORAX DX C+: CPT | Mod: 26

## 2024-06-12 PROCEDURE — 71260 CT THORAX DX C+: CPT

## 2024-06-12 PROCEDURE — 74177 CT ABD & PELVIS W/CONTRAST: CPT

## 2024-06-12 PROCEDURE — 74177 CT ABD & PELVIS W/CONTRAST: CPT | Mod: 26

## 2024-06-12 RX ORDER — FAMOTIDINE 20 MG/1
20 TABLET, FILM COATED ORAL
Qty: 180 | Refills: 1 | Status: ACTIVE | COMMUNITY
Start: 2020-01-15 | End: 1900-01-01

## 2024-06-13 ENCOUNTER — APPOINTMENT (OUTPATIENT)
Dept: PHYSICAL MEDICINE AND REHAB | Facility: CLINIC | Age: 74
End: 2024-06-13
Payer: MEDICARE

## 2024-06-13 PROCEDURE — 99443: CPT

## 2024-06-13 RX ORDER — GABAPENTIN 100 MG/1
100 CAPSULE ORAL
Qty: 90 | Refills: 0 | Status: ACTIVE | COMMUNITY
Start: 2024-06-13 | End: 1900-01-01

## 2024-06-13 NOTE — HISTORY OF PRESENT ILLNESS
[FreeTextEntry1] : This visit was conducted via phone call. Patient confirmed they were at home at [address] [place] [address] . Dr. Mullins was in the office at [address] [place] [address]. Patient consented to the televisit.   Mr. EVER CHAVEZ is a 73 year old male who presents for follow up. At last visit, he was ordered a MRI L Spine, given Oxycodone, advised him to try Tylenol instead of Mobic and start PT. He has tried PT, 4 sessions total, but without much help. He is taking just Tylenol now as he didnt want to take more oxycodone.    Location:R Low back/buttock Onset:Chronic for years, but worsening since early 5/2023 Provocation/Palliative:Worse with laying down flat, activity/better somewhat sitting. Patient needs to switch position to relieve the pain. Quality:Achy Sharp Radiation: Can radiate down R lateral leg at times Severity:Varies between 6/10 and 10/10 Timing:Varies day to day  No bowel/bladder changes. No groin numbness.

## 2024-06-13 NOTE — ASSESSMENT
[FreeTextEntry1] : Mr. EVER GOYAL is a 73 year old male with a history of metastatic cancer who presented with acute on chronic low back pain, possibly due to a lumbar radiculopathy, as well as spondylosis. No red flag signs/symptoms. Will recommend: - MRI L Spine reviewed with patient - He is now off oxycodone as he didn't want to take it. - Will start trial of Gabapentin 100mg Qhs with gradual increase to 300mg Qhs. Patient aware of side effects and risks including sedation, leg swelling, and possible mood changes.  Spoke about R/B/A of an YANE but given uncontrolled DM, will hold off for now - Continue Tylenol 1000mg BID PRN - Will continue PT 2-3x/week for stretching, strengthening (especially of core muscles), ROM exercises, HEP and modalities PRN including myofascial release, moist heat  Follow up in 3-4 weeks. Patient educated on red flag signs including any changes to their bowel/bladder control, groin numbness or new weakness. Patient knows to seek immediate attention by calling 911 or going to nearest ER if these symptoms appear.  This patient is being managed for a complex chronic pain that requires ongoing medical management. The nature of this condition requires a longitudinal relationship and monitoring over time for appropriate treatment.  I spent a total of 22 minutes on the phone for this encounter.   .

## 2024-06-13 NOTE — REASON FOR VISIT
[Follow-Up] : a follow-up visit [[employer] ] : provided by [employer]   [Interpreters_IDNumber] : [id number], [id number] [TWNoteComboBox1] : [place]

## 2024-06-17 ENCOUNTER — OUTPATIENT (OUTPATIENT)
Dept: OUTPATIENT SERVICES | Facility: HOSPITAL | Age: 74
LOS: 1 days | Discharge: HOME | End: 2024-06-17

## 2024-06-17 DIAGNOSIS — C34.90 MALIGNANT NEOPLASM OF UNSPECIFIED PART OF UNSPECIFIED BRONCHUS OR LUNG: ICD-10-CM

## 2024-06-17 DIAGNOSIS — D11.0 BENIGN NEOPLASM OF PAROTID GLAND: Chronic | ICD-10-CM

## 2024-06-17 DIAGNOSIS — Z90.89 ACQUIRED ABSENCE OF OTHER ORGANS: Chronic | ICD-10-CM

## 2024-06-17 DIAGNOSIS — N20.0 CALCULUS OF KIDNEY: Chronic | ICD-10-CM

## 2024-06-17 DIAGNOSIS — Z95.5 PRESENCE OF CORONARY ANGIOPLASTY IMPLANT AND GRAFT: Chronic | ICD-10-CM

## 2024-06-18 RX ORDER — SEMAGLUTIDE 2.68 MG/ML
8 INJECTION, SOLUTION SUBCUTANEOUS
Qty: 1 | Refills: 2 | Status: ACTIVE | COMMUNITY
Start: 2024-06-18 | End: 1900-01-01

## 2024-06-19 ENCOUNTER — APPOINTMENT (OUTPATIENT)
Dept: HEMATOLOGY ONCOLOGY | Facility: CLINIC | Age: 74
End: 2024-06-19
Payer: MEDICARE

## 2024-06-19 VITALS
BODY MASS INDEX: 31.17 KG/M2 | RESPIRATION RATE: 16 BRPM | HEART RATE: 77 BPM | DIASTOLIC BLOOD PRESSURE: 73 MMHG | TEMPERATURE: 97.7 F | OXYGEN SATURATION: 93 % | SYSTOLIC BLOOD PRESSURE: 114 MMHG | WEIGHT: 170.41 LBS

## 2024-06-19 DIAGNOSIS — C79.31 SECONDARY MALIGNANT NEOPLASM OF BRAIN: ICD-10-CM

## 2024-06-19 DIAGNOSIS — C34.12 MALIGNANT NEOPLASM OF UPPER LOBE, LEFT BRONCHUS OR LUNG: ICD-10-CM

## 2024-06-19 DIAGNOSIS — C77.1 SECONDARY AND UNSPECIFIED MALIGNANT NEOPLASM OF INTRATHORACIC LYMPH NODES: ICD-10-CM

## 2024-06-19 PROCEDURE — 99214 OFFICE O/P EST MOD 30 MIN: CPT

## 2024-06-19 PROCEDURE — G2211 COMPLEX E/M VISIT ADD ON: CPT

## 2024-06-19 NOTE — PHYSICAL EXAM
[Ambulatory and capable of all self care but unable to carry out any work activities] : Status 2- Ambulatory and capable of all self care but unable to carry out any work activities. Up and about more than 50% of waking hours [Normal] : affect appropriate [de-identified] : No icterus  [de-identified] : MMM O/P clear [de-identified] : Supple No LAD  [de-identified] : Somewhat distant BS [de-identified] : S1 S2  [de-identified] : No edema  [de-identified] : No spine/CVA tenderness

## 2024-06-19 NOTE — REASON FOR VISIT
[Follow-Up Visit] : a follow-up [Spouse] : spouse [Other: _____] : [unfilled] [FreeTextEntry2] : Lung Cancer

## 2024-06-19 NOTE — HISTORY OF PRESENT ILLNESS
[Disease: _____________________] : Disease: [unfilled] [T: ___] : T[unfilled] [N: ___] : N[unfilled] [de-identified] : The patient has a heavy smoking history who recently quit. He developed a worsening cough roughly 6 months ago followed by fatigue and insomnia roughly 5 months ago. He saw his PCP and was referred to pulmonary. He was referred for a CT chest for lung cancer screening which revealed a left upper lobe lung mass with associated lymphadenopathy and suspicious bony lesions. He was then sent for a PET/CT scan which have activity in these areas. He underwent a bronchoscopy with EBUS biopsy of bilateral mediastinal lymph nodes that were positive for small cell carcinoma.  Started first line systemic therapy with Carbo/Etoposide/Atezolizumab in May 2019.  Achieved CO.  Received 4 cycles completed early July 2019 followed by Atezolizumab maintenance since late July 2019.  Received consolidative Thoracic RT completed late Aug 2019.  He declined PCI.  Developed small brain metastases on Brain MRI in June 2020 and received GK-SRS for 2 small lesions in early July 2020. Patient hospitalized 10/21-11/2 with pneumonitis 2/2 immunotherapy and discharged on steroids.   Atezolizumab discontinued after last dose in late September 2020 due to development of pneumonitis and he has been monitored off systemic therapy since that time.  He has been tapered off Prednisone as of March 2021.   [de-identified] : -Lymph node 4L and 4R EBUS guided FNA 4/18/19: Positive for malignant cells. Small cell carcinoma. [de-identified] : Daughter provided translation where needed.   Patient has been off systemic therapy since late September 2020 when Atezolizumab was discontinued after developing pneumonitis.   He has been tapered off Prednisone as of March 2021.  Not currently on steroids.   Using supplemental O2 as needed, mainly at night and periodically during the day.     Patient developed worsening back pain over the past few months.  He did have a long car ride which may have exacerbated this.  He is following with PMR and is doing PT currently.  Symptoms are improving.  MRI L-spine from this month with degenerative changes and chronic compression fractures; no malignant findings. Restaging/surveillance body CT this month reveals a new indeterminate 4 mm PATRICIO nodule and no other new findings.  Reassurance provided.

## 2024-06-19 NOTE — RESULTS/DATA
[FreeTextEntry1] : -MRI Brain 10/24/23:  Stable appearing nodule lateral left temporal occipital junction with mild surrounding edema. No new parenchymal lesions seen. Region of signal abnormality right greater wing of the sphenoid bone seen best on FLAIR imaging stable from prior exam. Stable osseous metastasis cannot be excluded.  -CT C/A/P 2/7/24:  Stable exam compared to October 2023.   Unchanged 1.2 cm left upper lobe nodule. No new nodules or adenopathy.  No metastatic disease in the abdomen or pelvis.  Images Reviewed/Interpreted:  -MRI L-Spine 6/7/24:   1. Multilevel degenerative changes resulting in up to moderate spinal canal narrowing and severe neural foraminal narrowing as described. The degenerative changes and narrowing has increased as compared to the previous MRI from 2022. 2. Similar appearing multiple chronic compression fracture deformities. No acute compression fracture identified. 3. No evidence of osseous metastatic disease. No abnormal enhancement within the spinal canal.  -CT CAP 6/12/24:  New indeterminate 4 mm anterior left upper lobe nodule, which can be followed with CT in 3 months. Stable 1.2 cm left upper lobe peribronchovascular nodule, occluding the subsegmental bronchus. No new disease in the abdomen or pelvis.

## 2024-06-19 NOTE — ASSESSMENT
[FreeTextEntry1] : Extensive Stage Small cell lung cancer. Started first line Carbo/Etoposide/Atezo in early May 2019, achieved ND. Completed 4 cycles followed by Atezolizumab maintenance from July 2019. Received consolidative Thoracic RT completed late August 2019. He declined PCI. Developed Brain metastases in June 2020 s/p GK-SRS in July 2020. Hospitalized at [facility] 10/21-11/2/20 for pneumonitis, felt to be secondary to immunotherapy. Treated with steroids through early March 2021. Monitored off systemic therapy since last treatment with maintenance Atezolizumab in late Sept 2020. Surveillance/Restaging Brain MRI Oct 2023 with sustained intracranial response.  Surveillance/Restaging body CT June 2024 with sustained response.   Recommend: -Continue to observe off treatment and monitor for progression of disease -Pulm f/u. On supplemental O2 prn. Utilizing EPAP machine. F/U with sleep specialist. -F/U with endocrine for management of DM and osteoporosis -Osteoporotic compression fractures: following with with PMR. Doing PT for back pain.  MRI L-spine June 2024 without malignant findings.   -Patient will f/u with Rad-Onc.  Due for Brain MRI in April with Rad-Onc f/u  -Restaging/Surveillance CT C/A/P in 3 months (Sept 2024) and follow up to review results or sooner should problems arise.  The PATRICIO nodule is very non-specific and can be monitored.  -Information sheet given with directions for making appointments.

## 2024-06-20 RX ORDER — VALSARTAN 80 MG/1
80 TABLET, COATED ORAL DAILY
Qty: 90 | Refills: 3 | Status: ACTIVE | COMMUNITY
Start: 2023-05-15 | End: 1900-01-01

## 2024-06-20 RX ORDER — METOPROLOL SUCCINATE 50 MG/1
50 TABLET, EXTENDED RELEASE ORAL
Qty: 90 | Refills: 2 | Status: ACTIVE | COMMUNITY
Start: 2024-06-13 | End: 1900-01-01

## 2024-06-21 ENCOUNTER — TRANSCRIPTION ENCOUNTER (OUTPATIENT)
Age: 74
End: 2024-06-21

## 2024-06-24 ENCOUNTER — APPOINTMENT (OUTPATIENT)
Dept: PHYSICAL MEDICINE AND REHAB | Facility: CLINIC | Age: 74
End: 2024-06-24
Payer: MEDICARE

## 2024-06-24 VITALS
RESPIRATION RATE: 14 BRPM | HEART RATE: 78 BPM | SYSTOLIC BLOOD PRESSURE: 128 MMHG | DIASTOLIC BLOOD PRESSURE: 71 MMHG | WEIGHT: 170 LBS | HEIGHT: 62 IN | BODY MASS INDEX: 31.28 KG/M2

## 2024-06-24 DIAGNOSIS — E11.9 TYPE 2 DIABETES MELLITUS W/OUT COMPLICATIONS: ICD-10-CM

## 2024-06-24 DIAGNOSIS — M47.816 SPONDYLOSIS W/OUT MYELOPATHY OR RADICULOPATHY, LUMBAR REGION: ICD-10-CM

## 2024-06-24 DIAGNOSIS — M48.061 SPINAL STENOSIS, LUMBAR REGION WITHOUT NEUROGENIC CLAUDICATION: ICD-10-CM

## 2024-06-24 DIAGNOSIS — C34.90 MALIGNANT NEOPLASM OF UNSPECIFIED PART OF UNSPECIFIED BRONCHUS OR LUNG: ICD-10-CM

## 2024-06-24 PROCEDURE — G2211 COMPLEX E/M VISIT ADD ON: CPT

## 2024-06-24 PROCEDURE — 99214 OFFICE O/P EST MOD 30 MIN: CPT

## 2024-06-24 RX ORDER — TEMAZEPAM 15 MG/1
15 CAPSULE ORAL
Qty: 30 | Refills: 0 | Status: ACTIVE | COMMUNITY
Start: 2020-11-11 | End: 1900-01-01

## 2024-07-25 ENCOUNTER — APPOINTMENT (OUTPATIENT)
Dept: PHYSICAL MEDICINE AND REHAB | Facility: CLINIC | Age: 74
End: 2024-07-25
Payer: MEDICARE

## 2024-07-25 VITALS
HEIGHT: 62 IN | WEIGHT: 168 LBS | BODY MASS INDEX: 30.91 KG/M2 | SYSTOLIC BLOOD PRESSURE: 138 MMHG | DIASTOLIC BLOOD PRESSURE: 73 MMHG | HEART RATE: 81 BPM | RESPIRATION RATE: 15 BRPM

## 2024-07-25 DIAGNOSIS — M47.816 SPONDYLOSIS W/OUT MYELOPATHY OR RADICULOPATHY, LUMBAR REGION: ICD-10-CM

## 2024-07-25 DIAGNOSIS — M48.061 SPINAL STENOSIS, LUMBAR REGION WITHOUT NEUROGENIC CLAUDICATION: ICD-10-CM

## 2024-07-25 DIAGNOSIS — M43.9 DEFORMING DORSOPATHY, UNSPECIFIED: ICD-10-CM

## 2024-07-25 PROCEDURE — 99214 OFFICE O/P EST MOD 30 MIN: CPT

## 2024-07-25 PROCEDURE — G2211 COMPLEX E/M VISIT ADD ON: CPT

## 2024-07-25 NOTE — PHYSICAL EXAM
[FreeTextEntry1] : PE:  Constitutional: In NAD, calm and cooperative  MSK (Back)  Inspection: no gross swelling identified  Palpation: Minimal Tenderness of the R lower lumbar paraspinals  ROM: Able to flex 90 degrees, limited extension to 15 degrees due to pain  Strength: 5/5 strength in bilateral lower extremities  Reflexes: 2+ Patella reflex bilaterally, 2+ Achilles reflex bilaterally, negative clonus bilaterally  Sensation: Intact to light touch in bilateral lower extremities  Special tests: SLR:negative bilaterally ELIZABETH:negative bilaterally FADIR: negative bilaterally.

## 2024-07-25 NOTE — ASSESSMENT
[FreeTextEntry1] : Mr. EVER GOYAL is a 73 year old male with a history of metastatic cancer who presented with acute on chronic low back pain, possibly due to a lumbar radiculopathy, as well as spondylosis. No red flag signs/symptoms. Will recommend: - Will continue Gabapentin 200mg Qhs at night as he is getting good relief but told patient he can wean down to 100mg Qhs and off if he would like Patient aware of side effects and risks including sedation, leg swelling, and possible mood changes. - Continue Tylenol 1000mg BID PRN - Will continue PT 2-3x/week for stretching, strengthening (especially of core muscles), ROM exercises, HEP and modalities PRN including myofascial release, moist heat  Follow up in 6 weeks if pain continues. Patient educated on red flag signs including any changes to their bowel/bladder control, groin numbness or new weakness. Patient knows to seek immediate attention by calling 911 or going to nearest ER if these symptoms appear.  This patient is being managed for a complex chronic pain that requires ongoing medical management. The nature of this condition requires a longitudinal relationship and monitoring over time for appropriate treatment.

## 2024-07-25 NOTE — HISTORY OF PRESENT ILLNESS
[FreeTextEntry1] : Mr. EVER CHAVEZ is a 73 year old male who presents for follow up. At last visit, he was increased on Gabapentin to 100mg midday and 200mg Qhs, spoke about an YANE, continued on Tylenol and PT. He is feeling better overall, taking just Gabapentin 200mg Qhs and occasional Tylenol. He is going to PT with good relief.    Location:R Low back/buttock Onset:Chronic for years, but worsening since early 5/2023, although recently improved Provocation/Palliative: Worse with laying down flat, activity/better somewhat sitting. Patient needs to switch position to relieve the pain. Quality:Achy Sharp Radiation: No significant radiation at this time.  Severity:2-3/10 now Timing:Varies day to day  No bowel/bladder changes. No groin numbness.

## 2024-07-25 NOTE — REASON FOR VISIT
[Follow-Up] : a follow-up visit [[employer] ] : provided by [employer]   [Interpreters_IDNumber] : [id number] [TWNoteComboBox1] : [place]

## 2024-07-30 ENCOUNTER — OUTPATIENT (OUTPATIENT)
Dept: OUTPATIENT SERVICES | Facility: HOSPITAL | Age: 74
LOS: 1 days | End: 2024-07-30
Payer: MEDICARE

## 2024-07-30 ENCOUNTER — APPOINTMENT (OUTPATIENT)
Dept: MRI IMAGING | Facility: CLINIC | Age: 74
End: 2024-07-30
Payer: MEDICARE

## 2024-07-30 DIAGNOSIS — C79.31 SECONDARY MALIGNANT NEOPLASM OF BRAIN: ICD-10-CM

## 2024-07-30 DIAGNOSIS — Z95.5 PRESENCE OF CORONARY ANGIOPLASTY IMPLANT AND GRAFT: Chronic | ICD-10-CM

## 2024-07-30 DIAGNOSIS — D11.0 BENIGN NEOPLASM OF PAROTID GLAND: Chronic | ICD-10-CM

## 2024-07-30 DIAGNOSIS — N20.0 CALCULUS OF KIDNEY: Chronic | ICD-10-CM

## 2024-07-30 DIAGNOSIS — Z90.89 ACQUIRED ABSENCE OF OTHER ORGANS: Chronic | ICD-10-CM

## 2024-07-30 PROCEDURE — 70553 MRI BRAIN STEM W/O & W/DYE: CPT

## 2024-07-30 PROCEDURE — 70553 MRI BRAIN STEM W/O & W/DYE: CPT | Mod: 26

## 2024-07-30 PROCEDURE — A9585: CPT

## 2024-08-01 ENCOUNTER — APPOINTMENT (OUTPATIENT)
Dept: RADIATION ONCOLOGY | Facility: CLINIC | Age: 74
End: 2024-08-01

## 2024-08-01 VITALS
WEIGHT: 168.32 LBS | SYSTOLIC BLOOD PRESSURE: 129 MMHG | HEART RATE: 76 BPM | DIASTOLIC BLOOD PRESSURE: 75 MMHG | BODY MASS INDEX: 30.79 KG/M2 | OXYGEN SATURATION: 93 %

## 2024-08-01 PROCEDURE — 99213 OFFICE O/P EST LOW 20 MIN: CPT
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
## 2024-08-01 NOTE — VITALS
[80: Normal activity with effort; some signs or symptoms of disease.] : 80: Normal activity with effort; some signs or symptoms of disease.  [ECOG Performance Status: 1 - Restricted in physically strenuous activity but ambulatory and able to carry out work of a light or sedentary nature] : Performance Status: 1 - Restricted in physically strenuous activity but ambulatory and able to carry out work of a light or sedentary nature, e.g., light house work, office work [Maximal Pain Intensity: 0/10] : 0/10 [Least Pain Intensity: 0/10] : 0/10 [90: Able to carry normal activity; minor signs or symptoms of disease.] : 90: Able to carry normal activity; minor signs or symptoms of disease.

## 2024-08-01 NOTE — HISTORY OF PRESENT ILLNESS
[FreeTextEntry1] :   INITIAL CONSULTATION: JUNE 26, 2020 Mr. Cinthya Ness is a 73 yo male with PMH DM2, HTN, CAD s/p PCI (RCA 2007), HLD, and COPD , Found with extensive stage small cell lung cancer diagnosed in 4/2019 s/p carbo/ etoposide/ atezolizumab and consolidative RT 8/2019 followed by adjuvant atezolizumab. He was found to have two sub-cm brain metastases treated on 7/1/2020 with GK SRS (L_Frontal, L_Parietal).  ONCOLOGY HISTORY Presented as a 69 year old male with a history of small cell lung cancer.  This was initially diagnosed in 4/2019 through an ebus of bilateral level 4 lymph nodes in 4/2019 after presenting with 6 months of worsening cough, fatigue, and insomnia. A CT chest showed a lung mass at that time. He was initially treated with carbo/etoposide/atezoluzimab starting in 5/2019 with 4 cycles completed in 7/2019, followed by atezolizumab maintenance . Consolidative RT completed in 8/2019. He elected against prophylactic whole brain radiation.   Due to frequent headaches, forgetfulness, and reflexes bring worse, a brain MRI was ordered on 6/10/2020.   This brain MRI revealed two brain lesions consistent with metastases.  1: left posterior temporal/occipital cortex,  approximately 1.4 x 1.2 cm.  2:  high medial left frontal cortex, approximately 0.7 x 0.6 cm Small amount of surrounding edema is identified.   At the time of initial consult he endorsed occassional headaches, manageable.  Denied focal weakness, nausea, vomiting, or other complaints.  Maintains an excellent KPS.  His daughter, a physician, is serving as the  during this conversation.  He was treated with GK SRS to the two lesions on 7/1/2020 9/17/2020- Mr. Ness presents today for follow up. He is seen with the assistance of pac[facility]  [id number]. MRI brain done 9/11/2020 showed maarked improvement and treatment response since prior exam. Previously visualized enhancing lesion in the left posterior temporal lobe is markedly decreased in size since prior exam, measuring approximately 0.6 x 0.6 cm, and previously measured 1.4 x 1.2 cm. There is no vasogenic edema surrounding this lesion. Previously visualized enhancing lesion in the high medial left frontal cortex, is markedly decreased in size since prior exam, nearly nonexistent measuring 0.1 cm, and previously measuring 0.7 x 0.6 cm. Minimal surrounding vasogenic edema remains. No new enhancing lesions are identified.   He continues following with Dr. Ramirez. continues on atezoluzumab maintenance. CT chest 8/31/2020 showed Emphysema is present. Left upper lobe ill-defined nodular/linear opacity is similar compared to the prior study. Other bilateral patchy lung opacities are new from the prior study. These are indeterminate and may be infectious/inflammatory. 1 month follow-up CT needed for complete evaluation.   Today he feels very well. Notes some headaches over the last week, not lasting long. Denies nausea, vomiting, focal weakness, Overall feeling well. Hearing may be a little worse recently.  12/22/2020- Mr. Ness presents today for follow up. [employer]  [id number] used for visit today.  MRI brain 12/11/2020 showed previously seen left temporal occipital lobe lesion is significantly smaller in size.  Previously seen left frontal lobe lesion is no longer visualized.  Continued follow-up is recommended.  CT Chest 10/19/2020 showed widespread bilateral opacities throughout both lungs with significant interval progression since August 31, 2020 suggestive of infectious or inflammatory etiology. Findings may be due to Covid 19 pneumonia.  Atezoluzumab discontinued this fall due to hospitalization with pneumonitis. Due for surveillance imaging at the end of December. Currently on 120 mg of prednisone for pneumonitis.  Today he denies headaches. Says he feels slightly confused sometimes, attributes this to steroids. Denies nausea, vomiting, focal weakness. Slight bilateral LE swelling, +1  4/13/2021- Mr. Ness presents today for follow up. He is seen through [facility] for which he provides verbal consent on 4/13/2021 at 3:19 PM. [employer]  [id number] used for visit. he underwent a follow up brain MRI on 4/12/2021. This showed Previously noted enhancing lesions are no longer identified. Continued close interval follow-up is recommended. He was diagnosed with covid-19 on 4/2. Has continued to follow with Dr. Ramirez.  Today he is feeling  well. Remains on 3L O2, which he was on prior to COVID. Feels like antibody therapy helped him a lot. No headaches, no focal weakness.   7/22/2021- Mr. Ness presents today for follow up. [place]   he underwent a brain MRI on 7/15/2021. This showed no significant change. Continues to follow with Dr. Ramirez. Continues to be observed off treatment since 9/2020, with sustained response in June, plan for follow up imaging in in 9/2021. Today he is feeling well. Remains on 2L O2 during the day, none at night. No headaches, nausea, vomiting, focal weakness, visual trouble. some trouble swallowing which has been present since he had systemic therapy. Overall feeling well. has some back pain after a recent fall  10/20/2021- Mr. Ness presents today for follow up. Has continued to follow with Dr. Ramirez. Continues to be monitored off treatment.  CT CAP 9/2021 showed  Stable dominant central left upper lobe nodule. More conspicuous small tubular nodule in the anterior left upper lobe could be mucoid impaction. 3 month follow-up is recommended. New sclerotic lesion of the left third rib. Stable sclerosis and loss of height of the T10 vertebral body. No new finding in the abdomen or pelvis. Brain MRI 10/15/2021 showed Posttreatment changes. No new or enlarging intracranial lesions identified. Spoke with patient via  #[id number].  Patient is feeling well and denies any new symptoms.    1/19/2022: Pt presents for follow-up. Today he reports feeling well. Denies pain, numbness, tingling, or changes in hearing/vision. Endorses short-term memory loss (often related to tasks he wishes to complete). He is currently using 2L oxygen.  4/28/2022- Mr. Fuentes presents today for follow up.  offered, patient refuses, asks wife to translate. MRI brain 4/26/22 showed No hydrocephalus, mass effect, acute intracranial hemorrhage, vasogenic edema, or acute territorial infarct. No abnormal parenchymal or leptomeningeal enhancement.  No evidence for intracranial or osseous metastases  CT CAP 3/10/2022 was stable.  Continues to follow with Dr. Ramirez. not on systemic therapy.   He continues to feel very well. Following with PM &R regarding lower back pain, will get a lumbar spine MRI. notes memory trouble.  8/4/2022- Mr Fuentes presents today for follow up.  [id number] used for visit today. MRI brain 7/29/2022 with no final read, appears stable. Continues off of systemic therapy.  CT CAP 6/14/2022 showed Stable 1.3 cm left upper lobe nodule.  No evidence of new or progressive disease.  Feeling well overall. notes some nocturnal dizziness when he lays down over the last month, not progressive. no headaches, no nausea, no focal weakness, no numbness or tingling. lower back pain has improved.   11/23/2022- Mr. Fuentes presents today for follow up. Brain MRI 11/17/2022 showed Similar-appearing (since 7/29/2022) 6 x 5 mm enhancing parenchymal nodule in the posterior left temporal lobe with small surrounding vasogenic edema. No abnormal leptomeningeal enhancement.  Continues off systemic therapy.   CT CAP 10/3/2022 stable. Patient is doing well today. States 3 brief episodes of mild headache lasting a few seconds. No motor or sensory deficits noted. Minor facial asymmetry. Patient denies any nausea, vomiting or constipation.   3/9/2023 Mr Fuentes presents for follow up. Brain MRI 2/22/23 showed IMPRESSION: Stable appearing nodule lateral left temporal occipital junction with mild surrounding edema. No new lesions seen  2/6/23 CT C/A/P IMPRESSION: Interval disease stability in the chest, abdomen, and pelvis compared to  10/3/2022: Unchanged left upper lobe 1.3 cm lesion. Unchanged right adrenal gland 1.2 cm nodule. He continues to follow with Pulmonology.  Visit dated 10/25/2023 Patient returns for routine f/u to include progress check and imaging review. Reports overall doing well except he has had low energy over the past 6 months despite getting at least 6 hrs of sleep per night. He is still able to go on his daily walks.  Denies N/V, HA/unilateral extremity weakness/memory changes/gait disturbance/bowel/bladder dysfunction or other neurologic symptoms. No issues with speech or comprehension.  Continues to follow with Dr. Ramirez off systemic therapy since Sept 2020  MRI brain w w/o contrast 10/24/2023 final read pending at the time of this entry.  CT C/A/P w w/o contrast 10/4/203 IMPRESSION: Unchanged left upper lobe nodule. Moderately severe centrilobular emphysema. No evidence of intra-abdominal metastasis. Sclerotic skeletal foci unchanged  VISIT DATED: 8/1/2024 Patient returns for routine f/u and progress check with cranial images for review. Overall doing well w/o any new concerns since his last visit. Does continue with low energy but this doesn't prevent participation in activity. Today does have a running nose but attributes to allergy Denies sore throat/fever. Occasional headaches w/o the need for medications.  Continues to follow with Dr. Ramirez and has been monitored off systemic therapy since Sept 2020 with plans for restaging scan in September 2024  MRI brain w w/o contrast 7/30/2024 IMPRESSION: 1. An enhancing parenchymal lesion in the left occipital temporal region measures 7 x 3 x 4 mm (12:56, 14:121), unchanged when using similar measurement technique. Surrounding vasogenic edema is unchanged. No new enhancing parenchymal lesions. 2. Unchanged T1 hypointense, FLAIR hyperintense lesion in the right greater wing of the sphenoid (8:13). 3. There is a 3 x 3 x 3 mm inferiorly directed outpouching arising from the right supraclinoid ICA, suggesting an aneurysm (14:73, 12:67). 4. There is a similar 3 x 3 x 2 mm inferiorly directed outpouching arising from the left supraclinoid ICA, suggesting an infundibulum versus aneurysm (12:62, 14:92).

## 2024-08-01 NOTE — REASON FOR VISIT
[Routine Follow-Up] : routine follow-up visit for [Brain Metastasis] : brain metastasis [Spouse] : spouse [Other: _____] : [unfilled] [Other: ______] : provided by [employer] [Interpreters_IDNumber] : [id number] [Interpreters_FullName] : [place]iquita [TWNoteComboBox1] : [place]

## 2024-08-01 NOTE — REVIEW OF SYSTEMS
[Negative] : Neurological [Fatigue: Grade 1 - Fatigue relieved by rest] : Fatigue: Grade 1 - Fatigue relieved by rest [Concentration Impairment: Grade 1] : Concentration Impairment: Grade 1 - Mild inattention or decreased level of concentration [Dizziness: Grade 0] : Dizziness: Grade 0  [Headache: Grade 1 - Mild pain] : Headache: Grade 1 - Mild pain [Peripheral Motor Neuropathy: Grade 0] : Peripheral Motor Neuropathy: Grade 0 [Peripheral Sensory Neuropathy: Grade 0] : Peripheral Sensory Neuropathy: Grade 0 [Insomnia: Grade 1 - Mild difficulty falling asleep, staying asleep or waking up early] : Insomnia: Grade 1 - Mild difficulty falling asleep, staying asleep or waking up early [Dysphagia] : no dysphagia [Loss of Hearing] : no loss of hearing [Dizziness] : no dizziness [Fainting] : no fainting [FreeTextEntry2] : low energy but stable since last visit [FreeTextEntry6] : continues to use O2 2 L at nights [de-identified] : Denies headaches

## 2024-08-06 ENCOUNTER — APPOINTMENT (OUTPATIENT)
Dept: NEPHROLOGY | Facility: CLINIC | Age: 74
End: 2024-08-06

## 2024-08-06 PROCEDURE — 99214 OFFICE O/P EST MOD 30 MIN: CPT

## 2024-08-06 NOTE — PHYSICAL EXAM
[General Appearance - Alert] : alert [Sclera] : the sclera and conjunctiva were normal [Hearing Threshold Finger Rub Not [place]] : hearing was normal [Auscultation Breath Sounds / Voice Sounds] : lungs were clear to auscultation bilaterally [Heart Sounds] : normal S1 and S2 [Edema] : there was no peripheral edema [Abdomen Soft] : soft [Abnormal Walk] : normal gait [] : no rash [Motor Exam] : the motor exam was normal [Impaired Insight] : insight and judgment were intact [Affect] : the affect was normal [Mood] : the mood was normal [FreeTextEntry1] : on supplemental O2 via [place]

## 2024-08-06 NOTE — HISTORY OF PRESENT ILLNESS
[FreeTextEntry1] : 49 yr old M with metastatic Lung Cancer CAD , DM type II,  HLD and COPD here for initial evaluation of hypomagnesemia.  Pt has a history of heavy smoking and  quit 3 years ago after being diagnosed with a left lung mass and associated lymphadenopathy and suspicious bony lesions. HHe underwent a bronchoscopy with EBUS biopsy of bilateral mediastinal lymph nodes that were positive for small cell carcinoma. Started first line systemic therapy with Carbo/Etoposide/Atezolizumab in May 2019. Achieved [place].  Received 4 cycles completed early July 2019 followed by Atezolizumab maintenance since late July 2019. Received consolidative Thoracic RT completed late Aug 2019.  Developed small brain metastases on Brain MRI in June 2020 and received GK-SRS for 2 small lesions in early July 2020. Patient hospitalized 10/21-11/2 with pneumonitis 2/2 immunotherapy and discharged on steroids. Atezolizumab discontinued after last dose in late September 2020 due to development of pneumonitis and he has been monitored off systemic therapy since that time. He has been tapered off Prednisone as of March 2021.  Uses supplemental O2 as needed, mainly during the daytime.   Pt also has a h/o DM- HbA1c ~ 6.7 He was on metformin and januvia in the past- Januvia discontinued and he was started on Trulicty 1.5 mg subq weekly metformin 1g BID. Denies retinopathy/ neuropathy. Also denies foamy urine/ hematuria Reports daily diarrhea about 3-5 times x day Metformin was decreased to 500 mg bid and Trulicity increased to 3 mg.  Denies diuretics/ PPI use.  Originally from [place] Ex smoker  Daughter is a NP  -------------------------------------------------------------  02/09/2023 Pt presents for f/u of hypomagnesemia Pt seen and examined; feels well  Diarrhea has somewhat improved- He is on metformin 500 mg bid. He takes Mg oxide 400 mg morning and evening and 800 mg in afternoon.  =----------------------------------------------------  08/24 pt presents for follow up of hypomagnesemia. pt seen and examined; feels well. c/o back pain  Diarrhea has improved a lot He is taking Mg-citrate 500 mg bid Switched to Valsartan from Lisinopril by Cardiology  -----------------------------------------------  02/06/2024  Pt presents for f/u of hypomagnesemia - Sushant, ID# [id number] Pt seen and examined; feels well No acute complaint  Started on Jardiance by Endocrinology c/o 'penile discomfort'- denies dysuria, hematuria, urgency ----------------------------------------------  08/06/2024  Pt presents for f/u of hypomagnesemia - ID#  [id number] Pt seen and examined; feels well No acute complaint  reports he had MRI brain with improvement in brain mets

## 2024-08-06 NOTE — ASSESSMENT
[FreeTextEntry1] : Hypomagnesemia Metastatic Lung Ca DM- off Metformin and Jardiance on? 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

## 2024-08-12 ENCOUNTER — NON-APPOINTMENT (OUTPATIENT)
Age: 74
End: 2024-08-12

## 2024-08-12 ENCOUNTER — APPOINTMENT (OUTPATIENT)
Dept: CARDIOLOGY | Facility: CLINIC | Age: 74
End: 2024-08-12
Payer: MEDICARE

## 2024-08-12 VITALS
HEIGHT: 62 IN | WEIGHT: 167 LBS | OXYGEN SATURATION: 93 % | DIASTOLIC BLOOD PRESSURE: 80 MMHG | BODY MASS INDEX: 30.73 KG/M2 | HEART RATE: 71 BPM | SYSTOLIC BLOOD PRESSURE: 124 MMHG

## 2024-08-12 VITALS
BODY MASS INDEX: 30.73 KG/M2 | HEART RATE: 71 BPM | RESPIRATION RATE: 15 BRPM | TEMPERATURE: 98 F | OXYGEN SATURATION: 95 % | SYSTOLIC BLOOD PRESSURE: 120 MMHG | DIASTOLIC BLOOD PRESSURE: 80 MMHG | HEIGHT: 62 IN | WEIGHT: 167 LBS

## 2024-08-12 DIAGNOSIS — J44.9 CHRONIC OBSTRUCTIVE PULMONARY DISEASE, UNSPECIFIED: ICD-10-CM

## 2024-08-12 DIAGNOSIS — Z95.5 PRESENCE OF CORONARY ANGIOPLASTY IMPLANT AND GRAFT: ICD-10-CM

## 2024-08-12 PROCEDURE — G2211 COMPLEX E/M VISIT ADD ON: CPT

## 2024-08-12 PROCEDURE — 93000 ELECTROCARDIOGRAM COMPLETE: CPT

## 2024-08-12 PROCEDURE — 99214 OFFICE O/P EST MOD 30 MIN: CPT

## 2024-08-12 NOTE — CARDIOLOGY SUMMARY
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

## 2024-08-12 NOTE — REASON FOR VISIT
[[employer] ] : provided by [employer]   [Time Spent: ____ minutes] : Total time spent using  services: [unfilled] minutes. The patient's primary language is not English thus required  services.

## 2024-08-12 NOTE — REVIEW OF SYSTEMS
[Fever] : no fever [Headache] : no headache [Blurry Vision] : no blurred vision [Seeing Double (Diplopia)] : no diplopia [Earache] : no earache [SOB] : no shortness of breath [Chest Discomfort] : no chest discomfort [Leg Claudication] : no intermittent leg claudication [Palpitations] : no palpitations [Syncope] : no syncope [Abdominal Pain] : no abdominal pain [Nausea] : no nausea [Vomiting] : no vomiting [Urinary Frequency] : no change in urinary frequency [Joint Pain] : no joint pain [Rash] : no rash [Convulsions] : no convulsions [Limb Weakness (Paresis)] : no limb weakness (Paresis) [Confusion] : no confusion was observed [Easy Bleeding] : no tendency for easy bleeding [FreeTextEntry9] : chronic back pain   fracture spine due to osteoporosis

## 2024-08-20 ENCOUNTER — APPOINTMENT (OUTPATIENT)
Dept: INTERNAL MEDICINE | Facility: CLINIC | Age: 74
End: 2024-08-20
Payer: MEDICARE

## 2024-08-20 VITALS
DIASTOLIC BLOOD PRESSURE: 70 MMHG | WEIGHT: 168 LBS | BODY MASS INDEX: 30.91 KG/M2 | HEART RATE: 80 BPM | SYSTOLIC BLOOD PRESSURE: 124 MMHG | TEMPERATURE: 97.9 F | OXYGEN SATURATION: 93 % | HEIGHT: 62 IN

## 2024-08-20 DIAGNOSIS — E78.5 HYPERLIPIDEMIA, UNSPECIFIED: ICD-10-CM

## 2024-08-20 DIAGNOSIS — M54.9 DORSALGIA, UNSPECIFIED: ICD-10-CM

## 2024-08-20 DIAGNOSIS — E83.42 HYPOMAGNESEMIA: ICD-10-CM

## 2024-08-20 DIAGNOSIS — I25.10 ATHEROSCLEROTIC HEART DISEASE OF NATIVE CORONARY ARTERY W/OUT ANGINA PECTORIS: ICD-10-CM

## 2024-08-20 DIAGNOSIS — I10 ESSENTIAL (PRIMARY) HYPERTENSION: ICD-10-CM

## 2024-08-20 DIAGNOSIS — Z79.4 TYPE 2 DIABETES MELLITUS WITH HYPERGLYCEMIA: ICD-10-CM

## 2024-08-20 DIAGNOSIS — C34.12 MALIGNANT NEOPLASM OF UPPER LOBE, LEFT BRONCHUS OR LUNG: ICD-10-CM

## 2024-08-20 DIAGNOSIS — C79.31 SECONDARY MALIGNANT NEOPLASM OF BRAIN: ICD-10-CM

## 2024-08-20 DIAGNOSIS — E11.65 TYPE 2 DIABETES MELLITUS WITH HYPERGLYCEMIA: ICD-10-CM

## 2024-08-20 DIAGNOSIS — E11.9 TYPE 2 DIABETES MELLITUS W/OUT COMPLICATIONS: ICD-10-CM

## 2024-08-20 PROCEDURE — 99214 OFFICE O/P EST MOD 30 MIN: CPT

## 2024-08-20 PROCEDURE — G2211 COMPLEX E/M VISIT ADD ON: CPT

## 2024-08-20 RX ORDER — AMILORIDE HYDROCHLORIDE 5 MG/1
5 TABLET ORAL DAILY
Qty: 90 | Refills: 0 | Status: ACTIVE | COMMUNITY
Start: 2024-08-20 | End: 1900-01-01

## 2024-08-20 NOTE — PHYSICAL EXAM
[No Acute Distress] : no acute distress [Well-Appearing] : well-appearing [Normal Sclera/Conjunctiva] : normal sclera/conjunctiva [No Respiratory Distress] : no respiratory distress  [Clear to Auscultation] : lungs were clear to auscultation bilaterally [Normal Rate] : normal rate  [Regular Rhythm] : with a regular rhythm [No Edema] : there was no peripheral edema [Soft] : abdomen soft [Normal Gait] : normal gait [Normal Affect] : the affect was normal

## 2024-08-29 LAB
ALBUMIN SERPL ELPH-MCNC: 4.5 G/DL
ALP BLD-CCNC: 105 U/L
ALT SERPL-CCNC: 31 U/L
ANION GAP SERPL CALC-SCNC: 12 MMOL/L
AST SERPL-CCNC: 19 U/L
BASOPHILS # BLD AUTO: 0.05 K/UL
BASOPHILS NFR BLD AUTO: 0.8 %
BILIRUB SERPL-MCNC: 0.5 MG/DL
BUN SERPL-MCNC: 20 MG/DL
CALCIUM SERPL-MCNC: 9.2 MG/DL
CHLORIDE SERPL-SCNC: 97 MMOL/L
CHOLEST SERPL-MCNC: 148 MG/DL
CO2 SERPL-SCNC: 24 MMOL/L
CREAT SERPL-MCNC: 1.09 MG/DL
EGFR: 72 ML/MIN/1.73M2
EOSINOPHIL # BLD AUTO: 0.29 K/UL
EOSINOPHIL NFR BLD AUTO: 4.6 %
ESTIMATED AVERAGE GLUCOSE: 209 MG/DL
GLUCOSE SERPL-MCNC: 231 MG/DL
HBA1C MFR BLD HPLC: 8.9 %
HCT VFR BLD CALC: 43.2 %
HDLC SERPL-MCNC: 34 MG/DL
HGB BLD-MCNC: 14.1 G/DL
IMM GRANULOCYTES NFR BLD AUTO: 0.8 %
LDLC SERPL CALC-MCNC: 64 MG/DL
LYMPHOCYTES # BLD AUTO: 0.69 K/UL
LYMPHOCYTES NFR BLD AUTO: 10.9 %
MAN DIFF?: NORMAL
MCHC RBC-ENTMCNC: 30.1 PG
MCHC RBC-ENTMCNC: 32.6 GM/DL
MCV RBC AUTO: 92.1 FL
MONOCYTES # BLD AUTO: 0.71 K/UL
MONOCYTES NFR BLD AUTO: 11.2 %
NEUTROPHILS # BLD AUTO: 4.55 K/UL
NEUTROPHILS NFR BLD AUTO: 71.7 %
NONHDLC SERPL-MCNC: 115 MG/DL
PLATELET # BLD AUTO: 208 K/UL
POTASSIUM SERPL-SCNC: 4.6 MMOL/L
PROT SERPL-MCNC: 6.4 G/DL
RBC # BLD: 4.69 M/UL
RBC # FLD: 15.4 %
SODIUM SERPL-SCNC: 134 MMOL/L
TRIGL SERPL-MCNC: 321 MG/DL
WBC # FLD AUTO: 6.34 K/UL

## 2024-08-29 NOTE — HISTORY OF PRESENT ILLNESS
[FreeTextEntry1] : Follow-up [de-identified] : 72 yo M pmhx HTN, DM, HLD, CAD s/p RCA stent (2007), ARIELLE, BPH s/p TURP, ED, GERD, hx gastritis, hypomagnesemia, former smoker (quit 2019), COPD (on supplemental O2), osteopenia, stage 4 small cell lung cancer (dx'd 4/19, s/p carboplatin/etoposide and radiation with partial response, then on atezolizumab c/b pneumonitis in 10/2020 treated with high dose steroids c/b vertebral compression fractures, with small brain metastases 6/2020 s/p gamma knife radiation therapy in 7/2020. Was tapered off steroids for pneumonitis by 3/7/2021, and stopped Bactrim prophylaxis at that time), hx covid infection 3/21 (s/p monoclonal Ab infusion and in PREVENT-HD rivaroxaban trial), hx covid infection 7/22 s/p Paxlovid course here for f/u   : [id number] Last seen April 2024 Interval since last seen  evaluated by cardiology Seen by nephrology, was started on magnesium supplement Seen by radiation oncology  Needs a refill for his amiloride and the metoprolol

## 2024-08-29 NOTE — HISTORY OF PRESENT ILLNESS
[FreeTextEntry1] : Follow-up [de-identified] : 74 yo M pmhx HTN, DM, HLD, CAD s/p RCA stent (2007), ARIELLE, BPH s/p TURP, ED, GERD, hx gastritis, hypomagnesemia, former smoker (quit 2019), COPD (on supplemental O2), osteopenia, stage 4 small cell lung cancer (dx'd 4/19, s/p carboplatin/etoposide and radiation with partial response, then on atezolizumab c/b pneumonitis in 10/2020 treated with high dose steroids c/b vertebral compression fractures, with small brain metastases 6/2020 s/p gamma knife radiation therapy in 7/2020. Was tapered off steroids for pneumonitis by 3/7/2021, and stopped Bactrim prophylaxis at that time), hx covid infection 3/21 (s/p monoclonal Ab infusion and in PREVENT-HD rivaroxaban trial), hx covid infection 7/22 s/p Paxlovid course here for f/u   : [id number] Last seen April 2024 Interval since last seen  evaluated by cardiology Seen by nephrology, was started on magnesium supplement Seen by radiation oncology  Needs a refill for his amiloride and the metoprolol

## 2024-09-03 ENCOUNTER — RX RENEWAL (OUTPATIENT)
Age: 74
End: 2024-09-03

## 2024-09-09 ENCOUNTER — APPOINTMENT (OUTPATIENT)
Dept: PHYSICAL MEDICINE AND REHAB | Facility: CLINIC | Age: 74
End: 2024-09-09

## 2024-09-09 VITALS
BODY MASS INDEX: 30.91 KG/M2 | DIASTOLIC BLOOD PRESSURE: 64 MMHG | SYSTOLIC BLOOD PRESSURE: 102 MMHG | WEIGHT: 168 LBS | RESPIRATION RATE: 14 BRPM | HEIGHT: 62 IN

## 2024-09-09 DIAGNOSIS — M48.061 SPINAL STENOSIS, LUMBAR REGION WITHOUT NEUROGENIC CLAUDICATION: ICD-10-CM

## 2024-09-09 DIAGNOSIS — M47.816 SPONDYLOSIS W/OUT MYELOPATHY OR RADICULOPATHY, LUMBAR REGION: ICD-10-CM

## 2024-09-09 PROCEDURE — 99213 OFFICE O/P EST LOW 20 MIN: CPT

## 2024-09-09 NOTE — PHYSICAL EXAM
[FreeTextEntry1] : PE:  Constitutional: In NAD, calm and cooperative  MSK (Back)  Inspection: no gross swelling identified  Palpation: Minimal Tenderness of the R lower lumbar paraspinals  ROM: Able to flex 90 degrees, no pain at 15 degrees lumbar extension  Strength: 5/5 strength in bilateral lower extremities  Reflexes: 2+ Patella reflex bilaterally, 2+ Achilles reflex bilaterally, negative clonus bilaterally  Sensation: Intact to light touch in bilateral lower extremities  Special tests: SLR:negative bilaterally ELIZABETH:negative bilaterally FADIR: negative bilaterally.

## 2024-09-09 NOTE — HISTORY OF PRESENT ILLNESS
[FreeTextEntry1] : Mr. EVER CHAVEZ is a 74 year old male who presents for follow up. At last visit, he was continued on Gabapentin, Tylenol PRN and PT. He has been feel better, says pain is under control, has been back to the gym. He is now longer taking the Gabapentin. He is only occasionally taking Tylenol.    Patient offered but refused Hebrew interpretor, requested his wife to interpret instead.   Location:R Low back/buttock Onset:Chronic for years, but worsening since early 5/2023, although recently improved Provocation/Palliative: Worse with laying down flat, activity/better somewhat sitting. Patient needs to switch position to relieve the pain. Quality:Achy Sharp Radiation: No significant radiation at this time. Severity:1-2/10 now Timing:Varies day to day  No bowel/bladder changes. No groin numbness.

## 2024-09-09 NOTE — ASSESSMENT
[FreeTextEntry1] : Mr. EVER GOYAL is a 74 year old male with a history of metastatic cancer who presented with acute on chronic low back pain, possibly due to a lumbar radiculopathy, as well as spondylosis. He is now feeling much better.  No red flag signs/symptoms. Will recommend: - He is no longer needing Gabapentin - Continue Tylenol 1000mg BID PRN - Will continue HEP  Follow up as needed. Patient educated on red flag signs including any changes to their bowel/bladder control, groin numbness or new weakness. Patient knows to seek immediate attention by calling 911 or going to nearest ER if these symptoms appear.  This patient is being managed for a complex chronic pain that requires ongoing medical management. The nature of this condition requires a longitudinal relationship and monitoring over time for appropriate treatment.

## 2024-09-12 ENCOUNTER — APPOINTMENT (OUTPATIENT)
Dept: ENDOCRINOLOGY | Facility: CLINIC | Age: 74
End: 2024-09-12
Payer: MEDICARE

## 2024-09-12 VITALS
HEART RATE: 87 BPM | DIASTOLIC BLOOD PRESSURE: 121 MMHG | OXYGEN SATURATION: 90 % | BODY MASS INDEX: 30.91 KG/M2 | SYSTOLIC BLOOD PRESSURE: 141 MMHG | WEIGHT: 168 LBS | HEIGHT: 62 IN

## 2024-09-12 DIAGNOSIS — E11.65 TYPE 2 DIABETES MELLITUS WITH HYPERGLYCEMIA: ICD-10-CM

## 2024-09-12 DIAGNOSIS — E78.5 HYPERLIPIDEMIA, UNSPECIFIED: ICD-10-CM

## 2024-09-12 DIAGNOSIS — Z79.4 TYPE 2 DIABETES MELLITUS WITH HYPERGLYCEMIA: ICD-10-CM

## 2024-09-12 PROCEDURE — 99214 OFFICE O/P EST MOD 30 MIN: CPT

## 2024-09-12 PROCEDURE — 95251 CONT GLUC MNTR ANALYSIS I&R: CPT

## 2024-09-16 ENCOUNTER — OUTPATIENT (OUTPATIENT)
Dept: OUTPATIENT SERVICES | Facility: HOSPITAL | Age: 74
LOS: 1 days | Discharge: HOME | End: 2024-09-16

## 2024-09-16 DIAGNOSIS — Z90.89 ACQUIRED ABSENCE OF OTHER ORGANS: Chronic | ICD-10-CM

## 2024-09-16 DIAGNOSIS — N20.0 CALCULUS OF KIDNEY: Chronic | ICD-10-CM

## 2024-09-16 DIAGNOSIS — D11.0 BENIGN NEOPLASM OF PAROTID GLAND: Chronic | ICD-10-CM

## 2024-09-16 DIAGNOSIS — Z95.5 PRESENCE OF CORONARY ANGIOPLASTY IMPLANT AND GRAFT: Chronic | ICD-10-CM

## 2024-09-16 DIAGNOSIS — C34.90 MALIGNANT NEOPLASM OF UNSPECIFIED PART OF UNSPECIFIED BRONCHUS OR LUNG: ICD-10-CM

## 2024-09-18 NOTE — HISTORY OF PRESENT ILLNESS
[Continuous Glucose Monitoring] : Continuous Glucose Monitoring: Yes [Osiel] : Osiel [FreeTextEntry1] : This is a 75 yo male w/ h/o metastatic lung cancer on home oxygen, type 2 DM who presents for diabetes follow up.  Patient notes he had A1c checked at his PCP office in April 2024 and noted A1c to be 8.3% which is not much improved since previous office visit and increase metformin XL to 1000 mg given that he has history of GI intolerance.  He was also started on Jardiance at last endocrinology visit to help bring his sugars down however he notes he has been having pain now burning sensation.  He is additionally on Trulicity 3 mg weekly he notes penile burning with Jardiance and stopped 3 weeks ago He uses carmelo CGM, gets sensors from [employer]. He notes he has not been following diabetic diet since recent holidays . He sees nephrology for hypomagnesemia..  [FreeTextEntry2] : 36 [FreeTextEntry3] : 57+7 [FreeTextEntry4] : 0+0 [de-identified] : - [FreeTextEntry5] : 196 [FreeTextEntry6] : 16.5

## 2024-09-18 NOTE — ASSESSMENT
[Diabetes Foot Care] : diabetes foot care [Long Term Vascular Complications] : long term vascular complications of diabetes [Carbohydrate Consistent Diet] : carbohydrate consistent diet [Importance of Diet and Exercise] : importance of diet and exercise to improve glycemic control, achieve weight loss and improve cardiovascular health [Hypoglycemia Management] : hypoglycemia management [Self Monitoring of Blood Glucose] : self monitoring of blood glucose [Retinopathy Screening] : Patient was referred to ophthalmology for retinopathy screening [Diabetic Medications] : Risks and benefits of diabetic medications were discussed [FreeTextEntry1] : Type 2 DM Recent HgbA1c is 8.9% in Aug2024, worsening and not at goal Extensively spoke to patient and his wife that his diabetes has not improved at all since last few visits depsite his refusal to consider insulin, noted A1c has not been <7% since Nov 2022. Discussed changing his diet by itself at this point will not likely bring his A1c to goal  Jennifer discussed adding basal insulin however patient declined today. He declined to make any changes in his medication regimen. Discussed sternly to start insulin if not improved by Dec 2024 to avoid delaying worsening complications of diabetes. He already sees nephro for CKD  Reviewed carmelo sensor tracing today, noted TIR is 36%, 57% high and 7% very high, 0% lows;  Unable to tolerate Metformin due to GI effect Continue Ozempic 2mg weekly Stopped Jardiance due to penile burning. Reviewed lipid panel from Aug 2024, Continue Rosuvastatin 20mg for hyperlipidemia, noted elevation in triglyceride [id number] and LDL chol improved to 64mg/dl; cardiology recommending to start fish oil and omega 3  Answered all questions today; patient verbalized understanding of the above RTO in 3 months w/ CDE

## 2024-09-18 NOTE — HISTORY OF PRESENT ILLNESS
[Continuous Glucose Monitoring] : Continuous Glucose Monitoring: Yes [Osiel] : Osiel [FreeTextEntry1] : This is a 75 yo male w/ h/o metastatic lung cancer on home oxygen, type 2 DM who presents for diabetes follow up.  Patient notes he had A1c checked at his PCP office in April 2024 and noted A1c to be 8.3% which is not much improved since previous office visit and increase metformin XL to 1000 mg given that he has history of GI intolerance.  He was also started on Jardiance at last endocrinology visit to help bring his sugars down however he notes he has been having pain now burning sensation.  He is additionally on Trulicity 3 mg weekly he notes penile burning with Jardiance and stopped 3 weeks ago He uses carmelo CGM, gets sensors from [employer]. He notes he has not been following diabetic diet since recent holidays . He sees nephrology for hypomagnesemia..  [FreeTextEntry2] : 36 [FreeTextEntry3] : 57+7 [FreeTextEntry4] : 0+0 [de-identified] : - [FreeTextEntry5] : 196 [FreeTextEntry6] : 16.5

## 2024-09-18 NOTE — ASSESSMENT
[Diabetes Foot Care] : diabetes foot care [Long Term Vascular Complications] : long term vascular complications of diabetes [Carbohydrate Consistent Diet] : carbohydrate consistent diet [Importance of Diet and Exercise] : importance of diet and exercise to improve glycemic control, achieve weight loss and improve cardiovascular health [Hypoglycemia Management] : hypoglycemia management [Self Monitoring of Blood Glucose] : self monitoring of blood glucose [Retinopathy Screening] : Patient was referred to ophthalmology for retinopathy screening [Diabetic Medications] : Risks and benefits of diabetic medications were discussed [FreeTextEntry1] : Type 2 DM Recent HgbA1c is 8.9% in Aug2024, worsening and not at goal Extensively spoke to patient and his wife that his diabetes has not improved at all since last few visits depsite his refusal to consider insulin, noted A1c has not been <7% since Nov 2022. Discussed changing his diet by itself at this point will not likely bring his A1c to goal  Jennifer discussed adding basal insulin however patient declined today. He declined to make any changes in his medication regimen. Discussed sternly to start insulin if not improved by Dec 2024 to avoid delaying worsening complications of diabetes. He already sees nephro for CKD  Reviewed carmelo sensor tracing today, noted TIR is 36%, 57% high and 7% very high, 0% lows;  Unable to tolerate Metformin due to GI effect Continue Ozempic 2mg weekly Stopped Jardiance due to penile burning. Reviewed lipid panel from Aug 2024, Continue Rosuvastatin 20mg for hyperlipidemia, noted elevation in triglyceride [id number]/dl and LDL chol improved to 64mg/dl; cardiology recommending to start fish oil and omega 3  Answered all questions today; patient verbalized understanding of the above RTO in 3 months w/ CDE

## 2024-09-23 ENCOUNTER — APPOINTMENT (OUTPATIENT)
Dept: CT IMAGING | Facility: CLINIC | Age: 74
End: 2024-09-23
Payer: MEDICARE

## 2024-09-23 ENCOUNTER — OUTPATIENT (OUTPATIENT)
Dept: OUTPATIENT SERVICES | Facility: HOSPITAL | Age: 74
LOS: 1 days | End: 2024-09-23
Payer: MEDICARE

## 2024-09-23 DIAGNOSIS — C34.12 MALIGNANT NEOPLASM OF UPPER LOBE, LEFT BRONCHUS OR LUNG: ICD-10-CM

## 2024-09-23 DIAGNOSIS — Z95.5 PRESENCE OF CORONARY ANGIOPLASTY IMPLANT AND GRAFT: Chronic | ICD-10-CM

## 2024-09-23 DIAGNOSIS — Z90.89 ACQUIRED ABSENCE OF OTHER ORGANS: Chronic | ICD-10-CM

## 2024-09-23 PROCEDURE — 71260 CT THORAX DX C+: CPT | Mod: 26

## 2024-09-23 PROCEDURE — 74177 CT ABD & PELVIS W/CONTRAST: CPT | Mod: 26

## 2024-09-23 PROCEDURE — 74177 CT ABD & PELVIS W/CONTRAST: CPT

## 2024-09-23 PROCEDURE — 71260 CT THORAX DX C+: CPT

## 2024-09-24 ENCOUNTER — NON-APPOINTMENT (OUTPATIENT)
Age: 74
End: 2024-09-24

## 2024-09-25 ENCOUNTER — APPOINTMENT (OUTPATIENT)
Dept: HEMATOLOGY ONCOLOGY | Facility: CLINIC | Age: 74
End: 2024-09-25
Payer: MEDICARE

## 2024-09-25 VITALS
DIASTOLIC BLOOD PRESSURE: 74 MMHG | BODY MASS INDEX: 30.6 KG/M2 | OXYGEN SATURATION: 94 % | WEIGHT: 167.33 LBS | RESPIRATION RATE: 16 BRPM | HEART RATE: 80 BPM | TEMPERATURE: 97.2 F | SYSTOLIC BLOOD PRESSURE: 132 MMHG

## 2024-09-25 DIAGNOSIS — C79.31 SECONDARY MALIGNANT NEOPLASM OF BRAIN: ICD-10-CM

## 2024-09-25 DIAGNOSIS — C77.1 SECONDARY AND UNSPECIFIED MALIGNANT NEOPLASM OF INTRATHORACIC LYMPH NODES: ICD-10-CM

## 2024-09-25 DIAGNOSIS — C34.12 MALIGNANT NEOPLASM OF UPPER LOBE, LEFT BRONCHUS OR LUNG: ICD-10-CM

## 2024-09-25 PROCEDURE — G2211 COMPLEX E/M VISIT ADD ON: CPT

## 2024-09-25 PROCEDURE — 99213 OFFICE O/P EST LOW 20 MIN: CPT

## 2024-09-26 NOTE — HISTORY OF PRESENT ILLNESS
[Disease: _____________________] : Disease: [unfilled] [T: ___] : T[unfilled] [N: ___] : N[unfilled] [de-identified] : The patient has a heavy smoking history who recently quit. He developed a worsening cough roughly 6 months ago followed by fatigue and insomnia roughly 5 months ago. He saw his PCP and was referred to pulmonary. He was referred for a CT chest for lung cancer screening which revealed a left upper lobe lung mass with associated lymphadenopathy and suspicious bony lesions. He was then sent for a PET/CT scan which have activity in these areas. He underwent a bronchoscopy with EBUS biopsy of bilateral mediastinal lymph nodes that were positive for small cell carcinoma.  Started first line systemic therapy with Carbo/Etoposide/Atezolizumab in May 2019.  Achieved [place].  Received 4 cycles completed early July 2019 followed by Atezolizumab maintenance since late July 2019.  Received consolidative Thoracic RT completed late Aug 2019.  He declined PCI.  Developed small brain metastases on Brain MRI in June 2020 and received GK-SRS for 2 small lesions in early July 2020. Patient hospitalized 10/21-11/2 with pneumonitis 2/2 immunotherapy and discharged on steroids.   Atezolizumab discontinued after last dose in late September 2020 due to development of pneumonitis and he has been monitored off systemic therapy since that time.  He has been tapered off Prednisone as of March 2021.   [de-identified] : Patient has been off systemic therapy since late September 2020 when Atezolizumab was discontinued after developing pneumonitis.   He has been tapered off Prednisone as of March 2021.  Not currently on steroids.    Daughter Leena provides Italian translation by phone as needed.  Mr. Fuentes reports feeling very well with good appetite and energy. Last MRI brain 7/30/24 reviewed by Dr. Guerra showing no evidence of progression, and small aneurysms with plan for monitoring, next MRI brain in 6 month interval  CT CAP 9/23/24 showed no evidence of active disease and no change in indeterminate subcentimeter PATRIICO nodule.   He plans on lifestyle modifications for elevated hgb a1c managed by endocrinologist. 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   [de-identified] : -Lymph node 4L and 4R EBUS guided FNA 4/18/19: Positive for malignant cells. Small cell carcinoma.

## 2024-09-26 NOTE — PHYSICAL EXAM
[Ambulatory and capable of all self care but unable to carry out any work activities] : Status 2- Ambulatory and capable of all self care but unable to carry out any work activities. Up and about more than 50% of waking hours [Normal] : affect appropriate [de-identified] : No icterus  [de-identified] : MMM O/P clear [de-identified] : Supple No LAD  [de-identified] : Somewhat distant BS [de-identified] : S1 S2  [de-identified] : No edema  [de-identified] : No spine/CVA tenderness

## 2024-09-26 NOTE — ASSESSMENT
[FreeTextEntry1] : Extensive Stage Small cell lung cancer. Started first line Carbo/Etoposide/Atezo in early May 2019, achieved [place]. Completed 4 cycles followed by Atezolizumab maintenance from July 2019. Received consolidative Thoracic RT completed late August 2019. He declined PCI. Developed Brain metastases in June 2020 s/p GK-SRS in July 2020. Hospitalized at [facility] 10[facility]21-11/2/20 for pneumonitis, felt to be secondary to immunotherapy. Treated with steroids through early March 2021. Monitored off systemic therapy since last treatment with maintenance Atezolizumab in late Sept 2020. Surveillance/Restaging Brain MRI July 2024 with sustained intracranial response.  Surveillance/Restaging body CT Sept 2024 with sustained response.   Recommend: -Continue to observe off treatment and monitor for progression of disease -Pulm f/u. On supplemental O2 prn. Utilizing EPAP machine. F/U with sleep specialist. -F/U with endocrine for management of DM and osteoporosis -Osteoporotic compression fractures: following with with PMR. Doing PT for back pain.  MRI L-spine June 2024 without malignant findings.   -Patient will f/u with Rad-Onc.  Due for Brain MRI in January with Rad-Onc f/u  -Restaging/Surveillance CT C/A/P in 4 months (Jan 2025) and follow up to review results or sooner should problems arise.  The PATRICIO nodule is very non-specific, stable and can be monitored.  -Information sheet given with directions for making appointments.

## 2024-09-26 NOTE — HISTORY OF PRESENT ILLNESS
This is a historical note converted from [employer]. Formatting and pictures may have been removed.  Please reference [employer] for original formatting and attached multimedia. Chief Complaint  Left foot 3rd and 4th toe injury while walking @ 0200. Painful with bruising  History of Present Illness  Patient is a 42-year-old male presenting with?left third toe and?foot pain  Review of Systems  Constitutional_no fever, fatigue, weakness  Cardiovascular_no chest pain, tachycardia, bradycardia, arrhythmia  Respiratory_no shortness of breath, cough, wheezing, rhonchi  Musculoskeletal_left fourth toe?and third toe pain  Integumentary_no skin rash or abnormal lesion  Neurologic_no headache, no dizziness, no weakness or numbness  ?  Physical Exam  Vitals & Measurements  T:?36.8? ?C (Oral)? HR:?62(Peripheral)? RR:?18? BP:?124/87? SpO2:?98%?  HT:?162?cm? WT:?62.8?kg? BMI:?23.93?  VITAL SIGNS: ?Reviewed. ? ?  GENERAL:? In no apparent distress.?  CHEST:? Chest with clear breath sounds bilaterally.? No wheezes, rales, or rhonchi.?  CARDIAC:? Regular rate and rhythm.? S1 and S2, without murmurs, gallops, or rubs.  ABDOMEN:? Soft, without detectable tenderness.? No sign of distention.? No?? rebound or guarding, and no masses palpated.?? Bowel Sounds normal.  MUSCULOSKELETAL: Left third and fourth toe pain increased with ambulation and pressure?x-ray?suspicious of a small?fracture to the fourth phalanges.? Circulation sensation present no paresthesias present.  NEUROLOGIC EXAM:? Alert and oriented x 3.? No focal sensory or strength deficits.?? Speech normal.? Follows commands.  Pending?radiology over read?for confirmation fracture.  Assessment/Plan  1.?Pain in left toe(s)?M79.675  Ordered:  traMADol, 50 mg = 1 tab(s), Oral, BID, PRN PRN as needed for pain, X 5 day(s), # 10 tab(s), 0 Refill(s), Pharmacy: [id number]  XR Foot Left Minimum 3 Views, Routine, 02/25/19 12:18:00 CST, Trauma, None, Patient Bed, Patient Has IV?, Rad Type,  Pain in left toe(s), Not Scheduled, 02/25/19 12:18:00 CST  ?  Instructed patient to wear postop shoe until radiology over reads x-ray.  Instructed patient to start tramadol 50 mg p.o. twice daily as needed pain.  Instructed patient to?perform light duty at work until?radiology overread completed.   Problem List/Past Medical History  Ongoing  Closed fracture lumbar vertebra  Closed fracture nasal bone  Fracture of upper jaw  Historical  Rib fracture  Procedure/Surgical History  ORIF Mandible (.) (08/02/2018)   Medications  chlorhexidine 0.12% mucous membrane liquid, 0.018 gm= 15 mL, Oral, BID,? ?Not taking  Colace 100 mg oral capsule, 100 mg= 1 cap(s), Oral, BID,? ?Not taking  Ultram 50 mg oral tablet, 50 mg= 1 tab(s), Oral, BID, PRN  Allergies  No Known Medication Allergies  Social History  Alcohol  Current, Beer, Daily, 6 drinks/episode maximum., 08/01/2018  Employment/School  Employed, 07/31/2018  Substance Abuse  Past, Marijuana, 07/31/2018  Tobacco  10 or more cigarettes (1/2 pack or more)/day in last 30 days, Cigarettes, No, 02/25/2019  Family History  High blood pressure: Father.  Health Maintenance  Health Maintenance  ???Pending?(in the next year)  ??? ??Due?  ??? ? ? ?ADL Screening due??02/25/19??and every 1??year(s)  ??? ? ? ?Alcohol Misuse Screening due??02/25/19??and every 1??year(s)  ??? ? ? ?Tetanus Vaccine due??02/25/19??and every 10??year(s)  ??? ??Due In Future?  ??? ? ? ?Smoking Cessation not due until??08/03/19??and every 1??year(s)  ???Satisfied?(in the past 1 year)  ??? ??Satisfied?  ??? ? ? ?Blood Pressure Screening on??02/25/19.??Satisfied by Krysta Lay LPN  ??? ? ? ?Body Mass Index Check on??02/25/19.??Satisfied by Krysta Lay LPN  ??? ? ? ?Influenza Vaccine on??02/25/19.??Satisfied by Krysta Lay LPN  ??? ? ? ?Obesity Screening on??02/25/19.??Satisfied by Krysta Lay LPN  ??? ? ? ?Smoking Cessation on??08/03/18.??Satisfied by Paola CAMERON, Jaylan  ?  ?      [Disease: _____________________] : Disease: [unfilled] [T: ___] : T[unfilled] [N: ___] : N[unfilled] [de-identified] : The patient has a heavy smoking history who recently quit. He developed a worsening cough roughly 6 months ago followed by fatigue and insomnia roughly 5 months ago. He saw his PCP and was referred to pulmonary. He was referred for a CT chest for lung cancer screening which revealed a left upper lobe lung mass with associated lymphadenopathy and suspicious bony lesions. He was then sent for a PET/CT scan which have activity in these areas. He underwent a bronchoscopy with EBUS biopsy of bilateral mediastinal lymph nodes that were positive for small cell carcinoma.  Started first line systemic therapy with Carbo/Etoposide/Atezolizumab in May 2019.  Achieved MS.  Received 4 cycles completed early July 2019 followed by Atezolizumab maintenance since late July 2019.  Received consolidative Thoracic RT completed late Aug 2019.  He declined PCI.  Developed small brain metastases on Brain MRI in June 2020 and received GK-SRS for 2 small lesions in early July 2020. Patient hospitalized 10/21-11/2 with pneumonitis 2/2 immunotherapy and discharged on steroids.   Atezolizumab discontinued after last dose in late September 2020 due to development of pneumonitis and he has been monitored off systemic therapy since that time.  He has been tapered off Prednisone as of March 2021.   [de-identified] : Patient has been off systemic therapy since late September 2020 when Atezolizumab was discontinued after developing pneumonitis.   He has been tapered off Prednisone as of March 2021.  Not currently on steroids.    Daughter Leena provides Maori translation by phone as needed.  Mr. Fuentes reports feeling very well with good appetite and energy. Last MRI brain 7/30/24 reviewed by Dr. Guerra showing no evidence of progression, and small aneurysms with plan for monitoring, next MRI brain in 6 month interval  CT CAP 9/23/24 showed no evidence of active disease and no change in indeterminate subcentimeter PATRICIO nodule.   He plans on lifestyle modifications for elevated hgb a1c managed by endocrinologist. 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   [de-identified] : -Lymph node 4L and 4R EBUS guided FNA 4/18/19: Positive for malignant cells. Small cell carcinoma.

## 2024-09-26 NOTE — RESULTS/DATA
[FreeTextEntry1] : -MRI Brain 10/24/23:  Stable appearing nodule lateral left temporal occipital junction with mild surrounding edema. No new parenchymal lesions seen. Region of signal abnormality right greater wing of the sphenoid bone seen best on FLAIR imaging stable from prior exam. Stable osseous metastasis cannot be excluded.  -CT C/A/P 2/7/24:  Stable exam compared to October 2023.   Unchanged 1.2 cm left upper lobe nodule. No new nodules or adenopathy.  No metastatic disease in the abdomen or pelvis.  -MRI L-Spine 6/7/24:   1. Multilevel degenerative changes resulting in up to moderate spinal canal narrowing and severe neural foraminal narrowing as described. The degenerative changes and narrowing has increased as compared to the previous MRI from 2022. 2. Similar appearing multiple chronic compression fracture deformities. No acute compression fracture identified. 3. No evidence of osseous metastatic disease. No abnormal enhancement within the spinal canal.  -CT CAP 6/12/24:  New indeterminate 4 mm anterior left upper lobe nodule, which can be followed with CT in 3 months. Stable 1.2 cm left upper lobe peribronchovascular nodule, occluding the subsegmental bronchus. No new disease in the abdomen or pelvis.  Images Reviewed/Interpreted: -MRI Brain 7/30/24: An enhancing parenchymal lesion in the left occipital temporal region measures 7 x 3 x 4mm, unchanged when using similar measurement technique. Surrounding vasogenic edema is unchanged. No new enhancing parenchymal lesions. Unchanged T1 hypointense, FLAIR hyperintense lesion in the right greater wing of the sphenoid. There is a 3 x 3 x 3mm inferiorly directed outpouching arising from the right supraclinoid ICA, suggesting an aneurysm. There is a similar 3 x 3 x 2 mm inferiorly directed outpouching arising from the left supraclinoid ICA, suggesting an infundibulum versus aneurysm.   -CT CAP 9/23/24: Unchanged left upper lobe nodules. Hepatic flexure lipoma. Unchnaged sclerotic skeletal foci.

## 2024-09-26 NOTE — ASSESSMENT
[FreeTextEntry1] : Extensive Stage Small cell lung cancer. Started first line Carbo/Etoposide/Atezo in early May 2019, achieved [place]. Completed 4 cycles followed by Atezolizumab maintenance from July 2019. Received consolidative Thoracic RT completed late August 2019. He declined PCI. Developed Brain metastases in June 2020 s/p GK-SRS in July 2020. Hospitalized at [facility] 10/21-11/2/20 for pneumonitis, felt to be secondary to immunotherapy. Treated with steroids through early March 2021. Monitored off systemic therapy since last treatment with maintenance Atezolizumab in late Sept 2020. Surveillance/Restaging Brain MRI July 2024 with sustained intracranial response.  Surveillance/Restaging body CT Sept 2024 with sustained response.   Recommend: -Continue to observe off treatment and monitor for progression of disease -Pulm f/u. On supplemental O2 prn. Utilizing EPAP machine. F/U with sleep specialist. -F/U with endocrine for management of DM and osteoporosis -Osteoporotic compression fractures: following with with PMR. Doing PT for back pain.  MRI L-spine June 2024 without malignant findings.   -Patient will f/u with Rad-Onc.  Due for Brain MRI in January with Rad-Onc f/u  -Restaging/Surveillance CT C/A/P in 4 months (Jan 2025) and follow up to review results or sooner should problems arise.  The PATRICIO nodule is very non-specific, stable and can be monitored.  -Information sheet given with directions for making appointments.

## 2024-09-26 NOTE — PHYSICAL EXAM
[Ambulatory and capable of all self care but unable to carry out any work activities] : Status 2- Ambulatory and capable of all self care but unable to carry out any work activities. Up and about more than 50% of waking hours [Normal] : affect appropriate [de-identified] : MMM O/P clear [de-identified] : No icterus  [de-identified] : Supple No LAD  [de-identified] : Somewhat distant BS [de-identified] : S1 S2  [de-identified] : No edema  [de-identified] : No spine/CVA tenderness

## 2024-10-01 ENCOUNTER — NON-APPOINTMENT (OUTPATIENT)
Age: 74
End: 2024-10-01

## 2024-10-01 DIAGNOSIS — B35.3 TINEA PEDIS: ICD-10-CM

## 2024-10-01 DIAGNOSIS — B35.1 TINEA UNGUIUM: ICD-10-CM

## 2024-10-01 DIAGNOSIS — Z86.39 PERSONAL HISTORY OF OTHER ENDOCRINE, NUTRITIONAL AND METABOLIC DISEASE: ICD-10-CM

## 2024-10-01 DIAGNOSIS — E11.40 TYPE 2 DIABETES MELLITUS WITH DIABETIC NEUROPATHY, UNSPECIFIED: ICD-10-CM

## 2024-10-01 DIAGNOSIS — M79.671 PAIN IN RIGHT FOOT: ICD-10-CM

## 2024-10-01 RX ORDER — KETOCONAZOLE 20 MG/G
2 CREAM TOPICAL
Refills: 0 | Status: ACTIVE | COMMUNITY

## 2024-10-01 RX ORDER — ERYTHROMYCIN 20 MG/G
2 GEL TOPICAL
Refills: 0 | Status: ACTIVE | COMMUNITY

## 2024-10-08 ENCOUNTER — APPOINTMENT (OUTPATIENT)
Dept: DERMATOLOGY | Facility: CLINIC | Age: 74
End: 2024-10-08
Payer: MEDICARE

## 2024-10-08 DIAGNOSIS — L21.9 SEBORRHEIC DERMATITIS, UNSPECIFIED: ICD-10-CM

## 2024-10-08 DIAGNOSIS — R21 RASH AND OTHER NONSPECIFIC SKIN ERUPTION: ICD-10-CM

## 2024-10-08 DIAGNOSIS — L63.9 ALOPECIA AREATA, UNSPECIFIED: ICD-10-CM

## 2024-10-08 PROCEDURE — 11900 INJECT SKIN LESIONS </W 7: CPT

## 2024-10-08 PROCEDURE — 99214 OFFICE O/P EST MOD 30 MIN: CPT | Mod: 25

## 2024-10-08 RX ORDER — CLOBETASOL PROPIONATE 0.5 MG/ML
0.05 SOLUTION TOPICAL
Qty: 2 | Refills: 6 | Status: ACTIVE | COMMUNITY
Start: 2024-10-08 | End: 1900-01-01

## 2024-10-24 ENCOUNTER — APPOINTMENT (OUTPATIENT)
Age: 74
End: 2024-10-24

## 2024-10-24 DIAGNOSIS — M79.672 PAIN IN LEFT FOOT: ICD-10-CM

## 2024-10-24 DIAGNOSIS — B35.3 TINEA PEDIS: ICD-10-CM

## 2024-10-24 DIAGNOSIS — B35.1 TINEA UNGUIUM: ICD-10-CM

## 2024-10-24 DIAGNOSIS — E11.40 TYPE 2 DIABETES MELLITUS WITH DIABETIC NEUROPATHY, UNSPECIFIED: ICD-10-CM

## 2024-10-24 DIAGNOSIS — M79.671 PAIN IN RIGHT FOOT: ICD-10-CM

## 2024-10-24 PROCEDURE — 11721 DEBRIDE NAIL 6 OR MORE: CPT | Mod: Q8

## 2024-10-24 PROCEDURE — 99203 OFFICE O/P NEW LOW 30 MIN: CPT | Mod: 25

## 2024-11-08 ENCOUNTER — RX RENEWAL (OUTPATIENT)
Age: 74
End: 2024-11-08

## 2024-11-18 RX ORDER — INHALER, ASSIST DEVICES
SPACER (EA) MISCELLANEOUS
Qty: 1 | Refills: 0 | Status: ACTIVE | COMMUNITY
Start: 2024-11-18 | End: 1900-01-01

## 2024-11-25 ENCOUNTER — RX RENEWAL (OUTPATIENT)
Age: 74
End: 2024-11-25

## 2024-12-03 ENCOUNTER — APPOINTMENT (OUTPATIENT)
Dept: OPHTHALMOLOGY | Facility: CLINIC | Age: 74
End: 2024-12-03
Payer: MEDICARE

## 2024-12-03 ENCOUNTER — NON-APPOINTMENT (OUTPATIENT)
Age: 74
End: 2024-12-03

## 2024-12-03 PROCEDURE — 92136 OPHTHALMIC BIOMETRY: CPT

## 2024-12-03 PROCEDURE — 92014 COMPRE OPH EXAM EST PT 1/>: CPT

## 2024-12-03 PROCEDURE — 92134 CPTRZ OPH DX IMG PST SGM RTA: CPT

## 2024-12-05 ENCOUNTER — APPOINTMENT (OUTPATIENT)
Dept: INTERNAL MEDICINE | Facility: CLINIC | Age: 74
End: 2024-12-05
Payer: MEDICARE

## 2024-12-05 ENCOUNTER — APPOINTMENT (OUTPATIENT)
Dept: PEDIATRIC ALLERGY IMMUNOLOGY | Facility: CLINIC | Age: 74
End: 2024-12-05
Payer: MEDICARE

## 2024-12-05 ENCOUNTER — RX RENEWAL (OUTPATIENT)
Age: 74
End: 2024-12-05

## 2024-12-05 VITALS
BODY MASS INDEX: 28.93 KG/M2 | TEMPERATURE: 98 F | SYSTOLIC BLOOD PRESSURE: 114 MMHG | OXYGEN SATURATION: 91 % | WEIGHT: 163.25 LBS | DIASTOLIC BLOOD PRESSURE: 73 MMHG | HEIGHT: 63 IN | HEART RATE: 72 BPM

## 2024-12-05 VITALS
HEART RATE: 80 BPM | WEIGHT: 163 LBS | DIASTOLIC BLOOD PRESSURE: 72 MMHG | BODY MASS INDEX: 30 KG/M2 | HEIGHT: 62 IN | OXYGEN SATURATION: 95 % | SYSTOLIC BLOOD PRESSURE: 110 MMHG | TEMPERATURE: 98.4 F

## 2024-12-05 DIAGNOSIS — E11.65 TYPE 2 DIABETES MELLITUS WITH HYPERGLYCEMIA: ICD-10-CM

## 2024-12-05 DIAGNOSIS — C79.31 SECONDARY MALIGNANT NEOPLASM OF BRAIN: ICD-10-CM

## 2024-12-05 DIAGNOSIS — J30.1 ALLERGIC RHINITIS DUE TO POLLEN: ICD-10-CM

## 2024-12-05 DIAGNOSIS — Z23 ENCOUNTER FOR IMMUNIZATION: ICD-10-CM

## 2024-12-05 DIAGNOSIS — E78.5 HYPERLIPIDEMIA, UNSPECIFIED: ICD-10-CM

## 2024-12-05 DIAGNOSIS — J44.9 CHRONIC OBSTRUCTIVE PULMONARY DISEASE, UNSPECIFIED: ICD-10-CM

## 2024-12-05 DIAGNOSIS — Z79.4 TYPE 2 DIABETES MELLITUS WITH HYPERGLYCEMIA: ICD-10-CM

## 2024-12-05 DIAGNOSIS — I10 ESSENTIAL (PRIMARY) HYPERTENSION: ICD-10-CM

## 2024-12-05 DIAGNOSIS — J30.81 ALLERGIC RHINITIS DUE TO ANIMAL (CAT) (DOG) HAIR AND DANDER: ICD-10-CM

## 2024-12-05 DIAGNOSIS — R42 DIZZINESS AND GIDDINESS: ICD-10-CM

## 2024-12-05 PROCEDURE — G0008: CPT

## 2024-12-05 PROCEDURE — G2211 COMPLEX E/M VISIT ADD ON: CPT

## 2024-12-05 PROCEDURE — 99214 OFFICE O/P EST MOD 30 MIN: CPT

## 2024-12-05 PROCEDURE — 99203 OFFICE O/P NEW LOW 30 MIN: CPT

## 2024-12-05 PROCEDURE — 90662 IIV NO PRSV INCREASED AG IM: CPT

## 2024-12-05 RX ORDER — OLOPATADINE HYDROCHLORIDE AND MOMETASONE FUROATE 25; 665 UG/1; UG/1
665-25 SPRAY, METERED NASAL
Qty: 1 | Refills: 5 | Status: ACTIVE | COMMUNITY
Start: 2024-12-05 | End: 1900-01-01

## 2024-12-06 ENCOUNTER — LABORATORY RESULT (OUTPATIENT)
Age: 74
End: 2024-12-06

## 2024-12-09 ENCOUNTER — NON-APPOINTMENT (OUTPATIENT)
Age: 74
End: 2024-12-09

## 2024-12-09 PROBLEM — R42 DIZZINESS: Status: ACTIVE | Noted: 2024-12-09

## 2024-12-09 LAB
ALBUMIN SERPL ELPH-MCNC: 4.5 G/DL
ALP BLD-CCNC: 79 U/L
ALT SERPL-CCNC: 21 U/L
ANION GAP SERPL CALC-SCNC: 14 MMOL/L
AST SERPL-CCNC: 17 U/L
BASOPHILS # BLD AUTO: 0.08 K/UL
BASOPHILS NFR BLD AUTO: 1 %
BILIRUB SERPL-MCNC: 0.4 MG/DL
BUN SERPL-MCNC: 15 MG/DL
CALCIUM SERPL-MCNC: 9.7 MG/DL
CHLORIDE SERPL-SCNC: 102 MMOL/L
CHOLEST SERPL-MCNC: 139 MG/DL
CO2 SERPL-SCNC: 22 MMOL/L
CREAT SERPL-MCNC: 0.99 MG/DL
EGFR: 81 ML/MIN/1.73M2
EOSINOPHIL # BLD AUTO: 0.24 K/UL
EOSINOPHIL NFR BLD AUTO: 3.1 %
ESTIMATED AVERAGE GLUCOSE: 154 MG/DL
GLUCOSE SERPL-MCNC: 145 MG/DL
HBA1C MFR BLD HPLC: 7 %
HCT VFR BLD CALC: 44.7 %
HDLC SERPL-MCNC: 41 MG/DL
HGB BLD-MCNC: 14.3 G/DL
IMM GRANULOCYTES NFR BLD AUTO: 0.3 %
LDLC SERPL CALC-MCNC: 67 MG/DL
LYMPHOCYTES # BLD AUTO: 0.69 K/UL
LYMPHOCYTES NFR BLD AUTO: 8.9 %
MAN DIFF?: NORMAL
MCHC RBC-ENTMCNC: 30.4 PG
MCHC RBC-ENTMCNC: 32 G/DL
MCV RBC AUTO: 94.9 FL
MONOCYTES # BLD AUTO: 0.67 K/UL
MONOCYTES NFR BLD AUTO: 8.7 %
NEUTROPHILS # BLD AUTO: 6.04 K/UL
NEUTROPHILS NFR BLD AUTO: 78 %
NONHDLC SERPL-MCNC: 98 MG/DL
PLATELET # BLD AUTO: 270 K/UL
POTASSIUM SERPL-SCNC: 5.4 MMOL/L
PROT SERPL-MCNC: 6.3 G/DL
RBC # BLD: 4.71 M/UL
RBC # FLD: 14.2 %
SODIUM SERPL-SCNC: 138 MMOL/L
TRIGL SERPL-MCNC: 187 MG/DL
TSH SERPL-ACNC: 1.16 UIU/ML
WBC # FLD AUTO: 7.74 K/UL

## 2024-12-16 ENCOUNTER — APPOINTMENT (OUTPATIENT)
Dept: CARDIOLOGY | Facility: CLINIC | Age: 74
End: 2024-12-16
Payer: MEDICARE

## 2024-12-16 ENCOUNTER — NON-APPOINTMENT (OUTPATIENT)
Age: 74
End: 2024-12-16

## 2024-12-16 VITALS
SYSTOLIC BLOOD PRESSURE: 130 MMHG | WEIGHT: 165 LBS | HEIGHT: 63 IN | HEART RATE: 79 BPM | DIASTOLIC BLOOD PRESSURE: 60 MMHG | OXYGEN SATURATION: 92 % | BODY MASS INDEX: 29.23 KG/M2

## 2024-12-16 DIAGNOSIS — R53.82 CHRONIC FATIGUE, UNSPECIFIED: ICD-10-CM

## 2024-12-16 DIAGNOSIS — E78.5 HYPERLIPIDEMIA, UNSPECIFIED: ICD-10-CM

## 2024-12-16 DIAGNOSIS — I10 ESSENTIAL (PRIMARY) HYPERTENSION: ICD-10-CM

## 2024-12-16 DIAGNOSIS — J44.9 CHRONIC OBSTRUCTIVE PULMONARY DISEASE, UNSPECIFIED: ICD-10-CM

## 2024-12-16 DIAGNOSIS — Z95.5 PRESENCE OF CORONARY ANGIOPLASTY IMPLANT AND GRAFT: ICD-10-CM

## 2024-12-16 PROCEDURE — G2211 COMPLEX E/M VISIT ADD ON: CPT

## 2024-12-16 PROCEDURE — 93000 ELECTROCARDIOGRAM COMPLETE: CPT

## 2024-12-16 PROCEDURE — 99214 OFFICE O/P EST MOD 30 MIN: CPT

## 2024-12-16 RX ORDER — METFORMIN HYDROCHLORIDE 500 MG/1
500 TABLET, COATED ORAL TWICE DAILY
Refills: 0 | Status: ACTIVE | COMMUNITY

## 2024-12-17 ENCOUNTER — APPOINTMENT (OUTPATIENT)
Dept: ENDOCRINOLOGY | Facility: CLINIC | Age: 74
End: 2024-12-17

## 2024-12-31 ENCOUNTER — APPOINTMENT (OUTPATIENT)
Dept: ENDOCRINOLOGY | Facility: CLINIC | Age: 74
End: 2024-12-31
Payer: MEDICARE

## 2024-12-31 VITALS
DIASTOLIC BLOOD PRESSURE: 83 MMHG | OXYGEN SATURATION: 94 % | BODY MASS INDEX: 29.23 KG/M2 | WEIGHT: 165 LBS | RESPIRATION RATE: 16 BRPM | HEART RATE: 99 BPM | HEIGHT: 63 IN | TEMPERATURE: 97.9 F | SYSTOLIC BLOOD PRESSURE: 133 MMHG

## 2024-12-31 DIAGNOSIS — E78.5 HYPERLIPIDEMIA, UNSPECIFIED: ICD-10-CM

## 2024-12-31 DIAGNOSIS — E11.65 TYPE 2 DIABETES MELLITUS WITH HYPERGLYCEMIA: ICD-10-CM

## 2024-12-31 DIAGNOSIS — I10 ESSENTIAL (PRIMARY) HYPERTENSION: ICD-10-CM

## 2024-12-31 DIAGNOSIS — Z79.4 TYPE 2 DIABETES MELLITUS WITH HYPERGLYCEMIA: ICD-10-CM

## 2024-12-31 PROCEDURE — 99214 OFFICE O/P EST MOD 30 MIN: CPT

## 2024-12-31 RX ORDER — BLOOD-GLUCOSE SENSOR
EACH MISCELLANEOUS
Qty: 6 | Refills: 3 | Status: ACTIVE | COMMUNITY
Start: 2024-12-31 | End: 1900-01-01

## 2025-01-02 ENCOUNTER — APPOINTMENT (OUTPATIENT)
Age: 75
End: 2025-01-02
Payer: MEDICARE

## 2025-01-02 DIAGNOSIS — M79.672 PAIN IN LEFT FOOT: ICD-10-CM

## 2025-01-02 DIAGNOSIS — M79.671 PAIN IN RIGHT FOOT: ICD-10-CM

## 2025-01-02 DIAGNOSIS — B35.1 TINEA UNGUIUM: ICD-10-CM

## 2025-01-02 DIAGNOSIS — B35.3 TINEA PEDIS: ICD-10-CM

## 2025-01-02 DIAGNOSIS — E11.40 TYPE 2 DIABETES MELLITUS WITH DIABETIC NEUROPATHY, UNSPECIFIED: ICD-10-CM

## 2025-01-02 PROCEDURE — 99213 OFFICE O/P EST LOW 20 MIN: CPT | Mod: 25

## 2025-01-02 PROCEDURE — 11721 DEBRIDE NAIL 6 OR MORE: CPT | Mod: Q8

## 2025-01-02 RX ORDER — EMPAGLIFLOZIN 25 MG/1
25 TABLET, FILM COATED ORAL
Qty: 90 | Refills: 0 | Status: ACTIVE | COMMUNITY
Start: 2024-06-11

## 2025-01-09 ENCOUNTER — APPOINTMENT (OUTPATIENT)
Dept: CARDIOLOGY | Facility: CLINIC | Age: 75
End: 2025-01-09

## 2025-01-09 PROCEDURE — A9500: CPT | Mod: JZ

## 2025-01-09 PROCEDURE — 93015 CV STRESS TEST SUPVJ I&R: CPT

## 2025-01-09 PROCEDURE — 78452 HT MUSCLE IMAGE SPECT MULT: CPT

## 2025-01-14 ENCOUNTER — APPOINTMENT (OUTPATIENT)
Dept: INTERNAL MEDICINE | Facility: CLINIC | Age: 75
End: 2025-01-14
Payer: MEDICARE

## 2025-01-14 VITALS
WEIGHT: 176 LBS | DIASTOLIC BLOOD PRESSURE: 82 MMHG | BODY MASS INDEX: 31.18 KG/M2 | HEIGHT: 63 IN | TEMPERATURE: 97.7 F | OXYGEN SATURATION: 85 % | HEART RATE: 98 BPM | SYSTOLIC BLOOD PRESSURE: 142 MMHG

## 2025-01-14 DIAGNOSIS — E78.5 HYPERLIPIDEMIA, UNSPECIFIED: ICD-10-CM

## 2025-01-14 DIAGNOSIS — N40.0 BENIGN PROSTATIC HYPERPLASIA WITHOUT LOWER URINARY TRACT SYMPMS: ICD-10-CM

## 2025-01-14 DIAGNOSIS — E11.9 TYPE 2 DIABETES MELLITUS W/OUT COMPLICATIONS: ICD-10-CM

## 2025-01-14 DIAGNOSIS — C34.90 MALIGNANT NEOPLASM OF UNSPECIFIED PART OF UNSPECIFIED BRONCHUS OR LUNG: ICD-10-CM

## 2025-01-14 DIAGNOSIS — I10 ESSENTIAL (PRIMARY) HYPERTENSION: ICD-10-CM

## 2025-01-14 PROCEDURE — 36415 COLL VENOUS BLD VENIPUNCTURE: CPT

## 2025-01-14 PROCEDURE — 99214 OFFICE O/P EST MOD 30 MIN: CPT

## 2025-01-15 ENCOUNTER — APPOINTMENT (OUTPATIENT)
Dept: CT IMAGING | Facility: CLINIC | Age: 75
End: 2025-01-15
Payer: MEDICARE

## 2025-01-15 ENCOUNTER — APPOINTMENT (OUTPATIENT)
Dept: MRI IMAGING | Facility: CLINIC | Age: 75
End: 2025-01-15
Payer: MEDICARE

## 2025-01-15 ENCOUNTER — OUTPATIENT (OUTPATIENT)
Dept: OUTPATIENT SERVICES | Facility: HOSPITAL | Age: 75
LOS: 1 days | End: 2025-01-15
Payer: MEDICARE

## 2025-01-15 DIAGNOSIS — D11.0 BENIGN NEOPLASM OF PAROTID GLAND: Chronic | ICD-10-CM

## 2025-01-15 DIAGNOSIS — N20.0 CALCULUS OF KIDNEY: Chronic | ICD-10-CM

## 2025-01-15 DIAGNOSIS — Z90.89 ACQUIRED ABSENCE OF OTHER ORGANS: Chronic | ICD-10-CM

## 2025-01-15 DIAGNOSIS — Z95.5 PRESENCE OF CORONARY ANGIOPLASTY IMPLANT AND GRAFT: Chronic | ICD-10-CM

## 2025-01-15 DIAGNOSIS — Z00.8 ENCOUNTER FOR OTHER GENERAL EXAMINATION: ICD-10-CM

## 2025-01-15 DIAGNOSIS — C79.31 SECONDARY MALIGNANT NEOPLASM OF BRAIN: ICD-10-CM

## 2025-01-15 PROCEDURE — 71260 CT THORAX DX C+: CPT | Mod: 26

## 2025-01-15 PROCEDURE — 71260 CT THORAX DX C+: CPT

## 2025-01-15 PROCEDURE — 70553 MRI BRAIN STEM W/O & W/DYE: CPT

## 2025-01-15 PROCEDURE — 70553 MRI BRAIN STEM W/O & W/DYE: CPT | Mod: 26

## 2025-01-15 PROCEDURE — 74177 CT ABD & PELVIS W/CONTRAST: CPT | Mod: 26

## 2025-01-15 PROCEDURE — A9585: CPT

## 2025-01-15 PROCEDURE — 74177 CT ABD & PELVIS W/CONTRAST: CPT

## 2025-01-22 ENCOUNTER — APPOINTMENT (OUTPATIENT)
Dept: RADIATION ONCOLOGY | Facility: CLINIC | Age: 75
End: 2025-01-22
Payer: MEDICARE

## 2025-01-22 VITALS
SYSTOLIC BLOOD PRESSURE: 156 MMHG | BODY MASS INDEX: 29.8 KG/M2 | OXYGEN SATURATION: 93 % | DIASTOLIC BLOOD PRESSURE: 65 MMHG | HEART RATE: 71 BPM | WEIGHT: 168.21 LBS

## 2025-01-22 PROCEDURE — 99213 OFFICE O/P EST LOW 20 MIN: CPT

## 2025-01-23 ENCOUNTER — APPOINTMENT (OUTPATIENT)
Dept: PEDIATRIC ALLERGY IMMUNOLOGY | Facility: CLINIC | Age: 75
End: 2025-01-23
Payer: MEDICARE

## 2025-01-23 VITALS
OXYGEN SATURATION: 96 % | HEART RATE: 72 BPM | SYSTOLIC BLOOD PRESSURE: 136 MMHG | TEMPERATURE: 97.6 F | DIASTOLIC BLOOD PRESSURE: 70 MMHG

## 2025-01-23 DIAGNOSIS — J30.1 ALLERGIC RHINITIS DUE TO POLLEN: ICD-10-CM

## 2025-01-23 DIAGNOSIS — J30.81 ALLERGIC RHINITIS DUE TO ANIMAL (CAT) (DOG) HAIR AND DANDER: ICD-10-CM

## 2025-01-23 PROCEDURE — 99212 OFFICE O/P EST SF 10 MIN: CPT

## 2025-01-23 RX ORDER — OLOPATADINE HYDROCHLORIDE AND MOMETASONE FUROATE 25; 665 UG/1; UG/1
665-25 SPRAY, METERED NASAL
Qty: 87 | Refills: 3 | Status: ACTIVE | COMMUNITY
Start: 2025-01-23 | End: 1900-01-01

## 2025-01-28 ENCOUNTER — OUTPATIENT (OUTPATIENT)
Dept: OUTPATIENT SERVICES | Facility: HOSPITAL | Age: 75
LOS: 1 days | Discharge: HOME | End: 2025-01-28

## 2025-01-28 DIAGNOSIS — C34.90 MALIGNANT NEOPLASM OF UNSPECIFIED PART OF UNSPECIFIED BRONCHUS OR LUNG: ICD-10-CM

## 2025-01-28 DIAGNOSIS — D11.0 BENIGN NEOPLASM OF PAROTID GLAND: Chronic | ICD-10-CM

## 2025-01-28 DIAGNOSIS — Z95.5 PRESENCE OF CORONARY ANGIOPLASTY IMPLANT AND GRAFT: Chronic | ICD-10-CM

## 2025-01-29 ENCOUNTER — APPOINTMENT (OUTPATIENT)
Dept: HEMATOLOGY ONCOLOGY | Facility: CLINIC | Age: 75
End: 2025-01-29
Payer: MEDICARE

## 2025-01-29 ENCOUNTER — NON-APPOINTMENT (OUTPATIENT)
Age: 75
End: 2025-01-29

## 2025-01-29 VITALS
SYSTOLIC BLOOD PRESSURE: 137 MMHG | OXYGEN SATURATION: 93 % | HEART RATE: 70 BPM | TEMPERATURE: 97.9 F | WEIGHT: 165.34 LBS | HEIGHT: 63 IN | DIASTOLIC BLOOD PRESSURE: 84 MMHG | RESPIRATION RATE: 16 BRPM | BODY MASS INDEX: 29.3 KG/M2

## 2025-01-29 DIAGNOSIS — C34.12 MALIGNANT NEOPLASM OF UPPER LOBE, LEFT BRONCHUS OR LUNG: ICD-10-CM

## 2025-01-29 DIAGNOSIS — C77.1 SECONDARY AND UNSPECIFIED MALIGNANT NEOPLASM OF INTRATHORACIC LYMPH NODES: ICD-10-CM

## 2025-01-29 DIAGNOSIS — C79.31 SECONDARY MALIGNANT NEOPLASM OF BRAIN: ICD-10-CM

## 2025-01-29 PROCEDURE — G2211 COMPLEX E/M VISIT ADD ON: CPT

## 2025-01-29 PROCEDURE — 99213 OFFICE O/P EST LOW 20 MIN: CPT

## 2025-01-29 NOTE — ASSESSMENT
[FreeTextEntry1] : \par 70 yo M pmhx HTN, DM, HLD, CAD s/p RCA stent (2007), BPH s/p TURP, ED, GERD, hx gastritis, hypomagnesemia, former smoker (quit 2019), COPD (on supplemental O2), osteopenia, stage 4 small cell lung cancer (dx'd 4/19, s/p carboplatin/etoposide and radiation with partial response, then on atezolizumab c/b pneumonitis in 10/2020 treated with high dose steroids c/b vertebral compression fractures, with small brain metastases 6/2020 s/p gamma knife radiation therapy in 7/2020.  Was tapered off steroids for pneumonitis by 3/7/2021, and stopped Bactrim prophylaxis at that time), hx covid infection 3/21 (s/p monoclonal Ab infusion 3/30/21 and decadron 6 mg for 10 days by [facility] program; completed 35d participation in [facility] rivaroxaban trial) here for f/u\par \par \par hx chronic LBP, hx compression fractures, hx osteopenia, vit d insuff- hx mechanical fall s/p ER 6/21 w/o acute findings on CT scan.  Reports back pain back to baseline.  Denies recurrence of fall since 6/21.\par -1/21 DEXA: osteopenia\par -6/21 CT lumbar spine: Unchanged compression deformities of the T12-L3 vertebral bodies.  No destructive lesions are identified.  Multilevel degenerative changes with disc herniations at L3-4 and L4-5\par -7/21 vit d 25\par -followed by PMR, last seen 7/21, noted hx ER visit 6/21 and imaging, no changes made. Advised PT (pending). F/U pending.\par -followed by neurosx, last seen 3/21- advised PT and back brace use with no surgical intervention planned.\par -followed by ortho, last seen 3/21\par -hx PT \par -on Flexeril prn, Tylenol prn and Oxycodone prn (per PMR), taking mainly Tylenol 1-2x/wk at baseline)\par -on Alendronate since 1/21, tolerating w/o SEs\par -on ca/D OTC supp\par -ambulates with cane or walker\par -using back brace\par -fall precautions counseled\par \par small cell lung cancer stage 4 (dx'd 4/19), hx brain mets, anemia-\par -4/21 cbc wnl, except Hg 11.6\par -7/21 cbc/bmp wnl\par -followed by oncology, last seen 9/21. Noted off tx with cont'd monitoring planned. Planned to f/u in 3 mo with repeat CT scans.  Has f/u 12/21.\par -followed by rad-onc, last seen 10/21 with 10/21 MRI brain noted JAYCOB. Advised f/u in 3 mo.\par -followed by pulm\par \par COPD (on supp O2), seasonal allergies, former smoker (quit 2019), hx covid infection (s/p dexamethasone course, s/p MAB 3/21)- \par -followed by pulm, last seen 9/21- w/o med changes, sleep study ordered (pending)\par -on Symbicort and MDI prn\par -Flonase prn\par \par DM, microalbuminuria- 7/21 A1c 6.6 (was 6.3), 1/21 microalbumin 46\par -followed by endo, has f/u 1/22.\par -on metformin  (2) BID since 3/21, mild loose BMs with use- feels not too bothersome, wishes to continue\par -on Basaglar 12 u (dose since 7/21 per endo)\par -hx Novolog, states now off hx stopped x 45d per endo/daughter discussion due low fs to 80s reported with improved fs\par -on Lisinopril for renal protection\par -cont home fs monitoring, using Freestyle Osiel\par -followed by optho, last seen 1/21, told no DM retinopathy\par -seen by optometrist 7/21 with new glasses given\par -followed by podiatry, states seen 10/21 with nails clipped.  Has f/u 1/22.  Denies foot paresthesias. DM foot care counseled.\par -check A1c and microalbumin\par \par HTN, HLD, CAD s/p stent, hx LE edema (reports resolved edema since off steroids 3/21)- BP wnl\par -12/20 b/l LE duplex: no DVT b/l\par -6/21 echo 6/21: EF 60-65%, min MR, LV remodeling, nl LV fxn, mild diastolic dysfxn (Stg I)\par -7/21 cbc/bmp/TSH wnl\par -11/21 Tchol 114  LDL 42 HDL 49\par -11/21 cmp wnl\par -on ASA, metoprolol and Lisinopril\par -on Lipitor, Lovaza and niacin - denies SEs with use\par -off Zetia 11/21 per cardio\par -followed by cardio, seen 11/21 with NST advised (pending), labs done and Zetia d/c'd to cont Crestor.\par -low salt diet advised\par \par hx insomnia, snoring-\par -followed by pulm, last seen 9/21- w/o med changes, sleep study ordered (pending)\par -on Temazepam prn per oncology\par \par GERD, hx internal hemorrhoids, diverticulosis, colon polyps- reports controlled\par -hx EGD 1/16: nonbleeding erosive gastropathy\par -hx colonoscopy 1/16: internal hemorrhoids, large lipoma at transverse colon, diverticulosis, rec: repeat in 10 yrs (Dr. Hinds)\par -on famotidine\par -advised to avoid reflux aggravating foods\par \par hx hypomagnesium- hx chronic, mild diarrhea 2/2 metformin\par -declines to change metformin\par -7/21 Mg 1.3--> 1.5 in 7/21\par -on MgOx 400 (1/2/1)\par -increased Mg diet sources encouraged\par -nephrology referral given for eval- pending\par -check level\par \par \par MISC:  Continued social distancing and measure for covid19 prevention encouraged.  \par -hx pfizer vaccine series- 3/15, 7/2/21.  Reports booster pending 1/22.\par \par \par HCM\par -hx CPE 7/21\par -7/21 hep C screening negative\par -7/21 PSA wnl\par -flu shot today\par -hx Tdap 11/12\par -hx prevnar 3/15\par -hx PVX 10/20\par -advised to check on insurance coverage for shingrix and f/u if desires.  To d/w oncology prior to getting.\par -hx screening colonoscopy 1/16: internal hemorrhoids, large lipoma at transverse colon, diverticulosis, rec: repeat in 10 yrs (Dr. Hinds)\par -hx DEXA 1/21: osteopenia (hx compression fractures)\par -followed by derm, last seen 9/20.  Yearly skin screening advised.  Regular use of sun block for skin cancer prevention counseled.\par -advised to designate HCP and provide copy of completed form for records\par \par Pt requests wife, Jacque Abrams, be contacted re: his test results and medical care, (cell) [phone]\par \par \par Labs drawn in office today.\par  Detail Level: Detailed Detail Level: Zone Detail Level: Generalized Detail Level: Simple

## 2025-02-11 ENCOUNTER — APPOINTMENT (OUTPATIENT)
Dept: NEPHROLOGY | Facility: CLINIC | Age: 75
End: 2025-02-11

## 2025-02-28 ENCOUNTER — NON-APPOINTMENT (OUTPATIENT)
Age: 75
End: 2025-02-28

## 2025-02-28 ENCOUNTER — APPOINTMENT (OUTPATIENT)
Dept: INTERNAL MEDICINE | Facility: CLINIC | Age: 75
End: 2025-02-28
Payer: MEDICARE

## 2025-02-28 VITALS
TEMPERATURE: 97.8 F | OXYGEN SATURATION: 96 % | WEIGHT: 168 LBS | SYSTOLIC BLOOD PRESSURE: 140 MMHG | HEIGHT: 63 IN | DIASTOLIC BLOOD PRESSURE: 78 MMHG | HEART RATE: 74 BPM | BODY MASS INDEX: 29.77 KG/M2

## 2025-02-28 DIAGNOSIS — E87.5 HYPERKALEMIA: ICD-10-CM

## 2025-02-28 DIAGNOSIS — Z01.818 ENCOUNTER FOR OTHER PREPROCEDURAL EXAMINATION: ICD-10-CM

## 2025-02-28 PROCEDURE — 36415 COLL VENOUS BLD VENIPUNCTURE: CPT

## 2025-02-28 PROCEDURE — 99214 OFFICE O/P EST MOD 30 MIN: CPT

## 2025-02-28 PROCEDURE — G2211 COMPLEX E/M VISIT ADD ON: CPT

## 2025-03-03 PROBLEM — E87.5 HYPERKALEMIA: Status: ACTIVE | Noted: 2025-03-03

## 2025-03-03 LAB
ANION GAP SERPL CALC-SCNC: 13 MMOL/L
BUN SERPL-MCNC: 12 MG/DL
CALCIUM SERPL-MCNC: 9.6 MG/DL
CHLORIDE SERPL-SCNC: 101 MMOL/L
CO2 SERPL-SCNC: 27 MMOL/L
CREAT SERPL-MCNC: 0.79 MG/DL
EGFR: 94 ML/MIN/1.73M2
GLUCOSE SERPL-MCNC: 181 MG/DL
POTASSIUM SERPL-SCNC: 4.4 MMOL/L
SODIUM SERPL-SCNC: 141 MMOL/L

## 2025-03-11 ENCOUNTER — APPOINTMENT (OUTPATIENT)
Dept: OPHTHALMOLOGY | Facility: AMBULATORY SURGERY CENTER | Age: 75
End: 2025-03-11
Payer: MEDICARE

## 2025-03-11 PROCEDURE — 66984 XCAPSL CTRC RMVL W/O ECP: CPT | Mod: RT

## 2025-03-12 ENCOUNTER — APPOINTMENT (OUTPATIENT)
Dept: OPHTHALMOLOGY | Facility: CLINIC | Age: 75
End: 2025-03-12
Payer: MEDICARE

## 2025-03-12 ENCOUNTER — NON-APPOINTMENT (OUTPATIENT)
Age: 75
End: 2025-03-12

## 2025-03-12 PROCEDURE — 99024 POSTOP FOLLOW-UP VISIT: CPT

## 2025-03-15 ENCOUNTER — LABORATORY RESULT (OUTPATIENT)
Age: 75
End: 2025-03-15

## 2025-03-17 ENCOUNTER — NON-APPOINTMENT (OUTPATIENT)
Age: 75
End: 2025-03-17

## 2025-03-17 ENCOUNTER — APPOINTMENT (OUTPATIENT)
Dept: OPHTHALMOLOGY | Facility: CLINIC | Age: 75
End: 2025-03-17
Payer: MEDICARE

## 2025-03-17 PROCEDURE — 92136 OPHTHALMIC BIOMETRY: CPT | Mod: 26,LT

## 2025-03-17 PROCEDURE — 99024 POSTOP FOLLOW-UP VISIT: CPT

## 2025-03-19 ENCOUNTER — APPOINTMENT (OUTPATIENT)
Dept: ENDOCRINOLOGY | Facility: CLINIC | Age: 75
End: 2025-03-19
Payer: MEDICARE

## 2025-03-19 VITALS
DIASTOLIC BLOOD PRESSURE: 67 MMHG | HEART RATE: 85 BPM | BODY MASS INDEX: 30.21 KG/M2 | WEIGHT: 170.5 LBS | HEIGHT: 63 IN | RESPIRATION RATE: 16 BRPM | SYSTOLIC BLOOD PRESSURE: 139 MMHG | TEMPERATURE: 98.2 F | OXYGEN SATURATION: 94 %

## 2025-03-19 DIAGNOSIS — Z79.4 TYPE 2 DIABETES MELLITUS WITH HYPERGLYCEMIA: ICD-10-CM

## 2025-03-19 DIAGNOSIS — E11.65 TYPE 2 DIABETES MELLITUS WITH HYPERGLYCEMIA: ICD-10-CM

## 2025-03-19 DIAGNOSIS — C34.12 MALIGNANT NEOPLASM OF UPPER LOBE, LEFT BRONCHUS OR LUNG: ICD-10-CM

## 2025-03-19 DIAGNOSIS — I10 ESSENTIAL (PRIMARY) HYPERTENSION: ICD-10-CM

## 2025-03-19 PROCEDURE — 99214 OFFICE O/P EST MOD 30 MIN: CPT

## 2025-03-19 PROCEDURE — 95251 CONT GLUC MNTR ANALYSIS I&R: CPT

## 2025-03-19 RX ORDER — EMPAGLIFLOZIN 10 MG/1
10 TABLET, FILM COATED ORAL DAILY
Qty: 1 | Refills: 1 | Status: ACTIVE | COMMUNITY
Start: 2025-03-19 | End: 1900-01-01

## 2025-04-02 ENCOUNTER — APPOINTMENT (OUTPATIENT)
Age: 75
End: 2025-04-02
Payer: MEDICARE

## 2025-04-02 DIAGNOSIS — B35.1 TINEA UNGUIUM: ICD-10-CM

## 2025-04-02 DIAGNOSIS — E11.40 TYPE 2 DIABETES MELLITUS WITH DIABETIC NEUROPATHY, UNSPECIFIED: ICD-10-CM

## 2025-04-02 DIAGNOSIS — M79.671 PAIN IN RIGHT FOOT: ICD-10-CM

## 2025-04-02 DIAGNOSIS — M79.672 PAIN IN LEFT FOOT: ICD-10-CM

## 2025-04-02 PROCEDURE — 99213 OFFICE O/P EST LOW 20 MIN: CPT | Mod: 25

## 2025-04-02 PROCEDURE — 11721 DEBRIDE NAIL 6 OR MORE: CPT | Mod: Q8

## 2025-04-08 ENCOUNTER — APPOINTMENT (OUTPATIENT)
Dept: CARDIOLOGY | Facility: CLINIC | Age: 75
End: 2025-04-08
Payer: MEDICARE

## 2025-04-08 ENCOUNTER — NON-APPOINTMENT (OUTPATIENT)
Age: 75
End: 2025-04-08

## 2025-04-08 VITALS
HEIGHT: 63 IN | BODY MASS INDEX: 29.41 KG/M2 | OXYGEN SATURATION: 92 % | HEART RATE: 71 BPM | DIASTOLIC BLOOD PRESSURE: 72 MMHG | WEIGHT: 166 LBS | SYSTOLIC BLOOD PRESSURE: 118 MMHG

## 2025-04-08 DIAGNOSIS — Z95.5 PRESENCE OF CORONARY ANGIOPLASTY IMPLANT AND GRAFT: ICD-10-CM

## 2025-04-08 DIAGNOSIS — R94.31 ABNORMAL ELECTROCARDIOGRAM [ECG] [EKG]: ICD-10-CM

## 2025-04-08 DIAGNOSIS — E78.5 HYPERLIPIDEMIA, UNSPECIFIED: ICD-10-CM

## 2025-04-08 DIAGNOSIS — J44.9 CHRONIC OBSTRUCTIVE PULMONARY DISEASE, UNSPECIFIED: ICD-10-CM

## 2025-04-08 DIAGNOSIS — E11.9 TYPE 2 DIABETES MELLITUS W/OUT COMPLICATIONS: ICD-10-CM

## 2025-04-08 DIAGNOSIS — I10 ESSENTIAL (PRIMARY) HYPERTENSION: ICD-10-CM

## 2025-04-08 PROCEDURE — 93000 ELECTROCARDIOGRAM COMPLETE: CPT

## 2025-04-08 PROCEDURE — G2211 COMPLEX E/M VISIT ADD ON: CPT

## 2025-04-08 PROCEDURE — 99215 OFFICE O/P EST HI 40 MIN: CPT

## 2025-04-09 ENCOUNTER — NON-APPOINTMENT (OUTPATIENT)
Age: 75
End: 2025-04-09

## 2025-04-09 ENCOUNTER — APPOINTMENT (OUTPATIENT)
Dept: NEPHROLOGY | Facility: CLINIC | Age: 75
End: 2025-04-09
Payer: MEDICARE

## 2025-04-09 VITALS
OXYGEN SATURATION: 94 % | SYSTOLIC BLOOD PRESSURE: 132 MMHG | DIASTOLIC BLOOD PRESSURE: 66 MMHG | HEART RATE: 74 BPM | BODY MASS INDEX: 29.41 KG/M2 | WEIGHT: 166 LBS

## 2025-04-09 DIAGNOSIS — E83.42 HYPOMAGNESEMIA: ICD-10-CM

## 2025-04-09 PROCEDURE — 99214 OFFICE O/P EST MOD 30 MIN: CPT

## 2025-04-09 PROCEDURE — G2211 COMPLEX E/M VISIT ADD ON: CPT

## 2025-04-10 ENCOUNTER — NON-APPOINTMENT (OUTPATIENT)
Age: 75
End: 2025-04-10

## 2025-04-10 ENCOUNTER — APPOINTMENT (OUTPATIENT)
Dept: OPHTHALMOLOGY | Facility: CLINIC | Age: 75
End: 2025-04-10
Payer: MEDICARE

## 2025-04-10 PROCEDURE — 99024 POSTOP FOLLOW-UP VISIT: CPT

## 2025-04-10 NOTE — ASU PATIENT PROFILE, ADULT - HEALTH/HEALTHCARE ANXIETIES, PROFILE
We are going to see his Zyprexa 5 mg at bedtime will take care of her hallucinations.  Apparently she has both vivid hallucinations visual hallucinations but that she does do a great deal of talking to the hallucinations.  She thinks her children are there when they are not her grandchildren are there when they are not her mother who is not there as well.  She just could not tell me the stuff in front of her when they were in the office.  If we need to use Zyprexa twice a day will start 2 point 5 in the morning but it first right now would not start 5 mg at bedtime   pre op anxiety

## 2025-04-11 LAB — MAGNESIUM SERPL-MCNC: 2 MG/DL

## 2025-04-15 ENCOUNTER — APPOINTMENT (OUTPATIENT)
Dept: OPHTHALMOLOGY | Facility: CLINIC | Age: 75
End: 2025-04-15

## 2025-04-18 ENCOUNTER — INPATIENT (INPATIENT)
Facility: HOSPITAL | Age: 75
LOS: 0 days | Discharge: HOME | End: 2025-04-19
Attending: STUDENT IN AN ORGANIZED HEALTH CARE EDUCATION/TRAINING PROGRAM | Admitting: STUDENT IN AN ORGANIZED HEALTH CARE EDUCATION/TRAINING PROGRAM
Payer: MEDICARE

## 2025-04-18 ENCOUNTER — NON-APPOINTMENT (OUTPATIENT)
Age: 75
End: 2025-04-18

## 2025-04-18 VITALS — OXYGEN SATURATION: 86 % | RESPIRATION RATE: 20 BRPM

## 2025-04-18 DIAGNOSIS — J96.01 ACUTE RESPIRATORY FAILURE WITH HYPOXIA: ICD-10-CM

## 2025-04-18 DIAGNOSIS — E11.9 TYPE 2 DIABETES MELLITUS WITHOUT COMPLICATIONS: ICD-10-CM

## 2025-04-18 DIAGNOSIS — I10 ESSENTIAL (PRIMARY) HYPERTENSION: ICD-10-CM

## 2025-04-18 DIAGNOSIS — J44.1 CHRONIC OBSTRUCTIVE PULMONARY DISEASE WITH (ACUTE) EXACERBATION: ICD-10-CM

## 2025-04-18 DIAGNOSIS — D11.0 BENIGN NEOPLASM OF PAROTID GLAND: Chronic | ICD-10-CM

## 2025-04-18 DIAGNOSIS — C34.90 MALIGNANT NEOPLASM OF UNSPECIFIED PART OF UNSPECIFIED BRONCHUS OR LUNG: ICD-10-CM

## 2025-04-18 DIAGNOSIS — G47.33 OBSTRUCTIVE SLEEP APNEA (ADULT) (PEDIATRIC): ICD-10-CM

## 2025-04-18 DIAGNOSIS — Z90.89 ACQUIRED ABSENCE OF OTHER ORGANS: Chronic | ICD-10-CM

## 2025-04-18 DIAGNOSIS — Z95.5 PRESENCE OF CORONARY ANGIOPLASTY IMPLANT AND GRAFT: Chronic | ICD-10-CM

## 2025-04-18 DIAGNOSIS — Z29.9 ENCOUNTER FOR PROPHYLACTIC MEASURES, UNSPECIFIED: ICD-10-CM

## 2025-04-18 DIAGNOSIS — J98.4 OTHER DISORDERS OF LUNG: ICD-10-CM

## 2025-04-18 DIAGNOSIS — I25.10 ATHEROSCLEROTIC HEART DISEASE OF NATIVE CORONARY ARTERY WITHOUT ANGINA PECTORIS: ICD-10-CM

## 2025-04-18 DIAGNOSIS — N20.0 CALCULUS OF KIDNEY: Chronic | ICD-10-CM

## 2025-04-18 LAB
ALBUMIN SERPL ELPH-MCNC: 3.9 G/DL — SIGNIFICANT CHANGE UP (ref 3.3–5)
ALP SERPL-CCNC: 77 U/L — SIGNIFICANT CHANGE UP (ref 40–120)
ALT FLD-CCNC: 15 U/L — SIGNIFICANT CHANGE UP (ref 4–41)
ANION GAP SERPL CALC-SCNC: 12 MMOL/L — SIGNIFICANT CHANGE UP (ref 7–14)
AST SERPL-CCNC: 17 U/L — SIGNIFICANT CHANGE UP (ref 4–40)
BASOPHILS # BLD AUTO: 0.03 K/UL — SIGNIFICANT CHANGE UP (ref 0–0.2)
BASOPHILS NFR BLD AUTO: 0.6 % — SIGNIFICANT CHANGE UP (ref 0–2)
BILIRUB SERPL-MCNC: 0.2 MG/DL — SIGNIFICANT CHANGE UP (ref 0.2–1.2)
BLOOD GAS VENOUS COMPREHENSIVE RESULT: SIGNIFICANT CHANGE UP
BUN SERPL-MCNC: 14 MG/DL — SIGNIFICANT CHANGE UP (ref 7–23)
CALCIUM SERPL-MCNC: 9.3 MG/DL — SIGNIFICANT CHANGE UP (ref 8.4–10.5)
CHLORIDE SERPL-SCNC: 104 MMOL/L — SIGNIFICANT CHANGE UP (ref 98–107)
CO2 SERPL-SCNC: 24 MMOL/L — SIGNIFICANT CHANGE UP (ref 22–31)
CREAT SERPL-MCNC: 0.85 MG/DL — SIGNIFICANT CHANGE UP (ref 0.5–1.3)
EGFR: 92 ML/MIN/1.73M2 — SIGNIFICANT CHANGE UP
EGFR: 92 ML/MIN/1.73M2 — SIGNIFICANT CHANGE UP
EOSINOPHIL # BLD AUTO: 0.31 K/UL — SIGNIFICANT CHANGE UP (ref 0–0.5)
EOSINOPHIL NFR BLD AUTO: 5.8 % — SIGNIFICANT CHANGE UP (ref 0–6)
FLUAV AG NPH QL: SIGNIFICANT CHANGE UP
FLUBV AG NPH QL: SIGNIFICANT CHANGE UP
GLUCOSE SERPL-MCNC: 158 MG/DL — HIGH (ref 70–99)
HCT VFR BLD CALC: 42.1 % — SIGNIFICANT CHANGE UP (ref 39–50)
HGB BLD-MCNC: 13.8 G/DL — SIGNIFICANT CHANGE UP (ref 13–17)
IANC: 3.53 K/UL — SIGNIFICANT CHANGE UP (ref 1.8–7.4)
IMM GRANULOCYTES NFR BLD AUTO: 0.4 % — SIGNIFICANT CHANGE UP (ref 0–0.9)
LYMPHOCYTES # BLD AUTO: 0.86 K/UL — LOW (ref 1–3.3)
LYMPHOCYTES # BLD AUTO: 16.2 % — SIGNIFICANT CHANGE UP (ref 13–44)
MAGNESIUM SERPL-MCNC: 1.9 MG/DL — SIGNIFICANT CHANGE UP (ref 1.6–2.6)
MCHC RBC-ENTMCNC: 30.2 PG — SIGNIFICANT CHANGE UP (ref 27–34)
MCHC RBC-ENTMCNC: 32.8 G/DL — SIGNIFICANT CHANGE UP (ref 32–36)
MCV RBC AUTO: 92.1 FL — SIGNIFICANT CHANGE UP (ref 80–100)
MONOCYTES # BLD AUTO: 0.57 K/UL — SIGNIFICANT CHANGE UP (ref 0–0.9)
MONOCYTES NFR BLD AUTO: 10.7 % — SIGNIFICANT CHANGE UP (ref 2–14)
NEUTROPHILS # BLD AUTO: 3.53 K/UL — SIGNIFICANT CHANGE UP (ref 1.8–7.4)
NEUTROPHILS NFR BLD AUTO: 66.3 % — SIGNIFICANT CHANGE UP (ref 43–77)
NRBC # BLD AUTO: 0 K/UL — SIGNIFICANT CHANGE UP (ref 0–0)
NRBC # FLD: 0 K/UL — SIGNIFICANT CHANGE UP (ref 0–0)
NRBC BLD AUTO-RTO: 0 /100 WBCS — SIGNIFICANT CHANGE UP (ref 0–0)
NT-PROBNP SERPL-SCNC: 44 PG/ML — SIGNIFICANT CHANGE UP
PLATELET # BLD AUTO: 211 K/UL — SIGNIFICANT CHANGE UP (ref 150–400)
POTASSIUM SERPL-MCNC: 4.5 MMOL/L — SIGNIFICANT CHANGE UP (ref 3.5–5.3)
POTASSIUM SERPL-SCNC: 4.5 MMOL/L — SIGNIFICANT CHANGE UP (ref 3.5–5.3)
PROT SERPL-MCNC: 6.4 G/DL — SIGNIFICANT CHANGE UP (ref 6–8.3)
RBC # BLD: 4.57 M/UL — SIGNIFICANT CHANGE UP (ref 4.2–5.8)
RBC # FLD: 15 % — HIGH (ref 10.3–14.5)
RSV RNA NPH QL NAA+NON-PROBE: SIGNIFICANT CHANGE UP
SARS-COV-2 RNA SPEC QL NAA+PROBE: DETECTED
SODIUM SERPL-SCNC: 140 MMOL/L — SIGNIFICANT CHANGE UP (ref 135–145)
SOURCE RESPIRATORY: SIGNIFICANT CHANGE UP
TROPONIN T, HIGH SENSITIVITY RESULT: 11 NG/L — SIGNIFICANT CHANGE UP
WBC # BLD: 5.32 K/UL — SIGNIFICANT CHANGE UP (ref 3.8–10.5)
WBC # FLD AUTO: 5.32 K/UL — SIGNIFICANT CHANGE UP (ref 3.8–10.5)

## 2025-04-18 PROCEDURE — 71275 CT ANGIOGRAPHY CHEST: CPT | Mod: 26

## 2025-04-18 PROCEDURE — 99285 EMERGENCY DEPT VISIT HI MDM: CPT

## 2025-04-18 RX ORDER — REMDESIVIR 5 MG/ML
100 INJECTION INTRAVENOUS ONCE
Refills: 0 | Status: DISCONTINUED | OUTPATIENT
Start: 2025-04-20 | End: 2025-04-19

## 2025-04-18 RX ORDER — MAGNESIUM SULFATE 500 MG/ML
2 SYRINGE (ML) INJECTION ONCE
Refills: 0 | Status: COMPLETED | OUTPATIENT
Start: 2025-04-18 | End: 2025-04-18

## 2025-04-18 RX ORDER — METHYLPREDNISOLONE ACETATE 80 MG/ML
60 INJECTION, SUSPENSION INTRA-ARTICULAR; INTRALESIONAL; INTRAMUSCULAR; SOFT TISSUE ONCE
Refills: 0 | Status: COMPLETED | OUTPATIENT
Start: 2025-04-18 | End: 2025-04-18

## 2025-04-18 RX ORDER — IPRATROPIUM BROMIDE AND ALBUTEROL SULFATE .5; 2.5 MG/3ML; MG/3ML
3 SOLUTION RESPIRATORY (INHALATION) EVERY 6 HOURS
Refills: 0 | Status: DISCONTINUED | OUTPATIENT
Start: 2025-04-18 | End: 2025-04-19

## 2025-04-18 RX ORDER — DEXAMETHASONE 0.5 MG/1
6 TABLET ORAL DAILY
Refills: 0 | Status: DISCONTINUED | OUTPATIENT
Start: 2025-04-18 | End: 2025-04-19

## 2025-04-18 RX ORDER — REMDESIVIR 5 MG/ML
100 INJECTION INTRAVENOUS EVERY 24 HOURS
Refills: 0 | Status: CANCELLED | OUTPATIENT
Start: 2025-04-21 | End: 2025-04-19

## 2025-04-18 RX ORDER — REMDESIVIR 5 MG/ML
200 INJECTION INTRAVENOUS ONCE
Refills: 0 | Status: COMPLETED | OUTPATIENT
Start: 2025-04-18 | End: 2025-04-19

## 2025-04-18 RX ADMIN — IPRATROPIUM BROMIDE AND ALBUTEROL SULFATE 3 MILLILITER(S): .5; 2.5 SOLUTION RESPIRATORY (INHALATION) at 21:45

## 2025-04-18 RX ADMIN — Medication 25 GRAM(S): at 23:03

## 2025-04-18 RX ADMIN — METHYLPREDNISOLONE ACETATE 60 MILLIGRAM(S): 80 INJECTION, SUSPENSION INTRA-ARTICULAR; INTRALESIONAL; INTRAMUSCULAR; SOFT TISSUE at 21:45

## 2025-04-19 ENCOUNTER — TRANSCRIPTION ENCOUNTER (OUTPATIENT)
Age: 75
End: 2025-04-19

## 2025-04-19 VITALS
SYSTOLIC BLOOD PRESSURE: 108 MMHG | TEMPERATURE: 98 F | RESPIRATION RATE: 17 BRPM | OXYGEN SATURATION: 95 % | HEART RATE: 77 BPM | DIASTOLIC BLOOD PRESSURE: 73 MMHG

## 2025-04-19 DIAGNOSIS — R79.89 OTHER SPECIFIED ABNORMAL FINDINGS OF BLOOD CHEMISTRY: ICD-10-CM

## 2025-04-19 DIAGNOSIS — U07.1 COVID-19: ICD-10-CM

## 2025-04-19 DIAGNOSIS — R30.0 DYSURIA: ICD-10-CM

## 2025-04-19 LAB
A1C WITH ESTIMATED AVERAGE GLUCOSE RESULT: 7.6 % — HIGH (ref 4–5.6)
ADD ON TEST-SPECIMEN IN LAB: SIGNIFICANT CHANGE UP
ADD ON TEST-SPECIMEN IN LAB: SIGNIFICANT CHANGE UP
ANION GAP SERPL CALC-SCNC: 19 MMOL/L — HIGH (ref 7–14)
APPEARANCE UR: CLEAR — SIGNIFICANT CHANGE UP
APTT BLD: 62.1 SEC — HIGH (ref 24.5–35.6)
B PERT DNA SPEC QL NAA+PROBE: SIGNIFICANT CHANGE UP
B PERT+PARAPERT DNA PNL SPEC NAA+PROBE: SIGNIFICANT CHANGE UP
BILIRUB UR-MCNC: NEGATIVE — SIGNIFICANT CHANGE UP
BUN SERPL-MCNC: 15 MG/DL — SIGNIFICANT CHANGE UP (ref 7–23)
C PNEUM DNA SPEC QL NAA+PROBE: SIGNIFICANT CHANGE UP
CALCIUM SERPL-MCNC: 9.5 MG/DL — SIGNIFICANT CHANGE UP (ref 8.4–10.5)
CHLORIDE SERPL-SCNC: 102 MMOL/L — SIGNIFICANT CHANGE UP (ref 98–107)
CO2 SERPL-SCNC: 18 MMOL/L — LOW (ref 22–31)
COLOR SPEC: YELLOW — SIGNIFICANT CHANGE UP
CREAT SERPL-MCNC: 0.84 MG/DL — SIGNIFICANT CHANGE UP (ref 0.5–1.3)
CRP SERPL-MCNC: 14.7 MG/L — HIGH
D DIMER BLD IA.RAPID-MCNC: 218 NG/ML DDU — SIGNIFICANT CHANGE UP
DIFF PNL FLD: NEGATIVE — SIGNIFICANT CHANGE UP
EGFR: 93 ML/MIN/1.73M2 — SIGNIFICANT CHANGE UP
EGFR: 93 ML/MIN/1.73M2 — SIGNIFICANT CHANGE UP
ESTIMATED AVERAGE GLUCOSE: 171 — SIGNIFICANT CHANGE UP
FLUAV SUBTYP SPEC NAA+PROBE: SIGNIFICANT CHANGE UP
FLUBV RNA SPEC QL NAA+PROBE: SIGNIFICANT CHANGE UP
GLUCOSE BLDC GLUCOMTR-MCNC: 180 MG/DL — HIGH (ref 70–99)
GLUCOSE BLDC GLUCOMTR-MCNC: 196 MG/DL — HIGH (ref 70–99)
GLUCOSE BLDC GLUCOMTR-MCNC: 251 MG/DL — HIGH (ref 70–99)
GLUCOSE SERPL-MCNC: 232 MG/DL — HIGH (ref 70–99)
GLUCOSE UR QL: >=1000 MG/DL
HADV DNA SPEC QL NAA+PROBE: SIGNIFICANT CHANGE UP
HCOV 229E RNA SPEC QL NAA+PROBE: SIGNIFICANT CHANGE UP
HCOV HKU1 RNA SPEC QL NAA+PROBE: SIGNIFICANT CHANGE UP
HCOV NL63 RNA SPEC QL NAA+PROBE: SIGNIFICANT CHANGE UP
HCOV OC43 RNA SPEC QL NAA+PROBE: SIGNIFICANT CHANGE UP
HMPV RNA SPEC QL NAA+PROBE: SIGNIFICANT CHANGE UP
HPIV1 RNA SPEC QL NAA+PROBE: SIGNIFICANT CHANGE UP
HPIV2 RNA SPEC QL NAA+PROBE: SIGNIFICANT CHANGE UP
HPIV3 RNA SPEC QL NAA+PROBE: SIGNIFICANT CHANGE UP
HPIV4 RNA SPEC QL NAA+PROBE: SIGNIFICANT CHANGE UP
INR BLD: 1.51 RATIO — HIGH (ref 0.85–1.16)
KETONES UR-MCNC: NEGATIVE MG/DL — SIGNIFICANT CHANGE UP
LEUKOCYTE ESTERASE UR-ACNC: NEGATIVE — SIGNIFICANT CHANGE UP
M PNEUMO DNA SPEC QL NAA+PROBE: SIGNIFICANT CHANGE UP
MRSA PCR RESULT.: SIGNIFICANT CHANGE UP
NITRITE UR-MCNC: NEGATIVE — SIGNIFICANT CHANGE UP
PH UR: 6.5 — SIGNIFICANT CHANGE UP (ref 5–8)
POTASSIUM SERPL-MCNC: 4.6 MMOL/L — SIGNIFICANT CHANGE UP (ref 3.5–5.3)
POTASSIUM SERPL-SCNC: 4.6 MMOL/L — SIGNIFICANT CHANGE UP (ref 3.5–5.3)
PROCALCITONIN SERPL-MCNC: 0.05 NG/ML — SIGNIFICANT CHANGE UP (ref 0.02–0.1)
PROT UR-MCNC: NEGATIVE MG/DL — SIGNIFICANT CHANGE UP
PROTHROM AB SERPL-ACNC: 17.5 SEC — HIGH (ref 9.9–13.4)
RAPID RVP RESULT: DETECTED
RSV RNA SPEC QL NAA+PROBE: SIGNIFICANT CHANGE UP
RV+EV RNA SPEC QL NAA+PROBE: SIGNIFICANT CHANGE UP
S AUREUS DNA NOSE QL NAA+PROBE: SIGNIFICANT CHANGE UP
SARS-COV-2 RNA SPEC QL NAA+PROBE: DETECTED
SODIUM SERPL-SCNC: 139 MMOL/L — SIGNIFICANT CHANGE UP (ref 135–145)
SP GR SPEC: 1.04 — HIGH (ref 1–1.03)
TROPONIN T, HIGH SENSITIVITY RESULT: 7 NG/L — SIGNIFICANT CHANGE UP
UROBILINOGEN FLD QL: 0.2 MG/DL — SIGNIFICANT CHANGE UP (ref 0.2–1)

## 2025-04-19 PROCEDURE — 99223 1ST HOSP IP/OBS HIGH 75: CPT | Mod: GC

## 2025-04-19 PROCEDURE — 99497 ADVNCD CARE PLAN 30 MIN: CPT | Mod: GC,25

## 2025-04-19 RX ORDER — SODIUM CHLORIDE 9 G/1000ML
1000 INJECTION, SOLUTION INTRAVENOUS
Refills: 0 | Status: DISCONTINUED | OUTPATIENT
Start: 2025-04-19 | End: 2025-04-19

## 2025-04-19 RX ORDER — METOPROLOL SUCCINATE 50 MG/1
50 TABLET, EXTENDED RELEASE ORAL DAILY
Refills: 0 | Status: DISCONTINUED | OUTPATIENT
Start: 2025-04-19 | End: 2025-04-19

## 2025-04-19 RX ORDER — DEXTROSE 50 % IN WATER 50 %
12.5 SYRINGE (ML) INTRAVENOUS ONCE
Refills: 0 | Status: DISCONTINUED | OUTPATIENT
Start: 2025-04-19 | End: 2025-04-19

## 2025-04-19 RX ORDER — AZITHROMYCIN 250 MG
500 CAPSULE ORAL DAILY
Refills: 0 | Status: DISCONTINUED | OUTPATIENT
Start: 2025-04-19 | End: 2025-04-19

## 2025-04-19 RX ORDER — DEXTROSE 50 % IN WATER 50 %
15 SYRINGE (ML) INTRAVENOUS ONCE
Refills: 0 | Status: DISCONTINUED | OUTPATIENT
Start: 2025-04-19 | End: 2025-04-19

## 2025-04-19 RX ORDER — MAGNESIUM OXIDE 400 MG
400 TABLET ORAL DAILY
Refills: 0 | Status: DISCONTINUED | OUTPATIENT
Start: 2025-04-19 | End: 2025-04-19

## 2025-04-19 RX ORDER — SAME BUTANEDISULFONATE/BETAINE 400-600 MG
1000 POWDER IN PACKET (EA) ORAL AT BEDTIME
Refills: 0 | Status: DISCONTINUED | OUTPATIENT
Start: 2025-04-19 | End: 2025-04-19

## 2025-04-19 RX ORDER — GLUCAGON 3 MG/1
1 POWDER NASAL ONCE
Refills: 0 | Status: DISCONTINUED | OUTPATIENT
Start: 2025-04-19 | End: 2025-04-19

## 2025-04-19 RX ORDER — ENOXAPARIN SODIUM 100 MG/ML
40 INJECTION SUBCUTANEOUS EVERY 24 HOURS
Refills: 0 | Status: DISCONTINUED | OUTPATIENT
Start: 2025-04-19 | End: 2025-04-19

## 2025-04-19 RX ORDER — PREDNISONE 20 MG/1
2 TABLET ORAL
Qty: 8 | Refills: 0
Start: 2025-04-19 | End: 2025-04-22

## 2025-04-19 RX ORDER — ROSUVASTATIN CALCIUM 20 MG/1
20 TABLET, FILM COATED ORAL AT BEDTIME
Refills: 0 | Status: DISCONTINUED | OUTPATIENT
Start: 2025-04-19 | End: 2025-04-19

## 2025-04-19 RX ORDER — ASPIRIN 325 MG
81 TABLET ORAL DAILY
Refills: 0 | Status: DISCONTINUED | OUTPATIENT
Start: 2025-04-19 | End: 2025-04-19

## 2025-04-19 RX ORDER — INSULIN LISPRO 100 U/ML
INJECTION, SOLUTION INTRAVENOUS; SUBCUTANEOUS
Refills: 0 | Status: DISCONTINUED | OUTPATIENT
Start: 2025-04-19 | End: 2025-04-19

## 2025-04-19 RX ORDER — DEXTROSE 50 % IN WATER 50 %
25 SYRINGE (ML) INTRAVENOUS ONCE
Refills: 0 | Status: DISCONTINUED | OUTPATIENT
Start: 2025-04-19 | End: 2025-04-19

## 2025-04-19 RX ORDER — INSULIN LISPRO 100 U/ML
INJECTION, SOLUTION INTRAVENOUS; SUBCUTANEOUS AT BEDTIME
Refills: 0 | Status: DISCONTINUED | OUTPATIENT
Start: 2025-04-19 | End: 2025-04-19

## 2025-04-19 RX ADMIN — REMDESIVIR 200 MILLIGRAM(S): 5 INJECTION INTRAVENOUS at 00:46

## 2025-04-19 RX ADMIN — METOPROLOL SUCCINATE 50 MILLIGRAM(S): 50 TABLET, EXTENDED RELEASE ORAL at 06:38

## 2025-04-19 RX ADMIN — Medication 500 MILLIGRAM(S): at 12:21

## 2025-04-19 RX ADMIN — Medication 1 DOSE(S): at 09:02

## 2025-04-19 RX ADMIN — Medication 40 MILLIGRAM(S): at 06:38

## 2025-04-19 RX ADMIN — INSULIN LISPRO 1: 100 INJECTION, SOLUTION INTRAVENOUS; SUBCUTANEOUS at 09:03

## 2025-04-19 RX ADMIN — Medication 80 MILLIGRAM(S): at 06:38

## 2025-04-19 RX ADMIN — ENOXAPARIN SODIUM 40 MILLIGRAM(S): 100 INJECTION SUBCUTANEOUS at 06:38

## 2025-04-19 RX ADMIN — SODIUM CHLORIDE 70 MILLILITER(S): 9 INJECTION, SOLUTION INTRAVENOUS at 00:46

## 2025-04-19 RX ADMIN — Medication 81 MILLIGRAM(S): at 12:21

## 2025-04-19 RX ADMIN — INSULIN LISPRO 1: 100 INJECTION, SOLUTION INTRAVENOUS; SUBCUTANEOUS at 12:44

## 2025-04-19 RX ADMIN — DEXAMETHASONE 6 MILLIGRAM(S): 0.5 TABLET ORAL at 06:38

## 2025-04-19 RX ADMIN — Medication 400 MILLIGRAM(S): at 12:22

## 2025-04-20 RX ORDER — PREDNISONE 20 MG/1
2 TABLET ORAL
Qty: 8 | Refills: 0
Start: 2025-04-20 | End: 2025-04-23

## 2025-04-20 RX ORDER — AZITHROMYCIN 250 MG
1 CAPSULE ORAL
Qty: 2 | Refills: 0
Start: 2025-04-20 | End: 2025-04-21

## 2025-04-21 ENCOUNTER — NON-APPOINTMENT (OUTPATIENT)
Age: 75
End: 2025-04-21

## 2025-04-21 ENCOUNTER — APPOINTMENT (OUTPATIENT)
Dept: PULMONOLOGY | Facility: CLINIC | Age: 75
End: 2025-04-21
Payer: MEDICARE

## 2025-04-21 DIAGNOSIS — J44.1 CHRONIC OBSTRUCTIVE PULMONARY DISEASE WITH (ACUTE) EXACERBATION: ICD-10-CM

## 2025-04-21 PROBLEM — C34.90 MALIGNANT NEOPLASM OF UNSPECIFIED PART OF UNSPECIFIED BRONCHUS OR LUNG: Chronic | Status: ACTIVE | Noted: 2025-04-19

## 2025-04-21 PROCEDURE — 99496 TRANSJ CARE MGMT HIGH F2F 7D: CPT | Mod: 2W

## 2025-04-24 ENCOUNTER — APPOINTMENT (OUTPATIENT)
Dept: INTERNAL MEDICINE | Facility: CLINIC | Age: 75
End: 2025-04-24
Payer: MEDICARE

## 2025-04-24 VITALS
TEMPERATURE: 97.3 F | BODY MASS INDEX: 29.77 KG/M2 | OXYGEN SATURATION: 90 % | DIASTOLIC BLOOD PRESSURE: 76 MMHG | WEIGHT: 168 LBS | SYSTOLIC BLOOD PRESSURE: 140 MMHG | HEIGHT: 63 IN | HEART RATE: 72 BPM

## 2025-04-24 DIAGNOSIS — I25.10 ATHEROSCLEROTIC HEART DISEASE OF NATIVE CORONARY ARTERY W/OUT ANGINA PECTORIS: ICD-10-CM

## 2025-04-24 DIAGNOSIS — U07.1 COVID-19: ICD-10-CM

## 2025-04-24 DIAGNOSIS — E11.9 TYPE 2 DIABETES MELLITUS W/OUT COMPLICATIONS: ICD-10-CM

## 2025-04-24 PROCEDURE — 99214 OFFICE O/P EST MOD 30 MIN: CPT

## 2025-04-24 RX ORDER — TEMAZEPAM 15 MG/1
15 CAPSULE ORAL
Qty: 30 | Refills: 0 | Status: ACTIVE | COMMUNITY
Start: 2025-04-24 | End: 1900-01-01

## 2025-04-25 ENCOUNTER — APPOINTMENT (OUTPATIENT)
Dept: PULMONOLOGY | Facility: CLINIC | Age: 75
End: 2025-04-25
Payer: MEDICARE

## 2025-04-25 VITALS
HEIGHT: 64 IN | OXYGEN SATURATION: 91 % | WEIGHT: 167 LBS | TEMPERATURE: 97.2 F | HEART RATE: 76 BPM | BODY MASS INDEX: 28.51 KG/M2 | DIASTOLIC BLOOD PRESSURE: 68 MMHG | SYSTOLIC BLOOD PRESSURE: 125 MMHG | RESPIRATION RATE: 16 BRPM

## 2025-04-25 DIAGNOSIS — G47.33 OBSTRUCTIVE SLEEP APNEA (ADULT) (PEDIATRIC): ICD-10-CM

## 2025-04-25 DIAGNOSIS — C79.31 SECONDARY MALIGNANT NEOPLASM OF BRAIN: ICD-10-CM

## 2025-04-25 DIAGNOSIS — J44.9 CHRONIC OBSTRUCTIVE PULMONARY DISEASE, UNSPECIFIED: ICD-10-CM

## 2025-04-25 DIAGNOSIS — C34.12 MALIGNANT NEOPLASM OF UPPER LOBE, LEFT BRONCHUS OR LUNG: ICD-10-CM

## 2025-04-25 DIAGNOSIS — J96.21 ACUTE AND CHRONIC RESPIRATORY FAILURE WITH HYPOXIA: ICD-10-CM

## 2025-04-25 DIAGNOSIS — J43.2 CENTRILOBULAR EMPHYSEMA: ICD-10-CM

## 2025-04-25 DIAGNOSIS — I25.10 ATHEROSCLEROTIC HEART DISEASE OF NATIVE CORONARY ARTERY W/OUT ANGINA PECTORIS: ICD-10-CM

## 2025-04-25 DIAGNOSIS — C34.90 MALIGNANT NEOPLASM OF UNSPECIFIED PART OF UNSPECIFIED BRONCHUS OR LUNG: ICD-10-CM

## 2025-04-25 PROCEDURE — 99215 OFFICE O/P EST HI 40 MIN: CPT

## 2025-04-25 PROCEDURE — G2211 COMPLEX E/M VISIT ADD ON: CPT

## 2025-04-25 RX ORDER — AZITHROMYCIN 500 MG/1
500 TABLET, FILM COATED ORAL
Qty: 2 | Refills: 0 | Status: COMPLETED | COMMUNITY
Start: 2025-04-19

## 2025-04-25 RX ORDER — ALBUTEROL SULFATE 90 UG/1
108 (90 BASE) AEROSOL, METERED RESPIRATORY (INHALATION)
Qty: 1 | Refills: 5 | Status: ACTIVE | COMMUNITY
Start: 2025-04-25 | End: 1900-01-01

## 2025-04-25 RX ORDER — BUDESONIDE AND FORMOTEROL FUMARATE DIHYDRATE 160; 4.5 UG/1; UG/1
160-4.5 AEROSOL RESPIRATORY (INHALATION) TWICE DAILY
Qty: 1 | Refills: 6 | Status: ACTIVE | COMMUNITY
Start: 2025-04-25 | End: 1900-01-01

## 2025-04-26 PROBLEM — J96.21 ACUTE ON CHRONIC RESPIRATORY FAILURE WITH HYPOXIA: Status: ACTIVE | Noted: 2025-04-21

## 2025-04-28 RX ORDER — MAGNESIUM OXIDE 400 MG
400 TABLET ORAL
Refills: 0 | DISCHARGE

## 2025-04-28 RX ORDER — OMEGA-3-ACID ETHYL ESTERS CAPSULES 1 G/1
1 CAPSULE, LIQUID FILLED ORAL
Refills: 0 | DISCHARGE

## 2025-04-28 RX ORDER — SILDENAFIL 50 MG/1
1 TABLET, FILM COATED ORAL
Refills: 0 | DISCHARGE

## 2025-04-28 RX ORDER — DULAGLUTIDE 4.5 MG/.5ML
1.5 INJECTION, SOLUTION SUBCUTANEOUS
Refills: 0 | DISCHARGE

## 2025-04-28 RX ORDER — SAME BUTANEDISULFONATE/BETAINE 400-600 MG
2 POWDER IN PACKET (EA) ORAL
Refills: 0 | DISCHARGE

## 2025-04-28 NOTE — ASU PATIENT PROFILE, ADULT - PATIENT'S HEIGHT AND WEIGHT RECORDED IN THE VITAL SIGNS FLOWSHEET
Immediate Brief Procedure Note   Rona Ryan  8/17/2018    Preoperative Diagnosis:   Anemia/GI bleed.    Procedure(s):  EGD     Postoperative Diagnosis:  Normal     Procedural Physician:  Lee Ann Boston MD    Assistant:  None    Sedation:  MAC Sedation     Findings:  Normal     Complications:  None    Specimen(s):  none    Estimated Blood:  None      Endoscopy .  No blood loss.       Immediate Brief Procedure Note   Rona PEREZ Ryan  8/17/2018    Preoperative Diagnosis:  H/o polyp    Procedure(s):  Colon with biopsy    Postoperative Diagnosis:   Polyp    Procedural Physician:  Lee Ann Boston MD    Assistant:  None    Sedation:  MAC Sedation    Findings:  Normal    Complications:  None    Specimen(s):  Colon biopsy    Estimated Blood:  None    Colonoscopy with biopsies.  No blood loss.   Dim polyp-60 cms-bx     yes

## 2025-04-28 NOTE — ASU PATIENT PROFILE, ADULT - URINARY CATHETER
[facility]  [address]  [place] [address]  [phone]          September 20, 2017    Sukhi Chavez                                                                                                                     [address]  [place] [address]            Dear Sukhi,    We are sending you this letter to let you know that we refilled your prescription for CIALIS 20 MG tablet, however our records show that you are overdue for a physical examination.    Please call [phone], at your earliest convenience to schedule this appointment.  We will not be able to refill this prescription, or any other prescriptions for you until you are seen for an appointment.    Feel free to contact us with any questions or concerns.  Thank you.    Sincerely,         Prieto Veloz MD/lazaro    
no

## 2025-04-29 ENCOUNTER — TRANSCRIPTION ENCOUNTER (OUTPATIENT)
Age: 75
End: 2025-04-29

## 2025-04-29 ENCOUNTER — OUTPATIENT (OUTPATIENT)
Dept: OUTPATIENT SERVICES | Facility: HOSPITAL | Age: 75
LOS: 1 days | Discharge: HOME | End: 2025-04-29
Payer: MEDICARE

## 2025-04-29 VITALS
RESPIRATION RATE: 18 BRPM | DIASTOLIC BLOOD PRESSURE: 70 MMHG | HEIGHT: 63 IN | SYSTOLIC BLOOD PRESSURE: 138 MMHG | WEIGHT: 166.01 LBS | OXYGEN SATURATION: 93 % | HEART RATE: 67 BPM | TEMPERATURE: 98 F

## 2025-04-29 VITALS
RESPIRATION RATE: 19 BRPM | OXYGEN SATURATION: 94 % | SYSTOLIC BLOOD PRESSURE: 125 MMHG | HEART RATE: 72 BPM | DIASTOLIC BLOOD PRESSURE: 72 MMHG

## 2025-04-29 DIAGNOSIS — N20.0 CALCULUS OF KIDNEY: Chronic | ICD-10-CM

## 2025-04-29 DIAGNOSIS — D11.0 BENIGN NEOPLASM OF PAROTID GLAND: Chronic | ICD-10-CM

## 2025-04-29 DIAGNOSIS — Z95.5 PRESENCE OF CORONARY ANGIOPLASTY IMPLANT AND GRAFT: Chronic | ICD-10-CM

## 2025-04-29 DIAGNOSIS — Z90.89 ACQUIRED ABSENCE OF OTHER ORGANS: Chronic | ICD-10-CM

## 2025-04-29 DIAGNOSIS — R94.31 ABNORMAL ELECTROCARDIOGRAM [ECG] [EKG]: ICD-10-CM

## 2025-04-29 PROCEDURE — C1725: CPT

## 2025-04-29 PROCEDURE — C1874: CPT

## 2025-04-29 PROCEDURE — 93010 ELECTROCARDIOGRAM REPORT: CPT

## 2025-04-29 PROCEDURE — 92978 ENDOLUMINL IVUS OCT C 1ST: CPT | Mod: 26,RC

## 2025-04-29 PROCEDURE — 92978 ENDOLUMINL IVUS OCT C 1ST: CPT | Mod: RC

## 2025-04-29 PROCEDURE — 93458 L HRT ARTERY/VENTRICLE ANGIO: CPT | Mod: 59

## 2025-04-29 PROCEDURE — C1894: CPT

## 2025-04-29 PROCEDURE — 92972 PERQ TRLUML CORONRY LITHOTRP: CPT

## 2025-04-29 PROCEDURE — 86900 BLOOD TYPING SEROLOGIC ABO: CPT

## 2025-04-29 PROCEDURE — 93458 L HRT ARTERY/VENTRICLE ANGIO: CPT | Mod: 26,59

## 2025-04-29 PROCEDURE — 36415 COLL VENOUS BLD VENIPUNCTURE: CPT

## 2025-04-29 PROCEDURE — 99152 MOD SED SAME PHYS/QHP 5/>YRS: CPT

## 2025-04-29 PROCEDURE — 92928 PRQ TCAT PLMT NTRAC ST 1 LES: CPT | Mod: RC

## 2025-04-29 PROCEDURE — C1887: CPT

## 2025-04-29 PROCEDURE — 86901 BLOOD TYPING SEROLOGIC RH(D): CPT

## 2025-04-29 PROCEDURE — C1769: CPT

## 2025-04-29 PROCEDURE — C1761: CPT

## 2025-04-29 PROCEDURE — C1753: CPT

## 2025-04-29 PROCEDURE — 93005 ELECTROCARDIOGRAM TRACING: CPT

## 2025-04-29 PROCEDURE — 86850 RBC ANTIBODY SCREEN: CPT

## 2025-04-29 RX ORDER — ALBUTEROL SULFATE 2.5 MG/3ML
2 VIAL, NEBULIZER (ML) INHALATION
Refills: 0 | DISCHARGE

## 2025-04-29 RX ORDER — METOPROLOL SUCCINATE 50 MG/1
1 TABLET, EXTENDED RELEASE ORAL
Refills: 0 | DISCHARGE

## 2025-04-29 RX ORDER — ROSUVASTATIN CALCIUM 20 MG/1
1 TABLET, FILM COATED ORAL
Refills: 0 | DISCHARGE

## 2025-04-29 RX ORDER — ALENDRONATE SODIUM 70 MG/1
1 TABLET ORAL
Refills: 0 | DISCHARGE

## 2025-04-29 RX ORDER — CLOPIDOGREL BISULFATE 75 MG/1
1 TABLET, FILM COATED ORAL
Qty: 90 | Refills: 4
Start: 2025-04-29 | End: 2026-07-22

## 2025-04-29 RX ORDER — ORAL SEMAGLUTIDE 14 MG/1
2 TABLET ORAL
Refills: 0 | DISCHARGE

## 2025-04-29 RX ORDER — METFORMIN HYDROCHLORIDE 850 MG/1
1 TABLET ORAL
Qty: 0 | Refills: 0 | DISCHARGE

## 2025-04-29 RX ORDER — BUDESONIDE AND FORMOTEROL FUMARATE DIHYDRATE 80; 4.5 UG/1; UG/1
2 AEROSOL RESPIRATORY (INHALATION)
Refills: 0 | DISCHARGE

## 2025-04-29 RX ORDER — TEMAZEPAM 15 MG/1
1 CAPSULE ORAL
Refills: 0 | DISCHARGE

## 2025-04-29 RX ORDER — EMPAGLIFLOZIN 25 MG/1
1 TABLET, FILM COATED ORAL
Refills: 0 | DISCHARGE

## 2025-04-29 RX ADMIN — Medication 150 MILLILITER(S): at 11:30

## 2025-04-29 RX ADMIN — Medication 250 MILLILITER(S): at 07:45

## 2025-04-29 NOTE — ASU DISCHARGE PLAN (ADULT/PEDIATRIC) - FINANCIAL ASSISTANCE
[facility] provides services at a reduced cost to those who are determined to be eligible through [facility]’s financial assistance program. Information regarding [facility]’s financial assistance program can be found by going to [email or web address] or by calling [phone].

## 2025-04-29 NOTE — PACU DISCHARGE NOTE - COMMENTS
pt discharged home via wheelchair , IV removed, discharge instructions given to the patient and spouse, pt verbalized understanding, safety and comfort maintained

## 2025-04-29 NOTE — H&P ADULT - HISTORY OF PRESENT ILLNESS
75 y/o male with PMH DM ( metformin), COPD, CAD ( pci), HLD, HTN, GERD, Small cell lung CA, presented to cardiology with c/o fatigue and SOB. Pt had abnormal stress test revealing ST depressions during test. Pt referred to cardiac cath for further evaluation.

## 2025-04-29 NOTE — CHART NOTE - NSCHARTNOTEFT_GEN_A_CORE
Cardiac rehab Referral     EVER CHAVEZ 74yM  MRN: [id number]  : 54  Admit: 25  Patient is a 74y old  Male who presents with a chief complaint of C (2025 11:04)      pt s/p [facility] with WILLIAMS to  RCA x 3    Qualified for cardiac rehab     -Patient educated on  benefits of cardiac rehab. Information packet provided  with participating locations given to patient along with being advised to contact their insurance company for list of participating providers.   -Patient discharge paperwork will included detailed cardiovascular history, medications, testing/treatments and advised to provide all information with their primary outpatient cardiologist at a follow in 1-2 weeks from discharge       patient refused referral information sheet with [facility]HA website provided

## 2025-04-29 NOTE — H&P ADULT - ASSESSMENT
75 y/o male with PMH DM ( metformin), COPD, CAD ( pci), HLD, HTN, GERD, Small cell lung CA, presented to cardiology with c/o fatigue and SOB. Pt had abnormal stress test revealing ST depressions during test. Pt referred to cardiac cath for further evaluation.       ASA class: II  Creatinine: 0.84  GFR:  83  Bleeding  Risk score:  Vicente Score:  75 y/o male with PMH DM ( metformin), COPD, CAD ( pci), HLD, HTN, GERD, Small cell lung CA, presented to cardiology with c/o fatigue and SOB. Pt had abnormal stress test revealing ST depressions during test. Pt referred to cardiac cath for further evaluation.       ASA class: II  Creatinine: 0.84  GFR:  83  Bleeding  Risk score: 0.7%  Vicente Score:  7pts

## 2025-04-29 NOTE — PROGRESS NOTE ADULT - SUBJECTIVE AND OBJECTIVE BOX
Department of Cardiology                                                               Division of Interventional Cardiology                                                               [facility] /[address] [address]                                                                                 [phone]           Progress Note  Patient is a 74y old  Male who presents with a chief complaint of LHC (29 Apr 2025 08:40)      Patient is  now s/p left heart catheterization    s/p LHC : WILLIAMS x 3 to RCA   Pt denies chest pain/SOB/palpitations post cath.  PROCEDURE SITE: -RRA accessed. hemoband to be removed 2 hours post placement.  Site is without hematoma or bleeding. Sensation and YAIR intact. Distal pulses palpable 2+, capillary refill < 2 seconds.     Vital Signs Last 24 Hrs  T(C): 36.5 (29 Apr 2025 07:59), Max: 36.5 (29 Apr 2025 07:59)  T(F): 97.7 (29 Apr 2025 07:59), Max: 97.7 (29 Apr 2025 07:59)  HR: 67 (29 Apr 2025 07:59) (67 - 67)  BP: 138/70 (29 Apr 2025 07:59) (138/70 - 138/70)  BP(mean): --  RR: 18 (29 Apr 2025 07:59) (18 - 18)  SpO2: 93% (29 Apr 2025 07:59) (93% - 93%)    Parameters below as of 29 Apr 2025 07:59  Patient On (Oxygen Delivery Method): room air        PHYSICAL EXAM:  NEURO: Non-focal, AxOx3.  No neuro deficits NECK: Supple, No JVD, No LAD  CHEST/LUNG: Clear to auscultation bilaterally; No wheeze  HEART: s1 s2 Regular rate and rhythm; No murmurs, rubs, or gallops  ABDOMEN: Soft, Nontender, Nondistended; Bowel sounds present X 4 quadrants   EXTREMITIES:  2+ Peripheral Pulses, No clubbing, cyanosis, or edema   VASCULAR: Peripheral pulses palpable 2+ bilaterally      Labs:            Home Medications:  alendronate 70 mg oral tablet: 1 tab(s) orally once a week (29 Apr 2025 08:24)  Aspirin Enteric Coated 81 mg oral delayed release tablet: 1 tab(s) orally once a day (29 Apr 2025 08:24)  famotidine 20 mg oral tablet: 1 tab(s) orally once a day (29 Apr 2025 08:24)  Jardiance 10 mg oral tablet: 1 tab(s) orally once a day (29 Apr 2025 08:24)  metFORMIN 500 mg oral tablet: 1 tab(s) orally 2 times a day (29 Apr 2025 08:24)  metoprolol succinate 50 mg oral capsule, extended release: 1 cap(s) orally once a day (29 Apr 2025 08:24)  Ozempic 8 mg/3 mL (2 mg dose) subcutaneous solution: 2 milligram(s) subcutaneously once a week (29 Apr 2025 08:24)  rosuvastatin 20 mg oral capsule: 1 cap(s) orally once a day (29 Apr 2025 08:24)  Symbicort 80 mcg-4.5 mcg/inh inhalation aerosol: 2 puff(s) inhaled 2 times a day (29 Apr 2025 08:24)  temazepam 15 mg oral capsule: 1 cap(s) orally once a day (at bedtime) as needed for PRN (29 Apr 2025 08:24)  valsartan 80 mg oral capsule: 1 cap(s) orally once a day (29 Apr 2025 08:24)  Vitamin D3 10 mcg (400 intl units) oral capsule: 2 cap(s) orally once a day (29 Apr 2025 08:24)    MEDICATIONS  (STANDING):  sodium chloride 0.9% Bolus 250 milliLiter(s) IV Bolus once  sodium chloride 0.9%. 1000 milliLiter(s) (150 mL/Hr) IV Continuous <Continuous>      PROCEDURE:    full report pending     EKG POST PCI     #CAD  - s/p WILLIAMS to RCA x 3  -VS, diet, activity as per post cath orders  -Encourage PO fluids  -Continue current medications  - start plavix 75mg daily tomorrow   -hold metformin for 48 hrs post cath- resume 5/1/25  -Post cath instructions reviewed, post sedation instructions reviewed; patient verbalizes and understands instructions  -Discussed therapeutic lifestyle changes to reduce risk factors such as following a cardiac diet, weight loss, maintaining a healthy weight, exercise, smoking cessation, medication compliance, and regular follow-up  with PCP/Cardioloigst  - stent card left in chart to be given to patient/family upon discharge from hospital by discharging RN  -Qualified for cardiac rehab. see additional chart note for full referal information  - Patient to be discharged home at 5pm ;  recommend following up with cardiologist in 2 weeks  -Plan of care discussed with patient, RN and Dr. Hummel    Department of Cardiology                                                               Division of Interventional Cardiology                                                               [facility] /[address] [address]                                                                                 [phone]           Progress Note  Patient is a 74y old  Male who presents with a chief complaint of LHC (29 Apr 2025 08:40)      Patient is  now s/p left heart catheterization    s/p LHC : WILLIAMS x 3 to RCA   Pt denies chest pain/SOB/palpitations post cath.  PROCEDURE SITE: -RRA accessed. hemoband to be removed 2 hours post placement.  Site is without hematoma or bleeding. Sensation and YAIR intact. Distal pulses palpable 2+, capillary refill < 2 seconds.     Vital Signs Last 24 Hrs  T(C): 36.5 (29 Apr 2025 07:59), Max: 36.5 (29 Apr 2025 07:59)  T(F): 97.7 (29 Apr 2025 07:59), Max: 97.7 (29 Apr 2025 07:59)  HR: 67 (29 Apr 2025 07:59) (67 - 67)  BP: 138/70 (29 Apr 2025 07:59) (138/70 - 138/70)  BP(mean): --  RR: 18 (29 Apr 2025 07:59) (18 - 18)  SpO2: 93% (29 Apr 2025 07:59) (93% - 93%)    Parameters below as of 29 Apr 2025 07:59  Patient On (Oxygen Delivery Method): room air        PHYSICAL EXAM:  NEURO: Non-focal, AxOx3.  No neuro deficits NECK: Supple, No JVD, No LAD  CHEST/LUNG: Clear to auscultation bilaterally; No wheeze  HEART: s1 s2 Regular rate and rhythm; No murmurs, rubs, or gallops  ABDOMEN: Soft, Nontender, Nondistended; Bowel sounds present X 4 quadrants   EXTREMITIES:  2+ Peripheral Pulses, No clubbing, cyanosis, or edema   VASCULAR: Peripheral pulses palpable 2+ bilaterally      Labs:            Home Medications:  alendronate 70 mg oral tablet: 1 tab(s) orally once a week (29 Apr 2025 08:24)  Aspirin Enteric Coated 81 mg oral delayed release tablet: 1 tab(s) orally once a day (29 Apr 2025 08:24)  famotidine 20 mg oral tablet: 1 tab(s) orally once a day (29 Apr 2025 08:24)  Jardiance 10 mg oral tablet: 1 tab(s) orally once a day (29 Apr 2025 08:24)  metFORMIN 500 mg oral tablet: 1 tab(s) orally 2 times a day (29 Apr 2025 08:24)  metoprolol succinate 50 mg oral capsule, extended release: 1 cap(s) orally once a day (29 Apr 2025 08:24)  Ozempic 8 mg/3 mL (2 mg dose) subcutaneous solution: 2 milligram(s) subcutaneously once a week (29 Apr 2025 08:24)  rosuvastatin 20 mg oral capsule: 1 cap(s) orally once a day (29 Apr 2025 08:24)  Symbicort 80 mcg-4.5 mcg/inh inhalation aerosol: 2 puff(s) inhaled 2 times a day (29 Apr 2025 08:24)  temazepam 15 mg oral capsule: 1 cap(s) orally once a day (at bedtime) as needed for PRN (29 Apr 2025 08:24)  valsartan 80 mg oral capsule: 1 cap(s) orally once a day (29 Apr 2025 08:24)  Vitamin D3 10 mcg (400 intl units) oral capsule: 2 cap(s) orally once a day (29 Apr 2025 08:24)    MEDICATIONS  (STANDING):  sodium chloride 0.9% Bolus 250 milliLiter(s) IV Bolus once  sodium chloride 0.9%. 1000 milliLiter(s) (150 mL/Hr) IV Continuous <Continuous>      PROCEDURE:    full report pending     EKG POST PCI - NSR     #CAD  - s/p WILLIAMS to RCA x 3  -VS, diet, activity as per post cath orders  -Encourage PO fluids  -Continue current medications  - start plavix 75mg daily tomorrow   -hold metformin for 48 hrs post cath- resume 5/1/25  -Post cath instructions reviewed, post sedation instructions reviewed; patient verbalizes and understands instructions  -Discussed therapeutic lifestyle changes to reduce risk factors such as following a cardiac diet, weight loss, maintaining a healthy weight, exercise, smoking cessation, medication compliance, and regular follow-up  with PCP/Cardioloigst  - stent card left in chart to be given to patient/family upon discharge from hospital by discharging RN  -Qualified for cardiac rehab. see additional chart note for full referal information  - Patient to be discharged home at 5pm ;  recommend following up with cardiologist in 2 weeks  -Plan of care discussed with patient, RN and Dr. Hummel

## 2025-04-29 NOTE — H&P ADULT - NSICDXPASTMEDICALHX_GEN_ALL_CORE_FT
PAST MEDICAL HISTORY:  BPH (benign prostatic hypertrophy)     CAD (coronary artery disease)     Hyperlipidemia     Hypertension     Lung cancer     Smoking history     Type 2 diabetes mellitus     Urinary calculus     Weight loss

## 2025-04-29 NOTE — ASU DISCHARGE PLAN (ADULT/PEDIATRIC) - CARE PROVIDER_API CALL
Palla, Venugopal Baugh  Cardiovascular Disease  [address] [facility], [address]  [place] [address]  Phone: [phone]  Fax: [phone]  Established Patient  Follow Up Time: 1 week

## 2025-05-01 ENCOUNTER — APPOINTMENT (OUTPATIENT)
Dept: PULMONOLOGY | Facility: CLINIC | Age: 75
End: 2025-05-01

## 2025-05-01 DIAGNOSIS — I25.10 ATHEROSCLEROTIC HEART DISEASE OF NATIVE CORONARY ARTERY WITHOUT ANGINA PECTORIS: ICD-10-CM

## 2025-05-01 DIAGNOSIS — E78.5 HYPERLIPIDEMIA, UNSPECIFIED: ICD-10-CM

## 2025-05-01 DIAGNOSIS — I10 ESSENTIAL (PRIMARY) HYPERTENSION: ICD-10-CM

## 2025-05-09 ENCOUNTER — RX RENEWAL (OUTPATIENT)
Age: 75
End: 2025-05-09

## 2025-05-14 ENCOUNTER — APPOINTMENT (OUTPATIENT)
Dept: CARDIOLOGY | Facility: CLINIC | Age: 75
End: 2025-05-14
Payer: MEDICARE

## 2025-05-14 ENCOUNTER — NON-APPOINTMENT (OUTPATIENT)
Age: 75
End: 2025-05-14

## 2025-05-14 VITALS
WEIGHT: 166 LBS | SYSTOLIC BLOOD PRESSURE: 116 MMHG | DIASTOLIC BLOOD PRESSURE: 68 MMHG | BODY MASS INDEX: 28.34 KG/M2 | HEART RATE: 89 BPM | OXYGEN SATURATION: 90 % | HEIGHT: 64 IN

## 2025-05-14 DIAGNOSIS — R01.1 CARDIAC MURMUR, UNSPECIFIED: ICD-10-CM

## 2025-05-14 DIAGNOSIS — E78.5 HYPERLIPIDEMIA, UNSPECIFIED: ICD-10-CM

## 2025-05-14 PROCEDURE — 93000 ELECTROCARDIOGRAM COMPLETE: CPT

## 2025-05-14 PROCEDURE — G2211 COMPLEX E/M VISIT ADD ON: CPT

## 2025-05-14 PROCEDURE — 99214 OFFICE O/P EST MOD 30 MIN: CPT

## 2025-05-14 RX ORDER — CLOPIDOGREL BISULFATE 75 MG/1
75 TABLET, FILM COATED ORAL DAILY
Refills: 0 | Status: ACTIVE | COMMUNITY

## 2025-05-15 ENCOUNTER — APPOINTMENT (OUTPATIENT)
Dept: INTERNAL MEDICINE | Facility: CLINIC | Age: 75
End: 2025-05-15
Payer: MEDICARE

## 2025-05-15 VITALS
BODY MASS INDEX: 28.17 KG/M2 | HEIGHT: 64 IN | SYSTOLIC BLOOD PRESSURE: 132 MMHG | OXYGEN SATURATION: 92 % | WEIGHT: 165 LBS | HEART RATE: 81 BPM | TEMPERATURE: 97.6 F | DIASTOLIC BLOOD PRESSURE: 62 MMHG

## 2025-05-15 DIAGNOSIS — I25.10 ATHEROSCLEROTIC HEART DISEASE OF NATIVE CORONARY ARTERY W/OUT ANGINA PECTORIS: ICD-10-CM

## 2025-05-15 DIAGNOSIS — J44.1 CHRONIC OBSTRUCTIVE PULMONARY DISEASE WITH (ACUTE) EXACERBATION: ICD-10-CM

## 2025-05-15 DIAGNOSIS — E11.9 TYPE 2 DIABETES MELLITUS W/OUT COMPLICATIONS: ICD-10-CM

## 2025-05-15 DIAGNOSIS — I10 ESSENTIAL (PRIMARY) HYPERTENSION: ICD-10-CM

## 2025-05-15 DIAGNOSIS — Z87.39 PERSONAL HISTORY OF OTHER DISEASES OF THE MUSCULOSKELETAL SYSTEM AND CONNECTIVE TISSUE: ICD-10-CM

## 2025-05-15 DIAGNOSIS — R53.82 CHRONIC FATIGUE, UNSPECIFIED: ICD-10-CM

## 2025-05-15 PROCEDURE — 99397 PER PM REEVAL EST PAT 65+ YR: CPT

## 2025-05-16 ENCOUNTER — APPOINTMENT (OUTPATIENT)
Dept: CT IMAGING | Facility: CLINIC | Age: 75
End: 2025-05-16
Payer: MEDICARE

## 2025-05-16 ENCOUNTER — OUTPATIENT (OUTPATIENT)
Dept: OUTPATIENT SERVICES | Facility: HOSPITAL | Age: 75
LOS: 1 days | End: 2025-05-16
Payer: MEDICARE

## 2025-05-16 DIAGNOSIS — Z95.5 PRESENCE OF CORONARY ANGIOPLASTY IMPLANT AND GRAFT: Chronic | ICD-10-CM

## 2025-05-16 DIAGNOSIS — D11.0 BENIGN NEOPLASM OF PAROTID GLAND: Chronic | ICD-10-CM

## 2025-05-16 DIAGNOSIS — N20.0 CALCULUS OF KIDNEY: Chronic | ICD-10-CM

## 2025-05-16 DIAGNOSIS — Z90.89 ACQUIRED ABSENCE OF OTHER ORGANS: Chronic | ICD-10-CM

## 2025-05-16 DIAGNOSIS — C34.12 MALIGNANT NEOPLASM OF UPPER LOBE, LEFT BRONCHUS OR LUNG: ICD-10-CM

## 2025-05-16 PROCEDURE — 71260 CT THORAX DX C+: CPT

## 2025-05-16 PROCEDURE — 71260 CT THORAX DX C+: CPT | Mod: 26

## 2025-05-16 PROCEDURE — 74177 CT ABD & PELVIS W/CONTRAST: CPT

## 2025-05-16 PROCEDURE — 74177 CT ABD & PELVIS W/CONTRAST: CPT | Mod: 26

## 2025-05-19 ENCOUNTER — OUTPATIENT (OUTPATIENT)
Dept: OUTPATIENT SERVICES | Facility: HOSPITAL | Age: 75
LOS: 1 days | Discharge: HOME | End: 2025-05-19

## 2025-05-19 DIAGNOSIS — D11.0 BENIGN NEOPLASM OF PAROTID GLAND: Chronic | ICD-10-CM

## 2025-05-19 DIAGNOSIS — Z90.89 ACQUIRED ABSENCE OF OTHER ORGANS: Chronic | ICD-10-CM

## 2025-05-19 DIAGNOSIS — Z95.5 PRESENCE OF CORONARY ANGIOPLASTY IMPLANT AND GRAFT: Chronic | ICD-10-CM

## 2025-05-19 DIAGNOSIS — N20.0 CALCULUS OF KIDNEY: Chronic | ICD-10-CM

## 2025-05-19 DIAGNOSIS — C34.90 MALIGNANT NEOPLASM OF UNSPECIFIED PART OF UNSPECIFIED BRONCHUS OR LUNG: ICD-10-CM

## 2025-05-20 LAB
ALBUMIN SERPL ELPH-MCNC: 4.1 G/DL
ALP BLD-CCNC: 86 U/L
ALT SERPL-CCNC: 33 U/L
ANION GAP SERPL CALC-SCNC: 15 MMOL/L
AST SERPL-CCNC: 26 U/L
BASOPHILS # BLD AUTO: 0.05 K/UL
BASOPHILS NFR BLD AUTO: 1 %
BILIRUB SERPL-MCNC: 0.4 MG/DL
BUN SERPL-MCNC: 12 MG/DL
CALCIUM SERPL-MCNC: 9.6 MG/DL
CHLORIDE SERPL-SCNC: 101 MMOL/L
CHOLEST SERPL-MCNC: 96 MG/DL
CO2 SERPL-SCNC: 23 MMOL/L
CREAT SERPL-MCNC: 0.83 MG/DL
EGFRCR SERPLBLD CKD-EPI 2021: 93 ML/MIN/1.73M2
EOSINOPHIL # BLD AUTO: 0.29 K/UL
EOSINOPHIL NFR BLD AUTO: 5.8 %
ESTIMATED AVERAGE GLUCOSE: 197 MG/DL
GLUCOSE SERPL-MCNC: 161 MG/DL
HBA1C MFR BLD HPLC: 8.5 %
HCT VFR BLD CALC: 44.7 %
HDLC SERPL-MCNC: 33 MG/DL
HGB BLD-MCNC: 13.8 G/DL
IMM GRANULOCYTES NFR BLD AUTO: 0.4 %
LDLC SERPL-MCNC: 40 MG/DL
LYMPHOCYTES # BLD AUTO: 0.68 K/UL
LYMPHOCYTES NFR BLD AUTO: 13.5 %
MAN DIFF?: NORMAL
MCHC RBC-ENTMCNC: 29.2 PG
MCHC RBC-ENTMCNC: 30.9 G/DL
MCV RBC AUTO: 94.7 FL
MONOCYTES # BLD AUTO: 0.54 K/UL
MONOCYTES NFR BLD AUTO: 10.7 %
NEUTROPHILS # BLD AUTO: 3.46 K/UL
NEUTROPHILS NFR BLD AUTO: 68.6 %
NONHDLC SERPL-MCNC: 63 MG/DL
PLATELET # BLD AUTO: 219 K/UL
POTASSIUM SERPL-SCNC: 4.5 MMOL/L
PROT SERPL-MCNC: 6 G/DL
RBC # BLD: 4.72 M/UL
RBC # FLD: 15.3 %
SODIUM SERPL-SCNC: 139 MMOL/L
TRIGL SERPL-MCNC: 131 MG/DL
TSH SERPL-ACNC: 1.61 UIU/ML
WBC # FLD AUTO: 5.04 K/UL

## 2025-05-21 ENCOUNTER — APPOINTMENT (OUTPATIENT)
Dept: HEMATOLOGY ONCOLOGY | Facility: CLINIC | Age: 75
End: 2025-05-21
Payer: MEDICARE

## 2025-05-21 VITALS
RESPIRATION RATE: 17 BRPM | SYSTOLIC BLOOD PRESSURE: 149 MMHG | TEMPERATURE: 98 F | WEIGHT: 164 LBS | OXYGEN SATURATION: 91 % | DIASTOLIC BLOOD PRESSURE: 71 MMHG | HEART RATE: 66 BPM | BODY MASS INDEX: 28.15 KG/M2

## 2025-05-21 DIAGNOSIS — C79.31 SECONDARY MALIGNANT NEOPLASM OF BRAIN: ICD-10-CM

## 2025-05-21 DIAGNOSIS — C34.12 MALIGNANT NEOPLASM OF UPPER LOBE, LEFT BRONCHUS OR LUNG: ICD-10-CM

## 2025-05-21 DIAGNOSIS — C77.1 SECONDARY AND UNSPECIFIED MALIGNANT NEOPLASM OF INTRATHORACIC LYMPH NODES: ICD-10-CM

## 2025-05-21 DIAGNOSIS — G47.00 INSOMNIA, UNSPECIFIED: ICD-10-CM

## 2025-05-21 PROCEDURE — G2211 COMPLEX E/M VISIT ADD ON: CPT

## 2025-05-21 PROCEDURE — 99213 OFFICE O/P EST LOW 20 MIN: CPT

## 2025-06-23 ENCOUNTER — APPOINTMENT (OUTPATIENT)
Dept: ENDOCRINOLOGY | Facility: CLINIC | Age: 75
End: 2025-06-23

## 2025-07-02 ENCOUNTER — APPOINTMENT (OUTPATIENT)
Age: 75
End: 2025-07-02
Payer: MEDICARE

## 2025-07-02 PROBLEM — M79.671 ACUTE FOOT PAIN, RIGHT: Status: RESOLVED | Noted: 2024-10-24 | Resolved: 2025-07-02

## 2025-07-02 PROBLEM — M79.672 ACUTE FOOT PAIN, LEFT: Status: RESOLVED | Noted: 2024-10-24 | Resolved: 2025-07-02

## 2025-07-02 PROBLEM — L60.0 INGROWING NAIL: Status: ACTIVE | Noted: 2025-07-02

## 2025-07-02 PROCEDURE — 11721 DEBRIDE NAIL 6 OR MORE: CPT | Mod: Q8

## 2025-07-02 PROCEDURE — 99213 OFFICE O/P EST LOW 20 MIN: CPT | Mod: 25

## 2025-07-14 ENCOUNTER — APPOINTMENT (OUTPATIENT)
Dept: CARDIOLOGY | Facility: CLINIC | Age: 75
End: 2025-07-14
Payer: MEDICARE

## 2025-07-14 VITALS
TEMPERATURE: 98 F | HEIGHT: 64 IN | SYSTOLIC BLOOD PRESSURE: 130 MMHG | DIASTOLIC BLOOD PRESSURE: 72 MMHG | WEIGHT: 160 LBS | OXYGEN SATURATION: 93 % | BODY MASS INDEX: 27.31 KG/M2 | HEART RATE: 69 BPM | RESPIRATION RATE: 17 BRPM

## 2025-07-14 VITALS
HEART RATE: 68 BPM | HEIGHT: 64 IN | WEIGHT: 160 LBS | DIASTOLIC BLOOD PRESSURE: 72 MMHG | SYSTOLIC BLOOD PRESSURE: 130 MMHG | OXYGEN SATURATION: 93 % | BODY MASS INDEX: 27.31 KG/M2

## 2025-07-14 PROCEDURE — G2211 COMPLEX E/M VISIT ADD ON: CPT

## 2025-07-14 PROCEDURE — 99214 OFFICE O/P EST MOD 30 MIN: CPT

## 2025-07-14 PROCEDURE — 93000 ELECTROCARDIOGRAM COMPLETE: CPT

## 2025-07-17 ENCOUNTER — OUTPATIENT (OUTPATIENT)
Dept: OUTPATIENT SERVICES | Facility: HOSPITAL | Age: 75
LOS: 1 days | End: 2025-07-17
Payer: MEDICARE

## 2025-07-17 ENCOUNTER — APPOINTMENT (OUTPATIENT)
Dept: MRI IMAGING | Facility: CLINIC | Age: 75
End: 2025-07-17

## 2025-07-17 DIAGNOSIS — C79.31 SECONDARY MALIGNANT NEOPLASM OF BRAIN: ICD-10-CM

## 2025-07-17 DIAGNOSIS — Z90.89 ACQUIRED ABSENCE OF OTHER ORGANS: Chronic | ICD-10-CM

## 2025-07-17 DIAGNOSIS — N20.0 CALCULUS OF KIDNEY: Chronic | ICD-10-CM

## 2025-07-17 DIAGNOSIS — D11.0 BENIGN NEOPLASM OF PAROTID GLAND: Chronic | ICD-10-CM

## 2025-07-17 DIAGNOSIS — Z95.5 PRESENCE OF CORONARY ANGIOPLASTY IMPLANT AND GRAFT: Chronic | ICD-10-CM

## 2025-07-17 DIAGNOSIS — Z00.8 ENCOUNTER FOR OTHER GENERAL EXAMINATION: ICD-10-CM

## 2025-07-17 PROCEDURE — 70553 MRI BRAIN STEM W/O & W/DYE: CPT

## 2025-07-17 PROCEDURE — 70553 MRI BRAIN STEM W/O & W/DYE: CPT | Mod: 26

## 2025-07-17 PROCEDURE — A9585: CPT

## 2025-07-22 ENCOUNTER — APPOINTMENT (OUTPATIENT)
Dept: RADIATION ONCOLOGY | Facility: CLINIC | Age: 75
End: 2025-07-22
Payer: MEDICARE

## 2025-07-22 VITALS
BODY MASS INDEX: 27.14 KG/M2 | WEIGHT: 159 LBS | DIASTOLIC BLOOD PRESSURE: 71 MMHG | SYSTOLIC BLOOD PRESSURE: 132 MMHG | HEIGHT: 64 IN | TEMPERATURE: 96.7 F | RESPIRATION RATE: 17 BRPM | HEART RATE: 68 BPM | OXYGEN SATURATION: 92 %

## 2025-07-22 DIAGNOSIS — Z92.3 PERSONAL HISTORY OF IRRADIATION: ICD-10-CM

## 2025-07-22 PROCEDURE — 99214 OFFICE O/P EST MOD 30 MIN: CPT

## 2025-07-24 ENCOUNTER — APPOINTMENT (OUTPATIENT)
Dept: ENDOCRINOLOGY | Facility: CLINIC | Age: 75
End: 2025-07-24
Payer: MEDICARE

## 2025-07-24 LAB — HBA1C MFR BLD HPLC: 6.7

## 2025-07-24 PROCEDURE — 95251 CONT GLUC MNTR ANALYSIS I&R: CPT

## 2025-07-24 PROCEDURE — 83036 HEMOGLOBIN GLYCOSYLATED A1C: CPT | Mod: QW

## 2025-07-24 PROCEDURE — 99214 OFFICE O/P EST MOD 30 MIN: CPT

## 2025-07-25 ENCOUNTER — APPOINTMENT (OUTPATIENT)
Dept: INTERNAL MEDICINE | Facility: CLINIC | Age: 75
End: 2025-07-25
Payer: MEDICARE

## 2025-07-25 VITALS
DIASTOLIC BLOOD PRESSURE: 70 MMHG | TEMPERATURE: 97.8 F | BODY MASS INDEX: 26.63 KG/M2 | HEART RATE: 68 BPM | OXYGEN SATURATION: 93 % | WEIGHT: 156 LBS | SYSTOLIC BLOOD PRESSURE: 112 MMHG | HEIGHT: 64 IN

## 2025-07-25 DIAGNOSIS — J44.9 CHRONIC OBSTRUCTIVE PULMONARY DISEASE, UNSPECIFIED: ICD-10-CM

## 2025-07-25 DIAGNOSIS — E11.9 TYPE 2 DIABETES MELLITUS W/OUT COMPLICATIONS: ICD-10-CM

## 2025-07-25 DIAGNOSIS — E78.5 HYPERLIPIDEMIA, UNSPECIFIED: ICD-10-CM

## 2025-07-25 DIAGNOSIS — C34.12 MALIGNANT NEOPLASM OF UPPER LOBE, LEFT BRONCHUS OR LUNG: ICD-10-CM

## 2025-07-25 DIAGNOSIS — R68.89 OTHER GENERAL SYMPTOMS AND SIGNS: ICD-10-CM

## 2025-07-25 DIAGNOSIS — I10 ESSENTIAL (PRIMARY) HYPERTENSION: ICD-10-CM

## 2025-07-25 DIAGNOSIS — Z95.5 PRESENCE OF CORONARY ANGIOPLASTY IMPLANT AND GRAFT: ICD-10-CM

## 2025-07-25 DIAGNOSIS — C79.31 SECONDARY MALIGNANT NEOPLASM OF BRAIN: ICD-10-CM

## 2025-07-25 DIAGNOSIS — I25.10 ATHEROSCLEROTIC HEART DISEASE OF NATIVE CORONARY ARTERY W/OUT ANGINA PECTORIS: ICD-10-CM

## 2025-07-25 PROCEDURE — G2211 COMPLEX E/M VISIT ADD ON: CPT

## 2025-07-25 PROCEDURE — 99214 OFFICE O/P EST MOD 30 MIN: CPT

## 2025-08-22 ENCOUNTER — RX RENEWAL (OUTPATIENT)
Age: 75
End: 2025-08-22

## 2025-08-25 ENCOUNTER — APPOINTMENT (OUTPATIENT)
Dept: MRI IMAGING | Facility: CLINIC | Age: 75
End: 2025-08-25

## 2025-08-25 ENCOUNTER — RX RENEWAL (OUTPATIENT)
Age: 75
End: 2025-08-25

## 2025-08-25 ENCOUNTER — OUTPATIENT (OUTPATIENT)
Dept: OUTPATIENT SERVICES | Facility: HOSPITAL | Age: 75
LOS: 1 days | End: 2025-08-25
Payer: MEDICARE

## 2025-08-25 DIAGNOSIS — Z90.89 ACQUIRED ABSENCE OF OTHER ORGANS: Chronic | ICD-10-CM

## 2025-08-25 DIAGNOSIS — C79.31 SECONDARY MALIGNANT NEOPLASM OF BRAIN: ICD-10-CM

## 2025-08-25 DIAGNOSIS — Z95.5 PRESENCE OF CORONARY ANGIOPLASTY IMPLANT AND GRAFT: Chronic | ICD-10-CM

## 2025-08-25 DIAGNOSIS — N20.0 CALCULUS OF KIDNEY: Chronic | ICD-10-CM

## 2025-08-25 DIAGNOSIS — D11.0 BENIGN NEOPLASM OF PAROTID GLAND: Chronic | ICD-10-CM

## 2025-08-25 PROCEDURE — 70553 MRI BRAIN STEM W/O & W/DYE: CPT

## 2025-08-25 PROCEDURE — 76390 MR SPECTROSCOPY: CPT | Mod: 26

## 2025-08-25 PROCEDURE — 70553 MRI BRAIN STEM W/O & W/DYE: CPT | Mod: 26

## 2025-08-25 PROCEDURE — 76390 MR SPECTROSCOPY: CPT

## 2025-08-26 ENCOUNTER — APPOINTMENT (OUTPATIENT)
Dept: OPHTHALMOLOGY | Facility: CLINIC | Age: 75
End: 2025-08-26
Payer: MEDICARE

## 2025-08-26 PROCEDURE — 92134 CPTRZ OPH DX IMG PST SGM RTA: CPT

## 2025-08-26 PROCEDURE — 92014 COMPRE OPH EXAM EST PT 1/>: CPT

## 2025-08-28 ENCOUNTER — RX RENEWAL (OUTPATIENT)
Age: 75
End: 2025-08-28

## 2025-09-03 ENCOUNTER — APPOINTMENT (OUTPATIENT)
Dept: RADIATION ONCOLOGY | Facility: CLINIC | Age: 75
End: 2025-09-03
Payer: MEDICARE

## 2025-09-03 VITALS
OXYGEN SATURATION: 90 % | TEMPERATURE: 97.16 F | DIASTOLIC BLOOD PRESSURE: 72 MMHG | HEIGHT: 64 IN | SYSTOLIC BLOOD PRESSURE: 129 MMHG | RESPIRATION RATE: 16 BRPM | HEART RATE: 74 BPM

## 2025-09-03 PROCEDURE — 99215 OFFICE O/P EST HI 40 MIN: CPT

## 2025-09-04 ENCOUNTER — RX RENEWAL (OUTPATIENT)
Age: 75
End: 2025-09-04

## 2025-09-09 ENCOUNTER — APPOINTMENT (OUTPATIENT)
Dept: INTERNAL MEDICINE | Facility: CLINIC | Age: 75
End: 2025-09-09
Payer: MEDICARE

## 2025-09-09 VITALS
OXYGEN SATURATION: 95 % | BODY MASS INDEX: 27.83 KG/M2 | WEIGHT: 163 LBS | TEMPERATURE: 98.5 F | DIASTOLIC BLOOD PRESSURE: 75 MMHG | HEART RATE: 76 BPM | HEIGHT: 64 IN | SYSTOLIC BLOOD PRESSURE: 138 MMHG

## 2025-09-09 DIAGNOSIS — I10 ESSENTIAL (PRIMARY) HYPERTENSION: ICD-10-CM

## 2025-09-09 DIAGNOSIS — C79.31 SECONDARY MALIGNANT NEOPLASM OF BRAIN: ICD-10-CM

## 2025-09-09 DIAGNOSIS — E78.5 HYPERLIPIDEMIA, UNSPECIFIED: ICD-10-CM

## 2025-09-09 DIAGNOSIS — Z23 ENCOUNTER FOR IMMUNIZATION: ICD-10-CM

## 2025-09-09 DIAGNOSIS — I25.10 ATHEROSCLEROTIC HEART DISEASE OF NATIVE CORONARY ARTERY W/OUT ANGINA PECTORIS: ICD-10-CM

## 2025-09-09 DIAGNOSIS — Z79.4 TYPE 2 DIABETES MELLITUS WITH HYPERGLYCEMIA: ICD-10-CM

## 2025-09-09 DIAGNOSIS — E11.65 TYPE 2 DIABETES MELLITUS WITH HYPERGLYCEMIA: ICD-10-CM

## 2025-09-09 PROCEDURE — G0008: CPT

## 2025-09-09 PROCEDURE — G2211 COMPLEX E/M VISIT ADD ON: CPT

## 2025-09-09 PROCEDURE — 99214 OFFICE O/P EST MOD 30 MIN: CPT

## 2025-09-09 PROCEDURE — 90662 IIV NO PRSV INCREASED AG IM: CPT

## 2025-09-09 RX ORDER — BETAMETHASONE DIPROPIONATE 0.5 MG/G
0.05 OINTMENT TOPICAL DAILY
Qty: 1 | Refills: 0 | Status: ACTIVE | COMMUNITY
Start: 2025-09-09 | End: 1900-01-01

## 2025-09-18 ENCOUNTER — RX RENEWAL (OUTPATIENT)
Age: 75
End: 2025-09-18